# Patient Record
Sex: FEMALE | Race: BLACK OR AFRICAN AMERICAN | NOT HISPANIC OR LATINO | Employment: UNEMPLOYED | URBAN - METROPOLITAN AREA
[De-identification: names, ages, dates, MRNs, and addresses within clinical notes are randomized per-mention and may not be internally consistent; named-entity substitution may affect disease eponyms.]

---

## 2019-09-01 ENCOUNTER — HOSPITAL ENCOUNTER (EMERGENCY)
Facility: HOSPITAL | Age: 49
Discharge: HOME | End: 2019-09-01
Attending: EMERGENCY MEDICINE
Payer: MEDICAID

## 2019-09-01 VITALS
RESPIRATION RATE: 18 BRPM | HEART RATE: 88 BPM | SYSTOLIC BLOOD PRESSURE: 123 MMHG | WEIGHT: 257 LBS | BODY MASS INDEX: 45.54 KG/M2 | OXYGEN SATURATION: 100 % | DIASTOLIC BLOOD PRESSURE: 74 MMHG | TEMPERATURE: 98 F | HEIGHT: 63 IN

## 2019-09-01 DIAGNOSIS — H60.391 OTHER INFECTIVE ACUTE OTITIS EXTERNA OF RIGHT EAR: Primary | ICD-10-CM

## 2019-09-01 PROCEDURE — 69020 DRG XTRNL AUD CANAL ABSCESS: CPT | Mod: RT

## 2019-09-01 PROCEDURE — 25000003 PHARM REV CODE 250: Performed by: PHYSICIAN ASSISTANT

## 2019-09-01 PROCEDURE — 99283 EMERGENCY DEPT VISIT LOW MDM: CPT | Mod: 25

## 2019-09-01 RX ORDER — AMITRIPTYLINE HYDROCHLORIDE 50 MG/1
50 TABLET, FILM COATED ORAL NIGHTLY
COMMUNITY
End: 2022-04-28

## 2019-09-01 RX ORDER — METFORMIN HYDROCHLORIDE 1000 MG/1
1000 TABLET ORAL 2 TIMES DAILY WITH MEALS
Status: ON HOLD | COMMUNITY
End: 2023-08-12

## 2019-09-01 RX ORDER — LOSARTAN POTASSIUM 100 MG/1
100 TABLET ORAL DAILY
COMMUNITY

## 2019-09-01 RX ORDER — ACETAMINOPHEN 325 MG/1
650 TABLET ORAL
Status: COMPLETED | OUTPATIENT
Start: 2019-09-01 | End: 2019-09-01

## 2019-09-01 RX ORDER — NEOMYCIN SULFATE, POLYMYXIN B SULFATE AND HYDROCORTISONE 10; 3.5; 1 MG/ML; MG/ML; [USP'U]/ML
4 SUSPENSION/ DROPS AURICULAR (OTIC) 3 TIMES DAILY
Qty: 10 ML | Refills: 0 | Status: SHIPPED | OUTPATIENT
Start: 2019-09-01 | End: 2022-04-28

## 2019-09-01 RX ORDER — LAMOTRIGINE 100 MG/1
100 TABLET ORAL DAILY
COMMUNITY
End: 2022-05-19

## 2019-09-01 RX ORDER — MECLIZINE HYDROCHLORIDE 25 MG/1
25 TABLET ORAL 3 TIMES DAILY PRN
COMMUNITY
End: 2022-04-28

## 2019-09-01 RX ORDER — ESTRADIOL 1 MG/1
1 TABLET ORAL DAILY
COMMUNITY
End: 2022-05-19

## 2019-09-01 RX ORDER — CITALOPRAM 40 MG/1
40 TABLET, FILM COATED ORAL DAILY
COMMUNITY
End: 2022-04-28

## 2019-09-01 RX ADMIN — LIDOCAINE-EPINEPHRINE-TETRACAINE GEL 4-0.05-0.5% 1 ML: 4-0.05-0.5 GEL at 12:09

## 2019-09-01 RX ADMIN — BACITRACIN, NEOMYCIN, POLYMYXIN B 1 EACH: 400; 3.5; 5 OINTMENT TOPICAL at 12:09

## 2019-09-01 RX ADMIN — ACETAMINOPHEN 650 MG: 325 TABLET, FILM COATED ORAL at 12:09

## 2019-09-01 NOTE — ED TRIAGE NOTES
"Pt reports she woke up with right ear pain this morning and reports inside of ear "feels sloshy and wet".  Pt denies drainage from ear.  Denies taking medication for pain.  Denies f/c/n/v/d.  "

## 2019-09-01 NOTE — ED PROVIDER NOTES
"Encounter Date: 2019    SCRIBE #1 NOTE: I, Sandra Jarrett, am scribing for, and in the presence of,  SERGIO Montaño. I have scribed the following portions of the note - Other sections scribed: HPI, ROS.       History     Chief Complaint   Patient presents with    Otalgia     right ear pain x3 days     CC: Ear pain    HPI:  This is a 48 y.o. female with a PMHx of HTN and DM who presents to the Emergency Department with right ear pain that started 2-3 days ago. Pt states that a pimple was found in her ear when a nurse looked into her ear. Pt also states that her ear felt "wet" this morning and was tender to touch. Pt reports associated symptom of drainage, congestion, and muffled hearing. Pt denies fever and neck pain. Pt also denies swimming. Symptoms worsen with touch.      The history is provided by the patient.     Review of patient's allergies indicates:   Allergen Reactions    Morphine Other (See Comments)     shake     Past Medical History:   Diagnosis Date    Depression     Diabetes mellitus     Fibromyalgia     Hypertension     Vertigo      Past Surgical History:   Procedure Laterality Date     SECTION      HYSTERECTOMY       History reviewed. No pertinent family history.  Social History     Tobacco Use    Smoking status: Former Smoker    Smokeless tobacco: Never Used   Substance Use Topics    Alcohol use: Yes    Drug use: Never     Review of Systems   Constitutional: Negative for chills and fever.   HENT: Positive for congestion and ear pain. Negative for sore throat.         Positive for muffled hearing  Positive for ear drainage   Eyes: Negative for visual disturbance.   Respiratory: Negative for cough and shortness of breath.    Cardiovascular: Negative for chest pain.   Gastrointestinal: Negative for abdominal pain, nausea and vomiting.   Genitourinary: Negative for dysuria and vaginal discharge.   Musculoskeletal: Negative for neck pain.   Skin: Negative for rash. "   Allergic/Immunologic: Negative for immunocompromised state.   Neurological: Negative for headaches.       Physical Exam     Initial Vitals [09/01/19 1118]   BP Pulse Resp Temp SpO2   123/74 88 18 98.2 °F (36.8 °C) 100 %      MAP       --         Physical Exam    Vitals reviewed.  Constitutional: She appears well-developed and well-nourished. She is not diaphoretic. No distress.   HENT:   Head: Normocephalic and atraumatic.   Right Ear: There is drainage, swelling and tenderness. No foreign bodies. No mastoid tenderness. Tympanic membrane is not injected.   Left Ear: External ear normal.   Nose: Nose normal.   Focal swelling, erythema, and purulent drainage from superior aspect of the right external auditory canal, just inside the meatus.  No pain with pinna manipulation.  No other erythema or swelling to the external ear.   Eyes: Conjunctivae are normal. No scleral icterus.   Neck: Normal range of motion. Neck supple.   Cardiovascular: Normal rate and regular rhythm.   Pulmonary/Chest: No respiratory distress.   Musculoskeletal: Normal range of motion.   Lymphadenopathy:     She has no cervical adenopathy.   Neurological: She is alert and oriented to person, place, and time.   Skin: Skin is warm and dry.         ED Course   I & D - Incision and Drainage  Date/Time: 9/1/2019 12:35 PM  Performed by: German Noel PA-C  Authorized by: Chris Luna MD   Consent Done: Yes  Consent: Verbal consent obtained.  Consent given by: patient  Indications for incision and drainage: Pustule.  Body area: head/neck  Location details: right external ear    Anesthesia:  Local Anesthetic: LET (lido,epi,tetracaine)  Scalpel size: 18 gauge needle.  Drainage: pus  Drainage amount: scant  Complications: No  Specimens: No  Implants: No  Patient tolerance: Patient tolerated the procedure well with no immediate complications  Comments: 18 gauge needle to facilitate spontaneous drainage from a pustule inside right external  canal        Labs Reviewed - No data to display       Imaging Results    None          Medical Decision Making:   ED Management:  48-year-old female with right external ear pain with purulent drainage, coming from local source at the superior aspect of the external auditory canal.  No other canal swelling.  TM intact. No evidence of mastoiditis, facial cellulitis, necrotizing fasciitis, or other serious infection.  18 gauge needle was used to facilitate spontaneous drainage of the pustule.  Treated with antibiotic drops, and Tylenol.  Patient advised to follow up with PCP and/or ENT.  Return precautions given.            Scribe Attestation:   Scribe #1: I performed the above scribed service and the documentation accurately describes the services I performed. I attest to the accuracy of the note.               Clinical Impression:       ICD-10-CM ICD-9-CM   1. Other infective acute otitis externa of right ear H60.391 380.10                 Scribe attestation: I, German Noel, personally performed the services described in this documentation. All medical record entries made by the scribe were at my direction and in my presence.  I have reviewed the chart and agree that the record reflects my personal performance and is accurate and complete.               German Noel, LEONARD  09/01/19 4199

## 2019-12-05 ENCOUNTER — HOSPITAL ENCOUNTER (OUTPATIENT)
Dept: PREADMISSION TESTING | Facility: HOSPITAL | Age: 49
Discharge: HOME | End: 2019-12-05
Attending: ORTHOPAEDIC SURGERY
Payer: MEDICAID

## 2019-12-05 ENCOUNTER — ANESTHESIA EVENT (OUTPATIENT)
Dept: SURGERY | Facility: HOSPITAL | Age: 49
End: 2019-12-05
Payer: MEDICAID

## 2019-12-05 VITALS
RESPIRATION RATE: 18 BRPM | DIASTOLIC BLOOD PRESSURE: 97 MMHG | OXYGEN SATURATION: 100 % | BODY MASS INDEX: 45.7 KG/M2 | HEART RATE: 88 BPM | HEIGHT: 63 IN | SYSTOLIC BLOOD PRESSURE: 182 MMHG | WEIGHT: 257.94 LBS

## 2019-12-05 DIAGNOSIS — Z01.818 PRE-OP TESTING: Primary | ICD-10-CM

## 2019-12-05 RX ORDER — SODIUM CHLORIDE, SODIUM LACTATE, POTASSIUM CHLORIDE, CALCIUM CHLORIDE 600; 310; 30; 20 MG/100ML; MG/100ML; MG/100ML; MG/100ML
INJECTION, SOLUTION INTRAVENOUS CONTINUOUS
Status: CANCELLED | OUTPATIENT
Start: 2019-12-05

## 2019-12-05 RX ORDER — CEFAZOLIN SODIUM 2 G/50ML
2 SOLUTION INTRAVENOUS ONCE
Status: CANCELLED | OUTPATIENT
Start: 2019-12-06

## 2019-12-05 RX ORDER — LIDOCAINE HYDROCHLORIDE 10 MG/ML
1 INJECTION, SOLUTION EPIDURAL; INFILTRATION; INTRACAUDAL; PERINEURAL ONCE
Status: CANCELLED | OUTPATIENT
Start: 2019-12-05 | End: 2019-12-05

## 2019-12-05 NOTE — ANESTHESIA PREPROCEDURE EVALUATION
2019  Candy Huynh is a 49 y.o., female scheduled for excision of lipoma from left shoulder on 19.    Past Medical History:   Diagnosis Date    Depression     Diabetes mellitus     Fibromyalgia     Hypertension     Vertigo      Past Surgical History:   Procedure Laterality Date     SECTION      HYSTERECTOMY      NASAL SEPTOPLASTY         Anesthesia Evaluation    I have reviewed the Patient Summary Reports.    I have reviewed the Nursing Notes.   I have reviewed the Medications.     Review of Systems  Anesthesia Hx:  No problems with previous Anesthesia  Denies Family Hx of Anesthesia complications.    Social:  Former Smoker, Social Alcohol Use    Hematology/Oncology:  Hematology Normal        Cardiovascular:   Hypertension  Denies Angina.    Pulmonary:  Pulmonary Normal    Renal/:  Renal/ Normal     Hepatic/GI:  Hepatic/GI Normal    Neurological:  Neurology Normal    Endocrine:   Diabetes, well controlled, type 2    Psych:   depression          Physical Exam  General:  Morbid Obesity    Airway/Jaw/Neck:  Airway Findings: Mouth Opening: Normal Tongue: Normal  General Airway Assessment: Adult  Mallampati: II       Chest/Lungs:  Chest/Lungs Findings: Clear to auscultation, Normal Respiratory Rate     Heart/Vascular:  Heart Findings: Rate: Normal        Mental Status:  Mental Status Findings:  Cooperative, Alert and Oriented       Outside EKG 10/29/19  NSR      Anesthesia Plan  Type of Anesthesia, risks & benefits discussed:  Anesthesia Type:  general  Patient's Preference:   Intra-op Monitoring Plan: standard ASA monitors  Intra-op Monitoring Plan Comments:   Post Op Pain Control Plan: multimodal analgesia  Post Op Pain Control Plan Comments:   Induction:   IV  Beta Blocker:  Patient is not currently on a Beta-Blocker (No further documentation required).       Informed  Consent: Patient understands risks and agrees with Anesthesia plan.  Questions answered.   ASA Score: 3     Day of Surgery Review of History & Physical:        Anesthesia Plan Notes: Anesthesia consent to be signed prior to procedure on 12/6/19  Patient's BP elevated day of PAT, did not take BP meds           Ready For Surgery From Anesthesia Perspective.

## 2019-12-05 NOTE — DISCHARGE INSTRUCTIONS
Your surgery is scheduled for 12/06/2019    Please report to Procedure Check In Room on the 2nd FLOOR at  0845 a.m.          INSTRUCTIONS IMPORTANT!!!  ¨ Do not eat or drink after 12 midnight-including water. OK to brush teeth, no   gum, candy or mints!    ¨ Take only these medicines with a small swallow of water-morning of surgery.    losartan    ____  Proceed to [facility] on *** for additional testing.        ____  Do not wear makeup, including mascara.  ____  No powder, lotions or creams to surgical area.  ____  Please remove all jewelry, including piercings and leave at home.  ____  No money or valuables needed. Please leave at home.  ____  Please bring any documents given by your doctor.  ____  If going home the same day, arrange for a ride home. You will not be able to             drive if Anesthesia was used.  ____  Children under 18 years require a parent / guardian present the entire time             they are in surgery / recovery.  ____  Wear loose fitting clothing. Allow for dressings, bandages.  ____  Stop Aspirin, Ibuprofen, Motrin and Aleve at least 3-5 days before surgery, unless otherwise instructed by your doctor, or the nurse.   You MAY use Tylenol/acetaminophen until day of surgery.  ____  Wash the surgical area with Hibiclens the night before surgery, and again the             morning of surgery.  Be sure to rinse hibiclens off completely (if instructed by   nurse).  ____  If you take diabetic medication, do not take am of surgery unless instructed by Doctor.  ____  Call MD for temperature above 101 degrees or any other signs of infection such as Urinary (bladder) infection, Upper respiratory infection, skin boils, etc.  ____ Stop taking any Fish Oil supplement or any Vitamins that contain Vitamin E at least 5 days prior to surgery.  ____ Do Not wear your contact lenses the day of your procedure.  You may wear your glasses.      ____Do not shave surgical site for 3 days prior to  surgery.  ____ Practice Good hand washing before, during, and after procedure.      I have read or had read and explained to me, and understand the above information.  Additional comments or instructions:  For additional questions call [phone]      ANESTHESIA SIDE EFFECTS  -For the first 24 hours after surgery:  Do not drive, use heavy equipment, make important decisions, or drink alcohol  -It is normal to feel sleepy for several hours.  Rest until you are more awake.  -Have someone stay with you, if needed.  They can watch for problems and help keep you safe.  -Some possible post anesthesia side effects include: nausea and vomiting, sore throat and hoarseness, sleepiness, and dizziness.        Pre-Op Bathing Instructions    Before surgery, you can play an important role in your own health.    Because skin is not sterile, we need to be sure that your skin is as free of germs as possible. By following the instructions below, you can reduce the number of germs on your skin before surgery.    IMPORTANT: You will need to shower with a special soap called Hibiclens*, available at any pharmacy.  If you are allergic to Chlorhexidine (the antiseptic in Hibiclens), use an antibacterial soap such as Dial Soap for your preoperative shower.  You will shower with Hibiclens both the night before your surgery and the morning of your surgery.  Do not use Hibiclens on the head, face or genitals to avoid injury to those areas.    STEP #1: THE NIGHT BEFORE YOUR SURGERY     1. Do not shave the area of your body where your surgery will be performed.  2. Shower and wash your hair and body as usual with your normal soap and shampoo.  3. Rinse your hair and body thoroughly after you shower to remove all soap residue.  4. With your hand, apply one packet of Hibiclens soap to the surgical site.   5. Wash the site gently for five (5) minutes. Do not scrub your skin too hard.   6. Do not wash with your regular soap after Hibiclens is  used.  7. Rinse your body thoroughly.  8. Pat yourself dry with a clean, soft towel.  9. Do not use lotion, cream, or powder.  10. Wear clean clothes.    STEP #2: THE MORNING OF YOUR SURGERY     1. Repeat Step #1.    * Not to be used by people allergic to Chlorhexidine.      Anesthesia: General Anesthesia     You are watched continuously during your procedure by your anesthesia provider.     Youre due to have surgery. During surgery, youll be given medicine called anesthesia or anesthetic. This will keep you comfortable and pain-free. Your anesthesia provider will use general anesthesia.  What is general anesthesia?  General anesthesia puts you into a state like deep sleep. It goes into the bloodstream (IV anesthetics), into the lungs (gas anesthetics), or both. You feel nothing during the procedure. You will not remember it. During the procedure, the anesthesia provider monitors you continuously. He or she checks your heart rate and rhythm, blood pressure, breathing, and blood oxygen.  · IV anesthetics. IV anesthetics are given through an IV line in your arm. Theyre often given first. This is so you are asleep before a gas anesthetic is started. Some kinds of IV anesthetics relieve pain. Others relax you. Your doctor will decide which kind is best in your case.  · Gas anesthetics. Gas anesthetics are breathed into the lungs. They are often used to keep you asleep. They can be given through a facemask or a tube placed in your larynx or trachea (breathing tube).  ¨ If you have a facemask, your anesthesia provider will most likely place it over your nose and mouth while youre still awake. Youll breathe oxygen through the mask as your IV anesthetic is started. Gas anesthetic may be added through the mask.  ¨ If you have a tube in the larynx or trachea, it will be inserted into your throat after youre asleep.  Anesthesia tools and medicines  You will likely have:  · IV anesthetics. These are put into an IV line  into your bloodstream.  · Gas anesthetics. You breathe these anesthetics into your lungs, where they pass into your bloodstream.  · Pulse oximeter. This is a small clip that is attached to the end of your finger. This measures your blood oxygen level.  · Electrocardiography leads (electrodes). These are small sticky pads that are placed on your chest. They record your heart rate and rhythm.  · Blood pressure cuff. This reads your blood pressure.  Risks and possible complications  General anesthesia has some risks. These include:  · Breathing problems  · Nausea and vomiting  · Sore throat or hoarseness (usually temporary)  · Allergic reaction to the anesthetic  · Irregular heartbeat (rare)  · Cardiac arrest (rare)   Anesthesia safety  · Follow all instructions you are given for how long not to eat or drink before your procedure.  · Be sure your doctor knows what medicines and drugs you take. This includes over-the-counter medicines, herbs, supplements, alcohol or other drugs. You will be asked when those were last taken.  · Have an adult family member or friend drive you home after the procedure.  · For the first 24 hours after your surgery:  ¨ Do not drive or use heavy equipment.  ¨ Do not make important decisions or sign legal documents. If important decisions or signing legal documents is necessary during the first 24 hours after surgery, have a trusted family member or spouse act on your behalf.  ¨ Avoid alcohol.  ¨ Have a responsible adult stay with you. He or she can watch for problems and help keep you safe.  Date Last Reviewed: 12/1/2016 © 2000-2017 [employer]. [address] [place] [address], [place] [address]. All rights reserved. This information is not intended as a substitute for professional medical care. Always follow your healthcare professional's instructions.

## 2019-12-05 NOTE — PRE-PROCEDURE INSTRUCTIONS
Allergies, medical, surgical, family and psychosocial histories reviewed with patient. Periop plan of care discussed. Preop instructions given, including medications to take and to hold. Hibiclens soap and instructions on use given. Time given for questions and pt voiced understanding.      Pt contact number: Carmen King [phone]

## 2019-12-06 ENCOUNTER — ANESTHESIA (OUTPATIENT)
Dept: SURGERY | Facility: HOSPITAL | Age: 49
End: 2019-12-06
Payer: MEDICAID

## 2019-12-06 ENCOUNTER — HOSPITAL ENCOUNTER (OUTPATIENT)
Facility: HOSPITAL | Age: 49
Discharge: HOME | End: 2019-12-06
Attending: ORTHOPAEDIC SURGERY | Admitting: ORTHOPAEDIC SURGERY
Payer: MEDICAID

## 2019-12-06 VITALS
TEMPERATURE: 97 F | DIASTOLIC BLOOD PRESSURE: 72 MMHG | HEART RATE: 78 BPM | BODY MASS INDEX: 45.54 KG/M2 | SYSTOLIC BLOOD PRESSURE: 124 MMHG | RESPIRATION RATE: 18 BRPM | OXYGEN SATURATION: 95 % | WEIGHT: 257 LBS | HEIGHT: 63 IN

## 2019-12-06 DIAGNOSIS — Z01.818 PRE-OP TESTING: ICD-10-CM

## 2019-12-06 DIAGNOSIS — D17.20 LIPOMA OF ARM: ICD-10-CM

## 2019-12-06 PROCEDURE — 64415 NJX AA&/STRD BRCH PLXS IMG: CPT | Performed by: STUDENT IN AN ORGANIZED HEALTH CARE EDUCATION/TRAINING PROGRAM

## 2019-12-06 PROCEDURE — 63600175 PHARM REV CODE 636 W HCPCS

## 2019-12-06 PROCEDURE — 01710 ANES PX NRV MUSC UPR A&E NOS: CPT | Performed by: ORTHOPAEDIC SURGERY

## 2019-12-06 PROCEDURE — 63600175 PHARM REV CODE 636 W HCPCS: Performed by: NURSE ANESTHETIST, CERTIFIED REGISTERED

## 2019-12-06 PROCEDURE — 25000003 PHARM REV CODE 250: Performed by: NURSE ANESTHETIST, CERTIFIED REGISTERED

## 2019-12-06 PROCEDURE — 36000706: Performed by: ORTHOPAEDIC SURGERY

## 2019-12-06 PROCEDURE — 88304 TISSUE EXAM BY PATHOLOGIST: CPT | Performed by: PATHOLOGY

## 2019-12-06 PROCEDURE — 25000003 PHARM REV CODE 250: Performed by: STUDENT IN AN ORGANIZED HEALTH CARE EDUCATION/TRAINING PROGRAM

## 2019-12-06 PROCEDURE — 63600175 PHARM REV CODE 636 W HCPCS: Performed by: NURSE PRACTITIONER

## 2019-12-06 PROCEDURE — 63600175 PHARM REV CODE 636 W HCPCS: Performed by: ANESTHESIOLOGY

## 2019-12-06 PROCEDURE — 37000008 HC ANESTHESIA 1ST 15 MINUTES: Performed by: ORTHOPAEDIC SURGERY

## 2019-12-06 PROCEDURE — 25000003 PHARM REV CODE 250: Performed by: ORTHOPAEDIC SURGERY

## 2019-12-06 PROCEDURE — 88304 PR  SURG PATH,LEVEL III: ICD-10-PCS | Mod: 26,,, | Performed by: PATHOLOGY

## 2019-12-06 PROCEDURE — 63600175 PHARM REV CODE 636 W HCPCS: Performed by: STUDENT IN AN ORGANIZED HEALTH CARE EDUCATION/TRAINING PROGRAM

## 2019-12-06 PROCEDURE — 88304 TISSUE EXAM BY PATHOLOGIST: CPT | Mod: 26,,, | Performed by: PATHOLOGY

## 2019-12-06 PROCEDURE — 76942 ECHO GUIDE FOR BIOPSY: CPT | Performed by: STUDENT IN AN ORGANIZED HEALTH CARE EDUCATION/TRAINING PROGRAM

## 2019-12-06 PROCEDURE — 27200704 HC ULTRASOUND NDL/ECHOSTIM: Performed by: ANESTHESIOLOGY

## 2019-12-06 PROCEDURE — 71000015 HC POSTOP RECOV 1ST HR: Performed by: ORTHOPAEDIC SURGERY

## 2019-12-06 PROCEDURE — 37000009 HC ANESTHESIA EA ADD 15 MINS: Performed by: ORTHOPAEDIC SURGERY

## 2019-12-06 PROCEDURE — 36000707: Performed by: ORTHOPAEDIC SURGERY

## 2019-12-06 PROCEDURE — 71000033 HC RECOVERY, INTIAL HOUR: Performed by: ORTHOPAEDIC SURGERY

## 2019-12-06 RX ORDER — ONDANSETRON 2 MG/ML
INJECTION INTRAMUSCULAR; INTRAVENOUS
Status: DISCONTINUED | OUTPATIENT
Start: 2019-12-06 | End: 2019-12-06

## 2019-12-06 RX ORDER — METOCLOPRAMIDE HYDROCHLORIDE 5 MG/ML
10 INJECTION INTRAMUSCULAR; INTRAVENOUS ONCE
Status: COMPLETED | OUTPATIENT
Start: 2019-12-06 | End: 2019-12-06

## 2019-12-06 RX ORDER — ACETAMINOPHEN 10 MG/ML
INJECTION, SOLUTION INTRAVENOUS
Status: DISCONTINUED | OUTPATIENT
Start: 2019-12-06 | End: 2019-12-06

## 2019-12-06 RX ORDER — CEFAZOLIN SODIUM 2 G/50ML
2 SOLUTION INTRAVENOUS
Status: DISCONTINUED | OUTPATIENT
Start: 2019-12-06 | End: 2019-12-06 | Stop reason: HOSPADM

## 2019-12-06 RX ORDER — HYDROMORPHONE HYDROCHLORIDE 2 MG/ML
0.5 INJECTION, SOLUTION INTRAMUSCULAR; INTRAVENOUS; SUBCUTANEOUS EVERY 5 MIN PRN
Status: DISCONTINUED | OUTPATIENT
Start: 2019-12-06 | End: 2019-12-06 | Stop reason: HOSPADM

## 2019-12-06 RX ORDER — SODIUM CHLORIDE 9 MG/ML
INJECTION, SOLUTION INTRAVENOUS CONTINUOUS
Status: DISCONTINUED | OUTPATIENT
Start: 2019-12-06 | End: 2019-12-06 | Stop reason: HOSPADM

## 2019-12-06 RX ORDER — LIDOCAINE HCL/PF 100 MG/5ML
SYRINGE (ML) INTRAVENOUS
Status: DISCONTINUED | OUTPATIENT
Start: 2019-12-06 | End: 2019-12-06

## 2019-12-06 RX ORDER — GLYCOPYRROLATE 0.2 MG/ML
INJECTION INTRAMUSCULAR; INTRAVENOUS
Status: DISCONTINUED | OUTPATIENT
Start: 2019-12-06 | End: 2019-12-06

## 2019-12-06 RX ORDER — SUCCINYLCHOLINE CHLORIDE 20 MG/ML
INJECTION INTRAMUSCULAR; INTRAVENOUS
Status: DISCONTINUED | OUTPATIENT
Start: 2019-12-06 | End: 2019-12-06

## 2019-12-06 RX ORDER — ASPIRIN 325 MG
325 TABLET, DELAYED RELEASE (ENTERIC COATED) ORAL DAILY
Qty: 30 TABLET | Refills: 11 | Status: SHIPPED | OUTPATIENT
Start: 2019-12-06 | End: 2022-05-19

## 2019-12-06 RX ORDER — SODIUM CHLORIDE, SODIUM LACTATE, POTASSIUM CHLORIDE, CALCIUM CHLORIDE 600; 310; 30; 20 MG/100ML; MG/100ML; MG/100ML; MG/100ML
INJECTION, SOLUTION INTRAVENOUS CONTINUOUS
Status: DISCONTINUED | OUTPATIENT
Start: 2019-12-06 | End: 2019-12-06 | Stop reason: HOSPADM

## 2019-12-06 RX ORDER — BUPIVACAINE HYDROCHLORIDE AND EPINEPHRINE 2.5; 5 MG/ML; UG/ML
INJECTION, SOLUTION EPIDURAL; INFILTRATION; INTRACAUDAL; PERINEURAL
Status: DISCONTINUED | OUTPATIENT
Start: 2019-12-06 | End: 2019-12-06 | Stop reason: HOSPADM

## 2019-12-06 RX ORDER — SODIUM CHLORIDE 0.9 % (FLUSH) 0.9 %
10 SYRINGE (ML) INJECTION
Status: DISCONTINUED | OUTPATIENT
Start: 2019-12-06 | End: 2019-12-06 | Stop reason: HOSPADM

## 2019-12-06 RX ORDER — DEXAMETHASONE SODIUM PHOSPHATE 4 MG/ML
INJECTION, SOLUTION INTRA-ARTICULAR; INTRALESIONAL; INTRAMUSCULAR; INTRAVENOUS; SOFT TISSUE
Status: DISCONTINUED | OUTPATIENT
Start: 2019-12-06 | End: 2019-12-06

## 2019-12-06 RX ORDER — ONDANSETRON 2 MG/ML
INJECTION INTRAMUSCULAR; INTRAVENOUS
Status: COMPLETED
Start: 2019-12-06 | End: 2019-12-06

## 2019-12-06 RX ORDER — EPHEDRINE SULFATE 50 MG/ML
INJECTION, SOLUTION INTRAVENOUS
Status: DISCONTINUED | OUTPATIENT
Start: 2019-12-06 | End: 2019-12-06

## 2019-12-06 RX ORDER — METOCLOPRAMIDE HYDROCHLORIDE 5 MG/ML
INJECTION INTRAMUSCULAR; INTRAVENOUS
Status: DISCONTINUED
Start: 2019-12-06 | End: 2019-12-06 | Stop reason: HOSPADM

## 2019-12-06 RX ORDER — MIDAZOLAM HYDROCHLORIDE 1 MG/ML
INJECTION, SOLUTION INTRAMUSCULAR; INTRAVENOUS
Status: DISCONTINUED | OUTPATIENT
Start: 2019-12-06 | End: 2019-12-06

## 2019-12-06 RX ORDER — ROCURONIUM BROMIDE 10 MG/ML
INJECTION, SOLUTION INTRAVENOUS
Status: DISCONTINUED | OUTPATIENT
Start: 2019-12-06 | End: 2019-12-06

## 2019-12-06 RX ORDER — LIDOCAINE HYDROCHLORIDE 10 MG/ML
1 INJECTION, SOLUTION EPIDURAL; INFILTRATION; INTRACAUDAL; PERINEURAL ONCE
Status: DISCONTINUED | OUTPATIENT
Start: 2019-12-06 | End: 2019-12-06 | Stop reason: HOSPADM

## 2019-12-06 RX ORDER — PROPOFOL 10 MG/ML
VIAL (ML) INTRAVENOUS
Status: DISCONTINUED | OUTPATIENT
Start: 2019-12-06 | End: 2019-12-06

## 2019-12-06 RX ORDER — NEOSTIGMINE METHYLSULFATE 1 MG/ML
INJECTION, SOLUTION INTRAVENOUS
Status: DISCONTINUED | OUTPATIENT
Start: 2019-12-06 | End: 2019-12-06

## 2019-12-06 RX ORDER — PHENYLEPHRINE HYDROCHLORIDE 10 MG/ML
INJECTION INTRAVENOUS
Status: DISCONTINUED | OUTPATIENT
Start: 2019-12-06 | End: 2019-12-06

## 2019-12-06 RX ORDER — ONDANSETRON 4 MG/1
4 TABLET, FILM COATED ORAL EVERY 6 HOURS PRN
Qty: 30 TABLET | Refills: 0 | Status: SHIPPED | OUTPATIENT
Start: 2019-12-06 | End: 2022-05-19

## 2019-12-06 RX ORDER — MUPIROCIN 20 MG/G
OINTMENT TOPICAL
Status: DISCONTINUED | OUTPATIENT
Start: 2019-12-06 | End: 2019-12-06 | Stop reason: HOSPADM

## 2019-12-06 RX ORDER — HYDROCODONE BITARTRATE AND ACETAMINOPHEN 7.5; 325 MG/1; MG/1
1 TABLET ORAL EVERY 6 HOURS PRN
Qty: 30 TABLET | Refills: 0 | OUTPATIENT
Start: 2019-12-06 | End: 2020-10-21

## 2019-12-06 RX ORDER — SODIUM CHLORIDE 9 MG/ML
INJECTION, SOLUTION INTRAVENOUS CONTINUOUS PRN
Status: DISCONTINUED | OUTPATIENT
Start: 2019-12-06 | End: 2019-12-06

## 2019-12-06 RX ORDER — CEFAZOLIN SODIUM 2 G/50ML
2 SOLUTION INTRAVENOUS ONCE
Status: DISCONTINUED | OUTPATIENT
Start: 2019-12-06 | End: 2019-12-06 | Stop reason: HOSPADM

## 2019-12-06 RX ORDER — ONDANSETRON 2 MG/ML
4 INJECTION INTRAMUSCULAR; INTRAVENOUS ONCE AS NEEDED
Status: COMPLETED | OUTPATIENT
Start: 2019-12-06 | End: 2019-12-06

## 2019-12-06 RX ORDER — CEFAZOLIN SODIUM 1 G/3ML
INJECTION, POWDER, FOR SOLUTION INTRAMUSCULAR; INTRAVENOUS
Status: DISCONTINUED | OUTPATIENT
Start: 2019-12-06 | End: 2019-12-06

## 2019-12-06 RX ORDER — FENTANYL CITRATE 50 UG/ML
INJECTION, SOLUTION INTRAMUSCULAR; INTRAVENOUS
Status: DISCONTINUED | OUTPATIENT
Start: 2019-12-06 | End: 2019-12-06

## 2019-12-06 RX ADMIN — EPHEDRINE SULFATE 5 MG: 50 INJECTION, SOLUTION INTRAMUSCULAR; INTRAVENOUS; SUBCUTANEOUS at 02:12

## 2019-12-06 RX ADMIN — PHENYLEPHRINE HYDROCHLORIDE 100 MCG: 10 INJECTION INTRAVENOUS at 03:12

## 2019-12-06 RX ADMIN — FENTANYL CITRATE 50 MCG: 50 INJECTION, SOLUTION INTRAMUSCULAR; INTRAVENOUS at 04:12

## 2019-12-06 RX ADMIN — GLYCOPYRROLATE 0.6 MG: 0.2 INJECTION, SOLUTION INTRAMUSCULAR; INTRAVENOUS at 05:12

## 2019-12-06 RX ADMIN — MIDAZOLAM 2 MG: 1 INJECTION INTRAMUSCULAR; INTRAVENOUS at 01:12

## 2019-12-06 RX ADMIN — PHENYLEPHRINE HYDROCHLORIDE 100 MCG: 10 INJECTION INTRAVENOUS at 04:12

## 2019-12-06 RX ADMIN — PHENYLEPHRINE HYDROCHLORIDE 300 MCG: 10 INJECTION INTRAVENOUS at 02:12

## 2019-12-06 RX ADMIN — ONDANSETRON 4 MG: 2 INJECTION INTRAMUSCULAR; INTRAVENOUS at 05:12

## 2019-12-06 RX ADMIN — SODIUM CHLORIDE: 0.9 INJECTION, SOLUTION INTRAVENOUS at 03:12

## 2019-12-06 RX ADMIN — SODIUM CHLORIDE, SODIUM LACTATE, POTASSIUM CHLORIDE, AND CALCIUM CHLORIDE: .6; .31; .03; .02 INJECTION, SOLUTION INTRAVENOUS at 01:12

## 2019-12-06 RX ADMIN — FENTANYL CITRATE 25 MCG: 50 INJECTION, SOLUTION INTRAMUSCULAR; INTRAVENOUS at 04:12

## 2019-12-06 RX ADMIN — EPHEDRINE SULFATE 15 MG: 50 INJECTION, SOLUTION INTRAMUSCULAR; INTRAVENOUS; SUBCUTANEOUS at 02:12

## 2019-12-06 RX ADMIN — NEOSTIGMINE METHYLSULFATE 5 MG: 1 INJECTION INTRAVENOUS at 05:12

## 2019-12-06 RX ADMIN — ONDANSETRON 4 MG: 2 INJECTION, SOLUTION INTRAMUSCULAR; INTRAVENOUS at 03:12

## 2019-12-06 RX ADMIN — SUCCINYLCHOLINE CHLORIDE 100 MG: 20 INJECTION, SOLUTION INTRAMUSCULAR; INTRAVENOUS at 01:12

## 2019-12-06 RX ADMIN — ROCURONIUM BROMIDE 80 MG: 10 INJECTION, SOLUTION INTRAVENOUS at 01:12

## 2019-12-06 RX ADMIN — CEFAZOLIN 2 G: 330 INJECTION, POWDER, FOR SOLUTION INTRAMUSCULAR; INTRAVENOUS at 01:12

## 2019-12-06 RX ADMIN — ACETAMINOPHEN 1000 MG: 10 INJECTION, SOLUTION INTRAVENOUS at 02:12

## 2019-12-06 RX ADMIN — PHENYLEPHRINE HYDROCHLORIDE 200 MCG: 10 INJECTION INTRAVENOUS at 02:12

## 2019-12-06 RX ADMIN — FENTANYL CITRATE 100 MCG: 50 INJECTION, SOLUTION INTRAMUSCULAR; INTRAVENOUS at 01:12

## 2019-12-06 RX ADMIN — LIDOCAINE HYDROCHLORIDE 100 MG: 20 INJECTION, SOLUTION INTRAVENOUS at 01:12

## 2019-12-06 RX ADMIN — PROPOFOL 200 MG: 10 INJECTION, EMULSION INTRAVENOUS at 01:12

## 2019-12-06 RX ADMIN — PHENYLEPHRINE HYDROCHLORIDE 100 MCG: 10 INJECTION INTRAVENOUS at 02:12

## 2019-12-06 RX ADMIN — EPHEDRINE SULFATE 10 MG: 50 INJECTION, SOLUTION INTRAMUSCULAR; INTRAVENOUS; SUBCUTANEOUS at 04:12

## 2019-12-06 RX ADMIN — METOCLOPRAMIDE 10 MG: 5 INJECTION, SOLUTION INTRAMUSCULAR; INTRAVENOUS at 05:12

## 2019-12-06 RX ADMIN — DEXAMETHASONE SODIUM PHOSPHATE 4 MG: 4 INJECTION, SOLUTION INTRAMUSCULAR; INTRAVENOUS at 01:12

## 2019-12-06 NOTE — DISCHARGE INSTRUCTIONS
Do not lift your left arm.  Keep your sling on at all times except for hygiene.  Keep the dressing in place until follow up office visit.   Keep dressing clean dry and intact.   Take your medication as directed.       ANESTHESIA  -For the first 24 hours after surgery:  Do not drive, use heavy equipment, make important decisions, or drink alcohol  -It is normal to feel sleepy for several hours.  Rest until you are more awake.  -Have someone stay with you, if needed.  They can watch for problems and help keep you safe.  -Some possible post anesthesia side effects include: nausea and vomiting, sore throat and hoarseness, sleepiness, and dizziness.    PAIN  -If you have pain after surgery, pain medicine will help you feel better.  Take it as directed, before pain becomes severe.  Most pain relievers taken by mouth need at least 20-30 minutes to start working.  -Do not drive or drink alcohol while taking pain medicine.  -Pain medication can upset your stomach.  Taking them with a little food may help.  -Other ways to help control pain: elevation, ice, and relaxation  -Call your surgeon if still having unmanageable pain an hour after taking pain medicine.  -Pain medicine can cause constipation.  Taking an over-the counter stool softener while on prescription pain medicine and drinking plenty of fluids can prevent this side effect.  -Call your surgeon if you have severe side effects like: breathing problems, trouble waking up, dizziness, confusion, or severe constipation.    NAUSEA  -Some people have nausea after surgery.  This is often because of anesthesia, pain, pain medicine, or the stress of surgery.  -Do not push yourself to eat.  Start off with clear liquids and soup.  Slowly move to solid foods.  Don't eat fatty, rich, spicy foods at first.  Eat smaller amounts.  -If you develop persistent nausea and vomiting please notify your surgeon immediately.    BLEEDING  -Different types of surgery require different types of  care and dressing changes.  It is important to follow all instructions and advice from your surgeon.  Change dressing as directed.  Call your surgeon for any concerns regarding postop bleeding.    SIGNS OF INFECTION  -Signs of infection include: fever, swelling, drainage, and redness  -Notify your surgeon if you have a fever of 100.4 F (38.0 C) or higher.  -Notify your surgeon if you notice redness, swelling, increased pain, pus, or a foul smell at the incision site.          Aspirin, ASA oral tablets  What is this medicine?  ASPIRIN (AS pir in) is a pain reliever. It is used to treat mild pain and fever. This medicine is also used as directed by a doctor to prevent and to treat heart attacks, to prevent strokes, and to treat arthritis or inflammation.  How should I use this medicine?  Take this medicine by mouth with a glass of water. Follow the directions on the package or prescription label. You can take this medicine with or without food. If it upsets your stomach, take it with food. Do not take your medicine more often than directed.  Talk to your pediatrician regarding the use of this medicine in children. While this drug may be prescribed for children as young as 12 years of age for selected conditions, precautions do apply. Children and teenagers should not use this medicine to treat chicken pox or flu symptoms unless directed by a doctor.  Patients over 65 years old may have a stronger reaction and need a smaller dose.  What side effects may I notice from receiving this medicine?  Side effects that you should report to your doctor or health care professional as soon as possible:  · allergic reactions like skin rash, itching or hives, swelling of the face, lips, or tongue  · breathing problems  · changes in hearing, ringing in the ears  · confusion  · general ill feeling or flu-like symptoms  · pain on swallowing  · redness, blistering, peeling or loosening of the skin, including inside the mouth or  nose  · signs and symptoms of bleeding such as bloody or black, tarry stools; red or dark-brown urine; spitting up blood or brown material that looks like coffee grounds; red spots on the skin; unusual bruising or bleeding from the eye, gums, or nose  · trouble passing urine or change in the amount of urine  · unusually weak or tired  · yellowing of the eyes or skin  Side effects that usually do not require medical attention (report to your doctor or health care professional if they continue or are bothersome):  · diarrhea or constipation  · headache  · nausea, vomiting  · stomach gas, heartburn  What may interact with this medicine?  Do not take this medicine with any of the following medications:  · cidofovir  · ketorolac  · probenecid  This medicine may also interact with the following medications:  · alcohol  · alendronate  · bismuth subsalicylate  · flavocoxid  · herbal supplements like feverfew, garlic, marisa, ginkgo biloba, horse chestnut  · medicines for diabetes or glaucoma like acetazolamide, methazolamide  · medicines for gout  · medicines that treat or prevent blood clots like enoxaparin, heparin, ticlopidine, warfarin  · other aspirin and aspirin-like medicines  · NSAIDs, medicines for pain and inflammation, like ibuprofen or naproxen  · pemetrexed  · sulfinpyrazone  · varicella live vaccine  What if I miss a dose?  If you are taking this medicine on a regular schedule and miss a dose, take it as soon as you can. If it is almost time for your next dose, take only that dose. Do not take double or extra doses.  Where should I keep my medicine?  Keep out of the reach of children.  Store at room temperature between 15 and 30 degrees C (59 and 86 degrees F). Protect from heat and moisture. Do not use this medicine if it has a strong vinegar smell. Throw away any unused medicine after the expiration date.  What should I tell my health care provider before I take this medicine?  They need to know if you have  any of these conditions:  · anemia  · asthma  · bleeding problems  · child with chickenpox, the flu, or other viral infection  · diabetes  · gout  · if you frequently drink alcohol containing drinks  · kidney disease  · liver disease  · low level of vitamin K  · lupus  · smoke tobacco  · stomach ulcers or other problems  · an unusual or allergic reaction to aspirin, tartrazine dye, other medicines, dyes, or preservatives  · pregnant or trying to get pregnant  · breast-feeding  What should I watch for while using this medicine?  If you are treating yourself for pain, tell your doctor or health care professional if the pain lasts more than 10 days, if it gets worse, or if there is a new or different kind of pain. Tell your doctor if you see redness or swelling. Also, check with your doctor if you have a fever that lasts for more than 3 days. Only take this medicine to prevent heart attacks or blood clotting if prescribed by your doctor or health care professional.  Do not take aspirin or aspirin-like medicines with this medicine. Too much aspirin can be dangerous. Always read the labels carefully.  This medicine can irritate your stomach or cause bleeding problems. Do not smoke cigarettes or drink alcohol while taking this medicine. Do not lie down for 30 minutes after taking this medicine to prevent irritation to your throat.  If you are scheduled for any medical or dental procedure, tell your healthcare provider that you are taking this medicine. You may need to stop taking this medicine before the procedure.  This medicine may be used to treat migraines. If you take migraine medicines for 10 or more days a month, your migraines may get worse. Keep a diary of headache days and medicine use. Contact your healthcare professional if your migraine attacks occur more frequently.  NOTE:This sheet is a summary. It may not cover all possible information. If you have questions about this medicine, talk to your doctor,  pharmacist, or health care provider. Copyright© 2017 [employer]          Acetaminophen; Hydrocodone tablets or capsules  What is this medicine?  ACETAMINOPHEN; HYDROCODONE (a set a JAZLYN alonso fen; liza droe KOE done) is a pain reliever. It is used to treat moderate to severe pain.  How should I use this medicine?  Take this medicine by mouth with a glass of water. Follow the directions on the prescription label. You can take it with or without food. If it upsets your stomach, take it with food. Do not take your medicine more often than directed.  A special MedGuide will be given to you by the pharmacist with each prescription and refill. Be sure to read this information carefully each time.  Talk to your pediatrician regarding the use of this medicine in children. Special care may be needed.  What side effects may I notice from receiving this medicine?  Side effects that you should report to your doctor or health care professional as soon as possible:  · allergic reactions like skin rash, itching or hives, swelling of the face, lips, or tongue  · breathing problems  · confusion  · redness, blistering, peeling or loosening of the skin, including inside the mouth  · signs and symptoms of low blood pressure like dizziness; feeling faint or lightheaded, falls; unusually weak or tired  · trouble passing urine or change in the amount of urine  · yellowing of the eyes or skin  Side effects that usually do not require medical attention (report to your doctor or health care professional if they continue or are bothersome):  · constipation  · dry mouth  · nausea, vomiting  · tiredness  What may interact with this medicine?  This medicine may interact with the following medications:  · alcohol  · antiviral medicines for HIV or AIDS  · atropine  · antihistamines for allergy, cough and cold  · certain antibiotics like erythromycin, clarithromycin  · certain medicines for anxiety or sleep  · certain medicines for bladder problems  like oxybutynin, tolterodine  · certain medicines for depression like amitriptyline, fluoxetine, sertraline  · certain medicines for fungal infections like ketoconazole and itraconazole  · certain medicines for Parkinson's disease like benztropine, trihexyphenidyl  · certain medicines for seizures like carbamazepine, phenobarbital, phenytoin, primidone  · certain medicines for stomach problems like dicyclomine, hyoscyamine  · certain medicines for travel sickness like scopolamine  · general anesthetics like halothane, isoflurane, methoxyflurane, propofol  · ipratropium  · local anesthetics like lidocaine, pramoxine, tetracaine  · MAOIs like Carbex, Eldepryl, Marplan, Nardil, and Parnate  · medicines that relax muscles for surgery  · other medicines with acetaminophen  · other narcotic medicines for pain or cough  · phenothiazines like chlorpromazine, mesoridazine, prochlorperazine, thioridazine  · rifampin  What if I miss a dose?  If you miss a dose, take it as soon as you can. If it is almost time for your next dose, take only that dose. Do not take double or extra doses.  Where should I keep my medicine?  Keep out of the reach of children. This medicine can be abused. Keep your medicine in a safe place to protect it from theft. Do not share this medicine with anyone. Selling or giving away this medicine is dangerous and against the law.  This medicine may cause accidental overdose and death if it taken by other adults, children, or pets. Mix any unused medicine with a substance like cat litter or coffee grounds. Then throw the medicine away in a sealed container like a sealed bag or a coffee can with a lid. Do not use the medicine after the expiration date.  Store at room temperature between 15 and 30 degrees C (59 and 86 degrees F).  What should I tell my health care provider before I take this medicine?  They need to know if you have any of these conditions:  · brain tumor  · Crohn's disease, inflammatory bowel  disease, or ulcerative colitis  · drug abuse or addiction  · head injury  · heart or circulation problems  · if you often drink alcohol  · kidney disease or problems going to the bathroom  · liver disease  · lung disease, asthma, or breathing problems  · an unusual or allergic reaction to acetaminophen, hydrocodone, other opioid analgesics, other medicines, foods, dyes, or preservatives  · pregnant or trying to get pregnant  · breast-feeding  What should I watch for while using this medicine?  Tell your doctor or health care professional if your pain does not go away, if it gets worse, or if you have new or a different type of pain. You may develop tolerance to the medicine. Tolerance means that you will need a higher dose of the medicine for pain relief. Tolerance is normal and is expected if you take the medicine for a long time.  Do not suddenly stop taking your medicine because you may develop a severe reaction. Your body becomes used to the medicine. This does NOT mean you are addicted. Addiction is a behavior related to getting and using a drug for a non-medical reason. If you have pain, you have a medical reason to take pain medicine. Your doctor will tell you how much medicine to take. If your doctor wants you to stop the medicine, the dose will be slowly lowered over time to avoid any side effects.  There are different types of narcotic medicines (opiates). If you take more than one type at the same time or if you are taking another medicine that also causes drowsiness, you may have more side effects. Give your health care provider a list of all medicines you use. Your doctor will tell you how much medicine to take. Do not take more medicine than directed. Call emergency for help if you have problems breathing or unusual sleepiness.  Do not take other medicines that contain acetaminophen with this medicine. Always read labels carefully. If you have questions, ask your doctor or pharmacist.  If you take too  much acetaminophen get medical help right away. Too much acetaminophen can be very dangerous and cause liver damage. Even if you do not have symptoms, it is important to get help right away.  You may get drowsy or dizzy. Do not drive, use machinery, or do anything that needs mental alertness until you know how this medicine affects you. Do not stand or sit up quickly, especially if you are an older patient. This reduces the risk of dizzy or fainting spells. Alcohol may interfere with the effect of this medicine. Avoid alcoholic drinks.  The medicine will cause constipation. Try to have a bowel movement at least every 2 to 3 days. If you do not have a bowel movement for 3 days, call your doctor or health care professional.  Your mouth may get dry. Chewing sugarless gum or sucking hard candy, and drinking plenty of water may help. Contact your doctor if the problem does not go away or is severe.  NOTE:This sheet is a summary. It may not cover all possible information. If you have questions about this medicine, talk to your doctor, pharmacist, or health care provider. Copyright© 2017 [employer]        Ondansetron tablets  What is this medicine?  ONDANSETRON (on DAN se bereket) is used to treat nausea and vomiting caused by chemotherapy. It is also used to prevent or treat nausea and vomiting after surgery.  How should I use this medicine?  Take this medicine by mouth with a glass of water. Follow the directions on your prescription label. Take your doses at regular intervals. Do not take your medicine more often than directed.  Talk to your pediatrician regarding the use of this medicine in children. Special care may be needed.  What side effects may I notice from receiving this medicine?  Side effects that you should report to your doctor or health care professional as soon as possible:  · allergic reactions like skin rash, itching or hives, swelling of the face, lips or tongue  · breathing  problems  · confusion  · dizziness  · fast or irregular heartbeat  · feeling faint or lightheaded, falls  · fever and chills  · loss of balance or coordination  · seizures  · sweating  · swelling of the hands or feet  · tightness in the chest  · tremors  · unusually weak or tired  Side effects that usually do not require medical attention (report to your doctor or health care professional if they continue or are bothersome):  · constipation or diarrhea  · headache  What may interact with this medicine?  Do not take this medicine with any of the following medications:  · apomorphine  · certain medicines for fungal infections like fluconazole, itraconazole, ketoconazole, posaconazole, voriconazole  · cisapride  · dofetilide  · dronedarone  · pimozide  · thioridazine  · ziprasidone  This medicine may also interact with the following medications:  · carbamazepine  · certain medicines for depression, anxiety, or psychotic disturbances  · fentanyl  · linezolid  · MAOIs like Carbex, Eldepryl, Marplan, Nardil, and Parnate  · methylene blue (injected into a vein)  · other medicines that prolong the QT interval (cause an abnormal heart rhythm)  · phenytoin  · rifampicin  · tramadol  What if I miss a dose?  If you miss a dose, take it as soon as you can. If it is almost time for your next dose, take only that dose. Do not take double or extra doses.  Where should I keep my medicine?  Keep out of the reach of children.  Store between 2 and 30 degrees C (36 and 86 degrees F). Throw away any unused medicine after the expiration date.  What should I tell my health care provider before I take this medicine?  They need to know if you have any of these conditions:  · heart disease  · history of irregular heartbeat  · liver disease  · low levels of magnesium or potassium in the blood  · an unusual or allergic reaction to ondansetron, granisetron, other medicines, foods, dyes, or preservatives  · pregnant or trying to get  pregnant  · breast-feeding  What should I watch for while using this medicine?  Check with your doctor or health care professional right away if you have any sign of an allergic reaction.  NOTE:This sheet is a summary. It may not cover all possible information. If you have questions about this medicine, talk to your doctor, pharmacist, or health care provider. Copyright© 2017 [employer]

## 2019-12-06 NOTE — TRANSFER OF CARE
"Anesthesia Transfer of Care Note    Patient: Candy Huynh    Procedure(s) Performed: Procedure(s) (LRB):  EXCISION, MASS, EXTREMITY (Left)  RECONSTRUCTION-SOFT TISSUE (Left)    Patient location: PACU    Anesthesia Type: general and regional    Transport from OR: Transported from OR on 6-10 L/min O2 by face mask with adequate spontaneous ventilation    Post pain: adequate analgesia    Post assessment: no apparent anesthetic complications and tolerated procedure well    Post vital signs: stable    Level of consciousness: sedated and responds to stimulation    Nausea/Vomiting: no nausea/vomiting    Complications: none    Transfer of care protocol was followed      Last vitals:   Visit Vitals  /73   Pulse 78   Temp 36.2 °C (97.2 °F) (Skin)   Resp 20   Ht 5' 3" (1.6 m)   Wt 116.6 kg (257 lb)   SpO2 100%   Breastfeeding? No   BMI 45.53 kg/m²     "

## 2019-12-06 NOTE — ANESTHESIA PROCEDURE NOTES
Peripheral Block    Patient location during procedure: pre-op   Block not for primary anesthetic.  Reason for block: post-op pain management   Post-op Pain Location: left shoulder  Start time: 12/6/2019 1:30 PM  Timeout: 12/6/2019 1:29 PM   End time: 12/6/2019 1:34 PM    Staffing  Authorizing Provider: Amaury Mustafa MD  Performing Provider: Harman Alfaro MD    Preanesthetic Checklist  Completed: patient identified, site marked, pre-op evaluation, timeout performed, IV checked, risks and benefits discussed and monitors and equipment checked  Peripheral Block  Patient position: sitting  Block type: interscalene  Laterality: left  Injection technique: single shot  Needle  Needle type: Stimuplex   Needle localization: ultrasound guidance  Catheter type: stimulating  Catheter size: 19 G  Catheter at skin depth: 3 cm   -ultrasound image captured on disc.  Assessment  Injection assessment: negative aspiration and local visualized surrounding nerve  Paresthesia pain: none  Heart rate change: no  Slow fractionated injection: no

## 2019-12-06 NOTE — H&P
Reason For Visit  patient comes in for unknown mass on left shoulder that causes pain with movement.      History of Present Illness  The patient is a 49-year-old female who presents for evaluation and treatment of a left anterior shoulder mass. She says this started back in . She associates development of this lump on her shoulder to a period of overusing her left arm while on light duty at work after an accident in 2017. She states she never received appropriate treatment for this left shoulder problem and thus the problem has been getting worse. She complains of pain at night. She denies any neurologic complaints of shooting pains into her hand. Though she has full motion and strength of her shoulder, she states forward flexion causes the mass to hurt. She denies any fevers or chills per    Location: Left anterior shoulder    Severity: 5-6 out of 10    Timing: Onset     Modifying factors: Worse with forward flexion activity, better with rest    Quality: Burning sharp pain    Context: Atraumatic    Associated signs and symptoms: No neurologic complaints       Allergies   · morphine    Current Meds    Medication Name Instruction   busPIRone HCl - 10 MG Oral Tablet    Citalopram Hydrobromide 40 MG Oral Tablet    Estradiol 1 MG Oral Tablet    LaMICtal 150 MG Oral Tablet (LamoTRIgine)    Losartan Potassium 50 MG Oral Tablet    Meclizine HCl - 25 MG Oral Tablet Chewable    metFORMIN HCl - 1000 MG Oral Tablet      Surgical History   · History of  section   · History of Elbow surgery   · History of Hysterectomy   · History of Wrist surgery    Review of Systems  See chart for complete Review of Systems, Family, and Social history noted by patient and personally reviewed today.      Results/Data    Patient had x-rays of the left shoulder taken at an outside facility on 10/16/2019. These are notable for acromioclavicular arthrosis, moderate. No acute fractures or dislocations. The glenohumeral joint has  mild arthritic changes. No soft tissue calcifications noted in these images.       MRI is personally reviewed, taken recently of the left shoulder. These include multiple views of the shoulder demonstrating a large encapsulated mass in the soft tissue of the anterior shoulder and the anterior portion of the deltoid. The mass is same signal as the surrounding fat with no other signal appreciated in T1 images. Fat suppression images demonstrate full suppression of all signal in this mass. These findings together indicate a benign these findings together indicate peer fatty composition of the tissue. No aggressive findings noted. No invasion of nearby muscle or bone structures.     Vitals   Recorded: 30Oct2019 12:17PM   Height 5 ft 3 in   Weight 262 lb    BMI Calculated 46.41   BSA Calculated 2.17   Systolic 136, LUE, Sitting   Diastolic 89, LUE, Sitting   Heart Rate 84, L Brachial Artery   Pulse Quality Normal, L Brachial Artery   Pain Scale 6   Location: Left shoulder     Physical Exam  Vital signs are noted in the chart above.     The patient appears generally well and stated age.    The patient is oriented x 3.    Mood and affect are appropriate and normal.     Gait is normal.    Left shoulder is examined. There is a 5 cm round firm tender mass over the anterior shoulder just lateral to the biceps groove and distal to the AC joint. There is no fluctuance erythema or warmth to this mass. Mild tenderness to palpation at the AC joint. She has full range of motion of the shoulder in all directions but pain with forward flexion in the region of this mass. Rotator cuff strength is 5 out of 5. Deltoid strength is 5 out of 5. Normal sensation all distributions except decreased sensation in the region of the axillary nerve over the posterior and lateral deltoid compared to the right side. Palpable radial pulse.    Right shoulder examined for comparison. No masses or bulges noted over the shoulder. No tenderness to  palpation. Normal range of motion. Normal strength throughout the rotator cuff and entire extremity. Normal sensation in all distributions. Palpable radial pulse.      Assessment    1. 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 The mass is large and affects her activities of      daily living. Due to the position of the mass it is difficult for the patient to raise her arm.      She also has difficulty laying on that side due to pain caused by this mass. Patient has      the impression the mass continues to grow. The patient would like the mass removed. I      have advised her as to the risks and benefits of undergoing surgical intervention. These      include but are not limited to #1 regrowth of the mass, #2 injury to the      surrounding neurovascular structures, especially the axillary nerve and the deltoid      muscles, #3 worsening of pain after surgery, #4 prolonged recovery and limited      shoulder function after surgery, #5 need for repeat surgery. After discussing these risks,      the patient would like to proceed with surgical resection of this mass, which I      think is a reasonable option in this patient due to the limitations it is causing      her. Patient would like to schedule surgery for December 10, 2019, she will require      preoperative medical clearance prior to this. We will see her for surgery at that time and      then in the postop period.    Orders   1. Tobacco Use Screening; Status:Complete;   Done: [id number]    Plan  1. Plan for surgery, left shoulder mass resection  2. Patient will follow-up after surgery      3. Risks and benefits of surgery were discussed at length, including the possibility the mass is cancer, the possibility of regrowth of the mass, persistent pain, need for further surgery.

## 2019-12-06 NOTE — ANESTHESIA POSTPROCEDURE EVALUATION
Anesthesia Post Evaluation    Patient: Candy Huynh    Procedure(s) Performed: Procedure(s) (LRB):  EXCISION, MASS, EXTREMITY (Left)  RECONSTRUCTION-SOFT TISSUE (Left)    Final Anesthesia Type: MAC and regional    Patient location during evaluation: PACU  Patient participation: Yes- Able to Participate  Level of consciousness: awake and alert  Post-procedure vital signs: reviewed and stable  Pain management: adequate  Airway patency: patent    PONV status at discharge: No PONV  Anesthetic complications: no      Cardiovascular status: blood pressure returned to baseline and hemodynamically stable  Respiratory status: unassisted  Hydration status: euvolemic  Follow-up not needed.          Vitals Value Taken Time   /83 12/6/2019  5:46 PM   Temp 36.2 °C (97.2 °F) 12/6/2019  5:31 PM   Pulse 86 12/6/2019  5:47 PM   Resp 19 12/6/2019  5:47 PM   SpO2 97 % 12/6/2019  5:47 PM   Vitals shown include unvalidated device data.      No case tracking events are documented in the log.      Pain/Lisa Score: Lisa Score: 10 (12/6/2019  5:30 PM)

## 2019-12-06 NOTE — INTERVAL H&P NOTE
The patient has been examined and the H&P has been reviewed:    I concur with the findings and no changes have occurred since H&P was written.    Anesthesia/Surgery risks, benefits and alternative options discussed and understood by patient/family.          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

## 2019-12-06 NOTE — OP NOTE
LUCY ABRAHAM  : 1970  MRN: [id number]    Date of Procedure: 2019     Pre-op Diagnosis:  Left deltoid mass    Post-op Diagnosis:  Same    Procedure: excision of soft tissue tumor of arm (61198)     Surgeon: Honorio Pulido IV, MD   Assisting Surgeon:  none     Anesthesia: general, single-shot interscalene nerve blockade    Estimated Blood Loss: 50mL           Specimens:   1) frozen section: resulted as benign appearing fatty tissue  2) permanent section: pending      Implants: none    Indications:   The patient is a 49-year-old female who presented to the clinic for persistent left deltoid swelling and pain.  She had noted a mass developing over the lateral aspect of her shoulder.  This became painful and diagnostic imaging was performed.  This diagnostic imaging was consistent with a lipoma.  The patient was requested resection of this tumor.  A long discussion prior to surgery was had regarding the benefits of surgical treatment as well as the risks.  The risks included recurrence of the tumor, malignant tumor requiring further surgery, injury to the axillary nerve, severe bleeding, persistent pain in her shoulder, motor dysfunction, sensory dysfunction, or death.  Having understood these risks the patient proceeded with surgery.    Surgical procedure:   The patient was given preoperative antibiotics for prophylaxis against infection.  The patient was taken to the operating room and placed in the beach chair position on the OR table.  After adequate general anesthesia and securing of her head, the extremity was prepped and draped in standard sterile fashion. Local anesthetic was injected over the proposed surgical site which was in a standard deltopectoral incision.    (57053) deep to the deltoid fascia was noted to be a swelling deep to the mass of the lateral deltoid.  This was noted to be in the rhaphe between the anterior and lateral deltoid muscles.  The deltopectoral interval was developed  isolating the cephalic vein and taking it medially for protection during the case.  Once the interval was developed, the undersurface of the deltoid was palpated to correlate the location of the mass with the location of the axillary nerve and posterior humeral circumflex artery.  The structures were palpated and noted to be deep to the lesion.  The lateral deltoid was dissected sharply off of the lateral edge of the acromion. A single digit was inserted deep to the location of the lesion on the undersurface of the deltoid.  This finger was retained over the axillary nerve during dissection of the large mass measured above. Dissection was carried out between the anterior and lateral deltoid, revealing a large fatty appearing tumor.  This was taken out EN Kell.  During the dissection through the muscle, direct protection was used for the axillary nerve and posterior humeral circumflex artery.  These were not violated during the dissection.  After the tumor was removed, it was sent to pathology for frozen section as well as permanent section.     Next to the deltoid was reattached to the acromion using #5 braided nonabsorbable suture through drill holes in the lateral acromion. This anatomically restored the deltoid to its origin on the lateral margin of the acromion. The defect between the anterior deltoid and lateral deltoid was closed loosely with #1 Vicryl suture.  The wound was copiously irrigated and then closed in layers using 2 0 Vicryl for the subcutaneous layer and a 3 0 Monocryl for the skin.    Steri-Strips and a waterproof Aquacel dressing were applied over the wound.  The wound closed nicely. After drapes were removed, the patient was placed in a shoulder immobilizer with an abduction pad.  The patient was subsequently extubated in the OR.  She was transferred to the PACU in good condition.        Postoperative plan:  The patient will be nonweightbearing on the operative extremity.  She is to wear the  shoulder immobilizer at all times except for hygiene. She is to keep the dressing on until she sees me in the office in approximately 10 days ([phone]).        Complications: No     Estimated Blood Loss (EBL): 200 mL                   Condition: Good     Disposition: PACU - hemodynamically stable.     Attestation: I was present and scrubbed for the entire procedure.

## 2019-12-06 NOTE — BRIEF OP NOTE
[facility]  Brief Operative Note     SUMMARY     Surgery Date: 12/6/2019     Surgeon(s) and Role:     * Honorio Pulido IV, MD - Primary    Assisting Surgeon: None    Pre-op Diagnosis:  Localized swelling, mass and lump, upper limb [R22.30]    Post-op Diagnosis:  Post-Op Diagnosis Codes:     * Localized swelling, mass and lump, upper limb [R22.30]    Procedure(s) (LRB):  EXCISION, MASS, EXTREMITY (Left)  RECONSTRUCTION-SOFT TISSUE (Left)    Anesthesia: General    Description of the findings of the procedure: see operative report     Findings/Key Components: see operative report    Estimated Blood Loss: * No values recorded between 12/6/2019  2:44 PM and 12/6/2019  5:24 PM *         Specimens:   Specimen (12h ago, onward)    None          Discharge Note    SUMMARY     Admit Date: 12/6/2019    Discharge Date and Time:  12/06/2019 5:43 PM    Hospital Course (synopsis of major diagnoses, care, treatment, and services provided during the course of the hospital stay): no acute events     Final Diagnosis: Post-Op Diagnosis Codes:     * Localized swelling, mass and lump, upper limb [R22.30]    Disposition: Home or Self Care    Follow Up/Patient Instructions:     Medications:  Reconciled Home Medications:      Medication List      START taking these medications    aspirin 325 MG EC tablet  Commonly known as:  ECOTRIN  Take 1 tablet (325 mg total) by mouth once daily.     HYDROcodone-acetaminophen 7.5-325 mg per tablet  Commonly known as:  NORCO  Take 1 tablet by mouth every 6 (six) hours as needed for Pain.     ondansetron 4 MG tablet  Commonly known as:  ZOFRAN  Take 1 tablet (4 mg total) by mouth every 6 (six) hours as needed for Nausea.        CONTINUE taking these medications    amitriptyline 50 MG tablet  Commonly known as:  ELAVIL  Take 50 mg by mouth every evening.     citalopram 40 MG tablet  Commonly known as:  CELEXA  Take 40 mg by mouth once daily.     estradiol 1 MG tablet  Commonly known as:   ESTRACE  Take 1 mg by mouth once daily.     lamoTRIgine 100 MG tablet  Commonly known as:  LAMICTAL  Take 100 mg by mouth once daily.     losartan 100 MG tablet  Commonly known as:  COZAAR  Take 100 mg by mouth once daily.     meclizine 25 mg tablet  Commonly known as:  ANTIVERT  Take 25 mg by mouth 3 (three) times daily as needed.     metFORMIN 1000 MG tablet  Commonly known as:  GLUCOPHAGE  Take 1,000 mg by mouth 2 (two) times daily with meals.     neomycin-polymyxin-hydrocortisone 3.5-10,000-1 mg/mL-unit/mL-% otic suspension  Commonly known as:  CORTISPORIN  Place 4 drops into the right ear 3 (three) times daily.          Discharge Procedure Orders   Diet Adult Regular     Ice to affected area     Leave dressing on - Keep it clean, dry, and intact until clinic visit     Other restrictions (specify):   Order Comments: Sling at all times on left arm     Activity as tolerated     Weight bearing restrictions (specify):   Order Comments: NWLANE KUMAR, sling at all times

## 2019-12-07 NOTE — PLAN OF CARE
Pt ambulated to bathroom and voided freely, burping and states relief of nausea and gas discomfort. Tolerating clear liquids and crackers. Assisted pt to get dressed. Left arm in sling shot at all times. Explained and demonstrated to pt and family proper use and position of sling. IV discontinued. Discharge instructions given to pt and to family with time for questions and they voiced understanding. The family already has the delivered filled rx from out pharmacy. Pt able to tolerate activity to and from bathroom and getting dressed very well. Placed up in wheelchair and tolerating well Denies complaints. Looking forward to going home. Pt and family agree with discharge plan.

## 2019-12-09 NOTE — ADDENDUM NOTE
Addendum  created 12/09/19 1019 by Jacob Man MD    Cosign clinical note, Intraprocedure Flowsheets edited

## 2019-12-10 NOTE — BRIEF OP NOTE
[facility]  Brief Operative Note     SUMMARY     Surgery Date: 12/6/2019     Surgeon(s) and Role:     * Honorio Pulido IV, MD - Primary    Assisting Surgeon: None    Pre-op Diagnosis:  Localized swelling, mass and lump, upper limb [R22.30]    Post-op Diagnosis:  Post-Op Diagnosis Codes:     * Localized swelling, mass and lump, upper limb [R22.30]    Procedure(s) (LRB):  EXCISION, MASS, EXTREMITY (Left)  RECONSTRUCTION-SOFT TISSUE (Left)    Anesthesia: General    Description of the findings of the procedure: see operative report    Findings/Key Components: see operative report    Estimated Blood Loss: * No values recorded between 12/6/2019  2:44 PM and 12/6/2019  5:24 PM *         Specimens:   Specimen (12h ago, onward)    None          Discharge Note    SUMMARY     Admit Date: 12/6/2019    Discharge Date: 12/6/2019    Hospital Course (synopsis of major diagnoses, care, treatment, and services provided during the course of the hospital stay): no acute events     Final Diagnosis: Post-Op Diagnosis Codes:     * Localized swelling, mass and lump, upper limb [R22.30]    Disposition: Home or Self Care    Follow Up/Patient Instructions:     Medications:  Reconciled Home Medications:      Medication List      START taking these medications    aspirin 325 MG EC tablet  Commonly known as:  ECOTRIN  Take 1 tablet (325 mg total) by mouth once daily.     HYDROcodone-acetaminophen 7.5-325 mg per tablet  Commonly known as:  NORCO  Take 1 tablet by mouth every 6 (six) hours as needed for Pain.     ondansetron 4 MG tablet  Commonly known as:  ZOFRAN  Take 1 tablet (4 mg total) by mouth every 6 (six) hours as needed for Nausea.        CONTINUE taking these medications    amitriptyline 50 MG tablet  Commonly known as:  ELAVIL  Take 50 mg by mouth every evening.     citalopram 40 MG tablet  Commonly known as:  CELEXA  Take 40 mg by mouth once daily.     estradiol 1 MG tablet  Commonly known as:  ESTRACE  Take 1 mg by  mouth once daily.     lamoTRIgine 100 MG tablet  Commonly known as:  LAMICTAL  Take 100 mg by mouth once daily.     losartan 100 MG tablet  Commonly known as:  COZAAR  Take 100 mg by mouth once daily.     meclizine 25 mg tablet  Commonly known as:  ANTIVERT  Take 25 mg by mouth 3 (three) times daily as needed.     metFORMIN 1000 MG tablet  Commonly known as:  GLUCOPHAGE  Take 1,000 mg by mouth 2 (two) times daily with meals.     neomycin-polymyxin-hydrocortisone 3.5-10,000-1 mg/mL-unit/mL-% otic suspension  Commonly known as:  CORTISPORIN  Place 4 drops into the right ear 3 (three) times daily.          Discharge Procedure Orders   Diet Adult Regular     Ice to affected area     Leave dressing on - Keep it clean, dry, and intact until clinic visit     Other restrictions (specify):   Order Comments: Sling at all times on left arm     Activity as tolerated     Weight bearing restrictions (specify):   Order Comments: NWB LUE, sling at all times     Follow-up Information     Honorio Pulido IV, MD.    Specialty:  Orthopedic Surgery  Why:  For wound re-check  Contact information:  [address] [place] [address]  [address] [place] [id number] [phone]

## 2019-12-18 LAB
FINAL PATHOLOGIC DIAGNOSIS: NORMAL
FROZEN SECTION DIAGNOSIS: NORMAL
FROZEN SECTION FOOTNOTE: NORMAL
GROSS: NORMAL

## 2020-02-06 ENCOUNTER — CLINICAL SUPPORT (OUTPATIENT)
Dept: REHABILITATION | Facility: HOSPITAL | Age: 50
End: 2020-02-06
Payer: MEDICAID

## 2020-02-06 DIAGNOSIS — M25.512 LEFT ANTERIOR SHOULDER PAIN: ICD-10-CM

## 2020-02-06 PROCEDURE — 97161 PT EVAL LOW COMPLEX 20 MIN: CPT | Mod: PN

## 2020-02-06 NOTE — PLAN OF CARE
Physical Therapy Initial Evaluation     Name: Candy King [facility] Number: [id number]    Diagnosis:   Encounter Diagnosis   Name Primary?    Left anterior shoulder pain      Physician: Honorio Pulido IV, MD  Treatment Orders: PT Eval and Treat  Past Medical History:   Diagnosis Date    Depression     Diabetes mellitus     Fibromyalgia     Hypertension     Vertigo       Current Outpatient Medications   Medication Sig    amitriptyline (ELAVIL) 50 MG tablet Take 50 mg by mouth every evening.    aspirin (ECOTRIN) 325 MG EC tablet Take 1 tablet (325 mg total) by mouth once daily.    citalopram (CELEXA) 40 MG tablet Take 40 mg by mouth once daily.    estradiol (ESTRACE) 1 MG tablet Take 1 mg by mouth once daily.    HYDROcodone-acetaminophen (NORCO) 7.5-325 mg per tablet Take 1 tablet by mouth every 6 (six) hours as needed for Pain.    lamoTRIgine (LAMICTAL) 100 MG tablet Take 100 mg by mouth once daily.    losartan (COZAAR) 100 MG tablet Take 100 mg by mouth once daily.    meclizine (ANTIVERT) 25 mg tablet Take 25 mg by mouth 3 (three) times daily as needed.    metFORMIN (GLUCOPHAGE) 1000 MG tablet Take 1,000 mg by mouth 2 (two) times daily with meals.    neomycin-polymyxin-hydrocortisone (CORTISPORIN) 3.5-10,000-1 mg/mL-unit/mL-% otic suspension Place 4 drops into the right ear 3 (three) times daily.    ondansetron (ZOFRAN) 4 MG tablet Take 1 tablet (4 mg total) by mouth every 6 (six) hours as needed for Nausea.     No current facility-administered medications for this visit.      Review of patient's allergies indicates:   Allergen Reactions    Morphine Other (See Comments)     shake       Evaluation Date: 2-6-20  PT Diagnosis: L shoulder pain  Authorization period: 1-23-20 to 3-1-20  Visit # authorized: 20  Evaluation Time in/Out: see POC    Subjective     History of condition/Onset Date:   on December 6, 2019 pt underwent L shoulder surgery to  "remove a mass that was "in between her muscle and nerve". She says that the doctor said they "had to remove a muscle to get to the mass." she was wearing a sling for 6 wks. Denies having any specific restrictions from the doctor but says she is having a lot of trouble using her L arm at all. She still having ROM deficits from being immobilized.    PRECAUTIONS: dep, DM, HTN, fibromyalgia  Primary complaint: L shoulder pain  Describes pain as achy in ant shoulder, itchy around incision. Incision is hypersensitive.   Prior Treatment/Easing factors: tried tylenol/ibuprofen which doesn't change the pain much. Sometimes heat helps.  Current Functional Limitations:  pain and difficulty when trying to sleep, lifting groceries, folding laundry, driving due to L shoulder pain.   Occupation:  Was going to school prior to shoulder surgery. Not working.                       Prior Functional Status: pt living with her daughter who helps her with things around the house.   Pain Scale 0-10:  Current: 7/10     Best: 6/10      Worst: 10/10 says she has just dealt with the pain like this. Asked for pain meds but the doctor did not give her any.   Pts goals:  Get L shoulder feeling better and working again.     Objective     Observation/Posture: rounded shoulders  Pt with vertical incision along upper biceps measuring about 6 inches, dry healed, looks healthy. Pt notes its sensitive.   Palpation: significant TTP of scapular attachments on L side, biceps    Cervical Range of Motion:    % Pain   Flexion WFL    Extension WFL    Right Rotation 75    Left Rotation 75 Painful and tight     Right Side Bending WNL    Left Side Bending WNL       Shoulder Range of Motion:   Shoulder Left Right   Flexion 81 155   Abduction 52 WNL   ER Barely Able to reach L side of face WNL   IR L PSIS WNL     MMT   strength:   R= 50, 60 pounds force  L= 30,35 pounds force   Right Left Comment   Shoulder flexion: 4/5 3/5 Painful with all resistance applied " to L UE, MMT score based on lack of available range to perform test properly.    Shoulder Abduction: 4-/5 3-/5    Shoulder ER: 4/5 3/5    Shoulder IR: 4+/5 3-/5    Lower Trap: 3+/5 3-/5    Middle Trap: 3+/5 3+/5        Pt/family was provided educational information, including: role of PT, goals for PT, scheduling - pt verbalized understanding.   Discussed insurance limitations with pt.     Functional Limitations Reports - G Codes  Category: Other PT    Tool: FOTO Cervical Survey   Modifier  Impairment Limitation Restriction    CH  0 % impaired, limited or restricted    CI  @ least 1% but less than 20% impaired, limited or restricted    CJ  @ least 20%<40% impaired, limited or restricted    CK  @ least 40%<60% impaired, limited or restricted    CL  @ least 60% <80% impaired, limited or restricted    CM  @ least 80%<100% impaired limited or restricted    CN  100% impaired, limited or restricted     Current/: CL = 70% Limitation   Goal/ : CI = 19% Limitation      History  Co-morbidities and personal factors that may impact the plan of care Co-morbidities:   depression, diabetes, difficulty sleeping, high BMI, HTN and fibromyalgia.    Personal Factors:   no deficits     low   Examination  Body Structures and Functions, activity limitations and participation restrictions that may impact the plan of care Body Regions:   upper extremities  trunk    Body Systems:    gross symmetry  ROM  strength  gross coordinated movement  motor control  motor learning    Participation Restrictions:   none    Activity limitations:   Learning and applying knowledge  no deficits    General Tasks and Commands  no deficits    Communication  no deficits    Mobility  lifting and carrying objects  fine hand use (grasping/picking up)  driving (bike, car, motorcycle)  household chores    Self care  caring for body parts (brushing teeth, shaving, grooming)  dressing     Domestic Life  no deficits    Interactions/Relationships  no  deficits    Life Areas  no deficits    Community and Social Life  no deficits         low   Clinical Presentation stable and uncomplicated low   Decision Making/ Complexity Score: low         TREATMENT     PT Evaluation Completed? Yes  Educated pt on treatment goals and plan of care. Pt demo good understanding along with good return demonstration of home exercise program.      Candy received 00 minutes of therapeutic exercise & instruction to improve LOF per flowsheet.   Will plan to give HEP at first visit for comfortable AAROM stretches to improve L shoulder ROM.     MHP applied to L shoulder while semi-reclined to decrease muscle pain for ten mins at end of eval.   Candy received 00 minutes of manual therapy including:    Written Home Exercises Provided: no.   Candy verbalized good understanding of the education provided.   Patient demo good return demo with skill during exercises.    Assessment     Patient displays limitations in L shoulder AROM and strength due to recent immobilization following surgery to remove a mass in her L shoulder. She was very apprehensive to movement and reports very high levels of pain. Will take caution in prescribing HEP and starting AAROM exercises at next session. Pt requested heating pad and noted comfort with this.   Pt will benefit from skilled outpatient physical therapy to address the above stated deficits, provide pt/family education and to maximize pt's level of independence.  Pt prognosis is Excellent.      Medical necessity is demonstrated by the following IMPAIRMENTS/PROBLEMS:  - Pain limiting function  - Decreased Segmental Mobility & Decreased ROM  - Decreased Core & B UE strength  - Decreased Flexibility of L UE  - Decreased Tolerance to Functional Activities  - Inability to participate in vocational pursuits  - Requires skilled supervision to complete and progress HEP    Pt's spiritual, cultural and educational needs considered and pt agreeable to plan of care  and goals as stated below:     Anticipated Barriers for physical therapy: none    Short Term GOALS:  1. Pt to report average pain <5/10 with regular ADLs.   2.  Pt will be able to tolerate multi-directional UE strengthening without pain to improve functional use of UEs.  3. Pt will report >50% improvement in ability to dress self/put on jacket without pain since start of care to indicate improved functional mobility.    4. Pt to be able to sleep >4hrs without interruption due to pain.  5. Pt to begin HEP including specific stretching and strengthening as per their diagnosis to decrease pain and improve flexibility.  6. Pt to display >100 deg L shoulder flexion, >85 deg ABD in standing to allow for improved fxl level.       Long Term GOALS:   1. Pt to report <2/10, no longer limiting functional activities.  2. Pt to report return to >75% of regular ADLs, work and recreational tasks.   3. Pt to be compliant in advanced HEP to maintain progress gained in therapy thus far.  4. Pt will be able to perform 2 x 10 shoulder flexion with 2 lbs to indicate improved strength in B UEs.  5. Pt will report >75% improvement in ability to sleep/dress self/cook/clean  without pain since start of care to indicate improved functional mobility.     PLAN   Plan of care Certification: 2/6/2020 to 5-6-20.    Outpatient physical therapy 1-2 times per week x 12 weeks to include: patient education, therapeutic exercises, neuromuscular re-education, pain management/modalities prn, joint mobilizations, and regular update of pt's home exercise program.  Pt may be seen by PTA as part of the rehabilitation team.     Mimi Rodriguez DPT

## 2020-02-19 ENCOUNTER — DOCUMENTATION ONLY (OUTPATIENT)
Dept: REHABILITATION | Facility: HOSPITAL | Age: 50
End: 2020-02-19

## 2020-02-19 NOTE — PROGRESS NOTES
Missed Visit/Cancellation     Date: 02/19/2020      Canceled Number: 0  No Show Number: 1             Pt initially had visit scheduled today for 1000.  Pt with NS visit this morning post initial evaluation.  Pt's next scheduled appointment is  2/21/20 at 1000.

## 2020-03-25 PROBLEM — M25.512 LEFT ANTERIOR SHOULDER PAIN: Status: RESOLVED | Noted: 2020-02-06 | Resolved: 2020-03-25

## 2020-06-03 ENCOUNTER — HOSPITAL ENCOUNTER (EMERGENCY)
Facility: HOSPITAL | Age: 50
Discharge: HOME | End: 2020-06-03
Attending: EMERGENCY MEDICINE
Payer: MEDICAID

## 2020-06-03 VITALS
RESPIRATION RATE: 18 BRPM | HEIGHT: 63 IN | DIASTOLIC BLOOD PRESSURE: 77 MMHG | SYSTOLIC BLOOD PRESSURE: 124 MMHG | OXYGEN SATURATION: 99 % | WEIGHT: 265 LBS | HEART RATE: 84 BPM | BODY MASS INDEX: 46.95 KG/M2 | TEMPERATURE: 98 F

## 2020-06-03 DIAGNOSIS — M79.605 ACUTE PAIN OF LEFT LOWER EXTREMITY: Primary | ICD-10-CM

## 2020-06-03 DIAGNOSIS — M79.606 LEG PAIN: ICD-10-CM

## 2020-06-03 PROCEDURE — 99284 EMERGENCY DEPT VISIT MOD MDM: CPT | Mod: 25

## 2020-06-03 PROCEDURE — 25000003 PHARM REV CODE 250: Performed by: PHYSICIAN ASSISTANT

## 2020-06-03 RX ORDER — MELOXICAM 7.5 MG/1
7.5 TABLET ORAL DAILY
Qty: 12 TABLET | Refills: 0 | OUTPATIENT
Start: 2020-06-03 | End: 2020-07-12

## 2020-06-03 RX ORDER — IBUPROFEN 600 MG/1
600 TABLET ORAL
Status: COMPLETED | OUTPATIENT
Start: 2020-06-03 | End: 2020-06-03

## 2020-06-03 RX ADMIN — IBUPROFEN 600 MG: 600 TABLET, FILM COATED ORAL at 05:06

## 2020-06-03 NOTE — ED TRIAGE NOTES
Pt presents to the ED via personal transportation reporting left leg pain x 3 days. Pt currently reporting pain 10/10 behind her knee and upper part of her calf. Pt denies hx of blood clots. Pt reporting pain only when walking or stretching. Denies recent injuries or falls.

## 2020-06-03 NOTE — PROVIDER PROGRESS NOTES - EMERGENCY DEPT.
Emergency Department TeleTRIAGE Encounter Note      CHIEF COMPLAINT    Chief Complaint   Patient presents with    Leg Pain     pain behind ledt leg started x 4 days .Denies trauma or fall       VITAL SIGNS   Initial Vitals [06/03/20 1527]   BP Pulse Resp Temp SpO2   (!) 145/84 98 18 98.2 °F (36.8 °C) 99 %      MAP       --            ALLERGIES    Review of patient's allergies indicates:   Allergen Reactions    Morphine Other (See Comments)     shake       PROVIDER TRIAGE NOTE  Patient with past medical history depression, diabetes, fibromyalgia, hypertension presents to the ED for evaluation of left lower leg pain.  Patient reports left calf pain that started 4 days ago.  She denies trauma or injury.  She reports the leg feels swollen and is tender to palpation.  She denies erythema at this time.  She took over-the-counter Tylenol without relief.  Patient has not had a DVT before, she is not on birth control, she has not had any recent travel or surgeries.    ORDERS  Labs Reviewed - No data to display    ED Orders (720h ago, onward)    Start Ordered     Status Ordering Provider    06/03/20 1541 06/03/20 1540   Lower Extremity Veins Left  1 time imaging      Ordered DANYELL SCHERER            Virtual Visit Note: The provider triage portion of this emergency department evaluation and documentation was performed via [employer], a HIPAA-compliant telemedicine application, in concert with a tele-presenter in the room. A face to face patient evaluation with one of my colleagues will occur once the patient is placed in an emergency department room.      DISCLAIMER: This note was prepared with [employer] voice recognition transcription software. Garbled syntax, mangled pronouns, and other bizarre constructions may be attributed to that software system.

## 2020-06-04 NOTE — ED PROVIDER NOTES
Encounter Date: 6/3/2020    SCRIBE #1 NOTE: I, Fidencio Bueno, am scribing for, and in the presence of,  Jairo Aparicio PA-C. I have scribed the following portions of the note - Other sections scribed: HPI/ROS.       History     Chief Complaint   Patient presents with    Leg Pain     pain behind ledt leg started x 4 days .Denies trauma or fall     Pt seen by provider at 5:45PM    This 49 y.o. female with a medical history of DM, HTN, and fibromyalgia presents to the ED for an emergent evaluation of L popliteal and L upper calf pain x 3-4 days. No recent traumas or injuries to the LLE. No prior episodes of similar pain. She attempted tx previously with Tylenol without relief. No alleviating factors. No recent long distance traveling. Otherwise, pt denies fever, chills, rashes, wounds, n/v, numbness, weakness, and any other associated symptoms.    The history is provided by the patient. No  was used.     Review of patient's allergies indicates:   Allergen Reactions    Morphine Other (See Comments)     shake     Past Medical History:   Diagnosis Date    Depression     Diabetes mellitus     Fibromyalgia     Hypertension     Vertigo      Past Surgical History:   Procedure Laterality Date     SECTION      EXCISION OF MASS OF EXTREMITY Left 2019    Procedure: EXCISION, MASS, EXTREMITY;  Surgeon: Honorio Pulido IV, MD;  Location: [facility] OR;  Service: Orthopedics;  Laterality: Left;  excision lipoma  Request Lacie    HERNIA REPAIR      HYSTERECTOMY      NASAL SEPTOPLASTY       History reviewed. No pertinent family history.  Social History     Tobacco Use    Smoking status: Former Smoker    Smokeless tobacco: Never Used   Substance Use Topics    Alcohol use: Yes    Drug use: Never     Review of Systems   Constitutional: Negative for chills and fever.   Gastrointestinal: Negative for nausea and vomiting.   Musculoskeletal: Positive for myalgias. Negative for arthralgias.    Skin: Negative for rash and wound.   Neurological: Negative for weakness and numbness.   All other systems reviewed and are negative.      Physical Exam     Initial Vitals [06/03/20 1527]   BP Pulse Resp Temp SpO2   (!) 145/84 98 18 98.2 °F (36.8 °C) 99 %      MAP       --         Physical Exam    Nursing note and vitals reviewed.  Constitutional: She appears well-developed and well-nourished. She is not diaphoretic. No distress.   HENT:   Head: Normocephalic and atraumatic.   Nose: Nose normal.   Eyes: Conjunctivae and EOM are normal. Right eye exhibits no discharge. Left eye exhibits no discharge.   Neck: Normal range of motion. No tracheal deviation present. No JVD present.   Cardiovascular: Normal rate, regular rhythm and normal heart sounds. Exam reveals no friction rub.    No murmur heard.  Pulmonary/Chest: Breath sounds normal. No stridor. No respiratory distress. She has no wheezes. She has no rhonchi. She has no rales. She exhibits no tenderness.   Musculoskeletal: Normal range of motion.   Reproducible tenderness to the left calf with mild unilateral swelling on the left.  No erythema.  No bony tenderness of the left lower extremity.  Full ROM of left lower extremity.  Fully bears on the left lower extremity and is ambulatory.  Distal lower extremity pulses 2+ and equal.  Sensation intact and equal.  No foot drop.  Soft compartments.   Neurological: She is alert and oriented to person, place, and time.   Skin: Skin is warm and dry. No rash and no abscess noted. No erythema. No pallor.         ED Course   Procedures  Labs Reviewed - No data to display       Imaging Results          US Lower Extremity Veins Left (Final result)  Result time 06/03/20 19:16:48    Final result by Jaycob Stone MD (06/03/20 19:16:48)                 Impression:      No evidence of left lower extremity deep venous thrombosis.      Electronically signed by: Jaycob Stone MD  Date:    06/03/2020  Time:    19:16              Narrative:    EXAMINATION:  US LOWER EXTREMITY VEINS LEFT    CLINICAL HISTORY:  Pain in leg, unspecified    TECHNIQUE:  Duplex and color flow Doppler evaluation of the left lower extremity veins was performed.    COMPARISON:  None    FINDINGS:  No evidence of clot involving the bilateral common femoral veins or left greater saphenous, femoral, popliteal, peroneal, anterior and posterior tibial veins.  All venous structures demonstrate normal respiratory phasicity and augment adequately.  No evidence of soft tissue mass or Baker's cyst.                                 Medical Decision Making:   History:   Old Medical Records: I decided to obtain old medical records.  Clinical Tests:   Lab Tests: Ordered and Reviewed  Radiological Study: Ordered and Reviewed  ED Management:  I am unsure of the etiology of this patient's symptoms, however, I do not believe they are of emergent/life threatening etiology at this time.  No DVT or Baker cyst.  No vascular compromise or septic joint.  No overlying cellulitis.  I doubt occult hip and spine injury.  Symptoms improved in the ED with pain medication.  Advising follow-up with PCP.  Strict return precautions discussed with patient who is agreeable to the plan.            Scribe Attestation:   Scribe #1: I performed the above scribed service and the documentation accurately describes the services I performed. I attest to the accuracy of the note.                          Clinical Impression:       ICD-10-CM ICD-9-CM   1. Acute pain of left lower extremity M79.605 729.5   2. Leg pain M79.606 729.5           Scribe Attestation: I, Jairo Aparicio PA-C, personally performed the services described in this documentation. All medical record entries made by the scribe were at my direction and in my presence. I have reviewed the chart and agree that the record reflects my personal performance and is accurate and complete.   ED Disposition Condition    Discharge Stable        ED  Prescriptions     Medication Sig Dispense Start Date End Date Auth. Provider    meloxicam (MOBIC) 7.5 MG tablet Take 1 tablet (7.5 mg total) by mouth once daily. 12 tablet 6/3/2020  Jairo Aparicio PA-C        Follow-up Information     Follow up With Specialties Details Why Contact Info     [facility]  Schedule an appointment as soon as possible for a visit in 1 day For reevaluation [address] [place] [address]  [phone]      [facility] Go to  If symptoms worsen [address] [place] [id number] [phone]                                     Jairo Aparicio PA-C  06/03/20 7562

## 2020-06-24 ENCOUNTER — HOSPITAL ENCOUNTER (OUTPATIENT)
Dept: RADIOLOGY | Facility: HOSPITAL | Age: 50
Discharge: HOME | End: 2020-06-24
Attending: ORTHOPAEDIC SURGERY
Payer: MEDICAID

## 2020-06-24 DIAGNOSIS — M25.562 ACUTE PAIN OF LEFT KNEE: ICD-10-CM

## 2020-06-24 DIAGNOSIS — M25.562 ACUTE PAIN OF LEFT KNEE: Primary | ICD-10-CM

## 2020-06-24 PROCEDURE — 73564 X-RAY EXAM KNEE 4 OR MORE: CPT | Mod: TC,FY,LT

## 2020-06-24 PROCEDURE — 73564 X-RAY EXAM KNEE 4 OR MORE: CPT | Mod: 26,LT,, | Performed by: RADIOLOGY

## 2020-06-24 PROCEDURE — 73564 XR KNEE COMP 4 OR MORE VIEWS LEFT: ICD-10-PCS | Mod: 26,LT,, | Performed by: RADIOLOGY

## 2020-07-12 ENCOUNTER — HOSPITAL ENCOUNTER (EMERGENCY)
Facility: HOSPITAL | Age: 50
Discharge: HOME | End: 2020-07-12
Attending: EMERGENCY MEDICINE
Payer: MEDICAID

## 2020-07-12 VITALS
BODY MASS INDEX: 49.07 KG/M2 | TEMPERATURE: 98 F | DIASTOLIC BLOOD PRESSURE: 80 MMHG | WEIGHT: 277 LBS | SYSTOLIC BLOOD PRESSURE: 147 MMHG | OXYGEN SATURATION: 98 % | RESPIRATION RATE: 16 BRPM | HEART RATE: 97 BPM

## 2020-07-12 DIAGNOSIS — L60.0 INGROWN NAIL: Primary | ICD-10-CM

## 2020-07-12 DIAGNOSIS — M79.644 THUMB PAIN, RIGHT: ICD-10-CM

## 2020-07-12 PROCEDURE — 25000003 PHARM REV CODE 250: Mod: ER | Performed by: EMERGENCY MEDICINE

## 2020-07-12 PROCEDURE — 99284 EMERGENCY DEPT VISIT MOD MDM: CPT | Mod: 25,ER

## 2020-07-12 PROCEDURE — 11750 EXCISION NAIL&NAIL MATRIX: CPT | Mod: F5,ER

## 2020-07-12 RX ORDER — AMOXICILLIN AND CLAVULANATE POTASSIUM 875; 125 MG/1; MG/1
1 TABLET, FILM COATED ORAL 2 TIMES DAILY
Qty: 20 TABLET | Refills: 0 | Status: SHIPPED | OUTPATIENT
Start: 2020-07-12 | End: 2022-04-28

## 2020-07-12 RX ORDER — IBUPROFEN 400 MG/1
800 TABLET ORAL
Status: COMPLETED | OUTPATIENT
Start: 2020-07-12 | End: 2020-07-12

## 2020-07-12 RX ORDER — MELOXICAM 15 MG/1
15 TABLET ORAL DAILY
Qty: 30 TABLET | Refills: 0 | Status: SHIPPED | OUTPATIENT
Start: 2020-07-12 | End: 2020-12-17

## 2020-07-12 RX ORDER — AMOXICILLIN AND CLAVULANATE POTASSIUM 875; 125 MG/1; MG/1
1 TABLET, FILM COATED ORAL
Status: COMPLETED | OUTPATIENT
Start: 2020-07-12 | End: 2020-07-12

## 2020-07-12 RX ORDER — FAMOTIDINE 20 MG/1
20 TABLET, FILM COATED ORAL 2 TIMES DAILY
Qty: 60 TABLET | Refills: 0 | Status: SHIPPED | OUTPATIENT
Start: 2020-07-12 | End: 2022-10-06 | Stop reason: CLARIF

## 2020-07-12 RX ADMIN — AMOXICILLIN AND CLAVULANATE POTASSIUM 1 TABLET: 875; 125 TABLET, FILM COATED ORAL at 09:07

## 2020-07-12 RX ADMIN — IBUPROFEN 800 MG: 400 TABLET ORAL at 09:07

## 2020-07-12 NOTE — ED PROVIDER NOTES
"Encounter Date: 2020       History     Chief Complaint   Patient presents with    Hand Pain     Pt states," My right thumb been hurting for about four days. I have pain on the side of the nail."     49 y.o. female Past Medical History:  No date: Depression  No date: Diabetes mellitus  No date: Fibromyalgia  No date: Hypertension  No date: Vertigo     Notes that she has pain to lateral aspect of R nail x 4 days. Notes that she had a fake nail on it when she has removed. Does endorse putting thumb in mouth. No systemic symptoms        Review of patient's allergies indicates:   Allergen Reactions    Morphine Other (See Comments)     shake     Past Medical History:   Diagnosis Date    Depression     Diabetes mellitus     Fibromyalgia     Hypertension     Vertigo      Past Surgical History:   Procedure Laterality Date     SECTION      EXCISION OF MASS OF EXTREMITY Left 2019    Procedure: EXCISION, MASS, EXTREMITY;  Surgeon: Honorio Pulido IV, MD;  [facility];  Service: Orthopedics;  Laterality: Left;  excision lipoma  Request Lacie    HERNIA REPAIR      HYSTERECTOMY      NASAL SEPTOPLASTY       History reviewed. No pertinent family history.  Social History     Tobacco Use    Smoking status: Former Smoker    Smokeless tobacco: Never Used   Substance Use Topics    Alcohol use: Yes    Drug use: Never     Review of Systems   Constitutional: Negative for fever.   HENT: Negative for sore throat.    Respiratory: Negative for shortness of breath.    Cardiovascular: Negative for chest pain.   Gastrointestinal: Negative for nausea.   Genitourinary: Negative for dysuria.   Musculoskeletal: Negative for back pain.   Skin: Negative for rash.   Neurological: Negative for weakness.   Hematological: Does not bruise/bleed easily.   All other systems reviewed and are negative.      Physical Exam     Initial Vitals [20 0852]   BP Pulse Resp Temp SpO2   (!) 147/80 97 16 97.9 °F (36.6 °C) 98 % "      MAP       --         Physical Exam    Nursing note and vitals reviewed.  Constitutional: She appears well-developed and well-nourished.   HENT:   Head: Normocephalic and atraumatic.   Eyes: Conjunctivae and EOM are normal. Pupils are equal, round, and reactive to light.   Neck: Normal range of motion.   Cardiovascular: Normal rate.   Pulmonary/Chest: No respiratory distress.   Abdominal: She exhibits no distension.   Musculoskeletal: Normal range of motion.   Neurological: She is alert. No cranial nerve deficit. GCS score is 15. GCS eye subscore is 4. GCS verbal subscore is 5. GCS motor subscore is 6.   Skin: Skin is warm and dry.   Psychiatric: She has a normal mood and affect. Thought content normal.     R thumb with ingrown nail to radial aspect of thumb    ED Course   Ingrown Nail excision    Date/Time: 7/12/2020 9:14 AM  Performed by: Alla Grubbs MD  Authorized by: Alla Grubbs MD   Consent Done: Emergent Situation  Location: right hand  Patient sedated: no  Preparation: skin prepped with alcohol  Nail removal amount: ingrown portion of nail bluntly dissected and removed.  Side: radial  Wedge excision of skin of nail fold: no  Patient tolerance: Patient tolerated the procedure well with no immediate complications        Labs Reviewed - No data to display       Imaging Results    None                                  I have bluntly dissected out the ingrown portion of the nail and removed it. Resolution of symptoms.    Will start on augmentin empirically.        Clinical Impression:       ICD-10-CM ICD-9-CM   1. Ingrown nail  L60.0 703.0   2. Thumb pain, right  M79.644 729.5                                Alla Grubbs MD  07/12/20 0913       Alla Grubbs MD  07/12/20 0915

## 2020-07-12 NOTE — DISCHARGE INSTRUCTIONS
Thank you for coming to our Emergency Department today. It is important to remember that some problems are difficult to diagnose and may not be found during your first visit. Be sure to follow up with your primary care doctor and review any labs/imaging that was performed with them. If you do not have a primary care doctor, you may contact the one listed on your discharge paperwork or you may also call the [facility] Appointment Desk at [phone] to schedule an appointment with one.     All medications may potentially have side effects and it is impossible to predict which medications may give you side effects. If you feel that you are having a negative effect of any medication you should immediately stop taking them and seek medical attention.    Return to the ER with any questions/concerns, new/concerning symptoms, worsening or failure to improve. Do not drive or make any important decisions for 24 hours if you have received any pain medications, sedatives or mood altering drugs during your ER visit.

## 2020-07-15 ENCOUNTER — HOSPITAL ENCOUNTER (EMERGENCY)
Facility: HOSPITAL | Age: 50
Discharge: HOME | End: 2020-07-15
Attending: EMERGENCY MEDICINE
Payer: MEDICAID

## 2020-07-15 VITALS
HEIGHT: 63 IN | BODY MASS INDEX: 49.08 KG/M2 | DIASTOLIC BLOOD PRESSURE: 70 MMHG | OXYGEN SATURATION: 97 % | SYSTOLIC BLOOD PRESSURE: 126 MMHG | TEMPERATURE: 98 F | RESPIRATION RATE: 19 BRPM | WEIGHT: 277 LBS | HEART RATE: 87 BPM

## 2020-07-15 DIAGNOSIS — L03.011 PARONYCHIA OF FINGER OF RIGHT HAND: Primary | ICD-10-CM

## 2020-07-15 LAB
B-HCG UR QL: NEGATIVE
CTP QC/QA: YES

## 2020-07-15 PROCEDURE — 10060 I&D ABSCESS SIMPLE/SINGLE: CPT

## 2020-07-15 PROCEDURE — 63600175 PHARM REV CODE 636 W HCPCS: Performed by: PHYSICIAN ASSISTANT

## 2020-07-15 PROCEDURE — 81025 URINE PREGNANCY TEST: CPT | Performed by: EMERGENCY MEDICINE

## 2020-07-15 PROCEDURE — 25000003 PHARM REV CODE 250: Performed by: PHYSICIAN ASSISTANT

## 2020-07-15 PROCEDURE — 99284 EMERGENCY DEPT VISIT MOD MDM: CPT | Mod: 25

## 2020-07-15 PROCEDURE — 96372 THER/PROPH/DIAG INJ SC/IM: CPT | Mod: 59

## 2020-07-15 RX ORDER — ACETAMINOPHEN 500 MG
500 TABLET ORAL EVERY 4 HOURS PRN
Qty: 20 TABLET | Refills: 0 | Status: SHIPPED | OUTPATIENT
Start: 2020-07-15 | End: 2020-07-20

## 2020-07-15 RX ORDER — MUPIROCIN 20 MG/G
OINTMENT TOPICAL 3 TIMES DAILY
Qty: 15 G | Refills: 0 | Status: SHIPPED | OUTPATIENT
Start: 2020-07-15 | End: 2020-07-22

## 2020-07-15 RX ORDER — MUPIROCIN 20 MG/G
1 OINTMENT TOPICAL
Status: COMPLETED | OUTPATIENT
Start: 2020-07-15 | End: 2020-07-15

## 2020-07-15 RX ORDER — METHOCARBAMOL 500 MG/1
1000 TABLET, FILM COATED ORAL 3 TIMES DAILY PRN
Qty: 30 TABLET | Refills: 0 | Status: SHIPPED | OUTPATIENT
Start: 2020-07-15 | End: 2020-07-20

## 2020-07-15 RX ORDER — KETOROLAC TROMETHAMINE 30 MG/ML
15 INJECTION, SOLUTION INTRAMUSCULAR; INTRAVENOUS
Status: COMPLETED | OUTPATIENT
Start: 2020-07-15 | End: 2020-07-15

## 2020-07-15 RX ORDER — IBUPROFEN 600 MG/1
600 TABLET ORAL EVERY 6 HOURS PRN
Qty: 20 TABLET | Refills: 0 | Status: SHIPPED | OUTPATIENT
Start: 2020-07-15 | End: 2020-07-20

## 2020-07-15 RX ADMIN — KETOROLAC TROMETHAMINE 15 MG: 30 INJECTION, SOLUTION INTRAMUSCULAR at 06:07

## 2020-07-15 RX ADMIN — MUPIROCIN 22 G: 20 OINTMENT TOPICAL at 06:07

## 2020-07-15 NOTE — DISCHARGE INSTRUCTIONS
Apply Bactroban to prevent infection and Take medications for pain as prescribed. Follow up with primary care in 2 days. Return to ER for worsening symptoms, vomiting or as needed

## 2020-07-15 NOTE — PROVIDER PROGRESS NOTES - EMERGENCY DEPT.
Emergency Department TeleTRIAGE Encounter Note      CHIEF COMPLAINT    Chief Complaint   Patient presents with    Hand Pain     Patient c/o right thumb pain and swelling x 3 days.  Was seen in ED 3 days ago but reports symptoms increasing.  Mild swelling with redness noted to nailbed around right thumb.         VITAL SIGNS   Initial Vitals [07/15/20 0233]   BP Pulse Resp Temp SpO2   135/63 94 14 98.5 °F (36.9 °C) 98 %      MAP       --            ALLERGIES    Review of patient's allergies indicates:   Allergen Reactions    Morphine Other (See Comments)     shake       PROVIDER TRIAGE NOTE  This is a teletriage evaluation of a 49 y.o. female presenting to the ED with c/o right thumb pain and swelling.  Seen in the ED 3 days ago and discharged with antibiotics for ingrown fingernail, but reports symptoms have worsened.  Will get x-ray to rule out foreign body or fracture.  Initial orders will be placed and care will be transferred to an alternate provider when patient is roomed for a full evaluation. Any additional orders and the final disposition will be determined by that provider.         ORDERS  Labs Reviewed   POCT URINE PREGNANCY       ED Orders (720h ago, onward)    Start Ordered     Status Ordering Provider    07/15/20 0241 07/15/20 0240  X-Ray Finger 2 or More Views Right  1 time imaging      Ordered CUCO RAY    07/15/20 0227 07/15/20 0227  POCT urine pregnancy  Once      Ordered MARIVEL AN            Virtual Visit Note: The provider triage portion of this emergency department evaluation and documentation was performed via [employer] ([employer]), a HIPAA-compliant telemedicine application, in concert with a tele-presenter in the room. A face to face patient evaluation with one of my colleagues will occur once the patient is placed in an emergency department room.      DISCLAIMER: This note was prepared with [employer] voice recognition transcription software. Garbled syntax, mangled pronouns, and other  bizarre constructions may be attributed to that software system.

## 2020-07-15 NOTE — ED TRIAGE NOTES
Pt here with c/o pain to right thumb x 3 days. Pt seen in ED 3 days ago for same complaints. Pt has redness, swelling and tenderness to area.

## 2020-07-15 NOTE — ED PROVIDER NOTES
Encounter Date: 7/15/2020    SCRIBE #1 NOTE: I, Adriana Vega, am scribing for, and in the presence of,  Nedra Aiken PA-C. I have scribed the following portions of the note - Other sections scribed: HPI, ROS.       History   This is a 49 y.o. female with a PMHx of HTN who presents to the Emergency Department with a cc of hand pain to the right thumb x1 week. The pt states that she was seen in the ED 3 days ago where she was told that she had an ingrown nail. She reports that she was prescribed Amoxicillin and Mobic w/no relief. Pt reports associated swelling. Pt denies any fever, chills, nausea, or emesis. She does not bite her nails.  The pt states that she has not experienced these symptoms in the past. The pt is allergic to Morphine.    The history was provided by the patient. No  was used.      Chief Complaint   Patient presents with    Hand Pain     Patient c/o right thumb pain and swelling x 3 days.  Was seen in ED 3 days ago but reports symptoms increasing.  Mild swelling with redness noted to nailbed around right thumb.         Review of patient's allergies indicates:   Allergen Reactions    Morphine Other (See Comments)     shake     Past Medical History:   Diagnosis Date    Depression     Diabetes mellitus     Fibromyalgia     Hypertension     Vertigo      Past Surgical History:   Procedure Laterality Date     SECTION      EXCISION OF MASS OF EXTREMITY Left 2019    Procedure: EXCISION, MASS, EXTREMITY;  Surgeon: Honorio Pulido IV, MD;  Location: [facility];  Service: Orthopedics;  Laterality: Left;  excision lipoma  Request Lacie    HERNIA REPAIR      HYSTERECTOMY      NASAL SEPTOPLASTY       History reviewed. No pertinent family history.  Social History     Tobacco Use    Smoking status: Former Smoker    Smokeless tobacco: Never Used   Substance Use Topics    Alcohol use: Yes    Drug use: Never     Review of Systems   Constitutional: Negative for  chills, diaphoresis and fever.   HENT: Negative for ear pain and sore throat.    Eyes: Negative for photophobia, pain, discharge, redness and itching.   Respiratory: Negative for cough and shortness of breath.    Cardiovascular: Negative for chest pain.   Gastrointestinal: Negative for diarrhea, nausea and vomiting.   Genitourinary: Negative for dysuria.   Musculoskeletal: Positive for arthralgias and joint swelling.   Skin: Negative for rash.   Neurological: Negative for headaches.       Physical Exam     Initial Vitals [07/15/20 0233]   BP Pulse Resp Temp SpO2   135/63 94 14 98.5 °F (36.9 °C) 98 %      MAP       --         Physical Exam    Nursing note and vitals reviewed.  Constitutional: She appears well-developed and well-nourished.   HENT:   Head: Normocephalic.   Right Ear: External ear normal.   Left Ear: External ear normal.   Nose: Nose normal.   Eyes: Conjunctivae are normal.   Cardiovascular: Intact distal pulses.   Pulmonary/Chest: No respiratory distress.   Abdominal: There is no tenderness at McBurney's point and negative Baum's sign.   Musculoskeletal: Normal range of motion.      Comments: No bony ttp      Neurological: She is alert. She has normal strength. No sensory deficit.   Skin: Skin is warm and dry.   Purulent yellow discoloration under nail of L thumb with extension to the base of nail. Mild erythema surrounding the nail.   Psychiatric: She has a normal mood and affect.         ED Course   I & D - Incision and Drainage    Date/Time: 7/15/2020 6:28 AM  Location procedure was performed: [facility] EMERGENCY DEPARTMENT  Performed by: Nedra Aiken PA-C  Authorized by: Nam Ta MD   Type: abscess  Body area: upper extremity  Location details: right thumb  Description of findings: paronychia    Complexity: simple  Drainage: purulent  Drainage amount: scant  Wound treatment: incision,  drainage and  expression of material  Patient tolerance: Patient tolerated the procedure well with  no immediate complications  Comments: 18 gauge needle used to base of nail with return of only bloody drainage. trephinator used and purulent yellow drainage expressed from the nail         Labs Reviewed   POCT URINE PREGNANCY          Imaging Results          X-Ray Finger 2 or More Views Right (Final result)  Result time 07/15/20 04:05:23    Final result by Hunter Carter MD (07/15/20 04:05:23)                 Impression:      Chronic appearing changes are noted as above, there is no radiographic evidence for acute fracture or dislocation or soft tissue foreign body.  Close clinical and historical correlation is otherwise needed to determine need for additional follow-up.      Electronically signed by: Hunter Carter  Date:    07/15/2020  Time:    04:05             Narrative:    EXAMINATION:  XR FINGER 2 OR MORE VIEWS RIGHT    CLINICAL HISTORY:  Right thumb swelling;    TECHNIQUE:  Radiographic examination of the right thumb was performed, 3 radiographs are submitted.    COMPARISON:  None    FINDINGS:  There are mild chronic appearing changes noted including mild chronic change at the 1st carpometacarpal joint and at the metacarpophalangeal joint, there is also a small osseous projection seen at the proximal margin of the distal phalanx of the 1st digit, this may relate to a small exostosis or small osteophyte, it does not appear to represent acute process.  Otherwise on close evaluation of available imaging there is no radiographic evidence for osseous destructive process, acute fracture or dislocation and no radiographically detectable radiopaque soft tissue foreign body.                                 Medical Decision Making:   Initial Assessment:   49-year-old male presenting for evaluation of atraumatic right thumb pain.  Patient is afebrile, nontoxic appearing in no distress.  Exam above.  She is currently on 4 day course of amoxicillin.  Exam remarkable for paronychia.  Incision and drainage performed  per procedure note.  She is currently on amoxicillin.  She does not bite her nails.  No significant surrounding cellulitis.  Will have her complete course of amoxicillin.  Do not feel that she failed outpatient therapy.  Will prescribe Bactroban and instructed her to keep the area clean dry and covered and return to the emergency department for worsening symptoms or as needed.  Considered but doubt sepsis, septic joint. Xray negative for fracture or dislocation.             Scribe Attestation:   Scribe #1: I performed the above scribed service and the documentation accurately describes the services I performed. I attest to the accuracy of the note.                          Clinical Impression:       ICD-10-CM ICD-9-CM   1. Paronychia of finger of right hand  L03.011 681.02             ED Disposition Condition    Discharge Stable       Scribe attestation: I, Nedra Aiken PA-C, personally performed the services described in this documentation. All medical record entries made by the scribe were at my direction and in my presence.  I have reviewed the chart and agree that the record reflects my personal performance and is accurate and complete.    ED Prescriptions     Medication Sig Dispense Start Date End Date Auth. Provider    ibuprofen (ADVIL,MOTRIN) 600 MG tablet Take 1 tablet (600 mg total) by mouth every 6 (six) hours as needed for Pain. 20 tablet 7/15/2020 7/20/2020 Nedra Aiken PA-C    acetaminophen (TYLENOL) 500 MG tablet Take 1 tablet (500 mg total) by mouth every 4 (four) hours as needed. 20 tablet 7/15/2020 7/20/2020 Nedra Aiken PA-C    methocarbamoL (ROBAXIN) 500 MG Tab Take 2 tablets (1,000 mg total) by mouth 3 (three) times daily as needed (for pain). 30 tablet 7/15/2020 7/20/2020 Nedra Aiken PA-C    mupirocin (BACTROBAN) 2 % ointment Apply topically 3 (three) times daily. for 7 days 15 g 7/15/2020 7/22/2020 Nedra Aiken PA-C        Follow-up Information      Follow up With Specialties Details Why Contact Info    Your Primary Care Doctor  Schedule an appointment as soon as possible for a visit in 2 days      [facility] Emergency Medicine Go to  As needed, If symptoms worsen [address]  [facility] [address]  [phone]                                     Nedra Aiken PA-C  07/15/20 0811

## 2020-09-30 DIAGNOSIS — M25.562 LEFT KNEE PAIN: Primary | ICD-10-CM

## 2020-10-02 ENCOUNTER — CLINICAL SUPPORT (OUTPATIENT)
Dept: REHABILITATION | Facility: HOSPITAL | Age: 50
End: 2020-10-02
Payer: MEDICAID

## 2020-10-02 DIAGNOSIS — R26.9 GAIT ABNORMALITY: ICD-10-CM

## 2020-10-02 DIAGNOSIS — R29.898 DECREASED STRENGTH OF LOWER EXTREMITY: ICD-10-CM

## 2020-10-02 DIAGNOSIS — M25.662 DECREASED RANGE OF MOTION (ROM) OF LEFT KNEE: ICD-10-CM

## 2020-10-02 DIAGNOSIS — R26.89 DECREASED FUNCTIONAL MOBILITY: ICD-10-CM

## 2020-10-02 PROCEDURE — 97110 THERAPEUTIC EXERCISES: CPT | Mod: PN

## 2020-10-02 PROCEDURE — 97161 PT EVAL LOW COMPLEX 20 MIN: CPT | Mod: PN

## 2020-10-02 NOTE — PROGRESS NOTES
" [facility]  Physical Therapy Initial Evaluation    [facility]  [facility] Number: [id number]    Therapy Diagnosis:   Encounter Diagnoses   Name Primary?    Decreased strength of lower extremity     Gait abnormality     Decreased range of motion (ROM) of left knee     Decreased functional mobility      Physician: Araceli Avila, NP-C    Physician Orders: PT Eval and Treat   Medical Diagnosis from Referral: M25.562 (ICD-10-CM) - Left knee pain  Evaluation Date: 10/2/2020  Authorization Period Expiration: 9/30/2021  Plan of Care Expiration: 1/5/2021  Visit # / Visits authorized: 1/ 1    Time In: 9:25  Time Out: 10:00  Total Billable Time: 35 minutes    Precautions: Standard    Subjective   Date of onset: July 2020    History of current condition - Candy reports:history of pain in L knee since ~July. Pt reports insidious onset of pain. Reports pain with L knee when she walks and goes up stairs. If she sits for prolonged periods, increased pain with standing and needs to stand for a minute before walking. Pt localizes pain to anterior and lateral knee, some pulling in back of knee and down into calf. Pt reports increased pain with walking, standing, stair climbing, prolonged sitting. Pt reports some popping in knee when she starts walking.     Pain:  Current 6/10, worst 10/10, best 6/10   Location: left knee   Description: sharp and then nagging pressure  Aggravating Factors: was trying heat, MD said not to   Easing Factors: rest    Prior Therapy: not for knee  Social History: about 15 steps to get upstairs, lives with daughter  Occupation: working   Prior Level of Function: independent in all activities, minimal reported knee pain   Current Level of Function: limited in walking, standing, household chores    Pts goals: "I would like to be able to walk without pain"     Imaging, X-Ray L Knee: 6/24/2020       Medical History:   Past Medical History:   Diagnosis Date    " Depression     Diabetes mellitus     Fibromyalgia     Hypertension     Vertigo        Surgical History:   Candy Huynh  has a past surgical history that includes Hysterectomy;  section; Nasal septoplasty; Excision of mass of extremity (Left, 2019); and Hernia repair.    Medications:   Candy has a current medication list which includes the following prescription(s): amitriptyline, amoxicillin-clavulanate 875-125mg, aspirin, citalopram, estradiol, famotidine, hydrocodone-acetaminophen, lamotrigine, losartan, meclizine, meloxicam, metformin, neomycin-polymyxin-hydrocortisone, and ondansetron.    Allergies:   Review of patient's allergies indicates:   Allergen Reactions    Morphine Other (See Comments)     shake          Objective     Observation: Pt with difficulty localizing pain throughout evaluation    Posture Alignment: no significant postural deviations    GAIT DEVIATIONS: Candy displays decreased gait speed, antalgia gait on L LE, decreased L knee flexion throughout gait    Range of Motion:   Knee Left active Left Passive   Flexion 85 95   Extension 10 0     Knee Right active Right Passive   Flexion 105 105   Extension 0 0       Lower Extremity Strength   Right LE  Left LE    Knee extension: 4/5 Knee extension: 4/5   Knee flexion: 4/5 Knee flexion: 3+/5   Hip flexion: 3+/5 Hip flexion: 3+/5   Hip extension:  3/5 Hip extension: 3/5   Hip abduction: 4/5 Hip abduction: 3/5   Ankle dorsiflexion: 5/5 Ankle dorsiflexion: 4/5       Special Tests:   Right Left   Anterior Drawer Negative Negative   Sasha's Test Negative Negative   Donato's compression test Negative Positive   Patellar Grind Test Negative Positive     Joint Mobility:Normal patellar mobility in all planes bilaterally     Palpation: Difficulty localizing TTP, increased tenderness globally at L knee, located primarily at medial and lateral joint line    Flexibility: Decreased flexibility to B hamstrings and hip flexors,  quadriceps        TREATMENT   Treatment Time In: 9:50 am   Treatment Time Out: 10:00 am   Total Treatment time separate from Evaluation: 10 minutes    Candy received therapeutic exercises to develop strength, ROM and flexibility for 10 minutes including:    +Quad sets 2x10, 5 sec  +Bridges 2x10  +Sidelying hip abduction 2x10    Home Exercises and Patient Education Provided    Education provided:   - role of PT, plan of care, goals, scheduling limitations, cancellation policies    Written Home Exercises Provided: yes.  Exercises were reviewed and Candy was able to demonstrate them prior to the end of the session.  Candy demonstrated good  understanding of the education provided.     See EMR under Patient Instructions for exercises provided 10/2/2020.    Assessment   Candy is a 50 y.o. female referred to outpatient Physical Therapy with a medical diagnosis of L knee pain. Pt presents to evaluation with decreased L knee ROM, decreased B LE strength, impaired functional strength, muscular dysfunction, decreased flexibility, and increased pain in L knee. Pt currently limited in all daily activities secondary to pain and function loss. Pt with greatest LE strength limitations in B hip girdle and posterior chain. Ligamentous special testing all negative at B knee. Pt with likely muscular imbalance at L knee resulting from weakness in hip girdle and decreased flexibility to hip musculature. Pt would benefit from skilled PT services in order to address listed deficits, decrease pain, establish HEP, maximize functional mobility, and return to PLOF. Pt is motivated to participate in therapy and is in agreement with POC.     Pt prognosis is Good.   Pt will benefit from skilled outpatient Physical Therapy to address the deficits stated above and in the chart below, provide pt/family education, and to maximize pt's level of independence.     Plan of care discussed with patient: Yes  Pt's spiritual, cultural and  educational needs considered and patient is agreeable to the plan of care and goals as stated below:     Anticipated Barriers for therapy: high level of reported pain     Medical Necessity is demonstrated by the following  History  Co-morbidities and personal factors that may impact the plan of care Co-morbidities:   depression, diabetes and HTN, fibromyalgia    Personal Factors:   no deficits     low   Examination  Body Structures and Functions, activity limitations and participation restrictions that may impact the plan of care Body Regions:   back  lower extremities  trunk    Body Systems:    ROM  strength  balance  gait  transfers  transitions  motor control    Participation Restrictions:   Ability to complete housework with no pain    Activity limitations:   Learning and applying knowledge  no deficits    General Tasks and Commands  no deficits    Communication  no deficits    Mobility  lifting and carrying objects  walking    Self care  no deficits    Domestic Life  shopping  cooking  doing house work (cleaning house, washing dishes, laundry)  assisting others    Interactions/Relationships  no deficits    Life Areas  no deficits    Community and Social Life  no deficits         Complexity: low   Clinical Presentation stable and uncomplicated low   Decision Making/ Complexity Score: low       GOALS: Short Term Goals:  4 weeks  1.Report decreased in pain at worse less than  <   / =  7  /10  to increase tolerance for functional mobility.On going  2. Pt to improve L knee range of motion by 25% to allow for improved functional mobility to allow for improvement in IADLs. .On going  3. Increased B LE MMT 1/2 grade to increase tolerance for ADL and work activities.On going  5. Pt to tolerate HEP to improve ROM and independence with ADL's.On going    Long Term Goals: 8 weeks  1.Report decreased in pain at worse less than  <   / =  4  /10  to increase tolerance for functional mobility.  2.Pt to normalize L knee ROM  compared to R to allow for improved functional mobility to allow for improvement in IADLs.   3.Increased B LE MMT 1 grade to increase tolerance for ADL and work activities.  4. Pt will report < / = 50% disability on FOTO knee survey  to demonstrate increase in LE function with every day tasks.   5. Pt to be Independent with HEP to improve ROM and independence with ADL's    Plan   Plan of care Certification: 10/2/2020 to 1/2/2020.    Outpatient Physical Therapy 1-2 times weekly for 8 weeks to include the following interventions: Gait Training, Manual Therapy, Moist Heat/ Ice, Neuromuscular Re-ed, Patient Education, Self Care, Therapeutic Activites and Therapeutic Exercise, dry needling      Angela Ugalde, PT, DPT  10/2/2020

## 2020-10-05 PROBLEM — R26.9 GAIT ABNORMALITY: Status: ACTIVE | Noted: 2020-10-05

## 2020-10-05 PROBLEM — R26.89 DECREASED FUNCTIONAL MOBILITY: Status: ACTIVE | Noted: 2020-10-05

## 2020-10-05 PROBLEM — M25.662 DECREASED RANGE OF MOTION (ROM) OF LEFT KNEE: Status: ACTIVE | Noted: 2020-10-05

## 2020-10-05 PROBLEM — R29.898 DECREASED STRENGTH OF LOWER EXTREMITY: Status: ACTIVE | Noted: 2020-10-05

## 2020-10-05 NOTE — PLAN OF CARE
" [facility]  Physical Therapy Initial Evaluation    [facility]  [facility] Number: [id number]    Therapy Diagnosis:   Encounter Diagnoses   Name Primary?    Decreased strength of lower extremity     Gait abnormality     Decreased range of motion (ROM) of left knee     Decreased functional mobility      Physician: Araceli Avila, NP-C    Physician Orders: PT Eval and Treat   Medical Diagnosis from Referral: M25.562 (ICD-10-CM) - Left knee pain  Evaluation Date: 10/2/2020  Authorization Period Expiration: 9/30/2021  Plan of Care Expiration: 1/5/2021  Visit # / Visits authorized: 1/ 1    Time In: 9:25  Time Out: 10:00  Total Billable Time: 35 minutes    Precautions: Standard    Subjective   Date of onset: July 2020    History of current condition - Candy reports:history of pain in L knee since ~July. Pt reports insidious onset of pain. Reports pain with L knee when she walks and goes up stairs. If she sits for prolonged periods, increased pain with standing and needs to stand for a minute before walking. Pt localizes pain to anterior and lateral knee, some pulling in back of knee and down into calf. Pt reports increased pain with walking, standing, stair climbing, prolonged sitting. Pt reports some popping in knee when she starts walking.     Pain:  Current 6/10, worst 10/10, best 6/10   Location: left knee   Description: sharp and then nagging pressure  Aggravating Factors: was trying heat, MD said not to   Easing Factors: rest    Prior Therapy: not for knee  Social History: about 15 steps to get upstairs, lives with daughter  Occupation: working   Prior Level of Function: independent in all activities, minimal reported knee pain   Current Level of Function: limited in walking, standing, household chores    Pts goals: "I would like to be able to walk without pain"     Imaging, X-Ray L Knee: 6/24/2020       Medical History:   Past Medical History:   Diagnosis Date    " Depression     Diabetes mellitus     Fibromyalgia     Hypertension     Vertigo        Surgical History:   Candy Huynh  has a past surgical history that includes Hysterectomy;  section; Nasal septoplasty; Excision of mass of extremity (Left, 2019); and Hernia repair.    Medications:   Candy has a current medication list which includes the following prescription(s): amitriptyline, amoxicillin-clavulanate 875-125mg, aspirin, citalopram, estradiol, famotidine, hydrocodone-acetaminophen, lamotrigine, losartan, meclizine, meloxicam, metformin, neomycin-polymyxin-hydrocortisone, and ondansetron.    Allergies:   Review of patient's allergies indicates:   Allergen Reactions    Morphine Other (See Comments)     shake          Objective     Observation: Pt with difficulty localizing pain throughout evaluation    Posture Alignment: no significant postural deviations    GAIT DEVIATIONS: Candy displays decreased gait speed, antalgia gait on L LE, decreased L knee flexion throughout gait    Range of Motion:   Knee Left active Left Passive   Flexion 85 95   Extension 10 0     Knee Right active Right Passive   Flexion 105 105   Extension 0 0       Lower Extremity Strength   Right LE  Left LE    Knee extension: 4/5 Knee extension: 4/5   Knee flexion: 4/5 Knee flexion: 3+/5   Hip flexion: 3+/5 Hip flexion: 3+/5   Hip extension:  3/5 Hip extension: 3/5   Hip abduction: 4/5 Hip abduction: 3/5   Ankle dorsiflexion: 5/5 Ankle dorsiflexion: 4/5       Special Tests:   Right Left   Anterior Drawer Negative Negative   Sasha's Test Negative Negative   Donato's compression test Negative Positive   Patellar Grind Test Negative Positive     Joint Mobility:Normal patellar mobility in all planes bilaterally     Palpation: Difficulty localizing TTP, increased tenderness globally at L knee, located primarily at medial and lateral joint line    Flexibility: Decreased flexibility to B hamstrings and hip flexors,  quadriceps        TREATMENT   Treatment Time In: 9:50 am   Treatment Time Out: 10:00 am   Total Treatment time separate from Evaluation: 10 minutes    Candy received therapeutic exercises to develop strength, ROM and flexibility for 10 minutes including:    +Quad sets 2x10, 5 sec  +Bridges 2x10  +Sidelying hip abduction 2x10    Home Exercises and Patient Education Provided    Education provided:   - role of PT, plan of care, goals, scheduling limitations, cancellation policies    Written Home Exercises Provided: yes.  Exercises were reviewed and Candy was able to demonstrate them prior to the end of the session.  Candy demonstrated good  understanding of the education provided.     See EMR under Patient Instructions for exercises provided 10/2/2020.    Assessment   Candy is a 50 y.o. female referred to outpatient Physical Therapy with a medical diagnosis of L knee pain. Pt presents to evaluation with decreased L knee ROM, decreased B LE strength, impaired functional strength, muscular dysfunction, decreased flexibility, and increased pain in L knee. Pt currently limited in all daily activities secondary to pain and function loss. Pt with greatest LE strength limitations in B hip girdle and posterior chain. Ligamentous special testing all negative at B knee. Pt with likely muscular imbalance at L knee resulting from weakness in hip girdle and decreased flexibility to hip musculature. Pt would benefit from skilled PT services in order to address listed deficits, decrease pain, establish HEP, maximize functional mobility, and return to PLOF. Pt is motivated to participate in therapy and is in agreement with POC.     Pt prognosis is Good.   Pt will benefit from skilled outpatient Physical Therapy to address the deficits stated above and in the chart below, provide pt/family education, and to maximize pt's level of independence.     Plan of care discussed with patient: Yes  Pt's spiritual, cultural and  educational needs considered and patient is agreeable to the plan of care and goals as stated below:     Anticipated Barriers for therapy: high level of reported pain     Medical Necessity is demonstrated by the following  History  Co-morbidities and personal factors that may impact the plan of care Co-morbidities:   depression, diabetes and HTN, fibromyalgia    Personal Factors:   no deficits     low   Examination  Body Structures and Functions, activity limitations and participation restrictions that may impact the plan of care Body Regions:   back  lower extremities  trunk    Body Systems:    ROM  strength  balance  gait  transfers  transitions  motor control    Participation Restrictions:   Ability to complete housework with no pain    Activity limitations:   Learning and applying knowledge  no deficits    General Tasks and Commands  no deficits    Communication  no deficits    Mobility  lifting and carrying objects  walking    Self care  no deficits    Domestic Life  shopping  cooking  doing house work (cleaning house, washing dishes, laundry)  assisting others    Interactions/Relationships  no deficits    Life Areas  no deficits    Community and Social Life  no deficits         Complexity: low   Clinical Presentation stable and uncomplicated low   Decision Making/ Complexity Score: low       GOALS: Short Term Goals:  4 weeks  1.Report decreased in pain at worse less than  <   / =  7  /10  to increase tolerance for functional mobility.On going  2. Pt to improve L knee range of motion by 25% to allow for improved functional mobility to allow for improvement in IADLs. .On going  3. Increased B LE MMT 1/2 grade to increase tolerance for ADL and work activities.On going  5. Pt to tolerate HEP to improve ROM and independence with ADL's.On going    Long Term Goals: 8 weeks  1.Report decreased in pain at worse less than  <   / =  4  /10  to increase tolerance for functional mobility.  2.Pt to normalize L knee ROM  compared to R to allow for improved functional mobility to allow for improvement in IADLs.   3.Increased B LE MMT 1 grade to increase tolerance for ADL and work activities.  4. Pt will report < / = 50% disability on FOTO knee survey  to demonstrate increase in LE function with every day tasks.   5. Pt to be Independent with HEP to improve ROM and independence with ADL's    Plan   Plan of care Certification: 10/2/2020 to 1/2/2020.    Outpatient Physical Therapy 1-2 times weekly for 8 weeks to include the following interventions: Gait Training, Manual Therapy, Moist Heat/ Ice, Neuromuscular Re-ed, Patient Education, Self Care, Therapeutic Activites and Therapeutic Exercise, dry needling      Angela Ugalde, PT, DPT  10/2/2020

## 2020-10-20 ENCOUNTER — HOSPITAL ENCOUNTER (EMERGENCY)
Facility: HOSPITAL | Age: 50
Discharge: HOME | End: 2020-10-21
Attending: EMERGENCY MEDICINE
Payer: MEDICAID

## 2020-10-20 DIAGNOSIS — N75.1 BARTHOLIN'S GLAND ABSCESS: Primary | ICD-10-CM

## 2020-10-20 PROCEDURE — 99284 EMERGENCY DEPT VISIT MOD MDM: CPT

## 2020-10-21 VITALS
OXYGEN SATURATION: 96 % | HEART RATE: 88 BPM | SYSTOLIC BLOOD PRESSURE: 152 MMHG | WEIGHT: 282 LBS | TEMPERATURE: 98 F | DIASTOLIC BLOOD PRESSURE: 88 MMHG | HEIGHT: 63 IN | BODY MASS INDEX: 49.96 KG/M2 | RESPIRATION RATE: 18 BRPM

## 2020-10-21 PROCEDURE — 25000003 PHARM REV CODE 250: Performed by: PHYSICIAN ASSISTANT

## 2020-10-21 RX ORDER — HYDROCODONE BITARTRATE AND ACETAMINOPHEN 5; 325 MG/1; MG/1
1 TABLET ORAL EVERY 6 HOURS PRN
Qty: 10 TABLET | Refills: 0 | Status: SHIPPED | OUTPATIENT
Start: 2020-10-21 | End: 2022-05-19

## 2020-10-21 RX ORDER — SULFAMETHOXAZOLE AND TRIMETHOPRIM 800; 160 MG/1; MG/1
1 TABLET ORAL
Status: COMPLETED | OUTPATIENT
Start: 2020-10-21 | End: 2020-10-21

## 2020-10-21 RX ORDER — HYDROCODONE BITARTRATE AND ACETAMINOPHEN 5; 325 MG/1; MG/1
1 TABLET ORAL
Status: COMPLETED | OUTPATIENT
Start: 2020-10-21 | End: 2020-10-21

## 2020-10-21 RX ORDER — SULFAMETHOXAZOLE AND TRIMETHOPRIM 800; 160 MG/1; MG/1
1 TABLET ORAL 2 TIMES DAILY
Qty: 14 TABLET | Refills: 0 | Status: SHIPPED | OUTPATIENT
Start: 2020-10-21 | End: 2020-10-28

## 2020-10-21 RX ADMIN — SULFAMETHOXAZOLE AND TRIMETHOPRIM 1 TABLET: 800; 160 TABLET ORAL at 01:10

## 2020-10-21 RX ADMIN — HYDROCODONE BITARTRATE AND ACETAMINOPHEN 1 TABLET: 5; 325 TABLET ORAL at 01:10

## 2020-10-21 NOTE — DISCHARGE INSTRUCTIONS
Continue with warm compresses to the area 15 min at a time, multiple times daily.  Gentle massaging of the area to help express the fluid.    Take all antibiotics as prescribed, try to take with meals to limit nausea.  Norco for pain. Be aware, this medication is sedating.  Do not mix with alcohol or any other sedating medications.  Do not drive or operate machinery when taking this medication.     Follow-up with your OBGYN as planned.  Please return to this ED if area no longer draining, if area becomes more swollen and painful, if you begin with fever, if unable to tolerate pain, if any other problems occur.

## 2020-10-21 NOTE — ED PROVIDER NOTES
Encounter Date: 10/20/2020       History     Chief Complaint   Patient presents with    Abscess     Pt c/o an abscess with drainage to the vaginal area x3 days. Pt denies any fever.     51yo F with pmh HTN, NIDDM, presents to ED complaining of swelling to left labia.    Patient states she has had a small area of swelling to the left labia over the past few weeks.  She states area has become painful and swollen over the past 3 days.  She admits to spontaneous drainage today.  No fever or chills.  No abdominal pain.  No pelvic pain.  No urinary complaints or flank pain.  No vaginal discharge, no trauma.  Denies history of previous bartholin abscess.         Review of patient's allergies indicates:   Allergen Reactions    Morphine Other (See Comments)     shake     Past Medical History:   Diagnosis Date    Depression     Diabetes mellitus     Fibromyalgia     Hypertension     Vertigo      Past Surgical History:   Procedure Laterality Date     SECTION      EXCISION OF MASS OF EXTREMITY Left 2019    Procedure: EXCISION, MASS, EXTREMITY;  Surgeon: Honorio Pulido IV, MD;  Location: [facility] OR;  Service: Orthopedics;  Laterality: Left;  excision lipoma  Request Lacie    HERNIA REPAIR      HYSTERECTOMY      NASAL SEPTOPLASTY       No family history on file.  Social History     Tobacco Use    Smoking status: Former Smoker    Smokeless tobacco: Never Used   Substance Use Topics    Alcohol use: Yes    Drug use: Never     Review of Systems   Constitutional: Negative for chills, fatigue and fever.   Respiratory: Negative for shortness of breath.    Cardiovascular: Negative for chest pain.   Gastrointestinal: Negative for abdominal pain.   Genitourinary: Negative for pelvic pain and vaginal bleeding.   Musculoskeletal: Negative for myalgias, neck pain and neck stiffness.   Skin: Positive for rash.   Neurological: Negative for light-headedness.       Physical Exam     Initial Vitals [10/20/20 2325]   BP  Pulse Resp Temp SpO2   (!) 182/84 99 18 98.4 °F (36.9 °C) 98 %      MAP       --         Physical Exam    Nursing note and vitals reviewed.  Constitutional: She appears well-developed and well-nourished. She is not diaphoretic. No distress.   HENT:   Head: Normocephalic and atraumatic.   Eyes: EOM are normal.   Neck: Neck supple.   Genitourinary:    Genitourinary Comments: There is a small area of fluctuance with central spontaneous drainage just inside lower aspect of left labia minora; area is exquisitely tender.  Draining serosanguineous fluid with some scant curd-like material     Neurological: She is alert and oriented to person, place, and time.   Skin: Skin is warm.   Psychiatric: She has a normal mood and affect. Thought content normal.         ED Course   Procedures  Labs Reviewed - No data to display       Imaging Results    None          Medical Decision Making:   Initial Assessment:   50-year-old female with chief complaint wounds/swelling to left labia, worsening over the past 3 days, began spontaneously draining today.  Differential Diagnosis:   Bartholin's cyst, Bartholin abscess, folliculitis, vaginal discharge  ED Management:  Suspect Bartholin's cyst with likely infection.  Able to massage the area and easily express majority of the fluctuance.  I do not feel need for incision and drainage at this time.  I will start on Bactrim, encouraged warm compresses, she has an upcoming appointment with her OB.  ED return precautions given.                             Clinical Impression:       ICD-10-CM ICD-9-CM   1. Bartholin's gland abscess  N75.1 616.3                      Disposition:   Disposition: Discharged  Condition: Stable     ED Disposition Condition    Discharge Stable        ED Prescriptions     Medication Sig Dispense Start Date End Date Auth. Provider    sulfamethoxazole-trimethoprim 800-160mg (BACTRIM DS) 800-160 mg Tab Take 1 tablet by mouth 2 (two) times daily. for 7 days 14 tablet  10/21/2020 10/28/2020 Lio Chavira PA-C    HYDROcodone-acetaminophen (NORCO) 5-325 mg per tablet Take 1 tablet by mouth every 6 (six) hours as needed (Severe/breakthrough pain). 10 tablet 10/21/2020  Lio Chavira PA-C        Follow-up Information     Follow up With Specialties Details Why Contact Info    OBGYN  Go to  For reevaluation                                        Lio Chavira PA-C  10/21/20 0144

## 2020-10-31 ENCOUNTER — HOSPITAL ENCOUNTER (EMERGENCY)
Facility: HOSPITAL | Age: 50
Discharge: HOME | End: 2020-10-31
Attending: EMERGENCY MEDICINE
Payer: MEDICAID

## 2020-10-31 VITALS
TEMPERATURE: 99 F | HEART RATE: 85 BPM | DIASTOLIC BLOOD PRESSURE: 64 MMHG | SYSTOLIC BLOOD PRESSURE: 131 MMHG | WEIGHT: 272 LBS | OXYGEN SATURATION: 99 % | RESPIRATION RATE: 18 BRPM | BODY MASS INDEX: 48.18 KG/M2

## 2020-10-31 DIAGNOSIS — M54.32 SCIATICA OF LEFT SIDE: Primary | ICD-10-CM

## 2020-10-31 DIAGNOSIS — M54.42 ACUTE LEFT-SIDED LOW BACK PAIN WITH LEFT-SIDED SCIATICA: ICD-10-CM

## 2020-10-31 PROCEDURE — 96372 THER/PROPH/DIAG INJ SC/IM: CPT | Mod: ER

## 2020-10-31 PROCEDURE — 25000003 PHARM REV CODE 250: Mod: ER | Performed by: PHYSICIAN ASSISTANT

## 2020-10-31 PROCEDURE — 99284 EMERGENCY DEPT VISIT MOD MDM: CPT | Mod: 25,ER

## 2020-10-31 PROCEDURE — 63600175 PHARM REV CODE 636 W HCPCS: Mod: ER | Performed by: PHYSICIAN ASSISTANT

## 2020-10-31 RX ORDER — TRIAMCINOLONE ACETONIDE 40 MG/ML
60 INJECTION, SUSPENSION INTRA-ARTICULAR; INTRAMUSCULAR
Status: COMPLETED | OUTPATIENT
Start: 2020-10-31 | End: 2020-10-31

## 2020-10-31 RX ORDER — TRAMADOL HYDROCHLORIDE 50 MG/1
50 TABLET ORAL
Status: COMPLETED | OUTPATIENT
Start: 2020-10-31 | End: 2020-10-31

## 2020-10-31 RX ORDER — TRAMADOL HYDROCHLORIDE 50 MG/1
50 TABLET ORAL EVERY 6 HOURS PRN
Qty: 11 TABLET | Refills: 0 | Status: SHIPPED | OUTPATIENT
Start: 2020-10-31 | End: 2022-05-19

## 2020-10-31 RX ORDER — LIDOCAINE 50 MG/G
1 PATCH TOPICAL DAILY
Qty: 15 PATCH | Refills: 0 | Status: SHIPPED | OUTPATIENT
Start: 2020-10-31 | End: 2022-05-19

## 2020-10-31 RX ORDER — KETOROLAC TROMETHAMINE 30 MG/ML
30 INJECTION, SOLUTION INTRAMUSCULAR; INTRAVENOUS
Status: COMPLETED | OUTPATIENT
Start: 2020-10-31 | End: 2020-10-31

## 2020-10-31 RX ORDER — IBUPROFEN 600 MG/1
600 TABLET ORAL EVERY 6 HOURS PRN
Qty: 20 TABLET | Refills: 0 | Status: SHIPPED | OUTPATIENT
Start: 2020-10-31 | End: 2020-12-17

## 2020-10-31 RX ADMIN — KETOROLAC TROMETHAMINE 30 MG: 30 INJECTION, SOLUTION INTRAMUSCULAR; INTRAVENOUS at 01:10

## 2020-10-31 RX ADMIN — TRAMADOL HYDROCHLORIDE 50 MG: 50 TABLET, FILM COATED ORAL at 01:10

## 2020-10-31 RX ADMIN — TRIAMCINOLONE ACETONIDE 60 MG: 40 INJECTION, SUSPENSION INTRA-ARTICULAR; INTRAMUSCULAR at 01:10

## 2020-10-31 NOTE — ED PROVIDER NOTES
"Encounter Date: 10/31/2020    SCRIBE #1 NOTE: I, Dyan Rayshawn, am scribing for, and in the presence of,  SERGIO Tuttle. I have scribed the following portions of the note - Other sections scribed: HPI, ROS, PE.       History     Chief Complaint   Patient presents with    Sciatica     pT STATES," mY SCIATICA IS ACTING UP. I have pains going from my back, through the butt to my left leg."     Candy Huynh is a 50 y.o. female with a history of sciatica who presents to the ED complaining of back pain and stiffness radiating down to buttocks and left leg onset three days ago. Denies trauma or injury. Reports pain with movement and sitting. Patient has not taken any medication for pain. Denies fever, cough, chills, or sore throat. She was diagnosed with sciatica one year ago. Reports history of HTN, controlled with medication. Denies history of diabetes. Reports past surgical histroy of left shoulder with a mass removed, , and hysterectomy. Patient is allergic to morphine. States that Dr. Pulido was her surgeon for her shoulder surgery.    The history is provided by the patient. No  was used.     Review of patient's allergies indicates:   Allergen Reactions    Morphine Other (See Comments)     shake     Past Medical History:   Diagnosis Date    Depression     Diabetes mellitus     Fibromyalgia     Hypertension     Vertigo      Past Surgical History:   Procedure Laterality Date     SECTION      EXCISION OF MASS OF EXTREMITY Left 2019    Procedure: EXCISION, MASS, EXTREMITY;  Surgeon: Honorio Pulido IV, MD;  Location: [facility];  Service: Orthopedics;  Laterality: Left;  excision lipoma  Request Lacie    HERNIA REPAIR      HYSTERECTOMY      NASAL SEPTOPLASTY       History reviewed. No pertinent family history.  Social History     Tobacco Use    Smoking status: Former Smoker    Smokeless tobacco: Never Used   Substance Use Topics    Alcohol use: Yes    Drug use: " Never     Review of Systems   Constitutional: Negative for chills and fever.   HENT: Negative for rhinorrhea and sore throat.    Eyes: Negative for redness.   Respiratory: Negative for cough and shortness of breath.    Cardiovascular: Negative for chest pain and leg swelling.   Gastrointestinal: Negative for abdominal pain, diarrhea, nausea and vomiting.   Genitourinary: Negative for dysuria.   Musculoskeletal: Positive for back pain (radiating down buttocks and left leg ).   Skin: Negative for rash.   Neurological: Negative for syncope and headaches.       Physical Exam     Initial Vitals [10/31/20 1143]   BP Pulse Resp Temp SpO2   (!) 154/72 96 16 98 °F (36.7 °C) 98 %      MAP       --         Physical Exam    Nursing note and vitals reviewed.  Constitutional: She appears well-developed and well-nourished.   HENT:   Head: Normocephalic and atraumatic.   Eyes: Conjunctivae are normal.   Neck: Normal range of motion. Neck supple.   Cardiovascular: Normal rate and intact distal pulses.   Pulmonary/Chest: Effort normal. No respiratory distress.   Musculoskeletal: Normal range of motion.      Lumbar back: She exhibits pain. She exhibits no tenderness, no bony tenderness and no deformity.      Comments: 2+ dorsalis pedis pulses present bilaterally.  Distal sensation intact.  5/5 strength in bilateral lower extremities.   Neurological: She is alert and oriented to person, place, and time. She has normal strength. No cranial nerve deficit or sensory deficit. Coordination and gait normal.   Skin: Skin is warm and dry.   Psychiatric: She has a normal mood and affect.         ED Course   Procedures  Labs Reviewed - No data to display       Imaging Results    None          Medical Decision Making:   History:   Old Medical Records: I decided to obtain old medical records.  ED Management:  Hemodynamically stable.  Nontoxic and in no acute distress.  Patient is overall well-appearing, pleasant, conversational.  History and  physical exam findings consistent with sciatica.  Will treat with Kenalog IM, Toradol, tramadol.  Discharge home with PCP follow-up.  Patient verbalizes understanding and is agreeable with plan.  Strict ED return precautions given for any worsening or additional concerning symptoms.            Scribe Attestation:   Scribe #1: I performed the above scribed service and the documentation accurately describes the services I performed. I attest to the accuracy of the note.    Scribe attestation: I, Nolberto Adame, personally performed the services described in this documentation.  All medical record entries made by the scribe were at my direction and in my presence.  I have reviewed the chart and agree that the record reflects my personal performance and is accurate and complete.                  Clinical Impression:     ICD-10-CM ICD-9-CM   1. Sciatica of left side  M54.32 724.3   2. Acute left-sided low back pain with left-sided sciatica  M54.42 724.2     724.3                      Disposition:   Disposition: Discharged  Condition: Stable     ED Disposition Condition    Discharge Stable        ED Prescriptions     Medication Sig Dispense Start Date End Date Auth. Provider    lidocaine (LIDODERM) 5 % Place 1 patch onto the skin once daily. Remove & Discard patch within 12 hours or as directed by MD 15 patch 10/31/2020  Nolberto Adame PA-C    ibuprofen (ADVIL,MOTRIN) 600 MG tablet Take 1 tablet (600 mg total) by mouth every 6 (six) hours as needed. 20 tablet 10/31/2020  Nolberto Adame PA-C    traMADoL (ULTRAM) 50 mg tablet Take 1 tablet (50 mg total) by mouth every 6 (six) hours as needed. 11 tablet 10/31/2020  Nolberto Adame PA-C        Follow-up Information     Follow up With Specialties Details Why Contact Info    [facility] Sever[facility] Chaves  Schedule an appointment as soon as possible for a visit   [address] [place] [address]  [phone]      [facility] Emergency Medicine Go to   If symptoms worsen [address] [id number] [phone]                                       Nolberto Adame PA-C  11/04/20 [id number]

## 2020-10-31 NOTE — DISCHARGE INSTRUCTIONS
PLEASE TAKE NEW MEDICATION AS DIRECTED AND FOLLOW DISCHARGE INSTRUCTIONS PROVIDED.  PLEASE MAKE SURE TO FOLLOW-UP WITH YOUR PCP USING RESOURCES PROVIDED TO DISCUSS TODAY'S EMERGENCY DEPARTMENT VISIT AND FOR FURTHER EVALUATION AND MANAGEMENT.  PLEASE RETURN EMERGENCY DEPARTMENT IMMEDIATELY IF HER SYMPTOMS WORSEN OR YOU DEVELOP ANY ADDITIONAL CONCERNING SYMPTOMS.

## 2020-11-04 ENCOUNTER — CLINICAL SUPPORT (OUTPATIENT)
Dept: REHABILITATION | Facility: HOSPITAL | Age: 50
End: 2020-11-04
Payer: MEDICAID

## 2020-11-04 DIAGNOSIS — M25.662 DECREASED RANGE OF MOTION (ROM) OF LEFT KNEE: ICD-10-CM

## 2020-11-04 DIAGNOSIS — R29.898 DECREASED STRENGTH OF LOWER EXTREMITY: Primary | ICD-10-CM

## 2020-11-04 DIAGNOSIS — R26.9 GAIT ABNORMALITY: ICD-10-CM

## 2020-11-04 DIAGNOSIS — R26.89 DECREASED FUNCTIONAL MOBILITY: ICD-10-CM

## 2020-11-04 PROCEDURE — 97110 THERAPEUTIC EXERCISES: CPT | Mod: PN

## 2020-11-04 NOTE — PROGRESS NOTES
"  Physical Therapy Daily Treatment Note     Name: Candy King [facility]  Clinic Number: [id number]    Therapy Diagnosis:   Encounter Diagnoses   Name Primary?    Decreased strength of lower extremity Yes    Gait abnormality     Decreased range of motion (ROM) of left knee     Decreased functional mobility      Physician: Araceli Avila, NP-C    Visit Date: 2020    Physician Orders: PT Eval and Treat   Medical Diagnosis from Referral: M25.562 (ICD-10-CM) - Left knee pain  Evaluation Date: 10/2/2020  Authorization Period Expiration: 2021  Plan of Care Expiration: 2021  Visit # / Visits authorized:   PN Due: 2020     Time In: 11:15 pm   Time Out: 12:10 pm   Total Billable Time: 40 minutes    Precautions: Standard    Subjective     Pt reports: diagnosed sciatica years ago. Had increased pain over the weekend and went to ER, received 2 shots, some relief. States that this pain was on the lower part of L back, going down into the buttocks.     She was compliant with home exercise program.  Response to previous treatment: none  Functional change: ongoing    Pain: 8/10  Location: left knee      Objective     Candy received therapeutic exercises to develop strength, endurance, ROM and flexibility for 40 minutes includin minutes in Nu step    Gastroc str on incline 3x30"  HS str on step 3x30"  Quad set 5" 2x10  SLR 2x10  Glut Squeeze 2x10  LAQ 2x10  Standing hip abduction 2x10  Standing hip extension 2x10    Candy received the following manual therapy techniques: Soft tissue Mobilization were applied to the: L quad for 5 minutes, including:    +Roller to L quadriceps    Candy received hot pack to L knee for 10 minutes.    Home Exercises Provided and Patient Education Provided     Education provided:   Cont to perform HEP as provided.     Written Home Exercises Provided: Patient instructed to cont prior HEP.  Exercises were reviewed and Candy was able to demonstrate them prior to the end " of the session.  Canyd demonstrated good  understanding of the education provided.     See EMR under Patient Instructions for exercises provided prior visit.    Assessment     Pt tolerated initial treatment session fair overall with no adverse response noted. Pt with significant pain reported today in L knee and L lower back. Pt completing all supine there-ex with head of bed elevated. Pt with increased pain with knee flexion in supine, not able to tolerate bridging or supine hip abduction. Pt with good muscular challenge noted to glut squeezes and LAQ. Pt with some improvement in level of pain reported with rolling to L quadriceps. Pt remains appropriate for therapy to address limited L knee ROM, strength, balance, and decreased functional mobility.     Candy is progressing well towards her goals.   Pt prognosis is Good.     Pt will continue to benefit from skilled outpatient physical therapy to address the deficits listed in the problem list box on initial evaluation, provide pt/family education and to maximize pt's level of independence in the home and community environment.     Pt's spiritual, cultural and educational needs considered and pt agreeable to plan of care and goals.    Anticipated barriers to physical therapy: none    GOALS: Short Term Goals:  4 weeks  1.Report decreased in pain at worse less than  <   / =  7  /10  to increase tolerance for functional mobility.On going  2. Pt to improve L knee range of motion by 25% to allow for improved functional mobility to allow for improvement in IADLs. .On going  3. Increased B LE MMT 1/2 grade to increase tolerance for ADL and work activities.On going  5. Pt to tolerate HEP to improve ROM and independence with ADL's.On going     Long Term Goals: 8 weeks  1.Report decreased in pain at worse less than  <   / =  4  /10  to increase tolerance for functional mobility.  2.Pt to normalize L knee ROM compared to R to allow for improved functional mobility to  allow for improvement in IADLs.   3.Increased B LE MMT 1 grade to increase tolerance for ADL and work activities.  4. Pt will report < / = 50% disability on FOTO knee survey  to demonstrate increase in LE function with every day tasks.   5. Pt to be Independent with HEP to improve ROM and independence with ADL's    Plan     Certification date: 10/2/2020 - 1/2/2021    Cont skilled PT session towards PT and patient's goals.    Angela Ugalde, PT   11/04/2020

## 2020-12-01 ENCOUNTER — DOCUMENTATION ONLY (OUTPATIENT)
Dept: REHABILITATION | Facility: HOSPITAL | Age: 50
End: 2020-12-01

## 2020-12-17 ENCOUNTER — INITIAL CONSULT (OUTPATIENT)
Dept: ORTHOPEDICS | Facility: CLINIC | Age: 50
End: 2020-12-17
Payer: MEDICAID

## 2020-12-17 VITALS
DIASTOLIC BLOOD PRESSURE: 82 MMHG | HEIGHT: 63 IN | BODY MASS INDEX: 49.55 KG/M2 | HEART RATE: 96 BPM | SYSTOLIC BLOOD PRESSURE: 144 MMHG | WEIGHT: 279.63 LBS

## 2020-12-17 DIAGNOSIS — M25.462 EFFUSION OF LEFT KNEE: ICD-10-CM

## 2020-12-17 DIAGNOSIS — M25.562 ACUTE PAIN OF LEFT KNEE: Primary | ICD-10-CM

## 2020-12-17 DIAGNOSIS — M17.10 UNILATERAL PRIMARY OSTEOARTHRITIS, UNSPECIFIED KNEE: ICD-10-CM

## 2020-12-17 PROCEDURE — 99999 PR PBB SHADOW E&M-EST. PATIENT-LVL IV: ICD-10-PCS | Mod: PBBFAC,,, | Performed by: ORTHOPAEDIC SURGERY

## 2020-12-17 PROCEDURE — 99999 PR PBB SHADOW E&M-EST. PATIENT-LVL IV: CPT | Mod: PBBFAC,,, | Performed by: ORTHOPAEDIC SURGERY

## 2020-12-17 PROCEDURE — 99203 OFFICE O/P NEW LOW 30 MIN: CPT | Mod: 25,S$PBB,, | Performed by: ORTHOPAEDIC SURGERY

## 2020-12-17 PROCEDURE — 20610 DRAIN/INJ JOINT/BURSA W/O US: CPT | Mod: PBBFAC,PN | Performed by: ORTHOPAEDIC SURGERY

## 2020-12-17 PROCEDURE — 20610 LARGE JOINT ASPIRATION/INJECTION: L KNEE: ICD-10-PCS | Mod: S$PBB,LT,, | Performed by: ORTHOPAEDIC SURGERY

## 2020-12-17 PROCEDURE — 99214 OFFICE O/P EST MOD 30 MIN: CPT | Mod: PBBFAC,PN,25 | Performed by: ORTHOPAEDIC SURGERY

## 2020-12-17 PROCEDURE — 99203 PR OFFICE/OUTPT VISIT, NEW, LEVL III, 30-44 MIN: ICD-10-PCS | Mod: 25,S$PBB,, | Performed by: ORTHOPAEDIC SURGERY

## 2020-12-17 RX ORDER — LIDOCAINE HYDROCHLORIDE 10 MG/ML
4 INJECTION INFILTRATION; PERINEURAL
Status: DISCONTINUED | OUTPATIENT
Start: 2020-12-17 | End: 2020-12-17 | Stop reason: HOSPADM

## 2020-12-17 RX ORDER — DICLOFENAC SODIUM 75 MG/1
TABLET, DELAYED RELEASE ORAL
COMMUNITY
Start: 2020-11-20 | End: 2022-04-28

## 2020-12-17 RX ORDER — IBUPROFEN 800 MG/1
TABLET ORAL
COMMUNITY
Start: 2020-12-10 | End: 2021-11-10

## 2020-12-17 RX ORDER — BUPIVACAINE HYDROCHLORIDE 5 MG/ML
5 INJECTION, SOLUTION PERINEURAL
Status: DISCONTINUED | OUTPATIENT
Start: 2020-12-17 | End: 2020-12-17 | Stop reason: HOSPADM

## 2020-12-17 RX ORDER — MUPIROCIN 20 MG/G
OINTMENT TOPICAL
COMMUNITY
Start: 2020-10-19 | End: 2022-05-19

## 2020-12-17 RX ORDER — BUSPIRONE HYDROCHLORIDE 10 MG/1
TABLET ORAL
COMMUNITY
Start: 2020-11-20 | End: 2022-04-28

## 2020-12-17 RX ORDER — ARIPIPRAZOLE 5 MG/1
TABLET ORAL
COMMUNITY
Start: 2020-11-20 | End: 2022-04-28

## 2020-12-17 RX ORDER — MONTELUKAST SODIUM 10 MG/1
TABLET ORAL
Status: ON HOLD | COMMUNITY
Start: 2020-11-20 | End: 2023-08-12 | Stop reason: HOSPADM

## 2020-12-17 RX ORDER — CELECOXIB 200 MG/1
CAPSULE ORAL
COMMUNITY
Start: 2020-11-20 | End: 2020-12-17

## 2020-12-17 RX ORDER — KETOROLAC TROMETHAMINE 30 MG/ML
30 INJECTION, SOLUTION INTRAMUSCULAR; INTRAVENOUS
Status: DISCONTINUED | OUTPATIENT
Start: 2020-12-17 | End: 2020-12-17 | Stop reason: HOSPADM

## 2020-12-17 RX ORDER — OMEPRAZOLE 40 MG/1
CAPSULE, DELAYED RELEASE ORAL
COMMUNITY
Start: 2020-11-20 | End: 2022-10-06 | Stop reason: CLARIF

## 2020-12-17 RX ORDER — DICLOFENAC SODIUM 10 MG/G
GEL TOPICAL
COMMUNITY
Start: 2020-12-04 | End: 2022-04-28

## 2020-12-17 RX ADMIN — KETOROLAC TROMETHAMINE 30 MG: 30 INJECTION, SOLUTION INTRAMUSCULAR; INTRAVENOUS at 11:12

## 2020-12-17 RX ADMIN — LIDOCAINE HYDROCHLORIDE 4 ML: 10 INJECTION, SOLUTION INFILTRATION; PERINEURAL at 11:12

## 2020-12-17 RX ADMIN — BUPIVACAINE HYDROCHLORIDE 5 ML: 5 INJECTION, SOLUTION PERINEURAL at 11:12

## 2020-12-17 NOTE — PROGRESS NOTES
Subjective:      Patient ID: Candy Huynh is a 50 y.o. female.    Chief Complaint: Pain of the Left Knee    anteriorSubjective:    Patient ID: Candy Huynh is a 50 y.o. female.    Chief Complaint: Pain of the Left Knee      KNEE PAIN: Complains of pain to the leftknee.   PAIN LOCATED: anterior  ONSET: 3 months ago.  QUALITY:  Patient states the pain is worsening  INJURY?yes);  twisting  ASSOCIATED SYMPTOM AND TRIGGERS:Standing/Weightbearing, walking, trouble w stairs, stiffness, swelling, limping, stiffness w sitting , + giving out  USES ASSISTIVE DEVICE: none  RELIEVED BY:ice, medication: diclofenac some relief, mobic some relief, rest sitting  PATIENT DENIES: bruising, redness, deformity   HISTORY: Previous knee injury/surgery: no  Hx: none  Therapy for left knee  for 2-3 mos, no improvement  Right knee injury in 2017    Social History     Occupational History    Not on file   Tobacco Use    Smoking status: Former Smoker    Smokeless tobacco: Never Used   Substance and Sexual Activity    Alcohol use: Yes    Drug use: Never    Sexual activity: Yes     Partners: Male      Review of Systems   Constitution: Negative for diaphoresis.   HENT: Negative for ear discharge, nosebleeds and stridor.    Eyes: Negative for photophobia.   Cardiovascular: Negative for syncope.   Respiratory: Negative for hemoptysis, shortness of breath and wheezing.    Neurological: Negative for tremors.   Psychiatric/Behavioral: Negative.          Objective:              Right Knee Exam     Range of Motion   Extension: 5   Flexion: 100     Left Knee Exam     Inspection   Swelling: present  Effusion: present    Tenderness   The patient tender to palpation of the lateral joint line.    Range of Motion   Extension: 10   Flexion: 100     Tests   Meniscus   Sasha:  Medial - positive     Other   Sensation: normal    Muscle Strength   Left Lower Extremity   Hip Abduction: 4/5   Quadriceps:  4/5   Hamstrin/5           Assessment:       No diagnosis found.      Plan:       I had a lengthy discussion with the patient regarding the natural history of   meniscal tears and osteoarthritis.  The patient is receiving minimal relief with NSAIDs and the pain prevents them from participating in physical therapy. At this point, given the acuity of the symptoms, mechanical in nature, physical examination and minimal degenerative findings on radiographs, I think an MRI would be warranted to evaluate for meniscal pathology.  We will see if we can help arrange this.   I will see the patient back once the MRI is complete.  we injected the left knee w 1cc of ketorolac, marcaine and lidocaine under sterile conditions with the patient's informed consent mild/moderate

## 2020-12-17 NOTE — PROCEDURES
Large Joint Aspiration/Injection: L knee    Date/Time: 12/17/2020 11:45 AM  Performed by: Donnie Campuzano MD  Authorized by: Donnie Campuzano MD     Consent Done?:  Yes (Verbal)  Indications:  Arthritis  Site marked: the procedure site was marked    Timeout: prior to procedure the correct patient, procedure, and site was verified    Prep: patient was prepped and draped in usual sterile fashion      Local anesthesia used?: Yes    Local anesthetic:  Topical anesthetic    Details:  Needle Size:  22 G  Ultrasonic Guidance for needle placement?: No    Approach:  Anterolateral  Location:  Knee  Site:  L knee  Medications:  4 mL lidocaine HCL 10 mg/ml (1%) 10 mg/mL (1 %); 5 mL bupivacaine 0.5 % (5 mg/mL); 30 mg ketorolac 30 mg/mL (1 mL)  Patient tolerance:  Patient tolerated the procedure well with no immediate complications

## 2020-12-24 ENCOUNTER — HOSPITAL ENCOUNTER (OUTPATIENT)
Dept: RADIOLOGY | Facility: HOSPITAL | Age: 50
Discharge: HOME | End: 2020-12-24
Attending: ORTHOPAEDIC SURGERY
Payer: MEDICAID

## 2020-12-24 DIAGNOSIS — M25.562 ACUTE PAIN OF LEFT KNEE: ICD-10-CM

## 2020-12-24 DIAGNOSIS — M17.10 UNILATERAL PRIMARY OSTEOARTHRITIS, UNSPECIFIED KNEE: ICD-10-CM

## 2020-12-24 DIAGNOSIS — M25.462 EFFUSION OF LEFT KNEE: ICD-10-CM

## 2020-12-24 PROCEDURE — 73721 MRI KNEE WITHOUT CONTRAST LEFT: ICD-10-PCS | Mod: 26,LT,, | Performed by: RADIOLOGY

## 2020-12-24 PROCEDURE — 73721 MRI JNT OF LWR EXTRE W/O DYE: CPT | Mod: 26,LT,, | Performed by: RADIOLOGY

## 2020-12-24 PROCEDURE — 73721 MRI JNT OF LWR EXTRE W/O DYE: CPT | Mod: TC,LT

## 2020-12-28 ENCOUNTER — TELEPHONE (OUTPATIENT)
Dept: ORTHOPEDICS | Facility: CLINIC | Age: 50
End: 2020-12-28

## 2020-12-28 NOTE — TELEPHONE ENCOUNTER
----- Message from Donnie Campuzano MD sent at 12/26/2020  3:08 PM CST -----  Regarding: mri results  Patient should make appt to review mri results

## 2020-12-28 NOTE — TELEPHONE ENCOUNTER
----- Message from Kiah Rhodes sent at 12/28/2020  8:43 AM CST -----  Contact: [phone]  Patient is requesting call back about MRI states someone tried to call today 12/28/2020

## 2021-01-05 ENCOUNTER — HOSPITAL ENCOUNTER (OUTPATIENT)
Dept: RADIOLOGY | Facility: HOSPITAL | Age: 51
Discharge: HOME | End: 2021-01-05
Attending: ORTHOPAEDIC SURGERY
Payer: MEDICAID

## 2021-01-05 ENCOUNTER — OFFICE VISIT (OUTPATIENT)
Dept: ORTHOPEDICS | Facility: CLINIC | Age: 51
End: 2021-01-05
Payer: MEDICAID

## 2021-01-05 ENCOUNTER — CLINICAL SUPPORT (OUTPATIENT)
Dept: LAB | Facility: HOSPITAL | Age: 51
End: 2021-01-05
Attending: ORTHOPAEDIC SURGERY
Payer: MEDICAID

## 2021-01-05 VITALS
WEIGHT: 277.81 LBS | HEART RATE: 85 BPM | SYSTOLIC BLOOD PRESSURE: 146 MMHG | HEIGHT: 63 IN | DIASTOLIC BLOOD PRESSURE: 86 MMHG | BODY MASS INDEX: 49.22 KG/M2

## 2021-01-05 DIAGNOSIS — M25.562 ACUTE PAIN OF LEFT KNEE: ICD-10-CM

## 2021-01-05 DIAGNOSIS — M25.562 ACUTE PAIN OF LEFT KNEE: Primary | ICD-10-CM

## 2021-01-05 PROCEDURE — 71045 X-RAY EXAM CHEST 1 VIEW: CPT | Mod: TC,FY

## 2021-01-05 PROCEDURE — 93010 ELECTROCARDIOGRAM REPORT: CPT | Mod: ,,, | Performed by: INTERNAL MEDICINE

## 2021-01-05 PROCEDURE — 99215 OFFICE O/P EST HI 40 MIN: CPT | Mod: S$PBB,,, | Performed by: ORTHOPAEDIC SURGERY

## 2021-01-05 PROCEDURE — 93010 EKG 12-LEAD: ICD-10-PCS | Mod: ,,, | Performed by: INTERNAL MEDICINE

## 2021-01-05 PROCEDURE — 71045 X-RAY EXAM CHEST 1 VIEW: CPT | Mod: 26,,, | Performed by: RADIOLOGY

## 2021-01-05 PROCEDURE — 99215 OFFICE O/P EST HI 40 MIN: CPT | Mod: PBBFAC,PN | Performed by: ORTHOPAEDIC SURGERY

## 2021-01-05 PROCEDURE — 99999 PR PBB SHADOW E&M-EST. PATIENT-LVL V: ICD-10-PCS | Mod: PBBFAC,,, | Performed by: ORTHOPAEDIC SURGERY

## 2021-01-05 PROCEDURE — 71045 XR CHEST 1 VIEW PRE-OP: ICD-10-PCS | Mod: 26,,, | Performed by: RADIOLOGY

## 2021-01-05 PROCEDURE — 99215 PR OFFICE/OUTPT VISIT, EST, LEVL V, 40-54 MIN: ICD-10-PCS | Mod: S$PBB,,, | Performed by: ORTHOPAEDIC SURGERY

## 2021-01-05 PROCEDURE — 93005 ELECTROCARDIOGRAM TRACING: CPT

## 2021-01-05 PROCEDURE — 99999 PR PBB SHADOW E&M-EST. PATIENT-LVL V: CPT | Mod: PBBFAC,,, | Performed by: ORTHOPAEDIC SURGERY

## 2021-01-06 ENCOUNTER — ANESTHESIA EVENT (OUTPATIENT)
Dept: SURGERY | Facility: HOSPITAL | Age: 51
End: 2021-01-06
Payer: MEDICAID

## 2021-01-08 ENCOUNTER — TELEPHONE (OUTPATIENT)
Dept: ORTHOPEDICS | Facility: CLINIC | Age: 51
End: 2021-01-08

## 2021-01-11 ENCOUNTER — HOSPITAL ENCOUNTER (OUTPATIENT)
Facility: HOSPITAL | Age: 51
Discharge: HOME | End: 2021-01-11
Attending: ORTHOPAEDIC SURGERY | Admitting: ORTHOPAEDIC SURGERY
Payer: MEDICAID

## 2021-01-11 ENCOUNTER — ANESTHESIA (OUTPATIENT)
Dept: SURGERY | Facility: HOSPITAL | Age: 51
End: 2021-01-11
Payer: MEDICAID

## 2021-01-11 VITALS
BODY MASS INDEX: 49.08 KG/M2 | SYSTOLIC BLOOD PRESSURE: 122 MMHG | TEMPERATURE: 98 F | RESPIRATION RATE: 18 BRPM | DIASTOLIC BLOOD PRESSURE: 78 MMHG | WEIGHT: 277 LBS | HEIGHT: 63 IN | HEART RATE: 69 BPM | OXYGEN SATURATION: 96 %

## 2021-01-11 DIAGNOSIS — E66.01 MORBID OBESITY WITH BMI OF 45.0-49.9, ADULT: ICD-10-CM

## 2021-01-11 DIAGNOSIS — M25.569 KNEE PAIN: ICD-10-CM

## 2021-01-11 DIAGNOSIS — M25.562 ACUTE PAIN OF LEFT KNEE: Primary | ICD-10-CM

## 2021-01-11 DIAGNOSIS — M23.301 DEGENERATIVE TEAR OF LATERAL MENISCUS, LEFT: ICD-10-CM

## 2021-01-11 DIAGNOSIS — M23.204 DEGENERATIVE TEAR OF LEFT MEDIAL MENISCUS: ICD-10-CM

## 2021-01-11 LAB
APPEARANCE FLD: NORMAL
BASOPHILS NFR FLD MANUAL: 0 %
BODY FLD TYPE: NORMAL
BODY FLUID COMMENTS: NORMAL
COLOR FLD: NORMAL
EOSINOPHIL NFR FLD MANUAL: 0 %
LYMPHOCYTES NFR FLD MANUAL: 41 %
MESOTHL CELL NFR FLD MANUAL: 0 %
MONOS+MACROS NFR FLD MANUAL: 49 %
NEUTROPHILS NFR FLD MANUAL: 10 %
OTHER CELLS FLD MANUAL: 0 %
POCT GLUCOSE: 128 MG/DL (ref 70–110)
SARS-COV-2 RDRP RESP QL NAA+PROBE: NEGATIVE
WBC # FLD: 175 /CU MM

## 2021-01-11 PROCEDURE — 63600175 PHARM REV CODE 636 W HCPCS: Performed by: STUDENT IN AN ORGANIZED HEALTH CARE EDUCATION/TRAINING PROGRAM

## 2021-01-11 PROCEDURE — U0002 COVID-19 LAB TEST NON-CDC: HCPCS

## 2021-01-11 PROCEDURE — 01400 ANES OPN/ARTHRS KNEE JT NOS: CPT | Performed by: ORTHOPAEDIC SURGERY

## 2021-01-11 PROCEDURE — 37000009 HC ANESTHESIA EA ADD 15 MINS: Performed by: ORTHOPAEDIC SURGERY

## 2021-01-11 PROCEDURE — 29880 PR KNEE SCOPE MED/LAT MENISCECTOMY: ICD-10-PCS | Mod: 22,LT,, | Performed by: ORTHOPAEDIC SURGERY

## 2021-01-11 PROCEDURE — 89051 BODY FLUID CELL COUNT: CPT

## 2021-01-11 PROCEDURE — 97161 PT EVAL LOW COMPLEX 20 MIN: CPT

## 2021-01-11 PROCEDURE — 63600175 PHARM REV CODE 636 W HCPCS: Performed by: ANESTHESIOLOGY

## 2021-01-11 PROCEDURE — 29880 ARTHRS KNE SRG MNISECTMY M&L: CPT | Mod: 22,LT,, | Performed by: ORTHOPAEDIC SURGERY

## 2021-01-11 PROCEDURE — 97116 GAIT TRAINING THERAPY: CPT

## 2021-01-11 PROCEDURE — 63600175 PHARM REV CODE 636 W HCPCS: Performed by: ORTHOPAEDIC SURGERY

## 2021-01-11 PROCEDURE — 25000003 PHARM REV CODE 250: Performed by: NURSE ANESTHETIST, CERTIFIED REGISTERED

## 2021-01-11 PROCEDURE — 25000003 PHARM REV CODE 250: Performed by: ORTHOPAEDIC SURGERY

## 2021-01-11 PROCEDURE — 71000016 HC POSTOP RECOV ADDL HR: Performed by: ORTHOPAEDIC SURGERY

## 2021-01-11 PROCEDURE — 27201423 OPTIME MED/SURG SUP & DEVICES STERILE SUPPLY: Performed by: ORTHOPAEDIC SURGERY

## 2021-01-11 PROCEDURE — 71000015 HC POSTOP RECOV 1ST HR: Performed by: ORTHOPAEDIC SURGERY

## 2021-01-11 PROCEDURE — 37000008 HC ANESTHESIA 1ST 15 MINUTES: Performed by: ORTHOPAEDIC SURGERY

## 2021-01-11 PROCEDURE — 63600175 PHARM REV CODE 636 W HCPCS: Performed by: NURSE ANESTHETIST, CERTIFIED REGISTERED

## 2021-01-11 PROCEDURE — 36000711: Performed by: ORTHOPAEDIC SURGERY

## 2021-01-11 PROCEDURE — 71000033 HC RECOVERY, INTIAL HOUR: Performed by: ORTHOPAEDIC SURGERY

## 2021-01-11 PROCEDURE — 25000003 PHARM REV CODE 250: Performed by: ANESTHESIOLOGY

## 2021-01-11 PROCEDURE — 36000710: Performed by: ORTHOPAEDIC SURGERY

## 2021-01-11 RX ORDER — TRAMADOL HYDROCHLORIDE 50 MG/1
50 TABLET ORAL EVERY 6 HOURS PRN
Qty: 28 TABLET | Refills: 0 | Status: SHIPPED | OUTPATIENT
Start: 2021-01-11 | End: 2021-01-18

## 2021-01-11 RX ORDER — ACETAMINOPHEN 325 MG/1
325 TABLET ORAL EVERY 4 HOURS
Qty: 84 TABLET | Refills: 0 | Status: SHIPPED | OUTPATIENT
Start: 2021-01-11 | End: 2021-01-25

## 2021-01-11 RX ORDER — SODIUM CHLORIDE 0.9 % (FLUSH) 0.9 %
3 SYRINGE (ML) INJECTION
Status: DISCONTINUED | OUTPATIENT
Start: 2021-01-11 | End: 2021-01-11 | Stop reason: HOSPADM

## 2021-01-11 RX ORDER — PROPOFOL 10 MG/ML
VIAL (ML) INTRAVENOUS
Status: DISCONTINUED | OUTPATIENT
Start: 2021-01-11 | End: 2021-01-11

## 2021-01-11 RX ORDER — DEXAMETHASONE SODIUM PHOSPHATE 4 MG/ML
INJECTION, SOLUTION INTRA-ARTICULAR; INTRALESIONAL; INTRAMUSCULAR; INTRAVENOUS; SOFT TISSUE
Status: DISCONTINUED | OUTPATIENT
Start: 2021-01-11 | End: 2021-01-11

## 2021-01-11 RX ORDER — EPINEPHRINE 1 MG/ML
INJECTION, SOLUTION INTRACARDIAC; INTRAMUSCULAR; INTRAVENOUS; SUBCUTANEOUS
Status: DISCONTINUED | OUTPATIENT
Start: 2021-01-11 | End: 2021-01-11 | Stop reason: HOSPADM

## 2021-01-11 RX ORDER — CEFAZOLIN SODIUM 2 G/50ML
2 SOLUTION INTRAVENOUS ONCE
Status: COMPLETED | OUTPATIENT
Start: 2021-01-11 | End: 2021-01-11

## 2021-01-11 RX ORDER — MIDAZOLAM HYDROCHLORIDE 1 MG/ML
INJECTION, SOLUTION INTRAMUSCULAR; INTRAVENOUS
Status: DISCONTINUED | OUTPATIENT
Start: 2021-01-11 | End: 2021-01-11

## 2021-01-11 RX ORDER — DIPHENHYDRAMINE HYDROCHLORIDE 50 MG/ML
12.5 INJECTION INTRAMUSCULAR; INTRAVENOUS EVERY 6 HOURS PRN
Status: DISCONTINUED | OUTPATIENT
Start: 2021-01-11 | End: 2021-01-11 | Stop reason: HOSPADM

## 2021-01-11 RX ORDER — HYDROCODONE BITARTRATE AND ACETAMINOPHEN 5; 325 MG/1; MG/1
1 TABLET ORAL EVERY 6 HOURS PRN
Status: DISCONTINUED | OUTPATIENT
Start: 2021-01-11 | End: 2021-01-11 | Stop reason: HOSPADM

## 2021-01-11 RX ORDER — HYDROMORPHONE HYDROCHLORIDE 2 MG/ML
0.5 INJECTION, SOLUTION INTRAMUSCULAR; INTRAVENOUS; SUBCUTANEOUS EVERY 5 MIN PRN
Status: DISCONTINUED | OUTPATIENT
Start: 2021-01-11 | End: 2021-01-11 | Stop reason: HOSPADM

## 2021-01-11 RX ORDER — ONDANSETRON 2 MG/ML
INJECTION INTRAMUSCULAR; INTRAVENOUS
Status: DISCONTINUED | OUTPATIENT
Start: 2021-01-11 | End: 2021-01-11

## 2021-01-11 RX ORDER — FENTANYL CITRATE 50 UG/ML
INJECTION, SOLUTION INTRAMUSCULAR; INTRAVENOUS
Status: DISCONTINUED | OUTPATIENT
Start: 2021-01-11 | End: 2021-01-11

## 2021-01-11 RX ORDER — BUPIVACAINE HYDROCHLORIDE 2.5 MG/ML
INJECTION, SOLUTION INFILTRATION; PERINEURAL
Status: DISCONTINUED | OUTPATIENT
Start: 2021-01-11 | End: 2021-01-11 | Stop reason: HOSPADM

## 2021-01-11 RX ORDER — LIDOCAINE HYDROCHLORIDE 20 MG/ML
INJECTION INTRAVENOUS
Status: DISCONTINUED | OUTPATIENT
Start: 2021-01-11 | End: 2021-01-11

## 2021-01-11 RX ORDER — ONDANSETRON 2 MG/ML
4 INJECTION INTRAMUSCULAR; INTRAVENOUS DAILY PRN
Status: DISCONTINUED | OUTPATIENT
Start: 2021-01-11 | End: 2021-01-11 | Stop reason: HOSPADM

## 2021-01-11 RX ADMIN — MIDAZOLAM 2 MG: 1 INJECTION INTRAMUSCULAR; INTRAVENOUS at 09:01

## 2021-01-11 RX ADMIN — HYDROMORPHONE HYDROCHLORIDE 0.5 MG: 2 INJECTION, SOLUTION INTRAMUSCULAR; INTRAVENOUS; SUBCUTANEOUS at 10:01

## 2021-01-11 RX ADMIN — CEFAZOLIN SODIUM 2 G: 2 SOLUTION INTRAVENOUS at 09:01

## 2021-01-11 RX ADMIN — HYDROCODONE BITARTRATE AND ACETAMINOPHEN 1 TABLET: 5; 325 TABLET ORAL at 11:01

## 2021-01-11 RX ADMIN — FENTANYL CITRATE 50 MCG: 50 INJECTION, SOLUTION INTRAMUSCULAR; INTRAVENOUS at 09:01

## 2021-01-11 RX ADMIN — ONDANSETRON 8 MG: 2 INJECTION, SOLUTION INTRAMUSCULAR; INTRAVENOUS at 09:01

## 2021-01-11 RX ADMIN — PROPOFOL 200 MG: 10 INJECTION, EMULSION INTRAVENOUS at 09:01

## 2021-01-11 RX ADMIN — LIDOCAINE HYDROCHLORIDE 100 MG: 20 INJECTION, SOLUTION INTRAVENOUS at 09:01

## 2021-01-11 RX ADMIN — SODIUM CHLORIDE, SODIUM LACTATE, POTASSIUM CHLORIDE, AND CALCIUM CHLORIDE: .6; .31; .03; .02 INJECTION, SOLUTION INTRAVENOUS at 07:01

## 2021-01-11 RX ADMIN — DEXAMETHASONE SODIUM PHOSPHATE 8 MG: 4 INJECTION, SOLUTION INTRA-ARTICULAR; INTRALESIONAL; INTRAMUSCULAR; INTRAVENOUS; SOFT TISSUE at 09:01

## 2021-01-13 ENCOUNTER — CLINICAL SUPPORT (OUTPATIENT)
Dept: REHABILITATION | Facility: HOSPITAL | Age: 51
End: 2021-01-13
Attending: ORTHOPAEDIC SURGERY
Payer: MEDICAID

## 2021-01-13 DIAGNOSIS — M25.662 DECREASED RANGE OF MOTION (ROM) OF LEFT KNEE: Primary | ICD-10-CM

## 2021-01-13 DIAGNOSIS — R26.2 DIFFICULTY WALKING: ICD-10-CM

## 2021-01-13 DIAGNOSIS — M25.562 ACUTE PAIN OF LEFT KNEE: ICD-10-CM

## 2021-01-13 PROCEDURE — 97110 THERAPEUTIC EXERCISES: CPT | Mod: PN

## 2021-01-13 PROCEDURE — 97161 PT EVAL LOW COMPLEX 20 MIN: CPT | Mod: PN

## 2021-02-02 ENCOUNTER — OFFICE VISIT (OUTPATIENT)
Dept: ORTHOPEDICS | Facility: CLINIC | Age: 51
End: 2021-02-02
Payer: MEDICAID

## 2021-02-02 VITALS
HEIGHT: 63 IN | DIASTOLIC BLOOD PRESSURE: 90 MMHG | HEART RATE: 77 BPM | BODY MASS INDEX: 49.08 KG/M2 | WEIGHT: 277 LBS | SYSTOLIC BLOOD PRESSURE: 135 MMHG

## 2021-02-02 DIAGNOSIS — Z98.890 STATUS POST ARTHROSCOPY OF KNEE: Primary | ICD-10-CM

## 2021-02-02 PROCEDURE — 99024 PR POST-OP FOLLOW-UP VISIT: ICD-10-PCS | Mod: ,,, | Performed by: ORTHOPAEDIC SURGERY

## 2021-02-02 PROCEDURE — 99214 OFFICE O/P EST MOD 30 MIN: CPT | Mod: PBBFAC,PN | Performed by: ORTHOPAEDIC SURGERY

## 2021-02-02 PROCEDURE — 99999 PR PBB SHADOW E&M-EST. PATIENT-LVL IV: CPT | Mod: PBBFAC,,, | Performed by: ORTHOPAEDIC SURGERY

## 2021-02-02 PROCEDURE — 99999 PR PBB SHADOW E&M-EST. PATIENT-LVL IV: ICD-10-PCS | Mod: PBBFAC,,, | Performed by: ORTHOPAEDIC SURGERY

## 2021-02-02 PROCEDURE — 99024 POSTOP FOLLOW-UP VISIT: CPT | Mod: ,,, | Performed by: ORTHOPAEDIC SURGERY

## 2021-04-15 ENCOUNTER — PATIENT MESSAGE (OUTPATIENT)
Dept: RESEARCH | Facility: HOSPITAL | Age: 51
End: 2021-04-15

## 2021-07-01 ENCOUNTER — PATIENT MESSAGE (OUTPATIENT)
Dept: ADMINISTRATIVE | Facility: OTHER | Age: 51
End: 2021-07-01

## 2021-11-10 ENCOUNTER — HOSPITAL ENCOUNTER (EMERGENCY)
Facility: HOSPITAL | Age: 51
Discharge: HOME | End: 2021-11-10
Attending: EMERGENCY MEDICINE
Payer: MEDICAID

## 2021-11-10 VITALS
TEMPERATURE: 99 F | WEIGHT: 244 LBS | SYSTOLIC BLOOD PRESSURE: 145 MMHG | OXYGEN SATURATION: 99 % | HEIGHT: 63 IN | DIASTOLIC BLOOD PRESSURE: 78 MMHG | BODY MASS INDEX: 43.23 KG/M2 | RESPIRATION RATE: 18 BRPM | HEART RATE: 78 BPM

## 2021-11-10 DIAGNOSIS — R05.9 COUGH: ICD-10-CM

## 2021-11-10 DIAGNOSIS — L72.3 SEBACEOUS CYST: Primary | ICD-10-CM

## 2021-11-10 DIAGNOSIS — R68.84 JAW PAIN: ICD-10-CM

## 2021-11-10 LAB
CTP QC/QA: YES
POCT GLUCOSE: 189 MG/DL (ref 70–110)
SARS-COV-2 RDRP RESP QL NAA+PROBE: NEGATIVE

## 2021-11-10 PROCEDURE — 82962 GLUCOSE BLOOD TEST: CPT | Mod: ER

## 2021-11-10 PROCEDURE — 99284 EMERGENCY DEPT VISIT MOD MDM: CPT | Mod: 25,ER

## 2021-11-10 PROCEDURE — U0002 COVID-19 LAB TEST NON-CDC: HCPCS | Mod: ER | Performed by: PHYSICIAN ASSISTANT

## 2021-11-10 PROCEDURE — 10060 I&D ABSCESS SIMPLE/SINGLE: CPT | Mod: RT,ER

## 2021-11-10 PROCEDURE — 25000003 PHARM REV CODE 250: Mod: ER | Performed by: PHYSICIAN ASSISTANT

## 2021-11-10 RX ORDER — IBUPROFEN 600 MG/1
600 TABLET ORAL
Status: COMPLETED | OUTPATIENT
Start: 2021-11-10 | End: 2021-11-10

## 2021-11-10 RX ORDER — IBUPROFEN 600 MG/1
600 TABLET ORAL EVERY 6 HOURS PRN
Qty: 20 TABLET | Refills: 0 | Status: SHIPPED | OUTPATIENT
Start: 2021-11-10 | End: 2021-11-15

## 2021-11-10 RX ORDER — BENZONATATE 100 MG/1
100 CAPSULE ORAL
Status: COMPLETED | OUTPATIENT
Start: 2021-11-10 | End: 2021-11-10

## 2021-11-10 RX ORDER — CEPHALEXIN 500 MG/1
500 CAPSULE ORAL EVERY 12 HOURS
Qty: 20 CAPSULE | Refills: 0 | Status: SHIPPED | OUTPATIENT
Start: 2021-11-10 | End: 2021-11-20

## 2021-11-10 RX ORDER — CYCLOBENZAPRINE HCL 10 MG
10 TABLET ORAL 3 TIMES DAILY PRN
Qty: 20 TABLET | Refills: 0 | Status: SHIPPED | OUTPATIENT
Start: 2021-11-10 | End: 2021-11-17

## 2021-11-10 RX ORDER — LIDOCAINE HYDROCHLORIDE 10 MG/ML
10 INJECTION INFILTRATION; PERINEURAL
Status: COMPLETED | OUTPATIENT
Start: 2021-11-10 | End: 2021-11-10

## 2021-11-10 RX ORDER — ACETAMINOPHEN 500 MG
500 TABLET ORAL EVERY 4 HOURS PRN
Qty: 20 TABLET | Refills: 0 | Status: SHIPPED | OUTPATIENT
Start: 2021-11-10 | End: 2021-11-15

## 2021-11-10 RX ORDER — BENZONATATE 100 MG/1
100 CAPSULE ORAL 3 TIMES DAILY PRN
Qty: 20 CAPSULE | Refills: 0 | Status: SHIPPED | OUTPATIENT
Start: 2021-11-10 | End: 2021-11-17

## 2021-11-10 RX ORDER — ALBUTEROL SULFATE 90 UG/1
1-2 AEROSOL, METERED RESPIRATORY (INHALATION) EVERY 6 HOURS PRN
Qty: 18 G | Refills: 0 | Status: SHIPPED | OUTPATIENT
Start: 2021-11-10 | End: 2022-05-19 | Stop reason: SDUPTHER

## 2021-11-10 RX ADMIN — IBUPROFEN 600 MG: 600 TABLET ORAL at 02:11

## 2021-11-10 RX ADMIN — BENZONATATE 100 MG: 100 CAPSULE ORAL at 02:11

## 2021-11-10 RX ADMIN — LIDOCAINE HYDROCHLORIDE 10 ML: 10 INJECTION, SOLUTION INFILTRATION; PERINEURAL at 02:11

## 2022-04-28 ENCOUNTER — HOSPITAL ENCOUNTER (OUTPATIENT)
Dept: RADIOLOGY | Facility: HOSPITAL | Age: 52
Discharge: HOME | End: 2022-04-28
Attending: ORTHOPAEDIC SURGERY
Payer: MEDICAID

## 2022-04-28 ENCOUNTER — OFFICE VISIT (OUTPATIENT)
Dept: ORTHOPEDICS | Facility: CLINIC | Age: 52
End: 2022-04-28
Payer: MEDICAID

## 2022-04-28 VITALS
WEIGHT: 233.81 LBS | SYSTOLIC BLOOD PRESSURE: 127 MMHG | BODY MASS INDEX: 41.43 KG/M2 | HEIGHT: 63 IN | DIASTOLIC BLOOD PRESSURE: 81 MMHG

## 2022-04-28 DIAGNOSIS — M25.561 CHRONIC PAIN OF RIGHT KNEE: ICD-10-CM

## 2022-04-28 DIAGNOSIS — M25.561 RIGHT KNEE PAIN, UNSPECIFIED CHRONICITY: ICD-10-CM

## 2022-04-28 DIAGNOSIS — M25.561 RIGHT KNEE PAIN, UNSPECIFIED CHRONICITY: Primary | ICD-10-CM

## 2022-04-28 DIAGNOSIS — M17.11 PRIMARY OSTEOARTHRITIS OF RIGHT KNEE: Primary | ICD-10-CM

## 2022-04-28 DIAGNOSIS — G89.29 CHRONIC PAIN OF RIGHT KNEE: ICD-10-CM

## 2022-04-28 DIAGNOSIS — M62.81 QUADRICEPS WEAKNESS: ICD-10-CM

## 2022-04-28 PROCEDURE — 99999 PR PBB SHADOW E&M-EST. PATIENT-LVL III: CPT | Mod: PBBFAC,,, | Performed by: ORTHOPAEDIC SURGERY

## 2022-04-28 PROCEDURE — 3074F PR MOST RECENT SYSTOLIC BLOOD PRESSURE < 130 MM HG: ICD-10-PCS | Mod: CPTII,,, | Performed by: ORTHOPAEDIC SURGERY

## 2022-04-28 PROCEDURE — 99213 OFFICE O/P EST LOW 20 MIN: CPT | Mod: PBBFAC,PN,25 | Performed by: ORTHOPAEDIC SURGERY

## 2022-04-28 PROCEDURE — 20610 DRAIN/INJ JOINT/BURSA W/O US: CPT | Mod: PBBFAC,PN | Performed by: ORTHOPAEDIC SURGERY

## 2022-04-28 PROCEDURE — 1159F MED LIST DOCD IN RCRD: CPT | Mod: CPTII,,, | Performed by: ORTHOPAEDIC SURGERY

## 2022-04-28 PROCEDURE — 99214 OFFICE O/P EST MOD 30 MIN: CPT | Mod: 25,S$PBB,, | Performed by: ORTHOPAEDIC SURGERY

## 2022-04-28 PROCEDURE — 73564 X-RAY EXAM KNEE 4 OR MORE: CPT | Mod: TC,FY,RT

## 2022-04-28 PROCEDURE — 1159F PR MEDICATION LIST DOCUMENTED IN MEDICAL RECORD: ICD-10-PCS | Mod: CPTII,,, | Performed by: ORTHOPAEDIC SURGERY

## 2022-04-28 PROCEDURE — 3079F DIAST BP 80-89 MM HG: CPT | Mod: CPTII,,, | Performed by: ORTHOPAEDIC SURGERY

## 2022-04-28 PROCEDURE — 20610 LARGE JOINT ASPIRATION/INJECTION: R KNEE: ICD-10-PCS | Mod: S$PBB,RT,, | Performed by: ORTHOPAEDIC SURGERY

## 2022-04-28 PROCEDURE — 3074F SYST BP LT 130 MM HG: CPT | Mod: CPTII,,, | Performed by: ORTHOPAEDIC SURGERY

## 2022-04-28 PROCEDURE — 3008F BODY MASS INDEX DOCD: CPT | Mod: CPTII,,, | Performed by: ORTHOPAEDIC SURGERY

## 2022-04-28 PROCEDURE — 73564 X-RAY EXAM KNEE 4 OR MORE: CPT | Mod: 26,RT,, | Performed by: RADIOLOGY

## 2022-04-28 PROCEDURE — 99214 PR OFFICE/OUTPT VISIT, EST, LEVL IV, 30-39 MIN: ICD-10-PCS | Mod: 25,S$PBB,, | Performed by: ORTHOPAEDIC SURGERY

## 2022-04-28 PROCEDURE — 99999 PR PBB SHADOW E&M-EST. PATIENT-LVL III: ICD-10-PCS | Mod: PBBFAC,,, | Performed by: ORTHOPAEDIC SURGERY

## 2022-04-28 PROCEDURE — 3008F PR BODY MASS INDEX (BMI) DOCUMENTED: ICD-10-PCS | Mod: CPTII,,, | Performed by: ORTHOPAEDIC SURGERY

## 2022-04-28 PROCEDURE — 4010F PR ACE/ARB THEARPY RXD/TAKEN: ICD-10-PCS | Mod: CPTII,,, | Performed by: ORTHOPAEDIC SURGERY

## 2022-04-28 PROCEDURE — 73564 XR KNEE COMP 4 OR MORE VIEWS RIGHT: ICD-10-PCS | Mod: 26,RT,, | Performed by: RADIOLOGY

## 2022-04-28 PROCEDURE — 3079F PR MOST RECENT DIASTOLIC BLOOD PRESSURE 80-89 MM HG: ICD-10-PCS | Mod: CPTII,,, | Performed by: ORTHOPAEDIC SURGERY

## 2022-04-28 PROCEDURE — 4010F ACE/ARB THERAPY RXD/TAKEN: CPT | Mod: CPTII,,, | Performed by: ORTHOPAEDIC SURGERY

## 2022-04-28 RX ORDER — LIDOCAINE HYDROCHLORIDE 10 MG/ML
4 INJECTION INFILTRATION; PERINEURAL
Status: DISCONTINUED | OUTPATIENT
Start: 2022-04-28 | End: 2022-04-28 | Stop reason: HOSPADM

## 2022-04-28 RX ORDER — BUPIVACAINE HYDROCHLORIDE 5 MG/ML
5 INJECTION, SOLUTION PERINEURAL
Status: DISCONTINUED | OUTPATIENT
Start: 2022-04-28 | End: 2022-04-28 | Stop reason: HOSPADM

## 2022-04-28 RX ORDER — KETOROLAC TROMETHAMINE 30 MG/ML
30 INJECTION, SOLUTION INTRAMUSCULAR; INTRAVENOUS
Status: DISCONTINUED | OUTPATIENT
Start: 2022-04-28 | End: 2022-04-28 | Stop reason: HOSPADM

## 2022-04-28 RX ADMIN — BUPIVACAINE HYDROCHLORIDE 5 ML: 5 INJECTION, SOLUTION PERINEURAL at 01:04

## 2022-04-28 RX ADMIN — LIDOCAINE HYDROCHLORIDE 4 ML: 10 INJECTION, SOLUTION INFILTRATION; PERINEURAL at 01:04

## 2022-04-28 RX ADMIN — KETOROLAC TROMETHAMINE 30 MG: 30 INJECTION, SOLUTION INTRAMUSCULAR; INTRAVENOUS at 01:04

## 2022-04-28 NOTE — PROGRESS NOTES
Subjective:      Patient ID: Candy Huynh is a 51 y.o. female.    Chief Complaint: Pain of the Left Knee and Pain of the Right Knee      Patient ID: Candy Huynh is a 51 y.o. female.    Chief Complaint: Pain of the Left Knee and Pain of the Right Knee      KNEE PAIN: Complains of pain to the rightknee.   PAIN LOCATED: anterior, lateral and medial  ONSET: 2 years ago.  QUALITY:  Patient states the pain is worsening  HISTORY: Previous knee injury/surgery: no  Hx:   ASSOCIATED SYMPTOM AND TRIGGERS:Standing/Weightbearing, walking, trouble w stairs, stiffness, swelling, limping, stiffness w sitting   USES ASSISTIVE DEVICE: none  RELIEVED BY:rest, heat, ice, medication: Ibuprofen, Tylenol used but not effective, cortisone injection  PATIENT DENIES: bruising, redness, deformity         Social History     Occupational History    Not on file   Tobacco Use    Smoking status: Former Smoker    Smokeless tobacco: Never Used   Substance and Sexual Activity    Alcohol use: Yes    Drug use: Never    Sexual activity: Yes     Partners: Male      Review of Systems   Constitutional: Negative for diaphoresis.   HENT: Negative for ear discharge, nosebleeds and stridor.    Eyes: Negative for photophobia.   Cardiovascular: Negative for syncope.   Respiratory: Negative for hemoptysis, shortness of breath and wheezing.    Neurological: Negative for tremors.   Psychiatric/Behavioral: Negative.          Objective:    General    Constitutional: She is oriented to person, place, and time. She appears well-developed.   HENT:   Head: Normocephalic and atraumatic.   Right Ear: External ear normal.   Left Ear: External ear normal.   Eyes: EOM are normal.   Cardiovascular: Intact distal pulses.    Neurological: She is alert and oriented to person, place, and time.   Psychiatric: She has a normal mood and affect. Her behavior is normal. Judgment and thought content normal.     General Musculoskeletal Exam   Gait: antalgic and  abnormal       Right Knee Exam     Inspection   Swelling: present  Effusion: present    Tenderness   The patient is tender to palpation of the medial joint line.    Crepitus   The patient has crepitus of the patella.    Range of Motion   Extension: 10   Flexion:  90 abnormal     Tests   Ligament Examination   MCL - Valgus: normal (0 to 2mm)  LCL - Varus: normal    Other   Sensation: normal    Muscle Strength   Right Lower Extremity   Quadriceps:  4/5   Hamstrin/5          Assessment:       1. Primary osteoarthritis of right knee    2. Chronic pain of right knee    3. Quadriceps weakness          Plan:       We had a lengthy discussion regarding the natural history of osteoarthritis.   The patient would like to continue nonoperative management, and I think that is Reasonable. The patient has severe bone on bone knee oa. KL score 4  We went ahead and injected the right  with 1 dose of ketorolac, Marcaine, and lidocaine. We reviewed the risks (incl side effects and allergies), benefits, of all treatment options including injections.   We will see the patient back on a p.r.n. basis as their symptoms dictate.  We also did begin discussing total knee arthroplasty. The patient was given educational literature regarding knee OA and total knee arthroplasty.  I discussed a new treatment therapy called Iovera, which is cryotherapy, to provide symptomatic relief along the sensory distribution of the infrapatellar tendon branch of the saphenous nerve, AFCN, and LFCN.  The patient elected to move forward with this.  We did discuss the fact that this is a fairly novel procedure and the is very limited scientific data around this; however, it is FDA approved.  In a few patients there can be continued pain along the treated nerve but the exact risk has not been quantified but seems to be rare. The patient was given patient information and literature to review prior to the procedure as well. Based on this, the patient feels the  benefits outweigh the risks.

## 2022-04-28 NOTE — PROCEDURES
Large Joint Aspiration/Injection: R knee    Date/Time: 4/28/2022 1:45 PM  Performed by: Donnie Campuzano MD  Authorized by: Donnie Campuzano MD     Consent Done?:  Yes (Verbal)  Indications:  Arthritis  Site marked: the procedure site was marked    Timeout: prior to procedure the correct patient, procedure, and site was verified    Prep: patient was prepped and draped in usual sterile fashion      Local anesthesia used?: Yes    Local anesthetic:  Topical anesthetic and lidocaine 1% without epinephrine    Details:  Needle Size:  22 G  Ultrasonic Guidance for needle placement?: No    Approach:  Anteromedial  Location:  Knee  Site:  R knee  Medications:  4 mL LIDOcaine HCL 10 mg/ml (1%) 10 mg/mL (1 %); 5 mL BUPivacaine 0.5 % (5 mg/mL); 30 mg ketorolac 30 mg/mL (1 mL)  Patient tolerance:  Patient tolerated the procedure well with no immediate complications

## 2022-05-05 ENCOUNTER — TELEPHONE (OUTPATIENT)
Dept: ORTHOPEDICS | Facility: CLINIC | Age: 52
End: 2022-05-05
Payer: MEDICAID

## 2022-05-05 NOTE — TELEPHONE ENCOUNTER
----- Message from Akua Hodgse sent at 5/5/2022  2:30 PM CDT -----  Contact: [phone]  Type:  Needs Medical Advice    Who Called: pt called  Would the patient rather a call back or a response via [facility]? Call back  Best Call Back Number: [phone]  Additional Information: Pt says she called yesterday and no return call. Please call pt to advice

## 2022-05-06 ENCOUNTER — TELEPHONE (OUTPATIENT)
Dept: ORTHOPEDICS | Facility: CLINIC | Age: 52
End: 2022-05-06
Payer: MEDICAID

## 2022-05-06 DIAGNOSIS — M17.11 PRIMARY OSTEOARTHRITIS OF RIGHT KNEE: Primary | ICD-10-CM

## 2022-05-06 RX ORDER — DICLOFENAC SODIUM 75 MG/1
75 TABLET, DELAYED RELEASE ORAL 2 TIMES DAILY
Qty: 60 TABLET | Refills: 0 | Status: SHIPPED | OUTPATIENT
Start: 2022-05-06 | End: 2022-06-05

## 2022-05-06 NOTE — TELEPHONE ENCOUNTER
Call returned to [email or web address]. She wanted to inform us that pt is currently taking Meloxicam, Ibuprofen and just filled Voltaren. Information will be forwarded to prescribing physcian.

## 2022-05-06 NOTE — TELEPHONE ENCOUNTER
----- Message from Vitor Lim sent at 5/6/2022 10:18 AM CDT -----  Contact: [phone]/connor  Who Called: connor  Regarding: issue with prescription  Would the patient rather a call back or a response via [facility]? Call back  Best Call Back Number: [phone]  Additional Information: n/a

## 2022-05-12 ENCOUNTER — TELEPHONE (OUTPATIENT)
Dept: ORTHOPEDICS | Facility: CLINIC | Age: 52
End: 2022-05-12
Payer: MEDICAID

## 2022-05-12 NOTE — TELEPHONE ENCOUNTER
----- Message from Vitor Lim sent at 5/12/2022  3:08 PM CDT -----  Contact: [phone]/self  Who Called: PT  Regarding: pt wants to know was her procedure approved through Medicaid  Would the patient rather a call back or a response via [facility]? Call back  Best Call Back Number: [phone]  Additional Information: n/a

## 2022-05-13 ENCOUNTER — TELEPHONE (OUTPATIENT)
Dept: ORTHOPEDICS | Facility: CLINIC | Age: 52
End: 2022-05-13
Payer: MEDICAID

## 2022-05-13 NOTE — TELEPHONE ENCOUNTER
----- Message from Laurel Elizabeth MA sent at 5/12/2022  4:49 PM CDT -----  Contact: [phone]/self    ----- Message -----  From: Vitor Lim  Sent: 5/12/2022   4:44 PM CDT  To: Tatianna Fields Staff    Who Called: PT  Regarding: pt wants to know was her procedure approved through Medicaid  Would the patient rather a call back or a response via [facility]? Call back  Best Call Back Number: [phone]  Additional Information: n/a

## 2022-05-16 ENCOUNTER — TELEPHONE (OUTPATIENT)
Dept: ORTHOPEDICS | Facility: CLINIC | Age: 52
End: 2022-05-16
Payer: MEDICAID

## 2022-05-16 NOTE — TELEPHONE ENCOUNTER
Spoke with patient and advised her that her Iovera was approved by her insurance and she is schedule on Thursday 5/19/22 to have it done in the office.

## 2022-05-19 ENCOUNTER — PROCEDURE VISIT (OUTPATIENT)
Dept: ORTHOPEDICS | Facility: CLINIC | Age: 52
End: 2022-05-19
Payer: MEDICAID

## 2022-05-19 VITALS
WEIGHT: 229.38 LBS | HEART RATE: 86 BPM | DIASTOLIC BLOOD PRESSURE: 85 MMHG | SYSTOLIC BLOOD PRESSURE: 144 MMHG | HEIGHT: 63 IN | BODY MASS INDEX: 40.64 KG/M2

## 2022-05-19 DIAGNOSIS — M62.81 QUADRICEPS WEAKNESS: ICD-10-CM

## 2022-05-19 DIAGNOSIS — M25.561 CHRONIC PAIN OF RIGHT KNEE: ICD-10-CM

## 2022-05-19 DIAGNOSIS — M17.11 PRIMARY OSTEOARTHRITIS OF RIGHT KNEE: ICD-10-CM

## 2022-05-19 DIAGNOSIS — G89.29 CHRONIC PAIN OF RIGHT KNEE: ICD-10-CM

## 2022-05-19 PROCEDURE — 64640 PR DESTRUCT BY NEURO AGENT; OTHER PERIPH NERVE: ICD-10-PCS | Mod: S$PBB,RT,, | Performed by: ORTHOPAEDIC SURGERY

## 2022-05-19 PROCEDURE — 99499 NO LOS: ICD-10-PCS | Mod: S$PBB,,, | Performed by: ORTHOPAEDIC SURGERY

## 2022-05-19 PROCEDURE — 99499 UNLISTED E&M SERVICE: CPT | Mod: S$PBB,,, | Performed by: ORTHOPAEDIC SURGERY

## 2022-05-19 PROCEDURE — 64640 INJECTION TREATMENT OF NERVE: CPT | Mod: S$PBB,RT,, | Performed by: ORTHOPAEDIC SURGERY

## 2022-05-19 PROCEDURE — 64640 INJECTION TREATMENT OF NERVE: CPT | Mod: PBBFAC,PN,RT | Performed by: ORTHOPAEDIC SURGERY

## 2022-05-19 RX ORDER — LEVOCETIRIZINE DIHYDROCHLORIDE 5 MG/1
TABLET, FILM COATED ORAL
COMMUNITY
Start: 2022-04-06 | End: 2022-10-06 | Stop reason: CLARIF

## 2022-05-19 RX ORDER — METRONIDAZOLE 500 MG/1
500 TABLET ORAL 2 TIMES DAILY
COMMUNITY
Start: 2022-04-08 | End: 2022-10-16 | Stop reason: CLARIF

## 2022-05-19 RX ORDER — ERGOCALCIFEROL 1.25 MG/1
50000 CAPSULE ORAL
COMMUNITY
Start: 2022-05-03

## 2022-05-19 RX ORDER — AMLODIPINE BESYLATE 5 MG/1
5 TABLET ORAL DAILY
COMMUNITY
Start: 2022-05-03

## 2022-05-19 RX ORDER — AZELASTINE 1 MG/ML
1 SPRAY, METERED NASAL DAILY
COMMUNITY
Start: 2022-05-03

## 2022-05-19 RX ORDER — DIPHENHYDRAMINE HCL 25 MG
CAPSULE ORAL
Status: ON HOLD | COMMUNITY
Start: 2022-05-03 | End: 2022-10-27 | Stop reason: HOSPADM

## 2022-05-19 RX ORDER — PRAZOSIN HYDROCHLORIDE 1 MG/1
CAPSULE ORAL
Status: ON HOLD | COMMUNITY
Start: 2022-05-03 | End: 2023-08-08 | Stop reason: HOSPADM

## 2022-05-19 RX ORDER — FOLIC ACID 1 MG/1
1000 TABLET ORAL DAILY
Status: ON HOLD | COMMUNITY
Start: 2022-05-03 | End: 2022-10-27 | Stop reason: HOSPADM

## 2022-05-19 RX ORDER — IBUPROFEN 800 MG/1
TABLET ORAL
COMMUNITY
Start: 2022-05-03 | End: 2022-08-30 | Stop reason: ALTCHOICE

## 2022-05-19 RX ORDER — MELOXICAM 15 MG/1
15 TABLET ORAL DAILY
COMMUNITY
Start: 2022-05-03 | End: 2022-06-15

## 2022-05-19 NOTE — PROCEDURES
Procedures   Procedure Note Iovera:    DATE OF PROCEDURE:  05/19/2022    PREOPERATIVE DIAGNOSIS: right knee pain.     POSTOPERATIVE DIAGNOSIS: right knee pain.     PROCEDURE: Iovera treatment of anterior femoral cutaneous nerve, Lateral femoral cut nerve, and both branches of infrapatellar saphenous nerve using at least 4 different punctures to treat all 4 nerves. (cpt 64640 x4)    ATTENDING SURGEON: Donnie Campuzano M.D.   ASSISTANT: ruben coleman    COMPLICATIONS: None.     IMPLANTS:  None    ESTIMATED BLOOD LOSS:  < 5cc    SPECIMENS REMOVED:  None    ANESTHESIA: Local lidocaine    INDICATIONS FOR PROCEDURE: This is a 51 y.o. female with longstanding knee pain. They have failed non operative management including injections.I discussed a new treatment therapy called Iovera, which is cryotherapy, to provide symptomatic relief along the sensory distribution of the infrapatellar tendon branch of the saphenous nerve  and AFCN. The patient elected to move forward with this  We did discuss the fact that this is a fairly novel procedure and there is very limited scientific data around this.  However, it FDA approved.  The patient was given patient information and literature to review prior to the procedure as well.  Based on this, the patient agreed to move forward with doing the procedure.      PROCEDURE:    The patient was placed supine on the exam table and the proximal medial aspect of the right tibia and anterior aspect of distal femur was prepped with sterile Betadine and alcohol.  A line was drawn extending approximately 5 cm medial to inferior pole of the patella distally to a point approximately 5 cm medial to the tibial tubercle.  A second line was drawn in a medial to lateral direction the width of the patella approximately 7 cm proximal to the patella. We then infiltrated the skin with lidocaine along both lines using a 25g needle. We then introduced the Iovera device along these lines and this device penetrated the  skin, creating cryotherapy to both branches of the infrapatellar saphenous nerve, a third treatment to the anterior femoral cutaneous nerve, and fourth LFCN. 7 punctures of the skin were made to treat the 2 branches of the ISN and another 7 punctures were made to treat the AFCN and LFCN. There were a total of 4 nerves treated with iovera. The patient tolerated the procedure well with no problems.

## 2022-05-24 ENCOUNTER — TELEPHONE (OUTPATIENT)
Dept: ORTHOPEDICS | Facility: CLINIC | Age: 52
End: 2022-05-24
Payer: MEDICAID

## 2022-05-24 DIAGNOSIS — R52 PAIN: Primary | ICD-10-CM

## 2022-05-24 NOTE — TELEPHONE ENCOUNTER
----- Message from Akua Hodges sent at 5/24/2022 12:50 PM CDT -----  Contact: [phone]  Type:  Needs Medical Advice    Who Called: pt called  Would the patient rather a call back or a response via [facility]? Call back  Best Call Back Number: [phone]  Additional Information: Pt would like to speak to nurse. Please call pt to advice

## 2022-05-25 ENCOUNTER — OFFICE VISIT (OUTPATIENT)
Dept: ORTHOPEDICS | Facility: CLINIC | Age: 52
End: 2022-05-25
Payer: MEDICAID

## 2022-05-25 ENCOUNTER — HOSPITAL ENCOUNTER (OUTPATIENT)
Dept: RADIOLOGY | Facility: HOSPITAL | Age: 52
Discharge: HOME | End: 2022-05-25
Attending: ORTHOPAEDIC SURGERY
Payer: MEDICAID

## 2022-05-25 VITALS
WEIGHT: 230.94 LBS | HEART RATE: 80 BPM | BODY MASS INDEX: 40.91 KG/M2 | DIASTOLIC BLOOD PRESSURE: 80 MMHG | SYSTOLIC BLOOD PRESSURE: 123 MMHG

## 2022-05-25 DIAGNOSIS — M25.511 CHRONIC RIGHT SHOULDER PAIN: ICD-10-CM

## 2022-05-25 DIAGNOSIS — G89.29 CHRONIC RIGHT SHOULDER PAIN: ICD-10-CM

## 2022-05-25 DIAGNOSIS — M75.41 ROTATOR CUFF IMPINGEMENT SYNDROME OF RIGHT SHOULDER: Primary | ICD-10-CM

## 2022-05-25 DIAGNOSIS — M75.101 TEAR OF RIGHT SUPRASPINATUS TENDON: ICD-10-CM

## 2022-05-25 DIAGNOSIS — R52 PAIN: ICD-10-CM

## 2022-05-25 PROCEDURE — 1159F MED LIST DOCD IN RCRD: CPT | Mod: CPTII,,, | Performed by: ORTHOPAEDIC SURGERY

## 2022-05-25 PROCEDURE — 3079F DIAST BP 80-89 MM HG: CPT | Mod: CPTII,,, | Performed by: ORTHOPAEDIC SURGERY

## 2022-05-25 PROCEDURE — 3008F BODY MASS INDEX DOCD: CPT | Mod: CPTII,,, | Performed by: ORTHOPAEDIC SURGERY

## 2022-05-25 PROCEDURE — 73030 X-RAY EXAM OF SHOULDER: CPT | Mod: 26,RT,, | Performed by: RADIOLOGY

## 2022-05-25 PROCEDURE — 3008F PR BODY MASS INDEX (BMI) DOCUMENTED: ICD-10-PCS | Mod: CPTII,,, | Performed by: ORTHOPAEDIC SURGERY

## 2022-05-25 PROCEDURE — 99999 PR PBB SHADOW E&M-EST. PATIENT-LVL IV: ICD-10-PCS | Mod: PBBFAC,,, | Performed by: ORTHOPAEDIC SURGERY

## 2022-05-25 PROCEDURE — 3074F SYST BP LT 130 MM HG: CPT | Mod: CPTII,,, | Performed by: ORTHOPAEDIC SURGERY

## 2022-05-25 PROCEDURE — 99213 OFFICE O/P EST LOW 20 MIN: CPT | Mod: S$PBB,24,, | Performed by: ORTHOPAEDIC SURGERY

## 2022-05-25 PROCEDURE — 99999 PR PBB SHADOW E&M-EST. PATIENT-LVL IV: CPT | Mod: PBBFAC,,, | Performed by: ORTHOPAEDIC SURGERY

## 2022-05-25 PROCEDURE — 99214 OFFICE O/P EST MOD 30 MIN: CPT | Mod: PBBFAC,PN | Performed by: ORTHOPAEDIC SURGERY

## 2022-05-25 PROCEDURE — 99213 PR OFFICE/OUTPT VISIT, EST, LEVL III, 20-29 MIN: ICD-10-PCS | Mod: S$PBB,24,, | Performed by: ORTHOPAEDIC SURGERY

## 2022-05-25 PROCEDURE — 73030 XR SHOULDER COMPLETE 2 OR MORE VIEWS RIGHT: ICD-10-PCS | Mod: 26,RT,, | Performed by: RADIOLOGY

## 2022-05-25 PROCEDURE — 4010F ACE/ARB THERAPY RXD/TAKEN: CPT | Mod: CPTII,,, | Performed by: ORTHOPAEDIC SURGERY

## 2022-05-25 PROCEDURE — 3074F PR MOST RECENT SYSTOLIC BLOOD PRESSURE < 130 MM HG: ICD-10-PCS | Mod: CPTII,,, | Performed by: ORTHOPAEDIC SURGERY

## 2022-05-25 PROCEDURE — 3079F PR MOST RECENT DIASTOLIC BLOOD PRESSURE 80-89 MM HG: ICD-10-PCS | Mod: CPTII,,, | Performed by: ORTHOPAEDIC SURGERY

## 2022-05-25 PROCEDURE — 1159F PR MEDICATION LIST DOCUMENTED IN MEDICAL RECORD: ICD-10-PCS | Mod: CPTII,,, | Performed by: ORTHOPAEDIC SURGERY

## 2022-05-25 PROCEDURE — 4010F PR ACE/ARB THEARPY RXD/TAKEN: ICD-10-PCS | Mod: CPTII,,, | Performed by: ORTHOPAEDIC SURGERY

## 2022-05-25 PROCEDURE — 73030 X-RAY EXAM OF SHOULDER: CPT | Mod: TC,FY,RT

## 2022-05-25 RX ORDER — FLUOXETINE HYDROCHLORIDE 20 MG/1
40 CAPSULE ORAL DAILY
COMMUNITY
Start: 2022-05-03

## 2022-05-25 RX ORDER — FLUTICASONE PROPIONATE 50 MCG
1 SPRAY, SUSPENSION (ML) NASAL DAILY
Status: ON HOLD | COMMUNITY
Start: 2022-05-03 | End: 2023-08-12 | Stop reason: HOSPADM

## 2022-05-25 NOTE — PROGRESS NOTES
[facility], Orthopedics and Sports Medicine  [facility]    New Patient Shoulder Office Visit  05/25/2022     Subjective:      Candy Huynh is a 51 y.o. right handed female referred by No ref. provider found for evaluation and treatment of a right shoulder problem. This is evaluated as a personal injury.    The patient notes the following symptoms: pain and weakness.  Symptoms are located at the anterior, lateral, and posterior aspect of the shoulder.  Symptoms are worse with lifting the arm away from the body.  She has pain at night in the shoulder which effects her sleep.  She denies neurologic complaints into the hand or pain below the elbow.      The symptoms began 3 months ago and are worsening.    The patient is active in none. 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    Related history: negative for prior surgery, trauma, arthritis or disorders.    Patient states that she was evaluated for this complaint at [facility] and received an injection (? CSI SA) April 2022, which improved pain for approx 2 weeks. Patient states that she has also taken tylenol and diclofenac for the pain with minimal improvement. Patient has tried heating pads for the pain with mild improvement in symptoms. Patient states that she has been doing self directed home exercise program.     Outside reports reviewed: historical medical records, office notes, radiology reports and x-ray reports.    Past Medical History:   Diagnosis Date    Depression     Diabetes mellitus     Fibromyalgia     Hypertension     Vertigo        Patient Active Problem List   Diagnosis    Lipoma of arm    Quadriceps weakness    Gait abnormality    Decreased range of motion (ROM) of left knee    Decreased functional mobility    Acute pain of left knee    Knee pain    Degenerative tear of left medial meniscus    Degenerative tear of lateral meniscus, left    Morbid obesity with BMI of 45.0-49.9, adult     Difficulty walking    Status post arthroscopy of knee    Primary osteoarthritis of right knee       Past Surgical History:   Procedure Laterality Date    ARTHROSCOPY OF KNEE Left 2021    Procedure: ARTHROSCOPY, KNEE;  Surgeon: Donnie Campuzano MD;  Location: [facility] OR;  Service: Orthopedics;  Laterality: Left;     SECTION      EXCISION OF MASS OF EXTREMITY Left 2019    Procedure: EXCISION, MASS, EXTREMITY;  Surgeon: Honorio Pulido IV, MD;  Location: [facility] OR;  Service: Orthopedics;  Laterality: Left;  excision lipoma  Request Lacie    HERNIA REPAIR      HYSTERECTOMY      KNEE ARTHROSCOPY W/ MENISCECTOMY  2021    Procedure: ARTHROSCOPY, KNEE, WITH MENISCECTOMY;  Surgeon: Donnie Campuzano MD;  Location: [facility] OR;  Service: Orthopedics;;    NASAL SEPTOPLASTY          Current Outpatient Medications   Medication Instructions    amLODIPine (NORVASC) 5 mg, Oral, Daily    azelastine (ASTELIN) 137 mcg (0.1 %) nasal spray 1 spray, Daily    diclofenac (VOLTAREN) 75 mg, Oral, 2 times daily    ergocalciferol (ERGOCALCIFEROL) 50,000 Units, Oral, Every 7 days    famotidine (PEPCID) 20 mg, Oral, 2 times daily    FLUoxetine 40 mg, Oral, Daily    fluticasone propionate (FLONASE) 50 mcg/actuation nasal spray 1 spray, Each Nostril, Daily    folic acid (FOLVITE) 1,000 mcg, Oral, Daily    ibuprofen (ADVIL,MOTRIN) 800 MG tablet SMARTSI Tablet(s) By Mouth Every 12 Hours PRN    levocetirizine (XYZAL) 5 MG tablet No dose, route, or frequency recorded.    losartan (COZAAR) 100 mg, Oral, Daily    meloxicam (MOBIC) 15 mg, Oral, Daily    metFORMIN (GLUCOPHAGE) 1,000 mg, Oral, 2 times daily with meals    metroNIDAZOLE (FLAGYL) 500 mg, Oral, 2 times daily    montelukast (SINGULAIR) 10 mg tablet No dose, route, or frequency recorded.    nicotine polacrilex (NICORETTE) 4 MG Gum SMARTSI By Mouth Every 1-2 Hours    omeprazole (PRILOSEC) 40 MG capsule No dose, route, or frequency recorded.    prazosin  (MINIPRESS) 1 MG Cap TAKE 1 CAPSULE BY MOUTH EVERY NIGHT AT BEDTIME FOR 7 DAYS; THEN TAKE 2 CAPSULES BY MOUTH EVERY NIGHT AT BEDTIME FOR 7 DAYS; THEN TAKE 3 CAPSULES BY MOUTH EVERY NIGHT AT BEDTIME THEREAFTER AS DIRECTED BY PHYSICIAN        Review of patient's allergies indicates:   Allergen Reactions    Morphine Other (See Comments)     shake       Social History     Socioeconomic History    Marital status:    Tobacco Use    Smoking status: Former Smoker    Smokeless tobacco: Never Used   Substance and Sexual Activity    Alcohol use: Yes    Drug use: Never    Sexual activity: Yes     Partners: Male       No family history on file.      Review of Systems   Constitutional: Negative for chills and fever.   Cardiovascular: Negative for chest pain.   Respiratory: Negative for shortness of breath.    Musculoskeletal: Positive for arthritis, back pain and joint pain. Negative for joint swelling, muscle cramps, muscle weakness, myalgias and stiffness.   Neurological: Negative for focal weakness, numbness, paresthesias and sensory change.        Objective:      General    Nursing note and vitals reviewed.  Constitutional: She is oriented to person, place, and time. She appears well-developed and well-nourished. No distress.   HENT:   Head: Normocephalic and atraumatic.   Right Ear: External ear normal.   Left Ear: External ear normal.   Nose: Nose normal.   Eyes: Pupils are equal, round, and reactive to light.   Cardiovascular: Normal rate and regular rhythm.    Pulmonary/Chest: Effort normal and breath sounds normal. No respiratory distress.   Abdominal: Soft. There is no guarding.   Neurological: She is alert and oriented to person, place, and time.   Psychiatric: She has a normal mood and affect. Her behavior is normal.         Right Shoulder Exam     Inspection/Observation   Swelling: absent  Bruising: absent  Atrophy: absent    Tenderness   The patient is tender to palpation of the supraspinatus.    Range  of Motion   Active abduction:  140 normal   Passive abduction: 170   Forward Flexion:  110 normal Adduction: 90   External Rotation 0 degrees:  50 normal   External Rotation 90 degrees: normal  Internal rotation 0 degrees:  T8 normal   Internal rotation 90 degrees: normal     Tests & Signs   Drop arm: positive  Padgett test: positive  Impingement: positive  Rotator Cuff Painful Arc/Range: moderate  Belly Press: negative  Active Compression Test ([place]'s Sign): negative  Speed's Test: positive    Other   Sensation: normal    Comments:  Carey test- positive     Left Shoulder Exam     Inspection/Observation   Swelling: absent  Bruising: absent  Scars: present  Atrophy: absent    Tenderness   The patient is experiencing no tenderness.     Range of Motion   Active abduction:  170 normal   Forward Flexion:  170 normal   External Rotation 0 degrees:  50 normal   External Rotation 90 degrees: normal  Internal rotation 0 degrees:  T8 normal   Internal rotation 90 degrees: normal     Muscle Strength   The patient has normal left shoulder strength.    Tests & Signs   Drop arm: negative  Padgett test: negative  Impingement: negative  Belly Press: negative  Active Compression test ([place]'s Sign): negative  Speed's Test: negative    Other   Sensation: normal     Comments:  Carey test- negative      Muscle Strength   Right Upper Extremity   Shoulder Abduction: 4/5   Shoulder Internal Rotation: 5/5   Shoulder External Rotation: 5/5   Biceps: 5/5   Left Upper Extremity  Shoulder Abduction: 5/5   Shoulder Internal Rotation: 5/5   Shoulder External Rotation: 5/5   Biceps: 5/5     Reflexes     Left Side  Biceps:  2+  Triceps:  2+  Brachioradialis:  2+  Left Lowery's Sign:  Absent    Right Side   Biceps:  2+  Triceps:  2+  Brachioradialis:  2+  Right Lowery's Sign:  absent    Vascular Exam     Right Pulses      Radial:                    2+      Left Pulses      Radial:                    2+      Capillary Refill  Right Hand: normal  capillary refill            Imaging:  Radiographs of the right shoulder taken 05/25/2022 were personally reviewed from the [facility] EMR.  Multiple views of the shoulder are available today for review, including an AP, scapular Y, axillary view.  The glenohumeral joint demonstrates minimal degenerative changes.  The acromioclavicular joint demonstrates mild degenerative changes .  The glenohumeral joint is concentrically reduced.  There is no acute fracture or dislocation.      Procedures        Assessment:       Candy Huynh is a 51 y.o. female seen in the office today. The primary encounter diagnosis was Rotator cuff impingement syndrome of right shoulder. Diagnoses of Chronic right shoulder pain and Tear of right supraspinatus tendon were also pertinent to this visit.  Further work-up is recommended at this time. Will order MRI at today's visit. Discussed referral to therapy but patient declined since has already done a home exercise program and still has significant shoulder pain.  The natural history and expected course discussed with patient. Various treatment options were discussed, including their risks and benefits. All of the patient's questions were answered.     Plan:      1. Tylenol 650mg TID, PRN pain.  2. MRI Right shoulder.  3. Follow up visit in approximately 2 weeks to discuss results of MRI.  4. continue using heating pack at night and ice pack during daytime         Honorio Pulido IV, MD   of Clinical Orthopedics  Department of Orthopedic Surgery  [facility]  Office: [phone]  Website: [email or web address]      Orders Placed This Encounter    MRI Shoulder Without Contrast Right

## 2022-06-11 ENCOUNTER — HOSPITAL ENCOUNTER (OUTPATIENT)
Dept: RADIOLOGY | Facility: HOSPITAL | Age: 52
Discharge: HOME | End: 2022-06-11
Attending: STUDENT IN AN ORGANIZED HEALTH CARE EDUCATION/TRAINING PROGRAM
Payer: MEDICAID

## 2022-06-11 DIAGNOSIS — M75.101 TEAR OF RIGHT SUPRASPINATUS TENDON: ICD-10-CM

## 2022-06-11 DIAGNOSIS — G89.29 CHRONIC RIGHT SHOULDER PAIN: ICD-10-CM

## 2022-06-11 DIAGNOSIS — M25.511 CHRONIC RIGHT SHOULDER PAIN: ICD-10-CM

## 2022-06-11 DIAGNOSIS — M75.41 ROTATOR CUFF IMPINGEMENT SYNDROME OF RIGHT SHOULDER: ICD-10-CM

## 2022-06-11 PROCEDURE — 73221 MRI JOINT UPR EXTREM W/O DYE: CPT | Mod: 26,RT,, | Performed by: RADIOLOGY

## 2022-06-11 PROCEDURE — 73221 MRI SHOULDER WITHOUT CONTRAST RIGHT: ICD-10-PCS | Mod: 26,RT,, | Performed by: RADIOLOGY

## 2022-06-11 PROCEDURE — 73221 MRI JOINT UPR EXTREM W/O DYE: CPT | Mod: TC,RT

## 2022-06-15 ENCOUNTER — OFFICE VISIT (OUTPATIENT)
Dept: ORTHOPEDICS | Facility: CLINIC | Age: 52
End: 2022-06-15
Payer: MEDICAID

## 2022-06-15 VITALS
SYSTOLIC BLOOD PRESSURE: 145 MMHG | BODY MASS INDEX: 41.54 KG/M2 | HEIGHT: 63 IN | WEIGHT: 234.44 LBS | DIASTOLIC BLOOD PRESSURE: 82 MMHG | HEART RATE: 84 BPM

## 2022-06-15 DIAGNOSIS — M75.41 ROTATOR CUFF IMPINGEMENT SYNDROME OF RIGHT SHOULDER: ICD-10-CM

## 2022-06-15 DIAGNOSIS — M75.101 TEAR OF RIGHT SUPRASPINATUS TENDON: Primary | ICD-10-CM

## 2022-06-15 DIAGNOSIS — M75.21 BICEPS TENDINITIS OF RIGHT UPPER EXTREMITY: ICD-10-CM

## 2022-06-15 PROCEDURE — 3077F SYST BP >= 140 MM HG: CPT | Mod: CPTII,,, | Performed by: ORTHOPAEDIC SURGERY

## 2022-06-15 PROCEDURE — 3008F PR BODY MASS INDEX (BMI) DOCUMENTED: ICD-10-PCS | Mod: CPTII,,, | Performed by: ORTHOPAEDIC SURGERY

## 2022-06-15 PROCEDURE — 4010F PR ACE/ARB THEARPY RXD/TAKEN: ICD-10-PCS | Mod: CPTII,,, | Performed by: ORTHOPAEDIC SURGERY

## 2022-06-15 PROCEDURE — 4010F ACE/ARB THERAPY RXD/TAKEN: CPT | Mod: CPTII,,, | Performed by: ORTHOPAEDIC SURGERY

## 2022-06-15 PROCEDURE — 3079F PR MOST RECENT DIASTOLIC BLOOD PRESSURE 80-89 MM HG: ICD-10-PCS | Mod: CPTII,,, | Performed by: ORTHOPAEDIC SURGERY

## 2022-06-15 PROCEDURE — 99999 PR PBB SHADOW E&M-EST. PATIENT-LVL IV: ICD-10-PCS | Mod: PBBFAC,,, | Performed by: ORTHOPAEDIC SURGERY

## 2022-06-15 PROCEDURE — 99213 PR OFFICE/OUTPT VISIT, EST, LEVL III, 20-29 MIN: ICD-10-PCS | Mod: S$PBB,,, | Performed by: ORTHOPAEDIC SURGERY

## 2022-06-15 PROCEDURE — 99213 OFFICE O/P EST LOW 20 MIN: CPT | Mod: S$PBB,,, | Performed by: ORTHOPAEDIC SURGERY

## 2022-06-15 PROCEDURE — 99214 OFFICE O/P EST MOD 30 MIN: CPT | Mod: PBBFAC,PN | Performed by: ORTHOPAEDIC SURGERY

## 2022-06-15 PROCEDURE — 3008F BODY MASS INDEX DOCD: CPT | Mod: CPTII,,, | Performed by: ORTHOPAEDIC SURGERY

## 2022-06-15 PROCEDURE — 3077F PR MOST RECENT SYSTOLIC BLOOD PRESSURE >= 140 MM HG: ICD-10-PCS | Mod: CPTII,,, | Performed by: ORTHOPAEDIC SURGERY

## 2022-06-15 PROCEDURE — 1159F PR MEDICATION LIST DOCUMENTED IN MEDICAL RECORD: ICD-10-PCS | Mod: CPTII,,, | Performed by: ORTHOPAEDIC SURGERY

## 2022-06-15 PROCEDURE — 3079F DIAST BP 80-89 MM HG: CPT | Mod: CPTII,,, | Performed by: ORTHOPAEDIC SURGERY

## 2022-06-15 PROCEDURE — 1159F MED LIST DOCD IN RCRD: CPT | Mod: CPTII,,, | Performed by: ORTHOPAEDIC SURGERY

## 2022-06-15 PROCEDURE — 99999 PR PBB SHADOW E&M-EST. PATIENT-LVL IV: CPT | Mod: PBBFAC,,, | Performed by: ORTHOPAEDIC SURGERY

## 2022-06-15 RX ORDER — ESTROGENS, CONJUGATED 0.62 MG/1
0.62 TABLET, FILM COATED ORAL DAILY
COMMUNITY
Start: 2022-06-13

## 2022-06-15 RX ORDER — ESTRADIOL 0.1 MG/G
CREAM VAGINAL
COMMUNITY
Start: 2022-06-10 | End: 2023-07-28

## 2022-06-15 RX ORDER — ALBUTEROL SULFATE 90 UG/1
AEROSOL, METERED RESPIRATORY (INHALATION)
COMMUNITY
Start: 2022-06-10

## 2022-06-15 NOTE — PROGRESS NOTES
[facility], Orthopedics and Sports Medicine  [facility]    Established Patient Shoulder Office Visit  06/15/2022     Diagnosis:  Right supraspinatus tendon tear, biceps tendonitis, subacromial impingement syndrome      Subjective:      Candy Huynh is a 51 y.o. female who returns for follow up treatment of the right shoulder problem.      The patient previously had a right shoulder CSI and was doing a home exercise program for her right shoulder problem, but had no improvement in her symptoms when I saw her for the first time regarding this shoulder at the last visit.  Given the pain she was having and persistence of symptoms, we ordered an MRI.  She presents today to discuss further treatment.      She complains of anterior, lateral, and posterior shoulder pain with moving the arm away from the body and overhead motion.  She has pain in the shoulder at night.  She denies neurologic complaints.      Of note I previously did a lipoma resection from the left deltoid 2-3 years ago.     Today the patient was agitated and distraught about the condition of her shoulder.  She has been taking diclofenac from another provider, but had minimal improvement in her symptoms.  She is not taking tylenol or other medication.      Outside reports reviewed: historical medical records and office notes.    Past Medical History:   Diagnosis Date    Depression     Diabetes mellitus     Fibromyalgia     Hypertension     Vertigo        Patient Active Problem List   Diagnosis    Lipoma of arm    Quadriceps weakness    Gait abnormality    Decreased range of motion (ROM) of left knee    Decreased functional mobility    Acute pain of left knee    Knee pain    Degenerative tear of left medial meniscus    Degenerative tear of lateral meniscus, left    Morbid obesity with BMI of 45.0-49.9, adult    Difficulty walking    Status post arthroscopy of knee    Primary osteoarthritis of right knee    Tear  of right supraspinatus tendon    Rotator cuff impingement syndrome of right shoulder    Biceps tendinitis of right upper extremity       Past Surgical History:   Procedure Laterality Date    ARTHROSCOPY OF KNEE Left 2021    Procedure: ARTHROSCOPY, KNEE;  Surgeon: Donnie Campuzano MD;  Location: [facility] OR;  Service: Orthopedics;  Laterality: Left;     SECTION      EXCISION OF MASS OF EXTREMITY Left 2019    Procedure: EXCISION, MASS, EXTREMITY;  Surgeon: Honorio Pulido IV, MD;  Location: [facility] OR;  Service: Orthopedics;  Laterality: Left;  excision lipoma  Request Lacie    HERNIA REPAIR      HYSTERECTOMY      KNEE ARTHROSCOPY W/ MENISCECTOMY  2021    Procedure: ARTHROSCOPY, KNEE, WITH MENISCECTOMY;  Surgeon: Donnie Campuzano MD;  Location: [facility] OR;  Service: Orthopedics;;    NASAL SEPTOPLASTY          Current Outpatient Medications   Medication Instructions    albuterol (PROVENTIL/VENTOLIN HFA) 90 mcg/actuation inhaler SMARTSI Puff(s) By Mouth Every 4 Hours PRN    amLODIPine (NORVASC) 5 mg, Oral, Daily    azelastine (ASTELIN) 137 mcg (0.1 %) nasal spray 1 spray, Daily    ergocalciferol (ERGOCALCIFEROL) 50,000 Units, Oral, Every 7 days    estradioL (ESTRACE) 0.01 % (0.1 mg/gram) vaginal cream Vaginal    famotidine (PEPCID) 20 mg, Oral, 2 times daily    FLUoxetine 40 mg, Oral, Daily    fluticasone propionate (FLONASE) 50 mcg/actuation nasal spray 1 spray, Each Nostril, Daily    folic acid (FOLVITE) 1,000 mcg, Oral, Daily    ibuprofen (ADVIL,MOTRIN) 800 MG tablet SMARTSI Tablet(s) By Mouth Every 12 Hours PRN    levocetirizine (XYZAL) 5 MG tablet No dose, route, or frequency recorded.    losartan (COZAAR) 100 mg, Oral, Daily    metFORMIN (GLUCOPHAGE) 1,000 mg, Oral, 2 times daily with meals    metroNIDAZOLE (FLAGYL) 500 mg, Oral, 2 times daily    montelukast (SINGULAIR) 10 mg tablet No dose, route, or frequency recorded.    nicotine polacrilex (NICORETTE) 4 MG Gum SMARTSI By  Mouth Every 1-2 Hours    omeprazole (PRILOSEC) 40 MG capsule No dose, route, or frequency recorded.    prazosin (MINIPRESS) 1 MG Cap TAKE 1 CAPSULE BY MOUTH EVERY NIGHT AT BEDTIME FOR 7 DAYS; THEN TAKE 2 CAPSULES BY MOUTH EVERY NIGHT AT BEDTIME FOR 7 DAYS; THEN TAKE 3 CAPSULES BY MOUTH EVERY NIGHT AT BEDTIME THEREAFTER AS DIRECTED BY PHYSICIAN    PREMARIN 0.625 mg, Oral, Daily        Review of patient's allergies indicates:   Allergen Reactions    Morphine Other (See Comments)     shake       Social History     Socioeconomic History    Marital status:    Tobacco Use    Smoking status: Former Smoker    Smokeless tobacco: Never Used   Substance and Sexual Activity    Alcohol use: Yes    Drug use: Never    Sexual activity: Yes     Partners: Male       No family history on file.      Review of Systems   Constitutional: Negative for chills and fever.   HENT: Negative for hearing loss.    Eyes: Negative for blurred vision.   Cardiovascular: Negative for chest pain.   Respiratory: Negative for shortness of breath.    Gastrointestinal: Negative for abdominal pain.   Neurological: Negative for light-headedness.        Objective:      General    Nursing note and vitals reviewed.  Constitutional: She is oriented to person, place, and time. She appears well-developed and well-nourished. No distress.   HENT:   Head: Normocephalic and atraumatic.   Right Ear: External ear normal.   Left Ear: External ear normal.   Nose: Nose normal.   Eyes: Pupils are equal, round, and reactive to light.   Cardiovascular: Normal rate and regular rhythm.    Pulmonary/Chest: Effort normal and breath sounds normal. No respiratory distress.   Abdominal: Soft. There is no guarding.   Neurological: She is alert and oriented to person, place, and time.   Psychiatric: She has a normal mood and affect. Her behavior is normal.         Right Shoulder Exam     Inspection/Observation   Swelling: absent  Bruising: absent  Atrophy:  absent    Range of Motion   Active abduction: 140   Passive abduction: 170   Forward Flexion: 140     Tests & Signs   Drop arm: positive  Rotator Cuff Painful Arc/Range: moderate    Comments:  Exam limited by patient     Left Shoulder Exam     Inspection/Observation   Swelling: absent  Bruising: absent  Scars: present  Atrophy: absent    Tenderness   The patient is experiencing no tenderness.     Range of Motion   Active abduction:  170 normal   Forward Flexion:  170 normal   External Rotation 0 degrees:  50 normal   External Rotation 90 degrees: normal  Internal rotation 0 degrees:  T8 normal   Internal rotation 90 degrees: normal     Muscle Strength   The patient has normal left shoulder strength.    Tests & Signs   Drop arm: negative  Padgett test: negative  Impingement: negative  Belly Press: negative  Active Compression test ([place]'s Sign): negative  Speed's Test: negative    Other   Sensation: normal     Comments:  Carey test- negative      Muscle Strength   Left Upper Extremity  Shoulder Abduction: 5/5   Shoulder Internal Rotation: 5/5   Shoulder External Rotation: 5/5   Biceps: 5/5     Reflexes     Left Side  Biceps:  2+  Triceps:  2+  Brachioradialis:  2+  Left Lowery's Sign:  Absent    Right Side   Biceps:  2+  Triceps:  2+  Brachioradialis:  2+    Vascular Exam     Right Pulses      Radial:                    2+      Left Pulses      Radial:                    2+      Capillary Refill  Right Hand: normal capillary refill            Imaging:   MRI of the right shoulder taken taken 6/11/2022 was personally reviewed from the [facility] EMR.  Multiple T1 and T2 sequences including axial, coronal, and sagittal views were reviewed.  No acute fractures or dislocations are noted in these images.  There is a partial tear of the supraspinatus tendon.  There is flattening of the biceps tendon and synovitis in the biceps groove proximally and a possible tear at the biceps anchor.  The chondral surfaces are  intact.  The subacromial space has increased fluid.      Procedures      Assessment:       Candy Huynh is a 51 y.o. female seen in the office today. The primary encounter diagnosis was Tear of right supraspinatus tendon. Diagnoses of Rotator cuff impingement syndrome of right shoulder and Biceps tendinitis of right upper extremity were also pertinent to this visit.  Referal to another provider (second opinion and pain management) is recommended at this time. I had a long discussion with the patient regarding her problem.  I offered her a shoulder operation to help resolve her issue.  She requested pain medication and I recommended a scheduled regimen of Tylenol.  The patient declined and then became agitated.  She felt I was not treating her pain appropriately and felt I was not giving her other pain medications due to some agenda to limit the opioid epidemic.  I explained the reasoning behind my recommendation and she did not accept this reason.  The patient further became agitated.  I had a chaperone (nurse) enter the room, since the patient became increasingly agitated.  The patient requested referral to pain management and this was provided.  I recommended transfer of care to another provider because the patient does not trust this provider; this would limit the therapeutic effect of any proposed treatment.  Today's encounter was atypical for the patient and she was more agitated than at prior office visits so I asked if there was something else going on that we could help with; I offered referral to another professional to discuss any social issues that may be involved but the patient was offended and thought I was suggesting she needed an psychiatric evaluation.  Overall, the patient no longer trusts this provider regarding care for her shoulder problem, so I recommend evaluation by another Orthopedic provider.  The natural history and expected course discussed with patient. Various treatment options  were discussed, including their risks and benefits. All of the patient's questions were answered.     Plan:      1. Referral to another provider and pain management clinic .         Honorio Pulido IV, MD   of Clinical Orthopedics  Department of Orthopedic Surgery  [facility]  Office: [phone]  Website: [email or web address]      Orders Placed This Encounter    Ambulatory referral/consult to Pain Clinic    Ambulatory referral/consult to Orthopedics

## 2022-06-21 ENCOUNTER — OFFICE VISIT (OUTPATIENT)
Dept: ORTHOPEDICS | Facility: CLINIC | Age: 52
End: 2022-06-21
Payer: MEDICAID

## 2022-06-21 ENCOUNTER — RESEARCH ENCOUNTER (OUTPATIENT)
Dept: RESEARCH | Facility: HOSPITAL | Age: 52
End: 2022-06-21
Payer: MEDICAID

## 2022-06-21 VITALS
WEIGHT: 238.31 LBS | BODY MASS INDEX: 42.23 KG/M2 | HEART RATE: 70 BPM | DIASTOLIC BLOOD PRESSURE: 88 MMHG | HEIGHT: 63 IN | SYSTOLIC BLOOD PRESSURE: 134 MMHG

## 2022-06-21 DIAGNOSIS — I10 HYPERTENSION, UNSPECIFIED TYPE: ICD-10-CM

## 2022-06-21 DIAGNOSIS — E66.01 MORBID OBESITY WITH BMI OF 45.0-49.9, ADULT: ICD-10-CM

## 2022-06-21 DIAGNOSIS — M62.81 QUADRICEPS WEAKNESS: ICD-10-CM

## 2022-06-21 DIAGNOSIS — F32.A DEPRESSION, UNSPECIFIED DEPRESSION TYPE: ICD-10-CM

## 2022-06-21 DIAGNOSIS — M25.561 CHRONIC PAIN OF RIGHT KNEE: ICD-10-CM

## 2022-06-21 DIAGNOSIS — G89.29 CHRONIC PAIN OF RIGHT KNEE: ICD-10-CM

## 2022-06-21 DIAGNOSIS — M17.11 PRIMARY OSTEOARTHRITIS OF RIGHT KNEE: Primary | ICD-10-CM

## 2022-06-21 DIAGNOSIS — E11.69 TYPE 2 DIABETES MELLITUS WITH OTHER SPECIFIED COMPLICATION, UNSPECIFIED WHETHER LONG TERM INSULIN USE: ICD-10-CM

## 2022-06-21 DIAGNOSIS — M79.7 FIBROMYALGIA: ICD-10-CM

## 2022-06-21 DIAGNOSIS — Z01.818 PRE-OP TESTING: ICD-10-CM

## 2022-06-21 PROCEDURE — 3075F PR MOST RECENT SYSTOLIC BLOOD PRESS GE 130-139MM HG: ICD-10-PCS | Mod: CPTII,,, | Performed by: ORTHOPAEDIC SURGERY

## 2022-06-21 PROCEDURE — 3079F PR MOST RECENT DIASTOLIC BLOOD PRESSURE 80-89 MM HG: ICD-10-PCS | Mod: CPTII,,, | Performed by: ORTHOPAEDIC SURGERY

## 2022-06-21 PROCEDURE — 4010F PR ACE/ARB THEARPY RXD/TAKEN: ICD-10-PCS | Mod: CPTII,,, | Performed by: ORTHOPAEDIC SURGERY

## 2022-06-21 PROCEDURE — 99999 PR PBB SHADOW E&M-EST. PATIENT-LVL V: CPT | Mod: PBBFAC,,, | Performed by: ORTHOPAEDIC SURGERY

## 2022-06-21 PROCEDURE — 4010F ACE/ARB THERAPY RXD/TAKEN: CPT | Mod: CPTII,,, | Performed by: ORTHOPAEDIC SURGERY

## 2022-06-21 PROCEDURE — 1159F MED LIST DOCD IN RCRD: CPT | Mod: CPTII,,, | Performed by: ORTHOPAEDIC SURGERY

## 2022-06-21 PROCEDURE — 99215 PR OFFICE/OUTPT VISIT, EST, LEVL V, 40-54 MIN: ICD-10-PCS | Mod: S$PBB,,, | Performed by: ORTHOPAEDIC SURGERY

## 2022-06-21 PROCEDURE — 3008F BODY MASS INDEX DOCD: CPT | Mod: CPTII,,, | Performed by: ORTHOPAEDIC SURGERY

## 2022-06-21 PROCEDURE — 1159F PR MEDICATION LIST DOCUMENTED IN MEDICAL RECORD: ICD-10-PCS | Mod: CPTII,,, | Performed by: ORTHOPAEDIC SURGERY

## 2022-06-21 PROCEDURE — 3079F DIAST BP 80-89 MM HG: CPT | Mod: CPTII,,, | Performed by: ORTHOPAEDIC SURGERY

## 2022-06-21 PROCEDURE — 99999 PR PBB SHADOW E&M-EST. PATIENT-LVL V: ICD-10-PCS | Mod: PBBFAC,,, | Performed by: ORTHOPAEDIC SURGERY

## 2022-06-21 PROCEDURE — 3008F PR BODY MASS INDEX (BMI) DOCUMENTED: ICD-10-PCS | Mod: CPTII,,, | Performed by: ORTHOPAEDIC SURGERY

## 2022-06-21 PROCEDURE — 99215 OFFICE O/P EST HI 40 MIN: CPT | Mod: S$PBB,,, | Performed by: ORTHOPAEDIC SURGERY

## 2022-06-21 PROCEDURE — 3075F SYST BP GE 130 - 139MM HG: CPT | Mod: CPTII,,, | Performed by: ORTHOPAEDIC SURGERY

## 2022-06-21 PROCEDURE — 99215 OFFICE O/P EST HI 40 MIN: CPT | Mod: PBBFAC,PN | Performed by: ORTHOPAEDIC SURGERY

## 2022-06-21 NOTE — PROGRESS NOTES
Subjective:      Patient ID: Candy Huynh is a 51 y.o. female.    Chief Complaint: Pain of the Right Knee    Knee Pain: right  swelling: yes  worsens with activity: yes  relieved by: injections    Social History     Occupational History    Not on file   Tobacco Use    Smoking status: Former Smoker    Smokeless tobacco: Never Used   Substance and Sexual Activity    Alcohol use: Yes    Drug use: Never    Sexual activity: Yes     Partners: Male      Review of Systems   Constitutional: Negative for diaphoresis.   HENT: Negative for ear discharge, nosebleeds and stridor.    Eyes: Negative for photophobia.   Cardiovascular: Negative for syncope.   Respiratory: Negative for hemoptysis, shortness of breath and wheezing.    Neurological: Negative for tremors.   Psychiatric/Behavioral: Negative.          Objective:    General    Constitutional: She is oriented to person, place, and time. She appears well-developed.   HENT:   Head: Normocephalic and atraumatic.   Right Ear: External ear normal.   Left Ear: External ear normal.   Eyes: EOM are normal.   Cardiovascular: Intact distal pulses.    Neurological: She is alert and oriented to person, place, and time.   Psychiatric: She has a normal mood and affect. Her behavior is normal. Judgment and thought content normal.     General Musculoskeletal Exam   Gait: antalgic and abnormal       Right Knee Exam     Inspection   Swelling: present  Effusion: present    Tenderness   The patient is tender to palpation of the medial joint line.    Crepitus   The patient has crepitus of the patella.    Range of Motion   Flexion: abnormal     Other   Sensation: normal    Muscle Strength   Right Lower Extremity   Quadriceps:  4/5   Hamstrin/5          Assessment:       1. Primary osteoarthritis of right knee    2. Chronic pain of right knee    3. Quadriceps weakness    4. Depression, unspecified depression type    5. Morbid obesity with BMI of 45.0-49.9, adult    6. Type 2  diabetes mellitus with other specified complication, unspecified whether long term insulin use    7. Hypertension, unspecified type    8. Fibromyalgia          Plan:       The patient has failed non operative management, has painful crepitus, and has swelling. The natural history of severe degenerative arthritis was discussed at length and nonoperative and operative treatment was reviewed. Due to the pain and associated disability, I recommend right total knee replacement.  The risks, benefits, convalescence, and alternatives were reviewed.  Numerous questions were asked and answered.  Models were used as an education tool.  Surgery will be scheduled at a convenient time.  The discussion with the patient included a description of a total knee replacement.  A total knee replacement (TKR) means a resurfacing of all three surfaces-the patella, femur, and tibia, with metal and plastic parts. The prosthetic parts are usually cemented into position and will allow a patient a full range of motion from. The postoperative motion, however, is determined by multiple factors, the most important of which is preoperative motion. In general, the better the motion preoperatively, the better the motion postoperatively.  In younger patients  I will generally use an uncemented tibial component and leave the patella unresurfaced, in an effort to preserve bone stock for any future revision surgeries. In those patients we discuss the risk of anterior knee pain in a small subset of patients (5-10%) with an unresurfaced patella. The operation, pending medical clearance, generally requires a hospitalization of 0-3 days for one knee (possibly longer for a bilateral replacement). In general, we prefer to perform the procedure under epidural/spinal anesthesia.  Patient blood donation will be based on the individual patient but is not common and based on preoperative hemoglobin/hematocrit levels.The operation will be done preferably in a  minimally invasive fashion which will facilitate recovery.  While performing the procedure in a minimally invasive manner is our preference, some patients are not candidates.  In that situation, a non-minimally invasive technique will be performed.  This will not adversely affect the long term success of the TKR.  Postoperatively, the patient is  walking the day of surgery and may be discahrged the day of surgery if stable with a walker or cane.  The first couple of days can be painful and the pain medication will alleviate but not eliminate the pain.  Thus, the patient must really push hard to get the range of motion.   The cane is to be dispensed with once the patient is secure enough.  In general, there is no cast or brace required with a routine knee replacement. In the long term, we do not encourage our patients to run for the sake of running; although, pending their preoperative status, we often allow patients to play doubles tennis or comparable activities.  We also allow them to do gentle intermediate downhill skiing if they are truly an expert skier.  Biking is encouraged as well as swimming.  The follow-up periods are usually at 2 weeks, 3 months, six months, and yearly intervals.  Potential complications with total knee replacements include infection (less than 2%), which is much higher in patients with obesity, diabetes, or other conditions which adversely affect the immune system.  (Patients with a previous history of osteomyelitis can have infection rates as high as 15%).  By nerve damage we mean a peroneal nerve palsy with a foot drop (a flapping foot with ambulation).  This is particularly more apt to occur with a bad valgus (knock knee) deformity.  The incidence can be quite high in this particular patient population.  There are always areas of skin numbness, but this is not an untoward effect, nor do we consider it a complication.  Other potential complications include dislocation of the patellar  component (less than 2%).  The loosening of either the tibial or femoral component is much more infrequent.  It usually occurs with infection or long-term use in a patient population that is at extreme risk (e.g., markedly overweight and not conscientious about their exercises).  Major blood vessel damage is also extremely rare.  Theoretically, because of the anatomic proximity of the popliteal artery, it could be lacerated with subsequent repairs required.  Albeit unlikely, a disruption of the popliteal artery could theoretically result in an amputation.  Similarly, infection could theoretically result in an amputation if one were to grow out an organism that cannot be controlled with antibiotics.  General medical complications include phlebitis for which we prophylactically anticoagulate, but it could still occur and fatal pulmonary embolus has been reported.  Cardiovascular problems, such as a heart attack or ischemia, are always a concern with such hemodynamic changes in the blood vascular system.  Our goal is to improve at least 80% of the pain and hopefully 100% but some aspects of the patients anatomy or other factors may not provide complete pain relief. Other general complications are very rare but in medicine anything could theoretically happen. We also discussed performing a pre-operative peripheral sensory block of the bracnhes of the AFCN and ISN of the same knee using iovera to help with pain control post surgery. This is a relatively new technology with early data demonstrating significant pain improvement and functional recovery. However the science defining all the associated risks is still developing. From what we know thus far, a small number of patients can have nerve pain along the areas of the treated nerves. I think the patient understands the risk benefit ratio of total knee replacement and the iovera treatment and would like to pursue the total knee replacement and iovera treatment. We will  also use the TSB NMES device with garment prescribed for disuse atrophy as part of rehabilitation program to restore strength. Due to the large surface area and frequency of treatments, it is not feasible to use conventional electrodes, requiring the use of a conductive garment. This device is being used in conjunction with physical therapy. we will begin the pre-operative process.  KL4

## 2022-06-21 NOTE — PROGRESS NOTES
Protocol: innovations in Genicular Outcomes Registry (Natalio)  Protocol#: Natalio  IRB#: [id number]  Version Date: 18-Mar-2022  PI: Donnie Campuzano MD  Patient Initials: MS     Eligibility Note:      - Inclusion Criteria  1. Planned to receive treatment for knee OA pain including, but not limited to, knee injections, nerve blocking  procedures, or knee arthroplasty within 60 days of screening yes, scheduled for Iovera on 8- in preparation for TKA on 8-.  2. Able to understand the informed consent and the assessment questionnaires and have the ability to complete  them in the opinion of the investigator  yes  3. Have access to a smartphone or internet access with a computer/tablet to complete the questionnaires using  the registry application yes  7.4 Exclusion Criteria  1. Actively enrolled in an investigational trial that would preclude patients from receiving the sites standard  of care for knee OA pain or knee arthroplasty recovery protocol no   2. Planning to have a surgery other than on the target knee no

## 2022-06-21 NOTE — PROGRESS NOTES
Protocol: innovations in Genicular Outcomes Registry (Natalio)  Protocol#: Natalio  IRB#: [id number]  Version Date: 18-Mar-2022  PI: Donnie Campuzano MD  Patient Initials: MS     Informed Consent Process:     Research team met with patient, in private clinic room, to discuss above mentioned protocol. Patient is alert and oriented x 3. Mood and affect appropriate to the situation. Patient states that she is not participating in any other research studies. Patient states understanding about the diagnosis of osteoarthritis of the knee and of the procedure to being done on that knee.  Purpose of study reviewed with the patient.  Patient states understanding of this information.   Length of study and number of participants reviewed with patient; patient states understanding of the length of the study.   Study procedure discussed in detail with patient. Patient states understanding of this information.  Potential benefits of study along with costs and/or payment reimbursement per insurance discussed with patient; Patient states understanding that insurance will cover cost of all standard of care medications and procedures. Patient aware of $20 payment for qualifying visits with completed questionnaires.  Alternative treatment methods discussed with patient; Patient states understanding of this information.   Study related questions/compensation for injury, and whom to contact regarding rights as a research subject all reviewed with patient; Patient verbalizes understanding of this information.   Voluntary participation and withdrawal from research stressed to patient; patient states understanding that she may withdraw consent at any time without compromise to care.   Confidentiality and HIPAA discussed with patient. Patient verbalizes understanding of this information.      Patient states her willingness to participate in study; [employer] eligibility confirmed and [employer] link send to patient's e-mail address. Patient instructed  to read informed consent in its entirety, initial at the bottom of each page, then sign the last page via their smart device or computer and submit. Throughout consenting process, patient given several opportunities to ask questions; all questions addressed and answered to patient's satisfaction.      Patient signed eConsent and research team completed the staff portion.     Patient to complete all baseline/screening questionnaires prior to any procedures.  Clincard will be given on day of treatment.

## 2022-06-27 ENCOUNTER — TELEPHONE (OUTPATIENT)
Dept: ORTHOPEDICS | Facility: CLINIC | Age: 52
End: 2022-06-27
Payer: MEDICAID

## 2022-06-27 ENCOUNTER — HOSPITAL ENCOUNTER (EMERGENCY)
Facility: HOSPITAL | Age: 52
Discharge: HOME | End: 2022-06-27
Attending: EMERGENCY MEDICINE
Payer: MEDICAID

## 2022-06-27 VITALS
OXYGEN SATURATION: 98 % | TEMPERATURE: 99 F | HEART RATE: 74 BPM | SYSTOLIC BLOOD PRESSURE: 154 MMHG | HEIGHT: 63 IN | RESPIRATION RATE: 16 BRPM | WEIGHT: 238 LBS | DIASTOLIC BLOOD PRESSURE: 78 MMHG | BODY MASS INDEX: 42.17 KG/M2

## 2022-06-27 DIAGNOSIS — M75.41 ROTATOR CUFF IMPINGEMENT SYNDROME OF RIGHT SHOULDER: ICD-10-CM

## 2022-06-27 DIAGNOSIS — G89.29 CHRONIC RIGHT SHOULDER PAIN: Primary | ICD-10-CM

## 2022-06-27 DIAGNOSIS — M25.511 CHRONIC RIGHT SHOULDER PAIN: Primary | ICD-10-CM

## 2022-06-27 DIAGNOSIS — M67.911 DISORDER OF RIGHT ROTATOR CUFF: ICD-10-CM

## 2022-06-27 PROCEDURE — 96372 THER/PROPH/DIAG INJ SC/IM: CPT | Performed by: EMERGENCY MEDICINE

## 2022-06-27 PROCEDURE — 63600175 PHARM REV CODE 636 W HCPCS: Performed by: EMERGENCY MEDICINE

## 2022-06-27 PROCEDURE — 25000003 PHARM REV CODE 250: Performed by: EMERGENCY MEDICINE

## 2022-06-27 PROCEDURE — 99284 PR EMERGENCY DEPT VISIT,LEVEL IV: ICD-10-PCS | Mod: ,,, | Performed by: EMERGENCY MEDICINE

## 2022-06-27 PROCEDURE — 99284 EMERGENCY DEPT VISIT MOD MDM: CPT | Mod: ,,, | Performed by: EMERGENCY MEDICINE

## 2022-06-27 PROCEDURE — 99284 EMERGENCY DEPT VISIT MOD MDM: CPT

## 2022-06-27 RX ORDER — NAPROXEN 500 MG/1
500 TABLET ORAL 2 TIMES DAILY WITH MEALS
Qty: 14 TABLET | Refills: 0 | Status: SHIPPED | OUTPATIENT
Start: 2022-06-27 | End: 2022-07-04

## 2022-06-27 RX ORDER — OXYCODONE HYDROCHLORIDE 5 MG/1
5 TABLET ORAL
Status: COMPLETED | OUTPATIENT
Start: 2022-06-27 | End: 2022-06-27

## 2022-06-27 RX ORDER — ORPHENADRINE CITRATE 30 MG/ML
60 INJECTION INTRAMUSCULAR; INTRAVENOUS
Status: COMPLETED | OUTPATIENT
Start: 2022-06-27 | End: 2022-06-27

## 2022-06-27 RX ORDER — KETOROLAC TROMETHAMINE 30 MG/ML
30 INJECTION, SOLUTION INTRAMUSCULAR; INTRAVENOUS
Status: COMPLETED | OUTPATIENT
Start: 2022-06-27 | End: 2022-06-27

## 2022-06-27 RX ORDER — METHOCARBAMOL 500 MG/1
500 TABLET, FILM COATED ORAL 3 TIMES DAILY PRN
Qty: 15 TABLET | Refills: 0 | Status: SHIPPED | OUTPATIENT
Start: 2022-06-27 | End: 2022-07-02

## 2022-06-27 RX ADMIN — KETOROLAC TROMETHAMINE 30 MG: 30 INJECTION, SOLUTION INTRAMUSCULAR at 09:06

## 2022-06-27 RX ADMIN — ORPHENADRINE CITRATE 60 MG: 30 INJECTION INTRAMUSCULAR; INTRAVENOUS at 09:06

## 2022-06-27 RX ADMIN — OXYCODONE 5 MG: 5 TABLET ORAL at 09:06

## 2022-06-27 NOTE — TELEPHONE ENCOUNTER
----- Message from Estephania Lim sent at 6/27/2022  4:28 PM CDT -----  Regarding: appointment concerns  Name of Who is Calling: Candy           What is the request in detail: Patient is requesting a call back in regards to her appointment.            Can the clinic reply by [facility]: No           What Number to Call Back if not in [facility]: [phone]

## 2022-06-27 NOTE — TELEPHONE ENCOUNTER
Spoke with Mrs Wong- sooner appointment with PA declined. Patient prefers to see MD. Will keep scheduled appointment with MD and has been placed on wait-list in case there's a cancellation.

## 2022-06-28 NOTE — ED PROVIDER NOTES
"Encounter Date: 2022    SCRIBE #1 NOTE: I, Chely Tomlin, am scribing for, and in the presence of,  Shahbaz Garcia DO. I have scribed the entire note.       History     Chief Complaint   Patient presents with    Shoulder Pain     [facility] canceled my surg cause I didn't want to take tylenol     The history is provided by the patient and medical records. No  was used.      Time patient was seen by the provider: 8:53 PM      The patient is a 51 y.o. female with past medical history of DM type 2, HTN, fibromyalgia, who presents to the ED with a complaint of right shoulder pain onset 4 months ago. This is a patient with a known rotator cuff impingement syndrome of the right shoulder and being followed by orthopedic surgery at [facility]. She has received injections in the past with mild relief to her pain. She was told to start a regimen of Tylenol but states she refused due to Tylenol not being a strong pain medicine. She was also offered surgery but this has now been canceled. States she is in "unbearable" pain at this time. Any movement of the RUE worsens her pain and is alleviated with keeping arm still. She has not taken any pain medications, anti-inflammatories or muscle relaxants.    Review of patient's allergies indicates:   Allergen Reactions    Morphine Other (See Comments)     shake     Past Medical History:   Diagnosis Date    Depression     Diabetes mellitus     Fibromyalgia     Hypertension     Vertigo      Past Surgical History:   Procedure Laterality Date    ARTHROSCOPY OF KNEE Left 2021    Procedure: ARTHROSCOPY, KNEE;  Surgeon: Donnie Campuzano MD;  Location: [facility] OR;  Service: Orthopedics;  Laterality: Left;     SECTION      EXCISION OF MASS OF EXTREMITY Left 2019    Procedure: EXCISION, MASS, EXTREMITY;  Surgeon: Honorio Pulido IV, MD;  Location: [facility] OR;  Service: Orthopedics;  Laterality: Left;  excision lipoma  Request Lacie    HERNIA REPAIR  "     HYSTERECTOMY      KNEE ARTHROSCOPY W/ MENISCECTOMY  1/11/2021    Procedure: ARTHROSCOPY, KNEE, WITH MENISCECTOMY;  Surgeon: Donnie Campuzano MD;  Location: [facility] OR;  Service: Orthopedics;;    NASAL SEPTOPLASTY       No family history on file.  Social History     Tobacco Use    Smoking status: Former Smoker    Smokeless tobacco: Never Used   Substance Use Topics    Alcohol use: Yes    Drug use: Never     Review of Systems   Constitutional: Negative for fever.   HENT: Negative for sore throat.    Respiratory: Negative for shortness of breath.    Cardiovascular: Negative for chest pain.   Gastrointestinal: Negative for nausea.   Genitourinary: Negative for dysuria.   Musculoskeletal: Positive for arthralgias.   Skin: Negative for rash.   Neurological: Negative for weakness.   Hematological: Does not bruise/bleed easily.       Physical Exam     Initial Vitals [06/27/22 1837]   BP Pulse Resp Temp SpO2   (!) 154/78 74 18 98.6 °F (37 °C) 98 %      MAP       --         Physical Exam    Nursing note and vitals reviewed.      Gen/Constitutional: Interactive.  Moderate emotional distress  Head: Normocephalic, Atraumatic  Neck: supple, no masses or LAD, no JVD  Eyes: PERRLA, conjunctiva clear  Ears, Nose and Throat: No rhinorrhea or stridor.  Cardiac:  Regular rate, Reg Rhythm, No murmur  Pulmonary: CTA Bilat, no wheezes, rhonchi, rales.  No increased work of breathing.  GI: Abdomen soft, non-tender, non-distended; no rebound or guarding  : No CVA tenderness.  Musculoskeletal: Extremities warm, well perfused, no erythema, no edema  Right shoulder:  Pain and tenderness in the distribution of the supraspinatus muscle.  Subscapularis, teres minor, and infraspinatus intact.  2+ distal pulses, no pain along the bicipital tendon groove, there is no bony deformity, no dislocation, sensory intact to light touch.  Skin: No rashes, cyanosis or jaundice.  Neuro: Alert and Oriented x 3; No focal motor or sensory deficits.    Psych:  Normal affect      ED Course   Procedures  Labs Reviewed - No data to display       Imaging Results    None          Medications   ketorolac injection 30 mg (30 mg Intramuscular Given 6/27/22 2115)   orphenadrine injection 60 mg (60 mg Intramuscular Given 6/27/22 2115)   oxyCODONE immediate release tablet 5 mg (5 mg Oral Given 6/27/22 2114)     Medical Decision Making:   History:   Old Medical Records: I decided to obtain old medical records.  Initial Assessment:   The patient is a 51 y.o. female with past medical history of DM type 2, HTN, fibromyalgia, who presents to the ED with a complaint of right shoulder pain onset 4 months ago.  Differential Diagnosis:   Rotator cuff tear, rotator cuff injury, supraspinatus injury, shoulder fracture, shoulder dislocation, septic arthritis  Clinical Tests:   Radiological Study: Reviewed    Afebrile vital signs are stable.  Physical exam findings remarkable for tenderness along the trapezius and distribution of the supraspinatus muscle group of the rotator cuff of the right shoulder.  She has known tear of the right supraspinatus muscle approximately 4 x 4 mm with a 50% tear.  She is set for surgery by her orthopedic surgeon however there was a disagreement, and now the surgery has been canceled.  She is seeking a 2nd opinion and pain management.  Will treat her pain here in the ED with Toradol, muscle relaxant, and opiate pain medication.  Will refer to ambulatory Orthopedics for further evaluation for surgical intervention of known rotator cuff tear.  Do not suspect septic arthritis, dislocation or fracture based on exam, history and findings.  Sensory intact, neurovascular intact.  Ambulatory referral was placed. Patient agreeable to discharge plan. Strict ED precautions and return instructions discussed at length and patient verbalized understanding. All questions were answered and ample time was given for questions.      Complexity:  Moderate high risk          Scribe  Attestation:   Scribe #1: I performed the above scribed service and the documentation accurately describes the services I performed. I attest to the accuracy of the note.               I, Dr. Shahbaz Garcia, personally performed the services described in this documentation. All medical record entries made by the scribe were at my direction and in my presence.  I have reviewed the chart and agree that the record reflects my personal performance and is accurate and complete.     Clinical Impression:   Final diagnoses:  [M25.511, G89.29] Chronic right shoulder pain (Primary)  [M67.911] Disorder of right rotator cuff  [M75.41] Rotator cuff impingement syndrome of right shoulder          ED Disposition Condition    Discharge Stable        ED Prescriptions     Medication Sig Dispense Start Date End Date Auth. Provider    methocarbamoL (ROBAXIN) 500 MG Tab Take 1 tablet (500 mg total) by mouth 3 (three) times daily as needed (shoulder pain). 15 tablet 6/27/2022 7/2/2022 Shahbaz Garcia DO    naproxen (NAPROSYN) 500 MG tablet Take 1 tablet (500 mg total) by mouth 2 (two) times daily with meals. for 7 days 14 tablet 6/27/2022 7/4/2022 Shahbaz Garcia DO        Follow-up Information     Follow up With Specialties Details Why Contact Info Additional Information    [facility] - [facility]oped[facility] Schedule an appointment as soon as possible for a visit in 1 week They will call you for an appointment [phone] [facility] [address] [phone] [facility] - Main Building, 5th Floor Please park in [facility] and take Atrium elevator         Shahbaz Garcia DO, FAAEM  Emergency Staff Physician   Dept of Emergency Medicine   [facility]  Spectralink: [id number]        Disclaimer: This note has been generated using voice-recognition software. There may be typographical errors that have been missed during proof-reading.       Shahbaz Garcia DO  06/28/22 7734

## 2022-06-28 NOTE — DISCHARGE INSTRUCTIONS
Today, your evaluation for your pain at is consistent with your rotator cuff impingement and rotator cuff injury. We have referred you to Orthopedic surgery for re-evaluation and to discuss surgical options.  You may take the muscle relaxant in the anti-inflammatory for 1 week.  Please follow-up with Orthopedics for further pain management.    Our goal in the emergency department is to always give you outstanding care and exceptional service. You may receive a survey by mail or e-mail in the next week regarding your experience in our ED. We would greatly appreciate your completing and returning the survey. Your feedback provides us with a way to recognize our staff who give very good care and it helps us learn how to improve when your experience was below our aspiration of excellence.

## 2022-06-28 NOTE — ED NOTES
"Pt reports right  torn rotator cuff, was supposed to have surgery, which was cancelled. Today she reports "unbearable" pain.   "

## 2022-06-28 NOTE — ED NOTES
Patient identifiers for Candy Huynh 51 y.o. female checked and correct.  Past Medical History:   Diagnosis Date    Depression     Diabetes mellitus     Fibromyalgia     Hypertension     Vertigo      Allergies reported:   Review of patient's allergies indicates:   Allergen Reactions    Morphine Other (See Comments)     shake         LOC: Patient is awake, alert, and aware of environment with an appropriate affect. Patient is oriented x 4 and speaking appropriately.  APPEARANCE: Patient is well appearing and appears stated age. Patient is clean, well groomed, and dressed appropriately for season.   HEENT:   SKIN: The skin is warm and dry. Patient has normal skin turgor and moist mucus membranes.   MUSCULOSKELETAL: RUE ROM limited due to injury.    RESPIRATORY: Airway is open and patent. Respirations are spontaneous and non-labored with normal effort and rate.  ABDOMEN: Soft and non-tender upon palpation and non distended.

## 2022-06-29 ENCOUNTER — TELEPHONE (OUTPATIENT)
Dept: ORTHOPEDICS | Facility: CLINIC | Age: 52
End: 2022-06-29
Payer: MEDICAID

## 2022-06-29 NOTE — TELEPHONE ENCOUNTER
----- Message from Cheryl Martinez RN sent at 6/28/2022  9:45 PM CDT -----  Contact: [phone]  She needs to go back to [facility]  ----- Message -----  From: Kadie Rushing MA  Sent: 6/28/2022   5:46 PM CDT  To: Dewayne CASTELLANOS Staff, #      ----- Message -----  From: Chan Romero MA  Sent: 6/28/2022  12:35 PM CDT  To: [facility] Triage    Patient was released form the ER, was told by   to see someone asap. Please advise.

## 2022-06-29 NOTE — TELEPHONE ENCOUNTER
Unable to reach pt.  Left v/m for pt to contact [facility] or [facility] ortho for follow up care.  [facility] is unable to see patient at this time.

## 2022-07-01 ENCOUNTER — TELEPHONE (OUTPATIENT)
Dept: ORTHOPEDICS | Facility: CLINIC | Age: 52
End: 2022-07-01
Payer: MEDICAID

## 2022-07-01 NOTE — TELEPHONE ENCOUNTER
Called pt to apologize we were not able to meet her expectations for a sooner appt with a shoulder surgeon at [facility] Explained that we made appt with first available according to decision tree as [facility] not have shoulder surgeon, rather APPs that treat shoulders. Pt was initially dissatisfied. Offered non-pharmacologic pain relief measures such as heat to loosen joint in am, ice qhs to reduce inflammation. Pt reports was told to take tylenol. Suggested 650 of Tylenol ER tid. Pt appreciated the call and compassion and ended the call with have a blessed day.

## 2022-07-14 ENCOUNTER — OFFICE VISIT (OUTPATIENT)
Dept: ORTHOPEDICS | Facility: CLINIC | Age: 52
End: 2022-07-14
Payer: MEDICAID

## 2022-07-14 ENCOUNTER — TELEPHONE (OUTPATIENT)
Dept: ORTHOPEDICS | Facility: CLINIC | Age: 52
End: 2022-07-14

## 2022-07-14 VITALS — HEIGHT: 63 IN | BODY MASS INDEX: 42.17 KG/M2 | WEIGHT: 238 LBS

## 2022-07-14 DIAGNOSIS — M75.41 ROTATOR CUFF IMPINGEMENT SYNDROME OF RIGHT SHOULDER: ICD-10-CM

## 2022-07-14 DIAGNOSIS — M67.911 DISORDER OF RIGHT ROTATOR CUFF: ICD-10-CM

## 2022-07-14 DIAGNOSIS — M75.101 TEAR OF RIGHT SUPRASPINATUS TENDON: ICD-10-CM

## 2022-07-14 DIAGNOSIS — M25.511 CHRONIC RIGHT SHOULDER PAIN: ICD-10-CM

## 2022-07-14 DIAGNOSIS — G89.29 CHRONIC RIGHT SHOULDER PAIN: ICD-10-CM

## 2022-07-14 PROCEDURE — 4010F ACE/ARB THERAPY RXD/TAKEN: CPT | Mod: CPTII,,, | Performed by: ORTHOPAEDIC SURGERY

## 2022-07-14 PROCEDURE — 99204 OFFICE O/P NEW MOD 45 MIN: CPT | Mod: S$PBB,,, | Performed by: ORTHOPAEDIC SURGERY

## 2022-07-14 PROCEDURE — 99999 PR PBB SHADOW E&M-EST. PATIENT-LVL III: ICD-10-PCS | Mod: PBBFAC,,, | Performed by: ORTHOPAEDIC SURGERY

## 2022-07-14 PROCEDURE — 1159F MED LIST DOCD IN RCRD: CPT | Mod: CPTII,,, | Performed by: ORTHOPAEDIC SURGERY

## 2022-07-14 PROCEDURE — 99999 PR PBB SHADOW E&M-EST. PATIENT-LVL III: CPT | Mod: PBBFAC,,, | Performed by: ORTHOPAEDIC SURGERY

## 2022-07-14 PROCEDURE — 3008F BODY MASS INDEX DOCD: CPT | Mod: CPTII,,, | Performed by: ORTHOPAEDIC SURGERY

## 2022-07-14 PROCEDURE — 4010F PR ACE/ARB THEARPY RXD/TAKEN: ICD-10-PCS | Mod: CPTII,,, | Performed by: ORTHOPAEDIC SURGERY

## 2022-07-14 PROCEDURE — 3008F PR BODY MASS INDEX (BMI) DOCUMENTED: ICD-10-PCS | Mod: CPTII,,, | Performed by: ORTHOPAEDIC SURGERY

## 2022-07-14 PROCEDURE — 99204 PR OFFICE/OUTPT VISIT, NEW, LEVL IV, 45-59 MIN: ICD-10-PCS | Mod: S$PBB,,, | Performed by: ORTHOPAEDIC SURGERY

## 2022-07-14 PROCEDURE — 1159F PR MEDICATION LIST DOCUMENTED IN MEDICAL RECORD: ICD-10-PCS | Mod: CPTII,,, | Performed by: ORTHOPAEDIC SURGERY

## 2022-07-14 PROCEDURE — 99213 OFFICE O/P EST LOW 20 MIN: CPT | Mod: PBBFAC,PN | Performed by: ORTHOPAEDIC SURGERY

## 2022-07-14 RX ORDER — LORATADINE 10 MG/1
10 TABLET ORAL DAILY
COMMUNITY
Start: 2022-06-16

## 2022-07-14 RX ORDER — CEFAZOLIN SODIUM 2 G/50ML
2 SOLUTION INTRAVENOUS
Status: CANCELLED | OUTPATIENT
Start: 2022-07-14

## 2022-07-14 RX ORDER — TRAMADOL HYDROCHLORIDE 50 MG/1
50 TABLET ORAL EVERY 6 HOURS PRN
Qty: 40 TABLET | Refills: 0 | Status: SHIPPED | OUTPATIENT
Start: 2022-07-14 | End: 2022-07-24

## 2022-07-14 RX ORDER — NAPROXEN 375 MG/1
375 TABLET ORAL 2 TIMES DAILY
Qty: 60 TABLET | Refills: 1 | Status: SHIPPED | OUTPATIENT
Start: 2022-07-14 | End: 2022-08-30 | Stop reason: ALTCHOICE

## 2022-07-14 NOTE — TELEPHONE ENCOUNTER
----- Message from Bree Navarro sent at 7/14/2022  2:50 PM CDT -----  Type:  Needs Medical Advice    Who Called: PT  Symptoms (please be specific):    How long has patient had these symptoms:    Pharmacy name and phone #:      [facility]  [place] [address].   Phone:  [phone]  Fax:  [phone]          Would the patient rather a call back or a response via [facility]? CALL  Best Call Back Number: [phone] (M)   Additional Information: NEEDS AUTH         naproxen (EC-NAPROSYN) 375 MG TbEC EC tablet 60 tablet 1 7/14/2022  --  Sig - Route: Take 1 tablet (375 mg total) by mouth 2 (two) times daily. - Oral  Sent to pharmacy as: naproxen (EC-NAPROSYN) 375 MG TbEC EC tablet  Class: Normal  Order: [id number]  Date/Time Signed: 7/14/2022 14:16      E-Prescribing Status: Receipt confirmed by pharmacy (7/14/2022  2:21 PM CDT)

## 2022-07-14 NOTE — PROGRESS NOTES
CC:  Partial tear rotator cuff right shoulder        HPI:  Candy Huynh is a very pleasant 51 y.o. female with ongoing symptoms right shoulder for more than 6 months  She has had pain in the right shoulder difficulty with overhead lifting and pain at night  She actually had seen Dr. Pulido before and had been scheduled for surgery for shoulder arthroscopy.  There was some type of disagreement and surgery was canceled and she has elected to follow-up with  For further treatment  She has had an MRI on the right shoulder  She has been through physical therapy without relief            PAST MEDICAL HISTORY:   Past Medical History:   Diagnosis Date    Depression     Diabetes mellitus     Fibromyalgia     Hypertension     Vertigo      PAST SURGICAL HISTORY:   Past Surgical History:   Procedure Laterality Date    ARTHROSCOPY OF KNEE Left 2021    Procedure: ARTHROSCOPY, KNEE;  Surgeon: Donnie Campuzano MD;  Location: [facility] OR;  Service: Orthopedics;  Laterality: Left;     SECTION      EXCISION OF MASS OF EXTREMITY Left 2019    Procedure: EXCISION, MASS, EXTREMITY;  Surgeon: Honorio Pulido IV, MD;  Location: [facility] OR;  Service: Orthopedics;  Laterality: Left;  excision lipoma  Request Lacie    HERNIA REPAIR      HYSTERECTOMY      KNEE ARTHROSCOPY W/ MENISCECTOMY  2021    Procedure: ARTHROSCOPY, KNEE, WITH MENISCECTOMY;  Surgeon: Donnie Campuzano MD;  Location: [facility] OR;  Service: Orthopedics;;    NASAL SEPTOPLASTY       FAMILY HISTORY: No family history on file.  SOCIAL HISTORY:   Social History     Socioeconomic History    Marital status:    Tobacco Use    Smoking status: Former Smoker    Smokeless tobacco: Never Used   Substance and Sexual Activity    Alcohol use: Yes    Drug use: Never    Sexual activity: Yes     Partners: Male       MEDICATIONS:   Current Outpatient Medications:     albuterol (PROVENTIL/VENTOLIN HFA) 90 mcg/actuation inhaler, SMARTSI Puff(s) By Mouth  Every 4 Hours PRN, Disp: , Rfl:     amLODIPine (NORVASC) 5 MG tablet, Take 5 mg by mouth once daily., Disp: , Rfl:     azelastine (ASTELIN) 137 mcg (0.1 %) nasal spray, 1 spray once daily., Disp: , Rfl:     ergocalciferol (ERGOCALCIFEROL) 50,000 unit Cap, Take 50,000 Units by mouth every 7 days., Disp: , Rfl:     estradioL (ESTRACE) 0.01 % (0.1 mg/gram) vaginal cream, Place vaginally., Disp: , Rfl:     famotidine (PEPCID) 20 MG tablet, Take 1 tablet (20 mg total) by mouth 2 (two) times daily., Disp: 60 tablet, Rfl: 0    FLUoxetine 20 MG capsule, Take 40 mg by mouth once daily., Disp: , Rfl:     fluticasone propionate (FLONASE) 50 mcg/actuation nasal spray, 1 spray by Each Nostril route once daily., Disp: , Rfl:     folic acid (FOLVITE) 1 MG tablet, Take 1,000 mcg by mouth once daily., Disp: , Rfl:     ibuprofen (ADVIL,MOTRIN) 800 MG tablet, SMARTSI Tablet(s) By Mouth Every 12 Hours PRN, Disp: , Rfl:     levocetirizine (XYZAL) 5 MG tablet, , Disp: , Rfl:     loratadine (CLARITIN) 10 mg tablet, Take 10 mg by mouth once daily., Disp: , Rfl:     losartan (COZAAR) 100 MG tablet, Take 100 mg by mouth once daily., Disp: , Rfl:     metFORMIN (GLUCOPHAGE) 1000 MG tablet, Take 1,000 mg by mouth 2 (two) times daily with meals., Disp: , Rfl:     metroNIDAZOLE (FLAGYL) 500 MG tablet, Take 500 mg by mouth 2 (two) times daily., Disp: , Rfl:     montelukast (SINGULAIR) 10 mg tablet, , Disp: , Rfl:     nicotine polacrilex (NICORETTE) 4 MG Gum, SMARTSI By Mouth Every 1-2 Hours, Disp: , Rfl:     omeprazole (PRILOSEC) 40 MG capsule, , Disp: , Rfl:     prazosin (MINIPRESS) 1 MG Cap, TAKE 1 CAPSULE BY MOUTH EVERY NIGHT AT BEDTIME FOR 7 DAYS; THEN TAKE 2 CAPSULES BY MOUTH EVERY NIGHT AT BEDTIME FOR 7 DAYS; THEN TAKE 3 CAPSULES BY MOUTH EVERY NIGHT AT BEDTIME THEREAFTER AS DIRECTED BY PHYSICIAN, Disp: , Rfl:     PREMARIN 0.625 mg tablet, Take 0.625 mg by mouth once daily., Disp: , Rfl:     naproxen (EC-NAPROSYN)  "375 MG TbEC EC tablet, Take 1 tablet (375 mg total) by mouth 2 (two) times daily., Disp: 60 tablet, Rfl: 1    traMADoL (ULTRAM) 50 mg tablet, Take 1 tablet (50 mg total) by mouth every 6 (six) hours as needed for Pain., Disp: 40 tablet, Rfl: 0  ALLERGIES:   Review of patient's allergies indicates:   Allergen Reactions    Morphine Other (See Comments)     shake       Review of Systems:  Constitutional: no fever or chills  ENT: no nasal congestion or sore throat  Respiratory: no cough or shortness of breath  Cardiovascular: no chest pain or palpitations  Gastrointestinal: no nausea or vomiting, PUD, GERD, NSAID intolerance  Genitourinary: no hematuria or dysuria  Integument/Breast: no rash or pruritis  Hematologic/Lymphatic: no easy bruising or lymphadenopathy  Musculoskeletal: see HPI  Neurological: no seizures or tremors  Behavioral/Psych: no auditory or visual hallucinations      Physical Exam   Vitals:    07/14/22 1346   Weight: 108 kg (238 lb)   Height: 5' 3" (1.6 m)   PainSc:   8   PainLoc: Shoulder       Constitutional: Oriented to person, place, and time. Appears well-developed and well-nourished.   HENT:   Head: Normocephalic and atraumatic.   Nose: Nose normal.   Eyes: No scleral icterus.   Neck: Normal range of motion.   Cardiovascular: Normal rate and regular rhythm.    Pulses:       Radial pulses are 2+ on the right side, and 2+ on the left side.   Pulmonary/Chest: Effort normal and breath sounds normal.   Abdominal: Soft.   Neurological: Alert and oriented to person, place, and time.   Skin: Skin is warm.   Psychiatric: Normal mood and affect.     MUSCULOSKELETAL UPPER EXTREMITY:  Examination right shoulder mild tenderness at the AC joint and anterolateral acromion  Range of motion of the shoulder is slightly decreased due to pain  Positive impingement sign  No instability  Positive supraspinatus stress test  Neurologic exam intact            Diagnostic Studies:  MRI scan right shoulder shows partial " "tear rotator cuff with impingement        Assessment:  Partial tear rotator cuff right shoulder    Plan:  I have recommended right shoulder arthroscopy subacromial decompression possible rotator cuff repair as an outpatient  The risks and benefits explained she understands  In the meantime avoid overhead lifting  She is scheduled for knee surgery next month we will need to wait until she recovers from the knee surgery before we proceed with the shoulder surgery      The risks and benefits of surgery were discussed with the patient today and they understand.  The consent was signed in the office for surgery.      Ministerio Orr MD (Jay)  [facility]  Orthopedic Upper Extremity Surgery      "

## 2022-07-15 ENCOUNTER — TELEPHONE (OUTPATIENT)
Dept: ORTHOPEDICS | Facility: CLINIC | Age: 52
End: 2022-07-15
Payer: MEDICAID

## 2022-07-15 NOTE — TELEPHONE ENCOUNTER
Spoke with Mrs. Huynh- informed PA for Naproxen has been initiated. Also, patient stated only 28 tablets for Tramadol was only dispensed, pt was informed if she is still in pain after completing current script- she can request a refill,however this will be up to provider to refill. Patient expressed v/u.

## 2022-07-15 NOTE — TELEPHONE ENCOUNTER
----- Message from Kevin Laird sent at 7/15/2022 10:51 AM CDT -----  Contact: pt  Type: Requesting to speak with nurse        Who Called: PT  Regarding: would like a call back from [place]   Would the patient rather a call back or a response via [facility]? Call back  Best Call Back Number: [phone]  Additional Information:

## 2022-07-19 ENCOUNTER — PATIENT MESSAGE (OUTPATIENT)
Dept: ORTHOPEDICS | Facility: CLINIC | Age: 52
End: 2022-07-19
Payer: MEDICAID

## 2022-07-20 ENCOUNTER — HOSPITAL ENCOUNTER (OUTPATIENT)
Dept: RADIOLOGY | Facility: HOSPITAL | Age: 52
Discharge: HOME | End: 2022-07-20
Attending: ORTHOPAEDIC SURGERY
Payer: MEDICAID

## 2022-07-20 ENCOUNTER — CLINICAL SUPPORT (OUTPATIENT)
Dept: LAB | Facility: HOSPITAL | Age: 52
End: 2022-07-20
Attending: ORTHOPAEDIC SURGERY
Payer: MEDICAID

## 2022-07-20 ENCOUNTER — OFFICE VISIT (OUTPATIENT)
Dept: FAMILY MEDICINE | Facility: HOSPITAL | Age: 52
End: 2022-07-20
Attending: FAMILY MEDICINE
Payer: MEDICAID

## 2022-07-20 VITALS
HEIGHT: 63 IN | HEART RATE: 77 BPM | DIASTOLIC BLOOD PRESSURE: 91 MMHG | WEIGHT: 239 LBS | BODY MASS INDEX: 42.35 KG/M2 | SYSTOLIC BLOOD PRESSURE: 136 MMHG

## 2022-07-20 DIAGNOSIS — I10 HYPERTENSION, UNSPECIFIED TYPE: ICD-10-CM

## 2022-07-20 DIAGNOSIS — E11.69 TYPE 2 DIABETES MELLITUS WITH OTHER SPECIFIED COMPLICATION, UNSPECIFIED WHETHER LONG TERM INSULIN USE: ICD-10-CM

## 2022-07-20 DIAGNOSIS — F32.A DEPRESSION, UNSPECIFIED DEPRESSION TYPE: ICD-10-CM

## 2022-07-20 DIAGNOSIS — G89.29 CHRONIC PAIN OF RIGHT KNEE: ICD-10-CM

## 2022-07-20 DIAGNOSIS — M17.11 PRIMARY OSTEOARTHRITIS OF RIGHT KNEE: ICD-10-CM

## 2022-07-20 DIAGNOSIS — M79.7 FIBROMYALGIA: ICD-10-CM

## 2022-07-20 DIAGNOSIS — E66.01 MORBID OBESITY WITH BMI OF 45.0-49.9, ADULT: ICD-10-CM

## 2022-07-20 DIAGNOSIS — M62.81 QUADRICEPS WEAKNESS: ICD-10-CM

## 2022-07-20 DIAGNOSIS — M25.561 CHRONIC PAIN OF RIGHT KNEE: ICD-10-CM

## 2022-07-20 DIAGNOSIS — Z01.818 PRE-OP EVALUATION: Primary | ICD-10-CM

## 2022-07-20 PROCEDURE — 71045 XR CHEST 1 VIEW PRE-OP: ICD-10-PCS | Mod: 26,,, | Performed by: RADIOLOGY

## 2022-07-20 PROCEDURE — 71045 X-RAY EXAM CHEST 1 VIEW: CPT | Mod: 26,,, | Performed by: RADIOLOGY

## 2022-07-20 PROCEDURE — 71045 X-RAY EXAM CHEST 1 VIEW: CPT | Mod: TC,FY

## 2022-07-20 PROCEDURE — 77073 XR HIP TO ANKLE: ICD-10-PCS | Mod: 26,,, | Performed by: RADIOLOGY

## 2022-07-20 PROCEDURE — 77073 BONE LENGTH STUDIES: CPT | Mod: 26,,, | Performed by: RADIOLOGY

## 2022-07-20 PROCEDURE — 77073 BONE LENGTH STUDIES: CPT | Mod: TC,FY

## 2022-07-20 PROCEDURE — 99214 OFFICE O/P EST MOD 30 MIN: CPT | Mod: 25

## 2022-07-20 NOTE — PROGRESS NOTES
Progress Note  [facility]      Subjective:     Candy Huynh is a 51 y.o. year old female with PMHx of HTN, DM, GERD, MDD, Anxiety, Obesity (BMI 40-44) who presented to clinic today for a pre-op evaluation. Patient is scheduled to undergo R TKA with Dr. Campuzano on 8/15/22    For patient's HTN, she is taking amlodipine 5mg, losartan 5mg. Patient reports good compliance with this. Patient checks her BP at home and notes pressures in the 130s systolic and 80s-90s diastolic.     DM: Metformin 1000mg once a day. Patient has not had a A1C taken in a while. A1C ordered and patient will obtain lab work soon. Patient does not check BGs at home.    GERD: Patient currently on famotidine.    MDD/Anxiety: Fluoxetine 20mg    Review of Systems   Constitutional: Negative for chills, diaphoresis and fever.   HENT: Negative for ear discharge, ear pain and sinus pain.    Eyes: Negative for blurred vision, double vision and photophobia.   Respiratory: Negative for cough, sputum production, shortness of breath and wheezing.    Cardiovascular: Negative for chest pain, palpitations, leg swelling and PND.   Gastrointestinal: Negative for abdominal pain, constipation, diarrhea, nausea and vomiting.   Genitourinary: Negative for dysuria, flank pain and frequency.   Musculoskeletal: Negative for back pain, myalgias and neck pain.   Skin: Negative for rash.   Neurological: Negative for dizziness, weakness and headaches.   Psychiatric/Behavioral: Negative.        Objective:     Vitals:    07/20/22 1117   BP: (!) 136/91   Pulse: 77       Wt Readings from Last 1 Encounters:   07/20/22 108.4 kg (238 lb 15.7 oz)       Physical Exam  Constitutional:       General: She is not in acute distress.     Appearance: Normal appearance.   HENT:      Head: Normocephalic and atraumatic.      Right Ear: External ear normal.      Left Ear: External ear normal.      Mouth/Throat:      Mouth: Mucous membranes are moist.      Pharynx: No oropharyngeal  exudate or posterior oropharyngeal erythema.   Cardiovascular:      Rate and Rhythm: Normal rate and regular rhythm.      Pulses: Normal pulses.      Heart sounds: Normal heart sounds. No murmur heard.  Pulmonary:      Effort: Pulmonary effort is normal. No respiratory distress.      Breath sounds: Normal breath sounds. No wheezing.   Abdominal:      General: Abdomen is flat. There is no distension.      Palpations: Abdomen is soft.      Tenderness: There is no abdominal tenderness. There is no guarding or rebound.   Musculoskeletal:      Cervical back: Normal range of motion and neck supple. No tenderness.   Skin:     General: Skin is warm.      Capillary Refill: Capillary refill takes less than 2 seconds.   Neurological:      General: No focal deficit present.      Mental Status: She is alert.         Assessment/Plan:     Pre-operative evaluation with risk assessment              -           Scheduled for R TKA on 8/15/22 by Dr. Campuzano  -           DASI score: 58.2 w/ Functional Capacity in METS: 9.88 (>4) with a revised cardiac risk index of 3.9%.    - she  is not on antiplatelets/anticoagulation.   - she does not have a history of blood dyscrasias or previous reaction to anesthesia   - she  is a NEVER SMOKER.  - she does have a prior h/o HTN. BP: 136/91.   - she does not have a prior h/o HLD/CAD w/ either MI or CVA. See below for latest ASCVD if all necessary values are available. Continue medical management w/ amlodipine and losartan.  - she does have a prior h/o DM. Currently taking metformin. See below for last A1C.  - she does not have a prior h/o thyroid disease.  - she does not have a prior h/o COPD/Asthma/GATITO/OHS. does not use CPAP. does not have changes from baseline function.   - she does not have a prior h/o HF..  - BMI: Body mass index is 42.33 kg/m².               -           The patient is medically optimized with a low to moderate risk to proceed with (per ACC/AHA sofie-operative guidelines) a  moderate risk surgery.   - Please call our office for any additional questions or concerns.    The ASCVD Risk score (Kathy CONOR Jr., et al., 2013) failed to calculate for the following reasons:    Cannot find a previous HDL lab    Cannot find a previous total cholesterol lab  No results found for: LABA1C, HGBA1C   No results found for: TSH           Case discussed with staff: Dr. Reji Romano MD PGY-2  [facility]   07/20/2022 11:47 AM    This note was partially created using ZE[employer] Voice Recognition software. Typographical and content errors may occur with this process. While efforts are made to detect and correct such errors, in some cases errors will persist. For this reason, wording in this document should be considered in the proper context and not strictly verbatim.

## 2022-07-26 ENCOUNTER — OFFICE VISIT (OUTPATIENT)
Dept: ORTHOPEDICS | Facility: CLINIC | Age: 52
End: 2022-07-26
Payer: MEDICAID

## 2022-07-26 VITALS — BODY MASS INDEX: 42.17 KG/M2 | HEIGHT: 63 IN | WEIGHT: 238 LBS

## 2022-07-26 DIAGNOSIS — M17.11 PRIMARY OSTEOARTHRITIS OF RIGHT KNEE: Primary | ICD-10-CM

## 2022-07-26 PROCEDURE — 99213 PR OFFICE/OUTPT VISIT, EST, LEVL III, 20-29 MIN: ICD-10-PCS | Mod: S$PBB,,, | Performed by: ORTHOPAEDIC SURGERY

## 2022-07-26 PROCEDURE — 99213 OFFICE O/P EST LOW 20 MIN: CPT | Mod: PBBFAC,PN | Performed by: ORTHOPAEDIC SURGERY

## 2022-07-26 PROCEDURE — 3044F HG A1C LEVEL LT 7.0%: CPT | Mod: CPTII,,, | Performed by: ORTHOPAEDIC SURGERY

## 2022-07-26 PROCEDURE — 3008F PR BODY MASS INDEX (BMI) DOCUMENTED: ICD-10-PCS | Mod: CPTII,,, | Performed by: ORTHOPAEDIC SURGERY

## 2022-07-26 PROCEDURE — 99999 PR PBB SHADOW E&M-EST. PATIENT-LVL III: CPT | Mod: PBBFAC,,, | Performed by: ORTHOPAEDIC SURGERY

## 2022-07-26 PROCEDURE — 4010F PR ACE/ARB THEARPY RXD/TAKEN: ICD-10-PCS | Mod: CPTII,,, | Performed by: ORTHOPAEDIC SURGERY

## 2022-07-26 PROCEDURE — 99213 OFFICE O/P EST LOW 20 MIN: CPT | Mod: S$PBB,,, | Performed by: ORTHOPAEDIC SURGERY

## 2022-07-26 PROCEDURE — 3044F PR MOST RECENT HEMOGLOBIN A1C LEVEL <7.0%: ICD-10-PCS | Mod: CPTII,,, | Performed by: ORTHOPAEDIC SURGERY

## 2022-07-26 PROCEDURE — 1159F MED LIST DOCD IN RCRD: CPT | Mod: CPTII,,, | Performed by: ORTHOPAEDIC SURGERY

## 2022-07-26 PROCEDURE — 3008F BODY MASS INDEX DOCD: CPT | Mod: CPTII,,, | Performed by: ORTHOPAEDIC SURGERY

## 2022-07-26 PROCEDURE — 4010F ACE/ARB THERAPY RXD/TAKEN: CPT | Mod: CPTII,,, | Performed by: ORTHOPAEDIC SURGERY

## 2022-07-26 PROCEDURE — 99999 PR PBB SHADOW E&M-EST. PATIENT-LVL III: ICD-10-PCS | Mod: PBBFAC,,, | Performed by: ORTHOPAEDIC SURGERY

## 2022-07-26 PROCEDURE — 1159F PR MEDICATION LIST DOCUMENTED IN MEDICAL RECORD: ICD-10-PCS | Mod: CPTII,,, | Performed by: ORTHOPAEDIC SURGERY

## 2022-07-26 NOTE — PROGRESS NOTES
Subjective:      Patient ID: Candy Huynh is a 51 y.o. female.    Chief Complaint: Follow-up of the Right Knee    Patient frustrated with lack of office responsiveness    Social History     Occupational History    Not on file   Tobacco Use    Smoking status: Former Smoker    Smokeless tobacco: Never Used   Substance and Sexual Activity    Alcohol use: Yes    Drug use: Never    Sexual activity: Yes     Partners: Male      ROS      Objective:    Ortho/SPM Exam       Assessment:       1. Primary osteoarthritis of right knee          Plan:       I had a 30 minute conversation today with the patient about the frustrations that the office and staff have been having with some of the conversations they have had with the patient.  They feel that she has not been nice to them and has been rude.  I also listen to the patient's perspective that she has significant number of medical complaints and is looking out for her well-being.  We had a lengthy conversation about how to improve communication both from the patient and from the office so we can provide her the best care possible.  She understands that she may come across as being forceful but will try to be a little bit more respectful and accommodating knowing how busy and challenging the staff work flow can be.  I have asked her to delay her shoulder surgery to at least 3-6 months after her knee replacement.  We will continue with her surgery as scheduled.

## 2022-07-28 NOTE — PROGRESS NOTES
I assume primary medical responsibility for this patient. I have reviewed the history, physical, and assessement & treatment plan with the resident and agree that the care is reasonable and necessary. This service has been performed by a resident without the presence of a teaching physician under the primary care exception. If necessary, an addendum of additional findings or evaluation beyond the resident documentation will be noted below.     Kallie Mendoza MD

## 2022-08-04 ENCOUNTER — TELEPHONE (OUTPATIENT)
Dept: ORTHOPEDICS | Facility: CLINIC | Age: 52
End: 2022-08-04
Payer: MEDICAID

## 2022-08-04 DIAGNOSIS — M17.11 PRIMARY OSTEOARTHRITIS OF RIGHT KNEE: Primary | ICD-10-CM

## 2022-08-04 RX ORDER — NAPROXEN 375 MG/1
375 TABLET ORAL 2 TIMES DAILY WITH MEALS
Qty: 60 TABLET | Refills: 2 | Status: SHIPPED | OUTPATIENT
Start: 2022-08-04 | End: 2022-08-30 | Stop reason: ALTCHOICE

## 2022-08-04 NOTE — TELEPHONE ENCOUNTER
Moon Orr,     Patient's last office visit was 7/14. Patient was given a rx for naproxen ec, but patient's insurance won't cover. I spoke with the patients's pharmacy and was told that insurance will cover an alternative - regular naproxen.     Please Advise

## 2022-08-04 NOTE — TELEPHONE ENCOUNTER
----- Message from Imer Ferrera sent at 8/4/2022 10:13 AM CDT -----  Contact: pt  .Type:  Needs Medical Advice    Who Called: pt  Would the patient rather a call back or a response via [facility]? Call back  Best Call Back Number: [phone]  Additional Information: Pt. Is calling to speak to the nurse regarding getting her surgery date in Sept. Moved up.  Please call the pt back

## 2022-08-08 ENCOUNTER — TELEPHONE (OUTPATIENT)
Dept: ORTHOPEDICS | Facility: CLINIC | Age: 52
End: 2022-08-08
Payer: MEDICAID

## 2022-08-08 NOTE — TELEPHONE ENCOUNTER
----- Message from Grzegorz Llanos sent at 8/8/2022  9:24 AM CDT -----  .Type:  Needs Medical Advice    Who Called: self  Would the patient rather a call back or a response via [facility]? Call back  Best Call Back Number: [phone]   Additional Information: Patient would like a call back to schedule an appointment

## 2022-08-11 ENCOUNTER — RESEARCH ENCOUNTER (OUTPATIENT)
Dept: RESEARCH | Facility: HOSPITAL | Age: 52
End: 2022-08-11
Payer: MEDICAID

## 2022-08-11 NOTE — PROGRESS NOTES
Protocol: Innovations in Genicular Outcomes Registry (Natalio)  Protocol#:Natalio  IRB# 2021.156  Version Date: 18-Mar-2022  PI: Donnie Campuzano MD  Patient Initials: M.S.      Patient's Treatment Day Visit was canceled due to scheduling issues. Will wait for new treatment date.

## 2022-08-11 NOTE — PROGRESS NOTES
Protocol: innovations in Genicular Outcomes Registry (Natalio)  Protocol#: Natalio  IRB#: 2021.156  Version Date: 18-Mar-2022  PI: Donnie Campuzano MD  Patient Initials: DELVIN.S.   Study #: [id number]    Screening Note:    Patient reports the following information during screening visit.    Demographics:  female  Black or -American  Non-    Social Hx:    Former smoker  Alcohol Abuse or dependency: no  Opioid abuse hx: no  Illicit drug abuse: no    Economic hx:  Unemployed  # of days missed due to OA affected knee: not applicable  [place]: no  Level of physical activity during last 12 months: Light  Highest Level of education: College degree    Medical Hx:  allergies and past medical history  Active Ambulatory Problems     Diagnosis Date Noted    Lipoma of arm 2019    Quadriceps weakness 10/05/2020    Gait abnormality 10/05/2020    Decreased range of motion (ROM) of left knee 10/05/2020    Decreased functional mobility 10/05/2020    Acute pain of left knee 2020    Knee pain 2021    Degenerative tear of left medial meniscus     Degenerative tear of lateral meniscus, left     Morbid obesity with BMI of 45.0-49.9, adult     Difficulty walking 2021    Status post arthroscopy of knee 2021    Primary osteoarthritis of right knee 2022    Tear of right supraspinatus tendon 06/15/2022    Rotator cuff impingement syndrome of right shoulder 06/15/2022    Biceps tendinitis of right upper extremity 06/15/2022    Depression     Diabetes mellitus     Hypertension     Fibromyalgia      Resolved Ambulatory Problems     Diagnosis Date Noted    Left anterior shoulder pain 2020     Past Medical History:   Diagnosis Date    Vertigo        Surgical Hx:  Past Surgical History:   Procedure Laterality Date    ARTHROSCOPY OF KNEE Left 2021    Procedure: ARTHROSCOPY, KNEE;  Surgeon: Donnie Campuzano MD;  Location: [facility] OR;  Service: Orthopedics;  Laterality: Left;      SECTION  1998    EXCISION OF MASS OF EXTREMITY Left 12/6/2019    Procedure: EXCISION, MASS, EXTREMITY;  Surgeon: Honorio Pulido IV, MD;  Location: [facility] OR;  Service: Orthopedics;  Laterality: Left;  excision lipoma  Request Lacie    HERNIA REPAIR  2014    HYSTERECTOMY  2006    KNEE ARTHROSCOPY W/ MENISCECTOMY  1/11/2021    Procedure: ARTHROSCOPY, KNEE, WITH MENISCECTOMY;  Surgeon: Donnie Campuzano MD;  Location: [facility] OR;  Service: Orthopedics;;    Left shoulder  2020    NASAL SEPTOPLASTY           OA Treatment Hx:  Did pt receive steroid injections (intra-articular) for knee OA within last 12 months?  yes  Did the patient receive viscosupplementation within last 12 months? no  Is there a hx of cryoanalgesia or radio-frequency ablation tx for the affected knee within last 12 months? yes  Herbal therapy or supplements within last 12 months for OA knee pain? no  Medical marijuana use within last 12 months for OA pain? no  Hx of acupuncture within last 12 months for OA pain? no  Knee brace use within last 12 months for OA pain? yes  Physical therapy within last 12 months for OA knee pain? yes    OA Medications/ Any Analgesic Medications used in the last three months prior to enrollment: (Per study sponsor list)  Patient states the following medications are current:      · diclofenic (Pain management for knee OA)  · loratadine (CLARITIN)  · albuterol (PROVENTIL/VENTOLIN HFA)  · estradioL (ESTRACE)  · PREMARIN  · FLUoxetine  · fluticasone propionate (FLONASE)  · metroNIDAZOLE (FLAGYL)  · ibuprofen (ADVIL,MOTRIN)  · levocetirizine (XYZAL)  · amLODIPine (NORVASC)  · ASTELIN  · prazosin (MINIPRESS)  · nicotine polacrilex (NICORETTE)

## 2022-08-19 ENCOUNTER — RESEARCH ENCOUNTER (OUTPATIENT)
Dept: RESEARCH | Facility: HOSPITAL | Age: 52
End: 2022-08-19
Payer: MEDICAID

## 2022-08-19 ENCOUNTER — PATIENT MESSAGE (OUTPATIENT)
Dept: RESEARCH | Facility: HOSPITAL | Age: 52
End: 2022-08-19
Payer: MEDICAID

## 2022-08-19 NOTE — PROGRESS NOTES
Protocol: innovations in Genicular Outcomes Registry (Natalio)  Protocol#: Natalio  IRB#: 2021.156  Version Date: 18-Mar-2022  PI: Donnie Campuzano MD  Patient Initials: M.S. (Study ID#[id number])     End of Enrollment      Per Tatianna Gonzalez, pt has decided to go with a different provider for OA knee treatment. Pt will now be off study due to outside provider not being a sub-investigator on study.     Pt has been contacted via phone and patient portal.

## 2022-08-30 ENCOUNTER — OFFICE VISIT (OUTPATIENT)
Dept: ORTHOPEDICS | Facility: CLINIC | Age: 52
End: 2022-08-30
Payer: MEDICAID

## 2022-08-30 VITALS
WEIGHT: 245.81 LBS | HEART RATE: 70 BPM | DIASTOLIC BLOOD PRESSURE: 87 MMHG | SYSTOLIC BLOOD PRESSURE: 147 MMHG | HEIGHT: 63 IN | BODY MASS INDEX: 43.55 KG/M2

## 2022-08-30 DIAGNOSIS — M17.11 PRIMARY OSTEOARTHRITIS OF RIGHT KNEE: Primary | ICD-10-CM

## 2022-08-30 PROCEDURE — 99999 PR PBB SHADOW E&M-EST. PATIENT-LVL IV: CPT | Mod: PBBFAC,,, | Performed by: ORTHOPAEDIC SURGERY

## 2022-08-30 PROCEDURE — 3077F SYST BP >= 140 MM HG: CPT | Mod: CPTII,,, | Performed by: ORTHOPAEDIC SURGERY

## 2022-08-30 PROCEDURE — 3079F DIAST BP 80-89 MM HG: CPT | Mod: CPTII,,, | Performed by: ORTHOPAEDIC SURGERY

## 2022-08-30 PROCEDURE — 99213 PR OFFICE/OUTPT VISIT, EST, LEVL III, 20-29 MIN: ICD-10-PCS | Mod: S$PBB,,, | Performed by: ORTHOPAEDIC SURGERY

## 2022-08-30 PROCEDURE — 1159F PR MEDICATION LIST DOCUMENTED IN MEDICAL RECORD: ICD-10-PCS | Mod: CPTII,,, | Performed by: ORTHOPAEDIC SURGERY

## 2022-08-30 PROCEDURE — 3079F PR MOST RECENT DIASTOLIC BLOOD PRESSURE 80-89 MM HG: ICD-10-PCS | Mod: CPTII,,, | Performed by: ORTHOPAEDIC SURGERY

## 2022-08-30 PROCEDURE — 3008F BODY MASS INDEX DOCD: CPT | Mod: CPTII,,, | Performed by: ORTHOPAEDIC SURGERY

## 2022-08-30 PROCEDURE — 1160F PR REVIEW ALL MEDS BY PRESCRIBER/CLIN PHARMACIST DOCUMENTED: ICD-10-PCS | Mod: CPTII,,, | Performed by: ORTHOPAEDIC SURGERY

## 2022-08-30 PROCEDURE — 99214 OFFICE O/P EST MOD 30 MIN: CPT | Mod: PBBFAC,PN | Performed by: ORTHOPAEDIC SURGERY

## 2022-08-30 PROCEDURE — 1159F MED LIST DOCD IN RCRD: CPT | Mod: CPTII,,, | Performed by: ORTHOPAEDIC SURGERY

## 2022-08-30 PROCEDURE — 1160F RVW MEDS BY RX/DR IN RCRD: CPT | Mod: CPTII,,, | Performed by: ORTHOPAEDIC SURGERY

## 2022-08-30 PROCEDURE — 4010F ACE/ARB THERAPY RXD/TAKEN: CPT | Mod: CPTII,,, | Performed by: ORTHOPAEDIC SURGERY

## 2022-08-30 PROCEDURE — 3044F HG A1C LEVEL LT 7.0%: CPT | Mod: CPTII,,, | Performed by: ORTHOPAEDIC SURGERY

## 2022-08-30 PROCEDURE — 3077F PR MOST RECENT SYSTOLIC BLOOD PRESSURE >= 140 MM HG: ICD-10-PCS | Mod: CPTII,,, | Performed by: ORTHOPAEDIC SURGERY

## 2022-08-30 PROCEDURE — 3044F PR MOST RECENT HEMOGLOBIN A1C LEVEL <7.0%: ICD-10-PCS | Mod: CPTII,,, | Performed by: ORTHOPAEDIC SURGERY

## 2022-08-30 PROCEDURE — 4010F PR ACE/ARB THEARPY RXD/TAKEN: ICD-10-PCS | Mod: CPTII,,, | Performed by: ORTHOPAEDIC SURGERY

## 2022-08-30 PROCEDURE — 99999 PR PBB SHADOW E&M-EST. PATIENT-LVL IV: ICD-10-PCS | Mod: PBBFAC,,, | Performed by: ORTHOPAEDIC SURGERY

## 2022-08-30 PROCEDURE — 3008F PR BODY MASS INDEX (BMI) DOCUMENTED: ICD-10-PCS | Mod: CPTII,,, | Performed by: ORTHOPAEDIC SURGERY

## 2022-08-30 PROCEDURE — 99213 OFFICE O/P EST LOW 20 MIN: CPT | Mod: S$PBB,,, | Performed by: ORTHOPAEDIC SURGERY

## 2022-08-30 RX ORDER — CLARITHROMYCIN 500 MG/1
500 TABLET, FILM COATED ORAL 2 TIMES DAILY
COMMUNITY
Start: 2022-08-16 | End: 2022-10-06 | Stop reason: CLARIF

## 2022-08-30 RX ORDER — TRAMADOL HYDROCHLORIDE 50 MG/1
50 TABLET ORAL EVERY 6 HOURS PRN
COMMUNITY
Start: 2022-08-05 | End: 2022-10-25 | Stop reason: HOSPADM

## 2022-08-30 RX ORDER — MELOXICAM 15 MG/1
15 TABLET ORAL DAILY
Qty: 30 TABLET | Refills: 3 | Status: SHIPPED | OUTPATIENT
Start: 2022-08-30 | End: 2022-12-20 | Stop reason: SDUPTHER

## 2022-08-30 RX ORDER — AMOXICILLIN 500 MG/1
CAPSULE ORAL
COMMUNITY
Start: 2022-08-17 | End: 2022-10-06 | Stop reason: CLARIF

## 2022-08-30 RX ORDER — PANTOPRAZOLE SODIUM 40 MG/1
TABLET, DELAYED RELEASE ORAL
Status: ON HOLD | COMMUNITY
Start: 2022-08-12 | End: 2023-08-12 | Stop reason: HOSPADM

## 2022-08-30 RX ORDER — EPINEPHRINE 0.3 MG/.3ML
INJECTION SUBCUTANEOUS
Status: ON HOLD | COMMUNITY
End: 2023-08-12 | Stop reason: HOSPADM

## 2022-09-03 NOTE — PROGRESS NOTES
Subjective:       Patient ID: Candy Huynh is a 51 y.o. female.    Chief Complaint:   Pain of the Right Knee  She comes in for initial consultation and advice with me regarding right knee osteoarthritis pain.  She was previously medically cleared and scheduled for right knee replacement elsewhere, but she fired her doctor because the clinic was unresponsive to her calls.  She has also previously fires a shoulder surgeon based on similar complaints.  She is scheduled for right shoulder surgery with Dr. Orr this month.  She is wondering whether she should do the shoulder or the knee 1st.  The right side hurts more and swells more than the left.  She also has popping and catching in the right knee.  No fall, accident, injury.  She did have arthroscopic meniscectomy of the left knee about a year and a half ago.  No right knee surgery.  No groin pain, distal numbness or tingling.    Past Medical History:   Diagnosis Date    Depression     Diabetes mellitus     Fibromyalgia     Hypertension     Vertigo      Past Surgical History:   Procedure Laterality Date    ARTHROSCOPY OF KNEE Left 2021    Procedure: ARTHROSCOPY, KNEE;  Surgeon: Donnie Campuzano MD;  Location: [facility] OR;  Service: Orthopedics;  Laterality: Left;     SECTION      EXCISION OF MASS OF EXTREMITY Left 2019    Procedure: EXCISION, MASS, EXTREMITY;  Surgeon: Honorio Pulido IV, MD;  Location: [facility] OR;  Service: Orthopedics;  Laterality: Left;  excision lipoma  Request Lacie    HERNIA REPAIR      HYSTERECTOMY      KNEE ARTHROSCOPY W/ MENISCECTOMY  2021    Procedure: ARTHROSCOPY, KNEE, WITH MENISCECTOMY;  Surgeon: Donnie Campuzano MD;  Location: [facility] OR;  Service: Orthopedics;;    NASAL SEPTOPLASTY       History reviewed. No pertinent family history.  Social History     Socioeconomic History    Marital status:    Tobacco Use    Smoking status: Former    Smokeless tobacco: Never   Substance and Sexual Activity    Alcohol use: Yes     Drug use: Never    Sexual activity: Yes     Partners: Male       Current Outpatient Medications   Medication Sig Dispense Refill    albuterol (PROVENTIL/VENTOLIN HFA) 90 mcg/actuation inhaler SMARTSI Puff(s) By Mouth Every 4 Hours PRN      amLODIPine (NORVASC) 5 MG tablet Take 5 mg by mouth once daily.      azelastine (ASTELIN) 137 mcg (0.1 %) nasal spray 1 spray once daily.      clarithromycin (BIAXIN) 500 MG tablet Take 500 mg by mouth 2 (two) times daily.      EPINEPHrine (EPIPEN) 0.3 mg/0.3 mL AtIn as directed      ergocalciferol (ERGOCALCIFEROL) 50,000 unit Cap Take 50,000 Units by mouth every 7 days.      estradioL (ESTRACE) 0.01 % (0.1 mg/gram) vaginal cream Place vaginally.      FLUoxetine 20 MG capsule Take 40 mg by mouth once daily.      fluticasone propionate (FLONASE) 50 mcg/actuation nasal spray 1 spray by Each Nostril route once daily.      folic acid (FOLVITE) 1 MG tablet Take 1,000 mcg by mouth once daily.      levocetirizine (XYZAL) 5 MG tablet       loratadine (CLARITIN) 10 mg tablet Take 10 mg by mouth once daily.      losartan (COZAAR) 100 MG tablet Take 100 mg by mouth once daily.      metFORMIN (GLUCOPHAGE) 1000 MG tablet Take 1,000 mg by mouth 2 (two) times daily with meals.      metroNIDAZOLE (FLAGYL) 500 MG tablet Take 500 mg by mouth 2 (two) times daily.      montelukast (SINGULAIR) 10 mg tablet       nicotine polacrilex (NICORETTE) 4 MG Gum SMARTSI By Mouth Every 1-2 Hours      omeprazole (PRILOSEC) 40 MG capsule       prazosin (MINIPRESS) 1 MG Cap TAKE 1 CAPSULE BY MOUTH EVERY NIGHT AT BEDTIME FOR 7 DAYS; THEN TAKE 2 CAPSULES BY MOUTH EVERY NIGHT AT BEDTIME FOR 7 DAYS; THEN TAKE 3 CAPSULES BY MOUTH EVERY NIGHT AT BEDTIME THEREAFTER AS DIRECTED BY PHYSICIAN      PREMARIN 0.625 mg tablet Take 0.625 mg by mouth once daily.      traMADoL (ULTRAM) 50 mg tablet Take 50 mg by mouth every 6 (six) hours as needed.      amoxicillin (AMOXIL) 500 MG capsule TAKE 2 CAPSULES TWICE DAILY FOR 10  "DAYS      famotidine (PEPCID) 20 MG tablet Take 1 tablet (20 mg total) by mouth 2 (two) times daily. 60 tablet 0    meloxicam (MOBIC) 15 MG tablet Take 1 tablet (15 mg total) by mouth once daily. 30 tablet 3    pantoprazole (PROTONIX) 40 MG tablet TAKE 1 TABLET BY MOUTH ONCE DAILY 30 MINUTES BEFORE BREAKFAST       No current facility-administered medications for this visit.     Review of patient's allergies indicates:   Allergen Reactions    Morphine Other (See Comments)     shake         Objective:      Vitals:    08/30/22 1453   BP: (!) 147/87   BP Location: Right arm   Patient Position: Sitting   BP Method: Large (Automatic)   Pulse: 70   Weight: 111.5 kg (245 lb 13 oz)   Height: 5' 3" (1.6 m)     Physical Exam  Right knee:  There is is a mild varus deformity, which is partially passively correctable.  Active range of motion is 5-90 degrees.  (She only has 5-95 range of motion on the left).  Anterior crepitus with active extension.  Patellar mobility is limited.  Boggy synovitis without effusion.  Medial joint line tenderness predominates.  No instability to varus/valgus/Lachman's stressing.  No pain in the groin with flexion and internal rotation of the hip which is not limited.  Skin intact.  Distal neurovascular intact.  Flip test negative.    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  Assessment:   Advanced DJD knees  Plan:       We had a long discussion of knee replacement surgery with her and her daughter today.  She is uncertain about whether she would like to have her shoulder or her knee done 1st.  Since we have a 3 month wait list for knee surgery currently, she will probably go ahead and get her shoulder fixed while we put her on the list for right knee surgery.    The surgical process of joint replacement was discussed in detail with the patient including a detailed discussion of the procedure itself (including visual model, x-ray " review). The typical perioperative and postoperative course was discussed and perioperative risks were discussed to the patient's satisfaction.  Risks and complications discussed included but were not limited to the risks of anesthetic complications, infection, bleeding, wound healing complications, aseptic loosening, instability, limb length inequality, neurologic dysfunction including numbness,  DVT, pulmonary embolism, perioperative medical risks (cardiac, pulmonary, renal, neurologic), and death and the patient elects to proceed. We will initiate pre-operative medical evaluation and clearance and set a provisional date for surgical intervention according to the patient's schedule. We discussed surgical options including implant type, and why I believe the implant selected is a good match for the patient's needs. The patient expressed understanding and would like to proceed with surgery.     I explained to the patient that stiff knees make stiff knee replacements, and that they should not expect to achieve more flexion postoperatively than they have preoperatively.    The patient's pathophysiology was explained in detail with reference to x-rays, models, other visual aids as appropriate.  Treatment options were discussed in detail.  Questions were invited and answered to the patient's satisfaction.    Nolberto Fraser MD  Orthopaedic Surgery

## 2022-09-06 ENCOUNTER — HOSPITAL ENCOUNTER (OUTPATIENT)
Facility: HOSPITAL | Age: 52
Discharge: HOME | End: 2022-09-06
Attending: ORTHOPAEDIC SURGERY | Admitting: ORTHOPAEDIC SURGERY
Payer: MEDICAID

## 2022-09-06 ENCOUNTER — ANESTHESIA EVENT (OUTPATIENT)
Dept: SURGERY | Facility: HOSPITAL | Age: 52
End: 2022-09-06
Payer: MEDICAID

## 2022-09-06 ENCOUNTER — ANESTHESIA (OUTPATIENT)
Dept: SURGERY | Facility: HOSPITAL | Age: 52
End: 2022-09-06
Payer: MEDICAID

## 2022-09-06 VITALS
DIASTOLIC BLOOD PRESSURE: 62 MMHG | WEIGHT: 245 LBS | BODY MASS INDEX: 43.41 KG/M2 | OXYGEN SATURATION: 96 % | TEMPERATURE: 98 F | SYSTOLIC BLOOD PRESSURE: 118 MMHG | RESPIRATION RATE: 20 BRPM | HEART RATE: 59 BPM | HEIGHT: 63 IN

## 2022-09-06 DIAGNOSIS — M75.41 ROTATOR CUFF IMPINGEMENT SYNDROME OF RIGHT SHOULDER: ICD-10-CM

## 2022-09-06 DIAGNOSIS — M75.101 TEAR OF RIGHT SUPRASPINATUS TENDON: ICD-10-CM

## 2022-09-06 LAB — POCT GLUCOSE: 126 MG/DL (ref 70–110)

## 2022-09-06 PROCEDURE — 37000009 HC ANESTHESIA EA ADD 15 MINS: Performed by: ORTHOPAEDIC SURGERY

## 2022-09-06 PROCEDURE — 29827 PR SHLDR ARTHROSCOP,SURG,W/ROTAT CUFF REPR: ICD-10-PCS | Mod: RT,,, | Performed by: ORTHOPAEDIC SURGERY

## 2022-09-06 PROCEDURE — 63600175 PHARM REV CODE 636 W HCPCS: Performed by: NURSE ANESTHETIST, CERTIFIED REGISTERED

## 2022-09-06 PROCEDURE — 29827 SHO ARTHRS SRG RT8TR CUF RPR: CPT | Mod: RT,,, | Performed by: ORTHOPAEDIC SURGERY

## 2022-09-06 PROCEDURE — 71000033 HC RECOVERY, INTIAL HOUR: Performed by: ORTHOPAEDIC SURGERY

## 2022-09-06 PROCEDURE — 37000008 HC ANESTHESIA 1ST 15 MINUTES: Performed by: ORTHOPAEDIC SURGERY

## 2022-09-06 PROCEDURE — 36000710: Performed by: ORTHOPAEDIC SURGERY

## 2022-09-06 PROCEDURE — C9290 INJ, BUPIVACAINE LIPOSOME: HCPCS | Performed by: STUDENT IN AN ORGANIZED HEALTH CARE EDUCATION/TRAINING PROGRAM

## 2022-09-06 PROCEDURE — 63600175 PHARM REV CODE 636 W HCPCS: Performed by: STUDENT IN AN ORGANIZED HEALTH CARE EDUCATION/TRAINING PROGRAM

## 2022-09-06 PROCEDURE — 29824 PR SHLDR ARTHROSCOP,SURG,DIS CLAVICULECTOMY: ICD-10-PCS | Mod: 51,RT,, | Performed by: ORTHOPAEDIC SURGERY

## 2022-09-06 PROCEDURE — 29826 PR SHLDR ARTHROSCOP,PART ACROMIOPLAS: ICD-10-PCS | Mod: RT,,, | Performed by: ORTHOPAEDIC SURGERY

## 2022-09-06 PROCEDURE — 63600175 PHARM REV CODE 636 W HCPCS: Performed by: ORTHOPAEDIC SURGERY

## 2022-09-06 PROCEDURE — 25000003 PHARM REV CODE 250: Performed by: ANESTHESIOLOGY

## 2022-09-06 PROCEDURE — 71000015 HC POSTOP RECOV 1ST HR: Performed by: ORTHOPAEDIC SURGERY

## 2022-09-06 PROCEDURE — 27201423 OPTIME MED/SURG SUP & DEVICES STERILE SUPPLY: Performed by: ORTHOPAEDIC SURGERY

## 2022-09-06 PROCEDURE — 36000711: Performed by: ORTHOPAEDIC SURGERY

## 2022-09-06 PROCEDURE — C1713 ANCHOR/SCREW BN/BN,TIS/BN: HCPCS | Performed by: ORTHOPAEDIC SURGERY

## 2022-09-06 PROCEDURE — 29824 SHO ARTHRS SRG DSTL CLAVICLC: CPT | Mod: 51,RT,, | Performed by: ORTHOPAEDIC SURGERY

## 2022-09-06 PROCEDURE — 29826 SHO ARTHRS SRG DECOMPRESSION: CPT | Mod: RT,,, | Performed by: ORTHOPAEDIC SURGERY

## 2022-09-06 PROCEDURE — 25000003 PHARM REV CODE 250: Performed by: NURSE ANESTHETIST, CERTIFIED REGISTERED

## 2022-09-06 PROCEDURE — 76942 ECHO GUIDE FOR BIOPSY: CPT | Performed by: STUDENT IN AN ORGANIZED HEALTH CARE EDUCATION/TRAINING PROGRAM

## 2022-09-06 PROCEDURE — 63600175 PHARM REV CODE 636 W HCPCS: Performed by: ANESTHESIOLOGY

## 2022-09-06 PROCEDURE — 71000016 HC POSTOP RECOV ADDL HR: Performed by: ORTHOPAEDIC SURGERY

## 2022-09-06 DEVICE — ANCHOR SUT KNOTLESS MAG 2: Type: IMPLANTABLE DEVICE | Site: SHOULDER | Status: FUNCTIONAL

## 2022-09-06 RX ORDER — CEFAZOLIN SODIUM 2 G/50ML
2 SOLUTION INTRAVENOUS
Status: DISCONTINUED | OUTPATIENT
Start: 2022-09-06 | End: 2022-09-06 | Stop reason: HOSPADM

## 2022-09-06 RX ORDER — ACETAMINOPHEN 325 MG/1
650 TABLET ORAL EVERY 4 HOURS PRN
Status: DISCONTINUED | OUTPATIENT
Start: 2022-09-06 | End: 2022-09-06 | Stop reason: HOSPADM

## 2022-09-06 RX ORDER — DEXAMETHASONE SODIUM PHOSPHATE 4 MG/ML
INJECTION, SOLUTION INTRA-ARTICULAR; INTRALESIONAL; INTRAMUSCULAR; INTRAVENOUS; SOFT TISSUE
Status: DISCONTINUED | OUTPATIENT
Start: 2022-09-06 | End: 2022-09-06

## 2022-09-06 RX ORDER — PROPOFOL 10 MG/ML
VIAL (ML) INTRAVENOUS
Status: DISCONTINUED | OUTPATIENT
Start: 2022-09-06 | End: 2022-09-06

## 2022-09-06 RX ORDER — DIPHENHYDRAMINE HYDROCHLORIDE 50 MG/ML
INJECTION INTRAMUSCULAR; INTRAVENOUS
Status: DISCONTINUED | OUTPATIENT
Start: 2022-09-06 | End: 2022-09-06

## 2022-09-06 RX ORDER — HYDROCODONE BITARTRATE AND ACETAMINOPHEN 7.5; 325 MG/1; MG/1
1 TABLET ORAL EVERY 4 HOURS PRN
Qty: 30 TABLET | Refills: 0 | Status: SHIPPED | OUTPATIENT
Start: 2022-09-06 | End: 2022-09-21 | Stop reason: SDUPTHER

## 2022-09-06 RX ORDER — KETOROLAC TROMETHAMINE 30 MG/ML
30 INJECTION, SOLUTION INTRAMUSCULAR; INTRAVENOUS ONCE
Status: DISCONTINUED | OUTPATIENT
Start: 2022-09-06 | End: 2022-09-06 | Stop reason: HOSPADM

## 2022-09-06 RX ORDER — OXYCODONE HYDROCHLORIDE 5 MG/1
5 TABLET ORAL
Status: DISCONTINUED | OUTPATIENT
Start: 2022-09-06 | End: 2022-09-06 | Stop reason: HOSPADM

## 2022-09-06 RX ORDER — ONDANSETRON 2 MG/ML
4 INJECTION INTRAMUSCULAR; INTRAVENOUS DAILY PRN
Status: DISCONTINUED | OUTPATIENT
Start: 2022-09-06 | End: 2022-09-06 | Stop reason: HOSPADM

## 2022-09-06 RX ORDER — ROCURONIUM BROMIDE 10 MG/ML
INJECTION, SOLUTION INTRAVENOUS
Status: DISCONTINUED | OUTPATIENT
Start: 2022-09-06 | End: 2022-09-06

## 2022-09-06 RX ORDER — LIDOCAINE HCL/PF 100 MG/5ML
SYRINGE (ML) INTRAVENOUS
Status: DISCONTINUED | OUTPATIENT
Start: 2022-09-06 | End: 2022-09-06

## 2022-09-06 RX ORDER — FENTANYL CITRATE 50 UG/ML
INJECTION, SOLUTION INTRAMUSCULAR; INTRAVENOUS
Status: DISCONTINUED | OUTPATIENT
Start: 2022-09-06 | End: 2022-09-06

## 2022-09-06 RX ORDER — HYDROMORPHONE HYDROCHLORIDE 2 MG/ML
0.5 INJECTION, SOLUTION INTRAMUSCULAR; INTRAVENOUS; SUBCUTANEOUS EVERY 5 MIN PRN
Status: COMPLETED | OUTPATIENT
Start: 2022-09-06 | End: 2022-09-06

## 2022-09-06 RX ORDER — MIDAZOLAM HYDROCHLORIDE 1 MG/ML
INJECTION, SOLUTION INTRAMUSCULAR; INTRAVENOUS
Status: DISCONTINUED | OUTPATIENT
Start: 2022-09-06 | End: 2022-09-06

## 2022-09-06 RX ORDER — OXYCODONE HYDROCHLORIDE 5 MG/1
10 TABLET ORAL EVERY 4 HOURS PRN
Status: DISCONTINUED | OUTPATIENT
Start: 2022-09-06 | End: 2022-09-06 | Stop reason: HOSPADM

## 2022-09-06 RX ORDER — EPINEPHRINE 1 MG/ML
INJECTION, SOLUTION INTRACARDIAC; INTRAMUSCULAR; INTRAVENOUS; SUBCUTANEOUS
Status: DISCONTINUED | OUTPATIENT
Start: 2022-09-06 | End: 2022-09-06 | Stop reason: HOSPADM

## 2022-09-06 RX ORDER — ONDANSETRON 2 MG/ML
INJECTION INTRAMUSCULAR; INTRAVENOUS
Status: DISCONTINUED | OUTPATIENT
Start: 2022-09-06 | End: 2022-09-06

## 2022-09-06 RX ORDER — ONDANSETRON 8 MG/1
8 TABLET, ORALLY DISINTEGRATING ORAL EVERY 8 HOURS PRN
Status: DISCONTINUED | OUTPATIENT
Start: 2022-09-06 | End: 2022-09-06 | Stop reason: HOSPADM

## 2022-09-06 RX ORDER — BUPIVACAINE HYDROCHLORIDE 2.5 MG/ML
INJECTION, SOLUTION EPIDURAL; INFILTRATION; INTRACAUDAL
Status: COMPLETED | OUTPATIENT
Start: 2022-09-06 | End: 2022-09-06

## 2022-09-06 RX ADMIN — ONDANSETRON 4 MG: 2 INJECTION, SOLUTION INTRAMUSCULAR; INTRAVENOUS at 10:09

## 2022-09-06 RX ADMIN — HYDROMORPHONE HYDROCHLORIDE 0.5 MG: 2 INJECTION INTRAMUSCULAR; INTRAVENOUS; SUBCUTANEOUS at 12:09

## 2022-09-06 RX ADMIN — DIPHENHYDRAMINE HYDROCHLORIDE 12.5 MG: 50 INJECTION INTRAMUSCULAR; INTRAVENOUS at 10:09

## 2022-09-06 RX ADMIN — LIDOCAINE HYDROCHLORIDE 75 MG: 20 INJECTION, SOLUTION INTRAVENOUS at 10:09

## 2022-09-06 RX ADMIN — ROCURONIUM BROMIDE 40 MG: 10 INJECTION, SOLUTION INTRAVENOUS at 10:09

## 2022-09-06 RX ADMIN — GLYCOPYRROLATE 0.2 MG: 0.2 INJECTION, SOLUTION INTRAMUSCULAR; INTRAVITREAL at 10:09

## 2022-09-06 RX ADMIN — HYDROMORPHONE HYDROCHLORIDE 0.5 MG: 2 INJECTION INTRAMUSCULAR; INTRAVENOUS; SUBCUTANEOUS at 11:09

## 2022-09-06 RX ADMIN — OXYCODONE HYDROCHLORIDE 5 MG: 5 TABLET ORAL at 12:09

## 2022-09-06 RX ADMIN — SODIUM CHLORIDE, SODIUM LACTATE, POTASSIUM CHLORIDE, AND CALCIUM CHLORIDE: .6; .31; .03; .02 INJECTION, SOLUTION INTRAVENOUS at 09:09

## 2022-09-06 RX ADMIN — SODIUM CHLORIDE, SODIUM LACTATE, POTASSIUM CHLORIDE, AND CALCIUM CHLORIDE: .6; .31; .03; .02 INJECTION, SOLUTION INTRAVENOUS at 08:09

## 2022-09-06 RX ADMIN — DEXAMETHASONE SODIUM PHOSPHATE 4 MG: 4 INJECTION, SOLUTION INTRA-ARTICULAR; INTRALESIONAL; INTRAMUSCULAR; INTRAVENOUS; SOFT TISSUE at 10:09

## 2022-09-06 RX ADMIN — PROPOFOL 150 MG: 10 INJECTION, EMULSION INTRAVENOUS at 10:09

## 2022-09-06 RX ADMIN — CEFAZOLIN SODIUM 2 G: 2 SOLUTION INTRAVENOUS at 10:09

## 2022-09-06 RX ADMIN — FENTANYL CITRATE 50 MCG: 50 INJECTION, SOLUTION INTRAMUSCULAR; INTRAVENOUS at 10:09

## 2022-09-06 RX ADMIN — BUPIVACAINE HYDROCHLORIDE 10 ML: 2.5 INJECTION, SOLUTION EPIDURAL; INFILTRATION; INTRACAUDAL; PERINEURAL at 08:09

## 2022-09-06 RX ADMIN — MIDAZOLAM 5 MG: 1 INJECTION INTRAMUSCULAR; INTRAVENOUS at 08:09

## 2022-09-06 RX ADMIN — BUPIVACAINE 10 ML: 13.3 INJECTION, SUSPENSION, LIPOSOMAL INFILTRATION at 08:09

## 2022-09-06 NOTE — ANESTHESIA PROCEDURE NOTES
Peripheral Block    Patient location during procedure: pre-op   Block not for primary anesthetic.  Reason for block: at surgeon's request and post-op pain management   Post-op Pain Location: R shoulder   Start time: 9/6/2022 8:50 AM  Timeout: 9/6/2022 8:44 AM   End time: 9/6/2022 8:55 AM    Staffing  Authorizing Provider: Honorio Huang MD  Performing Provider: Kevin Heart MD    Preanesthetic Checklist  Completed: patient identified, IV checked, site marked, risks and benefits discussed, surgical consent, monitors and equipment checked, pre-op evaluation and timeout performed  Peripheral Block  Patient position: sitting  Prep: ChloraPrep  Patient monitoring: heart rate, cardiac monitor, continuous pulse ox, continuous capnometry and frequent blood pressure checks  Block type: interscalene  Laterality: right  Injection technique: single shot  Needle  Needle type: Stimuplex   Needle gauge: 22 G  Needle length: 2 in  Needle localization: anatomical landmarks and ultrasound guidance   -ultrasound image captured on disc.  Assessment  Injection assessment: negative aspiration, negative parasthesia and local visualized surrounding nerve  Paresthesia pain: none  Heart rate change: no  Slow fractionated injection: yes    Medications:    Medications: bupivacaine (pf) (MARCAINE) injection 0.25% - Perineural   10 mL - 9/6/2022 8:55:00 AM    Additional Notes  VSS.  [facility] RN monitoring vitals throughout procedure.  Patient tolerated procedure well.

## 2022-09-06 NOTE — TRANSFER OF CARE
"Anesthesia Transfer of Care Note    Patient: Candy Huynh    Procedure(s) Performed: Procedure(s) (LRB):  REPAIR, ROTATOR CUFF, ARTHROSCOPIC (Right)    Patient location: PACU    Anesthesia Type: general    Transport from OR: Transported from OR on 6-10 L/min O2 by face mask with adequate spontaneous ventilation    Post pain: adequate analgesia    Post assessment: no apparent anesthetic complications    Post vital signs: stable    Level of consciousness: awake and alert    Nausea/Vomiting: no nausea/vomiting    Complications: none    Transfer of care protocol was followed      Last vitals:   Visit Vitals  BP (!) 140/91   Pulse 70   Temp 37 °C (98.6 °F) (Skin)   Resp 18   Ht 5' 3" (1.6 m)   Wt 111.1 kg (245 lb)   SpO2 98%   Breastfeeding No   BMI 43.40 kg/m²     "

## 2022-09-06 NOTE — OP NOTE
[place] [facility] (Hospital)  Operative Note      Date of Procedure: 9/6/2022     Procedure: Procedure(s) (LRB):  REPAIR, ROTATOR CUFF, ARTHROSCOPIC (Right)     Surgeon(s) and Role:     * Ministerio Orr Jr., MD - Primary    Assisting Surgeon: None    Pre-Operative Diagnosis: Rotator cuff impingement syndrome of right shoulder [M75.41]    Post-Operative Diagnosis: Post-Op Diagnosis Codes:     * Rotator cuff impingement syndrome of right shoulder [M75.41]    Anesthesia: General/Regional    Operative Findings (including complications, if any): rot cuff tear right    Description of Technical Procedures: DATE OF PROCEDURE:   9/6/2022     PREOPERATIVE DIAGNOSES:  1.  Rotator cuff tear, right shoulder.  2.  Acromioclavicular arthritis, right shoulder.     POSTOPERATIVE DIAGNOSES:  1.  Rotator cuff tear,right shoulder.  2.  Acromioclavicular arthritis, right shoulder.     OPERATIVE PROCEDURES:  1.  right shoulder arthroscopy with subacromial decompression.  2.  right shoulder arthroscopic rotator cuff repair using Opus suture anchors X1.  3.  right shoulder arthroscopic acromioclavicular joint resection (mini   Tasneem).     SURGEON:  Ministerio Orr Jr., M.D.     FIRST ASSISTANT:  Daxa     ANESTHESIA:  General endotracheal.     ESTIMATED BLOOD LOSS:  Minimal.     COMPLICATIONS:  None.     SPECIMENS:  None.     BRIEF INDICATIONS:  A 51-year-old female with a rotator cuff tear,right shoulder, taken to surgery for the above procedure.     OPERATIVE PROCEDURE IN DETAIL:  After operative consent was obtained, the   patient brought to the Operating Room, placed supine on the operating room   table.  Anesthesia by GET method was performed by the Anesthesia staff.  After   the patient was asleep, carefully turned to lateral decubitus position and   stabilized on the bean bag, the involved shoulder was then prepped and draped   out in the normal sterile fashion and then suspended to longitudinal traction 12   pounds.      Following this, a posterolateral stab incision was made behind the acromion and   the scope inserted into the shoulder joint.  Diagnostic arthroscopy of the   shoulder showed a full-thickness rotator cuff tear.  The lateral portal was   created with a #15 blade and a sucker shaver inserted laterally and a complete   bursectomy performed including removal of the CA ligament.  Hemostasis achieved   with the Bovie.  The leading edge of the rotator cuff tear was freshened up and   the greater tuberosity was prepared.     Suture passers were then used to place inverted horizontal mattress sutures in   the leading edge of the rotator cuff.  A superior portal was then created and   drilling followed by insertion of the suture anchors laterally passing the   sutures through the anchor tightening up bringing the rotator cuff down flush to   bone where the anchors were locked in place and sutures cut short.  The repair   was noted to be watertight.  Range of motion was checked and noted to be full   without impingement.     The stephanie was then brought in laterally and an acromioplasty was performed from   lateral to medial, decompressing the subacromial space all the way to the AC   joint.  The AC joint was severely degenerative and therefore the distal clavicle   was resected using the bur and an anterior portal to remove the distal 5 to 8   mm of clavicle and including the undersurface.  After fully decompressing, all   bony surfaces were carefully smoothed out.  Excess fluid and debris evacuated.    The instruments were removed and the portals then closed using interrupted 3-0   nylon suture in the skin.  Sterile dressing applied followed by a pillow sling.    The patient extubated and brought to the Recovery Room in stable condition.    All sponge and needle counts reported as correct.  No complications.          Significant Surgical Tasks Conducted by the Assistant(s), if Applicable: scope    Estimated Blood Loss (EBL):  * No values recorded between 9/6/2022 10:38 AM and 9/6/2022 11:27 AM *           Implants: * No implants in log *    Specimens:   Specimen (24h ago, onward)      None                    Condition: Good    Disposition: PACU - hemodynamically stable.    Attestation: I was present and scrubbed for the entire procedure.    Discharge Note    OUTCOME: Patient tolerated treatment/procedure well without complication and is now ready for discharge.    DISPOSITION: Home or Self Care    FINAL DIAGNOSIS:  <principal problem not specified>    FOLLOWUP: In clinic    DISCHARGE INSTRUCTIONS:    Discharge Procedure Orders   Diet general     Call MD for:  temperature >100.4     Call MD for:  persistent nausea and vomiting     Call MD for:  severe uncontrolled pain     Ice to affected area   Order Comments: using barrier between ice and skin (specify duration&frequency)     Remove dressing in 24 hours

## 2022-09-06 NOTE — ANESTHESIA PROCEDURE NOTES
Intubation    Date/Time: 9/6/2022 10:10 AM  Performed by: Jennifer Baum CRNA  Authorized by: Honorio Huang MD     Intubation:     Induction:  Intravenous    Intubated:  Postinduction    Mask Ventilation:  Easy mask    Attempts:  1    Attempted By:  CRNA    Method of Intubation:  Video laryngoscopy    Blade:  [place] 3    Laryngeal View Grade: Grade I - full view of cords      Difficult Airway Encountered?: No      Complications:  None    Airway Device:  Oral endotracheal tube    Airway Device Size:  7.5    Style/Cuff Inflation:  Cuffed (inflated to minimal occlusive pressure)    Tube secured:  21    Secured at:  The lips    Placement Verified By:  Capnometry    Complicating Factors:  None    Findings Post-Intubation:  BS equal bilateral and atraumatic/condition of teeth unchanged

## 2022-09-06 NOTE — ANESTHESIA POSTPROCEDURE EVALUATION
Anesthesia Post Evaluation    Patient: Candy Huynh    Procedure(s) Performed: Procedure(s) (LRB):  REPAIR, ROTATOR CUFF, ARTHROSCOPIC (Right)    Final Anesthesia Type: general      Patient location during evaluation: PACU  Patient participation: Yes- Able to Participate  Level of consciousness: awake and alert  Post-procedure vital signs: reviewed and stable  Pain management: adequate  Airway patency: patent  GATITO mitigation strategies: Multimodal analgesia  PONV status at discharge: No PONV  Anesthetic complications: no      Cardiovascular status: blood pressure returned to baseline and hemodynamically stable  Respiratory status: room air  Hydration status: euvolemic  Follow-up not needed.          Vitals Value Taken Time   /69 09/06/22 1230   Temp 36.4 °C (97.5 °F) 09/06/22 1230   Pulse 57 09/06/22 1230   Resp 18 09/06/22 1230   SpO2 96 % 09/06/22 1230         Event Time   Out of Recovery 12:30:00         Pain/Lisa Score: Pain Rating Prior to Med Admin: 6 (9/6/2022 12:10 PM)  Lisa Score: 10 (9/6/2022 11:45 AM)

## 2022-09-06 NOTE — ANESTHESIA PREPROCEDURE EVALUATION
2022  Candy Huynh is a 51 y.o., female scheduled for R rotator cuff repair    Review of patient's allergies indicates:   Allergen Reactions    Morphine Other (See Comments)     shake       Past Medical History:   Diagnosis Date    Depression     Diabetes mellitus     Fibromyalgia     Hypertension     Vertigo        Past Surgical History:   Procedure Laterality Date    ARTHROSCOPY OF KNEE Left 2021    Procedure: ARTHROSCOPY, KNEE;  Surgeon: Donnie Campuzano MD;  Location: [facility] OR;  Service: Orthopedics;  Laterality: Left;     SECTION      EXCISION OF MASS OF EXTREMITY Left 2019    Procedure: EXCISION, MASS, EXTREMITY;  Surgeon: Honorio Pulido IV, MD;  Location: [facility] OR;  Service: Orthopedics;  Laterality: Left;  excision lipoma  Request Lacie    HERNIA REPAIR      HYSTERECTOMY      KNEE ARTHROSCOPY W/ MENISCECTOMY  2021    Procedure: ARTHROSCOPY, KNEE, WITH MENISCECTOMY;  Surgeon: Donnie Campuzano MD;  Location: [facility] OR;  Service: Orthopedics;;    NASAL SEPTOPLASTY         Outpatient Meds  No current facility-administered medications on file prior to encounter.     Current Outpatient Medications on File Prior to Encounter   Medication Sig Dispense Refill    albuterol (PROVENTIL/VENTOLIN HFA) 90 mcg/actuation inhaler SMARTSI Puff(s) By Mouth Every 4 Hours PRN      amLODIPine (NORVASC) 5 MG tablet Take 5 mg by mouth once daily.      azelastine (ASTELIN) 137 mcg (0.1 %) nasal spray 1 spray once daily.      ergocalciferol (ERGOCALCIFEROL) 50,000 unit Cap Take 50,000 Units by mouth every 7 days.      estradioL (ESTRACE) 0.01 % (0.1 mg/gram) vaginal cream Place vaginally.      FLUoxetine 20 MG capsule Take 40 mg by mouth once daily.      fluticasone propionate (FLONASE) 50 mcg/actuation nasal spray 1 spray by Each Nostril route once daily.      folic acid (FOLVITE)  1 MG tablet Take 1,000 mcg by mouth once daily.      levocetirizine (XYZAL) 5 MG tablet       loratadine (CLARITIN) 10 mg tablet Take 10 mg by mouth once daily.      losartan (COZAAR) 100 MG tablet Take 100 mg by mouth once daily.      metFORMIN (GLUCOPHAGE) 1000 MG tablet Take 1,000 mg by mouth 2 (two) times daily with meals.      metroNIDAZOLE (FLAGYL) 500 MG tablet Take 500 mg by mouth 2 (two) times daily.      montelukast (SINGULAIR) 10 mg tablet       nicotine polacrilex (NICORETTE) 4 MG Gum SMARTSI By Mouth Every 1-2 Hours      omeprazole (PRILOSEC) 40 MG capsule       prazosin (MINIPRESS) 1 MG Cap TAKE 1 CAPSULE BY MOUTH EVERY NIGHT AT BEDTIME FOR 7 DAYS; THEN TAKE 2 CAPSULES BY MOUTH EVERY NIGHT AT BEDTIME FOR 7 DAYS; THEN TAKE 3 CAPSULES BY MOUTH EVERY NIGHT AT BEDTIME THEREAFTER AS DIRECTED BY PHYSICIAN      PREMARIN 0.625 mg tablet Take 0.625 mg by mouth once daily.      famotidine (PEPCID) 20 MG tablet Take 1 tablet (20 mg total) by mouth 2 (two) times daily. 60 tablet 0       Inpatient Scheduled Meds        Pre-op Assessment    I have reviewed the Patient Summary Reports.    I have reviewed the Nursing Notes. I have reviewed the NPO Status.   I have reviewed the Medications.     Review of Systems  Anesthesia Hx:  No problems with previous Anesthesia  Denies Family Hx of Anesthesia complications.    Social:  Former Smoker, Social Alcohol Use    Hematology/Oncology:  Hematology Normal        Cardiovascular:   Hypertension  Denies Angina.    Pulmonary:  Pulmonary Normal    Renal/:  Renal/ Normal     Hepatic/GI:  Hepatic/GI Normal    Neurological:  Neurology Normal    Endocrine:   Diabetes, well controlled, type 2  Diabetes    Psych:   depression          Physical Exam  General:  Morbid Obesity      Airway/Jaw/Neck:  Airway Findings: Mouth Opening: Normal   Tongue: Normal   General Airway Assessment: Adult Mallampati: II       Chest/Lungs:  Chest/Lungs Findings: Clear to auscultation,  Normal Respiratory Rate      Heart/Vascular:  Heart Findings: Rate: Normal        Mental Status:  Mental Status Findings:  Cooperative, Alert and Oriented       Outside EKG 10/29/19  NSR      Anesthesia Plan  Type of Anesthesia, risks & benefits discussed:  Anesthesia Type:  general, Regional    Patient's Preference:   Plan Factors:          Intra-op Monitoring Plan: standard ASA monitors  Intra-op Monitoring Plan Comments:   Post Op Pain Control Plan: multimodal analgesia  Post Op Pain Control Plan Comments:     Induction:   IV  Beta Blocker:  Patient is not currently on a Beta-Blocker (No further documentation required).       Informed Consent: Informed consent signed with the Patient and all parties understand the risks and agree with anesthesia plan.  All questions answered.    ASA Score: 3     Day of Surgery Review of History & Physical:  There are no significant changes.  H&P Update referred to the surgeon/provider.          Ready For Surgery From Anesthesia Perspective.           Physical Exam  General: Morbid Obesity    Airway:  Mallampati: II   Mouth Opening: Normal  Tongue: Normal    Chest/Lungs:  Clear to auscultation, Normal Respiratory Rate    Heart:  Rate: Normal          Anesthesia Plan  Type of Anesthesia, risks & benefits discussed:    Anesthesia Type: general, Regional  Intra-op Monitoring Plan: standard ASA monitors  Post Op Pain Control Plan: multimodal analgesia  Induction:  IV  Informed Consent: Informed consent signed with the Patient and all parties understand the risks and agree with anesthesia plan.  All questions answered.   ASA Score: 3  Day of Surgery Review of History & Physical: H&P Update referred to the surgeon/provider.    Ready For Surgery From Anesthesia Perspective.       .

## 2022-09-06 NOTE — H&P
CC:  Partial tear rotator cuff right shoulder           HPI:  Candy Huynh is a very pleasant 51 y.o. female with ongoing symptoms right shoulder for more than 6 months  She has had pain in the right shoulder difficulty with overhead lifting and pain at night  She actually had seen Dr. Pulido before and had been scheduled for surgery for shoulder arthroscopy.  There was some type of disagreement and surgery was canceled and she has elected to follow-up with  For further treatment  She has had an MRI on the right shoulder  She has been through physical therapy without relief               PAST MEDICAL HISTORY:        Past Medical History:   Diagnosis Date    Depression      Diabetes mellitus      Fibromyalgia      Hypertension      Vertigo        PAST SURGICAL HISTORY:         Past Surgical History:   Procedure Laterality Date    ARTHROSCOPY OF KNEE Left 2021     Procedure: ARTHROSCOPY, KNEE;  Surgeon: Donnie Campuzano MD;  Location: [facility] OR;  Service: Orthopedics;  Laterality: Left;     SECTION        EXCISION OF MASS OF EXTREMITY Left 2019     Procedure: EXCISION, MASS, EXTREMITY;  Surgeon: Honorio Pulido IV, MD;  Location: [facility] OR;  Service: Orthopedics;  Laterality: Left;  excision lipoma  Request Lacie    HERNIA REPAIR        HYSTERECTOMY        KNEE ARTHROSCOPY W/ MENISCECTOMY   2021     Procedure: ARTHROSCOPY, KNEE, WITH MENISCECTOMY;  Surgeon: Donnie Campuzano MD;  Location: [facility] OR;  Service: Orthopedics;;    NASAL SEPTOPLASTY          FAMILY HISTORY: No family history on file.  SOCIAL HISTORY:   Social History               Socioeconomic History    Marital status:    Tobacco Use    Smoking status: Former Smoker    Smokeless tobacco: Never Used   Substance and Sexual Activity    Alcohol use: Yes    Drug use: Never    Sexual activity: Yes       Partners: Male            MEDICATIONS:   Current Outpatient Medications:     albuterol (PROVENTIL/VENTOLIN HFA) 90 mcg/actuation  inhaler, SMARTSI Puff(s) By Mouth Every 4 Hours PRN, Disp: , Rfl:     amLODIPine (NORVASC) 5 MG tablet, Take 5 mg by mouth once daily., Disp: , Rfl:     azelastine (ASTELIN) 137 mcg (0.1 %) nasal spray, 1 spray once daily., Disp: , Rfl:     ergocalciferol (ERGOCALCIFEROL) 50,000 unit Cap, Take 50,000 Units by mouth every 7 days., Disp: , Rfl:     estradioL (ESTRACE) 0.01 % (0.1 mg/gram) vaginal cream, Place vaginally., Disp: , Rfl:     famotidine (PEPCID) 20 MG tablet, Take 1 tablet (20 mg total) by mouth 2 (two) times daily., Disp: 60 tablet, Rfl: 0    FLUoxetine 20 MG capsule, Take 40 mg by mouth once daily., Disp: , Rfl:     fluticasone propionate (FLONASE) 50 mcg/actuation nasal spray, 1 spray by Each Nostril route once daily., Disp: , Rfl:     folic acid (FOLVITE) 1 MG tablet, Take 1,000 mcg by mouth once daily., Disp: , Rfl:     ibuprofen (ADVIL,MOTRIN) 800 MG tablet, SMARTSI Tablet(s) By Mouth Every 12 Hours PRN, Disp: , Rfl:     levocetirizine (XYZAL) 5 MG tablet, , Disp: , Rfl:     loratadine (CLARITIN) 10 mg tablet, Take 10 mg by mouth once daily., Disp: , Rfl:     losartan (COZAAR) 100 MG tablet, Take 100 mg by mouth once daily., Disp: , Rfl:     metFORMIN (GLUCOPHAGE) 1000 MG tablet, Take 1,000 mg by mouth 2 (two) times daily with meals., Disp: , Rfl:     metroNIDAZOLE (FLAGYL) 500 MG tablet, Take 500 mg by mouth 2 (two) times daily., Disp: , Rfl:     montelukast (SINGULAIR) 10 mg tablet, , Disp: , Rfl:     nicotine polacrilex (NICORETTE) 4 MG Gum, SMARTSI By Mouth Every 1-2 Hours, Disp: , Rfl:     omeprazole (PRILOSEC) 40 MG capsule, , Disp: , Rfl:     prazosin (MINIPRESS) 1 MG Cap, TAKE 1 CAPSULE BY MOUTH EVERY NIGHT AT BEDTIME FOR 7 DAYS; THEN TAKE 2 CAPSULES BY MOUTH EVERY NIGHT AT BEDTIME FOR 7 DAYS; THEN TAKE 3 CAPSULES BY MOUTH EVERY NIGHT AT BEDTIME THEREAFTER AS DIRECTED BY PHYSICIAN, Disp: , Rfl:     PREMARIN 0.625 mg tablet, Take 0.625 mg by mouth once daily., Disp: , Rfl:      "naproxen (EC-NAPROSYN) 375 MG TbEC EC tablet, Take 1 tablet (375 mg total) by mouth 2 (two) times daily., Disp: 60 tablet, Rfl: 1    traMADoL (ULTRAM) 50 mg tablet, Take 1 tablet (50 mg total) by mouth every 6 (six) hours as needed for Pain., Disp: 40 tablet, Rfl: 0  ALLERGIES:         Review of patient's allergies indicates:   Allergen Reactions    Morphine Other (See Comments)       shake         Review of Systems:  Constitutional: no fever or chills  ENT: no nasal congestion or sore throat  Respiratory: no cough or shortness of breath  Cardiovascular: no chest pain or palpitations  Gastrointestinal: no nausea or vomiting, PUD, GERD, NSAID intolerance  Genitourinary: no hematuria or dysuria  Integument/Breast: no rash or pruritis  Hematologic/Lymphatic: no easy bruising or lymphadenopathy  Musculoskeletal: see HPI  Neurological: no seizures or tremors  Behavioral/Psych: no auditory or visual hallucinations        Physical Exam       Vitals:     07/14/22 1346   Weight: 108 kg (238 lb)   Height: 5' 3" (1.6 m)   PainSc:   8   PainLoc: Shoulder         Constitutional: Oriented to person, place, and time. Appears well-developed and well-nourished.   HENT:   Head: Normocephalic and atraumatic.   Nose: Nose normal.   Eyes: No scleral icterus.   Neck: Normal range of motion.   Cardiovascular: Normal rate and regular rhythm.    Pulses:       Radial pulses are 2+ on the right side, and 2+ on the left side.   Pulmonary/Chest: Effort normal and breath sounds normal.   Abdominal: Soft.   Neurological: Alert and oriented to person, place, and time.   Skin: Skin is warm.   Psychiatric: Normal mood and affect.      MUSCULOSKELETAL UPPER EXTREMITY:  Examination right shoulder mild tenderness at the AC joint and anterolateral acromion  Range of motion of the shoulder is slightly decreased due to pain  Positive impingement sign  No instability  Positive supraspinatus stress test  Neurologic exam intact                 Diagnostic " "Studies:  MRI scan right shoulder shows partial tear rotator cuff with impingement           Assessment:  Partial tear rotator cuff right shoulder     Plan:  I have recommended right shoulder arthroscopy subacromial decompression possible rotator cuff repair as an outpatient  The risks and benefits explained she understands  In the meantime avoid overhead lifting  She is scheduled for knee surgery next month we will need to wait until she recovers from the knee surgery before we proceed with the shoulder surgery        The risks and benefits of surgery were discussed with the patient today and they understand.  The consent was signed in the office for surgery.        Ministerio Orr MD (Jay)  [facility]  Orthopedic Upper Extremity Surgery        "

## 2022-09-07 ENCOUNTER — NURSE TRIAGE (OUTPATIENT)
Dept: ADMINISTRATIVE | Facility: CLINIC | Age: 52
End: 2022-09-07
Payer: MEDICAID

## 2022-09-07 NOTE — TELEPHONE ENCOUNTER
Pt calling stating that she had a rotator cuff procedure with Dr. Orr yesterday. Reports pain medication isnt giving any relief and reports pain with breathing. Reports she does feel SOB while speaking. Per protocol advised to ED NOW. verbalized understanding. Denies any further questions or concerns at this time, advised to call back if they have any that come up. Advised pt to call back with any other concerns or worsening symptoms. Verbalized understanding and will route message to provider.         Reason for Disposition   Caller has URGENT question and triager unable to answer question   Difficulty breathing   MODERATE difficulty breathing (e.g., speaks in phrases, SOB even at rest, pulse 100-120) of new-onset or worse than normal    Additional Information   Negative: Sounds like a life-threatening emergency to the triager   Negative: Bright red, wide-spread, sunburn-like rash   Negative: SEVERE headache and after spinal (epidural) anesthesia   Negative: Vomiting and persists > 4 hours   Negative: Vomiting and abdomen looks much more swollen than usual   Negative: Drinking very little and dehydration suspected (e.g., no urine > 12 hours, very dry mouth, very lightheaded)   Negative: Patient sounds very sick or weak to the triager   Negative: Sounds like a serious complication to the triager   Negative: Fever > 100.4 F (38.0 C)   Negative: SEVERE difficulty breathing (e.g., struggling for each breath, speaks in single words, pulse > 120)   Negative: Breathing stopped and hasn't returned   Negative: Choking on something   Negative: Bluish (or gray) lips or face   Negative: Difficult to awaken or acting confused (e.g., disoriented, slurred speech)   Negative: Passed out (i.e., fainted, collapsed and was not responding)   Negative: Wheezing started suddenly after medicine, an allergic food, or bee sting   Negative: Stridor   Negative: Slow, shallow and weak breathing   Negative: Sounds like a life-threatening  emergency to the triager    Protocols used: Post-Op Symptoms and [place]-B-QS, Breathing Difficulty-A-OH

## 2022-09-07 NOTE — TELEPHONE ENCOUNTER
Spoke with patient regarding her pain she's having following her right shoulder surgery on 9/6. Informed patient that the pain she is having is normal after surgery. I also informed the patient that she can take 400mg of Ibuprofen with pain medication for 3 days, also place ice on her shoulder, and if she is still having pain to give us a call back in clinic. All questions answered and patient verbalized understanding.

## 2022-09-16 ENCOUNTER — TELEPHONE (OUTPATIENT)
Dept: ORTHOPEDICS | Facility: CLINIC | Age: 52
End: 2022-09-16
Payer: MEDICAID

## 2022-09-16 NOTE — TELEPHONE ENCOUNTER
I called the patient regarding her surgery with Dr. Fraser. The patient wants to have a R TKA with Dr. Fraser. The patient just had a recent right shoulder surgery with Dr. Orr. I told the patient to ask Dr. Orr when she can start to use a walker. The patient wants to have surgery in November. I told her that when I get more time in November, I will give her a call.

## 2022-09-21 ENCOUNTER — OFFICE VISIT (OUTPATIENT)
Dept: ORTHOPEDICS | Facility: CLINIC | Age: 52
End: 2022-09-21
Payer: MEDICAID

## 2022-09-21 ENCOUNTER — TELEPHONE (OUTPATIENT)
Dept: ORTHOPEDICS | Facility: CLINIC | Age: 52
End: 2022-09-21

## 2022-09-21 VITALS — HEIGHT: 63 IN | BODY MASS INDEX: 43.59 KG/M2 | WEIGHT: 246 LBS

## 2022-09-21 DIAGNOSIS — Z98.890 S/P RIGHT ROTATOR CUFF REPAIR: Primary | ICD-10-CM

## 2022-09-21 PROCEDURE — 99999 PR PBB SHADOW E&M-EST. PATIENT-LVL V: ICD-10-PCS | Mod: PBBFAC,,, | Performed by: PHYSICIAN ASSISTANT

## 2022-09-21 PROCEDURE — 1159F MED LIST DOCD IN RCRD: CPT | Mod: CPTII,,, | Performed by: PHYSICIAN ASSISTANT

## 2022-09-21 PROCEDURE — 3044F HG A1C LEVEL LT 7.0%: CPT | Mod: CPTII,,, | Performed by: PHYSICIAN ASSISTANT

## 2022-09-21 PROCEDURE — 99024 POSTOP FOLLOW-UP VISIT: CPT | Mod: ,,, | Performed by: PHYSICIAN ASSISTANT

## 2022-09-21 PROCEDURE — 4010F PR ACE/ARB THEARPY RXD/TAKEN: ICD-10-PCS | Mod: CPTII,,, | Performed by: PHYSICIAN ASSISTANT

## 2022-09-21 PROCEDURE — 99024 PR POST-OP FOLLOW-UP VISIT: ICD-10-PCS | Mod: ,,, | Performed by: PHYSICIAN ASSISTANT

## 2022-09-21 PROCEDURE — 4010F ACE/ARB THERAPY RXD/TAKEN: CPT | Mod: CPTII,,, | Performed by: PHYSICIAN ASSISTANT

## 2022-09-21 PROCEDURE — 99999 PR PBB SHADOW E&M-EST. PATIENT-LVL V: CPT | Mod: PBBFAC,,, | Performed by: PHYSICIAN ASSISTANT

## 2022-09-21 PROCEDURE — 1160F RVW MEDS BY RX/DR IN RCRD: CPT | Mod: CPTII,,, | Performed by: PHYSICIAN ASSISTANT

## 2022-09-21 PROCEDURE — 3044F PR MOST RECENT HEMOGLOBIN A1C LEVEL <7.0%: ICD-10-PCS | Mod: CPTII,,, | Performed by: PHYSICIAN ASSISTANT

## 2022-09-21 PROCEDURE — 99215 OFFICE O/P EST HI 40 MIN: CPT | Mod: PBBFAC,PN | Performed by: PHYSICIAN ASSISTANT

## 2022-09-21 PROCEDURE — 1160F PR REVIEW ALL MEDS BY PRESCRIBER/CLIN PHARMACIST DOCUMENTED: ICD-10-PCS | Mod: CPTII,,, | Performed by: PHYSICIAN ASSISTANT

## 2022-09-21 PROCEDURE — 3008F PR BODY MASS INDEX (BMI) DOCUMENTED: ICD-10-PCS | Mod: CPTII,,, | Performed by: PHYSICIAN ASSISTANT

## 2022-09-21 PROCEDURE — 1159F PR MEDICATION LIST DOCUMENTED IN MEDICAL RECORD: ICD-10-PCS | Mod: CPTII,,, | Performed by: PHYSICIAN ASSISTANT

## 2022-09-21 PROCEDURE — 3008F BODY MASS INDEX DOCD: CPT | Mod: CPTII,,, | Performed by: PHYSICIAN ASSISTANT

## 2022-09-21 RX ORDER — HYDROCODONE BITARTRATE AND ACETAMINOPHEN 7.5; 325 MG/1; MG/1
1 TABLET ORAL EVERY 8 HOURS PRN
Qty: 21 TABLET | Refills: 0 | Status: SHIPPED | OUTPATIENT
Start: 2022-09-21 | End: 2022-09-28

## 2022-09-21 NOTE — PROGRESS NOTES
Subjective:      Patient ID: Candy Huynh is a 51 y.o. female.    Chief Complaint: No chief complaint on file.    HPI: Candy Huynh is here for a postop visit. She is 14 days s/p RIGHT rotator cuff repair (suture anchor x1), SA decompression, and AC jt resection. She is doing well. Pt reports pain has been somewhat controlled with Ibuprofen and Rx Norco. She continues to wear UE sling and has not begun formal PT yet.    Past Medical History:   Diagnosis Date    Depression     Diabetes mellitus     Fibromyalgia     Hypertension     Vertigo        Current Outpatient Medications:     albuterol (PROVENTIL/VENTOLIN HFA) 90 mcg/actuation inhaler, SMARTSI Puff(s) By Mouth Every 4 Hours PRN, Disp: , Rfl:     amLODIPine (NORVASC) 5 MG tablet, Take 5 mg by mouth once daily., Disp: , Rfl:     amoxicillin (AMOXIL) 500 MG capsule, TAKE 2 CAPSULES TWICE DAILY FOR 10 DAYS, Disp: , Rfl:     azelastine (ASTELIN) 137 mcg (0.1 %) nasal spray, 1 spray once daily., Disp: , Rfl:     clarithromycin (BIAXIN) 500 MG tablet, Take 500 mg by mouth 2 (two) times daily., Disp: , Rfl:     EPINEPHrine (EPIPEN) 0.3 mg/0.3 mL AtIn, as directed, Disp: , Rfl:     ergocalciferol (ERGOCALCIFEROL) 50,000 unit Cap, Take 50,000 Units by mouth every 7 days., Disp: , Rfl:     estradioL (ESTRACE) 0.01 % (0.1 mg/gram) vaginal cream, Place vaginally., Disp: , Rfl:     famotidine (PEPCID) 20 MG tablet, Take 1 tablet (20 mg total) by mouth 2 (two) times daily., Disp: 60 tablet, Rfl: 0    FLUoxetine 20 MG capsule, Take 40 mg by mouth once daily., Disp: , Rfl:     fluticasone propionate (FLONASE) 50 mcg/actuation nasal spray, 1 spray by Each Nostril route once daily., Disp: , Rfl:     folic acid (FOLVITE) 1 MG tablet, Take 1,000 mcg by mouth once daily., Disp: , Rfl:     HYDROcodone-acetaminophen (NORCO) 7.5-325 mg per tablet, Take 1 tablet by mouth every 4 (four) hours as needed for Pain., Disp: 30 tablet, Rfl: 0    levocetirizine (XYZAL) 5 MG  "tablet, , Disp: , Rfl:     loratadine (CLARITIN) 10 mg tablet, Take 10 mg by mouth once daily., Disp: , Rfl:     losartan (COZAAR) 100 MG tablet, Take 100 mg by mouth once daily., Disp: , Rfl:     meloxicam (MOBIC) 15 MG tablet, Take 1 tablet (15 mg total) by mouth once daily., Disp: 30 tablet, Rfl: 3    metFORMIN (GLUCOPHAGE) 1000 MG tablet, Take 1,000 mg by mouth 2 (two) times daily with meals., Disp: , Rfl:     metroNIDAZOLE (FLAGYL) 500 MG tablet, Take 500 mg by mouth 2 (two) times daily., Disp: , Rfl:     montelukast (SINGULAIR) 10 mg tablet, , Disp: , Rfl:     nicotine polacrilex (NICORETTE) 4 MG Gum, SMARTSI By Mouth Every 1-2 Hours, Disp: , Rfl:     omeprazole (PRILOSEC) 40 MG capsule, , Disp: , Rfl:     pantoprazole (PROTONIX) 40 MG tablet, TAKE 1 TABLET BY MOUTH ONCE DAILY 30 MINUTES BEFORE BREAKFAST, Disp: , Rfl:     prazosin (MINIPRESS) 1 MG Cap, TAKE 1 CAPSULE BY MOUTH EVERY NIGHT AT BEDTIME FOR 7 DAYS; THEN TAKE 2 CAPSULES BY MOUTH EVERY NIGHT AT BEDTIME FOR 7 DAYS; THEN TAKE 3 CAPSULES BY MOUTH EVERY NIGHT AT BEDTIME THEREAFTER AS DIRECTED BY PHYSICIAN, Disp: , Rfl:     PREMARIN 0.625 mg tablet, Take 0.625 mg by mouth once daily., Disp: , Rfl:     traMADoL (ULTRAM) 50 mg tablet, Take 50 mg by mouth every 6 (six) hours as needed., Disp: , Rfl:   Review of patient's allergies indicates:   Allergen Reactions    Morphine Other (See Comments)     shake        5' 3" (1.6 m)   Wt 111.6 kg (246 lb)   BMI 43.58 kg/m²      Review of Systems   Constitutional:  Negative for chills and fever.   Respiratory:  Negative for shortness of breath.    Cardiovascular:  Negative for chest pain and leg swelling.   Gastrointestinal:  Negative for nausea and vomiting.   Genitourinary:  Negative for dysuria.   Musculoskeletal:  Negative for falls.   Skin:  Negative for rash.   Neurological:  Negative for dizziness and sensory change.      (+) Joint Pain  Objective:    Ortho Exam   Right shoulder:  Sugical wound margins " are well approximated and healing nicely. No redness, warmth, drainage, or other signs of infection.   PROM *limited 2/2 pain  FF: 45  ER: 5    Strength: not tested today  Sensation intact. Pulses present.    GEN: Well developed, well nourished female. AAOX3. No acute distress.   Breathing unlabored.  Mood and affect appropriate.       Assessment:     Imaging:  None obtained today        1. S/P right rotator cuff repair            Plan:     Sutures removed and steri strips applied. Remove strips in 7 dys   Regular wound care explained with soap and water.  DME: continue UE brace during activities  PT: formal referral for PROM x6wks  Activity: No strengthening  Pain control: OTC analgesia as tolerated, ice, PT, Rx Kaplan refill with recommendation to begin weaning off of narcotic pain medication   Follow Up: 4wks for re-evaluation

## 2022-09-27 ENCOUNTER — TELEPHONE (OUTPATIENT)
Dept: ORTHOPEDICS | Facility: CLINIC | Age: 52
End: 2022-09-27
Payer: MEDICAID

## 2022-09-27 NOTE — TELEPHONE ENCOUNTER
----- Message from Rafia Looney sent at 9/27/2022  1:51 PM CDT -----  Regarding: medical Clearence  Contact: Pt  Pt Advice    Who Called: pt   Would the patient rather a call back or a response via [facility]?    Best Call Back Number: [phone]  Additional Information:  Pt would like a Medical Clearance  Faxed To Dr Gray's office , Pt states that Surgery Date on 10/26

## 2022-09-27 NOTE — TELEPHONE ENCOUNTER
I called the patient today regarding her surgery. The patient needs to confirm with me a surgery date of 12/5/2022. I left a message for the patient to call me back. I left my name and phone number.

## 2022-09-27 NOTE — TELEPHONE ENCOUNTER
Spoke with patient. Patient stated she needs clearance from Dr Orr  stating that it will be ok for her to have a knee replacement after having her shoulder surgery with us on 9/6. Message sent to Dr Orr to please advise.

## 2022-09-27 NOTE — TELEPHONE ENCOUNTER
Patient has spoke with Hector. Will work on getting clearance with Dr Orr so that she can have surgery in 10/2022.    ----- Message from Bill Rizzo sent at 9/27/2022  9:37 AM CDT -----  Regarding: procedure  Type:  Patient Returning Call    Who Called:patient     Who Left Message for Patient:office    Does the patient know what this is regarding?:missed call     Would the patient rather a call back or a response via [facility]? Call back     Best Call Back Number:[phone]  Additional Information: n/a

## 2022-10-04 ENCOUNTER — PATIENT MESSAGE (OUTPATIENT)
Dept: ADMINISTRATIVE | Facility: OTHER | Age: 52
End: 2022-10-04
Payer: MEDICAID

## 2022-10-04 DIAGNOSIS — M17.11 PRIMARY OSTEOARTHRITIS OF RIGHT KNEE: Primary | ICD-10-CM

## 2022-10-06 ENCOUNTER — TELEPHONE (OUTPATIENT)
Dept: PREADMISSION TESTING | Facility: HOSPITAL | Age: 52
End: 2022-10-06
Payer: MEDICAID

## 2022-10-06 DIAGNOSIS — M79.609 PAIN IN EXTREMITY, UNSPECIFIED EXTREMITY: ICD-10-CM

## 2022-10-06 DIAGNOSIS — E11.69 TYPE 2 DIABETES MELLITUS WITH OTHER SPECIFIED COMPLICATION, UNSPECIFIED WHETHER LONG TERM INSULIN USE: ICD-10-CM

## 2022-10-06 DIAGNOSIS — Z01.818 PRE-OP TESTING: Primary | ICD-10-CM

## 2022-10-06 NOTE — TELEPHONE ENCOUNTER
----- Message from Nila Hughes RN sent at 10/6/2022  8:57 AM CDT -----  (10/26) Please schedule labs (A1C, CBC, BMP, PT/INR).  Thanks,  Nila

## 2022-10-06 NOTE — ANESTHESIA PAT ROS NOTE
10/06/2022  Candy Huynh is a 52 y.o., female.      Pre-op Assessment          Review of Systems       Anesthesia Assessment: Preoperative EQUATION    Planned Procedure: Procedure(s) (LRB):  ARTHROPLASTY, KNEE, TOTAL RIGHT: BALDO - NEXGEN (Right)  Requested Anesthesia Type:Regional  Surgeon: Nolberto Fraser MD  Service: Orthopedics  Known or anticipated Date of Surgery:10/26/2022    Surgeon notes: reviewed    Electronic QUestionnaire Assessment completed via nurse interview with patient.        Triage considerations:     The patient has no apparent active cardiac condition (No unstable coronary Syndrome such as severe unstable angina or recent [<1 month] myocardial infarction, decompensated CHF, severe valvular   disease or significant arrhythmia)    Previous anesthesia records:GETA, MAC, and No problems  9/6/22   REPAIR, ROTATOR CUFF, ARTHROSCOPIC (Right: Shoulder)   DECOMPRESSION, SUBACROMIAL SPACE   RECONSTRUCTION, ACROMIOCLAVICULAR JOINT (Shoulder)   Airway:  Mallampati: II   Mouth Opening: Normal  Tongue: Normal  Method of Intubation: Video Laryngoscopy Mask Ventilation: Easy Intubated: Postinduction Blade: [place] #3 Airway Device Size: 7.5 Cuff Inflation: Minimal occlusive pressure Placement Verified By: Capnometry Complicating Factors: None Findings Post-Intubation: Bilateral breath sounds;Atraumatic/Condition of teeth unchanged Secured at: Lips Complications: None    Last PCP note: within 3 months , within [facility] , Dr. John Kunthara  Subspecialty notes: Ortho    Other important co-morbidities: DM2, GERD, HTN, Morbid Obesity, and Depression, GATITO (ncw CPAP), Asthma (stable, PRN inhalers), Fibromyalgia, Vertigo, R Rotator Cuff repair (9/16/22 Dr. Orr)       Tests already available:  Available tests,  within [facility] .   7/20/22 CBC, CMP, PT/INR, A1C, APTT, Sed Rate, CRP  7/20/22 EKG              Instructions given. (See in Nurse's note)    Optimization:  Anesthesia Preop Clinic Assessment  Indicated Will Schedule POC    Medical Opinion Indicated: Dr. Orr-shoulder sx on 9/6/22       Sub-specialist consult indicated:   [facility] NP      Plan:    Testing:  A1C, BMP, Hematology Profile, and PT/INR   Pre-anesthesia  visit       Visit focus: possible regional anesthesia and/or nerve block      Consultation: [facility] NP for medical and anesthesia optimization     Patient  has previously scheduled Medical Appointment: 10/11    Navigation: Tests Scheduled.              Consults scheduled.             Results will be tracked by Preop Clinic.     10/6/22 MD Nila Castanon Jr., RN  Caller: Unspecified (Today,  9:09 AM)  OK with me    Previous Messages     ----- Message -----   From: Nila Hughes RN   Sent: 10/6/2022   9:11 AM CDT   To: Ministerio Orr Jr., MD, *     Dr. Orr,     This patient is scheduled for surgery (Right Knee Arthroplasty) on 10/26/22 with Dr. Fraser. Requesting clearance from Right Rotator Cuff Repair on 9/6/22.    Please advise. Will await your response.     Thanks in advance,   Nila Hughes RN                                                                        [facility]        10/12/22     Patient is optimized for surgery.         Jc Martinez NP  Perioperative Medicine  [facility]

## 2022-10-06 NOTE — PRE-PROCEDURE INSTRUCTIONS
Chart review; triage plan initiated.    Spoke to patient regarding upcoming scheduled surgery.  Name, , and allergies verified.  Medication record updated.  Medical, surgical, and anesthesia history reviewed.  Instructed to hold vitamins, supplements and NSAIDs for one week prior to surgery. Informed that the remaining medication instructions will be provided at the POC. Pt verbalized understanding.

## 2022-10-10 PROBLEM — K21.9 GASTROESOPHAGEAL REFLUX DISEASE WITHOUT ESOPHAGITIS: Status: ACTIVE | Noted: 2022-10-10

## 2022-10-10 NOTE — ASSESSMENT & PLAN NOTE
Currently takes: metformin   Most recent A1c is 5.8.    No regular accuchecks.      Denies peripheral neuropathy. Denies visual changes. Up to date with eye exams  Micro changes: Denies- Retinopathy, Nephropathy   Macro changes: Denies-  Carotid, Coronary , Peripheral disease     Maintain healthy weight. Exercise at least 150 minutes weekly. Encouraged diet rich in nutrients such as fruits, vegetables, and whole grains; reduce sugar intake from cakes, candy, and sugared drinks.

## 2022-10-10 NOTE — ASSESSMENT & PLAN NOTE
Treated with: fluoxetine; followed per PCP.  Denies suicidal/homicidal ideations  Recommend good sleep (7-8 hrs), healthy eating, and limit alcohol.

## 2022-10-10 NOTE — ASSESSMENT & PLAN NOTE
Patient would benefit from weight loss and has not set goals to achieve success.   Lifestyle changes should be made by eating healthy, exercising at least 150 minutes weekly, and avoiding sedentary behavior.

## 2022-10-10 NOTE — ASSESSMENT & PLAN NOTE
Current BP  at goal today.    Taking: amlodipine/losartan  Keeping a healthy weight/BMI can help with better BP control    Lifestyle changes to reduce systolic BP:  exercise 30 minutes per day,  5 days per week or 150 minutes weekly; sodium reduction and avoidance of high salt foods such as processed meats, frozen meals and  fast foods.     BP acceptable for surgery. I recommend monitoring BP during perioperative period as uncontrolled pain can elevate blood pressure.

## 2022-10-10 NOTE — ASSESSMENT & PLAN NOTE
Currently being treated with Protonix daily; controlled. Knows food triggers   Encouraged to elevate HOB and avoid laying down for 2-3 hours after meals.   Weight loss encouraged as well as dietary changes such as reduction or avoidance of fatty foods, caffeine, spicy foods, and chocolate.      Esophagram 5/11/21    IMPRESSION:   Within normal limits esophagram.     Recorded fluoroscopy time: 48 seconds   Spot images: 8       Electronically Signed By: CLARISSA Mensah 5/11/2021 10:02 CDT  Exam End: 05/11/21 09:49

## 2022-10-11 ENCOUNTER — HOSPITAL ENCOUNTER (OUTPATIENT)
Dept: RADIOLOGY | Facility: HOSPITAL | Age: 52
Discharge: HOME | End: 2022-10-11
Attending: ORTHOPAEDIC SURGERY
Payer: MEDICAID

## 2022-10-11 ENCOUNTER — PATIENT MESSAGE (OUTPATIENT)
Dept: ADMINISTRATIVE | Facility: OTHER | Age: 52
End: 2022-10-11
Payer: MEDICAID

## 2022-10-11 ENCOUNTER — OFFICE VISIT (OUTPATIENT)
Dept: ORTHOPEDICS | Facility: CLINIC | Age: 52
End: 2022-10-11
Payer: MEDICAID

## 2022-10-11 ENCOUNTER — OFFICE VISIT (OUTPATIENT)
Dept: INTERNAL MEDICINE | Facility: CLINIC | Age: 52
End: 2022-10-11
Payer: MEDICAID

## 2022-10-11 VITALS — HEIGHT: 63 IN | BODY MASS INDEX: 43.59 KG/M2 | WEIGHT: 246 LBS

## 2022-10-11 VITALS
TEMPERATURE: 98 F | HEIGHT: 63 IN | WEIGHT: 248 LBS | OXYGEN SATURATION: 97 % | SYSTOLIC BLOOD PRESSURE: 138 MMHG | DIASTOLIC BLOOD PRESSURE: 78 MMHG | HEART RATE: 65 BPM | BODY MASS INDEX: 43.94 KG/M2

## 2022-10-11 DIAGNOSIS — M17.11 PRIMARY OSTEOARTHRITIS OF RIGHT KNEE: Primary | ICD-10-CM

## 2022-10-11 DIAGNOSIS — E11.69 TYPE 2 DIABETES MELLITUS WITH OTHER SPECIFIED COMPLICATION, UNSPECIFIED WHETHER LONG TERM INSULIN USE: ICD-10-CM

## 2022-10-11 DIAGNOSIS — I10 HYPERTENSION, UNSPECIFIED TYPE: ICD-10-CM

## 2022-10-11 DIAGNOSIS — E66.01 MORBID OBESITY WITH BMI OF 45.0-49.9, ADULT: ICD-10-CM

## 2022-10-11 DIAGNOSIS — G47.33 OSA (OBSTRUCTIVE SLEEP APNEA): ICD-10-CM

## 2022-10-11 DIAGNOSIS — M79.7 FIBROMYALGIA: ICD-10-CM

## 2022-10-11 DIAGNOSIS — M17.11 PRIMARY OSTEOARTHRITIS OF RIGHT KNEE: ICD-10-CM

## 2022-10-11 DIAGNOSIS — F32.A DEPRESSION, UNSPECIFIED DEPRESSION TYPE: ICD-10-CM

## 2022-10-11 DIAGNOSIS — J45.20 MILD INTERMITTENT ASTHMA WITHOUT COMPLICATION: ICD-10-CM

## 2022-10-11 DIAGNOSIS — Z86.73 PERSONAL HISTORY OF TIA (TRANSIENT ISCHEMIC ATTACK): ICD-10-CM

## 2022-10-11 DIAGNOSIS — K21.9 GASTROESOPHAGEAL REFLUX DISEASE WITHOUT ESOPHAGITIS: ICD-10-CM

## 2022-10-11 DIAGNOSIS — D64.9 NORMOCYTIC ANEMIA: ICD-10-CM

## 2022-10-11 PROCEDURE — 99214 PR OFFICE/OUTPT VISIT, EST, LEVL IV, 30-39 MIN: ICD-10-PCS | Mod: S$PBB,,, | Performed by: NURSE PRACTITIONER

## 2022-10-11 PROCEDURE — 73560 X-RAY EXAM OF KNEE 1 OR 2: CPT | Mod: 26,RT,, | Performed by: RADIOLOGY

## 2022-10-11 PROCEDURE — 4010F ACE/ARB THERAPY RXD/TAKEN: CPT | Mod: CPTII,,, | Performed by: NURSE PRACTITIONER

## 2022-10-11 PROCEDURE — 99999 PR PBB SHADOW E&M-EST. PATIENT-LVL IV: CPT | Mod: PBBFAC,,, | Performed by: NURSE PRACTITIONER

## 2022-10-11 PROCEDURE — 73560 X-RAY EXAM OF KNEE 1 OR 2: CPT | Mod: TC,RT

## 2022-10-11 PROCEDURE — 4010F PR ACE/ARB THEARPY RXD/TAKEN: ICD-10-PCS | Mod: CPTII,,, | Performed by: NURSE PRACTITIONER

## 2022-10-11 PROCEDURE — 99214 OFFICE O/P EST MOD 30 MIN: CPT | Mod: PBBFAC,27 | Performed by: NURSE PRACTITIONER

## 2022-10-11 PROCEDURE — 3044F HG A1C LEVEL LT 7.0%: CPT | Mod: CPTII,,, | Performed by: NURSE PRACTITIONER

## 2022-10-11 PROCEDURE — 3044F PR MOST RECENT HEMOGLOBIN A1C LEVEL <7.0%: ICD-10-PCS | Mod: CPTII,,, | Performed by: NURSE PRACTITIONER

## 2022-10-11 PROCEDURE — 99999 PR PBB SHADOW E&M-EST. PATIENT-LVL IV: ICD-10-PCS | Mod: PBBFAC,,, | Performed by: NURSE PRACTITIONER

## 2022-10-11 PROCEDURE — 3008F BODY MASS INDEX DOCD: CPT | Mod: CPTII,,, | Performed by: NURSE PRACTITIONER

## 2022-10-11 PROCEDURE — 99214 OFFICE O/P EST MOD 30 MIN: CPT | Mod: PBBFAC | Performed by: NURSE PRACTITIONER

## 2022-10-11 PROCEDURE — 1160F PR REVIEW ALL MEDS BY PRESCRIBER/CLIN PHARMACIST DOCUMENTED: ICD-10-PCS | Mod: CPTII,,, | Performed by: NURSE PRACTITIONER

## 2022-10-11 PROCEDURE — 3075F PR MOST RECENT SYSTOLIC BLOOD PRESS GE 130-139MM HG: ICD-10-PCS | Mod: CPTII,,, | Performed by: NURSE PRACTITIONER

## 2022-10-11 PROCEDURE — 73560 XR KNEE 1 OR 2 VIEW RIGHT: ICD-10-PCS | Mod: 26,RT,, | Performed by: RADIOLOGY

## 2022-10-11 PROCEDURE — 3078F PR MOST RECENT DIASTOLIC BLOOD PRESSURE < 80 MM HG: ICD-10-PCS | Mod: CPTII,,, | Performed by: NURSE PRACTITIONER

## 2022-10-11 PROCEDURE — 3078F DIAST BP <80 MM HG: CPT | Mod: CPTII,,, | Performed by: NURSE PRACTITIONER

## 2022-10-11 PROCEDURE — 1160F RVW MEDS BY RX/DR IN RCRD: CPT | Mod: CPTII,,, | Performed by: NURSE PRACTITIONER

## 2022-10-11 PROCEDURE — 3008F PR BODY MASS INDEX (BMI) DOCUMENTED: ICD-10-PCS | Mod: CPTII,,, | Performed by: NURSE PRACTITIONER

## 2022-10-11 PROCEDURE — 99214 OFFICE O/P EST MOD 30 MIN: CPT | Mod: S$PBB,,, | Performed by: NURSE PRACTITIONER

## 2022-10-11 PROCEDURE — 3075F SYST BP GE 130 - 139MM HG: CPT | Mod: CPTII,,, | Performed by: NURSE PRACTITIONER

## 2022-10-11 PROCEDURE — 1159F MED LIST DOCD IN RCRD: CPT | Mod: CPTII,,, | Performed by: NURSE PRACTITIONER

## 2022-10-11 PROCEDURE — 1159F PR MEDICATION LIST DOCUMENTED IN MEDICAL RECORD: ICD-10-PCS | Mod: CPTII,,, | Performed by: NURSE PRACTITIONER

## 2022-10-11 RX ORDER — HYDROCODONE BITARTRATE AND ACETAMINOPHEN 7.5; 325 MG/1; MG/1
1 TABLET ORAL EVERY 6 HOURS PRN
COMMUNITY
End: 2022-10-25 | Stop reason: HOSPADM

## 2022-10-11 NOTE — OUTPATIENT SUBJECTIVE & OBJECTIVE
Outpatient Subjective & Objective      Chief Complaint: Preoperative evaulation, perioperative medical management, and complication reduction plan.     Functional Capacity:  Can walk slowly up one flight of stairs without CP/SOB. Able to walk around hospital today without symptoms.       Anesthesia issues: None    Difficulty mouth opening: No    Steroid use in the last 12 months:  No    Dental Issues: No    Family anesthesia difficulty: None       Family Hx of Thrombosis: None    Past Medical History:   Diagnosis Date    Allergy     Anemia     Anxiety     Asthma     denies- on outside problem list in Care Everywhere    Depression     Diabetes mellitus     Diabetes mellitus, type 2     Fibromyalgia     GERD (gastroesophageal reflux disease)     Hypertension     Stroke     TIA    Vertigo          Past Medical History Pertinent Negatives:   Diagnosis Date Noted    CHF (congestive heart failure) 10/11/2022    COPD (chronic obstructive pulmonary disease) 10/11/2022    Coronary artery disease 10/11/2022    Deep vein thrombosis 10/11/2022    Disorder of kidney and ureter 10/11/2022    Hyperlipidemia 10/11/2022    Pulmonary embolism 10/11/2022    Seizures 10/11/2022    Thyroid disease 10/11/2022         Past Surgical History:   Procedure Laterality Date    ARTHROSCOPIC REPAIR OF ROTATOR CUFF OF SHOULDER Right 2022    Procedure: REPAIR, ROTATOR CUFF, ARTHROSCOPIC;  Surgeon: Ministerio Orr Jr., MD;  Location: [facility] OR;  Service: Orthopedics;  Laterality: Right;  need opus system  Orthodox notifed CC     ARTHROSCOPY OF KNEE Left 2021    Procedure: ARTHROSCOPY, KNEE;  Surgeon: Donnie Campuzano MD;  Location: [facility] OR;  Service: Orthopedics;  Laterality: Left;     SECTION      DECOMPRESSION OF SUBACROMIAL SPACE  2022    Procedure: DECOMPRESSION, SUBACROMIAL SPACE;  Surgeon: Ministerio Orr Jr., MD;  Location: [facility] OR;  Service: Orthopedics;;    EXCISION OF MASS OF EXTREMITY Left 2019    Procedure:  EXCISION, MASS, EXTREMITY;  Surgeon: Honorio Pulido IV, MD;  Location: [facility] OR;  Service: Orthopedics;  Laterality: Left;  excision lipoma  Request Lacie    HERNIA REPAIR      HYSTERECTOMY      KNEE ARTHROSCOPY W/ MENISCECTOMY  1/11/2021    Procedure: ARTHROSCOPY, KNEE, WITH MENISCECTOMY;  Surgeon: Donnie Campuzano MD;  Location: [facility] OR;  Service: Orthopedics;;    NASAL SEPTOPLASTY      RECONSTRUCTION OF ACROMIOCLAVICULAR JOINT  9/6/2022    Procedure: RECONSTRUCTION, ACROMIOCLAVICULAR JOINT;  Surgeon: Ministerio Orr Jr., MD;  Location: [facility] OR;  Service: Orthopedics;;       Review of Systems   Constitutional:  Negative for chills, fatigue, fever and unexpected weight change.   HENT:  Positive for postnasal drip and tinnitus. Negative for dental problem, hearing loss, rhinorrhea, sore throat and trouble swallowing.    Eyes:  Positive for itching (occasional). Negative for photophobia, pain, discharge and visual disturbance.   Respiratory:  Positive for cough. Negative for apnea, chest tightness, shortness of breath and wheezing.    Cardiovascular:  Positive for leg swelling (RLE). Negative for chest pain and palpitations.   Gastrointestinal:  Positive for constipation (stool softener). Negative for abdominal pain, blood in stool, nausea and vomiting.        Denies Fatty liver, Hepatitis   Endocrine: Positive for heat intolerance. Negative for cold intolerance.   Genitourinary:  Negative for decreased urine volume, difficulty urinating, dysuria, frequency, hematuria and urgency.   Musculoskeletal:  Positive for arthralgias (right knee) and gait problem. Negative for back pain, neck pain and neck stiffness.   Skin:  Negative for rash and wound.   Allergic/Immunologic: Positive for environmental allergies.   Neurological:  Positive for dizziness (occasional symptoms; treated with rest) and headaches (migraines). Negative for tremors, seizures, syncope, weakness and numbness.   Hematological:  Negative for adenopathy.  "Does not bruise/bleed easily.   Psychiatric/Behavioral:  Negative for confusion, sleep disturbance and suicidal ideas.             VITALS  Visit Vitals  /78 (BP Location: Left arm, Patient Position: Sitting)   Pulse 65   Temp 98 °F (36.7 °C) (Oral)   Ht 5' 3" (1.6 m)   Wt 112.5 kg (248 lb)   SpO2 97%   BMI 43.93 kg/m²          Physical Exam  Vitals reviewed.   Constitutional:       General: She is not in acute distress.     Appearance: She is well-developed. She is morbidly obese.   HENT:      Head: Normocephalic.      Nose: Nose normal.      Mouth/Throat:      Pharynx: No oropharyngeal exudate.   Eyes:      General:         Right eye: No discharge.         Left eye: No discharge.      Conjunctiva/sclera: Conjunctivae normal.      Pupils: Pupils are equal, round, and reactive to light.   Neck:      Thyroid: No thyromegaly.      Vascular: No carotid bruit or JVD.      Trachea: No tracheal deviation.   Cardiovascular:      Rate and Rhythm: Normal rate and regular rhythm.      Pulses:           Carotid pulses are 2+ on the right side and 2+ on the left side.       Dorsalis pedis pulses are 2+ on the right side and 2+ on the left side.        Posterior tibial pulses are 2+ on the right side and 2+ on the left side.      Heart sounds: Normal heart sounds. No murmur heard.  Pulmonary:      Effort: Pulmonary effort is normal. No respiratory distress.      Breath sounds: Normal breath sounds. No stridor. No wheezing, rhonchi or rales.   Abdominal:      General: Bowel sounds are normal. There is no distension.      Palpations: Abdomen is soft.      Tenderness: There is no abdominal tenderness. There is no guarding.   Musculoskeletal:      Cervical back: Normal range of motion.      Right lower le+ Pitting Edema present.      Left lower le+ Pitting Edema present.   Lymphadenopathy:      Cervical: No cervical adenopathy.   Skin:     General: Skin is warm and dry.      Capillary Refill: Capillary refill takes less " than 2 seconds.      Findings: No erythema or rash.   Neurological:      Mental Status: She is alert and oriented to person, place, and time.      Coordination: Coordination normal.        Significant Labs:  Lab Results   Component Value Date    WBC 5.31 10/11/2022    HGB 11.5 (L) 10/11/2022    HCT 36.4 (L) 10/11/2022     10/11/2022    ALT 32 07/20/2022    AST 24 07/20/2022     10/11/2022    K 4.0 10/11/2022     10/11/2022    CREATININE 0.8 10/11/2022    BUN 11 10/11/2022    CO2 29 10/11/2022    INR 1.0 10/11/2022    HGBA1C 5.8 (H) 10/11/2022       Diagnostic Studies: No relevant studies.    EKG:   Results for orders placed or performed in visit on 07/20/22   EKG 12-lead    Collection Time: 07/20/22  1:34 PM    Narrative    Test Reason :     Vent. Rate : 060 BPM     Atrial Rate : 060 BPM     P-R Int : 158 ms          QRS Dur : 086 ms      QT Int : 430 ms       P-R-T Axes : 073 027 044 degrees     QTc Int : 430 ms    Normal sinus rhythm  Normal ECG  When compared with ECG of 05-JAN-2021 12:42,  T wave amplitude has decreased in Anterior leads  Confirmed by Wyatt Stuart MD ([id number]) on 7/21/2022 11:19:56 AM    Referred By: LYDIA QUESADA           Confirmed By:Wyatt Stuart MD         Active Cardiac Conditions: None      Revised Cardiac Risk Index   High -Risk Surgery  Intraperitoneal; Intrathoracic; suprainguinal vascular Yes- + 1 No- 0   History of Ischemic Heart Disease   (Hx of MI/positive exercise test/current chest pain due to ischemia/use of nitrate therapy/EKG with pathological Q waves) Yes- + 1 No- 0   History of CHF  (Pulmonary edema/bilateral rales or S3 gallop/PND/CXR showing pulmonary vascular redistribution) Yes- + 1 No- 0   History of CVA   (Prior stroke or TIA) Yes- + 1 No- 0   Pre-operative treatment with insulin Yes- + 1 No- 0   Pre-operative creatinine > 2mg/dl Yes- + 1 No- 0   Total: 1      Risk Status:  Estimated risk of cardiac complications after non-cardiac surgery using the  Revised Cardiac Risk Index for Preoperative risk is 6.0 %      ARISCAT (Canet) risk index: Intermediate: 13.3% risk of post-op pulmonary complications.    American Society of Anesthesiologists Physical Status classification (ASA): 3    Arozull respiratory failure index: 0.5 %         No further cardiac workup needed prior to surgery.    Outpatient Subjective & Objective

## 2022-10-11 NOTE — DISCHARGE INSTRUCTIONS
Your surgery has been scheduled for:__________________________________________    You should report to:  ____[facility], located on the [place] side of the first floor of the           [facility] ([phone])  ____[facility], located on the [facility] side of the            Second floor of the [facility] ([phone])  ____3rd Floor [facility] located on the [facility] side of the [facility] [phone]  __X__[facility] [address] [address] Check in on the first floor of the hospital.  Please Note   Tell your doctor if you take Aspirin, products containing Aspirin, herbal medications  or blood thinners, such as Coumadin, Ticlid, or Plavix.  (Consult your provider regarding holding or stopping before surgery).  Arrange for someone to drive you home following surgery.  You will not be allowed to leave the surgical facility alone or drive yourself home following sedation and anesthesia.  Before Surgery  Stop taking all herbal medications 7 days prior to surgery  No Motrin/Advil (Ibuprofen)  7 day before surgery  No Aleve (Naproxen) 7 days before surgery  Stop Taking Asprin, products containing Asprin __7___days before surgery  No Goody's/BC  Powder 7 days before surgery  Refrain from drinking alcoholic beverages for 24hours before and after surgery  Stop or limit smoking at least 24 hours prior to surgery  You may take Tylenol for pain  Night before Surgery  Do not eat or drink after midnight  Take a shower or bath (shower is recommended).  Bathe with Hibiclens soap or an antibacterial soap from the neck down.  If not supplied by your surgeon, hibiclens soap will need to be purchased over the counter in pharmacy.  Rinse soap off thoroughly.  Shampoo your hair with your regular shampoo  The Day of Surgery  Take another bath or shower with hibiclens or any  antibacterial soap, to reduce the chance of infection.  Take heart and blood pressure medications with a small sip of water, as advised by the perioperative team.  Do not take fluid pills  You may brush your teeth and rinse your mouth, but do not swall any additional water.   Do not apply perfumes, powder, body lotions or deodorant on the day of surgery.  Nail polish should be removed.  Do not wear makeup or moisturizer  Wear comfortable clothes, such as a button front shirt and loose fitting pants.  Leave all jewelry, including body piercings, and valuables at home.    Bring any devices you will neeed after surgery such as crutches or canes.  If you have sleep apnea, please bring your CPAP machine  In the event that your physical condition changes including the onset of a cold or respiratory illness, or if you have to delay or cancel your surgery, please notify your surgeon.

## 2022-10-11 NOTE — HPI
This is a 52 y.o. female  who presents today with daughter for a preoperative evaluation in preparation for an Orthopedic  procedure.  Scheduled for  right knee arthroplasty.   Surgery is indicated for osteoarthritis of right knee .   Patient is new to me.  Details of current problem: The duration of problem is  4 years. History of fall to right knee. S/P right knee scope in January 2021.   Reports symptoms of  constant aching pain to right knee with catching/locking.   Aggravating factors include: walking, standing  and decreased ADLs.   Relieving factors are rest.     Reports pain: 7/10 to right knee ; uses cane prn for assistance with mobility.    The history has been obtained by speaking with the patient and reviewing the electronic medical record and/or outside health information. Significant health conditions for the perioperative period are discussed below in assessment and plan.     Patient reports current health status to be Fair.  Denies any new symptoms before surgery.

## 2022-10-11 NOTE — ASSESSMENT & PLAN NOTE
Per outside problem list; patient denies.   Treated with Albuterol MDI prn ; uses inhaler 2-3x weekly.   Followed per outside ENT- reports history of allergies and bronchitis. Also taking Flonase/Astelin/Claritin/Singulair.  Sats today = 97%  No distress noted today.     I recommend possible use of inhaled bronchodilators if patient develops perioperative bronchospasm.

## 2022-10-11 NOTE — ASSESSMENT & PLAN NOTE
Dx'd a couple of years ago while staying in [place] after [place].   Reports found incidentally on imaging of brain.   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   No residual deficits  Not on ASA/statin  Control risk factors such as HTN/DM/Obesity and reduce salt intake.    No carotid studies available

## 2022-10-11 NOTE — ASSESSMENT & PLAN NOTE
CPAP compliance: No.    Recommend weight loss, increased exercise.  Recommend caution with sedating medication that can cause respiratory depression.   Patients with untreated GATITO have an increased risk of stroke, HTN, and heart disease.

## 2022-10-11 NOTE — PROGRESS NOTES
[facility]  Progress Note    Patient Name: Candy Huynh  MRN: [id number]  Date of Evaluation- 10/12/2022  PCP- Chris Romano MD    Future cases for Candy Huynh [[id number]]       Case ID Status Date Time Robinson Procedure Provider Location    [id number] [facility] 10/26/2022  1:12  ARTHROPLASTY, KNEE, TOTAL RIGHT: BALDO - NEXGEN Nolberto Fraser MD [[id number]] [facility]            HPI:  This is a 52 y.o. female  who presents today with daughter for a preoperative evaluation in preparation for an Orthopedic  procedure.  Scheduled for  right knee arthroplasty.   Surgery is indicated for osteoarthritis of right knee .   Patient is new to me.  Details of current problem: The duration of problem is  4 years. History of fall to right knee. S/P right knee scope in January 2021.   Reports symptoms of  constant aching pain to right knee with catching/locking.   Aggravating factors include: walking, standing  and decreased ADLs.   Relieving factors are rest.     Reports pain: 7/10 to right knee ; uses cane prn for assistance with mobility.    The history has been obtained by speaking with the patient and reviewing the electronic medical record and/or outside health information. Significant health conditions for the perioperative period are discussed below in assessment and plan.     Patient reports current health status to be Fair.  Denies any new symptoms before surgery.        Subjective/ Objective:     Chief Complaint: Preoperative evaulation, perioperative medical management, and complication reduction plan.     Functional Capacity:  Can walk slowly up one flight of stairs without CP/SOB. Able to walk around hospital today without symptoms.       Anesthesia issues: None    Difficulty mouth opening: No    Steroid use in the last 12 months:  No    Dental Issues: No    Family anesthesia difficulty: None       Family Hx of Thrombosis: None    Past Medical History:   Diagnosis Date    Allergy     Anemia      Anxiety     Asthma     denies- on outside problem list in Care Everywhere    Depression     Diabetes mellitus     Diabetes mellitus, type 2     Fibromyalgia     GERD (gastroesophageal reflux disease)     Hypertension     Stroke     TIA    Vertigo          Past Medical History Pertinent Negatives:   Diagnosis Date Noted    CHF (congestive heart failure) 10/11/2022    COPD (chronic obstructive pulmonary disease) 10/11/2022    Coronary artery disease 10/11/2022    Deep vein thrombosis 10/11/2022    Disorder of kidney and ureter 10/11/2022    Hyperlipidemia 10/11/2022    Pulmonary embolism 10/11/2022    Seizures 10/11/2022    Thyroid disease 10/11/2022         Past Surgical History:   Procedure Laterality Date    ARTHROSCOPIC REPAIR OF ROTATOR CUFF OF SHOULDER Right 2022    Procedure: REPAIR, ROTATOR CUFF, ARTHROSCOPIC;  Surgeon: Ministerio Orr Jr., MD;  Location: [facility];  Service: Orthopedics;  Laterality: Right;  need opus system  Tenriism notifed CC     ARTHROSCOPY OF KNEE Left 2021    Procedure: ARTHROSCOPY, KNEE;  Surgeon: Donnie Campuzano MD;  Location: [facility];  Service: Orthopedics;  Laterality: Left;     SECTION      DECOMPRESSION OF SUBACROMIAL SPACE  2022    Procedure: DECOMPRESSION, SUBACROMIAL SPACE;  Surgeon: Ministerio Orr Jr., MD;  Location: [facility] OR;  Service: Orthopedics;;    EXCISION OF MASS OF EXTREMITY Left 2019    Procedure: EXCISION, MASS, EXTREMITY;  Surgeon: Honorio Pulido IV, MD;  Location: [facility] OR;  Service: Orthopedics;  Laterality: Left;  excision lipoma  Request Lacie    HERNIA REPAIR      HYSTERECTOMY      KNEE ARTHROSCOPY W/ MENISCECTOMY  2021    Procedure: ARTHROSCOPY, KNEE, WITH MENISCECTOMY;  Surgeon: Donnie Campuzano MD;  Location: [facility] OR;  Service: Orthopedics;;    NASAL SEPTOPLASTY      RECONSTRUCTION OF ACROMIOCLAVICULAR JOINT  2022    Procedure: RECONSTRUCTION, ACROMIOCLAVICULAR JOINT;  Surgeon: Ministerio Orr Jr., MD;  Location: [facility] OR;   "Service: Orthopedics;;       Review of Systems   Constitutional:  Negative for chills, fatigue, fever and unexpected weight change.   HENT:  Positive for postnasal drip and tinnitus. Negative for dental problem, hearing loss, rhinorrhea, sore throat and trouble swallowing.    Eyes:  Positive for itching (occasional). Negative for photophobia, pain, discharge and visual disturbance.   Respiratory:  Positive for cough. Negative for apnea, chest tightness, shortness of breath and wheezing.    Cardiovascular:  Positive for leg swelling (RLE). Negative for chest pain and palpitations.   Gastrointestinal:  Positive for constipation (stool softener). Negative for abdominal pain, blood in stool, nausea and vomiting.        Denies Fatty liver, Hepatitis   Endocrine: Positive for heat intolerance. Negative for cold intolerance.   Genitourinary:  Negative for decreased urine volume, difficulty urinating, dysuria, frequency, hematuria and urgency.   Musculoskeletal:  Positive for arthralgias (right knee) and gait problem. Negative for back pain, neck pain and neck stiffness.   Skin:  Negative for rash and wound.   Allergic/Immunologic: Positive for environmental allergies.   Neurological:  Positive for dizziness (occasional symptoms; treated with rest) and headaches (migraines). Negative for tremors, seizures, syncope, weakness and numbness.   Hematological:  Negative for adenopathy. Does not bruise/bleed easily.   Psychiatric/Behavioral:  Negative for confusion, sleep disturbance and suicidal ideas.             VITALS  Visit Vitals  /78 (BP Location: Left arm, Patient Position: Sitting)   Pulse 65   Temp 98 °F (36.7 °C) (Oral)   Ht 5' 3" (1.6 m)   Wt 112.5 kg (248 lb)   SpO2 97%   BMI 43.93 kg/m²          Physical Exam  Vitals reviewed.   Constitutional:       General: She is not in acute distress.     Appearance: She is well-developed. She is morbidly obese.   HENT:      Head: Normocephalic.      Nose: Nose normal.      " Mouth/Throat:      Pharynx: No oropharyngeal exudate.   Eyes:      General:         Right eye: No discharge.         Left eye: No discharge.      Conjunctiva/sclera: Conjunctivae normal.      Pupils: Pupils are equal, round, and reactive to light.   Neck:      Thyroid: No thyromegaly.      Vascular: No carotid bruit or JVD.      Trachea: No tracheal deviation.   Cardiovascular:      Rate and Rhythm: Normal rate and regular rhythm.      Pulses:           Carotid pulses are 2+ on the right side and 2+ on the left side.       Dorsalis pedis pulses are 2+ on the right side and 2+ on the left side.        Posterior tibial pulses are 2+ on the right side and 2+ on the left side.      Heart sounds: Normal heart sounds. No murmur heard.  Pulmonary:      Effort: Pulmonary effort is normal. No respiratory distress.      Breath sounds: Normal breath sounds. No stridor. No wheezing, rhonchi or rales.   Abdominal:      General: Bowel sounds are normal. There is no distension.      Palpations: Abdomen is soft.      Tenderness: There is no abdominal tenderness. There is no guarding.   Musculoskeletal:      Cervical back: Normal range of motion.      Right lower le+ Pitting Edema present.      Left lower le+ Pitting Edema present.   Lymphadenopathy:      Cervical: No cervical adenopathy.   Skin:     General: Skin is warm and dry.      Capillary Refill: Capillary refill takes less than 2 seconds.      Findings: No erythema or rash.   Neurological:      Mental Status: She is alert and oriented to person, place, and time.      Coordination: Coordination normal.        Significant Labs:  Lab Results   Component Value Date    WBC 5.31 10/11/2022    HGB 11.5 (L) 10/11/2022    HCT 36.4 (L) 10/11/2022     10/11/2022    ALT 32 2022    AST 24 2022     10/11/2022    K 4.0 10/11/2022     10/11/2022    CREATININE 0.8 10/11/2022    BUN 11 10/11/2022    CO2 29 10/11/2022    INR 1.0 10/11/2022    HGBA1C 5.8  (H) 10/11/2022       Diagnostic Studies: No relevant studies.    EKG:   Results for orders placed or performed in visit on 07/20/22   EKG 12-lead    Collection Time: 07/20/22  1:34 PM    Narrative    Test Reason :     Vent. Rate : 060 BPM     Atrial Rate : 060 BPM     P-R Int : 158 ms          QRS Dur : 086 ms      QT Int : 430 ms       P-R-T Axes : 073 027 044 degrees     QTc Int : 430 ms    Normal sinus rhythm  Normal ECG  When compared with ECG of 05-JAN-2021 12:42,  T wave amplitude has decreased in Anterior leads  Confirmed by Wyatt Stuart MD ([id number]) on 7/21/2022 11:19:56 AM    Referred By: LYDIA QUESADA           Confirmed By:Wyatt Stuart MD         Active Cardiac Conditions: None      Revised Cardiac Risk Index   High -Risk Surgery  Intraperitoneal; Intrathoracic; suprainguinal vascular Yes- + 1 No- 0   History of Ischemic Heart Disease   (Hx of MI/positive exercise test/current chest pain due to ischemia/use of nitrate therapy/EKG with pathological Q waves) Yes- + 1 No- 0   History of CHF  (Pulmonary edema/bilateral rales or S3 gallop/PND/CXR showing pulmonary vascular redistribution) Yes- + 1 No- 0   History of CVA   (Prior stroke or TIA) Yes- + 1 No- 0   Pre-operative treatment with insulin Yes- + 1 No- 0   Pre-operative creatinine > 2mg/dl Yes- + 1 No- 0   Total: 1      Risk Status:  Estimated risk of cardiac complications after non-cardiac surgery using the Revised Cardiac Risk Index for Preoperative risk is 6.0 %      ARISCAT (Canet) risk index: Intermediate: 13.3% risk of post-op pulmonary complications.    American Society of Anesthesiologists Physical Status classification (ASA): 3    [facility] respiratory failure index: 0.5 %         No further cardiac workup needed prior to surgery.                 Assessment/Plan:     Depression  Treated with: fluoxetine; followed per PCP.  Denies suicidal/homicidal ideations  Recommend good sleep (7-8 hrs), healthy eating, and limit alcohol.      Hypertension  Current BP  at goal today.    Taking: amlodipine/losartan  Keeping a healthy weight/BMI can help with better BP control    Lifestyle changes to reduce systolic BP:  exercise 30 minutes per day,  5 days per week or 150 minutes weekly; sodium reduction and avoidance of high salt foods such as processed meats, frozen meals and  fast foods.     BP acceptable for surgery. I recommend monitoring BP during perioperative period as uncontrolled pain can elevate blood pressure.           Morbid obesity with BMI of 45.0-49.9, adult  Patient would benefit from weight loss and has not set goals to achieve success.   Lifestyle changes should be made by eating healthy, exercising at least 150 minutes weekly, and avoiding sedentary behavior.       Diabetes mellitus  Currently takes: metformin   Most recent A1c is 5.8.    No regular accuchecks.      Denies peripheral neuropathy. Denies visual changes. Up to date with eye exams  Micro changes: Denies- Retinopathy, Nephropathy   Macro changes: Denies-  Carotid, Coronary , Peripheral disease     Maintain healthy weight. Exercise at least 150 minutes weekly. Encouraged diet rich in nutrients such as fruits, vegetables, and whole grains; reduce sugar intake from cakes, candy, and sugared drinks.    Primary osteoarthritis of right knee  Scheduled with Dr. Fraser on 10/26/22 for right knee arthroplasty.    Fibromyalgia  Not currently treated  Generalized body aches      Gastroesophageal reflux disease without esophagitis  Currently being treated with Protonix daily; controlled. Knows food triggers   Encouraged to elevate HOB and avoid laying down for 2-3 hours after meals.   Weight loss encouraged as well as dietary changes such as reduction or avoidance of fatty foods, caffeine, spicy foods, and chocolate.      Esophagram 5/11/21    IMPRESSION:   Within normal limits esophagram.     Recorded fluoroscopy time: 48 seconds   Spot images: 8       Electronically Signed By: CLARISSA  Lencho Mensah 5/11/2021 10:02 CDT  Exam End: 05/11/21 09:49         Personal history of TIA (transient ischemic attack)  Dx'd a couple of years ago while staying in [place] after [place].   Reports found incidentally on imaging of brain.   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   No residual deficits  Not on ASA/statin  Control risk factors such as HTN/DM/Obesity and reduce salt intake.    No carotid studies available    Mild intermittent asthma without complication  Per outside problem list; patient denies.   Treated with Albuterol MDI prn ; uses inhaler 2-3x weekly.   Followed per outside ENT- reports history of allergies and bronchitis. Also taking Flonase/Astelin/Claritin/Singulair.  Sats today = 97%  No distress noted today.     I recommend possible use of inhaled bronchodilators if patient develops perioperative bronchospasm.        GATITO (obstructive sleep apnea)  CPAP compliance: No.    Recommend weight loss, increased exercise.  Recommend caution with sedating medication that can cause respiratory depression.   Patients with untreated GATITO have an increased risk of stroke, HTN, and heart disease.        Normocytic anemia  Current labs:  Hgb- 11.5     Hct-   36.4; stable. Possibly related to Mobic use.  Recommend monitoring during perioperative period.       Discussion/Management of Perioperative Care    Thromboembolic prophylaxis (VTE) Care: Risk factors for thrombosis include: morbid obesity and surgical procedure.  I recommend prophylaxis of thromboembolism with the use of compression stockings/pneumatic devices, and/or pharmacologic agents. The benefits should outweigh the risks for pharmacologic prophylaxis in the perioperative period. I also encourage early ambulation if not contraindicated during the post-operative period.    Risk factors for post-operative pulmonary complications include:diabetes mellitus, HTN, and vascular disease. To reduce the risk of pulmonary  complications, prophylactic recommendations include: incentive spirometry use/deep breathing, early ambulation, and pain control.    I recommend monitoring sodium during the perioperative period. Pt. is currently on an SSRI which can be associated with SIADH. Surgical procedures are associated with hypersecretion of ADH as well.    Risk factors for renal complications include: diabetes mellitus, HTN, and vascular disease. To reduce the risk of postoperative renal complications, I recommend the patient maintain adequate fluid volume status by drinking 2 liters of water daily.  Avoid/reduce NSAIDS and RAO-2 inhibitors use as well as IV contrast for renal protection.    I recommend the use of appropriate prophylactic antibiotics to reduce the risk of surgical site infections.    Delirium risk factors include opioid use. I recommend to avoid/reduce use of benzodiazepine use (not for patients who take on a regular basis), anticholinergics, Benadryl,  and agents that may cause postoperative serotonin syndrome.  Controlled pain can decrease the risk for postop delirium and since opioids are used for postoperative pain control, I suggest using the lowest dose for the shortest amount of time necessary for pain management.     The patient is at an increased risk for urinary retention due to : possible regional anesthesia. I recommend to avoid/decrease the use of benzodiazepines, anticholinergics, and Benadryl in the perioperative period. I also recommend using opioids for the shortest period of time if possible.        Diabetes Mellitus-I recommend monitoring the glucose in the perioperative period ( Before meals and bed time,if the patient is on oral feeds or every 6 hourly ,if the patient is NPO ).   Blood glucose target in hospitalized patients is 140-180. Oral Hypoglycemic agents are generally avoided during the hospital stay . If glucose is consistently elevated ,I recommend using a basal prandial Insulin regimen to  control the glucose - as elevated glucose can be associated with adverse surgical outcomes. Please consider involving Hospital Medicine or Endocrinology , if any help is needed with Glucose control. Patient will be instructed based on the pre op clinic guidelines about adjustment of diabetic treatment (If applicable). These guidelines are per recommendations of the Endocrinology department.      This visit was focused on Preoperative evaluation, Perioperative Medical management, complication reduction plans. I suggest that the patient follows up with primary care or relevant sub specialists for ongoing health care.    I appreciate the opportunity to be involved in this patients care. Please feel free to contact me if there were any questions about this consultation.    Patient is optimized for surgery.       Jc Martinez NP  Perioperative Medicine  [facility]

## 2022-10-12 PROBLEM — D64.9 NORMOCYTIC ANEMIA: Status: ACTIVE | Noted: 2022-10-12

## 2022-10-12 NOTE — ASSESSMENT & PLAN NOTE
Current labs:  Hgb- 11.5     Hct-   36.4; stable. Possibly related to Mobic use.  Recommend monitoring during perioperative period.

## 2022-10-13 ENCOUNTER — PATIENT MESSAGE (OUTPATIENT)
Dept: ORTHOPEDICS | Facility: CLINIC | Age: 52
End: 2022-10-13
Payer: MEDICAID

## 2022-10-13 RX ORDER — FENTANYL CITRATE 50 UG/ML
100 INJECTION, SOLUTION INTRAMUSCULAR; INTRAVENOUS
Status: CANCELLED | OUTPATIENT
Start: 2022-10-13 | End: 2022-10-14

## 2022-10-13 RX ORDER — TALC
6 POWDER (GRAM) TOPICAL NIGHTLY PRN
Status: CANCELLED | OUTPATIENT
Start: 2022-10-13

## 2022-10-13 RX ORDER — OXYCODONE HYDROCHLORIDE 5 MG/1
5 TABLET ORAL
Status: CANCELLED | OUTPATIENT
Start: 2022-10-13

## 2022-10-13 RX ORDER — ONDANSETRON 2 MG/ML
4 INJECTION INTRAMUSCULAR; INTRAVENOUS EVERY 8 HOURS PRN
Status: CANCELLED | OUTPATIENT
Start: 2022-10-13

## 2022-10-13 RX ORDER — ASPIRIN 81 MG/1
81 TABLET ORAL 2 TIMES DAILY
Status: CANCELLED | OUTPATIENT
Start: 2022-10-13

## 2022-10-13 RX ORDER — MORPHINE SULFATE 2 MG/ML
2 INJECTION, SOLUTION INTRAMUSCULAR; INTRAVENOUS
Status: CANCELLED | OUTPATIENT
Start: 2022-10-13

## 2022-10-13 RX ORDER — BISACODYL 10 MG
10 SUPPOSITORY, RECTAL RECTAL EVERY 12 HOURS PRN
Status: CANCELLED | OUTPATIENT
Start: 2022-10-13

## 2022-10-13 RX ORDER — NALOXONE HCL 0.4 MG/ML
0.02 VIAL (ML) INJECTION
Status: CANCELLED | OUTPATIENT
Start: 2022-10-13

## 2022-10-13 RX ORDER — OXYCODONE HYDROCHLORIDE 5 MG/1
10 TABLET ORAL
Status: CANCELLED | OUTPATIENT
Start: 2022-10-13

## 2022-10-13 RX ORDER — ACETAMINOPHEN 500 MG
1000 TABLET ORAL
Status: CANCELLED | OUTPATIENT
Start: 2022-10-13

## 2022-10-13 RX ORDER — MUPIROCIN 20 MG/G
1 OINTMENT TOPICAL 2 TIMES DAILY
Status: CANCELLED | OUTPATIENT
Start: 2022-10-13 | End: 2022-10-18

## 2022-10-13 RX ORDER — MELOXICAM 15 MG/1
15 TABLET ORAL ONCE
Status: CANCELLED | OUTPATIENT
Start: 2022-10-13 | End: 2022-10-13

## 2022-10-13 RX ORDER — FAMOTIDINE 20 MG/1
20 TABLET, FILM COATED ORAL 2 TIMES DAILY
Status: CANCELLED | OUTPATIENT
Start: 2022-10-13

## 2022-10-13 RX ORDER — FENTANYL CITRATE 50 UG/ML
25 INJECTION, SOLUTION INTRAMUSCULAR; INTRAVENOUS EVERY 5 MIN PRN
Status: CANCELLED | OUTPATIENT
Start: 2022-10-13

## 2022-10-13 RX ORDER — SODIUM CHLORIDE 9 MG/ML
INJECTION, SOLUTION INTRAVENOUS
Status: CANCELLED | OUTPATIENT
Start: 2022-10-13

## 2022-10-13 RX ORDER — PREGABALIN 75 MG/1
75 CAPSULE ORAL NIGHTLY
Status: CANCELLED | OUTPATIENT
Start: 2022-10-13

## 2022-10-13 RX ORDER — ACETAMINOPHEN 500 MG
1000 TABLET ORAL EVERY 6 HOURS
Status: CANCELLED | OUTPATIENT
Start: 2022-10-13

## 2022-10-13 RX ORDER — SODIUM CHLORIDE 9 MG/ML
INJECTION, SOLUTION INTRAVENOUS CONTINUOUS
Status: CANCELLED | OUTPATIENT
Start: 2022-10-13 | End: 2022-10-14

## 2022-10-13 RX ORDER — MELOXICAM 15 MG/1
15 TABLET ORAL DAILY
Status: CANCELLED | OUTPATIENT
Start: 2022-10-14

## 2022-10-13 RX ORDER — PREGABALIN 75 MG/1
75 CAPSULE ORAL
Status: CANCELLED | OUTPATIENT
Start: 2022-10-13

## 2022-10-13 RX ORDER — SODIUM CHLORIDE 0.9 % (FLUSH) 0.9 %
10 SYRINGE (ML) INJECTION
Status: CANCELLED | OUTPATIENT
Start: 2022-10-13

## 2022-10-13 RX ORDER — LIDOCAINE HYDROCHLORIDE 10 MG/ML
1 INJECTION, SOLUTION EPIDURAL; INFILTRATION; INTRACAUDAL; PERINEURAL
Status: CANCELLED | OUTPATIENT
Start: 2022-10-13

## 2022-10-13 RX ORDER — MIDAZOLAM HYDROCHLORIDE 1 MG/ML
4 INJECTION INTRAMUSCULAR; INTRAVENOUS
Status: CANCELLED | OUTPATIENT
Start: 2022-10-13 | End: 2022-10-14

## 2022-10-13 RX ORDER — TRAMADOL HYDROCHLORIDE 50 MG/1
50 TABLET ORAL EVERY 6 HOURS PRN
Qty: 40 TABLET | Refills: 0 | Status: SHIPPED | OUTPATIENT
Start: 2022-10-13 | End: 2022-10-23

## 2022-10-13 RX ORDER — METHOCARBAMOL 750 MG/1
750 TABLET, FILM COATED ORAL 3 TIMES DAILY
Status: CANCELLED | OUTPATIENT
Start: 2022-10-13

## 2022-10-13 RX ORDER — MUPIROCIN 20 MG/G
1 OINTMENT TOPICAL
Status: CANCELLED | OUTPATIENT
Start: 2022-10-13

## 2022-10-13 RX ORDER — AMOXICILLIN 250 MG
1 CAPSULE ORAL 2 TIMES DAILY
Status: CANCELLED | OUTPATIENT
Start: 2022-10-13

## 2022-10-13 RX ORDER — POLYETHYLENE GLYCOL 3350 17 G/17G
17 POWDER, FOR SOLUTION ORAL DAILY
Status: CANCELLED | OUTPATIENT
Start: 2022-10-14

## 2022-10-13 RX ORDER — PROCHLORPERAZINE EDISYLATE 5 MG/ML
5 INJECTION INTRAMUSCULAR; INTRAVENOUS EVERY 6 HOURS PRN
Status: CANCELLED | OUTPATIENT
Start: 2022-10-13

## 2022-10-13 NOTE — PROGRESS NOTES
"[facility]   Physical Therapy Initial Evaluation     Date: 10/14/2022   Name: Candy Huynh  Clinic Number: [id number]    Therapy Diagnosis:   Encounter Diagnoses   Name Primary?    S/P right rotator cuff repair     Decreased range of motion of right shoulder     Decreased strength of upper extremity      Physician: Mirlande Parada PA-C    Physician Orders: PT Eval and Treat   Medical Diagnosis from Referral: Z98.890 (ICD-10-CM) - S/P right rotator cuff repair  Evaluation Date: 10/14/2022  Authorization Period Expiration: 9/21/2023  Plan of Care Expiration: 11/25/2022  Progress Note Due: 10th visit  Visit # / Visits authorized: 1/ 1   FOTO: 58%/41%    Referral Note: Rotator Cuff Repair Protocol  Frequency: 1-2x a week x 6weeks  Activity: Emphasis on PROM, no strengthening, continue sling during activities  Precautions: Standard, anemic     Time In: 1:25 pm  Time Out: 2:00 pm  Total Appointment Time (timed & untimed codes): 35 minutes      SUBJECTIVE     Date of surgery: 9/6/2022    History of current condition - Candy reports: unable to recall a mechanism of injury. Patient educated regarding concerns with future TKA  and being able to place weight through shoulder when ambulating with walker. 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  Prior Level of Function: independent with all household and personal ADL's  Current Level of Function: relies on left arm for all upper extremity needs    Pain:  Current 5/10, worst 10/10, best 5/10   Location: right shoulder    Description: Sharp  Aggravating Factors: moving the arm  Easing Factors: pain medication and rest    Patients goals: improve use of right shoulder     Medical History:   Past Medical History:   Diagnosis Date    " Allergy     Anemia     Anxiety     Asthma     denies- on outside problem list in Care Everywhere    Depression     Diabetes mellitus     Diabetes mellitus, type 2     Fibromyalgia     GERD (gastroesophageal reflux disease)     Hypertension     Stroke     TIA    Vertigo        Surgical History:   Candy Huynh  has a past surgical history that includes Hysterectomy;  section; Nasal septoplasty; Excision of mass of extremity (Left, 2019); Hernia repair; Arthroscopy of knee (Left, 2021); Knee arthroscopy w/ meniscectomy (2021); Arthroscopic repair of rotator cuff of shoulder (Right, 2022); Decompression of subacromial space (2022); and Reconstruction of acromioclavicular joint (2022).    Medications:   Candy has a current medication list which includes the following prescription(s): albuterol, amlodipine, azelastine, epinephrine, ergocalciferol, estradiol, fluoxetine, fluticasone propionate, folic acid, hydrocodone-acetaminophen, loratadine, losartan, meloxicam, metformin, metronidazole, montelukast, nicotine polacrilex, pantoprazole, prazosin, premarin, tramadol, and tramadol.    Allergies:   Review of patient's allergies indicates:   Allergen Reactions    Morphine Other (See Comments)     shake          OBJECTIVE     Posture: forward head/rounded shoulders    Sensation: dermatomes intact    Shoulder Range of Motion:   R (Passive) L Active (Passive)   Flexion 150  WNL   Abduction 110 WNL   ER 80 WNL   IR 60 WNL     Functional assessment:  L ER: T4  L IR: T7    Upper Extremity Strength: manual muscle grades below   Right  Left   Shoulder FLEX NT Shoulder FLEX 4+/5   Shoulder ABD NT Shoulder ABD 4+/5   Shoulder ER NT Shoulder ER 3+/5   Shoulder IR NT Shoulder IR 3+/5   Elbow FLEX NT Elbow Flex 5/5   Elbow EXT NT Elbow EXT 5/5   Wrist FLEX NT Wrist FLEX 5/5   Wrist EXT NT Wrist EXT 5/5     Limitation/Restriction for FOTO Shoulder Survey    Therapist reviewed FOTO scores for  Candy Huynh on 10/14/2022.   FOTO documents entered into [employer] - see Media section.    Limitation Score: 58%         TREATMENT     Total Treatment time (time-based codes) separate from Evaluation: 00 minutes      Candy received the treatments listed below:      therapeutic exercises to develop strength, endurance, and ROM for 00 minutes including:  Scapular retractions  Shoulder shrugs      PATIENT EDUCATION AND HOME EXERCISES     Education provided:   - HEP    Written Home Exercises Provided: yes. Exercises were reviewed and Candy was able to demonstrate them prior to the end of the session.  Candy demonstrated good  understanding of the education provided. See EMR under Patient Instructions for exercises provided during therapy sessions.    ASSESSMENT     Candy is a 52 y.o. female referred to outpatient Physical Therapy with a medical diagnosis of S/P right rotator cuff repair. Patient presents s/p 5 weeks  right rotator cuff repair (suture anchor x1), SA decompression, and AC jt resection. Patient demonstrates poor posture, decreased motor control of scapulothoracic mm, and poor scapular mobility during GH joint mobility. Patient demonstrated near full R shoulder PROM with empty end feels and no exacerbation of pain. Pt would benefit from scapulothoracic strengthening and motor control activities to reduce R shoulder pain and improve posture per protocol. PROM only per protocol.      Patient prognosis is Good.   Patient will benefit from skilled outpatient Physical Therapy to address the deficits stated above and in the chart below, provide patient /family education, and to maximize patientt's level of independence.     Plan of care discussed with patient: Yes  Patient's spiritual, cultural and educational needs considered and patient is agreeable to the plan of care and goals as stated below:     Anticipated Barriers for therapy: none    Medical Necessity is demonstrated by the  following  History  Co-morbidities and personal factors that may impact the plan of care Co-morbidities:   COPD/asthma, diabetes, high BMI, HTN, and TIA, superficial tumor removed on R side of head    Personal Factors:   no deficits     high   Examination  Body Structures and Functions, activity limitations and participation restrictions that may impact the plan of care Body Regions:   upper extremities    Body Systems:    gross symmetry  ROM  strength  motor control    Participation Restrictions:   none    Activity limitations:   Learning and applying knowledge  no deficits    General Tasks and Commands  no deficits    Communication  no deficits    Mobility  lifting and carrying objects  driving (bike, car, motorcycle)    Self care  dressing    Domestic Life  shopping  cooking  doing house work (cleaning house, washing dishes, laundry)    Interactions/Relationships  no deficits    Life Areas  no deficits    Community and Social Life  no deficits         low   Clinical Presentation stable and uncomplicated low   Decision Making/ Complexity Score: low     Goals:  Short Term Goals(4 weeks)  1. Pt will be independent with HEP to assist PT treatment in restoring pain free motion of the right shoulder.   2. Pt will improve impaired shoulder MMTs 1/2 grade B to improve strength for functional tasks.   3. Pt will demonstrate improved postural awareness requiring less than 3 VC during therapeutic activity.   4. Pt will improve R shoulder flexion/ abduction to >/=  degrees to improve functional mobility of UEs      Long Term Goals (8 Weeks):   1. Pt will improve FOTO score to </= 41% to demonstrate improvements in functional mobility including carrying, moving, and handling objects   2. Pt will improve impaired shoulder MMTs 1 grade B to improve strength for household duties.   3. Pt will demonstrate improved perscapular strength and endurance to show improved OH mobility and activity tolerance.    4. Pt will improve R  shoulder abduction to >/= 160 deg to improve functional mobility of UEs     PLAN   Plan of care Certification: 10/14/2022 to 11/25/2022.    Outpatient Physical Therapy 1 times weekly for 6 weeks to include the following interventions: Electrical Stimulation TEN, Gait Training, Manual Therapy, Moist Heat/ Ice, Neuromuscular Re-ed, Orthotic Management and Training, Patient Education, Self Care, Therapeutic Activities, Therapeutic Exercise, Ultrasound, and functional dry needling .     Aggie Fernandez, PT      I CERTIFY THE NEED FOR THESE SERVICES FURNISHED UNDER THIS PLAN OF TREATMENT AND WHILE UNDER MY CARE   Physician's comments:     Physician's Signature: ___________________________________________________

## 2022-10-13 NOTE — H&P
CC:  Right knee pain    Candy Huynh is a 52 y.o. female with history of Right knee pain. Pain is worse with activity and weight bearing.  Patient has experienced interference of activities of daily living due to decreased range of motion and an increase in joint pain and swelling.  Patient has failed non-operative treatment including NSAIDs, corticosteroid injections, viscosupplement injections, and activity modification.  Candy Huynh currently ambulates independently.     Relevant medical conditions of significance in perioperative period:  HTN- on medication managed by pcp  Nicotine use  GERD- on medication managed by pcp    Past Medical History:   Diagnosis Date    Allergy     Anemia     Anxiety     Asthma     denies- on outside problem list in Care Everywhere    Depression     Diabetes mellitus     Diabetes mellitus, type 2     Fibromyalgia     GERD (gastroesophageal reflux disease)     Hypertension     Stroke     TIA    Vertigo        Past Surgical History:   Procedure Laterality Date    ARTHROSCOPIC REPAIR OF ROTATOR CUFF OF SHOULDER Right 2022    Procedure: REPAIR, ROTATOR CUFF, ARTHROSCOPIC;  Surgeon: Ministerio Orr Jr., MD;  Location: [facility] OR;  Service: Orthopedics;  Laterality: Right;  need opus system  jose roberto notifed CC     ARTHROSCOPY OF KNEE Left 2021    Procedure: ARTHROSCOPY, KNEE;  Surgeon: Donnie Campuzano MD;  Location: [facility] OR;  Service: Orthopedics;  Laterality: Left;     SECTION      DECOMPRESSION OF SUBACROMIAL SPACE  2022    Procedure: DECOMPRESSION, SUBACROMIAL SPACE;  Surgeon: Ministerio Orr Jr., MD;  Location: [facility] OR;  Service: Orthopedics;;    EXCISION OF MASS OF EXTREMITY Left 2019    Procedure: EXCISION, MASS, EXTREMITY;  Surgeon: Honorio Pulido IV, MD;  Location: [facility] OR;  Service: Orthopedics;  Laterality: Left;  excision lipoma  Request Lacie    HERNIA REPAIR      HYSTERECTOMY      KNEE ARTHROSCOPY W/ MENISCECTOMY  2021     Procedure: ARTHROSCOPY, KNEE, WITH MENISCECTOMY;  Surgeon: Donnie Campuzano MD;  Location: [facility] OR;  Service: Orthopedics;;    NASAL SEPTOPLASTY      RECONSTRUCTION OF ACROMIOCLAVICULAR JOINT  2022    Procedure: RECONSTRUCTION, ACROMIOCLAVICULAR JOINT;  Surgeon: Ministerio Orr Jr., MD;  Location: [facility] OR;  Service: Orthopedics;;       Family History   Problem Relation Age of Onset    No Known Problems Mother     Hypertension Father     Hypertension Sister     No Known Problems Sister     No Known Problems Brother     No Known Problems Daughter     No Known Problems Daughter     No Known Problems Daughter     No Known Problems Son        Review of patient's allergies indicates:   Allergen Reactions    Morphine Other (See Comments)     shake         Current Outpatient Medications:     albuterol (PROVENTIL/VENTOLIN HFA) 90 mcg/actuation inhaler, SMARTSI Puff(s) By Mouth Every 4 Hours PRN, Disp: , Rfl:     amLODIPine (NORVASC) 5 MG tablet, Take 5 mg by mouth once daily., Disp: , Rfl:     azelastine (ASTELIN) 137 mcg (0.1 %) nasal spray, 1 spray once daily., Disp: , Rfl:     EPINEPHrine (EPIPEN) 0.3 mg/0.3 mL AtIn, as directed, Disp: , Rfl:     ergocalciferol (ERGOCALCIFEROL) 50,000 unit Cap, Take 50,000 Units by mouth every 7 days., Disp: , Rfl:     FLUoxetine 20 MG capsule, Take 40 mg by mouth once daily., Disp: , Rfl:     fluticasone propionate (FLONASE) 50 mcg/actuation nasal spray, 1 spray by Each Nostril route once daily., Disp: , Rfl:     folic acid (FOLVITE) 1 MG tablet, Take 1,000 mcg by mouth once daily., Disp: , Rfl:     loratadine (CLARITIN) 10 mg tablet, Take 10 mg by mouth once daily., Disp: , Rfl:     losartan (COZAAR) 100 MG tablet, Take 100 mg by mouth once daily., Disp: , Rfl:     meloxicam (MOBIC) 15 MG tablet, Take 1 tablet (15 mg total) by mouth once daily., Disp: 30 tablet, Rfl: 3    metFORMIN (GLUCOPHAGE) 1000 MG tablet, Take 1,000 mg by mouth 2 (two) times daily with meals., Disp: , Rfl:      "montelukast (SINGULAIR) 10 mg tablet, , Disp: , Rfl:     nicotine polacrilex (NICORETTE) 4 MG Gum, SMARTSI By Mouth Every 1-2 Hours, Disp: , Rfl:     pantoprazole (PROTONIX) 40 MG tablet, TAKE 1 TABLET BY MOUTH ONCE DAILY 30 MINUTES BEFORE BREAKFAST, Disp: , Rfl:     prazosin (MINIPRESS) 1 MG Cap, TAKE 1 CAPSULE BY MOUTH EVERY NIGHT AT BEDTIME FOR 7 DAYS; THEN TAKE 2 CAPSULES BY MOUTH EVERY NIGHT AT BEDTIME FOR 7 DAYS; THEN TAKE 3 CAPSULES BY MOUTH EVERY NIGHT AT BEDTIME THEREAFTER AS DIRECTED BY PHYSICIAN, Disp: , Rfl:     PREMARIN 0.625 mg tablet, Take 0.625 mg by mouth once daily., Disp: , Rfl:     traMADoL (ULTRAM) 50 mg tablet, Take 50 mg by mouth every 6 (six) hours as needed., Disp: , Rfl:     estradioL (ESTRACE) 0.01 % (0.1 mg/gram) vaginal cream, Place vaginally., Disp: , Rfl:     HYDROcodone-acetaminophen (NORCO) 7.5-325 mg per tablet, Take 1 tablet by mouth every 6 (six) hours as needed for Pain., Disp: , Rfl:     metroNIDAZOLE (FLAGYL) 500 MG tablet, Take 500 mg by mouth 2 (two) times daily., Disp: , Rfl:     traMADoL (ULTRAM) 50 mg tablet, Take 1 tablet (50 mg total) by mouth every 6 (six) hours as needed for Pain., Disp: 40 tablet, Rfl: 0    Review of Systems:  Constitutional: no fever or chills  Eyes: no visual changes  ENT: no nasal congestion or sore throat  Respiratory: no cough or shortness of breath  Cardiovascular: no chest pain or palpitations  Gastrointestinal: no nausea or vomiting, tolerating diet  Genitourinary: no hematuria or dysuria  Integument/Breast: no rash or pruritis  Hematologic/Lymphatic: no easy bruising or lymphadenopathy  Musculoskeletal: positive for knee pain  Neurological: no seizures or tremors  Behavioral/Psych: no auditory or visual hallucinations  Endocrine: no heat or cold intolerance    PE:  Ht 5' 3" (1.6 m)   Wt 111.6 kg (246 lb)   BMI 43.58 kg/m²   General: Pleasant, cooperative, NAD   Gait: antalgic  HEENT: NCAT, sclera nonicteric   Lungs: Respirations clear " bilaterally; equal and unlabored.   CV: S1S2; 2+ bilateral upper and lower extremity pulses.   Skin: Intact throughout with no rashes, erythema, or lesions  Extremities: No LE edema,  no erythema or warmth of the skin in either lower extremity.    Right knee exam:  Knee Range of Motion:normal, pain with passive range of motion  Effusion:none  Condition of skin:intact  Location of tenderness:Medial joint line   Strength:normal  Stability: stable to testing    Hip Examination: painless PROM of hip     Radiographs: Radiographs reveal advanced degenerative changes including subchondral cyst formation, subchondral sclerosis, osteophyte formation, joint space narrowing.     Knee Alignment: normal    Diagnosis: Primary osteoarthritis Right knee    Plan: Right total knee arthroplasty    Due to the serious nature of total joint infection and the high prevalence of community acquired MRSA, vancomycin will be used perioperatively.

## 2022-10-13 NOTE — PROGRESS NOTES
Cadny Huynh is a 52 y.o. year old here today for pre surgery optimization visit  in preparation for a Right total knee arthroplasty to be performed by Dr. Fraser  on 10/26/2022.  she was last seen and treated in the clinic on Visit date not found. she will be medically optimized by the pre op center. There has been no significant change in medical status since last visit. No fever, chills, malaise, or unexplained weight change.      Allergies, Medications, past medical and surgical history reviewed.    Focused examination performed.    Patient declined to see surgeon today. All questions answered. Patient encouraged to call with questions. Contact information given.     Pre, sofie, and post operative procedures and expectations discussed. Goals of successful surgery reviewed and include manageable pain levels, surgical site free of infection, medication management, and ambulation with PT/OT assistance. Healthy weight management discussed with patient and caregiver who were receptive to eduction of healthy diet and activity. No other necessary lifestyle changes identified. Educated patient about signs and symptoms of infection, medication management, anticoagulation therapy, risk of tobacco and alcohol use, and self-care to promote healing. Surgical guide given for future reference. Hibiclens given to patient with instructions. All questions were answered.     Candy Huynh verbalized an understanding to the education and goals. Patient has displayed readiness to engage in care and is ready to proceed with surgery.  Patient reports her daughter is able and ready to provide assistance at home after discharge.    Surgical and blood consents signed.    Candy Huynh will contact us if there are any questions, concerns, or changes in medical status prior to surgery.       Joint class: private    COVID-19 test date: vaccinated     Patient has discussed discharge planning with surgeon. Patient will be  discharged to home following surgery.   patient will be scheduled with [facility]. Scheduled at [place].    30 minutes of time was spent on patient education, review of records, templating, H&P, , appointment scheduling and optimizing patient for surgery.

## 2022-10-14 ENCOUNTER — CLINICAL SUPPORT (OUTPATIENT)
Dept: REHABILITATION | Facility: HOSPITAL | Age: 52
End: 2022-10-14
Payer: MEDICAID

## 2022-10-14 DIAGNOSIS — Z98.890 S/P RIGHT ROTATOR CUFF REPAIR: ICD-10-CM

## 2022-10-14 DIAGNOSIS — M25.611 DECREASED RANGE OF MOTION OF RIGHT SHOULDER: ICD-10-CM

## 2022-10-14 DIAGNOSIS — R29.898 DECREASED STRENGTH OF UPPER EXTREMITY: ICD-10-CM

## 2022-10-14 PROCEDURE — 97161 PT EVAL LOW COMPLEX 20 MIN: CPT

## 2022-10-14 NOTE — PLAN OF CARE
"[facility]   Physical Therapy Initial Evaluation     Date: 10/14/2022   Name: Candy Huynh  Clinic Number: [id number]    Therapy Diagnosis:   Encounter Diagnoses   Name Primary?    S/P right rotator cuff repair     Decreased range of motion of right shoulder     Decreased strength of upper extremity        Physician: Mirlande Parada PA-C    Physician Orders: PT Eval and Treat   Medical Diagnosis from Referral: Z98.890 (ICD-10-CM) - S/P right rotator cuff repair  Evaluation Date: 10/14/2022  Authorization Period Expiration: 9/21/2023  Plan of Care Expiration: 11/25/2022  Progress Note Due: 10th visit  Visit # / Visits authorized: 1/ 1   FOTO: 58%/41%    Referral Note: Rotator Cuff Repair Protocol  Frequency: 1-2x a week x 6weeks  Activity: Emphasis on PROM, no strengthening, continue sling during activities  Precautions: Standard, anemic     Time In: 1:25 pm  Time Out: 2:00 pm  Total Appointment Time (timed & untimed codes): 35 minutes      SUBJECTIVE     Date of surgery: 9/6/2022    History of current condition - Candy reports: unable to recall a mechanism of injury. Patient educated regarding concerns with future TKA  and being able to place weight through shoulder when ambulating with walker. 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  Prior Level of Function: independent with all household and personal ADL's  Current Level of Function: relies on left arm for all upper extremity needs    Pain:  Current 5/10, worst 10/10, best 5/10   Location: right shoulder    Description: Sharp  Aggravating Factors: moving the arm  Easing Factors: pain medication and rest    Patients goals: improve use of right shoulder     Medical History:   Past Medical History:   Diagnosis Date    " Allergy     Anemia     Anxiety     Asthma     denies- on outside problem list in Care Everywhere    Depression     Diabetes mellitus     Diabetes mellitus, type 2     Fibromyalgia     GERD (gastroesophageal reflux disease)     Hypertension     Stroke     TIA    Vertigo        Surgical History:   Candy Huynh  has a past surgical history that includes Hysterectomy;  section; Nasal septoplasty; Excision of mass of extremity (Left, 2019); Hernia repair; Arthroscopy of knee (Left, 2021); Knee arthroscopy w/ meniscectomy (2021); Arthroscopic repair of rotator cuff of shoulder (Right, 2022); Decompression of subacromial space (2022); and Reconstruction of acromioclavicular joint (2022).    Medications:   Candy has a current medication list which includes the following prescription(s): albuterol, amlodipine, azelastine, epinephrine, ergocalciferol, estradiol, fluoxetine, fluticasone propionate, folic acid, hydrocodone-acetaminophen, loratadine, losartan, meloxicam, metformin, metronidazole, montelukast, nicotine polacrilex, pantoprazole, prazosin, premarin, tramadol, and tramadol.    Allergies:   Review of patient's allergies indicates:   Allergen Reactions    Morphine Other (See Comments)     shake          OBJECTIVE     Posture: forward head/rounded shoulders; presents in sling    Sensation: dermatomes intact    Shoulder Range of Motion:   R (Passive) L Active (Passive)   Flexion 150  WNL   Abduction 110 WNL   ER 80 WNL   IR 60 WNL     Functional assessment:  L ER: T4  L IR: T7    Upper Extremity Strength: manual muscle grades below   Right  Left   Shoulder FLEX NT Shoulder FLEX 4+/5   Shoulder ABD NT Shoulder ABD 4+/5   Shoulder ER NT Shoulder ER 3+/5   Shoulder IR NT Shoulder IR 3+/5   Elbow FLEX NT Elbow Flex 5/5   Elbow EXT NT Elbow EXT 5/5   Wrist FLEX NT Wrist FLEX 5/5   Wrist EXT NT Wrist EXT 5/5     Limitation/Restriction for FOTO Shoulder Survey    Therapist reviewed  FOTO scores for Candy Huynh on 10/14/2022.   FOTO documents entered into [employer] - see Media section.    Limitation Score: 58%         TREATMENT     Total Treatment time (time-based codes) separate from Evaluation: 00 minutes      Candy received the treatments listed below:      therapeutic exercises to develop strength, endurance, and ROM for 00 minutes including:  Scapular retractions  Shoulder shrugs      PATIENT EDUCATION AND HOME EXERCISES     Education provided:   - HEP    Written Home Exercises Provided: yes. Exercises were reviewed and Candy was able to demonstrate them prior to the end of the session.  Candy demonstrated good  understanding of the education provided. See EMR under Patient Instructions for exercises provided during therapy sessions.    ASSESSMENT     Candy is a 52 y.o. female referred to outpatient Physical Therapy with a medical diagnosis of S/P right rotator cuff repair. Patient presents s/p 5 weeks  right rotator cuff repair (suture anchor x1), SA decompression, and AC jt resection. Patient demonstrates poor posture, decreased motor control of scapulothoracic mm, and poor scapular mobility during GH joint mobility. Patient demonstrated near full R shoulder PROM with empty end feels and no exacerbation of pain. Pt would benefit from scapulothoracic strengthening and motor control activities to reduce R shoulder pain and improve posture per protocol. PROM only per protocol.      Patient prognosis is Good.   Patient will benefit from skilled outpatient Physical Therapy to address the deficits stated above and in the chart below, provide patient /family education, and to maximize patientt's level of independence.     Plan of care discussed with patient: Yes  Patient's spiritual, cultural and educational needs considered and patient is agreeable to the plan of care and goals as stated below:     Anticipated Barriers for therapy: none    Medical Necessity is demonstrated by the  following  History  Co-morbidities and personal factors that may impact the plan of care Co-morbidities:   COPD/asthma, diabetes, high BMI, HTN, and TIA, superficial tumor removed on R side of head    Personal Factors:   no deficits     high   Examination  Body Structures and Functions, activity limitations and participation restrictions that may impact the plan of care Body Regions:   upper extremities    Body Systems:    gross symmetry  ROM  strength  motor control    Participation Restrictions:   none    Activity limitations:   Learning and applying knowledge  no deficits    General Tasks and Commands  no deficits    Communication  no deficits    Mobility  lifting and carrying objects  driving (bike, car, motorcycle)    Self care  dressing    Domestic Life  shopping  cooking  doing house work (cleaning house, washing dishes, laundry)    Interactions/Relationships  no deficits    Life Areas  no deficits    Community and Social Life  no deficits         low   Clinical Presentation stable and uncomplicated low   Decision Making/ Complexity Score: low     Goals:  Short Term Goals(4 weeks)  1. Pt will be independent with HEP to assist PT treatment in restoring pain free motion of the right shoulder.   2. Pt will improve impaired shoulder MMTs 1/2 grade B to improve strength for functional tasks.   3. Pt will demonstrate improved postural awareness requiring less than 3 VC during therapeutic activity.   4. Pt will improve R shoulder flexion/ abduction to >/=  degrees to improve functional mobility of UEs      Long Term Goals (8 Weeks):   1. Pt will improve FOTO score to </= 41% to demonstrate improvements in functional mobility including carrying, moving, and handling objects   2. Pt will improve impaired shoulder MMTs 1 grade B to improve strength for household duties.   3. Pt will demonstrate improved perscapular strength and endurance to show improved OH mobility and activity tolerance.    4. Pt will improve R  shoulder abduction to >/= 160 deg to improve functional mobility of UEs     PLAN   Plan of care Certification: 10/14/2022 to 11/25/2022.    Outpatient Physical Therapy 1-2 times weekly for 6 weeks to include the following interventions: Electrical Stimulation TEN, Gait Training, Manual Therapy, Moist Heat/ Ice, Neuromuscular Re-ed, Orthotic Management and Training, Patient Education, Self Care, Therapeutic Activities, Therapeutic Exercise, Ultrasound, and functional dry needling.     Aggie Fernandez, PT      I CERTIFY THE NEED FOR THESE SERVICES FURNISHED UNDER THIS PLAN OF TREATMENT AND WHILE UNDER MY CARE   Physician's comments:     Physician's Signature: ___________________________________________________

## 2022-10-16 ENCOUNTER — HOSPITAL ENCOUNTER (EMERGENCY)
Facility: HOSPITAL | Age: 52
Discharge: HOME | End: 2022-10-16
Attending: EMERGENCY MEDICINE
Payer: MEDICAID

## 2022-10-16 VITALS
BODY MASS INDEX: 43.93 KG/M2 | WEIGHT: 248 LBS | SYSTOLIC BLOOD PRESSURE: 165 MMHG | RESPIRATION RATE: 20 BRPM | TEMPERATURE: 98 F | OXYGEN SATURATION: 96 % | DIASTOLIC BLOOD PRESSURE: 85 MMHG | HEART RATE: 60 BPM

## 2022-10-16 DIAGNOSIS — M54.50 ACUTE MIDLINE LOW BACK PAIN WITHOUT SCIATICA: Primary | ICD-10-CM

## 2022-10-16 PROBLEM — J34.3 HYPERTROPHY OF NASAL TURBINATES: Status: ACTIVE | Noted: 2019-11-05

## 2022-10-16 PROCEDURE — 99284 EMERGENCY DEPT VISIT MOD MDM: CPT

## 2022-10-16 PROCEDURE — 96372 THER/PROPH/DIAG INJ SC/IM: CPT | Performed by: PHYSICIAN ASSISTANT

## 2022-10-16 PROCEDURE — 25000003 PHARM REV CODE 250: Performed by: PHYSICIAN ASSISTANT

## 2022-10-16 PROCEDURE — 99284 PR EMERGENCY DEPT VISIT,LEVEL IV: ICD-10-PCS | Mod: ,,, | Performed by: EMERGENCY MEDICINE

## 2022-10-16 PROCEDURE — 99284 EMERGENCY DEPT VISIT MOD MDM: CPT | Mod: ,,, | Performed by: EMERGENCY MEDICINE

## 2022-10-16 PROCEDURE — 63600175 PHARM REV CODE 636 W HCPCS: Performed by: PHYSICIAN ASSISTANT

## 2022-10-16 RX ORDER — LIDOCAINE 50 MG/G
1 PATCH TOPICAL
Status: DISCONTINUED | OUTPATIENT
Start: 2022-10-16 | End: 2022-10-16 | Stop reason: HOSPADM

## 2022-10-16 RX ORDER — KETOROLAC TROMETHAMINE 30 MG/ML
15 INJECTION, SOLUTION INTRAMUSCULAR; INTRAVENOUS
Status: COMPLETED | OUTPATIENT
Start: 2022-10-16 | End: 2022-10-16

## 2022-10-16 RX ORDER — LIDOCAINE 50 MG/G
1 PATCH TOPICAL DAILY
Qty: 15 PATCH | Refills: 2 | Status: SHIPPED | OUTPATIENT
Start: 2022-10-16 | End: 2023-07-28

## 2022-10-16 RX ORDER — METHOCARBAMOL 500 MG/1
1000 TABLET, FILM COATED ORAL 3 TIMES DAILY PRN
Qty: 30 TABLET | Refills: 0 | Status: SHIPPED | OUTPATIENT
Start: 2022-10-16 | End: 2022-10-21

## 2022-10-16 RX ORDER — NAPROXEN 500 MG/1
500 TABLET ORAL 2 TIMES DAILY PRN
Qty: 20 TABLET | Refills: 0 | Status: SHIPPED | OUTPATIENT
Start: 2022-10-16 | End: 2022-10-25 | Stop reason: HOSPADM

## 2022-10-16 RX ORDER — OXYCODONE HYDROCHLORIDE 5 MG/1
5 TABLET ORAL
Status: COMPLETED | OUTPATIENT
Start: 2022-10-16 | End: 2022-10-16

## 2022-10-16 RX ORDER — ACETAMINOPHEN 500 MG
1000 TABLET ORAL
Status: COMPLETED | OUTPATIENT
Start: 2022-10-16 | End: 2022-10-16

## 2022-10-16 RX ORDER — TRIAMCINOLONE ACETONIDE 40 MG/ML
80 INJECTION, SUSPENSION INTRA-ARTICULAR; INTRAMUSCULAR
Status: COMPLETED | OUTPATIENT
Start: 2022-10-16 | End: 2022-10-16

## 2022-10-16 RX ADMIN — KETOROLAC TROMETHAMINE 15 MG: 30 INJECTION, SOLUTION INTRAMUSCULAR; INTRAVENOUS at 05:10

## 2022-10-16 RX ADMIN — OXYCODONE 5 MG: 5 TABLET ORAL at 05:10

## 2022-10-16 RX ADMIN — LIDOCAINE 1 PATCH: 50 PATCH CUTANEOUS at 05:10

## 2022-10-16 RX ADMIN — TRIAMCINOLONE ACETONIDE 80 MG: 200 INJECTION, SUSPENSION INTRA-ARTICULAR; INTRAMUSCULAR at 05:10

## 2022-10-16 RX ADMIN — ACETAMINOPHEN 1000 MG: 500 TABLET ORAL at 05:10

## 2022-10-16 NOTE — DISCHARGE INSTRUCTIONS
Diagnosis: Back pain    Suspect your back pain is due to pain in your muscles or sciatica.  The most important treatment for this is time and stretching.  You were given a steroid shot and pain medicine in the emergency department.  I am prescribing numbing patches, anti-inflammatory pain medicine and muscle relaxants to treat your symptoms.  Do not drive or drink alcohol while taking muscle relaxants.  Please be aware that steroids can make your blood sugar higher so make sure to stay hydrated, avoid sugary foods and check your blood sugar frequently.    Please schedule an appointment with your primary care doctor for follow-up. If you start to have any new or worsening symptoms, please come back to the emergency department.

## 2022-10-16 NOTE — ED NOTES
Patient states mid lower back pain x 3 days, states after PT for shoulder 3 days ago began with pains, deneis known injury, Taking pain meds for shoulder, no meds today No routine meds today Pain worse with mvmt

## 2022-10-16 NOTE — ED NOTES
Patient identifiers verified and correct for Ms Lino   C/C: Mid lower back pain SEE NN  APPEARANCE: awake and alert in NAD.  SKIN: warm, dry and intact. No breakdown or bruising.  MUSCULOSKELETAL: Patient moving all extremities spontaneously, no obvious swelling or deformities noted. Ambulates independently.  RESPIRATORY: Denies shortness of breath.Respirations unlabored.   CARDIAC: Denies CP, 2+ distal pulses; no peripheral edema  ABDOMEN: S/ND/NT, Denies nausea  : voids spontaneously, denies difficulty  Neurologic: AAO x 4; follows commands equal strength in all extremities; denies numbness/tingling. Denies dizziness  Denies new weakness, reports non radiating mid back pain

## 2022-10-16 NOTE — ED PROVIDER NOTES
Encounter Date: 10/16/2022       History     Chief Complaint   Patient presents with    Back Pain     Lower back pain, 2 days ago. Seen at PT, began to hurt after. Hx sciatica      52-year-old female with history of well-controlled DM 2, fibromyalgia, GERD, TIA, hypertension, sciatica presents for pain for 2 days after doing physical therapy for her shoulder.  Pain feels like a tightness the middle of her low back and is nonradiating.  Worse with movement.  She takes tramadol for her shoulder pain without significant improvement.  She denies any other complaints, no numbness/tingling/weakness, saddle anesthesia, bowel or bladder dysfunction, fevers/chills, abdominal pain or chest pain.  She did not take her blood pressure medicine today.    Review of patient's allergies indicates:   Allergen Reactions    Morphine Other (See Comments)     shake     Past Medical History:   Diagnosis Date    Allergy     Anemia     Anxiety     Asthma     denies- on outside problem list in Care Everywhere    Depression     Diabetes mellitus     Diabetes mellitus, type 2     Fibromyalgia     GERD (gastroesophageal reflux disease)     Hypertension     Stroke     TIA    Vertigo      Past Surgical History:   Procedure Laterality Date    ARTHROSCOPIC REPAIR OF ROTATOR CUFF OF SHOULDER Right 2022    Procedure: REPAIR, ROTATOR CUFF, ARTHROSCOPIC;  Surgeon: Ministerio Orr Jr., MD;  Location: [facility] OR;  Service: Orthopedics;  Laterality: Right;  need opus system  Buddhism notifed CC     ARTHROSCOPY OF KNEE Left 2021    Procedure: ARTHROSCOPY, KNEE;  Surgeon: Donnie Campuzano MD;  Location: [facility] OR;  Service: Orthopedics;  Laterality: Left;     SECTION      DECOMPRESSION OF SUBACROMIAL SPACE  2022    Procedure: DECOMPRESSION, SUBACROMIAL SPACE;  Surgeon: Ministerio Orr Jr., MD;  Location: [facility] OR;  Service: Orthopedics;;    EXCISION OF MASS OF EXTREMITY Left 2019    Procedure: EXCISION, MASS, EXTREMITY;  Surgeon: Honorio  Ashok HOLT MD;  Location: [facility] OR;  Service: Orthopedics;  Laterality: Left;  excision lipoma  Request Lacie    HERNIA REPAIR      HYSTERECTOMY      KNEE ARTHROSCOPY W/ MENISCECTOMY  1/11/2021    Procedure: ARTHROSCOPY, KNEE, WITH MENISCECTOMY;  Surgeon: Donnie Campuzano MD;  Location: [facility] OR;  Service: Orthopedics;;    NASAL SEPTOPLASTY      RECONSTRUCTION OF ACROMIOCLAVICULAR JOINT  9/6/2022    Procedure: RECONSTRUCTION, ACROMIOCLAVICULAR JOINT;  Surgeon: Ministerio Orr Jr., MD;  Location: [facility] OR;  Service: Orthopedics;;     Family History   Problem Relation Age of Onset    No Known Problems Mother     Hypertension Father     Hypertension Sister     No Known Problems Sister     No Known Problems Brother     No Known Problems Daughter     No Known Problems Daughter     No Known Problems Daughter     No Known Problems Son      Social History     Tobacco Use    Smoking status: Former    Smokeless tobacco: Never   Substance Use Topics    Alcohol use: Yes     Comment: occasional    Drug use: Never     Review of Systems   Constitutional:  Negative for fever.   HENT:  Negative for sore throat.    Respiratory:  Negative for shortness of breath.    Cardiovascular:  Negative for chest pain.   Gastrointestinal:  Negative for nausea.   Genitourinary:  Negative for dysuria.   Musculoskeletal:  Positive for back pain. Negative for arthralgias, gait problem, joint swelling, myalgias, neck pain and neck stiffness.   Skin:  Negative for rash.   Neurological:  Negative for weakness and numbness.   Hematological:  Does not bruise/bleed easily.     Physical Exam     Initial Vitals [10/16/22 1553]   BP Pulse Resp Temp SpO2   (!) 197/95 71 18 98.3 °F (36.8 °C) 100 %      MAP       --         Physical Exam    Nursing note and vitals reviewed.  Constitutional: She appears well-developed and well-nourished. She is Obese .   HENT:   Head: Normocephalic and atraumatic.   Eyes: EOM are normal. Pupils are equal, round, and reactive to  light.   Neck: Neck supple.   Normal range of motion.  Cardiovascular:  Normal rate, regular rhythm, normal heart sounds and intact distal pulses.     Exam reveals no gallop and no friction rub.       No murmur heard.  Pulmonary/Chest: Breath sounds normal. No respiratory distress. She has no wheezes. She has no rhonchi. She has no rales. She exhibits no tenderness.   Musculoskeletal:         General: Normal range of motion.      Cervical back: Normal range of motion and neck supple.      Comments: No tenderness to palpation of C or T-spine.  There is some low lumbar midline paraspinous tenderness     Neurological: She is alert and oriented to person, place, and time. She has normal strength. No sensory deficit.   Skin: Skin is warm and dry.   Psychiatric: She has a normal mood and affect.       ED Course   Procedures  Labs Reviewed - No data to display         Imaging Results    None          Medications   LIDOcaine 5 % patch 1 patch (1 patch Transdermal Patch Applied 10/16/22 1727)   ketorolac injection 15 mg (15 mg Intramuscular Given 10/16/22 1723)   acetaminophen tablet 1,000 mg (1,000 mg Oral Given 10/16/22 1719)   oxyCODONE immediate release tablet 5 mg (5 mg Oral Given 10/16/22 1719)   triamcinolone acetonide injection 80 mg (80 mg Intramuscular Given 10/16/22 1725)     Medical Decision Making:   History:   Old Medical Records: I decided to obtain old medical records.  Old Records Summarized: records from clinic visits.       <> Summary of Records: HgB A1c checked 10/11/2022, 5.8  Initial Assessment:   52-year-old female presenting for atraumatic back pain.  He is hypertensive 197/95, do not take blood pressure medication today.  Otherwise normal vitals.  Differential Diagnosis:   Sciatica   Muscle strain  Muscle spasm   Trauma to suggest fracture   No red flags for cauda equina syndrome  Clinical Tests:   Lab Tests: Reviewed  ED Management:  Will treat with Kenalog C6 and instruct to follow up with PCP and  return to ED for worsening symptoms. Stressed the importance of follow-up, strict ED return precautions given.  Patient voiced understanding and is comfortable with discharge. I discussed this patient with my supervising physician.                          Clinical Impression:   Final diagnoses:  [M54.50] Acute midline low back pain without sciatica (Primary)      ED Disposition Condition    Discharge Stable          ED Prescriptions       Medication Sig Dispense Start Date End Date Auth. Provider    LIDOcaine (LIDODERM) 5 % Place 1 patch onto the skin once daily. Remove & Discard patch within 12 hours or as directed by MD 15 patch 10/16/2022 -- Kassandra Aviles PA-C    naproxen (NAPROSYN) 500 MG tablet Take 1 tablet (500 mg total) by mouth 2 (two) times daily as needed (Pain). 20 tablet 10/16/2022 -- Kassandra Avilse PA-C    methocarbamoL (ROBAXIN) 500 MG Tab Take 2 tablets (1,000 mg total) by mouth 3 (three) times daily as needed (Pain/ spasm). Do not drive or drink alcohol while taking this medication 30 tablet 10/16/2022 10/21/2022 Kassandra Aviles PA-C          Follow-up Information       Follow up With Specialties Details Why Contact Info Additional Information    Chris Romano MD Family Medicine Schedule an appointment as soon as possible for a visit in 1 week  [address], [facility] [address]  [facility] [address] [phone]       [facility] Schedule an appointment as soon as possible for a visit  As needed [address], [address]  [facility] [address] [phone] [facility] - [facility], 4th Floor Please park in [address] and use [place] elevators    [facility] - Emergency Dept Emergency Medicine Go to  If symptoms worsen [address]  [facility] [address] [phone]              Kassandra Aviles PA-C  10/16/22 8023

## 2022-10-18 ENCOUNTER — PATIENT MESSAGE (OUTPATIENT)
Dept: ADMINISTRATIVE | Facility: OTHER | Age: 52
End: 2022-10-18
Payer: MEDICAID

## 2022-10-21 ENCOUNTER — TELEPHONE (OUTPATIENT)
Dept: ORTHOPEDICS | Facility: CLINIC | Age: 52
End: 2022-10-21
Payer: MEDICAID

## 2022-10-21 ENCOUNTER — TELEPHONE (OUTPATIENT)
Dept: REHABILITATION | Facility: HOSPITAL | Age: 52
End: 2022-10-21
Payer: MEDICAID

## 2022-10-21 ENCOUNTER — DOCUMENTATION ONLY (OUTPATIENT)
Dept: REHABILITATION | Facility: HOSPITAL | Age: 52
End: 2022-10-21
Payer: MEDICAID

## 2022-10-21 NOTE — TELEPHONE ENCOUNTER
Therapist called and spoke to patient regarding cancellation of previous two therapy appointments. Patient stated that she has not been in to therapy since her initial evaluation secondary to exacerbation of low back pain and having been in the ER recently. She states that she would like to continue on 10/28/2022 after her TKA to rehab both her shoulder and knee joint together. She plans to follow up with a back and spine doctor after she completes rehab.    Ayesha Bueno, PTA  10/21/2022

## 2022-10-21 NOTE — TELEPHONE ENCOUNTER
I called the patient today regarding surgery on 10/26/2022 with Dr. Nolberto Fraser. I informed the patient that her surgery will be at  [facility] ([facility] [place] [address]). I informed the patient they must arrive at 8:00am and their surgery will start at 10:00am.     I reminded the patient about her COVID-19 swab test. The patient has received the COVID-19 vaccine and records of the vaccine are in the patient's chart. Therefore this patient does not need to complete a pre-procedural Covid-19 test..    I reminded the patient that they cannot eat or drink after midnight, the night before surgery.     I reminded the patient to be careful of their skin over the next few days to make sure they do not get any cuts, scratches or scrapes.     I informed the patient that she will do a myChart Virtual Visit - Audio with our Orthopedic Nurse Navigator on 10/28/2022. I informed the patient to log into their virtual appointment 15 minutes before their scheduled time.    The patient verbalized understanding and has no further questions.

## 2022-10-21 NOTE — PROGRESS NOTES
Physical Therapy: No show/Cancellation of Visit  Date: 10/21/2022    Patient cancelled today's PT appointment. Reason for cancellation: none given. Patient's next scheduled appointment is 10/24/2022.     Initial Evaluation: 10/14/2022  Plan of Care Explanation: 11/25/2022  Cancel: 2  No show: 0    Therapist: Ayesha Bueno PTA

## 2022-10-22 ENCOUNTER — PATIENT OUTREACH (OUTPATIENT)
Dept: EMERGENCY MEDICINE | Facility: HOSPITAL | Age: 52
End: 2022-10-22
Payer: MEDICAID

## 2022-10-22 NOTE — PROGRESS NOTES
Celena Bolden LPN  ED Navigator  Emergency Department    [place] [facility]  Role: Community Health Worker    Date: 10/22/2022  Patient Name: Candy Huynh  MRN: [id number]  PCP: Chris Romano MD    Assessment:     Candy Huynh is a 52 y.o. female who has presented to ED for Back Pain. Patient has visited the ED 1 times in the past 3 months. Patient did not contact PCP.     ED Navigator Initial Assessment    ED Navigator Enrollment Documentation  Consent to Services  Does patient consent to completing the assessment?: Yes  Contact  Method of Initial Contact: Phone  Transportation  Does the patient have issues with Transportation?: No  Does the patient have transportation to and from healthcare appointments?: Yes  Insurance Coverage  Do you have coverage/adequate coverage?: Yes  Type/kind of coverage: [facility] [employer] [facility] [employer] [place]ICA[place]  Is patient able to afford co-pays/deductibles?: Yes  Is patient able to afford HME or supplies?: Yes  Does patient have an established [facility] PCP?: No  Does patient need assistance finding a PCP?: Yes  Does the patient have a lack of adequate coverage?: No  Specialist Appointment  Did the patient come to the ED to see a specialist?: No  Does the patient have a pending specialist referral?: No  Does the patient have a specialist appointment made?: Yes  Is the patient able to access a timely specialist appointment?: Yes  PCP Follow Up Appointment  Has the patient had an appointment with a primary care provider in the past year?: Yes  Approximate date: 10/11/22  Provider: Jc Martinez NP  Does the patient have a follow up appontment with a PCP?: No  When was the last time you saw your PCP?: 10/11/22  Why does the patient not have a follow up scheduled?: Other (see comments) (Comment: Would like new PCP)  Medications  Is patient able to afford medication?: Yes  Is patient unable to get medication due to lack of transportation?: No  Psychological  Does the  patient have psycho-social concerns?: Yes  What concerns does the patient have?: Anxiety and/or Depression  Food  Does the patient have concerns about food?: No  Communication/Education  Does the patient have limited English proficiency/English not primary language?: No  Does patient have low literacy and/or low health literacy?: Yes  Does patient have concerns with care?: No  Does patient have dissatisfaction with care?: No  Other Financial Concerns  Does the patient have immediate financial distress?: No  Does the patient have general financial concerns?: Yes  Other Social Barriers/Concerns  Does the patient have any additional barriers or concerns?: Unable to afford utilities  Primary Barrier  Barriers identified: Cognitive barrier (health literacy, language and communication, etc.)  Root Cause of ED Utilization: Patient Knowledge/Low Health Literacy  Plan to address Patient Knowledge/Low Health Literacy: Provided information for [facility] On [facility] 24/7 Nurse triage line [phone] or 1-866-[facility] ([phone])  Next steps: Provided Education  Was education/educational materials provided surrounding PCP services/creating a medical home?: Yes Was education verbal or written?: Written     Was education/educational materials provided surrounding low cost, healthy foods?: Yes Was education verbal or written?: Written     Was education/educational materials provided surrounding other items? If so, use comment to explain.: Yes (Comment: Behavioral health resources, smoking cessation information, utility assistance resources) Was education verbal or written?: Written   Plan: Utility payment assistance resource given for their region  Expected Date of Follow Up 1: 10/26/22  Additional Documentation: Spoke with patient today and she was agreeable to enrollment and subsequent F/U calls. Pt. Experiences stress/anxiety she reports so information to get scheduled with [facility] was provided to the  patient to schedule this week since they were closed at the time of call and she was instructed to call for any assistance scheduling and pt verbalized understanding. Pt. Would like a new [facility] PCP. Scheduling assistance provided and will F/U this week to get patient scheduled. Pt. Is a current smoker and requested smoking cessation program contact info. Pt. Would like resources for utility assistance. [facility] form, [place] Virtual Visit Flyer, [facility] PCP scheduling assistance, [facility] on call RN#, and Heart Healthy Diet education all sent to email as well.          Social History     Socioeconomic History    Marital status:    Tobacco Use    Smoking status: Former    Smokeless tobacco: Never   Substance and Sexual Activity    Alcohol use: Yes     Comment: occasional    Drug use: Never    Sexual activity: Yes     Partners: Male     Social Determinants of Health     Financial Resource Strain: Medium Risk    Difficulty of Paying Living Expenses: Somewhat hard   Food Insecurity: No Food Insecurity    Worried About Running Out of Food in the Last Year: Never true    Ran Out of Food in the Last Year: Never true   Transportation Needs: No Transportation Needs    Lack of Transportation (Medical): No    Lack of Transportation (Non-Medical): No   Stress: Stress Concern Present    Feeling of Stress : To some extent   Social Connections: Unknown    Marital Status:    Housing Stability: Unknown    Unable to Pay for Housing in the Last Year: No    Unstable Housing in the Last Year: No       Plan:   Spoke with patient today and she was agreeable to enrollment and subsequent F/U calls. Pt. Experiences stress/anxiety she reports so information to get scheduled with [facility] was provided to the patient to schedule this week since they were closed at the time of call and she was instructed to call for any assistance scheduling and pt verbalized understanding. Pt. Would like a new [facility]  PCP. Scheduling assistance provided and will F/U this week to get patient scheduled. Pt. Is a current smoker and requested smoking cessation program contact info. Pt. Would like resources for utility assistance. [facility] form, [place] Virtual Visit Flyer, [facility] PCP scheduling assistance, [facility] on call RN#, and Heart Healthy Diet education all sent to email as well.

## 2022-10-24 ENCOUNTER — PATIENT MESSAGE (OUTPATIENT)
Dept: ORTHOPEDICS | Facility: CLINIC | Age: 52
End: 2022-10-24
Payer: MEDICAID

## 2022-10-24 ENCOUNTER — PATIENT MESSAGE (OUTPATIENT)
Dept: ADMINISTRATIVE | Facility: OTHER | Age: 52
End: 2022-10-24
Payer: MEDICAID

## 2022-10-25 ENCOUNTER — ANESTHESIA EVENT (OUTPATIENT)
Dept: SURGERY | Facility: HOSPITAL | Age: 52
End: 2022-10-25
Payer: MEDICAID

## 2022-10-25 RX ORDER — DEXTROMETHORPHAN HYDROBROMIDE, GUAIFENESIN 5; 100 MG/5ML; MG/5ML
650 LIQUID ORAL
Qty: 120 TABLET | Refills: 0 | Status: ON HOLD | OUTPATIENT
Start: 2022-10-25 | End: 2023-08-08 | Stop reason: HOSPADM

## 2022-10-25 RX ORDER — ASPIRIN 81 MG/1
81 TABLET ORAL 2 TIMES DAILY
Qty: 60 TABLET | Refills: 0 | Status: SHIPPED | OUTPATIENT
Start: 2022-10-25 | End: 2022-11-26

## 2022-10-25 RX ORDER — DOCUSATE SODIUM 100 MG/1
100 CAPSULE, LIQUID FILLED ORAL 2 TIMES DAILY PRN
Qty: 60 CAPSULE | Refills: 0 | Status: SHIPPED | OUTPATIENT
Start: 2022-10-25 | End: 2022-10-31

## 2022-10-25 RX ORDER — METHOCARBAMOL 750 MG/1
750 TABLET, FILM COATED ORAL 3 TIMES DAILY PRN
Qty: 30 TABLET | Refills: 0 | Status: SHIPPED | OUTPATIENT
Start: 2022-10-25 | End: 2022-11-03 | Stop reason: SDUPTHER

## 2022-10-25 RX ORDER — OXYCODONE HYDROCHLORIDE 5 MG/1
TABLET ORAL
Qty: 50 TABLET | Refills: 0 | Status: SHIPPED | OUTPATIENT
Start: 2022-10-25 | End: 2022-11-03 | Stop reason: SDUPTHER

## 2022-10-25 RX ORDER — PREGABALIN 75 MG/1
75 CAPSULE ORAL NIGHTLY
Qty: 14 CAPSULE | Refills: 0 | Status: SHIPPED | OUTPATIENT
Start: 2022-10-25 | End: 2022-10-31 | Stop reason: SDUPTHER

## 2022-10-26 ENCOUNTER — ANESTHESIA (OUTPATIENT)
Dept: SURGERY | Facility: HOSPITAL | Age: 52
End: 2022-10-26
Payer: MEDICAID

## 2022-10-26 ENCOUNTER — HOSPITAL ENCOUNTER (OUTPATIENT)
Facility: HOSPITAL | Age: 52
Discharge: HOME | End: 2022-10-27
Attending: ORTHOPAEDIC SURGERY | Admitting: ORTHOPAEDIC SURGERY
Payer: MEDICAID

## 2022-10-26 DIAGNOSIS — M17.11 PRIMARY OSTEOARTHRITIS OF RIGHT KNEE: ICD-10-CM

## 2022-10-26 LAB
POCT GLUCOSE: 105 MG/DL (ref 70–110)
POCT GLUCOSE: 127 MG/DL (ref 70–110)
POCT GLUCOSE: 163 MG/DL (ref 70–110)
POCT GLUCOSE: 99 MG/DL (ref 70–110)

## 2022-10-26 PROCEDURE — 99900035 HC TECH TIME PER 15 MIN (STAT)

## 2022-10-26 PROCEDURE — 25000003 PHARM REV CODE 250: Performed by: NURSE ANESTHETIST, CERTIFIED REGISTERED

## 2022-10-26 PROCEDURE — 63600175 PHARM REV CODE 636 W HCPCS: Performed by: ORTHOPAEDIC SURGERY

## 2022-10-26 PROCEDURE — 36000710: Performed by: ORTHOPAEDIC SURGERY

## 2022-10-26 PROCEDURE — 27201423 OPTIME MED/SURG SUP & DEVICES STERILE SUPPLY: Performed by: ORTHOPAEDIC SURGERY

## 2022-10-26 PROCEDURE — 27447 TOTAL KNEE ARTHROPLASTY: CPT | Mod: RT,,, | Performed by: ORTHOPAEDIC SURGERY

## 2022-10-26 PROCEDURE — 25000003 PHARM REV CODE 250: Performed by: NURSE PRACTITIONER

## 2022-10-26 PROCEDURE — 97530 THERAPEUTIC ACTIVITIES: CPT

## 2022-10-26 PROCEDURE — 25000003 PHARM REV CODE 250

## 2022-10-26 PROCEDURE — 94799 UNLISTED PULMONARY SVC/PX: CPT

## 2022-10-26 PROCEDURE — 64448 RIGHT ADDUCTOR CANAL CATHETER: ICD-10-PCS | Mod: 59,RT,, | Performed by: STUDENT IN AN ORGANIZED HEALTH CARE EDUCATION/TRAINING PROGRAM

## 2022-10-26 PROCEDURE — 63600175 PHARM REV CODE 636 W HCPCS: Performed by: NURSE PRACTITIONER

## 2022-10-26 PROCEDURE — 63600175 PHARM REV CODE 636 W HCPCS

## 2022-10-26 PROCEDURE — 63600175 PHARM REV CODE 636 W HCPCS: Performed by: STUDENT IN AN ORGANIZED HEALTH CARE EDUCATION/TRAINING PROGRAM

## 2022-10-26 PROCEDURE — 64448 NJX AA&/STRD FEM NRV NFS IMG: CPT | Mod: 59,RT,, | Performed by: STUDENT IN AN ORGANIZED HEALTH CARE EDUCATION/TRAINING PROGRAM

## 2022-10-26 PROCEDURE — D9220A PRA ANESTHESIA: ICD-10-PCS | Mod: CRNA,,, | Performed by: NURSE ANESTHETIST, CERTIFIED REGISTERED

## 2022-10-26 PROCEDURE — 63600175 PHARM REV CODE 636 W HCPCS: Performed by: NURSE ANESTHETIST, CERTIFIED REGISTERED

## 2022-10-26 PROCEDURE — 36000711: Performed by: ORTHOPAEDIC SURGERY

## 2022-10-26 PROCEDURE — 27100025 HC TUBING, SET FLUID WARMER: Performed by: ANESTHESIOLOGY

## 2022-10-26 PROCEDURE — 01402 ANES OPN/ARTH TOT KNE ARTHRP: CPT | Performed by: ORTHOPAEDIC SURGERY

## 2022-10-26 PROCEDURE — 27200688 HC TRAY, SPINAL-HYPER/ ISOBARIC: Performed by: ANESTHESIOLOGY

## 2022-10-26 PROCEDURE — 76942 ECHO GUIDE FOR BIOPSY: CPT | Performed by: STUDENT IN AN ORGANIZED HEALTH CARE EDUCATION/TRAINING PROGRAM

## 2022-10-26 PROCEDURE — 97162 PT EVAL MOD COMPLEX 30 MIN: CPT

## 2022-10-26 PROCEDURE — D9220A PRA ANESTHESIA: Mod: CRNA,,, | Performed by: NURSE ANESTHETIST, CERTIFIED REGISTERED

## 2022-10-26 PROCEDURE — 71000039 HC RECOVERY, EACH ADD'L HOUR: Performed by: ORTHOPAEDIC SURGERY

## 2022-10-26 PROCEDURE — 63600175 PHARM REV CODE 636 W HCPCS: Performed by: ANESTHESIOLOGY

## 2022-10-26 PROCEDURE — 27447 PR TOTAL KNEE ARTHROPLASTY: ICD-10-PCS | Mod: RT,,, | Performed by: ORTHOPAEDIC SURGERY

## 2022-10-26 PROCEDURE — 76942 RIGHT ADDUCTOR CANAL CATHETER: ICD-10-PCS | Mod: 26,,, | Performed by: STUDENT IN AN ORGANIZED HEALTH CARE EDUCATION/TRAINING PROGRAM

## 2022-10-26 PROCEDURE — D9220A PRA ANESTHESIA: Mod: ANES,,, | Performed by: ANESTHESIOLOGY

## 2022-10-26 PROCEDURE — C1713 ANCHOR/SCREW BN/BN,TIS/BN: HCPCS | Performed by: ORTHOPAEDIC SURGERY

## 2022-10-26 PROCEDURE — 37000008 HC ANESTHESIA 1ST 15 MINUTES: Performed by: ORTHOPAEDIC SURGERY

## 2022-10-26 PROCEDURE — 25000003 PHARM REV CODE 250: Performed by: ANESTHESIOLOGY

## 2022-10-26 PROCEDURE — 94761 N-INVAS EAR/PLS OXIMETRY MLT: CPT | Mod: 59

## 2022-10-26 PROCEDURE — C1776 JOINT DEVICE (IMPLANTABLE): HCPCS | Performed by: ORTHOPAEDIC SURGERY

## 2022-10-26 PROCEDURE — 97165 OT EVAL LOW COMPLEX 30 MIN: CPT

## 2022-10-26 PROCEDURE — 71000033 HC RECOVERY, INTIAL HOUR: Performed by: ORTHOPAEDIC SURGERY

## 2022-10-26 PROCEDURE — 37000009 HC ANESTHESIA EA ADD 15 MINS: Performed by: ORTHOPAEDIC SURGERY

## 2022-10-26 PROCEDURE — 97535 SELF CARE MNGMENT TRAINING: CPT

## 2022-10-26 PROCEDURE — 82962 GLUCOSE BLOOD TEST: CPT | Performed by: ORTHOPAEDIC SURGERY

## 2022-10-26 PROCEDURE — D9220A PRA ANESTHESIA: ICD-10-PCS | Mod: ANES,,, | Performed by: ANESTHESIOLOGY

## 2022-10-26 DEVICE — TIBIAL INSERT POST SZ CD 3-4 1: Type: IMPLANTABLE DEVICE | Site: KNEE | Status: FUNCTIONAL

## 2022-10-26 DEVICE — PLUG TAPER: Type: IMPLANTABLE DEVICE | Site: KNEE | Status: FUNCTIONAL

## 2022-10-26 DEVICE — CEMENT BONE W/GENT SIMPLEX HV: Type: IMPLANTABLE DEVICE | Site: KNEE | Status: FUNCTIONAL

## 2022-10-26 DEVICE — FEMORAL POST STBLZD SZ D RIGH: Type: IMPLANTABLE DEVICE | Site: KNEE | Status: FUNCTIONAL

## 2022-10-26 DEVICE — TIBIAL NEXGEN PRECOAT STEM: Type: IMPLANTABLE DEVICE | Site: KNEE | Status: FUNCTIONAL

## 2022-10-26 RX ORDER — OXYCODONE HYDROCHLORIDE 10 MG/1
10 TABLET ORAL
Status: DISCONTINUED | OUTPATIENT
Start: 2022-10-26 | End: 2022-10-27 | Stop reason: HOSPADM

## 2022-10-26 RX ORDER — FENTANYL CITRATE 50 UG/ML
100 INJECTION, SOLUTION INTRAMUSCULAR; INTRAVENOUS
Status: DISCONTINUED | OUTPATIENT
Start: 2022-10-26 | End: 2022-10-26 | Stop reason: HOSPADM

## 2022-10-26 RX ORDER — POLYETHYLENE GLYCOL 3350 17 G/17G
17 POWDER, FOR SOLUTION ORAL DAILY
Status: DISCONTINUED | OUTPATIENT
Start: 2022-10-26 | End: 2022-10-27 | Stop reason: HOSPADM

## 2022-10-26 RX ORDER — ROPIVACAINE HYDROCHLORIDE 5 MG/ML
INJECTION, SOLUTION EPIDURAL; INFILTRATION; PERINEURAL
Status: COMPLETED | OUTPATIENT
Start: 2022-10-26 | End: 2022-10-26

## 2022-10-26 RX ORDER — FAMOTIDINE 10 MG/ML
20 INJECTION INTRAVENOUS 2 TIMES DAILY
Status: DISCONTINUED | OUTPATIENT
Start: 2022-10-26 | End: 2022-10-26

## 2022-10-26 RX ORDER — LOSARTAN POTASSIUM 25 MG/1
100 TABLET ORAL DAILY
Status: DISCONTINUED | OUTPATIENT
Start: 2022-10-26 | End: 2022-10-27 | Stop reason: HOSPADM

## 2022-10-26 RX ORDER — INSULIN ASPART 100 [IU]/ML
0-5 INJECTION, SOLUTION INTRAVENOUS; SUBCUTANEOUS
Status: DISCONTINUED | OUTPATIENT
Start: 2022-10-26 | End: 2022-10-27 | Stop reason: HOSPADM

## 2022-10-26 RX ORDER — TRANEXAMIC ACID 100 MG/ML
1000 INJECTION, SOLUTION INTRAVENOUS
Status: DISCONTINUED | OUTPATIENT
Start: 2022-10-26 | End: 2022-10-26 | Stop reason: HOSPADM

## 2022-10-26 RX ORDER — METHOCARBAMOL 750 MG/1
750 TABLET, FILM COATED ORAL 3 TIMES DAILY
Status: DISCONTINUED | OUTPATIENT
Start: 2022-10-26 | End: 2022-10-27 | Stop reason: HOSPADM

## 2022-10-26 RX ORDER — FAMOTIDINE 20 MG/1
20 TABLET, FILM COATED ORAL 2 TIMES DAILY
Status: DISCONTINUED | OUTPATIENT
Start: 2022-10-26 | End: 2022-10-27 | Stop reason: HOSPADM

## 2022-10-26 RX ORDER — PROCHLORPERAZINE EDISYLATE 5 MG/ML
5 INJECTION INTRAMUSCULAR; INTRAVENOUS EVERY 6 HOURS PRN
Status: DISCONTINUED | OUTPATIENT
Start: 2022-10-26 | End: 2022-10-27 | Stop reason: HOSPADM

## 2022-10-26 RX ORDER — ONDANSETRON 2 MG/ML
INJECTION INTRAMUSCULAR; INTRAVENOUS
Status: DISCONTINUED | OUTPATIENT
Start: 2022-10-26 | End: 2022-10-26

## 2022-10-26 RX ORDER — PREGABALIN 75 MG/1
75 CAPSULE ORAL NIGHTLY
Status: DISCONTINUED | OUTPATIENT
Start: 2022-10-26 | End: 2022-10-27 | Stop reason: HOSPADM

## 2022-10-26 RX ORDER — PANTOPRAZOLE SODIUM 40 MG/1
40 TABLET, DELAYED RELEASE ORAL DAILY
Status: DISCONTINUED | OUTPATIENT
Start: 2022-10-26 | End: 2022-10-27 | Stop reason: HOSPADM

## 2022-10-26 RX ORDER — ONDANSETRON 2 MG/ML
4 INJECTION INTRAMUSCULAR; INTRAVENOUS EVERY 8 HOURS PRN
Status: DISCONTINUED | OUTPATIENT
Start: 2022-10-26 | End: 2022-10-27 | Stop reason: HOSPADM

## 2022-10-26 RX ORDER — FENTANYL CITRATE 50 UG/ML
INJECTION, SOLUTION INTRAMUSCULAR; INTRAVENOUS
Status: DISCONTINUED | OUTPATIENT
Start: 2022-10-26 | End: 2022-10-26

## 2022-10-26 RX ORDER — KETAMINE HCL IN 0.9 % NACL 50 MG/5 ML
SYRINGE (ML) INTRAVENOUS
Status: DISCONTINUED | OUTPATIENT
Start: 2022-10-26 | End: 2022-10-26

## 2022-10-26 RX ORDER — CEFAZOLIN SODIUM 1 G/3ML
INJECTION, POWDER, FOR SOLUTION INTRAMUSCULAR; INTRAVENOUS
Status: DISCONTINUED | OUTPATIENT
Start: 2022-10-26 | End: 2022-10-26

## 2022-10-26 RX ORDER — EPINEPHRINE 1 MG/ML
0.3 INJECTION, SOLUTION, CONCENTRATE INTRAVENOUS ONCE AS NEEDED
Status: DISCONTINUED | OUTPATIENT
Start: 2022-10-26 | End: 2022-10-27 | Stop reason: HOSPADM

## 2022-10-26 RX ORDER — FLUOXETINE 10 MG/1
40 CAPSULE ORAL DAILY
Status: DISCONTINUED | OUTPATIENT
Start: 2022-10-26 | End: 2022-10-27 | Stop reason: HOSPADM

## 2022-10-26 RX ORDER — ACETAMINOPHEN 500 MG
1000 TABLET ORAL EVERY 6 HOURS
Status: DISCONTINUED | OUTPATIENT
Start: 2022-10-26 | End: 2022-10-27 | Stop reason: HOSPADM

## 2022-10-26 RX ORDER — BISACODYL 10 MG
10 SUPPOSITORY, RECTAL RECTAL EVERY 12 HOURS PRN
Status: DISCONTINUED | OUTPATIENT
Start: 2022-10-26 | End: 2022-10-27 | Stop reason: HOSPADM

## 2022-10-26 RX ORDER — MUPIROCIN 20 MG/G
1 OINTMENT TOPICAL 2 TIMES DAILY
Status: DISCONTINUED | OUTPATIENT
Start: 2022-10-26 | End: 2022-10-27 | Stop reason: HOSPADM

## 2022-10-26 RX ORDER — MORPHINE SULFATE 2 MG/ML
2 INJECTION, SOLUTION INTRAMUSCULAR; INTRAVENOUS
Status: DISCONTINUED | OUTPATIENT
Start: 2022-10-26 | End: 2022-10-27 | Stop reason: HOSPADM

## 2022-10-26 RX ORDER — GLUCAGON 1 MG
1 KIT INJECTION
Status: DISCONTINUED | OUTPATIENT
Start: 2022-10-26 | End: 2022-10-27 | Stop reason: HOSPADM

## 2022-10-26 RX ORDER — DEXAMETHASONE SODIUM PHOSPHATE 4 MG/ML
INJECTION, SOLUTION INTRA-ARTICULAR; INTRALESIONAL; INTRAMUSCULAR; INTRAVENOUS; SOFT TISSUE
Status: DISCONTINUED | OUTPATIENT
Start: 2022-10-26 | End: 2022-10-26

## 2022-10-26 RX ORDER — SODIUM CHLORIDE 9 MG/ML
INJECTION, SOLUTION INTRAVENOUS CONTINUOUS
Status: DISCONTINUED | OUTPATIENT
Start: 2022-10-26 | End: 2022-10-27 | Stop reason: HOSPADM

## 2022-10-26 RX ORDER — ASPIRIN 81 MG/1
81 TABLET ORAL 2 TIMES DAILY
Status: DISCONTINUED | OUTPATIENT
Start: 2022-10-26 | End: 2022-10-27 | Stop reason: HOSPADM

## 2022-10-26 RX ORDER — MAG HYDROX/ALUMINUM HYD/SIMETH 200-200-20
30 SUSPENSION, ORAL (FINAL DOSE FORM) ORAL
Status: DISCONTINUED | OUTPATIENT
Start: 2022-10-26 | End: 2022-10-27 | Stop reason: HOSPADM

## 2022-10-26 RX ORDER — FAMOTIDINE 10 MG/ML
20 INJECTION INTRAVENOUS 2 TIMES DAILY
Status: DISCONTINUED | OUTPATIENT
Start: 2022-10-26 | End: 2022-10-26 | Stop reason: HOSPADM

## 2022-10-26 RX ORDER — FAMOTIDINE 10 MG/ML
INJECTION INTRAVENOUS
Status: COMPLETED
Start: 2022-10-26 | End: 2022-10-26

## 2022-10-26 RX ORDER — ROPIVACAINE HYDROCHLORIDE 2 MG/ML
INJECTION, SOLUTION EPIDURAL; INFILTRATION; PERINEURAL CONTINUOUS
Status: DISCONTINUED | OUTPATIENT
Start: 2022-10-26 | End: 2022-10-27 | Stop reason: HOSPADM

## 2022-10-26 RX ORDER — HYDROMORPHONE HYDROCHLORIDE 1 MG/ML
1 INJECTION, SOLUTION INTRAMUSCULAR; INTRAVENOUS; SUBCUTANEOUS ONCE
Status: COMPLETED | OUTPATIENT
Start: 2022-10-26 | End: 2022-10-26

## 2022-10-26 RX ORDER — MUPIROCIN 20 MG/G
1 OINTMENT TOPICAL
Status: COMPLETED | OUTPATIENT
Start: 2022-10-26 | End: 2022-10-26

## 2022-10-26 RX ORDER — IBUPROFEN 200 MG
24 TABLET ORAL
Status: DISCONTINUED | OUTPATIENT
Start: 2022-10-26 | End: 2022-10-27 | Stop reason: HOSPADM

## 2022-10-26 RX ORDER — SODIUM CHLORIDE 0.9 % (FLUSH) 0.9 %
10 SYRINGE (ML) INJECTION
Status: DISCONTINUED | OUTPATIENT
Start: 2022-10-26 | End: 2022-10-26 | Stop reason: HOSPADM

## 2022-10-26 RX ORDER — ERGOCALCIFEROL 1.25 MG/1
50000 CAPSULE ORAL
Status: DISCONTINUED | OUTPATIENT
Start: 2022-10-27 | End: 2022-10-27 | Stop reason: HOSPADM

## 2022-10-26 RX ORDER — AMLODIPINE BESYLATE 5 MG/1
5 TABLET ORAL DAILY
Status: DISCONTINUED | OUTPATIENT
Start: 2022-10-26 | End: 2022-10-27 | Stop reason: HOSPADM

## 2022-10-26 RX ORDER — AMOXICILLIN 250 MG
1 CAPSULE ORAL 2 TIMES DAILY
Status: DISCONTINUED | OUTPATIENT
Start: 2022-10-26 | End: 2022-10-27 | Stop reason: HOSPADM

## 2022-10-26 RX ORDER — TRANEXAMIC ACID 100 MG/ML
INJECTION, SOLUTION INTRAVENOUS
Status: DISCONTINUED | OUTPATIENT
Start: 2022-10-26 | End: 2022-10-26

## 2022-10-26 RX ORDER — LIDOCAINE HYDROCHLORIDE 10 MG/ML
1 INJECTION, SOLUTION EPIDURAL; INFILTRATION; INTRACAUDAL; PERINEURAL
Status: DISCONTINUED | OUTPATIENT
Start: 2022-10-26 | End: 2022-10-26 | Stop reason: HOSPADM

## 2022-10-26 RX ORDER — FLUTICASONE PROPIONATE 50 MCG
1 SPRAY, SUSPENSION (ML) NASAL DAILY
Status: DISCONTINUED | OUTPATIENT
Start: 2022-10-26 | End: 2022-10-27 | Stop reason: HOSPADM

## 2022-10-26 RX ORDER — FAMOTIDINE 10 MG/ML
20 INJECTION INTRAVENOUS 2 TIMES DAILY
Status: CANCELLED | OUTPATIENT
Start: 2022-10-26

## 2022-10-26 RX ORDER — MIDAZOLAM HYDROCHLORIDE 1 MG/ML
4 INJECTION INTRAMUSCULAR; INTRAVENOUS
Status: DISCONTINUED | OUTPATIENT
Start: 2022-10-26 | End: 2022-10-26 | Stop reason: HOSPADM

## 2022-10-26 RX ORDER — SUCRALFATE 1 G/10ML
1 SUSPENSION ORAL EVERY 6 HOURS
Status: DISCONTINUED | OUTPATIENT
Start: 2022-10-26 | End: 2022-10-27 | Stop reason: HOSPADM

## 2022-10-26 RX ORDER — IBUPROFEN 200 MG
16 TABLET ORAL
Status: DISCONTINUED | OUTPATIENT
Start: 2022-10-26 | End: 2022-10-27 | Stop reason: HOSPADM

## 2022-10-26 RX ORDER — CEFAZOLIN SODIUM 1 G/3ML
2 INJECTION, POWDER, FOR SOLUTION INTRAMUSCULAR; INTRAVENOUS
Status: DISCONTINUED | OUTPATIENT
Start: 2022-10-26 | End: 2022-10-26 | Stop reason: HOSPADM

## 2022-10-26 RX ORDER — VANCOMYCIN HYDROCHLORIDE 1 G/20ML
INJECTION, POWDER, LYOPHILIZED, FOR SOLUTION INTRAVENOUS
Status: DISCONTINUED | OUTPATIENT
Start: 2022-10-26 | End: 2022-10-26 | Stop reason: HOSPADM

## 2022-10-26 RX ORDER — CEFAZOLIN SODIUM 2 G/50ML
2 SOLUTION INTRAVENOUS
Status: COMPLETED | OUTPATIENT
Start: 2022-10-26 | End: 2022-10-27

## 2022-10-26 RX ORDER — ALBUTEROL SULFATE 90 UG/1
1 AEROSOL, METERED RESPIRATORY (INHALATION) EVERY 4 HOURS PRN
Status: DISCONTINUED | OUTPATIENT
Start: 2022-10-26 | End: 2022-10-27 | Stop reason: HOSPADM

## 2022-10-26 RX ORDER — TALC
6 POWDER (GRAM) TOPICAL NIGHTLY PRN
Status: DISCONTINUED | OUTPATIENT
Start: 2022-10-26 | End: 2022-10-26 | Stop reason: HOSPADM

## 2022-10-26 RX ORDER — FENTANYL CITRATE 50 UG/ML
25 INJECTION, SOLUTION INTRAMUSCULAR; INTRAVENOUS EVERY 5 MIN PRN
Status: DISCONTINUED | OUTPATIENT
Start: 2022-10-26 | End: 2022-10-26 | Stop reason: HOSPADM

## 2022-10-26 RX ORDER — MELOXICAM 7.5 MG/1
15 TABLET ORAL ONCE
Status: COMPLETED | OUTPATIENT
Start: 2022-10-26 | End: 2022-10-26

## 2022-10-26 RX ORDER — OXYCODONE HYDROCHLORIDE 5 MG/1
5 TABLET ORAL
Status: DISCONTINUED | OUTPATIENT
Start: 2022-10-26 | End: 2022-10-27 | Stop reason: HOSPADM

## 2022-10-26 RX ORDER — PROPOFOL 10 MG/ML
VIAL (ML) INTRAVENOUS CONTINUOUS PRN
Status: DISCONTINUED | OUTPATIENT
Start: 2022-10-26 | End: 2022-10-26

## 2022-10-26 RX ORDER — NALOXONE HCL 0.4 MG/ML
0.02 VIAL (ML) INJECTION
Status: DISCONTINUED | OUTPATIENT
Start: 2022-10-26 | End: 2022-10-27 | Stop reason: HOSPADM

## 2022-10-26 RX ORDER — SODIUM CITRATE AND CITRIC ACID MONOHYDRATE 334; 500 MG/5ML; MG/5ML
30 SOLUTION ORAL ONCE
Status: DISCONTINUED | OUTPATIENT
Start: 2022-10-26 | End: 2022-10-27 | Stop reason: HOSPADM

## 2022-10-26 RX ORDER — CETIRIZINE HYDROCHLORIDE 10 MG/1
10 TABLET ORAL DAILY
Status: DISCONTINUED | OUTPATIENT
Start: 2022-10-26 | End: 2022-10-27 | Stop reason: HOSPADM

## 2022-10-26 RX ORDER — PREGABALIN 75 MG/1
75 CAPSULE ORAL
Status: COMPLETED | OUTPATIENT
Start: 2022-10-26 | End: 2022-10-26

## 2022-10-26 RX ORDER — METFORMIN HYDROCHLORIDE 500 MG/1
1000 TABLET ORAL 2 TIMES DAILY WITH MEALS
Status: DISCONTINUED | OUTPATIENT
Start: 2022-10-26 | End: 2022-10-27 | Stop reason: HOSPADM

## 2022-10-26 RX ORDER — FAMOTIDINE 10 MG/ML
INJECTION INTRAVENOUS
Status: DISCONTINUED | OUTPATIENT
Start: 2022-10-26 | End: 2022-10-26

## 2022-10-26 RX ORDER — ACETAMINOPHEN 500 MG
1000 TABLET ORAL
Status: COMPLETED | OUTPATIENT
Start: 2022-10-26 | End: 2022-10-26

## 2022-10-26 RX ORDER — SODIUM CHLORIDE 9 MG/ML
INJECTION, SOLUTION INTRAVENOUS
Status: COMPLETED | OUTPATIENT
Start: 2022-10-26 | End: 2022-10-26

## 2022-10-26 RX ORDER — MONTELUKAST SODIUM 10 MG/1
10 TABLET ORAL DAILY
Status: DISCONTINUED | OUTPATIENT
Start: 2022-10-26 | End: 2022-10-27 | Stop reason: HOSPADM

## 2022-10-26 RX ORDER — MIDAZOLAM HYDROCHLORIDE 1 MG/ML
INJECTION INTRAMUSCULAR; INTRAVENOUS
Status: DISCONTINUED | OUTPATIENT
Start: 2022-10-26 | End: 2022-10-26

## 2022-10-26 RX ADMIN — SODIUM CHLORIDE: 0.9 INJECTION, SOLUTION INTRAVENOUS at 09:10

## 2022-10-26 RX ADMIN — AMLODIPINE BESYLATE 5 MG: 5 TABLET ORAL at 04:10

## 2022-10-26 RX ADMIN — HYDROMORPHONE HYDROCHLORIDE 1 MG: 1 INJECTION, SOLUTION INTRAMUSCULAR; INTRAVENOUS; SUBCUTANEOUS at 04:10

## 2022-10-26 RX ADMIN — ROPIVACAINE HYDROCHLORIDE 10 ML: 5 INJECTION EPIDURAL; INFILTRATION; PERINEURAL at 10:10

## 2022-10-26 RX ADMIN — MUPIROCIN 1 G: 20 OINTMENT TOPICAL at 09:10

## 2022-10-26 RX ADMIN — ACETAMINOPHEN 1000 MG: 500 TABLET ORAL at 04:10

## 2022-10-26 RX ADMIN — SODIUM CHLORIDE: 0.9 INJECTION, SOLUTION INTRAVENOUS at 11:10

## 2022-10-26 RX ADMIN — DEXAMETHASONE SODIUM PHOSPHATE 4 MG: 4 INJECTION, SOLUTION INTRAMUSCULAR; INTRAVENOUS at 11:10

## 2022-10-26 RX ADMIN — SUCRALFATE 1 G: 1 SUSPENSION ORAL at 06:10

## 2022-10-26 RX ADMIN — OXYCODONE HYDROCHLORIDE 10 MG: 10 TABLET ORAL at 10:10

## 2022-10-26 RX ADMIN — MELOXICAM 15 MG: 7.5 TABLET ORAL at 09:10

## 2022-10-26 RX ADMIN — OXYCODONE 5 MG: 5 TABLET ORAL at 03:10

## 2022-10-26 RX ADMIN — PANTOPRAZOLE SODIUM 40 MG: 40 TABLET, DELAYED RELEASE ORAL at 04:10

## 2022-10-26 RX ADMIN — SODIUM CHLORIDE: 0.9 INJECTION, SOLUTION INTRAVENOUS at 02:10

## 2022-10-26 RX ADMIN — METHOCARBAMOL 750 MG: 750 TABLET ORAL at 09:10

## 2022-10-26 RX ADMIN — METHOCARBAMOL 750 MG: 750 TABLET ORAL at 02:10

## 2022-10-26 RX ADMIN — MIDAZOLAM HYDROCHLORIDE 2 MG: 1 INJECTION, SOLUTION INTRAMUSCULAR; INTRAVENOUS at 09:10

## 2022-10-26 RX ADMIN — POLYETHYLENE GLYCOL 3350 17 G: 17 POWDER, FOR SOLUTION ORAL at 04:10

## 2022-10-26 RX ADMIN — FAMOTIDINE 20 MG: 10 INJECTION INTRAVENOUS at 10:10

## 2022-10-26 RX ADMIN — PREGABALIN 75 MG: 75 CAPSULE ORAL at 09:10

## 2022-10-26 RX ADMIN — FAMOTIDINE 20 MG: 20 TABLET, FILM COATED ORAL at 09:10

## 2022-10-26 RX ADMIN — VANCOMYCIN HYDROCHLORIDE 1500 MG: 1.5 INJECTION, POWDER, LYOPHILIZED, FOR SOLUTION INTRAVENOUS at 09:10

## 2022-10-26 RX ADMIN — LOSARTAN POTASSIUM 100 MG: 25 TABLET, FILM COATED ORAL at 04:10

## 2022-10-26 RX ADMIN — SENNOSIDES AND DOCUSATE SODIUM 1 TABLET: 50; 8.6 TABLET ORAL at 09:10

## 2022-10-26 RX ADMIN — MIDAZOLAM HYDROCHLORIDE 2 MG: 1 INJECTION, SOLUTION INTRAMUSCULAR; INTRAVENOUS at 11:10

## 2022-10-26 RX ADMIN — FENTANYL CITRATE 10 MCG: 50 INJECTION, SOLUTION INTRAMUSCULAR; INTRAVENOUS at 11:10

## 2022-10-26 RX ADMIN — FENTANYL CITRATE 100 MCG: 50 INJECTION INTRAMUSCULAR; INTRAVENOUS at 09:10

## 2022-10-26 RX ADMIN — TRANEXAMIC ACID 1000 MG: 100 INJECTION, SOLUTION INTRAVENOUS at 11:10

## 2022-10-26 RX ADMIN — ACETAMINOPHEN 1000 MG: 500 TABLET ORAL at 10:10

## 2022-10-26 RX ADMIN — ALUMINUM HYDROXIDE, MAGNESIUM HYDROXIDE, AND SIMETHICONE 30 ML: 200; 200; 20 SUSPENSION ORAL at 09:10

## 2022-10-26 RX ADMIN — OXYCODONE 5 MG: 5 TABLET ORAL at 06:10

## 2022-10-26 RX ADMIN — FLUOXETINE 40 MG: 10 CAPSULE ORAL at 06:10

## 2022-10-26 RX ADMIN — MEPIVACAINE HYDROCHLORIDE 3 ML: 15 INJECTION, SOLUTION EPIDURAL; INFILTRATION at 11:10

## 2022-10-26 RX ADMIN — CEFAZOLIN 2 G: 330 INJECTION, POWDER, FOR SOLUTION INTRAMUSCULAR; INTRAVENOUS at 11:10

## 2022-10-26 RX ADMIN — FAMOTIDINE 20 MG: 10 INJECTION, SOLUTION INTRAVENOUS at 11:10

## 2022-10-26 RX ADMIN — Medication: at 02:10

## 2022-10-26 RX ADMIN — SODIUM CHLORIDE, SODIUM GLUCONATE, SODIUM ACETATE, POTASSIUM CHLORIDE, MAGNESIUM CHLORIDE, SODIUM PHOSPHATE, DIBASIC, AND POTASSIUM PHOSPHATE: .53; .5; .37; .037; .03; .012; .00082 INJECTION, SOLUTION INTRAVENOUS at 11:10

## 2022-10-26 RX ADMIN — SODIUM CHLORIDE, SODIUM GLUCONATE, SODIUM ACETATE, POTASSIUM CHLORIDE, MAGNESIUM CHLORIDE, SODIUM PHOSPHATE, DIBASIC, AND POTASSIUM PHOSPHATE: .53; .5; .37; .037; .03; .012; .00082 INJECTION, SOLUTION INTRAVENOUS at 01:10

## 2022-10-26 RX ADMIN — TRANEXAMIC ACID 1000 MG: 100 INJECTION, SOLUTION INTRAVENOUS at 12:10

## 2022-10-26 RX ADMIN — CEFAZOLIN SODIUM 2 G: 2 SOLUTION INTRAVENOUS at 07:10

## 2022-10-26 RX ADMIN — ASPIRIN 81 MG: 81 TABLET, COATED ORAL at 09:10

## 2022-10-26 RX ADMIN — PROPOFOL 100 MCG/KG/MIN: 10 INJECTION, EMULSION INTRAVENOUS at 11:10

## 2022-10-26 RX ADMIN — MONTELUKAST 10 MG: 10 TABLET, FILM COATED ORAL at 04:10

## 2022-10-26 RX ADMIN — METFORMIN HYDROCHLORIDE 1000 MG: 500 TABLET ORAL at 07:10

## 2022-10-26 RX ADMIN — ACETAMINOPHEN 1000 MG: 500 TABLET ORAL at 09:10

## 2022-10-26 RX ADMIN — ONDANSETRON 4 MG: 2 INJECTION INTRAMUSCULAR; INTRAVENOUS at 11:10

## 2022-10-26 RX ADMIN — Medication 30 MG: at 11:10

## 2022-10-26 RX ADMIN — OXYCODONE 5 MG: 5 TABLET ORAL at 02:10

## 2022-10-26 NOTE — PLAN OF CARE
Nursing pre-op complete. Pt calm, will continue to monitor. Pt's mother visiting at bedside. Mother has pt's cell phone. Pt's clothing placed in locker #5, 1 bag. Pt states walker and cane at home. Ok for pt to leave false lashes on per Dr Marsh.

## 2022-10-26 NOTE — PT/OT/SLP EVAL
Occupational Therapy   Evaluation    Name: Candy Huynh  MRN: [id number]  Admitting Diagnosis:  Primary osteoarthritis of right knee  Recent Surgery: Procedure(s) (LRB):  ARTHROPLASTY, KNEE, TOTAL RIGHT: BALDO - NEXGEN (Right) Day of Surgery    Recommendations:     Discharge Recommendations: outpatient PT  Discharge Equipment Recommendations:  bedside commode, walker, rolling  Barriers to discharge:  None    Assessment:     Candy Huynh is a 52 y.o. female with a medical diagnosis of Primary osteoarthritis of right knee.  She presents with s/p R TKA. Pt completed sit<>stand from bedside chair with CGA, UBD with Min (A), LBD with Mod (A), and toileting/bathing anteriorly (after toileting incident in prior PT session) with CGA. Pt was primarily limited due to c/o of severe pain on this date. Performance deficits affecting function: weakness, impaired endurance, impaired self care skills, impaired functional mobility, gait instability, impaired balance, pain, decreased safety awareness, decreased lower extremity function, decreased ROM, orthopedic precautions.      Rehab Prognosis: Good; patient would benefit from acute skilled OT services to address these deficits and reach maximum level of function.       Plan:     Patient to be seen daily to address the above listed problems via self-care/home management, therapeutic activities, therapeutic exercises  Plan of Care Expires: 10/28/22  Plan of Care Reviewed with: patient, daughter, mother    Subjective     Chief Complaint: pain  Patient/Family Comments/goals: to be pain free    Occupational Profile:  Living Environment: [facility] [place] [facility] [place] [facility] [place] [facility] [place] [facility] [place]. She has a 1/2 bath on the 1st floor and plans to reside downstairs during her recovery.  Previous level of function: PTA, pt was Mod (I) with ADLs and functional mobility using a SPC as needed. She (+) drives  Roles and Routines: daughter, mother; enjoys dancing  Equipment Used at  Home:  cane, straight  Assistance upon Discharge: family    Pain/Comfort:  Pain Rating 1:  (unrated)  Location - Side 1: Right  Location - Orientation 1: generalized  Location 1: knee  Pain Addressed 1: Pre-medicate for activity, Reposition, Distraction, Cessation of Activity  Pain Rating Post-Intervention 1:  (unrated)    Patients cultural, spiritual, Yazdanism conflicts given the current situation: no    Objective:     Communicated with: RN prior to session.  Patient found up in chair with telemetry, cryotherapy, FCD, perineural catheter, peripheral IV (nimbus) upon OT entry to room.    General Precautions: Standard, fall   Orthopedic Precautions:RLE weight bearing as tolerated   Braces: N/A  Respiratory Status: Room air    Occupational Performance:    Bed Mobility:    Not assessed- pt OOB on bedside chair beginning & end of session    Functional Mobility/Transfers:  Patient completed Sit <> Stand Transfer with contact guard assistance  with  rolling walker   Functional Mobility: NT due to pt deferring with c/o of severe pain    Activities of Daily Living:  Upper Body Dressing: minimum assistance for IV management to don/doff gowns anteriorly/posteriorly  Lower Body Dressing: moderate assistance to don/doff R sock and pt able to don/doff L sock in long sitting  Toileting: contact guard assistance for balance as pt maintained unilateral support on RW with R UE and cleansed anteriorly and upper thigh regions with L UE in standing    Cognitive/Visual Perceptual:  Cognitive/Psychosocial Skills:     -       Oriented to: Person, Place, Time, and Situation   -       Follows Commands/attention:Follows multistep  commands  -       Safety awareness/insight to disability: intact   -       Mood/Affect/Coping skills/emotional control: Cooperative and Lethargic    Physical Exam:  Balance:    -       fair standing balance with RW; good sitting balance  Postural examination/scapula alignment:    -       Rounded shoulders  -        Forward head  Upper Extremity Range of Motion:     -       Right Upper Extremity: WFL  -       Left Upper Extremity: WFL  Upper Extremity Strength:    -       Right Upper Extremity: WFL  -       Left Upper Extremity: WFL    AMPAC 6 Click ADL:  AMPAC Total Score: 19    Treatment & Education:  -Education on weightbearing precautions  -Education on importance of OOB activity to improve overall activity tolerance and promote recovery  -Pt educated to call for assistance and to transfer with hospital staff only  -Provided education regarding role of OT, POC, & discharge recommendations with pt and family verbalizing understanding.  Pt had no further questions & when asked whether there were any concerns pt reported none.     Patient left up in chair with all lines intact, call button in reach, RN notified, and family present    GOALS:   Multidisciplinary Problems       Occupational Therapy Goals          Problem: Occupational Therapy    Goal Priority Disciplines Outcome Interventions   Occupational Therapy Goal     OT, PT/OT Ongoing, Progressing    Description: Goals to be met by: 10/28/2022     Patient will increase functional independence with ADLs by performing:    UE Dressing with Modified [place].  LE Dressing with Supervision.  Grooming while standing with Modified [place].  Toileting from toilet/bedside commode with Modified [place] for hygiene and clothing management.   Toilet transfer to toilet/bedside commode with Modified [place] and LRAD.                         History:     Past Medical History:   Diagnosis Date    Allergy     Anemia     Anxiety     Asthma     denies- on outside problem list in Care Everywhere    Depression     Diabetes mellitus     Diabetes mellitus, type 2     Fibromyalgia     GERD (gastroesophageal reflux disease)     Hypertension     Stroke     TIA    Vertigo          Past Surgical History:   Procedure Laterality Date    ARTHROSCOPIC REPAIR OF ROTATOR CUFF OF  SHOULDER Right 2022    Procedure: REPAIR, ROTATOR CUFF, ARTHROSCOPIC;  Surgeon: Ministerio Orr Jr., MD;  Location: [facility] OR;  Service: Orthopedics;  Laterality: Right;  need opus system  jose roberto notifed CC     ARTHROSCOPY OF KNEE Left 2021    Procedure: ARTHROSCOPY, KNEE;  Surgeon: Donnie Campuzano MD;  Location: [facility] OR;  Service: Orthopedics;  Laterality: Left;     SECTION      DECOMPRESSION OF SUBACROMIAL SPACE  2022    Procedure: DECOMPRESSION, SUBACROMIAL SPACE;  Surgeon: Ministerio Orr Jr., MD;  Location: [facility] OR;  Service: Orthopedics;;    EXCISION OF MASS OF EXTREMITY Left 2019    Procedure: EXCISION, MASS, EXTREMITY;  Surgeon: Honorio Pulido IV, MD;  Location: [facility] OR;  Service: Orthopedics;  Laterality: Left;  excision lipoma  Request Lacie    HERNIA REPAIR      HYSTERECTOMY      KNEE ARTHROSCOPY W/ MENISCECTOMY  2021    Procedure: ARTHROSCOPY, KNEE, WITH MENISCECTOMY;  Surgeon: Donnie Campuzano MD;  Location: [facility] OR;  Service: Orthopedics;;    NASAL SEPTOPLASTY      RECONSTRUCTION OF ACROMIOCLAVICULAR JOINT  2022    Procedure: RECONSTRUCTION, ACROMIOCLAVICULAR JOINT;  Surgeon: Ministerio Orr Jr., MD;  Location: [facility];  Service: Orthopedics;;       Time Tracking:     OT Date of Treatment: 10/26/22  OT Start Time: 1559  OT Stop Time: 1619  OT Total Time (min): 20 min    Billable Minutes:Evaluation 10  Self Care/Home Management 10    10/26/2022

## 2022-10-26 NOTE — NURSING
BP Rechecked post pain med administration.  .  MD made aware. Patient also  Diabetic with  pre dinner . Dr Villalobos aware and will place diabetic order set .

## 2022-10-26 NOTE — NURSING TRANSFER
Nursing Transfer Note      10/26/2022     Reason patient is being transferred: recovery suites    Transfer To: 306    Transfer via bed    Transfer with alaris pump, nimbus pump    Transported by rn, pct    Medicines sent: yes    Any special needs or follow-up needed: none    Chart send with patient: Yes    Notified: mother

## 2022-10-26 NOTE — ANESTHESIA PROCEDURE NOTES
Spinal    Diagnosis: knee pain  Patient location during procedure: OR  Start time: 10/26/2022 11:20 AM  Timeout: 10/26/2022 11:18 AM  End time: 10/26/2022 11:25 AM    Staffing  Authorizing Provider: Jace Martin MD  Performing Provider: Jace Martin MD    Preanesthetic Checklist  Completed: patient identified, IV checked, site marked, risks and benefits discussed, surgical consent, monitors and equipment checked, pre-op evaluation and timeout performed  Spinal Block  Patient position: sitting  Prep: ChloraPrep  Patient monitoring: heart rate  Approach: midline  Location: L3-4  Injection technique: single shot  CSF Fluid: clear free-flowing CSF  Needle  Needle type: pencil-tip   Needle gauge: 25 G  Needle length: 5 in  Additional Documentation: incremental injection, no paresthesia on injection and negative aspiration for heme  Needle localization: anatomical landmarks  Assessment   Dermatomal levels determined by pinch or prick  Ease of block: easy  Patient's tolerance of the procedure: no complaints and comfortable throughout block  Medications:    Medications: mepivacaine (CARBOCAINE) injection 15 mg/mL (1.5%) - Other   3 mL - 10/26/2022 11:25:00 AM

## 2022-10-26 NOTE — H&P
CC:  Right knee pain     Candy Huynh is a 52 y.o. female with history of Right knee pain. Pain is worse with activity and weight bearing.  Patient has experienced interference of activities of daily living due to decreased range of motion and an increase in joint pain and swelling.  Patient has failed non-operative treatment including NSAIDs, corticosteroid injections, viscosupplement injections, and activity modification.  Candy Huynh currently ambulates independently.      Relevant medical conditions of significance in perioperative period:  HTN- on medication managed by pcp  Nicotine use  GERD- on medication managed by pcp          Past Medical History:   Diagnosis Date    Allergy      Anemia      Anxiety      Asthma       denies- on outside problem list in Care Everywhere    Depression      Diabetes mellitus      Diabetes mellitus, type 2      Fibromyalgia      GERD (gastroesophageal reflux disease)      Hypertension      Stroke       TIA    Vertigo                 Past Surgical History:   Procedure Laterality Date    ARTHROSCOPIC REPAIR OF ROTATOR CUFF OF SHOULDER Right 2022     Procedure: REPAIR, ROTATOR CUFF, ARTHROSCOPIC;  Surgeon: Ministerio Orr Jr., MD;  Location: [facility] OR;  Service: Orthopedics;  Laterality: Right;  need opus system  jose roberto notifed CC     ARTHROSCOPY OF KNEE Left 2021     Procedure: ARTHROSCOPY, KNEE;  Surgeon: Donnie Campuzano MD;  Location: [facility] OR;  Service: Orthopedics;  Laterality: Left;     SECTION        DECOMPRESSION OF SUBACROMIAL SPACE   2022     Procedure: DECOMPRESSION, SUBACROMIAL SPACE;  Surgeon: Ministerio Orr Jr., MD;  Location: [facility] OR;  Service: Orthopedics;;    EXCISION OF MASS OF EXTREMITY Left 2019     Procedure: EXCISION, MASS, EXTREMITY;  Surgeon: Honorio Pulido IV, MD;  Location: [facility] OR;  Service: Orthopedics;  Laterality: Left;  excision lipoma  Request Lacie    HERNIA REPAIR        HYSTERECTOMY        KNEE  ARTHROSCOPY W/ MENISCECTOMY   2021     Procedure: ARTHROSCOPY, KNEE, WITH MENISCECTOMY;  Surgeon: Donnie Campuzano MD;  Location: [facility] OR;  Service: Orthopedics;;    NASAL SEPTOPLASTY        RECONSTRUCTION OF ACROMIOCLAVICULAR JOINT   2022     Procedure: RECONSTRUCTION, ACROMIOCLAVICULAR JOINT;  Surgeon: Ministerio Orr Jr., MD;  Location: [facility] OR;  Service: Orthopedics;;               Family History   Problem Relation Age of Onset    No Known Problems Mother      Hypertension Father      Hypertension Sister      No Known Problems Sister      No Known Problems Brother      No Known Problems Daughter      No Known Problems Daughter      No Known Problems Daughter      No Known Problems Son                 Review of patient's allergies indicates:   Allergen Reactions    Morphine Other (See Comments)       shake            Current Outpatient Medications:     albuterol (PROVENTIL/VENTOLIN HFA) 90 mcg/actuation inhaler, SMARTSI Puff(s) By Mouth Every 4 Hours PRN, Disp: , Rfl:     amLODIPine (NORVASC) 5 MG tablet, Take 5 mg by mouth once daily., Disp: , Rfl:     azelastine (ASTELIN) 137 mcg (0.1 %) nasal spray, 1 spray once daily., Disp: , Rfl:     EPINEPHrine (EPIPEN) 0.3 mg/0.3 mL AtIn, as directed, Disp: , Rfl:     ergocalciferol (ERGOCALCIFEROL) 50,000 unit Cap, Take 50,000 Units by mouth every 7 days., Disp: , Rfl:     FLUoxetine 20 MG capsule, Take 40 mg by mouth once daily., Disp: , Rfl:     fluticasone propionate (FLONASE) 50 mcg/actuation nasal spray, 1 spray by Each Nostril route once daily., Disp: , Rfl:     folic acid (FOLVITE) 1 MG tablet, Take 1,000 mcg by mouth once daily., Disp: , Rfl:     loratadine (CLARITIN) 10 mg tablet, Take 10 mg by mouth once daily., Disp: , Rfl:     losartan (COZAAR) 100 MG tablet, Take 100 mg by mouth once daily., Disp: , Rfl:     meloxicam (MOBIC) 15 MG tablet, Take 1 tablet (15 mg total) by mouth once daily., Disp: 30 tablet, Rfl: 3    metFORMIN (GLUCOPHAGE) 1000 MG  "tablet, Take 1,000 mg by mouth 2 (two) times daily with meals., Disp: , Rfl:     montelukast (SINGULAIR) 10 mg tablet, , Disp: , Rfl:     nicotine polacrilex (NICORETTE) 4 MG Gum, SMARTSI By Mouth Every 1-2 Hours, Disp: , Rfl:     pantoprazole (PROTONIX) 40 MG tablet, TAKE 1 TABLET BY MOUTH ONCE DAILY 30 MINUTES BEFORE BREAKFAST, Disp: , Rfl:     prazosin (MINIPRESS) 1 MG Cap, TAKE 1 CAPSULE BY MOUTH EVERY NIGHT AT BEDTIME FOR 7 DAYS; THEN TAKE 2 CAPSULES BY MOUTH EVERY NIGHT AT BEDTIME FOR 7 DAYS; THEN TAKE 3 CAPSULES BY MOUTH EVERY NIGHT AT BEDTIME THEREAFTER AS DIRECTED BY PHYSICIAN, Disp: , Rfl:     PREMARIN 0.625 mg tablet, Take 0.625 mg by mouth once daily., Disp: , Rfl:     traMADoL (ULTRAM) 50 mg tablet, Take 50 mg by mouth every 6 (six) hours as needed., Disp: , Rfl:     estradioL (ESTRACE) 0.01 % (0.1 mg/gram) vaginal cream, Place vaginally., Disp: , Rfl:     HYDROcodone-acetaminophen (NORCO) 7.5-325 mg per tablet, Take 1 tablet by mouth every 6 (six) hours as needed for Pain., Disp: , Rfl:     metroNIDAZOLE (FLAGYL) 500 MG tablet, Take 500 mg by mouth 2 (two) times daily., Disp: , Rfl:     traMADoL (ULTRAM) 50 mg tablet, Take 1 tablet (50 mg total) by mouth every 6 (six) hours as needed for Pain., Disp: 40 tablet, Rfl: 0     Review of Systems:  Constitutional: no fever or chills  Eyes: no visual changes  ENT: no nasal congestion or sore throat  Respiratory: no cough or shortness of breath  Cardiovascular: no chest pain or palpitations  Gastrointestinal: no nausea or vomiting, tolerating diet  Genitourinary: no hematuria or dysuria  Integument/Breast: no rash or pruritis  Hematologic/Lymphatic: no easy bruising or lymphadenopathy  Musculoskeletal: positive for knee pain  Neurological: no seizures or tremors  Behavioral/Psych: no auditory or visual hallucinations  Endocrine: no heat or cold intolerance     PE:  Ht 5' 3" (1.6 m)   Wt 111.6 kg (246 lb)   BMI 43.58 kg/m²   General: Pleasant, cooperative, " NAD   Gait: antalgic  HEENT: NCAT, sclera nonicteric   Lungs: Respirations clear bilaterally; equal and unlabored.   CV: S1S2; 2+ bilateral upper and lower extremity pulses.   Skin: Intact throughout with no rashes, erythema, or lesions  Extremities: No LE edema,  no erythema or warmth of the skin in either lower extremity.     Right knee exam:  Knee Range of Motion:5-90, pain with passive range of motion  Effusion:none  Condition of skin:intact  Location of tenderness:Medial joint line   Strength:normal  Stability: stable to testing     Hip Examination: painless PROM of hip      Radiographs: Radiographs reveal advanced degenerative changes including subchondral cyst formation, subchondral sclerosis, osteophyte formation, joint space narrowing.      Knee Alignment: normal     Diagnosis: Primary osteoarthritis Right knee     Plan: Right total knee arthroplasty.   I explained to the patient that stiff knees make stiff knee replacements, and that they should not expect to achieve more flexion postoperatively than they have preoperatively.     Due to the serious nature of total joint infection and the high prevalence of community acquired MRSA, vancomycin will be used perioperatively.

## 2022-10-26 NOTE — ANESTHESIA PROCEDURE NOTES
Right adductor canal catheter    Patient location during procedure: pre-op   Block not for primary anesthetic.  Reason for block: at surgeon's request and post-op pain management   Post-op Pain Location: Right leg pain   Start time: 10/26/2022 9:56 AM  Timeout: 10/26/2022 9:55 AM   End time: 10/26/2022 10:05 AM    Staffing  Authorizing Provider: Jace Martin MD  Performing Provider: Rodrigo Marsh MD    Preanesthetic Checklist  Completed: patient identified, IV checked, site marked, risks and benefits discussed, surgical consent, monitors and equipment checked, pre-op evaluation and timeout performed  Peripheral Block  Patient position: supine  Prep: ChloraPrep and site prepped and draped  Patient monitoring: heart rate, cardiac monitor, continuous pulse ox, continuous capnometry and frequent blood pressure checks  Block type: adductor canal  Laterality: right  Injection technique: continuous  Needle  Needle type: Tuohy   Needle gauge: 17 G  Needle length: 3.5 in  Needle localization: anatomical landmarks and ultrasound guidance  Catheter type: spring wound  Catheter size: 19 G  Test dose: lidocaine 1.5% with Epi 1-to-200,000 and negative   -ultrasound image captured on disc.  Assessment  Injection assessment: negative aspiration, negative parasthesia and local visualized surrounding nerve  Paresthesia pain: none  Heart rate change: no  Slow fractionated injection: yes  Pain Tolerance: comfortable throughout block and no complaints  Medications:    Medications: ropivacaine (NAROPIN) injection 0.5% - Perineural   10 mL - 10/26/2022 10:05:00 AM    Additional Notes  VSS.  [facility] RN monitoring vitals throughout procedure.  Patient tolerated procedure well.

## 2022-10-26 NOTE — PLAN OF CARE
Problem: Occupational Therapy  Goal: Occupational Therapy Goal  Description: Goals to be met by: 10/28/2022     Patient will increase functional independence with ADLs by performing:    UE Dressing with Modified Maverick.  LE Dressing with Supervision.  Grooming while standing with Modified Maverick.  Toileting from toilet/bedside commode with Modified Maverick for hygiene and clothing management.   Toilet transfer to toilet/bedside commode with Modified Maverick and LRAD.    Outcome: Ongoing, Progressing   OT eval completed with goals established. Pt completed sit<>stands with CGA, UBD with Min (A), and LBD with Mod (A).

## 2022-10-26 NOTE — OP NOTE
Evelio Right TKA  OPERATIVE NOTE    Date of Operation:   10/26/2022    Providers Performing:   Surgeon(s):  Nolberto Fraser MD  Assistant: Bereket Durand MD    Operative Procedure:   Right Total Knee Arthroplasty, CPT 40547    Preoperative Diagnosis:   Right Knee Osteoarthritis, ICD-10 M17.11    Postoperative Diagnosis:   SAME    Components Used:       Implant Name Type Inv. Item Serial No.  Lot No. LRB No. Used Action   CEMENT BONE W/GENT SIMPLEX HV - [id number]  CEMENT BONE W/GENT SIMPLEX HV  [employer] [id number] Right 2 Implanted   TIBIAL INSERT POST SZ CD 3-4 1 - [id number]  TIBIAL INSERT POST SZ CD 3-4 1  BALDO,INC [id number] Right 1 Implanted   FEMORAL POST STBLZD SZ D RIGH - [id number]  FEMORAL POST STBLZD SZ D RIGH  BALDO,INC [id number] Right 1 Implanted   PLUG TAPER - [id number]  PLUG TAPER  [employer]DO,INC [id number] Right 1 Implanted   TIBIAL NEXGEN PRECOAT STEM - [id number]  TIBIAL NEXGEN PRECOAT STEM  BALDO,INC [id number] Right 1 Implanted       Anesthesia:   Spinal  ACC    Estimated Blood Loss:   100 cc    Specimens:   None    Complications:   None    Indications:   The patient has failed non-operative measures including medications, injections and activity modifications for their right knee.  After an explanation of risks and benefits of knee arthroplasty surgery, the patient wishes to proceed with surgery.    Operative Notes:   After the induction of satisfactory anesthesia, the patient's right lower extremity was prepped and draped in the usual sterile fashion with the tourniquet cuff in place. The extremity was exsanguinated with an Esmarch bandage, and the tourniquet inflated. An anterior midline incision was made, and the dissection was carried sharply through the subcutaneous tissues and prepatellar bursa layer which was reflected medially. A modified medial parapatellar arthrotomy was performed, and the patella was subluxated laterally. There was a moderate amount of clear joint  fluid. Most of the fat pad was excised, and the medial release was completed around to the mid coronal point, subsequently to the posteromedial corner, sparing the semimembranosus and the pes anserinus. A drill hole was made in the distal femur, and the canal was suctioned. The sword was placed in 5° of valgus. The distal femoral cut was made. The femur was sized, and the secondary femoral cuts were made in 3° external rotation.The knee was flexed, and a drill hole was made in the footprint of the ACL. The canal was suctioned. An intramedullary alignment destiny was placed with good purchase in the isthmus. The upper tibial cut was made perpendicular in both planes. This wafer of bone was excised, and the extension gap was checked and found to be symmetric and satisfactory.  The flexion gap was checked after removing cruciate and meniscal remnants, and was found to be satisfactory with a spacer block. Flexion and extension gaps were symmetric. The femoral finishing guide was used, and trials were inserted. We used a tibial baseplate template which gave us good coverage. The rotational position of it was marked with the Bovie in extension after ranging the knee.  We had full extension without hyperextension, full flexion, good stability in both.  The patella was not resurfaced since it had normal articular cartilage, normal morphology, tracked well in the anterior femoral flange.  The trials were removed, and the tibia was drilled and punched. The bone was cleaned with pulsatile lavage and dried thoroughly. The components were impacted in the usual fashion with Simplex HV plus gentamicin cement. Patellar tracking was central without lateral release. The knee was irrigated with Betadine and 3 liters of pulsatile lavage.  Vancomycin powder 1 g was placed in the knee joint. The arthrotomy was repaired with running and nterrupted figure-of-eight sutures of #1 Vicryl. The prepatellar bursa tissue was closed with interrupted 0  Vicryl. The skin was closed with interrupted, inverted 2-0 Vicryl, running 3-0 Monocryl, and Dermabond.  An Aquacel and Bender dressing was applied, and the patient was taken to the PACU in stable condition without apparent orthopedic or anesthetic complications.    Sponge and needle counts were correct.    The first-assistant was critical to all steps of the operation, including retraction and leg stabilization during exposure and bone preparation, as well as the deep and superficial wound closure.  Dr. Fraser performed and/or supervised the key portions of this surgical procedure, including evaluation of the bone cuts, reshaping of the bony elements, and insertion of the provisional and final components.    Postoperative Plan:  The patient will be anticoagulated with 81mg aspirin twice daily for one month.   They will receive 24 hours of post-operative antibiotics.    Activity will be weight bearing as tolerated.    Signed by: Nolberto Fraser MD

## 2022-10-26 NOTE — ANESTHESIA PREPROCEDURE EVALUATION
10/26/2022  Candy Huynh is a 52 y.o., female   Pre-operative evaluation for Procedure(s) (LRB):  ARTHROPLASTY, KNEE, TOTAL RIGHT: BALDO - NEXGEN (Right)    Candy Huynh is a 52 y.o. female     Patient Active Problem List   Diagnosis    Lipoma of arm    Quadriceps weakness    Gait abnormality    Decreased range of motion (ROM) of left knee    Decreased functional mobility    Acute pain of left knee    Knee pain    Degenerative tear of left medial meniscus    Degenerative tear of lateral meniscus, left    Morbid obesity with BMI of 45.0-49.9, adult    Difficulty walking    Status post arthroscopy of knee    Primary osteoarthritis of right knee    Tear of right supraspinatus tendon    Rotator cuff impingement syndrome of right shoulder    Biceps tendinitis of right upper extremity    Depression    Diabetes mellitus    Hypertension    Fibromyalgia    Gastroesophageal reflux disease without esophagitis    Personal history of TIA (transient ischemic attack)    Mild intermittent asthma without complication    GATITO (obstructive sleep apnea)    Normocytic anemia    Decreased range of motion of right shoulder    Decreased strength of upper extremity    Hypertrophy of nasal turbinates       Review of patient's allergies indicates:   Allergen Reactions    Morphine Other (See Comments)     shake       No current facility-administered medications on file prior to encounter.     Current Outpatient Medications on File Prior to Encounter   Medication Sig Dispense Refill    albuterol (PROVENTIL/VENTOLIN HFA) 90 mcg/actuation inhaler SMARTSI Puff(s) By Mouth Every 4 Hours PRN      amLODIPine (NORVASC) 5 MG tablet Take 5 mg by mouth once daily.      azelastine (ASTELIN) 137 mcg (0.1 %) nasal spray 1 spray once daily.      EPINEPHrine (EPIPEN) 0.3 mg/0.3 mL AtIn as directed       ergocalciferol (ERGOCALCIFEROL) 50,000 unit Cap Take 50,000 Units by mouth every 7 days.      FLUoxetine 20 MG capsule Take 40 mg by mouth once daily.      fluticasone propionate (FLONASE) 50 mcg/actuation nasal spray 1 spray by Each Nostril route once daily.      folic acid (FOLVITE) 1 MG tablet Take 1,000 mcg by mouth once daily.      loratadine (CLARITIN) 10 mg tablet Take 10 mg by mouth once daily.      losartan (COZAAR) 100 MG tablet Take 100 mg by mouth once daily.      meloxicam (MOBIC) 15 MG tablet Take 1 tablet (15 mg total) by mouth once daily. 30 tablet 3    metFORMIN (GLUCOPHAGE) 1000 MG tablet Take 1,000 mg by mouth 2 (two) times daily with meals.      montelukast (SINGULAIR) 10 mg tablet       pantoprazole (PROTONIX) 40 MG tablet TAKE 1 TABLET BY MOUTH ONCE DAILY 30 MINUTES BEFORE BREAKFAST      PREMARIN 0.625 mg tablet Take 0.625 mg by mouth once daily.      estradioL (ESTRACE) 0.01 % (0.1 mg/gram) vaginal cream Place vaginally.      nicotine polacrilex (NICORETTE) 4 MG Gum SMARTSI By Mouth Every 1-2 Hours      prazosin (MINIPRESS) 1 MG Cap TAKE 1 CAPSULE BY MOUTH EVERY NIGHT AT BEDTIME FOR 7 DAYS; THEN TAKE 2 CAPSULES BY MOUTH EVERY NIGHT AT BEDTIME FOR 7 DAYS; THEN TAKE 3 CAPSULES BY MOUTH EVERY NIGHT AT BEDTIME THEREAFTER AS DIRECTED BY PHYSICIAN         Past Surgical History:   Procedure Laterality Date    ARTHROSCOPIC REPAIR OF ROTATOR CUFF OF SHOULDER Right 2022    Procedure: REPAIR, ROTATOR CUFF, ARTHROSCOPIC;  Surgeon: Ministerio Orr Jr., MD;  Location: [facility] OR;  Service: Orthopedics;  Laterality: Right;  need opus system  Anabaptism notifed CC     ARTHROSCOPY OF KNEE Left 2021    Procedure: ARTHROSCOPY, KNEE;  Surgeon: Donnie Campuzano MD;  Location: [facility] OR;  Service: Orthopedics;  Laterality: Left;     SECTION      DECOMPRESSION OF SUBACROMIAL SPACE  2022    Procedure: DECOMPRESSION, SUBACROMIAL SPACE;  Surgeon: Ministerio Orr Jr., MD;  Location:  [facility] OR;  Service: Orthopedics;;    EXCISION OF MASS OF EXTREMITY Left 12/6/2019    Procedure: EXCISION, MASS, EXTREMITY;  Surgeon: Honorio Pulido IV, MD;  Location: [facility] OR;  Service: Orthopedics;  Laterality: Left;  excision lipoma  Request Lacie    HERNIA REPAIR      HYSTERECTOMY      KNEE ARTHROSCOPY W/ MENISCECTOMY  1/11/2021    Procedure: ARTHROSCOPY, KNEE, WITH MENISCECTOMY;  Surgeon: Donnie Campuzano MD;  Location: [facility] OR;  Service: Orthopedics;;    NASAL SEPTOPLASTY      RECONSTRUCTION OF ACROMIOCLAVICULAR JOINT  9/6/2022    Procedure: RECONSTRUCTION, ACROMIOCLAVICULAR JOINT;  Surgeon: Ministerio Orr Jr., MD;  Location: [facility] OR;  Service: Orthopedics;;     Lab Results   Component Value Date    WBC 5.31 10/11/2022    HGB 11.5 (L) 10/11/2022    HCT 36.4 (L) 10/11/2022    MCV 92 10/11/2022     10/11/2022         Pre-op Assessment    I have reviewed the Patient Summary Reports.     I have reviewed the Nursing Notes. I have reviewed the NPO Status.   I have reviewed the Medications.     Review of Systems  Anesthesia Hx:  No problems with previous Anesthesia Denies Hx of Anesthetic complications  History of prior surgery of interest to airway management or planning: Denies Family Hx of Anesthesia complications.   Denies Personal Hx of Anesthesia complications.   Social:  Former Smoker, Social Alcohol Use    Hematology/Oncology:  Hematology Normal   Oncology Normal     EENT/Dental:EENT/Dental Normal   Cardiovascular:   Exercise tolerance: good Hypertension    Pulmonary:  Pulmonary Normal    Renal/:  Renal/ Normal     Hepatic/GI:   GERD    Musculoskeletal:   Arthritis     Neurological:   CVA Neuromuscular Disease,    Endocrine:   Diabetes, type 2  Morbid Obesity / BMI > 40  Psych:   depression          Physical Exam  General: Well nourished, Cooperative, Alert and Oriented    Airway:  Mallampati: III   Mouth Opening: Normal  TM Distance: Normal  Tongue: Normal  Neck ROM: Normal  ROM    Dental:  Intact    Chest/Lungs:  Clear to auscultation, Normal Respiratory Rate    Heart:  Rate: Normal  Rhythm: Regular Rhythm        Anesthesia Plan  Type of Anesthesia, risks & benefits discussed:    Anesthesia Type: Epidural, CSE  Intra-op Monitoring Plan: Standard ASA Monitors  Post Op Pain Control Plan: epidural analgesia  Informed Consent: Informed consent signed with the Patient and all parties understand the risks and agree with anesthesia plan.  All questions answered. Patient consented to blood products? Yes  ASA Score: 2    Ready For Surgery From Anesthesia Perspective.     .

## 2022-10-26 NOTE — PLAN OF CARE
Patient tolerated PT session fair due to right knee pain. Patient ambulated 6 steps from bed to bedside commode and 6 steps from bedside commode to bedside chair with RW and contact guard assistance. No LOB or SOB noted. Patient has an OPPT appointment on 10/28/2022.       Problem: Physical Therapy  Goal: Physical Therapy Goal  Description: Goals to be met by: 10/28/2022    Patient will increase functional independence with mobility by performin. Supine to sit with supervision  2. Sit to stand transfer with Supervision  3. Gait x200 feet with Supervision using Rolling Walker  4. Ascend/Descend 1 curb step with Stand by assistance using Rolling Walker  5. Lower extremity exercise program x30 reps per handout, with supervision        Outcome: Ongoing, Progressing

## 2022-10-26 NOTE — NURSING
Pt C/O pain 10 /10 and is crying and shaking.  Dr. Villalobos made aware and ordered 1 mg dilaudid IV .  SBP elevated in 190s at that time. Dr. Villalobos aware .

## 2022-10-26 NOTE — PT/OT/SLP EVAL
Physical Therapy Evaluation and Treatment     Patient Name:  Candy Huynh   MRN:  [id number]    Recommendations:     Discharge Recommendations:  outpatient PT   Discharge Equipment Recommendations: bedside commode, walker, rolling   Barriers to discharge: None    Assessment:     Candy Huynh is a 52 y.o. female admitted with a medical diagnosis of Primary osteoarthritis of right knee.  She presents with the following impairments/functional limitations:  weakness, impaired functional mobility, gait instability, impaired balance, decreased lower extremity function, pain, decreased ROM, impaired skin, orthopedic precautions. She is s/p R rotator cuff repair on 9/6/2022. She stated that she only went to 1 outpatient appointment and has not gone back since. Patient tolerated PT session fair due to right knee pain. Patient ambulated 6 steps from bed to bedside commode and 6 steps from bedside commode to bedside chair with RW and contact guard assistance. No LOB or SOB noted. Patient has an OPPT appointment on 10/28/2022.     Rehab Prognosis: Good; patient would benefit from acute skilled PT services to address these deficits and reach maximum level of function.    Recent Surgery: Procedure(s) (LRB):  ARTHROPLASTY, KNEE, TOTAL RIGHT: BALDO - NEXGEN (Right) Day of Surgery    Plan:     During this hospitalization, patient to be seen daily to address the identified rehab impairments via gait training, therapeutic activities, therapeutic exercises and progress toward the following goals:    Plan of Care Expires:  10/28/22    Subjective     Chief Complaint: Right knee pain.   Patient/Family Comments/goals: To dance.   Pain/Comfort:  Pain Rating 1:  (yes but did not rate with number)  Location - Side 1: Right  Location 1: knee  Pain Addressed 1: Pre-medicate for activity, Reposition, Distraction, Cessation of Activity, Nurse notified    Patients cultural, spiritual, Oriental orthodox conflicts given the current situation:   n/a    Living Environment:  Patient lives with her daughter in a 2 story town home with 1 step to enter. She moved all her stuff to the 1st floor for her recovery. Prior to admission, patients level of function was modified independent with straight cane as needed for functional mobility. Equipment at home: cane, straight and bath bench. Upon discharge, patient will have assistance from daughter and family.    Objective:     Communicated with RN prior to session.  Patient found supine with cryotherapy, FCD, perineural catheter, peripheral IV (Nimbus) upon PT entry to room.    General Precautions: Standard, fall   Orthopedic Precautions:RLE weight bearing as tolerated   Braces: N/A  Respiratory Status: Room air    Exams:  Cognitive Exam:  Patient is oriented to Person, Place, Time, and Situation  Gross Motor Coordination:  WFL  Sensation:    -       Intact  RLE ROM: WFL except limited at knee due to pain  RLE Strength: appears WFL but did not formally assess due to pain and recent surgery  LLE ROM: WFL  LLE Strength: WFL    Functional Mobility:  Bed Mobility:     Scooting: contact guard assistance  Supine to Sit: contact guard assistance  Transfers:     Sit to Stand:  contact guard assistance with rolling walker x1 from bed, x1 from bedside commode, and x1 from bedside chair   Toilet Transfer (bed to bedside commode): 6 steps with contact guard assistance and rolling walker    Gait: Patient ambulated 6 steps from bedside commode to bedside chair with RW and contact guard assistance. Patient limited in ambulation distance due to right knee pain.     AM-PAC 6 CLICK MOBILITY  Total Score:17       Treatment & Education:  Patient and family educated in:  -PT role and POC  -safety with transfers including hand placement  -gait sequencing and RW management  -OOB activity to maximize recovery including ambulating with nursing staff assistance and RW while in the hospital     Patient left up in chair with all lines intact,  call button in reach, and RN and family present.    GOALS:   Multidisciplinary Problems       Physical Therapy Goals          Problem: Physical Therapy    Goal Priority Disciplines Outcome Goal Variances Interventions   Physical Therapy Goal     PT, PT/OT Ongoing, Progressing     Description: Goals to be met by: 10/28/2022    Patient will increase functional independence with mobility by performin. Supine to sit with supervision  2. Sit to stand transfer with Supervision  3. Gait x200 feet with Supervision using Rolling Walker  4. Ascend/Descend 1 curb step with Stand by assistance using Rolling Walker  5. Lower extremity exercise program x30 reps per handout, with supervision                             History:     Past Medical History:   Diagnosis Date    Allergy     Anemia     Anxiety     Asthma     denies- on outside problem list in Care Everywhere    Depression     Diabetes mellitus     Diabetes mellitus, type 2     Fibromyalgia     GERD (gastroesophageal reflux disease)     Hypertension     Stroke     TIA    Vertigo        Past Surgical History:   Procedure Laterality Date    ARTHROSCOPIC REPAIR OF ROTATOR CUFF OF SHOULDER Right 2022    Procedure: REPAIR, ROTATOR CUFF, ARTHROSCOPIC;  Surgeon: Ministerio Orr Jr., MD;  Location: [facility] OR;  Service: Orthopedics;  Laterality: Right;  need opus system  Yazidism notifed CC     ARTHROSCOPY OF KNEE Left 2021    Procedure: ARTHROSCOPY, KNEE;  Surgeon: Donnie Campuzano MD;  Location: [facility] OR;  Service: Orthopedics;  Laterality: Left;     SECTION      DECOMPRESSION OF SUBACROMIAL SPACE  2022    Procedure: DECOMPRESSION, SUBACROMIAL SPACE;  Surgeon: Ministerio Orr Jr., MD;  Location: [facility] OR;  Service: Orthopedics;;    EXCISION OF MASS OF EXTREMITY Left 2019    Procedure: EXCISION, MASS, EXTREMITY;  Surgeon: Honorio Pulido IV, MD;  Location: [facility] OR;  Service: Orthopedics;  Laterality: Left;  excision lipoma  Request Lacie    HERNIA  REPAIR      HYSTERECTOMY      KNEE ARTHROSCOPY W/ MENISCECTOMY  1/11/2021    Procedure: ARTHROSCOPY, KNEE, WITH MENISCECTOMY;  Surgeon: Donnie Campuzano MD;  Location: [facility] OR;  Service: Orthopedics;;    NASAL SEPTOPLASTY      RECONSTRUCTION OF ACROMIOCLAVICULAR JOINT  9/6/2022    Procedure: RECONSTRUCTION, ACROMIOCLAVICULAR JOINT;  Surgeon: Ministerio Orr Jr., MD;  Location: [facility] OR;  Service: Orthopedics;;       Time Tracking:     PT Received On: 10/26/22  PT Start Time: 1536     PT Stop Time: 1555  PT Total Time (min): 19 min     Billable Minutes: Evaluation 9 and Therapeutic Activity 10      10/26/2022

## 2022-10-27 ENCOUNTER — OFFICE VISIT (OUTPATIENT)
Dept: ORTHOPEDICS | Facility: CLINIC | Age: 52
End: 2022-10-27
Payer: MEDICAID

## 2022-10-27 VITALS
OXYGEN SATURATION: 98 % | HEIGHT: 63 IN | HEART RATE: 72 BPM | DIASTOLIC BLOOD PRESSURE: 70 MMHG | BODY MASS INDEX: 43.94 KG/M2 | WEIGHT: 248 LBS | RESPIRATION RATE: 16 BRPM | TEMPERATURE: 98 F | SYSTOLIC BLOOD PRESSURE: 141 MMHG

## 2022-10-27 VITALS — BODY MASS INDEX: 43.94 KG/M2 | HEIGHT: 63 IN | WEIGHT: 248 LBS

## 2022-10-27 DIAGNOSIS — M75.101 TEAR OF RIGHT SUPRASPINATUS TENDON: Primary | ICD-10-CM

## 2022-10-27 LAB — POCT GLUCOSE: 116 MG/DL (ref 70–110)

## 2022-10-27 PROCEDURE — 99024 POSTOP FOLLOW-UP VISIT: CPT | Mod: 95,,, | Performed by: ORTHOPAEDIC SURGERY

## 2022-10-27 PROCEDURE — 25000003 PHARM REV CODE 250

## 2022-10-27 PROCEDURE — 99212 OFFICE O/P EST SF 10 MIN: CPT | Mod: ,,, | Performed by: ANESTHESIOLOGY

## 2022-10-27 PROCEDURE — 97116 GAIT TRAINING THERAPY: CPT

## 2022-10-27 PROCEDURE — 63600175 PHARM REV CODE 636 W HCPCS: Performed by: ANESTHESIOLOGY

## 2022-10-27 PROCEDURE — 97535 SELF CARE MNGMENT TRAINING: CPT

## 2022-10-27 PROCEDURE — 25000003 PHARM REV CODE 250: Performed by: NURSE PRACTITIONER

## 2022-10-27 PROCEDURE — 99212 PR OFFICE/OUTPT VISIT, EST, LEVL II, 10-19 MIN: ICD-10-PCS | Mod: ,,, | Performed by: ANESTHESIOLOGY

## 2022-10-27 PROCEDURE — 3044F PR MOST RECENT HEMOGLOBIN A1C LEVEL <7.0%: ICD-10-PCS | Mod: CPTII,95,, | Performed by: ORTHOPAEDIC SURGERY

## 2022-10-27 PROCEDURE — 25000003 PHARM REV CODE 250: Performed by: ANESTHESIOLOGY

## 2022-10-27 PROCEDURE — 3044F HG A1C LEVEL LT 7.0%: CPT | Mod: CPTII,95,, | Performed by: ORTHOPAEDIC SURGERY

## 2022-10-27 PROCEDURE — 63600175 PHARM REV CODE 636 W HCPCS

## 2022-10-27 PROCEDURE — 97530 THERAPEUTIC ACTIVITIES: CPT

## 2022-10-27 PROCEDURE — 97110 THERAPEUTIC EXERCISES: CPT

## 2022-10-27 PROCEDURE — 1159F PR MEDICATION LIST DOCUMENTED IN MEDICAL RECORD: ICD-10-PCS | Mod: CPTII,95,, | Performed by: ORTHOPAEDIC SURGERY

## 2022-10-27 PROCEDURE — 99024 PR POST-OP FOLLOW-UP VISIT: ICD-10-PCS | Mod: 95,,, | Performed by: ORTHOPAEDIC SURGERY

## 2022-10-27 PROCEDURE — 4010F PR ACE/ARB THEARPY RXD/TAKEN: ICD-10-PCS | Mod: CPTII,95,, | Performed by: ORTHOPAEDIC SURGERY

## 2022-10-27 PROCEDURE — 1159F MED LIST DOCD IN RCRD: CPT | Mod: CPTII,95,, | Performed by: ORTHOPAEDIC SURGERY

## 2022-10-27 PROCEDURE — 4010F ACE/ARB THERAPY RXD/TAKEN: CPT | Mod: CPTII,95,, | Performed by: ORTHOPAEDIC SURGERY

## 2022-10-27 RX ORDER — HYDROMORPHONE HYDROCHLORIDE 1 MG/ML
0.5 INJECTION, SOLUTION INTRAMUSCULAR; INTRAVENOUS; SUBCUTANEOUS ONCE
Status: COMPLETED | OUTPATIENT
Start: 2022-10-27 | End: 2022-10-27

## 2022-10-27 RX ORDER — MELOXICAM 15 MG/1
15 TABLET ORAL DAILY
Status: DISCONTINUED | OUTPATIENT
Start: 2022-10-27 | End: 2022-10-27 | Stop reason: HOSPADM

## 2022-10-27 RX ADMIN — SENNOSIDES AND DOCUSATE SODIUM 1 TABLET: 50; 8.6 TABLET ORAL at 08:10

## 2022-10-27 RX ADMIN — SUCRALFATE 1 G: 1 SUSPENSION ORAL at 12:10

## 2022-10-27 RX ADMIN — AMLODIPINE BESYLATE 5 MG: 5 TABLET ORAL at 08:10

## 2022-10-27 RX ADMIN — CEFAZOLIN SODIUM 2 G: 2 SOLUTION INTRAVENOUS at 03:10

## 2022-10-27 RX ADMIN — OXYCODONE 5 MG: 5 TABLET ORAL at 10:10

## 2022-10-27 RX ADMIN — SUCRALFATE 1 G: 1 SUSPENSION ORAL at 06:10

## 2022-10-27 RX ADMIN — FLUOXETINE 40 MG: 10 CAPSULE ORAL at 08:10

## 2022-10-27 RX ADMIN — ACETAMINOPHEN 1000 MG: 500 TABLET ORAL at 04:10

## 2022-10-27 RX ADMIN — PANTOPRAZOLE SODIUM 40 MG: 40 TABLET, DELAYED RELEASE ORAL at 08:10

## 2022-10-27 RX ADMIN — POLYETHYLENE GLYCOL 3350 17 G: 17 POWDER, FOR SOLUTION ORAL at 08:10

## 2022-10-27 RX ADMIN — ASPIRIN 81 MG: 81 TABLET, COATED ORAL at 08:10

## 2022-10-27 RX ADMIN — HYDROMORPHONE HYDROCHLORIDE 0.5 MG: 1 INJECTION, SOLUTION INTRAMUSCULAR; INTRAVENOUS; SUBCUTANEOUS at 09:10

## 2022-10-27 RX ADMIN — OXYCODONE HYDROCHLORIDE 10 MG: 10 TABLET ORAL at 01:10

## 2022-10-27 RX ADMIN — ALUMINUM HYDROXIDE, MAGNESIUM HYDROXIDE, AND SIMETHICONE 30 ML: 200; 200; 20 SUSPENSION ORAL at 06:10

## 2022-10-27 RX ADMIN — LOSARTAN POTASSIUM 100 MG: 25 TABLET, FILM COATED ORAL at 08:10

## 2022-10-27 RX ADMIN — METHOCARBAMOL 750 MG: 750 TABLET ORAL at 08:10

## 2022-10-27 RX ADMIN — MONTELUKAST 10 MG: 10 TABLET, FILM COATED ORAL at 08:10

## 2022-10-27 RX ADMIN — MUPIROCIN 1 G: 20 OINTMENT TOPICAL at 08:10

## 2022-10-27 RX ADMIN — MELOXICAM 15 MG: 15 TABLET ORAL at 08:10

## 2022-10-27 RX ADMIN — OXYCODONE HYDROCHLORIDE 10 MG: 10 TABLET ORAL at 04:10

## 2022-10-27 RX ADMIN — METFORMIN HYDROCHLORIDE 1000 MG: 500 TABLET ORAL at 08:10

## 2022-10-27 RX ADMIN — SODIUM CHLORIDE: 0.9 INJECTION, SOLUTION INTRAVENOUS at 03:10

## 2022-10-27 NOTE — NURSING
[facility] Pump teaching done w/patient & patients Mom .Instructed to remove on day 5,   10/31     at 12 noon or when pump alarms infusion complete. Demonstrated and explained how to remove catheter.Pt and pts      Mom       verbalized understanding.  All questions answered. [facility] Pump contract signed, home care instxn pamphlet, home care supplies provided to patient, placed in discharge folder. Encouraged to contact anesthesia using # on brochure with any questions/concerns re: pain, side effects.. Instructed to call [facility] for any pump related issues. Informed that pt/fmly will receive follow up calls.

## 2022-10-27 NOTE — ASSESSMENT & PLAN NOTE
52 y.o. female POD1 s/p right TKA 10.26.2    Pain control: multimodal  PT/OT: WBAT RLE with walker.   DVT PPx: ASA 81 BID, FCDs at all times when not ambulating  Abx: postop Ancef  Labs: glucose 127  Drain: None  Rod: None    Dispo: f/u PT recs. DC home today

## 2022-10-27 NOTE — PT/OT/SLP PROGRESS
"Occupational Therapy   Treatment/Discharge Summary    Name: Candy Huynh  MRN: [id number]  Admitting Diagnosis:  Primary osteoarthritis of right knee  1 Day Post-Op    Recommendations:     Discharge Recommendations: outpatient PT  Discharge Equipment Recommendations:  bedside commode, walker, rolling  Barriers to discharge:  None    Assessment:     Candy Huynh is a 52 y.o. female with a medical diagnosis of Primary osteoarthritis of right knee.  She presents with s/p R TKA. Pt completed bed mobility supine<>sit x2 trials with anesthesiology visiting during session to assess surgical LE and administer medication in supine. She completed bed mobility and LBD with supervision and grooming/hygiene and UBD with Mod (I). Pt is appropriate for D/C home. Performance deficits affecting function are weakness, impaired endurance, impaired self care skills, impaired functional mobility, gait instability, impaired balance, pain, decreased safety awareness, decreased lower extremity function, decreased ROM, orthopedic precautions, impaired sensation.     Rehab Prognosis:  Good; patient would benefit from acute skilled OT services to address these deficits and reach maximum level of function.       Plan:     D/C from OT    Subjective   "[place] my knee hurts"  Pain/Comfort:  Pain Rating 1:  (unrated)  Location - Side 1: Right  Location - Orientation 1: generalized  Location 1: knee  Pain Addressed 1: Pre-medicate for activity, Reposition, Distraction, Cessation of Activity  Pain Rating Post-Intervention 1: 8/10    Objective:     Communicated with: RN prior to session.  Patient found HOB elevated with cryotherapy, FCD, perineural catheter (Nimbus) upon OT entry to room.    General Precautions: Standard, fall   Orthopedic Precautions:RLE weight bearing as tolerated   Braces: N/A  Respiratory Status: Room air     Occupational Performance:     Bed Mobility:    Patient completed Scooting/Bridging with supervision  Patient " completed Supine to Sit with supervision  Patient completed Sit to Supine with supervision     Functional Mobility/Transfers:  Patient completed Sit <> Stand Transfer with supervision  with  rolling walker   Patient completed Bed <> Chair Transfer using Step Transfer technique with supervision with rolling walker  Functional Mobility: Pt completed functional ambulation EOB>bathroom sink>EOB then EOB>bedside chair with SPV and RW  No LOB observed    Activities of Daily Living:  Grooming: modified independence to brush hair and teeth in standing at sink  Upper Body Dressing: modified independence to doff gown and don overhead dress  Lower Body Dressing: supervision to don underwear and manipulate above hips in standing. Pt was able to don/doff B socks and was unable to don slip on slippers d/t absence of full back shoes to ensure safety      [facility] 6 Click ADL: 24    Treatment & Education:  -Pt educated to dress surgical site first and further dressing techniques s/p surgery  -Pt educated on hand placement for transfers  -Pt educated on RW placement for ADLs  -Pt educated on proper foot wear s/p surgery  -Pt educated on car transfer technique  -Pt educated on weightbearing and surgical precautions with pt verbalizing 1/1 correctly  -Pt educated on role of OT and plan of care s/p surgery at [facility], white board updated     Patient left up in chair with all lines intact, call button in reach, RN notified, and mother present    GOALS:   Multidisciplinary Problems       Occupational Therapy Goals          Problem: Occupational Therapy    Goal Priority Disciplines Outcome Interventions   Occupational Therapy Goal     OT, PT/OT Ongoing, Progressing    Description: Goals to be met by: 10/28/2022     Patient will increase functional independence with ADLs by performing:    UE Dressing with Modified [place].  LE Dressing with Supervision.  Grooming while standing with Modified [place].  Toileting from  toilet/bedside commode with Modified [place] for hygiene and clothing management.   Toilet transfer to toilet/bedside commode with Modified [place] and LRAD.                         Time Tracking:     OT Date of Treatment: 10/27/22  OT Start Time: 0844  OT Stop Time: 0927  OT Total Time (min): 43 min    Billable Minutes:Self Care/Home Management 23  Therapeutic Activity 20    OT/INDER: OT          10/27/2022

## 2022-10-27 NOTE — PROGRESS NOTES
Acute Pain Service and Perioperative Surgical Home Progress Note    HPI  Candy Huynh is a 52 y.o., female,     Interval history  52-year-old female status post right knee replacement, no acute events overnight    Surgery:  Procedure(s) (LRB):  ARTHROPLASTY, KNEE, TOTAL RIGHT: BALDO - NEXGEN (Right)    Post Op Day #: 1    Catheter type: Perineural Adductor Canal    Infusion type: Ropivacaine 0.2%, with a 4cc automatic bolus every 3 hours, combined with a 5 cc patient controlled bolus available [id number]    Problem List:    Active Hospital Problems    Diagnosis  POA    *Primary osteoarthritis of right knee [M17.11]  Yes      Resolved Hospital Problems   No resolved problems to display.       Subjective:       General appearance of alert, oriented, no complaints   Pain with rest: 3    Numbers   Pain with movement: 4    Numbers   Side Effects    1. Pruritis No    2. Nausea No    3. Motor Blockade Yes, 0=Ability to raise lower extremities off bed    4. Sedation No, 1=awake and alert    Schedule Medications:    acetaminophen  1,000 mg Oral Q6H    aluminum-magnesium hydroxide-simethicone  30 mL Oral QID (AC & HS)    amLODIPine  5 mg Oral Daily    aspirin  81 mg Oral BID    cetirizine  10 mg Oral Daily    ergocalciferol  50,000 Units Oral Q7 Days    estrogens (conjugated)  0.625 mg Oral Daily    famotidine  20 mg Oral BID    FLUoxetine  40 mg Oral Daily    fluticasone propionate  1 spray Each Nostril Daily    losartan  100 mg Oral Daily    meloxicam  15 mg Oral Daily    metFORMIN  1,000 mg Oral BID WM    methocarbamoL  750 mg Oral TID    montelukast  10 mg Oral Daily    mupirocin  1 g Nasal BID    pantoprazole  40 mg Oral Daily    polyethylene glycol  17 g Oral Daily    pregabalin  75 mg Oral QHS    senna-docusate 8.6-50 mg  1 tablet Oral BID    sodium citrate-citric acid 500-334 mg/5 ml  30 mL Oral Once    sucralfate  1 g Oral Q6H        Continuous Infusions:   sodium chloride 0.9% 150 mL/hr at 10/27/22 3781     ropivacaine (PF) 2 mg/ml (0.2%)          PRN Medications:  albuterol, bisacodyL, dextrose 10%, dextrose 10%, EPINEPHrine (PF), glucagon (human recombinant), glucose, glucose, insulin aspart U-100, morphine, naloxone, ondansetron, oxyCODONE, oxyCODONE, prochlorperazine       Antibiotics:  Antibiotics (From admission, onward)      Start     Stop Route Frequency Ordered    10/26/22 2100  mupirocin 2 % ointment 1 g         10/31 2059 Nasl 2 times daily 10/26/22 1542               Objective:     Catheter site clean, dry, intact          Vital Signs (Most Recent):  Temp: 98 °F (36.7 °C) (10/27/22 0419)  Pulse: (!) 59 (10/27/22 0419)  Resp: 16 (10/27/22 0442)  BP: (!) 141/73 (10/27/22 0419)  SpO2: 100 % (10/27/22 0419)   Vital Signs Range (Last 24H):  Temp:  [97.3 °F (36.3 °C)-98.2 °F (36.8 °C)]   Pulse:  [51-79]   Resp:  [10-22]   BP: ()/()   SpO2:  [96 %-100 %]          I & O (Last 24H):  Intake/Output Summary (Last 24 hours) at 10/27/2022 0548  Last data filed at 10/27/2022 0330  Gross per 24 hour   Intake 2400 ml   Output 2900 ml   Net -500 ml       Physical Exam:    GA: Alert, comfortable, no acute distress.   Pulmonary: Clear to auscultation A/P/L. No wheezing, crackles, or rhonchi.  Cardiac: RRR S1 & S2 w/o rubs/murmurs/gallops.   Abdominal:Bowel sounds present. No tenderness to palpation or distension. No appreciable hepatosplenomegaly.   Skin: No jaundice, rashes, or visible lesions.         Laboratory:  CBC: No results for input(s): WBC, RBC, HGB, HCT, PLT, MCV, MCH, MCHC in the last 72 hours.    BMP: No results for input(s): NA, K, CO2, CL, BUN, CREATININE, GLU, MG, PHOS, CALCIUM in the last 72 hours.    No results for input(s): PT, INR, PROTIME, APTT in the last 72 hours.      Anticoagulant dose aspirin 81 mg administered at 9:02 p.m..    Assessment:  Doing well, no acute events overnight.  Pain is well controlled.  Had a 1 time dose of Dilaudid given yesterday which helped control her pain.  Good  plantar flexion and dorsiflexion of the right foot.  Good sensation.       Pain control adequate    Plan:     1) Pain:    Adductor canal perineural catheter in place and infusing. Good level. Multimodal pain regimen with acetaminophen, Celebrex, Lyrica, and prn oxycodone given  Will continue to monitor. Plan to discharge with Nimbus pain pump on POD #1.   2) diabetes, continue metformin, sliding scale insulin   3) hypertension and GERD, continue current regimen   4) FEN/GI: Tolerating liquids, advance diet as tolerated. No flatus. No BM.   5) Dispo: Pt working well with PT/OT. Case management and SW for placement. Plan for discharge POD #1.        Evaluator Neno Horton PA-C

## 2022-10-27 NOTE — SUBJECTIVE & OBJECTIVE
Principal Problem:Primary osteoarthritis of right knee    Principal Orthopedic Problem: same    Interval History: Patient seen and examined at bedside.  No acute events overnight. Pain issues last night. Stable this am. Patient refusing ice pack. walked 6 feet with PT yesterday. Elevated BP overnight. In the 140s systolic this am.       Review of patient's allergies indicates:   Allergen Reactions    Morphine Other (See Comments)     shake       Current Facility-Administered Medications   Medication    0.9%  NaCl infusion    acetaminophen tablet 1,000 mg    albuterol inhaler 1 puff    aluminum-magnesium hydroxide-simethicone 200-200-20 mg/5 mL suspension 30 mL    amLODIPine tablet 5 mg    aspirin EC tablet 81 mg    bisacodyL suppository 10 mg    cetirizine tablet 10 mg    dextrose 10% bolus 125 mL    dextrose 10% bolus 250 mL    EPINEPHrine (PF) injection 0.3 mg    ergocalciferol capsule 50,000 Units    estrogens (conjugated) tablet 0.625 mg    famotidine tablet 20 mg    FLUoxetine capsule 40 mg    fluticasone propionate 50 mcg/actuation nasal spray 50 mcg    glucagon (human recombinant) injection 1 mg    glucose chewable tablet 16 g    glucose chewable tablet 24 g    insulin aspart U-100 pen 0-5 Units    losartan tablet 100 mg    meloxicam tablet 15 mg    metFORMIN tablet 1,000 mg    methocarbamoL tablet 750 mg    montelukast tablet 10 mg    morphine injection 2 mg    mupirocin 2 % ointment 1 g    naloxone 0.4 mg/mL injection 0.02 mg    ondansetron injection 4 mg    oxyCODONE immediate release tablet 5 mg    oxyCODONE immediate release tablet Tab 10 mg    pantoprazole EC tablet 40 mg    polyethylene glycol packet 17 g    pregabalin capsule 75 mg    prochlorperazine injection Soln 5 mg    ropivacaine 0.2% [facility] PainPRO Pump infusion 500 ML    senna-docusate 8.6-50 mg per tablet 1 tablet    sodium citrate-citric acid 500-334 mg/5 ml solution 30 mL    sucralfate 100 mg/mL suspension 1 g     Objective:     Vital Signs  "(Most Recent):  Temp: 98 °F (36.7 °C) (10/27/22 0419)  Pulse: (!) 59 (10/27/22 0419)  Resp: 16 (10/27/22 0442)  BP: (!) 141/73 (10/27/22 0419)  SpO2: 100 % (10/27/22 0419)   Vital Signs (24h Range):  Temp:  [97.3 °F (36.3 °C)-98.2 °F (36.8 °C)] 98 °F (36.7 °C)  Pulse:  [51-79] 59  Resp:  [10-22] 16  SpO2:  [96 %-100 %] 100 %  BP: ()/() 141/73     Weight: 112.5 kg (248 lb)  Height: 5' 3" (160 cm)  Body mass index is 43.93 kg/m².      Intake/Output Summary (Last 24 hours) at 10/27/2022 0607  Last data filed at 10/27/2022 0330  Gross per 24 hour   Intake 2400 ml   Output 2900 ml   Net -500 ml       Ortho/SPM Exam  AAOx4  NAD  Reg rate  No increased WOB  Dressing c/d/i  No Polar ice in place  SILT T/SP/DP/Bell/Sa  Motor intact T/SP/DP  WWP extremities  FCDs in place and functioning    Significant Labs: None    Significant Imaging: I have reviewed and interpreted all pertinent imaging results/findings.  "

## 2022-10-27 NOTE — ANESTHESIA POSTPROCEDURE EVALUATION
Anesthesia Post Evaluation    Patient: Candy Huynh    Procedure(s) Performed: Procedure(s) (LRB):  ARTHROPLASTY, KNEE, TOTAL RIGHT: BALDO - NEXGEN (Right)    Final Anesthesia Type: spinal      Patient location during evaluation: PACU  Patient participation: Yes- Able to Participate  Level of consciousness: awake and alert and oriented  Post-procedure vital signs: reviewed and stable  Pain management: adequate  Airway patency: patent    PONV status at discharge: No PONV  Anesthetic complications: no      Cardiovascular status: blood pressure returned to baseline and hemodynamically stable  Respiratory status: unassisted, spontaneous ventilation and room air  Hydration status: euvolemic  Follow-up not needed.          Vitals Value Taken Time   /70 10/27/22 1052   Temp 36.8 °C (98.2 °F) 10/27/22 1052   Pulse 72 10/27/22 1052   Resp 16 10/27/22 1052   SpO2 98 % 10/27/22 1052         Event Time   Out of Recovery 15:39:00         Pain/Lisa Score: Pain Rating Prior to Med Admin: 6 (10/27/2022 10:52 AM)  Pain Rating Post Med Admin: 4 (10/27/2022  5:40 AM)  Lisa Score: 9 (10/26/2022  2:55 PM)

## 2022-10-27 NOTE — PLAN OF CARE
Problem: Adult Inpatient Plan of Care  Goal: Plan of Care Review  Outcome: Ongoing, Progressing  Flowsheets (Taken 10/27/2022 0724)  Plan of Care Reviewed With: patient     Problem: Adult Inpatient Plan of Care  Goal: Patient-Specific Goal (Individualized)  Outcome: Ongoing, Progressing  Flowsheets (Taken 10/27/2022 0724)  Anxieties, Fears or Concerns: General  Individualized Care Needs: Keep family up to date  Patient-Specific Goals (Include Timeframe): Post op pain control     Problem: Diabetes Comorbidity  Goal: Blood Glucose Level Within Targeted Range  Intervention: Monitor and Manage Glycemia  Flowsheets (Taken 10/27/2022 0724)  Glycemic Management: blood glucose monitored     Problem: Pain Acute  Goal: Acceptable Pain Control and Functional Ability  Intervention: Develop Pain Management Plan  Flowsheets (Taken 10/27/2022 0724)  Pain Management Interventions:   cold applied   care clustered       PT received AAO x 4. Bed locked and in lowest position. Oriented to room and callbell.  Fall precautions maintained. Non skid socks on while out of bed. Pt instructed to call for assistance, skin integrity maintained,  ice maintained .  Callbell placed within reach and use encouraged.

## 2022-10-27 NOTE — PT/OT/SLP PROGRESS
"Physical Therapy Treatment and Discharge    Patient Name:  Candy Huynh   MRN:  [id number]    Recommendations:     Discharge Recommendations:  outpatient PT   Discharge Equipment Recommendations: bedside commode, walker, rolling   Barriers to discharge: None    Assessment:     Candy Huynh is a 52 y.o. female admitted with a medical diagnosis of Primary osteoarthritis of right knee.  She presents with the following impairments/functional limitations:  weakness, impaired functional mobility, gait instability, impaired balance, decreased lower extremity function, pain, decreased ROM, impaired skin, orthopedic precautions. Patient tolerated PT session fairly well due to right knee pain. Patient ambulated 60ft with RW and supervision . No LOB or SOB noted. Patient ascended/descended 6" curb step with RW and contact guard assistance. Patient performed R LE therex. Patient has an OPPT appointment on 10/28/2022. Patient ready to discharge home from PT standpoint.     Rehab Prognosis: Good; patient would benefit from acute skilled PT services to address these deficits and reach maximum level of function.    Recent Surgery: Procedure(s) (LRB):  ARTHROPLASTY, KNEE, TOTAL RIGHT: BALDO - NEXGEN (Right) 1 Day Post-Op    Plan:     During this hospitalization, patient to be seen daily to address the identified rehab impairments via gait training, therapeutic activities, therapeutic exercises and progress toward the following goals:    Plan of Care Expires:  10/28/22    Subjective     Chief Complaint: Right knee pain. Limited in exercises because the pain was starting to get too high.   Patient/Family Comments/goals: To walk without pain.   Pain/Comfort:  Pain Rating 1:  (yes but did not rate with number)  Location - Side 1: Right  Location 1: knee  Pain Addressed 1: Pre-medicate for activity, Reposition, Distraction, Cessation of Activity      Objective:     Communicated with RN prior to session.  Patient found up in " "chair with cryotherapy, perineural catheter (Nimbus) upon PT entry to room. Mother present during PT session.     General Precautions: Standard, fall   Orthopedic Precautions:RLE weight bearing as tolerated   Braces:  n/a  Respiratory Status: Room air     Functional Mobility:  Mat Mobility:     Supine to Sit: supervision  Sit to Supine: supervision  Transfers:     Sit to Stand:  supervision with rolling walker x2 from bedside chair   Gait: Patient ambulated 60ft with Rolling Walker and supervision using 3-point gait. Patient demonstrated decreased irma and decreased step length during gait due to pain, decreased ROM, and decreased strength.  Stairs:  Patient ascended/descended 6" curb step with RW and contact guard assistance.       AM-PAC 6 CLICK MOBILITY  Turning over in bed (including adjusting bedclothes, sheets and blankets)?: 4  Sitting down on and standing up from a chair with arms (e.g., wheelchair, bedside commode, etc.): 4  Moving from lying on back to sitting on the side of the bed?: 4  Moving to and from a bed to a chair (including a wheelchair)?: 4  Need to walk in hospital room?: 4  Climbing 3-5 steps with a railing?: 3  Basic Mobility Total Score: 23       Treatment & Education:  Patient educated in and performed R LE exercises x10 reps for ankle pumps, quad set, glute set, and x3 reps for SAQ over bolster, heel slide, hip abd/add, and LAQ.    Patient and mother educated in:  -PT role and POC  -safety with transfers including hand placement  -gait sequencing and RW management  -OOB activity to maximize recovery including ambulating at home to prevent DVT   -van transfer  -curb training  -HEP for therex at home with handout provided     Patient left up in chair with all lines intact, call button in reach, RN notified, and mother present.    GOALS:   Multidisciplinary Problems       Physical Therapy Goals       Not on file              Multidisciplinary Problems (Resolved)          Problem: Physical " Therapy    Goal Priority Disciplines Outcome Goal Variances Interventions   Physical Therapy Goal   (Resolved)     PT, PT/OT Met     Description: Goals to be met by: 10/28/2022    Patient will increase functional independence with mobility by performin. Supine to sit with supervision  2. Sit to stand transfer with Supervision  3. Gait x200 feet with Supervision using Rolling Walker  4. Ascend/Descend 1 curb step with Stand by assistance using Rolling Walker  5. Lower extremity exercise program x30 reps per handout, with supervision                             Time Tracking:     PT Received On: 10/27/22  PT Start Time: 947     PT Stop Time: 1010  PT Total Time (min): 23 min     Billable Minutes: Gait Training 13 and Therapeutic Exercise 10    Treatment Type: Treatment  PT/PTA: PT       10/27/2022

## 2022-10-27 NOTE — PROGRESS NOTES
[facility])  Orthopedics  Progress Note    Patient Name: Candy Huynh  MRN: [id number]  Admission Date: 10/26/2022  Hospital Length of Stay: 0 days  Attending Provider: Nolberto Fraser MD  Primary Care Provider: Chris Romano MD  Follow-up For: Procedure(s) (LRB):  ARTHROPLASTY, KNEE, TOTAL RIGHT: BALDO - NEXGEN (Right)    Post-Operative Day: 1 Day Post-Op  Subjective:     Principal Problem:Primary osteoarthritis of right knee    Principal Orthopedic Problem: same    Interval History: Patient seen and examined at bedside.  No acute events overnight. Pain issues last night. Stable this am. Patient refusing ice pack. walked 6 feet with PT yesterday. Elevated BP overnight. In the 140s systolic this am.       Review of patient's allergies indicates:   Allergen Reactions    Morphine Other (See Comments)     shake       Current Facility-Administered Medications   Medication    0.9%  NaCl infusion    acetaminophen tablet 1,000 mg    albuterol inhaler 1 puff    aluminum-magnesium hydroxide-simethicone 200-200-20 mg/5 mL suspension 30 mL    amLODIPine tablet 5 mg    aspirin EC tablet 81 mg    bisacodyL suppository 10 mg    cetirizine tablet 10 mg    dextrose 10% bolus 125 mL    dextrose 10% bolus 250 mL    EPINEPHrine (PF) injection 0.3 mg    ergocalciferol capsule 50,000 Units    estrogens (conjugated) tablet 0.625 mg    famotidine tablet 20 mg    FLUoxetine capsule 40 mg    fluticasone propionate 50 mcg/actuation nasal spray 50 mcg    glucagon (human recombinant) injection 1 mg    glucose chewable tablet 16 g    glucose chewable tablet 24 g    insulin aspart U-100 pen 0-5 Units    losartan tablet 100 mg    meloxicam tablet 15 mg    metFORMIN tablet 1,000 mg    methocarbamoL tablet 750 mg    montelukast tablet 10 mg    morphine injection 2 mg    mupirocin 2 % ointment 1 g    naloxone 0.4 mg/mL injection 0.02 mg    ondansetron injection 4 mg    oxyCODONE immediate  "release tablet 5 mg    oxyCODONE immediate release tablet Tab 10 mg    pantoprazole EC tablet 40 mg    polyethylene glycol packet 17 g    pregabalin capsule 75 mg    prochlorperazine injection Soln 5 mg    ropivacaine 0.2% [facility] PainPRO Pump infusion 500 ML    senna-docusate 8.6-50 mg per tablet 1 tablet    sodium citrate-citric acid 500-334 mg/5 ml solution 30 mL    sucralfate 100 mg/mL suspension 1 g     Objective:     Vital Signs (Most Recent):  Temp: 98 °F (36.7 °C) (10/27/22 0419)  Pulse: (!) 59 (10/27/22 0419)  Resp: 16 (10/27/22 0442)  BP: (!) 141/73 (10/27/22 0419)  SpO2: 100 % (10/27/22 0419)   Vital Signs (24h Range):  Temp:  [97.3 °F (36.3 °C)-98.2 °F (36.8 °C)] 98 °F (36.7 °C)  Pulse:  [51-79] 59  Resp:  [10-22] 16  SpO2:  [96 %-100 %] 100 %  BP: ()/() 141/73     Weight: 112.5 kg (248 lb)  Height: 5' 3" (160 cm)  Body mass index is 43.93 kg/m².      Intake/Output Summary (Last 24 hours) at 10/27/2022 0607  Last data filed at 10/27/2022 0330  Gross per 24 hour   Intake 2400 ml   Output 2900 ml   Net -500 ml       Ortho/SPM Exam  AAOx4  NAD  Reg rate  No increased WOB  Dressing c/d/i  No Polar ice in place  SILT T/SP/DP/Bell/Sa  Motor intact T/SP/DP  WWP extremities  FCDs in place and functioning    Significant Labs: None    Significant Imaging: I have reviewed and interpreted all pertinent imaging results/findings.    Assessment/Plan:     * Primary osteoarthritis of right knee  52 y.o. female POD1 s/p right TKA 10.26.2    Pain control: multimodal  PT/OT: WBAT RLE with walker.   DVT PPx: ASA 81 BID, FCDs at all times when not ambulating  Abx: postop Ancef  Labs: glucose 127  Drain: None  Rod: None    Dispo: f/u PT recs. DC home today              He Engle MD  Orthopedics  [place] [facility] ([facility])  "

## 2022-10-27 NOTE — PROGRESS NOTES
Established Patient - Audio Only Telehealth Visit     The patient location is:  At home  The chief complaint leading to consultation is:  Right shoulder rotator cuff repair  Visit type: Virtual visit with audio only (telephone)  Total time spent with patient:  10 minutes       The reason for the audio only service rather than synchronous audio and video virtual visit was related to technical difficulties or patient preference/necessity.     Each patient to whom I provide medical services by telemedicine is:  (1) informed of the relationship between the physician and patient and the respective role of any other health care provider with respect to management of the patient; and (2) notified that they may decline to receive medical services by telemedicine and may withdraw from such care at any time. Patient verbally consented to receive this service via voice-only telephone call.       HPI:  52-year-old female status post recent total knee replacement  She is also about 6-7 weeks status post rotator cuff repair right shoulder     Assessment and plan:  We went over her current treatment plan including continued physical therapy   She will be using a walker after a knee replacement so I recommend she avoid any painful weight-bearing activities on the shoulder  Advance slowly   Advil or Motrin by mouth  Follow-up 3-4 weeks                        This service was not originating from a related E/M service provided within the previous 7 days nor will  to an E/M service or procedure within the next 24 hours or my soonest available appointment.  Prevailing standard of care was able to be met in this audio-only visit.

## 2022-10-27 NOTE — PLAN OF CARE
Problem: Adult Inpatient Plan of Care  Goal: Plan of Care Review  Outcome: Ongoing, Progressing  Goal: Patient-Specific Goal (Individualized)  Outcome: Ongoing, Progressing  Goal: Absence of Hospital-Acquired Illness or Injury  Outcome: Ongoing, Progressing  Goal: Optimal Comfort and Wellbeing  Outcome: Ongoing, Progressing  Goal: Readiness for Transition of Care  Outcome: Ongoing, Progressing     Problem: Diabetes Comorbidity  Goal: Blood Glucose Level Within Targeted Range  Outcome: Ongoing, Progressing     Problem: Pain Acute  Goal: Acceptable Pain Control and Functional Ability  Outcome: Ongoing, Progressing     Pt remained free from falls or injuries this shift. Pt independent in repositioning. No skin breakdown noticed. Pt rested well through the night.  Medicated q3hrs prn pain. Nimbus in use. Ambulating and voiding well. VSS. Pt refused ice pack to knee as she states it increases her pain

## 2022-10-27 NOTE — DISCHARGE SUMMARY
[facility])  Orthopedics  Discharge Summary      Patient Name: Candy Huynh  MRN: [id number]  Admission Date: 10/26/2022  Hospital Length of Stay: 0 days  Discharge Date and Time:  10/27/2022 6:12 AM  Attending Physician: Nolberto Fraser MD   Discharging Provider: He Engle MD  Primary Care Provider: Chris Romano MD    HPI:    Candy Huynh is a 52 y.o. female with history of Right knee pain. Pain is worse with activity and weight bearing.  Patient has experienced interference of activities of daily living due to decreased range of motion and an increase in joint pain and swelling.  Patient has failed non-operative treatment including NSAIDs, corticosteroid injections, viscosupplement injections, and activity modification.  Candy Huynh currently ambulates independently.      Relevant medical conditions of significance in perioperative period:  HTN- on medication managed by pcp  Nicotine use  GERD- on medication managed by pcp    Procedure(s) (LRB):  ARTHROPLASTY, KNEE, TOTAL RIGHT: BALDO - NEXGEN (Right)      Hospital Course: the patient arrived to pre-op area for proper pre-operative management.  Upon completion of pre-operative preparation, the patient was taken back to the operative theatre.  A Right TKAwas performed without complication and the patient was transported to the post anesthesia care unit in stable condition. After appropriate recovery from the anaesthetic agents used during the surgery the patient was transferred to the floor where they received pain medication and physical therapy. The following day, when the patient was deemed safe for DC, they were discharged home.        Consults (From admission, onward)          Status Ordering Provider     Inpatient consult to Respiratory Care  Once        Provider:  (Not yet assigned)    GODWIN Perez     Inpatient consult to Respiratory Care  Once        Provider:  (Not yet assigned)     Acknowledged GODWIN DORMAN     Inpatient consult to Social Work  Once        Provider:  (Not yet assigned)    Acknowledged GODWIN DORMAN     Inpatient consult to Pain Management  Once        Provider:  (Not yet assigned)    STEFANO Bueno     Inpatient consult to Respiratory Care  Once        Provider:  (Not yet assigned)    STEFANO Bueno            Significant Diagnostic Studies: No pertinent studies.    Pending Diagnostic Studies:       None          Final Active Diagnoses:    Diagnosis Date Noted POA    PRINCIPAL PROBLEM:  Primary osteoarthritis of right knee [M17.11] 04/28/2022 Yes      Problems Resolved During this Admission:      Discharged Condition: stable    Disposition: Home or Self Care    Follow Up:    Patient Instructions:      Activity as tolerated     Sponge bath only until clinic visit     Keep surgical extremity elevated     Lifting restrictions   Order Comments: No strenuous exercise or lifting of > 10 lbs     Weight bearing as tolerated     No driving, operating heavy equipment or signing legal documents while taking pain medication     Leave dressing on - Keep it clean, dry, and intact until clinic visit   Order Comments: Do not remove surgical dressing for 2 weeks post-op. This will be done only by MD at initial post-op visit. If dressing is completely saturated, replace with identical dressing - silver-impregnated hydrocolloid dressing.     Do not get dressings wet. Do not shower.     If dressing continues to be saturated or there are signs of infection, please call [facility] [phone] for further instructions and to make appt to be seen.     Call MD for:  temperature >100.4     Call MD for:  persistent nausea and vomiting     Call MD for:  severe uncontrolled pain     Call MD for:  difficulty breathing, headache or visual disturbances     Call MD for:  redness, tenderness, or signs of infection (pain, swelling, redness, odor or green/yellow discharge  around incision site)     Call MD for:  hives     Call MD for:  persistent dizziness or light-headedness     Call MD for:  extreme fatigue     Medications:  Reconciled Home Medications:      Medication List        START taking these medications      acetaminophen 650 MG Tbsr  Commonly known as: TYLENOL  Take 1 tablet (650 mg total) by mouth every 6 to 8 hours as needed (pain).     aspirin 81 MG EC tablet  Commonly known as: ECOTRIN  Take 1 tablet (81 mg total) by mouth 2 (two) times daily.     docusate sodium 100 MG capsule  Commonly known as: COLACE  Take 1 capsule (100 mg total) by mouth 2 (two) times daily as needed for Constipation.     methocarbamoL 750 MG Tab  Commonly known as: ROBAXIN  Take 1 tablet (750 mg total) by mouth 3 (three) times daily as needed (muscle spasms).     oxyCODONE 5 MG immediate release tablet  Commonly known as: ROXICODONE  Take 1-2 tablets by mouth every 4-6 hours as needed for pain     pregabalin 75 MG capsule  Commonly known as: LYRICA  Take 1 capsule (75 mg total) by mouth every evening. for 14 days            CONTINUE taking these medications      meloxicam 15 MG tablet  Commonly known as: MOBIC  Take 1 tablet (15 mg total) by mouth once daily.            ASK your doctor about these medications      albuterol 90 mcg/actuation inhaler  Commonly known as: PROVENTIL/VENTOLIN HFA  SMARTSI Puff(s) By Mouth Every 4 Hours PRN     amLODIPine 5 MG tablet  Commonly known as: NORVASC  Take 5 mg by mouth once daily.     azelastine 137 mcg (0.1 %) nasal spray  Commonly known as: ASTELIN  1 spray once daily.     EPINEPHrine 0.3 mg/0.3 mL Atin  Commonly known as: EPIPEN  as directed     ergocalciferol 50,000 unit Cap  Commonly known as: ERGOCALCIFEROL  Take 50,000 Units by mouth every 7 days.     estradioL 0.01 % (0.1 mg/gram) vaginal cream  Commonly known as: ESTRACE  Place vaginally.     FLUoxetine 20 MG capsule  Take 40 mg by mouth once daily.     fluticasone propionate 50 mcg/actuation  nasal spray  Commonly known as: FLONASE  1 spray by Each Nostril route once daily.     folic acid 1 MG tablet  Commonly known as: FOLVITE  Take 1,000 mcg by mouth once daily.     LIDOcaine 5 %  Commonly known as: LIDODERM  Place 1 patch onto the skin once daily. Remove & Discard patch within 12 hours or as directed by MD     loratadine 10 mg tablet  Commonly known as: CLARITIN  Take 10 mg by mouth once daily.     losartan 100 MG tablet  Commonly known as: COZAAR  Take 100 mg by mouth once daily.     metFORMIN 1000 MG tablet  Commonly known as: GLUCOPHAGE  Take 1,000 mg by mouth 2 (two) times daily with meals.     montelukast 10 mg tablet  Commonly known as: SINGULAIR     nicotine polacrilex 4 MG Gum  Commonly known as: NICORETTE  SMARTSI By Mouth Every 1-2 Hours     pantoprazole 40 MG tablet  Commonly known as: PROTONIX  TAKE 1 TABLET BY MOUTH ONCE DAILY 30 MINUTES BEFORE BREAKFAST     prazosin 1 MG Cap  Commonly known as: MINIPRESS  TAKE 1 CAPSULE BY MOUTH EVERY NIGHT AT BEDTIME FOR 7 DAYS; THEN TAKE 2 CAPSULES BY MOUTH EVERY NIGHT AT BEDTIME FOR 7 DAYS; THEN TAKE 3 CAPSULES BY MOUTH EVERY NIGHT AT BEDTIME THEREAFTER AS DIRECTED BY PHYSICIAN     PREMARIN 0.625 MG tablet  Generic drug: estrogens (conjugated)  Take 0.625 mg by mouth once daily.              He Engle MD  Orthopedics  [facility])

## 2022-10-28 ENCOUNTER — PATIENT MESSAGE (OUTPATIENT)
Dept: ORTHOPEDICS | Facility: CLINIC | Age: 52
End: 2022-10-28

## 2022-10-28 ENCOUNTER — DOCUMENTATION ONLY (OUTPATIENT)
Dept: REHABILITATION | Facility: HOSPITAL | Age: 52
End: 2022-10-28

## 2022-10-28 ENCOUNTER — CLINICAL SUPPORT (OUTPATIENT)
Dept: ORTHOPEDICS | Facility: CLINIC | Age: 52
End: 2022-10-28
Payer: MEDICAID

## 2022-10-28 DIAGNOSIS — Z96.659 STATUS POST KNEE REPLACEMENT, UNSPECIFIED LATERALITY: Primary | ICD-10-CM

## 2022-10-28 PROCEDURE — 99499 UNLISTED E&M SERVICE: CPT | Mod: 95,,, | Performed by: ORTHOPAEDIC SURGERY

## 2022-10-28 PROCEDURE — 99499 NO LOS: ICD-10-PCS | Mod: 95,,, | Performed by: ORTHOPAEDIC SURGERY

## 2022-10-28 RX ORDER — NALOXONE HYDROCHLORIDE 4 MG/.1ML
1 SPRAY NASAL ONCE
Qty: 1 EACH | Refills: 0 | Status: SHIPPED | OUTPATIENT
Start: 2022-10-28 | End: 2022-10-28

## 2022-10-28 RX ORDER — HYDROMORPHONE HYDROCHLORIDE 2 MG/1
4 TABLET ORAL
Qty: 50 TABLET | Refills: 0 | Status: ON HOLD | OUTPATIENT
Start: 2022-10-28 | End: 2023-08-08 | Stop reason: HOSPADM

## 2022-10-28 NOTE — PROGRESS NOTES
Pt reports 15 out of 10 pain s/p knee replacement 2 days ago. New rx sent to [facility] pharmacy as requested. I explained to patient that she can't take both the oxycodone and dilaudid and that I am sending narcan which she has to have in case of increased somnolence.

## 2022-10-28 NOTE — PROGRESS NOTES
I called the patient today regarding her surgery with Dr. Nolberto Fraser . The patient had a right TKA on 10/26.     Pain Scale:  15  / 10- pt states pain meds are not working, she states they did not work while in the hospital, she thought the nurse was going to inform the doctor to change her pain med. Informed her that I will talk with Donna in regards to her pain and call her back.     Any issues with Fever: No.    Any issues with medications (specifically DVT prophylaxis): No. ASA 81 BID    Any issues with bowel movements:  Passing jess: No.                                                                 Urination: No.                                                                 Constipation: No.    Completing at home exercises: Yes:     Any concerns regarding their dressing/bandage:  No.    Patient confirmed first OP-PT appointment:  [facility] on  10/31 at am. PT unable to make appt that is scheduled today due to pain    Any other concerns: Yes:         The patient was informed that if they have any urgent issues with their bandage, medications or any other health concerns regarding their surgery to call the 24/7 Orthopedic Post-op Hot Line at [phone]. The patient was reminded that if they have any chest pain or shortness of breath to call 911 or go to the ER.    The patient verbalized understanding and does not have any other questions

## 2022-10-28 NOTE — PROGRESS NOTES
10/28/2022  12:20 pm    PT reached out to patient over the telephone. PT spoke to patient's daughter stating patient would call back. PT received staff message stating patient will not attend PT today secondary to pain.    Patient's next scheduled appointment is 10/31/2022.    Aggie Fernandez PT DPT

## 2022-10-31 ENCOUNTER — TELEPHONE (OUTPATIENT)
Dept: REHABILITATION | Facility: HOSPITAL | Age: 52
End: 2022-10-31
Payer: MEDICAID

## 2022-10-31 ENCOUNTER — DOCUMENTATION ONLY (OUTPATIENT)
Dept: REHABILITATION | Facility: HOSPITAL | Age: 52
End: 2022-10-31
Payer: MEDICAID

## 2022-10-31 ENCOUNTER — TELEPHONE (OUTPATIENT)
Dept: ORTHOPEDICS | Facility: CLINIC | Age: 52
End: 2022-10-31
Payer: MEDICAID

## 2022-10-31 ENCOUNTER — CLINICAL SUPPORT (OUTPATIENT)
Dept: REHABILITATION | Facility: HOSPITAL | Age: 52
End: 2022-10-31
Payer: MEDICAID

## 2022-10-31 ENCOUNTER — PATIENT MESSAGE (OUTPATIENT)
Dept: ADMINISTRATIVE | Facility: OTHER | Age: 52
End: 2022-10-31
Payer: MEDICAID

## 2022-10-31 DIAGNOSIS — G62.9 NEUROPATHY: Primary | ICD-10-CM

## 2022-10-31 DIAGNOSIS — Z96.651 STATUS POST RIGHT KNEE REPLACEMENT: ICD-10-CM

## 2022-10-31 DIAGNOSIS — M25.661 DECREASED RANGE OF MOTION (ROM) OF RIGHT KNEE: ICD-10-CM

## 2022-10-31 DIAGNOSIS — G89.29 CHRONIC PAIN OF RIGHT KNEE: ICD-10-CM

## 2022-10-31 DIAGNOSIS — M25.561 CHRONIC PAIN OF RIGHT KNEE: ICD-10-CM

## 2022-10-31 PROCEDURE — 97110 THERAPEUTIC EXERCISES: CPT

## 2022-10-31 PROCEDURE — 97162 PT EVAL MOD COMPLEX 30 MIN: CPT

## 2022-10-31 RX ORDER — PREGABALIN 75 MG/1
75 CAPSULE ORAL 2 TIMES DAILY
Qty: 60 CAPSULE | Refills: 0 | Status: SHIPPED | OUTPATIENT
Start: 2022-10-31 | End: 2022-11-03 | Stop reason: SDUPTHER

## 2022-10-31 NOTE — TELEPHONE ENCOUNTER
Pt called hotline stating she is have electricity, shooting type pain on the top of her right foot as well as R thigh. Notified Donna TAYLOR, advised pt she may take lyrica 75 mg twice a day and is to take one now. She states she took one an hour ago with no relief. Informed pt she may take another one now per Donna TAYLOR.     Pt requests a refill of Lyrica to be sent to:    [facility]  - [place] [address]. Phone:  [phone]   Fax:  [phone]      Message forwarded to Donna TAYLOR, pt pleased and verbalized understanding.

## 2022-10-31 NOTE — PLAN OF CARE
[facility]  Physical Therapy Initial Evaluation    Date: 10/31/2022   Name: Candy Huynh  Clinic Number: [id number]    Therapy Diagnosis: No diagnosis found.  Physician: Mirlande Parada PA-C    Physician Orders: PT Eval and Treat   Medical Diagnosis from Referral: M17.11 (ICD-10-CM) - Primary osteoarthritis of right knee  Evaluation Date: 10/31/2022  Authorization Period Expiration: 12/31/2022  Plan of Care Expiration: 12/31/2022  Visit # / Visits authorized: 1/ 1    Time In: 1505  Time Out: 1605  Total Appointment Time (timed & untimed codes): 60 minutes    Precautions: Standard and Diabetes    Subjective   Date of surgery R RCR: 09/06/2022  Date of surgery R TKA: 10/26/2022  History of current condition - Candy reports:     Patient reports she underwent R TKA last Wednesday. She missed her appointment last week for physical therapy due to pain. She is ambulating with a FWW and only walking short distances e.g. to restroom. Reports pain is managed primarily with pain medications with ice only occasionally. She messaged orthopedics earlier today concerning electricity shooting pain on the top of her R foot and thigh. She has been taking lyrica currently without relief.     She states that she currently is reliant on family for transportation and needs appointments after 3 pm during the week.    She recently underwent R RCR ~ 7 weeks ago. She reports no complaints with R shoulder at current time and is able to use FWW without increase in R shoulder pain.    Goals - ADLs without complaint       Medical History:   Past Medical History:   Diagnosis Date    Allergy     Anemia     Anxiety     Asthma     denies- on outside problem list in Care Everywhere    Depression     Diabetes mellitus     Diabetes mellitus, type 2     Fibromyalgia     GERD (gastroesophageal reflux disease)     Hypertension     Stroke     TIA    Vertigo        Surgical History:   Candy Huynh  has a past  surgical history that includes Hysterectomy;  section; Nasal septoplasty; Excision of mass of extremity (Left, 2019); Hernia repair; Arthroscopy of knee (Left, 2021); Knee arthroscopy w/ meniscectomy (2021); Arthroscopic repair of rotator cuff of shoulder (Right, 2022); Decompression of subacromial space (2022); Reconstruction of acromioclavicular joint (2022); and Total knee arthroplasty (Right, 10/26/2022).    Medications:   Candy has a current medication list which includes the following prescription(s): acetaminophen, albuterol, amlodipine, aspirin, azelastine, docusate sodium, epinephrine, ergocalciferol, estradiol, fluoxetine, fluticasone propionate, hydromorphone, lidocaine, loratadine, losartan, meloxicam, metformin, methocarbamol, montelukast, oxycodone, pantoprazole, prazosin, pregabalin, and premarin.    Allergies:   Review of patient's allergies indicates:   Allergen Reactions    Morphine Other (See Comments)     shake        Imaging, see imaging    Prior Therapy: recently for Right RCT  Social History:  lives with their family  Occupation: not specified  Prior Level of Function: R knee pain with ADLs  Current Level of Function: amb with FWW    Pain:  Current 10/10, worst 15/10, best 7/10   Location: { right knee(s)   Description: Aching, Dull, Sharp, Electric, and Shooting  Aggravating Factors: all activities [place] weight bearing  Easing Factors: rest and pain medication    Pts goals: ADLs without complaint    Objective     Observation: Aquacel in place; pain pump removed. Mod knee swelling    Gait: Amb with step too pattern using FWW; inadequate knee extension on LR and stance     Edema: moderate as expected post-op              Palpation: TTP global [place] medial joint line     Sensation:  Intact    Range of Motion:   Knee Right Active Left Active Right Passive Left Passive   Flexion 70 125 86 125   Extension Lacking 10 deg 0 Lacking 5 deg *pain +10      Ankle Right  Active Left Active   DF limited WNL   PF WNL WNL     Lower Extremity Strength:    Formal MMT not performed 2/2 POD# 5 and increased pain.    Poor to Fair Quad activation noted R LE.   Able to perform with significant lag on SLR     Joint Mobility: hypomobile as expected post-op.     Flexibility:  Grossly hypomobile quad, hamstring and gastroc as expected on post-op R LE    Special Tests:  Not performed 2/2 post-op.    Functional tests:   SLS EO: Not performed 2/2 post-op.  SLS EC: Not performed 2/2 post-op.  SL Squat: Not performed 2/2 post-op.  DL Squat: Not performed 2/2 post-op.  Step-down: Not performed 2/2 post-op.      Limitation/Restriction for FOTO Knee Survey    Therapist reviewed FOTO scores for Candy Huynh on 10/31/2022.   FOTO documents entered into [employer] - see Media section.    Limitation Score: see intake%         TREATMENT   Treatment Time In: 1525  Treatment Time Out: 1605  Total Treatment time (time-based codes) separate from Evaluation: 30 minutes + 10- min ice    Date of surgery R TKA: 10/26/2022  POD 5    Candy received therapeutic exercises to develop strength, endurance, ROM, flexibility, posture, and core stabilization for 30 minutes including:    Patient education - importance of HEP, importance of swelling management, importance of gradual progression of activities; s/s of DVT and infection prophylaxis.    LLLD heel prop in chair x10 min  Quad set 10x10 sec  LAQ 5x10 sec hold ea  LAQ 3x30 sec reps ea  Heel slides AAROM seated 30x5 sec  Calf / HS stretch with towel 3x30 sec ea  Hip abduction / adduction slides 30x5 sec ea      NEXT VISIT - Bike and hip extension ROM      Candy received cold pack for 10 minutes to R knee.    Home Exercises and Patient Education Provided    Education provided:   - See above    Written Home Exercises Provided: yes.  Exercises were reviewed and patient was able to demonstrate them prior to the end of the session.  Patient demonstrated good   understanding of the education provided.     See EMR under Patient Instructions for exercisesprovided 10/31/2022.    Assessment   Candy is a 52 y.o. female referred to outpatient Physical Therapy with a medical diagnosis of M17.11 (ICD-10-CM) - Primary osteoarthritis of right knee. Pt presents with post op pain and swelling, decreased ROM, joint hypomobility, flexibility deficits, Quad activation deficits, LE strength deficits, and possible neural tension [place] of femoral nerve. Impairments and post op timelines preventing ability to perform normal ADLs w/o complaint.    Pt prognosis is Fair.   Pt will benefit from skilled outpatient Physical Therapy to address the deficits stated above and in the chart below, provide pt/family education, and to maximize pt's level of independence.     Plan of care discussed with patient: Yes  Pt's spiritual, cultural and educational needs considered and patient is agreeable to the plan of care and goals as stated below:     Anticipated Barriers for therapy: diabetes, transportation, BMI    Medical Necessity is demonstrated by the following  History  Co-morbidities and personal factors that may impact the plan of care Co-morbidities:   diabetes, high BMI, and transportation assistance required    Personal Factors:   no deficits     high   Examination  Body Structures and Functions, activity limitations and participation restrictions that may impact the plan of care Body Regions:   lower extremities    Body Systems:    ROM  strength  balance  gait  transfers  transitions  motor control  motor learning  edema  scar formation    Participation Restrictions:   covid-19    Activity limitations:   Learning and applying knowledge  no deficits    General Tasks and Commands  no deficits    Communication  no deficits    Mobility  walking  driving (bike, car, motorcycle)    Self care  dressing    Domestic Life  no deficits    Interactions/Relationships  no deficits    Life Areas  no  deficits    Community and Social Life  no deficits         high   Clinical Presentation evolving clinical presentation with changing clinical characteristics moderate   Decision Making/ Complexity Score: moderate     GOALS: Short Term Goals: 0-3 weeks  1.Report decreased R knee pain  < / =  4/10  to increase tolerance for amb  2. Increase knee ROM to 0-120 in order to be able to perform ADLs without difficulty.  3. Increase strength by 1/3 MMT grade in Quads to increase tolerance for ADL and work activities.  4. Pt to tolerate HEP to improve ROM and independence with ADL's     Long Term Goals: 3-6 weeks  1. Able to amb w/o deviation or complaint  2.Patient goal: ADLs without complaint  3.Increase strength to >/= 4+/5 in Quad and hip musculature to increase tolerance for ADL and work activities.  4. Pt will report at CJ level (20-40% impaired) on FOTO knee to demonstrate increase in LE function with every day tasks.     Plan   Plan of care Certification: 10/31/2022 to 12/31/2022.    Outpatient Physical Therapy 2-3 times weekly for 6-8 weeks to include the following interventions: Gait Training, Manual Therapy, Moist Heat/ Ice, Neuromuscular Re-ed, Patient Education, Self Care, Therapeutic Activities, and Therapeutic Exercise.     PETAR PERKINS, PT, DPT, OCS

## 2022-10-31 NOTE — PROGRESS NOTES
10/31/2022    PT called patient to confirm patient evaluation for this afternoon 10/31/2022 at 3 pm. Patient reported she is pain and will try to come this afternoon. Patient requested call back from PT supervisor. Request delivered to supervisor Evelyne Paniagua.    Aggie Fernandez PT, DPT

## 2022-11-01 ENCOUNTER — TELEPHONE (OUTPATIENT)
Dept: ORTHOPEDICS | Facility: CLINIC | Age: 52
End: 2022-11-01
Payer: MEDICAID

## 2022-11-01 NOTE — TELEPHONE ENCOUNTER
----- Message from Virginia Kamara MA sent at 11/1/2022 10:10 AM CDT -----  Contact: Shahla Shi    ----- Message -----  From: Aggie Pappas  Sent: 11/1/2022   9:58 AM CDT  To: Norbert Thompson Staff    Vernon is calling from [facility] in regards to pt medication: HYDROmorphone (DILAUDID) 2 MG tablet 50 tablet.   Pharmacy stated that the pt is allergic to Morphine which the RX contains. Pharmacy is requesting alternative to be sent over.     Confirmed contact below:   Contact Name:Vernon Shi   Phone Number: [phone]

## 2022-11-01 NOTE — TELEPHONE ENCOUNTER
Returned call to [facility] and let them know that the allergy was shakes and we don't have anything else to try for her pain which is unmanaged with oxycodone.

## 2022-11-03 ENCOUNTER — PATIENT MESSAGE (OUTPATIENT)
Dept: ADMINISTRATIVE | Facility: OTHER | Age: 52
End: 2022-11-03
Payer: MEDICAID

## 2022-11-03 ENCOUNTER — PATIENT MESSAGE (OUTPATIENT)
Dept: ORTHOPEDICS | Facility: CLINIC | Age: 52
End: 2022-11-03
Payer: MEDICAID

## 2022-11-03 ENCOUNTER — TELEPHONE (OUTPATIENT)
Dept: ORTHOPEDICS | Facility: CLINIC | Age: 52
End: 2022-11-03
Payer: MEDICAID

## 2022-11-03 DIAGNOSIS — Z96.651 STATUS POST RIGHT KNEE REPLACEMENT: ICD-10-CM

## 2022-11-03 DIAGNOSIS — G62.9 NEUROPATHY: ICD-10-CM

## 2022-11-03 RX ORDER — PREGABALIN 75 MG/1
75 CAPSULE ORAL 2 TIMES DAILY
Qty: 60 CAPSULE | Refills: 0 | Status: SHIPPED | OUTPATIENT
Start: 2022-11-03 | End: 2022-11-11

## 2022-11-03 RX ORDER — METHOCARBAMOL 750 MG/1
750 TABLET, FILM COATED ORAL 3 TIMES DAILY PRN
Qty: 30 TABLET | Refills: 1 | Status: SHIPPED | OUTPATIENT
Start: 2022-11-03 | End: 2022-11-16 | Stop reason: SDUPTHER

## 2022-11-03 RX ORDER — OXYCODONE HYDROCHLORIDE 5 MG/1
TABLET ORAL
Qty: 50 TABLET | Refills: 0 | Status: SHIPPED | OUTPATIENT
Start: 2022-11-03 | End: 2022-11-16 | Stop reason: SDUPTHER

## 2022-11-03 NOTE — TELEPHONE ENCOUNTER
----- Message from Virginia Kamara MA sent at 11/3/2022  4:31 PM CDT -----  Regarding: FW: MESSAGE UPDATE - MEDICATION  Contact: pretty Palacios    ----- Message -----  From: Kim Gao  Sent: 11/3/2022   4:30 PM CDT  To: Norbert Thompson Staff  Subject: MESSAGE UPDATE - MEDICATION                      Suzanne olsen/ Shahla Shi stated that the pt just picked up the HYDROmorphone (DILAUDID) 2 MG tablet 2 days ago and now have received a prescription for oxyCODONE (ROXICODONE) 5 MG immediate release tablet Suzanne ask if it's ok to fill the new prescription      Contact info   [phone] Phone

## 2022-11-03 NOTE — TELEPHONE ENCOUNTER
Spoke to Suzanne and informed not to fill the OXY until the pt is out of Dilaudid per Donna NP. Clarified the Lyrica is 75 mg BID. Understanding verbalized.

## 2022-11-07 ENCOUNTER — PATIENT MESSAGE (OUTPATIENT)
Dept: ADMINISTRATIVE | Facility: OTHER | Age: 52
End: 2022-11-07
Payer: MEDICAID

## 2022-11-07 ENCOUNTER — DOCUMENTATION ONLY (OUTPATIENT)
Dept: REHABILITATION | Facility: HOSPITAL | Age: 52
End: 2022-11-07

## 2022-11-07 ENCOUNTER — CLINICAL SUPPORT (OUTPATIENT)
Dept: REHABILITATION | Facility: HOSPITAL | Age: 52
End: 2022-11-07
Payer: MEDICAID

## 2022-11-07 DIAGNOSIS — G89.29 CHRONIC PAIN OF RIGHT KNEE: Primary | ICD-10-CM

## 2022-11-07 DIAGNOSIS — Z96.651 STATUS POST RIGHT KNEE REPLACEMENT: Primary | ICD-10-CM

## 2022-11-07 DIAGNOSIS — M25.661 DECREASED RANGE OF MOTION (ROM) OF RIGHT KNEE: ICD-10-CM

## 2022-11-07 DIAGNOSIS — M25.561 CHRONIC PAIN OF RIGHT KNEE: Primary | ICD-10-CM

## 2022-11-07 NOTE — PROGRESS NOTES
"Patient arrived 17 min late to appointment and asked the  if she could come to the back of the clinic. She was visibly and verbally upset at the start of visit about distance to walk to the back of the clinic and that she walked back without assistance; patient did not notify any employees of need for assistance to walk to the back of the clinic. She initially expressed that she did not want to continue physical therapy at this location.     Upon talking with the patient, she was agreeable to continuing PT in clinic today. Had patient warm up performing half circles on the bike for ROM with PT supervision. She patrizia this fairly well and had patient set up on table to start ROM therex. Went to grab heat to help with comfort on therex and when I returned she became frustrated with the exercise and stated she wanted to quit therapy and go home. I offered to continue with PT to help manage pain. Patient stated "I don't want your help" and proceeded to get up and leave clinic. She was assisted out of clinic via wheelchair with the assistance of other clinicians.     Nolberto Elise PT, DPT, OCS      "

## 2022-11-08 ENCOUNTER — PATIENT OUTREACH (OUTPATIENT)
Dept: EMERGENCY MEDICINE | Facility: OTHER | Age: 52
End: 2022-11-08
Payer: MEDICAID

## 2022-11-08 NOTE — PROGRESS NOTES
Spoke to patient today and she stated she was able to get in to see her previous primary care and she also got in to see someone for behavioral health so she no longer needs assistance with scheduling. Pt stated she would like utility assistance resources resent to her. I sent her the following:   [facility] Support Housing Block [place]- A rental assistance program that will pay either a deposit or first months rent on a place to live but not both for those who have a mental health diagnosis and are a client of either the [facility] or [facility]. Services: Electric Service Payment Assistance, Gas Service Payment Assistance, Rental Deposit Assistance, Rent Payment Assistance, Water Service Payment Assistance. [address] [place], [place] [address] [phone]  Emergency Rent/Mortgage and Utility Assistance  This [facility] program provides families with utility payments and rent/mortgage payments to avert cut-off or evictions, thus enabling them to keep their residence.  Services: Electric Service Payment Assistance, Gas Service Payment Assistance, Mortgage Payment Assistance, Rental Deposit Assistance, Rent Payment Assistance, Water Service Payment Assistance. [address] [place] [address] [phone]  [facility] Housing Assistance Program  This [facility] program provides families with utility payments and rent/mortgage payments to avert cut-off or evictions, thus enabling them to keep their residence.  Services: Electric Service Payment Assistance, Gas Service Payment Assistance, Mortgage Payment Assistance, Rental Deposit Assistance, Rent Payment Assistance, Water Service Payment Assistance. [address] [place] [address] [phone]  Low-Income Home Energy Assistance Program  [employer] provides assistance to eligible households in meeting the costs of home energy for heating/cooling.  Services: Electric  Service Payment Assistance, Gas Service Payment Assistance  [address], [address]  [place], [place] [address] [phone]  [facility]  Services: Appliances, Burial/Cremation Expense Assistance, Electric Service Payment Assistance, Gas Service Payment Assistance, General Clothing Provision, Occasional Emergency Food Assistance, Prescription Expense Assistance, Rent Payment Assistance, Water Service Payment Assistance  [place] [address] [phone]  Instructed pt to call with any future needs/concerns and she verbalized understanding.

## 2022-11-10 ENCOUNTER — PATIENT MESSAGE (OUTPATIENT)
Dept: ADMINISTRATIVE | Facility: OTHER | Age: 52
End: 2022-11-10
Payer: MEDICAID

## 2022-11-10 ENCOUNTER — CLINICAL SUPPORT (OUTPATIENT)
Dept: REHABILITATION | Facility: HOSPITAL | Age: 52
End: 2022-11-10
Payer: MEDICAID

## 2022-11-10 DIAGNOSIS — M25.561 CHRONIC PAIN OF RIGHT KNEE: Primary | ICD-10-CM

## 2022-11-10 DIAGNOSIS — G89.29 CHRONIC PAIN OF RIGHT KNEE: Primary | ICD-10-CM

## 2022-11-10 DIAGNOSIS — M25.661 DECREASED RANGE OF MOTION (ROM) OF RIGHT KNEE: ICD-10-CM

## 2022-11-10 PROCEDURE — 97110 THERAPEUTIC EXERCISES: CPT

## 2022-11-11 ENCOUNTER — OFFICE VISIT (OUTPATIENT)
Dept: ORTHOPEDICS | Facility: CLINIC | Age: 52
End: 2022-11-11
Payer: MEDICAID

## 2022-11-11 DIAGNOSIS — Z96.651 STATUS POST RIGHT KNEE REPLACEMENT: ICD-10-CM

## 2022-11-11 DIAGNOSIS — G62.9 NEUROPATHY: ICD-10-CM

## 2022-11-11 PROCEDURE — 99213 OFFICE O/P EST LOW 20 MIN: CPT | Mod: PBBFAC | Performed by: NURSE PRACTITIONER

## 2022-11-11 PROCEDURE — 1160F RVW MEDS BY RX/DR IN RCRD: CPT | Mod: CPTII,,, | Performed by: NURSE PRACTITIONER

## 2022-11-11 PROCEDURE — 1160F PR REVIEW ALL MEDS BY PRESCRIBER/CLIN PHARMACIST DOCUMENTED: ICD-10-PCS | Mod: CPTII,,, | Performed by: NURSE PRACTITIONER

## 2022-11-11 PROCEDURE — 99024 PR POST-OP FOLLOW-UP VISIT: ICD-10-PCS | Mod: ,,, | Performed by: NURSE PRACTITIONER

## 2022-11-11 PROCEDURE — 4010F ACE/ARB THERAPY RXD/TAKEN: CPT | Mod: CPTII,,, | Performed by: NURSE PRACTITIONER

## 2022-11-11 PROCEDURE — 99024 POSTOP FOLLOW-UP VISIT: CPT | Mod: ,,, | Performed by: NURSE PRACTITIONER

## 2022-11-11 PROCEDURE — 3044F HG A1C LEVEL LT 7.0%: CPT | Mod: CPTII,,, | Performed by: NURSE PRACTITIONER

## 2022-11-11 PROCEDURE — 99999 PR PBB SHADOW E&M-EST. PATIENT-LVL III: ICD-10-PCS | Mod: PBBFAC,,, | Performed by: NURSE PRACTITIONER

## 2022-11-11 PROCEDURE — 99999 PR PBB SHADOW E&M-EST. PATIENT-LVL III: CPT | Mod: PBBFAC,,, | Performed by: NURSE PRACTITIONER

## 2022-11-11 PROCEDURE — 4010F PR ACE/ARB THEARPY RXD/TAKEN: ICD-10-PCS | Mod: CPTII,,, | Performed by: NURSE PRACTITIONER

## 2022-11-11 PROCEDURE — 1159F PR MEDICATION LIST DOCUMENTED IN MEDICAL RECORD: ICD-10-PCS | Mod: CPTII,,, | Performed by: NURSE PRACTITIONER

## 2022-11-11 PROCEDURE — 1159F MED LIST DOCD IN RCRD: CPT | Mod: CPTII,,, | Performed by: NURSE PRACTITIONER

## 2022-11-11 PROCEDURE — 3044F PR MOST RECENT HEMOGLOBIN A1C LEVEL <7.0%: ICD-10-PCS | Mod: CPTII,,, | Performed by: NURSE PRACTITIONER

## 2022-11-11 RX ORDER — PREGABALIN 75 MG/1
150 CAPSULE ORAL 2 TIMES DAILY
Qty: 60 CAPSULE | Refills: 1 | Status: SHIPPED | OUTPATIENT
Start: 2022-11-11 | End: 2022-11-11

## 2022-11-11 RX ORDER — PREGABALIN 75 MG/1
150 CAPSULE ORAL 2 TIMES DAILY
Qty: 60 CAPSULE | Refills: 1 | Status: SHIPPED | OUTPATIENT
Start: 2022-11-11 | End: 2022-12-20

## 2022-11-11 NOTE — PROGRESS NOTES
Candy Huynh presents for initial post-operative visit following a right total knee arthroplasty performed by Dr. Fraser on 10/26/2022.      Exam:   There were no vitals taken for this visit.   Ambulating well with assistive device.  Incision is clean and dry without drainage or erythema.   ROM:-5-90    Initial post-operative radiographs reviewed today revealing a well fixed and aligned prosthesis.    A/P:  2 weeks s/p right total knee replacement  - The patient was advised to keep the incision clean and dry for the next 24 hours after which she may wash the area with antibacterial soap in the shower. Will not submerge incision until one month post op.  - Outpatient PT: pt recently moved to the [place] location and that is going well  - Dr. Fraser came to the room to evaluate the patient as well.  - Continue aspirin for 1 month post op.  - Pain medication increased lyrica to 150mg twice a day for continued neuropathy unrelieved with 75mg twice a day  - Follow up in 2 weeks with surgeon. Pt will call clinic with problems/concerns.

## 2022-11-11 NOTE — PROGRESS NOTES
"  Physical Therapy Daily Treatment Note     Name: Candy King [facility]  Clinic Number: [id number]    Therapy Diagnosis:   Encounter Diagnoses   Name Primary?    Chronic pain of right knee Yes    Decreased range of motion (ROM) of right knee      Physician: Mirlande Parada PA-C    Visit Date: 11/10/2022    Physician Orders: PT Eval and Treat   Medical Diagnosis from Referral: M17.11 (ICD-10-CM) - Primary osteoarthritis of right knee  Evaluation Date: 10/31/2022  Authorization Period Expiration: 12/31/2022  Plan of Care Expiration: 12/31/2022  Visit # / Visits authorized: 1/ 1  FOTO: 2/10    Time In: 6:05 pm (late arrival)   Time Out: 6:38 pm   Total Billable Time: 33 minutes    Precautions: Standard and Diabetes    Subjective     Pt reports: that today has been a rough day, (R) knee is hurting. Pt stated that she has follow up apt with MD office tomorrow. Pt stated that she has been doing HEP to the best of her ability.   She was compliant with home exercise program.  Functional change: progressing     Pain: 7/10 (R) knee      Objective       Candy received therapeutic exercises to develop strength, ROM, and flexibility for 33 minutes including:  Quad sets 5"x20 towel under ankle  SAQ 2x10 w/Min A  Gastroc stretch w/strap 3x30"   Heel slides w/strap  5" 2x10   Seated marches 2x10   LAQ 2x10 2"   Knee extension stretch 2' with heel propped on chair      (R) knee AROM:  7- 90 degrees      Home Exercises Provided and Patient Education Provided     Education provided:   - HEP   - Reviewed icing/elevating (R) knee/LE with pt - pt verbalized understanding    Written Home Exercises Provided: yes.  Exercises were reviewed and Candy was able to demonstrate them prior to the end of the session.  Candy demonstrated good  understanding of the education provided.     See EMR under Patient Instructions for exercises provided 11/10/2022.      Assessment     Pt demo improvements in (R) knee flexion and extension AROM compared to " measurements taken on initial evaluation. Pt tolerated therapy session without any adverse reactions.   Candy Is progressing towards her goals.   Pt prognosis is Good.     Pt will continue to benefit from skilled outpatient physical therapy to address the deficits listed in the problem list box on initial evaluation, provide pt/family education and to maximize pt's level of independence in the home and community environment.     Pt's spiritual, cultural and educational needs considered and pt agreeable to plan of care and goals.    Anticipated barriers to physical therapy: co-morbidities, transportation    Goals:     GOALS: Short Term Goals: 0-3 weeks  1.Report decreased R knee pain  < / =  4/10  to increase tolerance for amb  2. Increase knee ROM to 0-120 in order to be able to perform ADLs without difficulty.  3. Increase strength by 1/3 MMT grade in Quads to increase tolerance for ADL and work activities.  4. Pt to tolerate HEP to improve ROM and independence with ADL's     Long Term Goals: 3-6 weeks  1. Able to amb w/o deviation or complaint  2.Patient goal: ADLs without complaint  3.Increase strength to >/= 4+/5 in Quad and hip musculature to increase tolerance for ADL and work activities.  4. Pt will report at CJ level (20-40% impaired) on FOTO knee to demonstrate increase in LE function with every day tasks.     Plan     Continue per POC, progress as tolerated     Tosha Lopez, PT

## 2022-11-13 NOTE — PROGRESS NOTES
"  Physical Therapy Daily Treatment Note     [facility]  [facility] Number: [id number]    Therapy Diagnosis:   No diagnosis found.    Physician: Nolberto Fraser MD    Visit Date: 11/14/2022    Physician Orders: PT Eval and Treat   Medical Diagnosis from Referral: M17.11 (ICD-10-CM) - Primary osteoarthritis of right knee  Evaluation Date: 10/31/2022  Authorization Period Expiration: 12/31/2022  Plan of Care Expiration: 12/31/2022  Visit # / Visits authorized: 1/ 1, 1/12  FOTO: 1/10  DOS: 10/26/22    Time In: 3:25 pm (late arrival)   Time Out: 4:20pm   Total Billable Time: 47 minutes    Precautions: Standard and Diabetes    Subjective     Pt reports: her knee is feeling a little better. She reports she can use her shoulder for all that she needs to do and it does not bother her much. She had her bandage removed last week.   She was compliant with home exercise program.  Functional change: progressing     Pain: 7/10 (R) knee      Objective     (R) knee AROM:  lacking 10 deg- 116 degrees      Candy received therapeutic exercises to develop strength, ROM, and flexibility for 47 minutes including:    Recumbent bike: 5' for ROM   Quad sets 5"x20 towel under ankle  SAQ 2x10 w/Min A  Gastroc stretch w/strap 3x30"   Heel slides w/strap  5" 2x10 not performed    Seated marches 2x10   LAQ not performed    VMO extensions in sitting 2x10 2"   Knee extension stretch 2' with heel propped on chair not performed       Ice applied to R knee for 8 min after session (in LLLD position for 3 min)      Home Exercises Provided and Patient Education Provided     Education provided:   - HEP   - Reviewed icing/elevating (R) knee/LE with pt - pt verbalized understanding  -reviewed HEP and exercise modifications     Written Home Exercises Provided: yes.  Exercises were reviewed and Candy was able to demonstrate them prior to the end of the session.  Candy demonstrated good  understanding of the education provided.     See EMR " under Patient Instructions for exercises provided 11/10/2022.      Assessment     Pt tolerated session well. She was able to complete full revolutions on recumbent bike, however complained of posterior knee pain and cramping with gastroc strap stretch. Pt continues to lack R knee Ext range of motion, but demonstrates significant improvement in knee flexion range of motion.     Candy Is progressing towards her goals.   Pt prognosis is Good.     Pt will continue to benefit from skilled outpatient physical therapy to address the deficits listed in the problem list box on initial evaluation, provide pt/family education and to maximize pt's level of independence in the home and community environment.     Pt's spiritual, cultural and educational needs considered and pt agreeable to plan of care and goals.    Anticipated barriers to physical therapy: co-morbidities, transportation    Goals:     GOALS: Short Term Goals: 0-3 weeks  1.Report decreased R knee pain  < / =  4/10  to increase tolerance for amb  2. Increase knee ROM to 0-120 in order to be able to perform ADLs without difficulty.  3. Increase strength by 1/3 MMT grade in Quads to increase tolerance for ADL and work activities.  4. Pt to tolerate HEP to improve ROM and independence with ADL's     Long Term Goals: 3-6 weeks  1. Able to amb w/o deviation or complaint  2.Patient goal: ADLs without complaint  3.Increase strength to >/= 4+/5 in Quad and hip musculature to increase tolerance for ADL and work activities.  4. Pt will report at CJ level (20-40% impaired) on FOTO knee to demonstrate increase in LE function with every day tasks.     Plan     Continue per POC, progress as tolerated     Stephanie Thibodeaux, PT

## 2022-11-14 ENCOUNTER — CLINICAL SUPPORT (OUTPATIENT)
Dept: REHABILITATION | Facility: HOSPITAL | Age: 52
End: 2022-11-14
Payer: MEDICAID

## 2022-11-14 DIAGNOSIS — M25.561 CHRONIC PAIN OF RIGHT KNEE: Primary | ICD-10-CM

## 2022-11-14 DIAGNOSIS — M25.661 DECREASED RANGE OF MOTION (ROM) OF RIGHT KNEE: ICD-10-CM

## 2022-11-14 DIAGNOSIS — G89.29 CHRONIC PAIN OF RIGHT KNEE: Primary | ICD-10-CM

## 2022-11-14 PROCEDURE — 97110 THERAPEUTIC EXERCISES: CPT

## 2022-11-16 DIAGNOSIS — Z96.651 STATUS POST RIGHT KNEE REPLACEMENT: ICD-10-CM

## 2022-11-16 DIAGNOSIS — G62.9 NEUROPATHY: ICD-10-CM

## 2022-11-16 DIAGNOSIS — M25.551 RIGHT HIP PAIN: Primary | ICD-10-CM

## 2022-11-16 RX ORDER — OXYCODONE HYDROCHLORIDE 5 MG/1
TABLET ORAL
Qty: 50 TABLET | Refills: 0 | Status: SHIPPED | OUTPATIENT
Start: 2022-11-16 | End: 2022-11-23 | Stop reason: SDUPTHER

## 2022-11-16 RX ORDER — METHOCARBAMOL 750 MG/1
750 TABLET, FILM COATED ORAL 3 TIMES DAILY PRN
Qty: 60 TABLET | Refills: 1 | Status: SHIPPED | OUTPATIENT
Start: 2022-11-16 | End: 2022-12-20 | Stop reason: SDUPTHER

## 2022-11-16 NOTE — PROGRESS NOTES
Pt requests refill of pain medication and muscle relaxer. Refills sent in as requested.  Pt is also having right hip pain and would like an xray for further evaluation of her hip as well. Xray ordered. She says she will coordinate with her  for PT.

## 2022-11-17 ENCOUNTER — CLINICAL SUPPORT (OUTPATIENT)
Dept: REHABILITATION | Facility: HOSPITAL | Age: 52
End: 2022-11-17
Payer: MEDICAID

## 2022-11-17 DIAGNOSIS — G89.29 CHRONIC PAIN OF RIGHT KNEE: Primary | ICD-10-CM

## 2022-11-17 DIAGNOSIS — M25.661 DECREASED RANGE OF MOTION (ROM) OF RIGHT KNEE: ICD-10-CM

## 2022-11-17 DIAGNOSIS — M25.561 CHRONIC PAIN OF RIGHT KNEE: Primary | ICD-10-CM

## 2022-11-17 PROCEDURE — 97110 THERAPEUTIC EXERCISES: CPT | Mod: CQ

## 2022-11-17 NOTE — PROGRESS NOTES
"  Physical Therapy Daily Treatment Note     Name: Candy King [facility]  Clinic Number: [id number]    Therapy Diagnosis:   Encounter Diagnoses   Name Primary?    Chronic pain of right knee Yes    Decreased range of motion (ROM) of right knee        Physician: Nolberto Fraser MD    Visit Date: 11/17/2022    Physician Orders: PT Eval and Treat   Medical Diagnosis from Referral: M17.11 (ICD-10-CM) - Primary osteoarthritis of right knee  Evaluation Date: 10/31/2022  Authorization Period Expiration: 12/31/2022  Plan of Care Expiration: 12/31/2022  Visit # / Visits authorized: 1/ 1, 1/12  FOTO: 2/10  DOS: 10/26/22    Time In: 2:23 pm (late arrival)   Time Out: 3:10 pm   Total Billable Time: 39 minutes    Precautions: Standard and Diabetes    Subjective     Pt reports: that she intermittently gets pain in the back of her knee and feels pain on the outside of her knee where she used to be "bow legged". Pt states that she would like to get off of the walker soon.   She was compliant with home exercise program.  Functional change: progressing     Pain: 6/10 (R) knee      Objective     (R) knee AROM:  lacking 8 deg- 118 degrees      Candy received therapeutic exercises to develop strength, ROM, and flexibility for 39 minutes including:    Recumbent bike: 5' for ROM   Quad sets 5"x20 towel under ankle  SAQ 2x10 NO assist   Gastroc stretch w/strap 3x30"   Heel slides w/strap  5" 2x10   Seated marches 2x10   LAQ not performed    VMO extensions in sitting 2x10 2"   Knee extension stretch 2' with heel propped on chair not performed   HS curl c/ RTB 2x10       Ice applied to R knee for 8 min after session (in LLLD position for 3 min)      Home Exercises Provided and Patient Education Provided     Education provided:   - HEP   - Reviewed icing/elevating (R) knee/LE with pt - pt verbalized understanding  -reviewed HEP and exercise modifications     Written Home Exercises Provided: yes.  Exercises were reviewed and Candy was able to " demonstrate them prior to the end of the session.  Candy demonstrated good  understanding of the education provided.     See EMR under Patient Instructions for exercises provided 11/10/2022.      Assessment     Pt with slight improvements in knee flexion and extension compared to previous session. Pt able to complete transitional movements without assistance and independent RLE movements, as well as no assistance needed during SAQs. Discomfort noted in posterior knee during knee extension stretching during ice at the end of session. Will continue to monitor pt's response to therapy sessions and progress per her tolerance and POC.     Candy Is progressing towards her goals.   Pt prognosis is Good.     Pt will continue to benefit from skilled outpatient physical therapy to address the deficits listed in the problem list box on initial evaluation, provide pt/family education and to maximize pt's level of independence in the home and community environment.     Pt's spiritual, cultural and educational needs considered and pt agreeable to plan of care and goals.    Anticipated barriers to physical therapy: co-morbidities, transportation    Goals:     GOALS: Short Term Goals: 0-3 weeks  1.Report decreased R knee pain  < / =  4/10  to increase tolerance for amb  2. Increase knee ROM to 0-120 in order to be able to perform ADLs without difficulty.  3. Increase strength by 1/3 MMT grade in Quads to increase tolerance for ADL and work activities.  4. Pt to tolerate HEP to improve ROM and independence with ADL's     Long Term Goals: 3-6 weeks  1. Able to amb w/o deviation or complaint  2.Patient goal: ADLs without complaint  3.Increase strength to >/= 4+/5 in Quad and hip musculature to increase tolerance for ADL and work activities.  4. Pt will report at CJ level (20-40% impaired) on FOTO knee to demonstrate increase in LE function with every day tasks.     Plan     Continue per POC, progress as tolerated     Michelle  [place], MILDRED

## 2022-11-23 ENCOUNTER — HOSPITAL ENCOUNTER (OUTPATIENT)
Dept: RADIOLOGY | Facility: HOSPITAL | Age: 52
Discharge: HOME | End: 2022-11-23
Attending: NURSE PRACTITIONER
Payer: MEDICAID

## 2022-11-23 ENCOUNTER — OFFICE VISIT (OUTPATIENT)
Dept: ORTHOPEDICS | Facility: CLINIC | Age: 52
End: 2022-11-23
Payer: MEDICAID

## 2022-11-23 VITALS — BODY MASS INDEX: 41.45 KG/M2 | WEIGHT: 233.94 LBS | HEIGHT: 63 IN

## 2022-11-23 DIAGNOSIS — G62.9 NEUROPATHY: ICD-10-CM

## 2022-11-23 DIAGNOSIS — Z96.651 STATUS POST RIGHT KNEE REPLACEMENT: ICD-10-CM

## 2022-11-23 DIAGNOSIS — M25.551 RIGHT HIP PAIN: ICD-10-CM

## 2022-11-23 PROCEDURE — 1159F PR MEDICATION LIST DOCUMENTED IN MEDICAL RECORD: ICD-10-PCS | Mod: CPTII,,, | Performed by: ORTHOPAEDIC SURGERY

## 2022-11-23 PROCEDURE — 3008F PR BODY MASS INDEX (BMI) DOCUMENTED: ICD-10-PCS | Mod: CPTII,,, | Performed by: ORTHOPAEDIC SURGERY

## 2022-11-23 PROCEDURE — 99213 OFFICE O/P EST LOW 20 MIN: CPT | Mod: PBBFAC | Performed by: ORTHOPAEDIC SURGERY

## 2022-11-23 PROCEDURE — 99024 POSTOP FOLLOW-UP VISIT: CPT | Mod: ,,, | Performed by: ORTHOPAEDIC SURGERY

## 2022-11-23 PROCEDURE — 1159F MED LIST DOCD IN RCRD: CPT | Mod: CPTII,,, | Performed by: ORTHOPAEDIC SURGERY

## 2022-11-23 PROCEDURE — 1160F RVW MEDS BY RX/DR IN RCRD: CPT | Mod: CPTII,,, | Performed by: ORTHOPAEDIC SURGERY

## 2022-11-23 PROCEDURE — 3044F HG A1C LEVEL LT 7.0%: CPT | Mod: CPTII,,, | Performed by: ORTHOPAEDIC SURGERY

## 2022-11-23 PROCEDURE — 73502 X-RAY EXAM HIP UNI 2-3 VIEWS: CPT | Mod: 26,RT,, | Performed by: RADIOLOGY

## 2022-11-23 PROCEDURE — 3008F BODY MASS INDEX DOCD: CPT | Mod: CPTII,,, | Performed by: ORTHOPAEDIC SURGERY

## 2022-11-23 PROCEDURE — 73502 XR HIP WITH PELVIS WHEN PERFORMED, 2 OR 3  VIEWS RIGHT: ICD-10-PCS | Mod: 26,RT,, | Performed by: RADIOLOGY

## 2022-11-23 PROCEDURE — 4010F ACE/ARB THERAPY RXD/TAKEN: CPT | Mod: CPTII,,, | Performed by: ORTHOPAEDIC SURGERY

## 2022-11-23 PROCEDURE — 3044F PR MOST RECENT HEMOGLOBIN A1C LEVEL <7.0%: ICD-10-PCS | Mod: CPTII,,, | Performed by: ORTHOPAEDIC SURGERY

## 2022-11-23 PROCEDURE — 99999 PR PBB SHADOW E&M-EST. PATIENT-LVL III: ICD-10-PCS | Mod: PBBFAC,,, | Performed by: ORTHOPAEDIC SURGERY

## 2022-11-23 PROCEDURE — 73502 X-RAY EXAM HIP UNI 2-3 VIEWS: CPT | Mod: TC,RT

## 2022-11-23 PROCEDURE — 99024 PR POST-OP FOLLOW-UP VISIT: ICD-10-PCS | Mod: ,,, | Performed by: ORTHOPAEDIC SURGERY

## 2022-11-23 PROCEDURE — 1160F PR REVIEW ALL MEDS BY PRESCRIBER/CLIN PHARMACIST DOCUMENTED: ICD-10-PCS | Mod: CPTII,,, | Performed by: ORTHOPAEDIC SURGERY

## 2022-11-23 PROCEDURE — 4010F PR ACE/ARB THEARPY RXD/TAKEN: ICD-10-PCS | Mod: CPTII,,, | Performed by: ORTHOPAEDIC SURGERY

## 2022-11-23 PROCEDURE — 99999 PR PBB SHADOW E&M-EST. PATIENT-LVL III: CPT | Mod: PBBFAC,,, | Performed by: ORTHOPAEDIC SURGERY

## 2022-11-23 RX ORDER — OXYCODONE HYDROCHLORIDE 5 MG/1
TABLET ORAL
Qty: 30 TABLET | Refills: 0 | Status: SHIPPED | OUTPATIENT
Start: 2022-11-23 | End: 2022-12-20 | Stop reason: SDUPTHER

## 2022-11-23 NOTE — PROGRESS NOTES
Subjective:      Patient ID: Candy Huynh is a 52 y.o. female.    Chief Complaint:   No chief complaint on file.    HPI    Candy Huynh is here for a 4 week postoperative visit for her right knee. She also complains of hip pain at this time. Her knee is doing quite well. She only complains of some tenderness located over the incision. For her hip, she said she was trying to get off the commode and twisted a weird way and it began to hurt but then got better. She said she was also walking with the RW and had a twisting motion that aggravated the hip. She is in a wheelchair today mainly because of the distance from the parking lot to the clinic. She has been walking at home with the RW. She has no other complaints at this time.        Objective:        Ortho/SPM Exam    MSK:  Right Lower Extremity  Inspection  - Skin intact throughout, no open wounds  - There is incisional tenderness over the knee  - Mild knee swelling  - No ecchymosis, erythema, or signs of cellulitis  Palpation  - TTP over incision  - Mild TTP over lateral hip  - No joint line tenderness  Range of motion  - AROM and PROM of the hip, knee, ankle, and foot intact without pain  - ROM at knee 5-115  Neurovascular  - TA/EHL/Gastroc/FHL assessed in isolation without deficit  - SILT throughout  - Compartments soft  - DP palpated   - Capillary Refill <3s  - Negative Log roll  - Negative Stinchfield  - Muscle tone normal    Physical Exam  Imaging Review: I independently reviewed and interpreted the imaging and my findings are as follows: Right hip with mild degenerative changes and joint space narrowing      Assessment:     No diagnosis found.     Plan:     - Patient given refill of pain medication  - Instructed to keep icing the knee and continue working on extension   - FU in 2 weeks for 6 week postop visit     The patient's pathophysiology was explained in detail with reference to x-rays, models, other visual aids as appropriate.  Treatment  options were discussed in detail.  Questions were invited and answered to the patient's satisfaction.    Amos Cole MD  Orthopedic Surgery

## 2022-11-28 ENCOUNTER — CLINICAL SUPPORT (OUTPATIENT)
Dept: REHABILITATION | Facility: HOSPITAL | Age: 52
End: 2022-11-28
Payer: MEDICAID

## 2022-11-28 DIAGNOSIS — G89.29 CHRONIC PAIN OF RIGHT KNEE: Primary | ICD-10-CM

## 2022-11-28 DIAGNOSIS — M25.661 DECREASED RANGE OF MOTION (ROM) OF RIGHT KNEE: ICD-10-CM

## 2022-11-28 DIAGNOSIS — M25.561 CHRONIC PAIN OF RIGHT KNEE: Primary | ICD-10-CM

## 2022-11-28 PROCEDURE — 97110 THERAPEUTIC EXERCISES: CPT | Mod: CQ

## 2022-11-28 NOTE — PROGRESS NOTES
"  Physical Therapy Daily Treatment Note     Name: Candy King [facility]  Clinic Number: [id number]    Therapy Diagnosis:   Encounter Diagnoses   Name Primary?    Chronic pain of right knee Yes    Decreased range of motion (ROM) of right knee          Physician: Mirlande Parada PA-C    Visit Date: 11/28/2022    Physician Orders: PT Eval and Treat   Medical Diagnosis from Referral: M17.11 (ICD-10-CM) - Primary osteoarthritis of right knee  Evaluation Date: 10/31/2022  Authorization Period Expiration: 12/31/2022  Plan of Care Expiration: 12/31/2022  Visit # / Visits authorized: 1/ 1, 2/12  FOTO: 2/10  DOS: 10/26/22    Time In: 4:10 pm (late arrival)   Time Out: 4:57 pm   Total Billable Time: 39 minutes    Precautions: Standard and Diabetes    Subjective     Pt reports: that she has been having some pain in the back of her knee and is has increased lately.    She was compliant with home exercise program.  Functional change: use of less restrictive AD     Pain: 6/10 (R) knee      Objective     (R) knee AROM:  lacking 1 deg- 118 degrees      Candy received therapeutic exercises to develop strength, ROM, and flexibility for 29 minutes including:    Recumbent bike: 5' for ROM   TKE OTB 2x10   Standing heel raises 3x10   Step ups lvl 2 (lead c/ RLE) 2x10     Not performed:   Quad sets 5"x20 towel under ankle  SAQ 2x10 NO assist   Gastroc stretch w/strap 3x30"   Heel slides w/strap  5" 2x10   Seated marches 2x10   LAQ not performed    VMO extensions in sitting 2x10 2"   Knee extension stretch 2' with heel propped on chair not performed   HS curl c/ RTB 2x10       manual therapy techniques:  were applied to the: (R) knee for 10 minutes, including:  Seated tibial distraction  Seated A/P and P/A mobilizations         Ice applied to R knee for 8 min after session (in LLLD position for 3 min)      Home Exercises Provided and Patient Education Provided     Education provided:   - HEP   - Reviewed icing/elevating (R) knee/LE with pt " - pt verbalized understanding  -reviewed HEP and exercise modifications     Written Home Exercises Provided: yes.  Exercises were reviewed and Candy was able to demonstrate them prior to the end of the session.  Candy demonstrated good  understanding of the education provided.     See EMR under Patient Instructions for exercises provided 11/10/2022.      Assessment     Pt presented to therapy with SPC instead of walker. Adjusted patient's cane to correct height. Pt with complaints of increased pain in posterior knee, therefore provided manual therapy technqiues to reduce pain and improve mobility. Pt noted some pain relief following manual therapy, however did not quantify on pain scale. Pt is ambulating with decreased knee flexion at heel strike and throughout stance phase. Began incorporating standing therex this session in order to work towards goals of improved ADLs and functional mobility. Will continue to progress pt per her tolerance and POC during upcoming sessions.     Candy Is progressing towards her goals.   Pt prognosis is Good.     Pt will continue to benefit from skilled outpatient physical therapy to address the deficits listed in the problem list box on initial evaluation, provide pt/family education and to maximize pt's level of independence in the home and community environment.     Pt's spiritual, cultural and educational needs considered and pt agreeable to plan of care and goals.    Anticipated barriers to physical therapy: co-morbidities, transportation    Goals:     GOALS: Short Term Goals: 0-3 weeks  1.Report decreased R knee pain  < / =  4/10  to increase tolerance for amb  2. Increase knee ROM to 0-120 in order to be able to perform ADLs without difficulty.  3. Increase strength by 1/3 MMT grade in Quads to increase tolerance for ADL and work activities.  4. Pt to tolerate HEP to improve ROM and independence with ADL's     Long Term Goals: 3-6 weeks  1. Able to amb w/o deviation or  complaint  2.Patient goal: ADLs without complaint  3.Increase strength to >/= 4+/5 in Quad and hip musculature to increase tolerance for ADL and work activities.  4. Pt will report at CJ level (20-40% impaired) on FOTO knee to demonstrate increase in LE function with every day tasks.     Plan     Continue per POC, progress as tolerated     Michelle [place], PTA

## 2022-12-05 ENCOUNTER — CLINICAL SUPPORT (OUTPATIENT)
Dept: REHABILITATION | Facility: HOSPITAL | Age: 52
End: 2022-12-05
Payer: MEDICAID

## 2022-12-05 ENCOUNTER — TELEPHONE (OUTPATIENT)
Dept: ORTHOPEDICS | Facility: CLINIC | Age: 52
End: 2022-12-05
Payer: MEDICAID

## 2022-12-05 DIAGNOSIS — G89.29 CHRONIC PAIN OF RIGHT KNEE: Primary | ICD-10-CM

## 2022-12-05 DIAGNOSIS — M25.561 CHRONIC PAIN OF RIGHT KNEE: Primary | ICD-10-CM

## 2022-12-05 DIAGNOSIS — M25.661 DECREASED RANGE OF MOTION (ROM) OF RIGHT KNEE: ICD-10-CM

## 2022-12-05 PROCEDURE — 97110 THERAPEUTIC EXERCISES: CPT | Mod: CQ
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
## 2022-12-08 ENCOUNTER — CLINICAL SUPPORT (OUTPATIENT)
Dept: REHABILITATION | Facility: HOSPITAL | Age: 52
End: 2022-12-08
Payer: MEDICAID

## 2022-12-08 DIAGNOSIS — G89.29 CHRONIC PAIN OF RIGHT KNEE: Primary | ICD-10-CM

## 2022-12-08 DIAGNOSIS — M25.661 DECREASED RANGE OF MOTION (ROM) OF RIGHT KNEE: ICD-10-CM

## 2022-12-08 DIAGNOSIS — M25.561 CHRONIC PAIN OF RIGHT KNEE: Primary | ICD-10-CM

## 2022-12-08 PROCEDURE — 97110 THERAPEUTIC EXERCISES: CPT | Mod: CQ

## 2022-12-08 NOTE — PROGRESS NOTES
"  Physical Therapy Daily Treatment Note     Name: Candy King [facility]  Clinic Number: [id number]    Therapy Diagnosis:   Encounter Diagnoses   Name Primary?    Chronic pain of right knee Yes    Decreased range of motion (ROM) of right knee              Physician: Nolberto Fraser MD    Visit Date: 12/8/2022    Physician Orders: PT Eval and Treat   Medical Diagnosis from Referral: M17.11 (ICD-10-CM) - Primary osteoarthritis of right knee  Evaluation Date: 10/31/2022  Authorization Period Expiration: 12/31/2022  Plan of Care Expiration: 12/31/2022  Visit # / Visits authorized: 1/ 1, 5/12  FOTO: 4/10  DOS: 10/26/22    Time In: 4:04 pm  Time Out: 4:51  Total Billable Time: 47 minutes    Precautions: Standard and Diabetes    Subjective     Pt reports: Pt said her pain has decreasing and noticing progress. At home she stated she is walking around without a cane more.   Pt was compliant with HEP  Functional change: use of less restrictive AD     Pain: 4/10 (R) knee      Objective     (R) knee AROM:  lacking 1 deg- 119 degrees      Candy received therapeutic exercises to develop strength, ROM, and flexibility for 47 minutes including:    Recumbent bike: 5' for ROM   HS s 3x30" RLE  Seated marches 2x10   LAQ 2x10  Standing heel raises 3x10   HS curl c/ RTB 2x10   Step ups lvl 2 (lead c/ RLE) 2x10   Step down lvl 2 (lead c/ LLE) 2x10  Lateral step downs c/ 4" 2x10   Heel slides w/strap  5" 2x10   Standing HS curls 2lb (R) 2x10       Not performed:   Quad sets 5"x20 towel under ankle  SAQ 2x10   Gastroc stretch w/strap 3x30"   VMO extensions in sitting 2x10 2"   Knee extension stretch 2' with heel propped on chair not performed   Shuttle squats 2 straps 2x10  TKE OTB 2x10  Half foam step overs 4x      manual therapy techniques:  were applied to the: (R) knee for 0 minutes, including:  Seated tibial distraction  Seated A/P and P/A mobilizations         Ice applied to R knee for 0 min after session (in LLLD position for 3 " "min)      Home Exercises Provided and Patient Education Provided     Education provided:   - HEP   - Reviewed icing/elevating (R) knee/LE with pt - pt verbalized understanding  -reviewed HEP and exercise modifications     Written Home Exercises Provided: yes.  Exercises were reviewed and Candy was able to demonstrate them prior to the end of the session.  Candy demonstrated good  understanding of the education provided.     See EMR under Patient Instructions for exercises provided 11/10/2022.      Assessment     Pt noted feeling pain at the lateral knee throughout the session. Pt listed subjective reports c/ descending stairs due to R foot catching.  Added in lateral step downs on 4" to incorporate quad strengthening. Encouraged patient to speak with doctor about returning to driving at upcoming follow-up appointment. Continue following POC for next session     Candy Is progressing towards her goals.   Pt prognosis is Good.     Pt will continue to benefit from skilled outpatient physical therapy to address the deficits listed in the problem list box on initial evaluation, provide pt/family education and to maximize pt's level of independence in the home and community environment.     Pt's spiritual, cultural and educational needs considered and pt agreeable to plan of care and goals.    Anticipated barriers to physical therapy: co-morbidities, transportation    Goals:     GOALS: Short Term Goals: 0-3 weeks  1.Report decreased R knee pain  < / =  4/10  to increase tolerance for amb  2. Increase knee ROM to 0-120 in order to be able to perform ADLs without difficulty.  3. Increase strength by 1/3 MMT grade in Quads to increase tolerance for ADL and work activities.  4. Pt to tolerate HEP to improve ROM and independence with ADL's     Long Term Goals: 3-6 weeks  1. Able to amb w/o deviation or complaint  2.Patient goal: ADLs without complaint  3.Increase strength to >/= 4+/5 in Quad and hip musculature to " increase tolerance for ADL and work activities.  4. Pt will report at CJ level (20-40% impaired) on FOTO knee to demonstrate increase in LE function with every day tasks.     Plan     Continue per POC, progress as tolerated     LISA Chopra, PTA

## 2022-12-11 ENCOUNTER — HOSPITAL ENCOUNTER (EMERGENCY)
Facility: HOSPITAL | Age: 52
Discharge: LEFT BEFORE DISCHARGE | End: 2022-12-11
Attending: EMERGENCY MEDICINE
Payer: MEDICAID

## 2022-12-11 VITALS
HEART RATE: 89 BPM | DIASTOLIC BLOOD PRESSURE: 76 MMHG | WEIGHT: 233 LBS | BODY MASS INDEX: 41.27 KG/M2 | SYSTOLIC BLOOD PRESSURE: 135 MMHG | TEMPERATURE: 99 F | RESPIRATION RATE: 18 BRPM | OXYGEN SATURATION: 99 %

## 2022-12-11 DIAGNOSIS — M79.605 LEFT LEG PAIN: ICD-10-CM

## 2022-12-11 DIAGNOSIS — M54.9 BACK PAIN, UNSPECIFIED BACK LOCATION, UNSPECIFIED BACK PAIN LATERALITY, UNSPECIFIED CHRONICITY: Primary | ICD-10-CM

## 2022-12-11 DIAGNOSIS — M79.604 RIGHT LEG PAIN: ICD-10-CM

## 2022-12-11 LAB
ALBUMIN SERPL BCP-MCNC: 3.4 G/DL (ref 3.5–5.2)
ALP SERPL-CCNC: 102 U/L (ref 55–135)
ALT SERPL W/O P-5'-P-CCNC: 21 U/L (ref 10–44)
ANION GAP SERPL CALC-SCNC: 8 MMOL/L (ref 8–16)
AST SERPL-CCNC: 10 U/L (ref 10–40)
BASOPHILS # BLD AUTO: 0.03 K/UL (ref 0–0.2)
BASOPHILS NFR BLD: 0.4 % (ref 0–1.9)
BILIRUB SERPL-MCNC: 0.3 MG/DL (ref 0.1–1)
BUN SERPL-MCNC: 10 MG/DL (ref 6–20)
CALCIUM SERPL-MCNC: 9.3 MG/DL (ref 8.7–10.5)
CHLORIDE SERPL-SCNC: 105 MMOL/L (ref 95–110)
CO2 SERPL-SCNC: 29 MMOL/L (ref 23–29)
CREAT SERPL-MCNC: 0.7 MG/DL (ref 0.5–1.4)
CRP SERPL-MCNC: 10.6 MG/L (ref 0–8.2)
DIFFERENTIAL METHOD: ABNORMAL
EOSINOPHIL # BLD AUTO: 0.1 K/UL (ref 0–0.5)
EOSINOPHIL NFR BLD: 1.2 % (ref 0–8)
ERYTHROCYTE [DISTWIDTH] IN BLOOD BY AUTOMATED COUNT: 14.7 % (ref 11.5–14.5)
ERYTHROCYTE [SEDIMENTATION RATE] IN BLOOD BY PHOTOMETRIC METHOD: 34 MM/HR (ref 0–36)
EST. GFR  (NO RACE VARIABLE): >60 ML/MIN/1.73 M^2
GLUCOSE SERPL-MCNC: 92 MG/DL (ref 70–110)
HCT VFR BLD AUTO: 33.7 % (ref 37–48.5)
HGB BLD-MCNC: 10.8 G/DL (ref 12–16)
IMM GRANULOCYTES # BLD AUTO: 0.01 K/UL (ref 0–0.04)
IMM GRANULOCYTES NFR BLD AUTO: 0.1 % (ref 0–0.5)
LYMPHOCYTES # BLD AUTO: 2.6 K/UL (ref 1–4.8)
LYMPHOCYTES NFR BLD: 38.4 % (ref 18–48)
MCH RBC QN AUTO: 29.6 PG (ref 27–31)
MCHC RBC AUTO-ENTMCNC: 32 G/DL (ref 32–36)
MCV RBC AUTO: 92 FL (ref 82–98)
MONOCYTES # BLD AUTO: 0.4 K/UL (ref 0.3–1)
MONOCYTES NFR BLD: 6 % (ref 4–15)
NEUTROPHILS # BLD AUTO: 3.7 K/UL (ref 1.8–7.7)
NEUTROPHILS NFR BLD: 53.9 % (ref 38–73)
NRBC BLD-RTO: 0 /100 WBC
PLATELET # BLD AUTO: 372 K/UL (ref 150–450)
PMV BLD AUTO: 10.4 FL (ref 9.2–12.9)
POTASSIUM SERPL-SCNC: 4.2 MMOL/L (ref 3.5–5.1)
PROT SERPL-MCNC: 7.2 G/DL (ref 6–8.4)
RBC # BLD AUTO: 3.65 M/UL (ref 4–5.4)
SODIUM SERPL-SCNC: 142 MMOL/L (ref 136–145)
WBC # BLD AUTO: 6.85 K/UL (ref 3.9–12.7)

## 2022-12-11 PROCEDURE — 99284 EMERGENCY DEPT VISIT MOD MDM: CPT | Mod: ,,, | Performed by: EMERGENCY MEDICINE

## 2022-12-11 PROCEDURE — 99284 PR EMERGENCY DEPT VISIT,LEVEL IV: ICD-10-PCS | Mod: ,,, | Performed by: EMERGENCY MEDICINE

## 2022-12-11 PROCEDURE — 85025 COMPLETE CBC W/AUTO DIFF WBC: CPT | Performed by: EMERGENCY MEDICINE

## 2022-12-11 PROCEDURE — 99284 EMERGENCY DEPT VISIT MOD MDM: CPT | Mod: 25

## 2022-12-11 PROCEDURE — 25000003 PHARM REV CODE 250: Performed by: EMERGENCY MEDICINE

## 2022-12-11 PROCEDURE — 80053 COMPREHEN METABOLIC PANEL: CPT | Performed by: EMERGENCY MEDICINE

## 2022-12-11 PROCEDURE — 85652 RBC SED RATE AUTOMATED: CPT | Performed by: EMERGENCY MEDICINE

## 2022-12-11 PROCEDURE — 86140 C-REACTIVE PROTEIN: CPT | Performed by: EMERGENCY MEDICINE

## 2022-12-11 RX ORDER — LIDOCAINE 50 MG/G
1 PATCH TOPICAL
Status: DISCONTINUED | OUTPATIENT
Start: 2022-12-11 | End: 2022-12-12 | Stop reason: HOSPADM

## 2022-12-11 RX ORDER — OXYCODONE AND ACETAMINOPHEN 5; 325 MG/1; MG/1
1 TABLET ORAL
Status: COMPLETED | OUTPATIENT
Start: 2022-12-11 | End: 2022-12-11

## 2022-12-11 RX ADMIN — OXYCODONE HYDROCHLORIDE AND ACETAMINOPHEN 1 TABLET: 5; 325 TABLET ORAL at 04:12

## 2022-12-11 RX ADMIN — LIDOCAINE 1 PATCH: 50 PATCH CUTANEOUS at 04:12

## 2022-12-11 NOTE — PROVIDER PROGRESS NOTES - EMERGENCY DEPT.
Encounter Date: 12/11/2022    ED Physician Progress Notes        Physician Note:   I received this patient in sign out from the previous team.  I have discussed the patient's history and presentation in the ED with Dr. Cerrato  The patient is a 52 y.o. female who presented to the ED with Back Pain (Lower back pain and R knee pain x2 days. Hx of TKR in September )    Significant history and PE findings: recent tkr with pain, compensatory pain on other ride  Significant diagnostic results: pending  Pending studies and/or consultations: Xrs, ortho consult  Disposition:  Will continue to monitor, update patient on results of testing and determine appropriate additional treatment for the duration of stay in ED.  Pertinent lab and imaging findings are below.  Dmitriy Silva DO 5:39 PM 12/11/2022           7:40 PM  Ortho has evaluated the patient at bedside and stated that they reviewed the films   Awaiting orthopedic attending discussion in ultimate disposition, likely plan for discharge home with continued outpatient follow-up as the films were normal, ultrasound normal  The patient states that lidocaine patches do not stick to her skin.  When offered alternative outpatient regimen such as roll-on lidocaine which can be purchased over-the-counter at any local pharmacy, she abruptly stated that she did not want to talk to me anymore.  She is already on extensive pain control regimen at home, and that I feel this is the most reasonable addition to her regimen, so I gave them options on where to find this medication in a pharmacy.    8:45 PM  I was notified multiple times by nursing staff that the patient would like to go home.  She was advised to wait (orthopedic resident still had yet to discuss case with attending) however she declined and was discharged.      Impression:     Postoperative changes of right total knee arthroplasty.  No apparent complication.     No acute fracture or dislocation.     Electronically signed  by resident: Nolberto Reddy  Date:                                            12/11/2022  Time:                                           17:30     Electronically signed by: Wali Dooley MD  Date:                                            12/11/2022  Time:                                           18:06    Impression:     1. No acute displaced fracture or dislocation of the left hip.        Electronically signed by: Neno Morrow MD  Date:                                            12/11/2022  Time:                                           17:36    Impression:     No acute fracture or traumatic malalignment.     Mild lower lumbar facet arthropathy.  Follow-up, as clinically warranted.     Electronically signed by resident: Nolberto Reddy  Date:                                            12/11/2022  Time:                                           17:25     Electronically signed by: Wali Dooley MD  Date:                                            12/11/2022  Time:                                           18:08    Impression:     Postoperative changes of right total knee arthroplasty.  No apparent complication.     No acute fracture or dislocation.     Electronically signed by resident: Nolberto Reddy  Date:                                            12/11/2022  Time:                                           17:30     Electronically signed by: Wali Dooley MD  Date:                                            12/11/2022  Time:                                           18:06

## 2022-12-11 NOTE — ED PROVIDER NOTES
Encounter Date: 12/11/2022    SCRIBE #1 NOTE: I, Manasa Carrera, am scribing for, and in the presence of,  Nedra Cerrato MD. I have scribed the entire note.     History     Chief Complaint   Patient presents with    Back Pain     Lower back pain and R knee pain x2 days. Hx of TKR in September      Time patient was seen by the provider: 3:33 PM      The patient is a 52 y.o. female with co-morbidities including HTN, DM, fibromyalgia, DM type 2, TIA, and a PSHx of left knee arthroscopy (1/11/21) and total right knee arthroplasty (10/26/22) who presents to the ED with a complaint of back pain with onset 2 days. She had a total right knee replacement 1.5 months ago and is still having pain. She endorses more increased swelling focal to the knee. She denies any skin changes to the knee. She has been using a cane and limping and thinks her pain in her left side of her back is secondary to overcompensation. The pain is across her back but primarily to the left side. She also endorses left low back pain radiating to the hip, right knee pain, and right lower leg pain. The back and left hip pain is the worst. She has not had any new trauma to her legs / back. She had a follow up with orthopedic surgery on 11/23. She takes Oxycodone, Lyrica, Methocarbamol, and Mobic for her pain which she last took last night. She has an appointment with her orthopedic surgeon on 12/13 as well. She denies any fevers, chest pain, SOB, or abdominal pain.     The history is provided by the patient and medical records. No  was used.   Review of patient's allergies indicates:   Allergen Reactions    Morphine Other (See Comments)     shake     Past Medical History:   Diagnosis Date    Allergy     Anemia     Anxiety     Asthma     denies- on outside problem list in Care Everywhere    Depression     Diabetes mellitus     Diabetes mellitus, type 2     Fibromyalgia     GERD (gastroesophageal reflux disease)     Hypertension      Stroke     TIA    Vertigo      Past Surgical History:   Procedure Laterality Date    ARTHROSCOPIC REPAIR OF ROTATOR CUFF OF SHOULDER Right 2022    Procedure: REPAIR, ROTATOR CUFF, ARTHROSCOPIC;  Surgeon: Ministerio Orr Jr., MD;  Location: [facility];  Service: Orthopedics;  Laterality: Right;  need opus system  jose roberto notifed CC     ARTHROSCOPY OF KNEE Left 2021    Procedure: ARTHROSCOPY, KNEE;  Surgeon: Donnie Campuzano MD;  Location: [facility];  Service: Orthopedics;  Laterality: Left;     SECTION      DECOMPRESSION OF SUBACROMIAL SPACE  2022    Procedure: DECOMPRESSION, SUBACROMIAL SPACE;  Surgeon: Ministerio Orr Jr., MD;  Location: [facility] OR;  Service: Orthopedics;;    EXCISION OF MASS OF EXTREMITY Left 2019    Procedure: EXCISION, MASS, EXTREMITY;  Surgeon: Honorio Pulido IV, MD;  Location: [facility];  Service: Orthopedics;  Laterality: Left;  excision lipoma  Request Lacie    HERNIA REPAIR      HYSTERECTOMY      KNEE ARTHROSCOPY W/ MENISCECTOMY  2021    Procedure: ARTHROSCOPY, KNEE, WITH MENISCECTOMY;  Surgeon: Donnie Campuzano MD;  Location: [facility];  Service: Orthopedics;;    NASAL SEPTOPLASTY      RECONSTRUCTION OF ACROMIOCLAVICULAR JOINT  2022    Procedure: RECONSTRUCTION, ACROMIOCLAVICULAR JOINT;  Surgeon: Ministerio Orr Jr., MD;  Location: [facility];  Service: Orthopedics;;    TOTAL KNEE ARTHROPLASTY Right 10/26/2022    Procedure: ARTHROPLASTY, KNEE, TOTAL RIGHT: BALDO - NEXGEN;  Surgeon: Nolberto Fraser MD;  Location: [facility];  Service: Orthopedics;  Laterality: Right;     Family History   Problem Relation Age of Onset    No Known Problems Mother     Hypertension Father     Hypertension Sister     No Known Problems Sister     No Known Problems Brother     No Known Problems Daughter     No Known Problems Daughter     No Known Problems Daughter     No Known Problems Son      Social History     Tobacco Use    Smoking status: Former    Smokeless tobacco: Never   Substance Use  Topics    Alcohol use: Yes     Comment: occasional    Drug use: Never     Review of Systems   Constitutional:  Negative for fever.   HENT:  Negative for sore throat.    Eyes:  Negative for visual disturbance.   Respiratory:  Negative for shortness of breath.    Cardiovascular:  Negative for chest pain.   Gastrointestinal:  Negative for abdominal pain and nausea.   Endocrine: Negative for polyuria.   Genitourinary:  Negative for dysuria.   Musculoskeletal:  Positive for back pain and joint swelling (right knee).        + Right knee and lower leg pain.   + Left hip pain.   Skin:  Negative for color change and rash.   Neurological:  Negative for weakness.   Hematological:  Does not bruise/bleed easily.   Psychiatric/Behavioral:  Negative for confusion.      Physical Exam     Initial Vitals [12/11/22 1447]   BP Pulse Resp Temp SpO2   135/76 89 16 99.2 °F (37.3 °C) 99 %      MAP       --         Physical Exam  Consititutional: Pt is awake, alert, and oriented x 4. Uncomfortable when she is moving about.  HEENT: PERRL; EOMI; nares patent; op clear; mmm without lesions.  Neck: Supple with good ROM.  CV: Normal rate; regular rhythm; no mrg. Heart sounds normal. No peripheral edema. 2+ radials bilateral and symmetric.  Respiratory: CTA bilaterally with no focal rales, ronchi, or wheezes.  GI: Abdomen soft, NTND. No rebound. No guarding. BS normal.  MSK: No long bone deformities; Mild right knee effusion. Normal extensor mechanism of the right knee. AROM from 0 to 90 of flexion with some increased discomfort at the extreme flexion. 5/5 dorsiflexion. 5/5 plantar flexion. + EHL. No calf tenderness or lower extremity swelling on that right side other than what is found focally at the knee as above. There is an incision to the right knee that is vertically oriented and healing well without redness or warmth or dehiscence.   There is diffuse tenderness to palpation anywhere in the lower back and around the left hip that is worse  with any movement although the patient does have motor and sensation intact to that left side.   Neuro: MAEW. Sensation to fine touch is grossly intact through distal RLE.   Skin: No skin lesions or rash. Right knee has a vertically oriented surgical scar, healed well, with no surrounding redness.    ED Course   Procedures  Labs Reviewed   CBC W/ AUTO DIFFERENTIAL   COMPREHENSIVE METABOLIC PANEL   SEDIMENTATION RATE   C-REACTIVE PROTEIN          Imaging Results    None          Medications   LIDOcaine 5 % patch 1 patch (1 patch Transdermal Patch Applied 12/11/22 1612)   oxyCODONE-acetaminophen 5-325 mg per tablet 1 tablet (1 tablet Oral Given 12/11/22 1611)     Medical Decision Making:   History:   Old Medical Records: I decided to obtain old medical records.  Initial Assessment:   52 y.o. female with history of right total knee arthroplasty on 10/27. She is now here with worsening right knee pain and left lower back and hip pain after increased mobilization on the knee.  Differential Diagnosis:   I feel that this is most likely due to the fact that she has been up more on this knee and is now compensating for some of this discomfort on the left side. Although this is a diagnosis of exclusion. She does not have any infectious symptoms and the right knee does not appear to be a septic knee. I will consult orthopedics for further guidance early on in her stay given her recent post operative status. We will also rule out DVT.   Independently Interpreted Test(s):   I have ordered and independently interpreted X-rays - see prior notes.  Clinical Tests:   Lab Tests: Ordered and Reviewed  Radiological Study: Ordered and Reviewed  ED Management:  I spoke with orthopedics and we are currently placing labs (including inflammatory markers), plain films, and ultrasound of the RLE to rule out DVT which have been ordered. I treated the patient with oral pain medication with percocet. We are currently waiting for those results and  the orthopedic consult at time of sign out of care. The patient is in stable condition.   ED Diagnosis:  1. Acute right knee pain.   2. Acute lower back pain.   3. Above diagnoses complicated by recent total knee arthroplasty on the right.  Other:   I have discussed this case with another health care provider.       <> Summary of the Discussion: Orthopedic Surgery        Scribe Attestation:   Scribe #1: I performed the above scribed service and the documentation accurately describes the services I performed. I attest to the accuracy of the note.    Attending Attestation:           Physician Attestation for Scribe:  Physician Attestation Statement for Scribe #1: I, Nedra Cerrato MD, reviewed documentation, as scribed by Manasa Carrera in my presence, and it is both accurate and complete.                        Clinical Impression:   Final diagnoses:  [M79.604] Right leg pain  [M79.605] Left leg pain               Nedra Cerrato MD  12/13/22 [id number]

## 2022-12-11 NOTE — ED NOTES
"The patient c/o back pain. Right total knee replacement oct 26th. Back pain off and on "for a while" but started hurting again about 2 days ago and is now difficult to get up from sitting position. When she walks it hurts.denies recent falls. Uses a cane. Lower back pain across the entire back. Described as a soreness and likes it "locking up". States yesterday she was bent over and couldn't straighten up. Takes mobic and prn meds at home. Oxycodone she has for knee pain. Patient is sitting in a wheelchair in intake 3 and has her cane with her. Is wearing a mask  "

## 2022-12-12 ENCOUNTER — TELEPHONE (OUTPATIENT)
Dept: ORTHOPEDICS | Facility: CLINIC | Age: 52
End: 2022-12-12
Payer: MEDICAID

## 2022-12-12 NOTE — ED NOTES
Patient continues to request to leave. MD informed. Per MD, pt is awaiting ortho consult prior to DC. Pt informed of this. IV removed per patient request. Pt agreeing to wait for ortho at this time

## 2022-12-12 NOTE — ED NOTES
Patient requesting to leave x3. MD aware. Pt once again informed of plan of care. Pt and family left ED despite explanation that patient should wait for evaluation by orthopedic dr. ATWOOD notified.

## 2022-12-12 NOTE — CONSULTS
"[facility] - Emergency Dept  Orthopedics  Consult Note    Patient Name: Candy Huynh  MRN: [id number]  Admission Date: 12/11/2022  Hospital Length of Stay: 0 days  Attending Provider: Nedra Cerrato MD  Primary Care Provider: Chris Romano MD      Inpatient consult to Orthopedic Surgery  Consult performed by: Davide Pedro MD  Consult ordered by: Nedra Cerrato MD        Subjective:     Principal Problem:<principal problem not specified>    Chief Complaint:   Chief Complaint   Patient presents with    Back Pain     Lower back pain and R knee pain x2 days. Hx of TKR in September         HPI: Candy Huynh is a 52 y.o. female who recently underwent right TKA with Dr. Fraser on 10/27/22 presenting with low back pain. She reports two days of acute worsening of her left paraspinal low back pain. Pain is located in the left paraspinal area and radiates to the hip. She denies any new falls or trauma Patient denies any head trauma or LOC. The patient denies prior hx of falls. Patient denies numbness and tingling. Denies any other musculoskeletal pain or injuries.   Walks w/out assisted devices at baseline, but has been using cane for 2-3 weeks due to post-operative state. Doesn't take any anticoagulation at baseline.  They deny IV drug use.  They deny tobacco use, but report nicotine gum use.   They report occasional alcohol use.   They deny immunosuppressant medications.  They deny chemotherapy.  They deny radiation therapy.   With regards to her right TKA, she denies any issue other than pain when she begins to ambulate. She states that when she rises she has to "take the pressure off" her knee, but is then able to ambulate. She reports knee effusion that has been stable since surgery.        Past Medical History:   Diagnosis Date    Allergy     Anemia     Anxiety     Asthma     denies- on outside problem list in Care Everywhere    Depression     Diabetes mellitus     Diabetes mellitus, type 2     " Fibromyalgia     GERD (gastroesophageal reflux disease)     Hypertension     Stroke     TIA    Vertigo        Past Surgical History:   Procedure Laterality Date    ARTHROSCOPIC REPAIR OF ROTATOR CUFF OF SHOULDER Right 2022    Procedure: REPAIR, ROTATOR CUFF, ARTHROSCOPIC;  Surgeon: Ministerio Orr Jr., MD;  Location: [facility];  Service: Orthopedics;  Laterality: Right;  need opus system  jose roberto notifed CC     ARTHROSCOPY OF KNEE Left 2021    Procedure: ARTHROSCOPY, KNEE;  Surgeon: Donnie Campuzano MD;  Location: [facility] OR;  Service: Orthopedics;  Laterality: Left;     SECTION      DECOMPRESSION OF SUBACROMIAL SPACE  2022    Procedure: DECOMPRESSION, SUBACROMIAL SPACE;  Surgeon: Ministerio Orr Jr., MD;  Location: [facility] OR;  Service: Orthopedics;;    EXCISION OF MASS OF EXTREMITY Left 2019    Procedure: EXCISION, MASS, EXTREMITY;  Surgeon: Honorio Pulido IV, MD;  Location: [facility];  Service: Orthopedics;  Laterality: Left;  excision lipoma  Request Lacie    HERNIA REPAIR      HYSTERECTOMY      KNEE ARTHROSCOPY W/ MENISCECTOMY  2021    Procedure: ARTHROSCOPY, KNEE, WITH MENISCECTOMY;  Surgeon: Donnie Campuzano MD;  Location: [facility];  Service: Orthopedics;;    NASAL SEPTOPLASTY      RECONSTRUCTION OF ACROMIOCLAVICULAR JOINT  2022    Procedure: RECONSTRUCTION, ACROMIOCLAVICULAR JOINT;  Surgeon: Ministerio Orr Jr., MD;  Location: [facility];  Service: Orthopedics;;    TOTAL KNEE ARTHROPLASTY Right 10/26/2022    Procedure: ARTHROPLASTY, KNEE, TOTAL RIGHT: BALDO - NEXGEN;  Surgeon: Nolberto Fraser MD;  Location: [facility];  Service: Orthopedics;  Laterality: Right;       Review of patient's allergies indicates:   Allergen Reactions    Morphine Other (See Comments)     shake       Current Facility-Administered Medications   Medication    LIDOcaine 5 % patch 1 patch     Current Outpatient Medications   Medication Sig    acetaminophen (TYLENOL) 650 MG TbSR Take 1 tablet (650  mg total) by mouth every 6 to 8 hours as needed (pain).    albuterol (PROVENTIL/VENTOLIN HFA) 90 mcg/actuation inhaler SMARTSI Puff(s) By Mouth Every 4 Hours PRN    amLODIPine (NORVASC) 5 MG tablet Take 5 mg by mouth once daily.    aspirin (ECOTRIN) 81 MG EC tablet Take 1 tablet (81 mg total) by mouth 2 (two) times daily.    azelastine (ASTELIN) 137 mcg (0.1 %) nasal spray 1 spray once daily.    EPINEPHrine (EPIPEN) 0.3 mg/0.3 mL AtIn as directed    ergocalciferol (ERGOCALCIFEROL) 50,000 unit Cap Take 50,000 Units by mouth every 7 days.    estradioL (ESTRACE) 0.01 % (0.1 mg/gram) vaginal cream Place vaginally.    FLUoxetine 20 MG capsule Take 40 mg by mouth once daily.    fluticasone propionate (FLONASE) 50 mcg/actuation nasal spray 1 spray by Each Nostril route once daily.    HYDROmorphone (DILAUDID) 2 MG tablet Take 2 tablets (4 mg total) by mouth every 3 (three) hours as needed for Pain.    LIDOcaine (LIDODERM) 5 % Place 1 patch onto the skin once daily. Remove & Discard patch within 12 hours or as directed by MD    loratadine (CLARITIN) 10 mg tablet Take 10 mg by mouth once daily.    losartan (COZAAR) 100 MG tablet Take 100 mg by mouth once daily.    meloxicam (MOBIC) 15 MG tablet Take 1 tablet (15 mg total) by mouth once daily.    metFORMIN (GLUCOPHAGE) 1000 MG tablet Take 1,000 mg by mouth 2 (two) times daily with meals.    methocarbamoL (ROBAXIN) 750 MG Tab Take 1 tablet (750 mg total) by mouth 3 (three) times daily as needed (muscle spasms).    montelukast (SINGULAIR) 10 mg tablet     oxyCODONE (ROXICODONE) 5 MG immediate release tablet Take 1-2 tablets by mouth every 4-6 hours as needed for pain    pantoprazole (PROTONIX) 40 MG tablet TAKE 1 TABLET BY MOUTH ONCE DAILY 30 MINUTES BEFORE BREAKFAST    prazosin (MINIPRESS) 1 MG Cap TAKE 1 CAPSULE BY MOUTH EVERY NIGHT AT BEDTIME FOR 7 DAYS; THEN TAKE 2 CAPSULES BY MOUTH EVERY NIGHT AT BEDTIME FOR 7 DAYS; THEN TAKE 3 CAPSULES BY MOUTH EVERY  NIGHT AT BEDTIME THEREAFTER AS DIRECTED BY PHYSICIAN    pregabalin (LYRICA) 75 MG capsule Take 2 capsules (150 mg total) by mouth 2 (two) times daily. for 14 days    PREMARIN 0.625 mg tablet Take 0.625 mg by mouth once daily.    STOOL SOFTENER 100 mg capsule Take 1 softgel by mouth twice daily     Family History       Problem Relation (Age of Onset)    Hypertension Father, Sister    No Known Problems Mother, Sister, Brother, Daughter, Daughter, Daughter, Son          Tobacco Use    Smoking status: Former    Smokeless tobacco: Never   Substance and Sexual Activity    Alcohol use: Yes     Comment: occasional    Drug use: Never    Sexual activity: Yes     Partners: Male     ROS  Constitutional: negative for fevers or chills  Eyes: negative visual changes or eye discharge  ENT: negative for ear pain or sore throat  Respiratory: negative for shortness of breath or cough  Cardiovascular: negative for chest pain or palpitations  Gastrointestinal: negative for abdominal pain, nausea, or vomiting  Genitourinary: negative for dysuria and flank pain  Neurological: negative for headaches or dizziness  Musculoskeletal: positive for low back pain    Objective:     Vital Signs (Most Recent):  Temp: 99.2 °F (37.3 °C) (12/11/22 1447)  Pulse: 89 (12/11/22 1447)  Resp: 18 (12/11/22 1611)  BP: 135/76 (12/11/22 1447)  SpO2: 99 % (12/11/22 1447) Vital Signs (24h Range):  Temp:  [99.2 °F (37.3 °C)] 99.2 °F (37.3 °C)  Pulse:  [89] 89  Resp:  [16-18] 18  SpO2:  [99 %] 99 %  BP: (135)/(76) 135/76     Weight: 105.7 kg (233 lb)     Body mass index is 41.27 kg/m².    No intake or output data in the 24 hours ending 12/11/22 2111    Ortho/SPM Exam  Gen:  No acute distress, well-developed, well nourished.  CV:  Peripherally well-perfused.  Respiratory:  Normal respiratory effort. No accessory muscle use.   Head/Neck:  Normocephalic.  Atraumatic. Sclera anicteric. TM. Neck supple.  Neuro: CN 2-12 grossly intact. No FND. Awake. Alert.  Oriented to person, place, time, and situation.      MSK:  Right Lower Extremity  Inspection  - Skin intact throughout, no open wounds, well healed midline TKA incision  - Suprapatellar effusion present  - No ecchymosis, erythema, or signs of cellulitis  Palpation  - TTP to tibial tubercle  Range of motion  - AROM and PROM of the hip, knee, ankle, and foot intact without pain  Stability  - No evidence of joint dislocation or abnormal laxity  Neurovascular  - TA/EHL/Gastroc/FHL assessed in isolation without deficit  - SILT throughout  - Compartments soft  - DP palpated   - Muscle tone normal    Left Lower Extremity  Palpation  - NonTTP throughout  Range of motion  - AROM and PROM of the hip, knee, ankle, and foot intact without pain  Stability  - No evidence of joint dislocation or abnormal laxity  Neurovascular  - TA/EHL/Gastroc/FHL assessed in isolation without deficit  - SILT throughout  - Compartments soft  - DP palpated   - Muscle tone normal      Significant Labs: CBC:   Recent Labs   Lab 12/11/22  1722   WBC 6.85   HGB 10.8*   HCT 33.7*        CMP:   Recent Labs   Lab 12/11/22  1722      K 4.2      CO2 29   GLU 92   BUN 10   CREATININE 0.7   CALCIUM 9.3   PROT 7.2   ALBUMIN 3.4*   BILITOT 0.3   ALKPHOS 102   AST 10   ALT 21   ANIONGAP 8     CRP:   Recent Labs   Lab 12/11/22  1722   CRP 10.6*     Lab Results   Component Value Date    SEDRATE 34 12/11/2022       All pertinent labs within the past 24 hours have been reviewed.    Significant Imaging: X-Ray: I have reviewed all pertinent results/findings and my personal findings are:  TKA in place, no acute fracture or dislocation  I have reviewed all pertinent imaging results/findings.    Assessment/Plan:     Primary osteoarthritis of right knee  Candy Huynh is a 52 y.o. female s/p R TKA with Dr. Fraser on 10/27/22. Presented to ED for low back pain. Knee has been doing well overall. Small increase in CRP, but suspect it is baseline for  the patient or due to acute pain flare as last CRP was also slightly elevated.     - WBAT, ROMAT RLE  - Pain control: MM  - Continue cane for comfort  - No acute orthopedic intervention for right TKA  - Follow up in clinic, patient will be contacted with appointment details          Davide Pedro MD  Orthopedics  [facility] - [facility] Dept

## 2022-12-12 NOTE — SUBJECTIVE & OBJECTIVE
Past Medical History:   Diagnosis Date    Allergy     Anemia     Anxiety     Asthma     denies- on outside problem list in Care Everywhere    Depression     Diabetes mellitus     Diabetes mellitus, type 2     Fibromyalgia     GERD (gastroesophageal reflux disease)     Hypertension     Stroke     TIA    Vertigo        Past Surgical History:   Procedure Laterality Date    ARTHROSCOPIC REPAIR OF ROTATOR CUFF OF SHOULDER Right 2022    Procedure: REPAIR, ROTATOR CUFF, ARTHROSCOPIC;  Surgeon: Ministerio Orr Jr., MD;  Location: [facility];  Service: Orthopedics;  Laterality: Right;  need opus system  [employer] notifed CC     ARTHROSCOPY OF KNEE Left 2021    Procedure: ARTHROSCOPY, KNEE;  Surgeon: Donnie Campuzano MD;  Location: [facility] OR;  Service: Orthopedics;  Laterality: Left;     SECTION      DECOMPRESSION OF SUBACROMIAL SPACE  2022    Procedure: DECOMPRESSION, SUBACROMIAL SPACE;  Surgeon: Ministerio Orr Jr., MD;  Location: [facility];  Service: Orthopedics;;    EXCISION OF MASS OF EXTREMITY Left 2019    Procedure: EXCISION, MASS, EXTREMITY;  Surgeon: Honorio Pulido IV, MD;  Location: [facility];  Service: Orthopedics;  Laterality: Left;  excision lipoma  Request Lacie    HERNIA REPAIR      HYSTERECTOMY      KNEE ARTHROSCOPY W/ MENISCECTOMY  2021    Procedure: ARTHROSCOPY, KNEE, WITH MENISCECTOMY;  Surgeon: Donnie Campuzano MD;  Location: [facility] OR;  Service: Orthopedics;;    NASAL SEPTOPLASTY      RECONSTRUCTION OF ACROMIOCLAVICULAR JOINT  2022    Procedure: RECONSTRUCTION, ACROMIOCLAVICULAR JOINT;  Surgeon: Ministerio Orr Jr., MD;  Location: [facility];  Service: Orthopedics;;    TOTAL KNEE ARTHROPLASTY Right 10/26/2022    Procedure: ARTHROPLASTY, KNEE, TOTAL RIGHT: BALDO - NEXGEN;  Surgeon: Nolberto Fraser MD;  Location: [facility] OR;  Service: Orthopedics;  Laterality: Right;       Review of patient's allergies indicates:   Allergen Reactions    Morphine Other (See Comments)     shake        Current Facility-Administered Medications   Medication    LIDOcaine 5 % patch 1 patch     Current Outpatient Medications   Medication Sig    acetaminophen (TYLENOL) 650 MG TbSR Take 1 tablet (650 mg total) by mouth every 6 to 8 hours as needed (pain).    albuterol (PROVENTIL/VENTOLIN HFA) 90 mcg/actuation inhaler SMARTSI Puff(s) By Mouth Every 4 Hours PRN    amLODIPine (NORVASC) 5 MG tablet Take 5 mg by mouth once daily.    aspirin (ECOTRIN) 81 MG EC tablet Take 1 tablet (81 mg total) by mouth 2 (two) times daily.    azelastine (ASTELIN) 137 mcg (0.1 %) nasal spray 1 spray once daily.    EPINEPHrine (EPIPEN) 0.3 mg/0.3 mL AtIn as directed    ergocalciferol (ERGOCALCIFEROL) 50,000 unit Cap Take 50,000 Units by mouth every 7 days.    estradioL (ESTRACE) 0.01 % (0.1 mg/gram) vaginal cream Place vaginally.    FLUoxetine 20 MG capsule Take 40 mg by mouth once daily.    fluticasone propionate (FLONASE) 50 mcg/actuation nasal spray 1 spray by Each Nostril route once daily.    HYDROmorphone (DILAUDID) 2 MG tablet Take 2 tablets (4 mg total) by mouth every 3 (three) hours as needed for Pain.    LIDOcaine (LIDODERM) 5 % Place 1 patch onto the skin once daily. Remove & Discard patch within 12 hours or as directed by MD    loratadine (CLARITIN) 10 mg tablet Take 10 mg by mouth once daily.    losartan (COZAAR) 100 MG tablet Take 100 mg by mouth once daily.    meloxicam (MOBIC) 15 MG tablet Take 1 tablet (15 mg total) by mouth once daily.    metFORMIN (GLUCOPHAGE) 1000 MG tablet Take 1,000 mg by mouth 2 (two) times daily with meals.    methocarbamoL (ROBAXIN) 750 MG Tab Take 1 tablet (750 mg total) by mouth 3 (three) times daily as needed (muscle spasms).    montelukast (SINGULAIR) 10 mg tablet     oxyCODONE (ROXICODONE) 5 MG immediate release tablet Take 1-2 tablets by mouth every 4-6 hours as needed for pain    pantoprazole (PROTONIX) 40 MG tablet TAKE 1 TABLET BY MOUTH ONCE DAILY 30 MINUTES BEFORE BREAKFAST     prazosin (MINIPRESS) 1 MG Cap TAKE 1 CAPSULE BY MOUTH EVERY NIGHT AT BEDTIME FOR 7 DAYS; THEN TAKE 2 CAPSULES BY MOUTH EVERY NIGHT AT BEDTIME FOR 7 DAYS; THEN TAKE 3 CAPSULES BY MOUTH EVERY NIGHT AT BEDTIME THEREAFTER AS DIRECTED BY PHYSICIAN    pregabalin (LYRICA) 75 MG capsule Take 2 capsules (150 mg total) by mouth 2 (two) times daily. for 14 days    PREMARIN 0.625 mg tablet Take 0.625 mg by mouth once daily.    STOOL SOFTENER 100 mg capsule Take 1 softgel by mouth twice daily     Family History       Problem Relation (Age of Onset)    Hypertension Father, Sister    No Known Problems Mother, Sister, Brother, Daughter, Daughter, Daughter, Son          Tobacco Use    Smoking status: Former    Smokeless tobacco: Never   Substance and Sexual Activity    Alcohol use: Yes     Comment: occasional    Drug use: Never    Sexual activity: Yes     Partners: Male     ROS  Constitutional: negative for fevers or chills  Eyes: negative visual changes or eye discharge  ENT: negative for ear pain or sore throat  Respiratory: negative for shortness of breath or cough  Cardiovascular: negative for chest pain or palpitations  Gastrointestinal: negative for abdominal pain, nausea, or vomiting  Genitourinary: negative for dysuria and flank pain  Neurological: negative for headaches or dizziness  Musculoskeletal: positive for low back pain    Objective:     Vital Signs (Most Recent):  Temp: 99.2 °F (37.3 °C) (12/11/22 1447)  Pulse: 89 (12/11/22 1447)  Resp: 18 (12/11/22 1611)  BP: 135/76 (12/11/22 1447)  SpO2: 99 % (12/11/22 1447) Vital Signs (24h Range):  Temp:  [99.2 °F (37.3 °C)] 99.2 °F (37.3 °C)  Pulse:  [89] 89  Resp:  [16-18] 18  SpO2:  [99 %] 99 %  BP: (135)/(76) 135/76     Weight: 105.7 kg (233 lb)     Body mass index is 41.27 kg/m².    No intake or output data in the 24 hours ending 12/11/22 2111    Ortho/SPM Exam  Gen:  No acute distress, well-developed, well nourished.  CV:  Peripherally well-perfused.  Respiratory:  Normal  respiratory effort. No accessory muscle use.   Head/Neck:  Normocephalic.  Atraumatic. Sclera anicteric. TM. Neck supple.  Neuro: CN 2-12 grossly intact. No FND. Awake. Alert. Oriented to person, place, time, and situation.      MSK:  Right Lower Extremity  Inspection  - Skin intact throughout, no open wounds, well healed midline TKA incision  - Suprapatellar effusion present  - No ecchymosis, erythema, or signs of cellulitis  Palpation  - TTP to tibial tubercle  Range of motion  - AROM and PROM of the hip, knee, ankle, and foot intact without pain  Stability  - No evidence of joint dislocation or abnormal laxity  Neurovascular  - TA/EHL/Gastroc/FHL assessed in isolation without deficit  - SILT throughout  - Compartments soft  - DP palpated   - Muscle tone normal    Left Lower Extremity  Palpation  - NonTTP throughout  Range of motion  - AROM and PROM of the hip, knee, ankle, and foot intact without pain  Stability  - No evidence of joint dislocation or abnormal laxity  Neurovascular  - TA/EHL/Gastroc/FHL assessed in isolation without deficit  - SILT throughout  - Compartments soft  - DP palpated   - Muscle tone normal      Significant Labs: CBC:   Recent Labs   Lab 12/11/22  1722   WBC 6.85   HGB 10.8*   HCT 33.7*        CMP:   Recent Labs   Lab 12/11/22  1722      K 4.2      CO2 29   GLU 92   BUN 10   CREATININE 0.7   CALCIUM 9.3   PROT 7.2   ALBUMIN 3.4*   BILITOT 0.3   ALKPHOS 102   AST 10   ALT 21   ANIONGAP 8     CRP:   Recent Labs   Lab 12/11/22  1722   CRP 10.6*     Lab Results   Component Value Date    SEDRATE 34 12/11/2022       All pertinent labs within the past 24 hours have been reviewed.    Significant Imaging: X-Ray: I have reviewed all pertinent results/findings and my personal findings are:  TKA in place, no acute fracture or dislocation  I have reviewed all pertinent imaging results/findings.

## 2022-12-12 NOTE — DISCHARGE INSTRUCTIONS
"You were evaluated in the emergency department today for back pain and leg pain following your total knee replacement.  Although there were no findings of concern to necessitate admission to the hospital or warrant immediate surgical intervention, disease exists on a spectrum and your disease process may progress.  If this is the case, please watch your symptoms and return to the emergency department if you feel worse and are unable to discuss care with your primary care doctor in follow up in the next several days.  Specific information regarding your complaint has been provided.  Thank you for choosing [facility]!    You should continue your pain regimen outpatient and continue outpatient orthopedic care.  You should try to find lidocaine medicine in "roll on" or ointment form and apply this to your back where it hurts, if the patches do not work for you.    "

## 2022-12-12 NOTE — ASSESSMENT & PLAN NOTE
Candy Huynh is a 52 y.o. female s/p R TKA with Dr. Fraser on 10/27/22. Presented to ED for low back pain. Knee has been doing well overall. Small increase in CRP, but suspect it is baseline for the patient or due to acute pain flare as last CRP was also slightly elevated.     - WBAT, ROMAT RLE  - Pain control: MM  - Continue cane for comfort  - No acute orthopedic intervention for right TKA  - Follow up in clinic, patient will be contacted with appointment details

## 2022-12-12 NOTE — HPI
"Candy Huynh is a 52 y.o. female who recently underwent right TKA with Dr. Fraser on 10/27/22 presenting with low back pain. She reports two days of acute worsening of her left paraspinal low back pain. Pain is located in the left paraspinal area and radiates to the hip. She denies any new falls or trauma Patient denies any head trauma or LOC. The patient denies prior hx of falls. Patient denies numbness and tingling. Denies any other musculoskeletal pain or injuries.   Walks w/out assisted devices at baseline, but has been using cane for 2-3 weeks due to post-operative state. Doesn't take any anticoagulation at baseline.  They deny IV drug use.  They deny tobacco use, but report nicotine gum use.   They report occasional alcohol use.   They deny immunosuppressant medications.  They deny chemotherapy.  They deny radiation therapy.   With regards to her right TKA, she denies any issue other than pain when she begins to ambulate. She states that when she rises she has to "take the pressure off" her knee, but is then able to ambulate. She reports knee effusion that has been stable since surgery.    "

## 2022-12-12 NOTE — ED NOTES
Patient requesting to leave x 2. MD informed. Pt once again informed that ortho will be at bedside as soon as possible, ortho is currently in room speaking with another patient.

## 2022-12-12 NOTE — TELEPHONE ENCOUNTER
Pt has apt with Dr Fraser 12/13.   ----- Message from Davide ePdro MD sent at 12/11/2022  9:15 PM CST -----  Hi,    Can we get her clinic follow up for her ED visit in one to two weeks?    Thank you,  Davide Pedro

## 2022-12-20 ENCOUNTER — OFFICE VISIT (OUTPATIENT)
Dept: ORTHOPEDICS | Facility: CLINIC | Age: 52
End: 2022-12-20
Payer: MEDICAID

## 2022-12-20 VITALS — WEIGHT: 237.56 LBS | HEIGHT: 63 IN | BODY MASS INDEX: 42.09 KG/M2

## 2022-12-20 DIAGNOSIS — G89.29 CHRONIC BILATERAL LOW BACK PAIN WITHOUT SCIATICA: Primary | ICD-10-CM

## 2022-12-20 DIAGNOSIS — Z96.651 STATUS POST RIGHT KNEE REPLACEMENT: ICD-10-CM

## 2022-12-20 DIAGNOSIS — G62.9 NEUROPATHY: ICD-10-CM

## 2022-12-20 DIAGNOSIS — M54.50 CHRONIC BILATERAL LOW BACK PAIN WITHOUT SCIATICA: Primary | ICD-10-CM

## 2022-12-20 PROCEDURE — 4010F PR ACE/ARB THEARPY RXD/TAKEN: ICD-10-PCS | Mod: CPTII,,, | Performed by: ORTHOPAEDIC SURGERY

## 2022-12-20 PROCEDURE — 3044F PR MOST RECENT HEMOGLOBIN A1C LEVEL <7.0%: ICD-10-PCS | Mod: CPTII,,, | Performed by: ORTHOPAEDIC SURGERY

## 2022-12-20 PROCEDURE — 3008F PR BODY MASS INDEX (BMI) DOCUMENTED: ICD-10-PCS | Mod: CPTII,,, | Performed by: ORTHOPAEDIC SURGERY

## 2022-12-20 PROCEDURE — 1159F PR MEDICATION LIST DOCUMENTED IN MEDICAL RECORD: ICD-10-PCS | Mod: CPTII,,, | Performed by: ORTHOPAEDIC SURGERY

## 2022-12-20 PROCEDURE — 3044F HG A1C LEVEL LT 7.0%: CPT | Mod: CPTII,,, | Performed by: ORTHOPAEDIC SURGERY

## 2022-12-20 PROCEDURE — 1160F RVW MEDS BY RX/DR IN RCRD: CPT | Mod: CPTII,,, | Performed by: ORTHOPAEDIC SURGERY

## 2022-12-20 PROCEDURE — 99999 PR PBB SHADOW E&M-EST. PATIENT-LVL V: CPT | Mod: PBBFAC,,, | Performed by: ORTHOPAEDIC SURGERY

## 2022-12-20 PROCEDURE — 99024 POSTOP FOLLOW-UP VISIT: CPT | Mod: ,,, | Performed by: ORTHOPAEDIC SURGERY

## 2022-12-20 PROCEDURE — 99999 PR PBB SHADOW E&M-EST. PATIENT-LVL V: ICD-10-PCS | Mod: PBBFAC,,, | Performed by: ORTHOPAEDIC SURGERY

## 2022-12-20 PROCEDURE — 4010F ACE/ARB THERAPY RXD/TAKEN: CPT | Mod: CPTII,,, | Performed by: ORTHOPAEDIC SURGERY

## 2022-12-20 PROCEDURE — 3008F BODY MASS INDEX DOCD: CPT | Mod: CPTII,,, | Performed by: ORTHOPAEDIC SURGERY

## 2022-12-20 PROCEDURE — 99024 PR POST-OP FOLLOW-UP VISIT: ICD-10-PCS | Mod: ,,, | Performed by: ORTHOPAEDIC SURGERY

## 2022-12-20 PROCEDURE — 99215 OFFICE O/P EST HI 40 MIN: CPT | Mod: PBBFAC | Performed by: ORTHOPAEDIC SURGERY

## 2022-12-20 PROCEDURE — 1160F PR REVIEW ALL MEDS BY PRESCRIBER/CLIN PHARMACIST DOCUMENTED: ICD-10-PCS | Mod: CPTII,,, | Performed by: ORTHOPAEDIC SURGERY

## 2022-12-20 PROCEDURE — 1159F MED LIST DOCD IN RCRD: CPT | Mod: CPTII,,, | Performed by: ORTHOPAEDIC SURGERY

## 2022-12-20 RX ORDER — METHOCARBAMOL 750 MG/1
750 TABLET, FILM COATED ORAL 3 TIMES DAILY PRN
Qty: 60 TABLET | Refills: 1 | Status: ON HOLD | OUTPATIENT
Start: 2022-12-20 | End: 2023-08-08 | Stop reason: HOSPADM

## 2022-12-20 RX ORDER — OXYCODONE HYDROCHLORIDE 5 MG/1
TABLET ORAL
Qty: 28 TABLET | Refills: 0 | Status: SHIPPED | OUTPATIENT
Start: 2022-12-20 | End: 2023-01-12 | Stop reason: ALTCHOICE

## 2022-12-20 RX ORDER — MELOXICAM 15 MG/1
15 TABLET ORAL DAILY
Qty: 30 TABLET | Refills: 3 | Status: SHIPPED | OUTPATIENT
Start: 2022-12-20 | End: 2023-02-07 | Stop reason: SDUPTHER

## 2022-12-20 NOTE — PROGRESS NOTES
Subjective:      Patient ID: Candy Huynh is a 52 y.o. female.    Chief Complaint:   Post-op Evaluation    HPI  She comes in about 7 weeks out from her right JOANN.  She has up to 5/10 pain, but usually less.  No falls.  She feels her knee is good.  No new symptoms to report.  She has been bothered by her chronic low back pain, and in fact had an ER visit for this.      Objective:        Ortho/SPM Exam  On exam, the scar is well healed without redness or tenderness.  There is no effusion.  Active range of motion is 0-115° without crepitus.  No instability to varus/valgus stress in extension or flexion.  No excessive sagittal plane instability.  Calves soft, nontender.  Distal neurovascular intact.        IMAGING:  Weightbearing x-rays show well-fixed and positioned total knee arthroplasty without signs of loosening or other complications  Assessment:   Progressing well status post TKA      Plan:   Medications refilled, and we referred her to Pain Management for further assessment and management of her back pain.  See me in 3 weeks for progress check.    The patient's pathophysiology was explained in detail with reference to x-rays, models, other visual aids as appropriate.  Treatment options were discussed in detail.  Questions were invited and answered to the patient's satisfaction.      Nolberto Fraser MD  Orthopedic Surgery

## 2022-12-28 NOTE — PLAN OF CARE
[facility] - [facility] (Hospital)  Discharge Final Note    Primary Care Provider: Chris Romano MD    Expected Discharge Date: 10/27/2022    Final Discharge Note (most recent)       Final Note - 10/27/22 1122          Final Note    Assessment Type Final Discharge Note     Anticipated Discharge Disposition Home or Self Care     Hospital Resources/Appts/Education Provided Provided patient/caregiver with written discharge plan information;Appointments scheduled by Navigator/Coordinator

## 2022-12-28 NOTE — PLAN OF CARE
10/26/22 1623   STINSON Message   Medicare Outpatient and Observation Notification regarding financial responsibility Given to patient/caregiver;Explained to patient/caregiver;Signed/date by patient/caregiver   Date STINSON was signed 10/26/22   Time STINSON was signed [id number]

## 2023-01-12 ENCOUNTER — PATIENT MESSAGE (OUTPATIENT)
Dept: ADMINISTRATIVE | Facility: OTHER | Age: 53
End: 2023-01-12
Payer: MEDICAID

## 2023-01-12 ENCOUNTER — OFFICE VISIT (OUTPATIENT)
Dept: ORTHOPEDICS | Facility: CLINIC | Age: 53
End: 2023-01-12
Payer: MEDICAID

## 2023-01-12 DIAGNOSIS — Z96.651 STATUS POST RIGHT KNEE REPLACEMENT: Primary | ICD-10-CM

## 2023-01-12 PROCEDURE — 1159F MED LIST DOCD IN RCRD: CPT | Mod: CPTII,,, | Performed by: ORTHOPAEDIC SURGERY

## 2023-01-12 PROCEDURE — 1160F RVW MEDS BY RX/DR IN RCRD: CPT | Mod: CPTII,,, | Performed by: ORTHOPAEDIC SURGERY

## 2023-01-12 PROCEDURE — 99999 PR PBB SHADOW E&M-EST. PATIENT-LVL II: ICD-10-PCS | Mod: PBBFAC,,, | Performed by: ORTHOPAEDIC SURGERY

## 2023-01-12 PROCEDURE — 99024 POSTOP FOLLOW-UP VISIT: CPT | Mod: ,,, | Performed by: ORTHOPAEDIC SURGERY

## 2023-01-12 PROCEDURE — 99999 PR PBB SHADOW E&M-EST. PATIENT-LVL II: CPT | Mod: PBBFAC,,, | Performed by: ORTHOPAEDIC SURGERY

## 2023-01-12 PROCEDURE — 1160F PR REVIEW ALL MEDS BY PRESCRIBER/CLIN PHARMACIST DOCUMENTED: ICD-10-PCS | Mod: CPTII,,, | Performed by: ORTHOPAEDIC SURGERY

## 2023-01-12 PROCEDURE — 1159F PR MEDICATION LIST DOCUMENTED IN MEDICAL RECORD: ICD-10-PCS | Mod: CPTII,,, | Performed by: ORTHOPAEDIC SURGERY

## 2023-01-12 PROCEDURE — 99212 OFFICE O/P EST SF 10 MIN: CPT | Mod: PBBFAC | Performed by: ORTHOPAEDIC SURGERY

## 2023-01-12 PROCEDURE — 99024 PR POST-OP FOLLOW-UP VISIT: ICD-10-PCS | Mod: ,,, | Performed by: ORTHOPAEDIC SURGERY

## 2023-01-12 RX ORDER — METHYLPREDNISOLONE 4 MG/1
TABLET ORAL
Qty: 1 EACH | Refills: 0 | Status: SHIPPED | OUTPATIENT
Start: 2023-01-12 | End: 2023-02-02

## 2023-01-12 RX ORDER — HYDROCODONE BITARTRATE AND ACETAMINOPHEN 7.5; 325 MG/1; MG/1
1 TABLET ORAL EVERY 6 HOURS PRN
Status: ON HOLD | COMMUNITY
End: 2024-03-28 | Stop reason: HOSPADM

## 2023-01-14 NOTE — PROGRESS NOTES
Subjective:      Patient ID: Candy Huynh is a 52 y.o. female.    Chief Complaint:   Post-op Evaluation of the Right Knee    HPI  She comes in about 9 weeks out from right TKA.  Her pain is down to 6/10, and seems to be improving steadily over time.  She is walking without supports now.  No new symptoms to report.      Objective:        Ortho/SPM Exam    On exam, the scar is well healed without redness or tenderness.  There is no effusion, but some tender boggy synovitis.  Active range of motion is 0-115° without crepitus.  No instability to varus/valgus stress in extension or flexion.  No excessive sagittal plane instability.  Calves soft, nontender.  Distal neurovascular intact.        IMAGING:  None today  Assessment:   Progressing better status post right TKA      Plan:   We had a long discussion of pain management and medications were refilled today.  I think a lot of her pain is likely related to inflammation, so we will add a Medrol Dosepak to the regimen.  Home exercise program was reviewed in detail.  See me in 3 weeks for her 12 week visit.    The patient's pathophysiology was explained in detail with reference to x-rays, models, other visual aids as appropriate.  Treatment options were discussed in detail.  Questions were invited and answered to the patient's satisfaction.      Nolberto Fraser MD  Orthopedic Surgery

## 2023-02-07 ENCOUNTER — OFFICE VISIT (OUTPATIENT)
Dept: ORTHOPEDICS | Facility: CLINIC | Age: 53
End: 2023-02-07
Payer: MEDICAID

## 2023-02-07 VITALS — HEIGHT: 63 IN | WEIGHT: 237.44 LBS | BODY MASS INDEX: 42.07 KG/M2

## 2023-02-07 DIAGNOSIS — Z96.651 STATUS POST RIGHT KNEE REPLACEMENT: Primary | ICD-10-CM

## 2023-02-07 PROCEDURE — 99214 OFFICE O/P EST MOD 30 MIN: CPT | Mod: PBBFAC | Performed by: ORTHOPAEDIC SURGERY

## 2023-02-07 PROCEDURE — 1160F RVW MEDS BY RX/DR IN RCRD: CPT | Mod: CPTII,,, | Performed by: ORTHOPAEDIC SURGERY

## 2023-02-07 PROCEDURE — 99999 PR PBB SHADOW E&M-EST. PATIENT-LVL IV: CPT | Mod: PBBFAC,,, | Performed by: ORTHOPAEDIC SURGERY

## 2023-02-07 PROCEDURE — 1160F PR REVIEW ALL MEDS BY PRESCRIBER/CLIN PHARMACIST DOCUMENTED: ICD-10-PCS | Mod: CPTII,,, | Performed by: ORTHOPAEDIC SURGERY

## 2023-02-07 PROCEDURE — 99024 POSTOP FOLLOW-UP VISIT: CPT | Mod: ,,, | Performed by: ORTHOPAEDIC SURGERY

## 2023-02-07 PROCEDURE — 1159F PR MEDICATION LIST DOCUMENTED IN MEDICAL RECORD: ICD-10-PCS | Mod: CPTII,,, | Performed by: ORTHOPAEDIC SURGERY

## 2023-02-07 PROCEDURE — 3008F PR BODY MASS INDEX (BMI) DOCUMENTED: ICD-10-PCS | Mod: CPTII,,, | Performed by: ORTHOPAEDIC SURGERY

## 2023-02-07 PROCEDURE — 4010F ACE/ARB THERAPY RXD/TAKEN: CPT | Mod: CPTII,,, | Performed by: ORTHOPAEDIC SURGERY

## 2023-02-07 PROCEDURE — 4010F PR ACE/ARB THEARPY RXD/TAKEN: ICD-10-PCS | Mod: CPTII,,, | Performed by: ORTHOPAEDIC SURGERY

## 2023-02-07 PROCEDURE — 1159F MED LIST DOCD IN RCRD: CPT | Mod: CPTII,,, | Performed by: ORTHOPAEDIC SURGERY

## 2023-02-07 PROCEDURE — 99999 PR PBB SHADOW E&M-EST. PATIENT-LVL IV: ICD-10-PCS | Mod: PBBFAC,,, | Performed by: ORTHOPAEDIC SURGERY

## 2023-02-07 PROCEDURE — 99024 PR POST-OP FOLLOW-UP VISIT: ICD-10-PCS | Mod: ,,, | Performed by: ORTHOPAEDIC SURGERY

## 2023-02-07 PROCEDURE — 3008F BODY MASS INDEX DOCD: CPT | Mod: CPTII,,, | Performed by: ORTHOPAEDIC SURGERY

## 2023-02-07 RX ORDER — MELOXICAM 15 MG/1
15 TABLET ORAL DAILY
Qty: 30 TABLET | Refills: 3 | Status: SHIPPED | OUTPATIENT
Start: 2023-02-07 | End: 2023-08-21 | Stop reason: SDUPTHER

## 2023-02-11 NOTE — PROGRESS NOTES
Subjective:      Patient ID: Candy Huynh is a 52 y.o. female.    Chief Complaint:   Post-op Evaluation    HPI  She comes in about 12 weeks out from right TKA.  Although she still complains of some pain, she is generally pleased with her progress.  She is walking without supports and has little limitation of her activities.  No new symptoms to report.      Objective:        Ortho/SPM Exam    On exam, the scar is well healed without redness or tenderness.  There is no effusion.  Active range of motion is 0-110° without crepitus.  No instability to varus/valgus stress in extension or flexion.  No excessive sagittal plane instability.  Calves soft, nontender.  Distal neurovascular intact.        IMAGING:  None today  Assessment:   Doing well status post right TKA      Plan:   I told her that although she feels stiff, she has exceeded expectations given her preop range of motion of about 5-95.  Anniversary follow-up with x-rays.    The patient's pathophysiology was explained in detail with reference to x-rays, models, other visual aids as appropriate.  Treatment options were discussed in detail.  Questions were invited and answered to the patient's satisfaction.      Nolberto Fraser MD  Orthopedic Surgery

## 2023-03-13 ENCOUNTER — PATIENT OUTREACH (OUTPATIENT)
Dept: EMERGENCY MEDICINE | Facility: OTHER | Age: 53
End: 2023-03-13
Payer: MEDICAID

## 2023-03-13 NOTE — PROGRESS NOTES
Unable to reach patient by phone for second F/U. Will attempt third F/U at a later date. Closing encounter.

## 2023-04-13 ENCOUNTER — PATIENT OUTREACH (OUTPATIENT)
Dept: EMERGENCY MEDICINE | Facility: OTHER | Age: 53
End: 2023-04-13
Payer: MEDICAID

## 2023-04-13 NOTE — PROGRESS NOTES
Unable to reach patient by phone for third F/U. Sent third F/U e-mail successfully instructing pt to call with any future needs/concerns. Closing encounter.

## 2023-06-19 ENCOUNTER — TELEPHONE (OUTPATIENT)
Dept: ORTHOPEDICS | Facility: CLINIC | Age: 53
End: 2023-06-19
Payer: MEDICAID

## 2023-06-19 NOTE — TELEPHONE ENCOUNTER
Pt called hotline requesting an appt for her left knee with Dr. Fraser. Appt scheduled. Pt pleased and verbalized understanding.

## 2023-06-29 DIAGNOSIS — M25.562 LEFT KNEE PAIN, UNSPECIFIED CHRONICITY: Primary | ICD-10-CM

## 2023-07-06 ENCOUNTER — TELEPHONE (OUTPATIENT)
Dept: ORTHOPEDICS | Facility: CLINIC | Age: 53
End: 2023-07-06
Payer: MEDICAID

## 2023-07-06 NOTE — TELEPHONE ENCOUNTER
The patient called the joint hotline. The patient thought her appointment was tomorrow and not today. She wants to come in to discuss scheduling L TKA. The patient is scheduled for tomorrow. The patient verbalized understanding and has no further questions.

## 2023-07-07 ENCOUNTER — HOSPITAL ENCOUNTER (OUTPATIENT)
Dept: RADIOLOGY | Facility: HOSPITAL | Age: 53
Discharge: HOME | End: 2023-07-07
Attending: ORTHOPAEDIC SURGERY
Payer: MEDICAID

## 2023-07-07 ENCOUNTER — OFFICE VISIT (OUTPATIENT)
Dept: ORTHOPEDICS | Facility: CLINIC | Age: 53
End: 2023-07-07
Payer: MEDICAID

## 2023-07-07 VITALS — HEIGHT: 63 IN | BODY MASS INDEX: 43.45 KG/M2 | WEIGHT: 245.25 LBS

## 2023-07-07 DIAGNOSIS — M25.562 LEFT KNEE PAIN, UNSPECIFIED CHRONICITY: ICD-10-CM

## 2023-07-07 DIAGNOSIS — M17.12 PRIMARY OSTEOARTHRITIS OF LEFT KNEE: ICD-10-CM

## 2023-07-07 DIAGNOSIS — M25.562 LEFT KNEE PAIN, UNSPECIFIED CHRONICITY: Primary | ICD-10-CM

## 2023-07-07 PROCEDURE — 73564 XR KNEE ORTHO LEFT WITH FLEXION: ICD-10-PCS | Mod: 26,LT,, | Performed by: RADIOLOGY

## 2023-07-07 PROCEDURE — 99999 PR PBB SHADOW E&M-EST. PATIENT-LVL IV: ICD-10-PCS | Mod: PBBFAC,,, | Performed by: ORTHOPAEDIC SURGERY

## 2023-07-07 PROCEDURE — 1160F PR REVIEW ALL MEDS BY PRESCRIBER/CLIN PHARMACIST DOCUMENTED: ICD-10-PCS | Mod: CPTII,,, | Performed by: ORTHOPAEDIC SURGERY

## 2023-07-07 PROCEDURE — 1159F MED LIST DOCD IN RCRD: CPT | Mod: CPTII,,, | Performed by: ORTHOPAEDIC SURGERY

## 2023-07-07 PROCEDURE — 99214 OFFICE O/P EST MOD 30 MIN: CPT | Mod: S$PBB,,, | Performed by: ORTHOPAEDIC SURGERY

## 2023-07-07 PROCEDURE — 73562 X-RAY EXAM OF KNEE 3: CPT | Mod: 26,RT,, | Performed by: RADIOLOGY

## 2023-07-07 PROCEDURE — 1159F PR MEDICATION LIST DOCUMENTED IN MEDICAL RECORD: ICD-10-PCS | Mod: CPTII,,, | Performed by: ORTHOPAEDIC SURGERY

## 2023-07-07 PROCEDURE — 4010F PR ACE/ARB THEARPY RXD/TAKEN: ICD-10-PCS | Mod: CPTII,,, | Performed by: ORTHOPAEDIC SURGERY

## 2023-07-07 PROCEDURE — 4010F ACE/ARB THERAPY RXD/TAKEN: CPT | Mod: CPTII,,, | Performed by: ORTHOPAEDIC SURGERY

## 2023-07-07 PROCEDURE — 3008F PR BODY MASS INDEX (BMI) DOCUMENTED: ICD-10-PCS | Mod: CPTII,,, | Performed by: ORTHOPAEDIC SURGERY

## 2023-07-07 PROCEDURE — 3008F BODY MASS INDEX DOCD: CPT | Mod: CPTII,,, | Performed by: ORTHOPAEDIC SURGERY

## 2023-07-07 PROCEDURE — 99214 PR OFFICE/OUTPT VISIT, EST, LEVL IV, 30-39 MIN: ICD-10-PCS | Mod: S$PBB,,, | Performed by: ORTHOPAEDIC SURGERY

## 2023-07-07 PROCEDURE — 99214 OFFICE O/P EST MOD 30 MIN: CPT | Mod: PBBFAC | Performed by: ORTHOPAEDIC SURGERY

## 2023-07-07 PROCEDURE — 73564 X-RAY EXAM KNEE 4 OR MORE: CPT | Mod: 26,LT,, | Performed by: RADIOLOGY

## 2023-07-07 PROCEDURE — 73564 X-RAY EXAM KNEE 4 OR MORE: CPT | Mod: TC,LT

## 2023-07-07 PROCEDURE — 1160F RVW MEDS BY RX/DR IN RCRD: CPT | Mod: CPTII,,, | Performed by: ORTHOPAEDIC SURGERY

## 2023-07-07 PROCEDURE — 99999 PR PBB SHADOW E&M-EST. PATIENT-LVL IV: CPT | Mod: PBBFAC,,, | Performed by: ORTHOPAEDIC SURGERY

## 2023-07-07 PROCEDURE — 73562 XR KNEE ORTHO LEFT WITH FLEXION: ICD-10-PCS | Mod: 26,RT,, | Performed by: RADIOLOGY

## 2023-07-07 NOTE — PROGRESS NOTES
Subjective:      Patient ID: Candy Huynh is a 52 y.o. female.    Chief Complaint:   Pain of the Left Knee    Candy Huynh is a 52 y.o. female with PMH of right TKA (10/26/22) and right-sided sciatica,  who presents with left knee pain that has been present for over a year.  She states that she has been having no problems with the right knee but he has been having increased anterior left knee pain with some associated swelling.  States that her pain is most noticeable when going up stairs at home.   Denies any recent trauma but has a history left meniscal repair performed in 2021.  She is currently scheduled to have a right side RFA for her sciatica on Monday (7/10).          Objective:        Ortho/SPM Exam    RLE  On exam, the scar is well healed without redness or tenderness.  There is no effusion.  Active range of motion is 0-120° without crepitus.  No instability to varus/valgus stress in extension or flexion.  No excessive sagittal plane instability.  Calves soft, nontender.  Distal neurovascular intact.    LLE  No open wounds at the left knee.  Noted to have moderate swelling medially along with tenderness to palpation at the anteromedial joint line.  Active range of motion is 0 to 100°.  Otherwise she is no tenderness to palpation throughout her left lower extremity and is neurovascularly intact distally.        IMAGING:    X-ray of bilateral knees showing right total knee arthroplasty hardware well-fixed and well-aligned without signs of loosening.  Left knee with Kellgren Nithin grade 3 osteoarthritis; significant medial joint space narrowing noted.  Assessment:     Primary osteoarthritis of left knee   Doing well status post right TKA      Plan:     Surgical and nonsurgical treatment options were discussed extensively with patient.  Will proceed with scheduling her for Left TKA, and she will follow-up with pain management for continued treatment of her right-sided sciatic pain.    The  patient's pathophysiology was explained in detail with reference to x-rays, models, other visual aids as appropriate.  Treatment options were discussed in detail.  Questions were invited and answered to the patient's satisfaction.      Nolberto Fraser MD  Orthopedic Surgery

## 2023-07-12 PROBLEM — M17.12 PRIMARY OSTEOARTHRITIS OF LEFT KNEE: Status: ACTIVE | Noted: 2023-07-12

## 2023-07-24 ENCOUNTER — PATIENT MESSAGE (OUTPATIENT)
Dept: ADMINISTRATIVE | Facility: OTHER | Age: 53
End: 2023-07-24
Payer: MEDICAID

## 2023-07-24 DIAGNOSIS — M17.12 PRIMARY OSTEOARTHRITIS OF LEFT KNEE: Primary | ICD-10-CM

## 2023-07-25 ENCOUNTER — PATIENT MESSAGE (OUTPATIENT)
Dept: ADMINISTRATIVE | Facility: OTHER | Age: 53
End: 2023-07-25
Payer: MEDICAID

## 2023-07-26 ENCOUNTER — PATIENT MESSAGE (OUTPATIENT)
Dept: ADMINISTRATIVE | Facility: OTHER | Age: 53
End: 2023-07-26
Payer: MEDICAID

## 2023-07-28 ENCOUNTER — HOSPITAL ENCOUNTER (OUTPATIENT)
Dept: RADIOLOGY | Facility: HOSPITAL | Age: 53
Discharge: HOME | End: 2023-07-28
Attending: NURSE PRACTITIONER
Payer: MEDICAID

## 2023-07-28 ENCOUNTER — OFFICE VISIT (OUTPATIENT)
Dept: ORTHOPEDICS | Facility: CLINIC | Age: 53
End: 2023-07-28
Payer: MEDICAID

## 2023-07-28 VITALS — WEIGHT: 238.44 LBS | HEIGHT: 63 IN | BODY MASS INDEX: 42.25 KG/M2

## 2023-07-28 DIAGNOSIS — M17.12 PRIMARY OSTEOARTHRITIS OF LEFT KNEE: Primary | ICD-10-CM

## 2023-07-28 DIAGNOSIS — Z96.652 S/P TOTAL KNEE REPLACEMENT USING CEMENT, LEFT: Primary | ICD-10-CM

## 2023-07-28 DIAGNOSIS — M17.12 PRIMARY OSTEOARTHRITIS OF LEFT KNEE: ICD-10-CM

## 2023-07-28 PROCEDURE — 4010F PR ACE/ARB THEARPY RXD/TAKEN: ICD-10-PCS | Mod: CPTII,,, | Performed by: NURSE PRACTITIONER

## 2023-07-28 PROCEDURE — 73560 XR KNEE 1 OR 2 VIEW LEFT: ICD-10-PCS | Mod: 26,LT,, | Performed by: RADIOLOGY

## 2023-07-28 PROCEDURE — 3044F PR MOST RECENT HEMOGLOBIN A1C LEVEL <7.0%: ICD-10-PCS | Mod: CPTII,,, | Performed by: NURSE PRACTITIONER

## 2023-07-28 PROCEDURE — 99215 OFFICE O/P EST HI 40 MIN: CPT | Mod: PBBFAC | Performed by: NURSE PRACTITIONER

## 2023-07-28 PROCEDURE — 99999 PR PBB SHADOW E&M-EST. PATIENT-LVL V: CPT | Mod: PBBFAC,,, | Performed by: NURSE PRACTITIONER

## 2023-07-28 PROCEDURE — 73560 X-RAY EXAM OF KNEE 1 OR 2: CPT | Mod: 26,LT,, | Performed by: RADIOLOGY

## 2023-07-28 PROCEDURE — 1159F MED LIST DOCD IN RCRD: CPT | Mod: CPTII,,, | Performed by: NURSE PRACTITIONER

## 2023-07-28 PROCEDURE — 1160F PR REVIEW ALL MEDS BY PRESCRIBER/CLIN PHARMACIST DOCUMENTED: ICD-10-PCS | Mod: CPTII,,, | Performed by: NURSE PRACTITIONER

## 2023-07-28 PROCEDURE — 4010F ACE/ARB THERAPY RXD/TAKEN: CPT | Mod: CPTII,,, | Performed by: NURSE PRACTITIONER

## 2023-07-28 PROCEDURE — 3044F HG A1C LEVEL LT 7.0%: CPT | Mod: CPTII,,, | Performed by: NURSE PRACTITIONER

## 2023-07-28 PROCEDURE — 99999 PR PBB SHADOW E&M-EST. PATIENT-LVL V: ICD-10-PCS | Mod: PBBFAC,,, | Performed by: NURSE PRACTITIONER

## 2023-07-28 PROCEDURE — 3008F BODY MASS INDEX DOCD: CPT | Mod: CPTII,,, | Performed by: NURSE PRACTITIONER

## 2023-07-28 PROCEDURE — 73560 X-RAY EXAM OF KNEE 1 OR 2: CPT | Mod: TC,LT

## 2023-07-28 PROCEDURE — 99214 PR OFFICE/OUTPT VISIT, EST, LEVL IV, 30-39 MIN: ICD-10-PCS | Mod: S$PBB,,, | Performed by: NURSE PRACTITIONER

## 2023-07-28 PROCEDURE — 99214 OFFICE O/P EST MOD 30 MIN: CPT | Mod: S$PBB,,, | Performed by: NURSE PRACTITIONER

## 2023-07-28 PROCEDURE — 1160F RVW MEDS BY RX/DR IN RCRD: CPT | Mod: CPTII,,, | Performed by: NURSE PRACTITIONER

## 2023-07-28 PROCEDURE — 1159F PR MEDICATION LIST DOCUMENTED IN MEDICAL RECORD: ICD-10-PCS | Mod: CPTII,,, | Performed by: NURSE PRACTITIONER

## 2023-07-28 PROCEDURE — 3008F PR BODY MASS INDEX (BMI) DOCUMENTED: ICD-10-PCS | Mod: CPTII,,, | Performed by: NURSE PRACTITIONER

## 2023-07-28 NOTE — PROGRESS NOTES
"Patient arrived to the pre-op center at 12:02 pm wanting to be seen NOW. Her scheduled appointment time is 2:30 pm. Unfortunately our provider has a 1:00 pm patient and cannot see this patient early. She insisted on seeing the supervisor so I went to the lobby to speak with her. She said "This is because I am a black woman, ya'll are racist, I want to see the  over you. I spoke with our medical director of anesthesia (Dr.Colleen Nettles) who advised me to re-schedule the patient if needed. Patient refused. I let Nava Cagle LPN for Ortho know what took place. She also let  know who said "Her surgery will be cancelled and re-scheduled if she is not optimized. Patient left.      "

## 2023-07-30 NOTE — ASSESSMENT & PLAN NOTE
Treated with: Paxil/hydroxyzine-  not working well; has appt. with psych 8/2/23 for possible adjustment with medication.   Denies suicidal/homicidal ideations  Recommend good sleep (7-8 hrs), healthy eating, and limit alcohol.

## 2023-07-30 NOTE — ASSESSMENT & PLAN NOTE
Currently being treated with Protonix  Knows food triggers  Encouraged to elevate HOB and avoid laying down for 2-3 hours after meals.   Weight loss encouraged as well as dietary changes such as reduction or avoidance of fatty foods, caffeine, spicy foods, and chocolate.

## 2023-07-30 NOTE — ASSESSMENT & PLAN NOTE
Current BP  controlled today; 133/74.    Taking: amlodipine/losartan- did not take     Lifestyle changes to reduce systolic BP:   exercise 30 minutes per day,  5 days per week or 150 minutes weekly; sodium reduction and avoidance of high salt foods such as processed meats, frozen meals and  fast foods.   Keeping a healthy weight/BMI can help with better BP control    BP acceptable for surgery. I recommend monitoring BP during perioperative period as uncontrolled pain can elevate blood pressure.

## 2023-07-30 NOTE — ASSESSMENT & PLAN NOTE
Scheduled with Dr. Fraser on 8/7/23 for left knee arthroplasty.    Cimetidine Counseling:  I discussed with the patient the risks of Cimetidine including but not limited to gynecomastia, headache, diarrhea, nausea, drowsiness, arrhythmias, pancreatitis, skin rashes, psychosis, bone marrow suppression and kidney toxicity.

## 2023-07-30 NOTE — ASSESSMENT & PLAN NOTE
Per outside problem list; patient denies.   Treated with Albuterol MDI prn ; uses inhaler 2-3x weekly.  Last use 2 days ago for chronic coughing and SOB.   Followed per outside ENT- reports history of allergies and bronchitis. Also taking Flonase/Astelin/Claritin/Singulair.  Sats today = 99%  No distress noted today.      I recommend possible use of inhaled bronchodilators if patient develops perioperative bronchospasm.

## 2023-07-30 NOTE — ASSESSMENT & PLAN NOTE
Dx'd a couple of years ago while staying in [place] after [place].   Reports found incidentally on imaging of brain.   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   No residual deficits  On ASA/statin  Control risk factors such as HTN/DM/Obesity and reduce salt intake.     No carotid studies available   Pt resting on the stretcher. Pt provided with warm blanket for comfort. No concerns or complaints are voiced at this present time.  Pt remains in NOAD

## 2023-07-30 NOTE — ASSESSMENT & PLAN NOTE
Currently takes:   Ozempic/  metformin  Most recent A1c is 5.5  Average accuchecks are 130s before eating .     Denies peripheral neuropathy.   Denies visual changes. Up to date with eye exams.  Micro changes: Denies- Retinopathy, Nephropathy   Macro changes: Denies-  Carotid, Coronary , Peripheral disease     Maintain healthy weight. Exercise at least 150 minutes weekly. Encouraged diet rich in nutrients such as fruits, vegetables, and whole grains; reduce sugar intake from cakes, candy, and sugared drinks.

## 2023-07-31 ENCOUNTER — HOSPITAL ENCOUNTER (OUTPATIENT)
Dept: CARDIOLOGY | Facility: CLINIC | Age: 53
Discharge: HOME | End: 2023-07-31
Payer: MEDICAID

## 2023-07-31 ENCOUNTER — PATIENT MESSAGE (OUTPATIENT)
Dept: ADMINISTRATIVE | Facility: OTHER | Age: 53
End: 2023-07-31
Payer: MEDICAID

## 2023-07-31 ENCOUNTER — OFFICE VISIT (OUTPATIENT)
Dept: INTERNAL MEDICINE | Facility: CLINIC | Age: 53
End: 2023-07-31
Payer: MEDICAID

## 2023-07-31 VITALS
SYSTOLIC BLOOD PRESSURE: 133 MMHG | BODY MASS INDEX: 44.12 KG/M2 | DIASTOLIC BLOOD PRESSURE: 74 MMHG | OXYGEN SATURATION: 99 % | TEMPERATURE: 99 F | WEIGHT: 249 LBS | HEART RATE: 75 BPM | HEIGHT: 63 IN

## 2023-07-31 DIAGNOSIS — M54.50 CHRONIC LOW BACK PAIN WITHOUT SCIATICA, UNSPECIFIED BACK PAIN LATERALITY: ICD-10-CM

## 2023-07-31 DIAGNOSIS — J45.20 MILD INTERMITTENT ASTHMA WITHOUT COMPLICATION: ICD-10-CM

## 2023-07-31 DIAGNOSIS — G89.29 CHRONIC LOW BACK PAIN WITHOUT SCIATICA, UNSPECIFIED BACK PAIN LATERALITY: ICD-10-CM

## 2023-07-31 DIAGNOSIS — I10 HYPERTENSION, UNSPECIFIED TYPE: ICD-10-CM

## 2023-07-31 DIAGNOSIS — M79.605 PAIN OF LEFT LOWER EXTREMITY: ICD-10-CM

## 2023-07-31 DIAGNOSIS — G47.33 OSA (OBSTRUCTIVE SLEEP APNEA): ICD-10-CM

## 2023-07-31 DIAGNOSIS — R60.0 BILATERAL LEG EDEMA: ICD-10-CM

## 2023-07-31 DIAGNOSIS — Z86.73 PERSONAL HISTORY OF TIA (TRANSIENT ISCHEMIC ATTACK): ICD-10-CM

## 2023-07-31 DIAGNOSIS — K21.9 GASTROESOPHAGEAL REFLUX DISEASE WITHOUT ESOPHAGITIS: ICD-10-CM

## 2023-07-31 DIAGNOSIS — E66.01 MORBID OBESITY WITH BMI OF 40.0-44.9, ADULT: ICD-10-CM

## 2023-07-31 DIAGNOSIS — F32.A DEPRESSION, UNSPECIFIED DEPRESSION TYPE: ICD-10-CM

## 2023-07-31 DIAGNOSIS — E11.69 TYPE 2 DIABETES MELLITUS WITH OTHER SPECIFIED COMPLICATION, UNSPECIFIED WHETHER LONG TERM INSULIN USE: ICD-10-CM

## 2023-07-31 DIAGNOSIS — F11.90 CHRONIC, CONTINUOUS USE OF OPIOIDS: ICD-10-CM

## 2023-07-31 DIAGNOSIS — M17.12 PRIMARY OSTEOARTHRITIS OF LEFT KNEE: ICD-10-CM

## 2023-07-31 DIAGNOSIS — Z01.818 PREOPERATIVE EXAMINATION: Primary | ICD-10-CM

## 2023-07-31 PROCEDURE — 3075F SYST BP GE 130 - 139MM HG: CPT | Mod: CPTII,,, | Performed by: NURSE PRACTITIONER

## 2023-07-31 PROCEDURE — 99999 PR PBB SHADOW E&M-EST. PATIENT-LVL V: CPT | Mod: PBBFAC,,, | Performed by: NURSE PRACTITIONER

## 2023-07-31 PROCEDURE — 1160F RVW MEDS BY RX/DR IN RCRD: CPT | Mod: CPTII,,, | Performed by: NURSE PRACTITIONER

## 2023-07-31 PROCEDURE — 3008F BODY MASS INDEX DOCD: CPT | Mod: CPTII,,, | Performed by: NURSE PRACTITIONER

## 2023-07-31 PROCEDURE — 1159F PR MEDICATION LIST DOCUMENTED IN MEDICAL RECORD: ICD-10-PCS | Mod: CPTII,,, | Performed by: NURSE PRACTITIONER

## 2023-07-31 PROCEDURE — 1159F MED LIST DOCD IN RCRD: CPT | Mod: CPTII,,, | Performed by: NURSE PRACTITIONER

## 2023-07-31 PROCEDURE — 3044F PR MOST RECENT HEMOGLOBIN A1C LEVEL <7.0%: ICD-10-PCS | Mod: CPTII,,, | Performed by: NURSE PRACTITIONER

## 2023-07-31 PROCEDURE — 3078F PR MOST RECENT DIASTOLIC BLOOD PRESSURE < 80 MM HG: ICD-10-PCS | Mod: CPTII,,, | Performed by: NURSE PRACTITIONER

## 2023-07-31 PROCEDURE — 93010 ELECTROCARDIOGRAM REPORT: CPT | Mod: S$PBB,,, | Performed by: INTERNAL MEDICINE

## 2023-07-31 PROCEDURE — 93005 ELECTROCARDIOGRAM TRACING: CPT | Mod: PBBFAC | Performed by: INTERNAL MEDICINE

## 2023-07-31 PROCEDURE — 3075F PR MOST RECENT SYSTOLIC BLOOD PRESS GE 130-139MM HG: ICD-10-PCS | Mod: CPTII,,, | Performed by: NURSE PRACTITIONER

## 2023-07-31 PROCEDURE — 3044F HG A1C LEVEL LT 7.0%: CPT | Mod: CPTII,,, | Performed by: NURSE PRACTITIONER

## 2023-07-31 PROCEDURE — 99215 OFFICE O/P EST HI 40 MIN: CPT | Mod: PBBFAC | Performed by: NURSE PRACTITIONER

## 2023-07-31 PROCEDURE — 3078F DIAST BP <80 MM HG: CPT | Mod: CPTII,,, | Performed by: NURSE PRACTITIONER

## 2023-07-31 PROCEDURE — 3008F PR BODY MASS INDEX (BMI) DOCUMENTED: ICD-10-PCS | Mod: CPTII,,, | Performed by: NURSE PRACTITIONER

## 2023-07-31 PROCEDURE — 99215 OFFICE O/P EST HI 40 MIN: CPT | Mod: S$PBB,,, | Performed by: NURSE PRACTITIONER

## 2023-07-31 PROCEDURE — 99215 PR OFFICE/OUTPT VISIT, EST, LEVL V, 40-54 MIN: ICD-10-PCS | Mod: S$PBB,,, | Performed by: NURSE PRACTITIONER

## 2023-07-31 PROCEDURE — 93010 EKG 12-LEAD: ICD-10-PCS | Mod: S$PBB,,, | Performed by: INTERNAL MEDICINE

## 2023-07-31 PROCEDURE — 4010F PR ACE/ARB THEARPY RXD/TAKEN: ICD-10-PCS | Mod: CPTII,,, | Performed by: NURSE PRACTITIONER

## 2023-07-31 PROCEDURE — 4010F ACE/ARB THERAPY RXD/TAKEN: CPT | Mod: CPTII,,, | Performed by: NURSE PRACTITIONER

## 2023-07-31 PROCEDURE — 99999 PR PBB SHADOW E&M-EST. PATIENT-LVL V: ICD-10-PCS | Mod: PBBFAC,,, | Performed by: NURSE PRACTITIONER

## 2023-07-31 PROCEDURE — 1160F PR REVIEW ALL MEDS BY PRESCRIBER/CLIN PHARMACIST DOCUMENTED: ICD-10-PCS | Mod: CPTII,,, | Performed by: NURSE PRACTITIONER

## 2023-07-31 NOTE — ASSESSMENT & PLAN NOTE
I suggest avoidance of added salt and voidance of NSAID's- unless advised or ordered. I recommend limb elevation and david hose use during perioperative period.

## 2023-07-31 NOTE — OUTPATIENT SUBJECTIVE & OBJECTIVE
Outpatient Subjective & Objective      Chief Complaint: Preoperative evaulation, perioperative medical management, and complication reduction plan.     Functional Capacity: grocery shopping without CP/SOB. Parked in garage and walked to POC without CP/SOB.       Anesthesia issues: None    Difficulty mouth opening: No    Steroid use in the last 12 months:  No    Dental Issues: None    Family anesthesia difficulty: None           Family Hx of Thrombosis: None    Past Medical History:   Diagnosis Date    Allergy     Anemia     Anxiety     Asthma     denies- on outside problem list in Care Everywhere    Depression     Diabetes mellitus     Diabetes mellitus, type 2     Fibromyalgia     GERD (gastroesophageal reflux disease)     Hypertension     Stroke     TIA    Vertigo          Past Medical History Pertinent Negatives:   Diagnosis Date Noted    CHF (congestive heart failure) 10/11/2022    COPD (chronic obstructive pulmonary disease) 10/11/2022    Coronary artery disease 10/11/2022    Deep vein thrombosis 10/11/2022    Disorder of kidney and ureter 10/11/2022    Hyperlipidemia 10/11/2022    Pulmonary embolism 10/11/2022    Seizures 10/11/2022    Thyroid disease 10/11/2022         Past Surgical History:   Procedure Laterality Date    ARTHROSCOPIC REPAIR OF ROTATOR CUFF OF SHOULDER Right 2022    Procedure: REPAIR, ROTATOR CUFF, ARTHROSCOPIC;  Surgeon: Ministerio Orr Jr., MD;  Location: [facility] OR;  Service: Orthopedics;  Laterality: Right;  need opus system  [employer]oo notifed CC     ARTHROSCOPY OF KNEE Left 2021    Procedure: ARTHROSCOPY, KNEE;  Surgeon: Donnie Campuzano MD;  Location: [facility] OR;  Service: Orthopedics;  Laterality: Left;     SECTION      DECOMPRESSION OF SUBACROMIAL SPACE  2022    Procedure: DECOMPRESSION, SUBACROMIAL SPACE;  Surgeon: Ministerio Orr Jr., MD;  Location: [facility] OR;  Service: Orthopedics;;    EXCISION OF MASS OF EXTREMITY Left 2019    Procedure: EXCISION, MASS,  EXTREMITY;  Surgeon: Honorio Pulido IV, MD;  Location: [facility] OR;  Service: Orthopedics;  Laterality: Left;  excision lipoma  Request Lacie    HERNIA REPAIR      HYSTERECTOMY      KNEE ARTHROSCOPY W/ MENISCECTOMY  1/11/2021    Procedure: ARTHROSCOPY, KNEE, WITH MENISCECTOMY;  Surgeon: Donnie Campuzano MD;  Location: [facility] OR;  Service: Orthopedics;;    NASAL SEPTOPLASTY      RECONSTRUCTION OF ACROMIOCLAVICULAR JOINT  9/6/2022    Procedure: RECONSTRUCTION, ACROMIOCLAVICULAR JOINT;  Surgeon: Ministerio Orr Jr., MD;  Location: [facility] OR;  Service: Orthopedics;;    TOTAL KNEE ARTHROPLASTY Right 10/26/2022    Procedure: ARTHROPLASTY, KNEE, TOTAL RIGHT: BALDO - NEXGEN;  Surgeon: Nolberto Fraser MD;  Location: [facility] OR;  Service: Orthopedics;  Laterality: Right;       Review of Systems   Constitutional:  Negative for chills, fatigue, fever and unexpected weight change.   HENT:  Negative for congestion, dental problem, hearing loss, rhinorrhea, sore throat, tinnitus and trouble swallowing.    Eyes:  Negative for photophobia, pain, discharge and visual disturbance. Eye itching: occasional.  Respiratory:  Negative for apnea, cough, chest tightness, shortness of breath and wheezing.    Cardiovascular:  Positive for leg swelling. Negative for chest pain and palpitations.   Gastrointestinal:  Negative for abdominal pain, blood in stool, constipation, diarrhea, nausea and vomiting.        Denies Fatty liver, Hepatitis   Endocrine: Positive for cold intolerance.   Genitourinary:  Negative for decreased urine volume, difficulty urinating, dysuria, frequency, hematuria and urgency.   Musculoskeletal:  Positive for arthralgias (left hip), back pain, gait problem and neck pain (occasional). Negative for neck stiffness.   Skin:  Negative for rash and wound.   Allergic/Immunologic: Positive for environmental allergies.   Neurological:  Positive for dizziness (occasional- attributes to headaches- Imitrex prn) and headaches. Negative for  "tremors, seizures, syncope, weakness and numbness.   Hematological:  Negative for adenopathy. Does not bruise/bleed easily.   Psychiatric/Behavioral:  Negative for confusion, sleep disturbance and suicidal ideas.               VITALS  Visit Vitals  /74 (BP Location: Left arm, Patient Position: Sitting)   Pulse 75   Temp 99.1 °F (37.3 °C) (Oral)   Ht 5' 3" (1.6 m)   Wt 112.9 kg (249 lb)   SpO2 99%   BMI 44.11 kg/m²          Physical Exam  Vitals reviewed.   Constitutional:       General: She is not in acute distress.     Appearance: She is well-developed. She is morbidly obese.   HENT:      Head: Normocephalic.      Nose: Nose normal.      Mouth/Throat:      Pharynx: No oropharyngeal exudate.   Eyes:      General:         Right eye: No discharge.         Left eye: No discharge.      Conjunctiva/sclera: Conjunctivae normal.      Pupils: Pupils are equal, round, and reactive to light.   Neck:      Thyroid: No thyromegaly.      Vascular: No carotid bruit or JVD.      Trachea: No tracheal deviation.   Cardiovascular:      Rate and Rhythm: Normal rate and regular rhythm.      Pulses:           Carotid pulses are 2+ on the right side and 2+ on the left side.       Dorsalis pedis pulses are 2+ on the right side and 2+ on the left side.        Posterior tibial pulses are 2+ on the right side and 2+ on the left side.      Heart sounds: Normal heart sounds. No murmur heard.  Pulmonary:      Effort: Pulmonary effort is normal. No respiratory distress.      Breath sounds: Normal breath sounds. No stridor. No wheezing, rhonchi or rales.   Abdominal:      General: Bowel sounds are normal. There is no distension.      Palpations: Abdomen is soft.      Tenderness: There is no abdominal tenderness. There is no guarding.   Musculoskeletal:      Cervical back: Normal range of motion. No pain with movement.      Right lower le+ Pitting Edema present.      Left lower le+ Pitting Edema present.   Lymphadenopathy:      " Cervical: No cervical adenopathy.   Skin:     General: Skin is warm and dry.      Capillary Refill: Capillary refill takes less than 2 seconds.      Findings: No erythema or rash.   Neurological:      Mental Status: She is alert and oriented to person, place, and time.          Significant Labs:  Lab Results   Component Value Date    WBC 6.85 12/11/2022    HGB 10.8 (L) 12/11/2022    HCT 33.7 (L) 12/11/2022     12/11/2022    CHOL 216 (H) 05/11/2023    TRIG 120 05/11/2023    HDL 61 05/11/2023    ALT 21 12/11/2022    AST 10 12/11/2022     12/11/2022    K 4.2 12/11/2022     12/11/2022    CREATININE 0.7 12/11/2022    BUN 10 12/11/2022    CO2 29 12/11/2022    INR 0.9 07/31/2023    HGBA1C 5.5 07/31/2023       Diagnostic Studies: No relevant studies.    EKG:   Results for orders placed or performed during the hospital encounter of 07/31/23   EKG 12-lead    Collection Time: 07/31/23 11:53 AM    Narrative    Test Reason : I10,    Vent. Rate : 074 BPM     Atrial Rate : 074 BPM     P-R Int : 164 ms          QRS Dur : 086 ms      QT Int : 384 ms       P-R-T Axes : 081 012 044 degrees     QTc Int : 426 ms    Normal sinus rhythm with sinus arrhythmia  Normal ECG  When compared with ECG of 20-JUL-2022 13:34,  No significant change was found  Confirmed by ALEXY SHANNON MD ([id number]) on 7/31/2023 12:53:38 PM    Referred By: CLARY SOLITARIO           Confirmed By:ALEXY SHANNON MD           Active Cardiac Conditions: None      Revised Cardiac Risk Index   High -Risk Surgery  Intraperitoneal; Intrathoracic; suprainguinal vascular Yes- + 1 No- 0   History of Ischemic Heart Disease   (Hx of MI/positive exercise test/current chest pain due to ischemia/use of nitrate therapy/EKG with pathological Q waves) Yes- + 1 No- 0   History of CHF  (Pulmonary edema/bilateral rales or S3 gallop/PND/CXR showing pulmonary vascular redistribution) Yes- + 1 No- 0   History of CVA   (Prior stroke or TIA) Yes- + 1 No- 0   Pre-operative  treatment with insulin Yes- + 1 No- 0   Pre-operative creatinine > 2mg/dl Yes- + 1 No- 0   Total:1      Risk Status:  Estimated risk of cardiac complications after non-cardiac surgery using the Revised Cardiac Risk Index for Preoperative risk is 6.0 %      ARISCAT (Canet) risk index: Intermediate: 13.3% risk of post-op pulmonary complications.    American Society of Anesthesiologists Physical Status classification (ASA): 3    Caryl respiratory failure index: 0.5 %         No further cardiac workup needed prior to surgery.    Outpatient Subjective & Objective

## 2023-07-31 NOTE — ASSESSMENT & PLAN NOTE
Patient may have opioid tolerance due to chronic continuous opioid use. I recommend monitoring for opioid tolerance during the postoperative period and plan pain control that will assist the patient in the hospital as well as through the discharge process.   Taking Norco for back pain

## 2023-07-31 NOTE — ASSESSMENT & PLAN NOTE
No loss of bladder or bowel control    Denies N/T to buttocks, perineum and inner surfaces of the thighs (saddle anesthesia)    Followed per outside Pain Management     Recent imaging: Xray L-Spine 12/11/22  FINDINGS:  AP lumbar alignment is maintained. No spondylolisthesis.  Vertebral body heights are well maintained.  Mild intervertebral disc space height loss at T12-L1 and L1-L2. Mild lower lumbar facet arthropathy, worst at L4-L5 and L5-S1, possibly contributing to foraminal narrowing.     Nonobstructive bowel gas pattern.  Pelvic phleboliths noted.  Partially imaged ribs are unremarkable.     Impression:     No acute fracture or traumatic malalignment.     Mild lower lumbar facet arthropathy.  Follow-up, as clinically warranted.

## 2023-07-31 NOTE — HPI
This is a 52 y.o. female  who presents today for a preoperative evaluation in preparation for left knee arthoplasty.   Surgery is indicated for   osteoarthritis of left knee  .  I have seen  this patient before in October 2022 for preop eval before right knee surgery.   The history has been obtained by speaking with the patient and reviewing the electronic medical record and/or outside health information. Significant health conditions for the perioperative period are discussed below in assessment and plan.   Patient reports current health status to be Poor.  Denies any new symptoms before surgery.

## 2023-07-31 NOTE — PROGRESS NOTES
[facility]  [facility] Note    Patient Name: Candy Huynh  MRN: [id number]  Date of Evaluation- 07/31/2023  PCP- Chris Romano MD    Future cases for Candy Huynh [[id number]]       Case ID Status Date Time Robinson Procedure Provider Location    [id number] [facility] 8/7/2023  7:00  ARTHROPLASTY, KNEE, TOTAL: LEFT: BALDO NEXGEN Nolberto Fraser MD [[id number]] [facility] OR            HPI:  This is a 52 y.o. female  who presents today for a preoperative evaluation in preparation for left knee arthoplasty.   Surgery is indicated for   osteoarthritis of left knee  .  I have seen  this patient before in October 2022 for preop eval before right knee surgery.   The history has been obtained by speaking with the patient and reviewing the electronic medical record and/or outside health information. Significant health conditions for the perioperative period are discussed below in assessment and plan.   Patient reports current health status to be Poor.  Denies any new symptoms before surgery.        Subjective/ Objective:     Chief Complaint: Preoperative evaulation, perioperative medical management, and complication reduction plan.     Functional Capacity: grocery shopping without CP/SOB. Parked in garage and walked to POC without CP/SOB.       Anesthesia issues: None    Difficulty mouth opening: No    Steroid use in the last 12 months:  No    Dental Issues: None    Family anesthesia difficulty: None           Family Hx of Thrombosis: None    Past Medical History:   Diagnosis Date    Allergy     Anemia     Anxiety     Asthma     denies- on outside problem list in Care Everywhere    Depression     Diabetes mellitus     Diabetes mellitus, type 2     Fibromyalgia     GERD (gastroesophageal reflux disease)     Hypertension     Stroke     TIA    Vertigo          Past Medical History Pertinent Negatives:   Diagnosis Date Noted    CHF (congestive heart failure) 10/11/2022    COPD (chronic obstructive pulmonary disease)  10/11/2022    Coronary artery disease 10/11/2022    Deep vein thrombosis 10/11/2022    Disorder of kidney and ureter 10/11/2022    Hyperlipidemia 10/11/2022    Pulmonary embolism 10/11/2022    Seizures 10/11/2022    Thyroid disease 10/11/2022         Past Surgical History:   Procedure Laterality Date    ARTHROSCOPIC REPAIR OF ROTATOR CUFF OF SHOULDER Right 2022    Procedure: REPAIR, ROTATOR CUFF, ARTHROSCOPIC;  Surgeon: Ministerio Orr Jr., MD;  Location: [facility] OR;  Service: Orthopedics;  Laterality: Right;  need opus system  jose roberto notifed CC     ARTHROSCOPY OF KNEE Left 2021    Procedure: ARTHROSCOPY, KNEE;  Surgeon: Donnie Campuzano MD;  Location: [facility] OR;  Service: Orthopedics;  Laterality: Left;     SECTION      DECOMPRESSION OF SUBACROMIAL SPACE  2022    Procedure: DECOMPRESSION, SUBACROMIAL SPACE;  Surgeon: Ministerio Orr Jr., MD;  Location: [facility] OR;  Service: Orthopedics;;    EXCISION OF MASS OF EXTREMITY Left 2019    Procedure: EXCISION, MASS, EXTREMITY;  Surgeon: Honorio Pulido IV, MD;  Location: [facility] OR;  Service: Orthopedics;  Laterality: Left;  excision lipoma  Request Lacie    HERNIA REPAIR      HYSTERECTOMY      KNEE ARTHROSCOPY W/ MENISCECTOMY  2021    Procedure: ARTHROSCOPY, KNEE, WITH MENISCECTOMY;  Surgeon: Donnie Campuzano MD;  Location: [facility] OR;  Service: Orthopedics;;    NASAL SEPTOPLASTY      RECONSTRUCTION OF ACROMIOCLAVICULAR JOINT  2022    Procedure: RECONSTRUCTION, ACROMIOCLAVICULAR JOINT;  Surgeon: Ministerio Orr Jr., MD;  Location: [facility] OR;  Service: Orthopedics;;    TOTAL KNEE ARTHROPLASTY Right 10/26/2022    Procedure: ARTHROPLASTY, KNEE, TOTAL RIGHT: BALDO - NEXGEN;  Surgeon: Nolberto Fraser MD;  Location: [facility] OR;  Service: Orthopedics;  Laterality: Right;       Review of Systems   Constitutional:  Negative for chills, fatigue, fever and unexpected weight change.   HENT:  Negative for congestion, dental problem, hearing loss, rhinorrhea, sore  "throat, tinnitus and trouble swallowing.    Eyes:  Negative for photophobia, pain, discharge and visual disturbance. Eye itching: occasional.  Respiratory:  Negative for apnea, cough, chest tightness, shortness of breath and wheezing.    Cardiovascular:  Positive for leg swelling. Negative for chest pain and palpitations.   Gastrointestinal:  Negative for abdominal pain, blood in stool, constipation, diarrhea, nausea and vomiting.        Denies Fatty liver, Hepatitis   Endocrine: Positive for cold intolerance.   Genitourinary:  Negative for decreased urine volume, difficulty urinating, dysuria, frequency, hematuria and urgency.   Musculoskeletal:  Positive for arthralgias (left hip), back pain, gait problem and neck pain (occasional). Negative for neck stiffness.   Skin:  Negative for rash and wound.   Allergic/Immunologic: Positive for environmental allergies.   Neurological:  Positive for dizziness (occasional- attributes to headaches- Imitrex prn) and headaches. Negative for tremors, seizures, syncope, weakness and numbness.   Hematological:  Negative for adenopathy. Does not bruise/bleed easily.   Psychiatric/Behavioral:  Negative for confusion, sleep disturbance and suicidal ideas.               VITALS  Visit Vitals  /74 (BP Location: Left arm, Patient Position: Sitting)   Pulse 75   Temp 99.1 °F (37.3 °C) (Oral)   Ht 5' 3" (1.6 m)   Wt 112.9 kg (249 lb)   SpO2 99%   BMI 44.11 kg/m²          Physical Exam  Vitals reviewed.   Constitutional:       General: She is not in acute distress.     Appearance: She is well-developed. She is morbidly obese.   HENT:      Head: Normocephalic.      Nose: Nose normal.      Mouth/Throat:      Pharynx: No oropharyngeal exudate.   Eyes:      General:         Right eye: No discharge.         Left eye: No discharge.      Conjunctiva/sclera: Conjunctivae normal.      Pupils: Pupils are equal, round, and reactive to light.   Neck:      Thyroid: No thyromegaly.      Vascular: No " carotid bruit or JVD.      Trachea: No tracheal deviation.   Cardiovascular:      Rate and Rhythm: Normal rate and regular rhythm.      Pulses:           Carotid pulses are 2+ on the right side and 2+ on the left side.       Dorsalis pedis pulses are 2+ on the right side and 2+ on the left side.        Posterior tibial pulses are 2+ on the right side and 2+ on the left side.      Heart sounds: Normal heart sounds. No murmur heard.  Pulmonary:      Effort: Pulmonary effort is normal. No respiratory distress.      Breath sounds: Normal breath sounds. No stridor. No wheezing, rhonchi or rales.   Abdominal:      General: Bowel sounds are normal. There is no distension.      Palpations: Abdomen is soft.      Tenderness: There is no abdominal tenderness. There is no guarding.   Musculoskeletal:      Cervical back: Normal range of motion. No pain with movement.      Right lower le+ Pitting Edema present.      Left lower le+ Pitting Edema present.   Lymphadenopathy:      Cervical: No cervical adenopathy.   Skin:     General: Skin is warm and dry.      Capillary Refill: Capillary refill takes less than 2 seconds.      Findings: No erythema or rash.   Neurological:      Mental Status: She is alert and oriented to person, place, and time.          Significant Labs:  Lab Results   Component Value Date    WBC 6.85 2022    HGB 10.8 (L) 2022    HCT 33.7 (L) 2022     2022    CHOL 216 (H) 2023    TRIG 120 2023    HDL 61 2023    ALT 21 2022    AST 10 2022     2022    K 4.2 2022     2022    CREATININE 0.7 2022    BUN 10 2022    CO2 29 2022    INR 0.9 2023    HGBA1C 5.5 2023       Diagnostic Studies: No relevant studies.    EKG:   Results for orders placed or performed during the hospital encounter of 23   EKG 12-lead    Collection Time: 23 11:53 AM    Narrative    Test Reason : I10,    Vent. Rate  : 074 BPM     Atrial Rate : 074 BPM     P-R Int : 164 ms          QRS Dur : 086 ms      QT Int : 384 ms       P-R-T Axes : 081 012 044 degrees     QTc Int : 426 ms    Normal sinus rhythm with sinus arrhythmia  Normal ECG  When compared with ECG of 20-JUL-2022 13:34,  No significant change was found  Confirmed by ALEXY SHANNON MD ([id number]) on 7/31/2023 12:53:38 PM    Referred By: CLARY SOLITARIO           Confirmed By:ALEXY SHANNON MD           Active Cardiac Conditions: None      Revised Cardiac Risk Index   High -Risk Surgery  Intraperitoneal; Intrathoracic; suprainguinal vascular Yes- + 1 No- 0   History of Ischemic Heart Disease   (Hx of MI/positive exercise test/current chest pain due to ischemia/use of nitrate therapy/EKG with pathological Q waves) Yes- + 1 No- 0   History of CHF  (Pulmonary edema/bilateral rales or S3 gallop/PND/CXR showing pulmonary vascular redistribution) Yes- + 1 No- 0   History of CVA   (Prior stroke or TIA) Yes- + 1 No- 0   Pre-operative treatment with insulin Yes- + 1 No- 0   Pre-operative creatinine > 2mg/dl Yes- + 1 No- 0   Total:1      Risk Status:  Estimated risk of cardiac complications after non-cardiac surgery using the Revised Cardiac Risk Index for Preoperative risk is 6.0 %      ARISCAT (Canet) risk index: Intermediate: 13.3% risk of post-op pulmonary complications.    American Society of Anesthesiologists Physical Status classification (ASA): 3    [facility] respiratory failure index: 0.5 %         No further cardiac workup needed prior to surgery.          Orders Placed This Encounter    Protime-INR    Hemoglobin A1C    EKG 12-lead           Assessment/Plan:     Personal history of TIA (transient ischemic attack)  Dx'd a couple of years ago while staying in [place] after [place].   Reports found incidentally on imaging of brain.   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   No residual deficits  On ASA/statin  Control risk factors such as  HTN/DM/Obesity and reduce salt intake.     No carotid studies available    Depression  Treated with: Paxil/hydroxyzine-  not working well; has appt. with psych 8/2/23 for possible adjustment with medication.   Denies suicidal/homicidal ideations  Recommend good sleep (7-8 hrs), healthy eating, and limit alcohol.     Mild intermittent asthma without complication  Per outside problem list; patient denies.   Treated with Albuterol MDI prn ; uses inhaler 2-3x weekly.  Last use 2 days ago for chronic coughing and SOB.   Followed per outside ENT- reports history of allergies and bronchitis. Also taking Flonase/Astelin/Claritin/Singulair.  Sats today = 99%  No distress noted today.      I recommend possible use of inhaled bronchodilators if patient develops perioperative bronchospasm.     Hypertension  Current BP  controlled today; 133/74.    Taking: amlodipine/losartan- did not take     Lifestyle changes to reduce systolic BP:   exercise 30 minutes per day,  5 days per week or 150 minutes weekly; sodium reduction and avoidance of high salt foods such as processed meats, frozen meals and  fast foods.   Keeping a healthy weight/BMI can help with better BP control    BP acceptable for surgery. I recommend monitoring BP during perioperative period as uncontrolled pain can elevate blood pressure.           Morbid obesity with BMI of 40.0-44.9, adult  Lifestyle changes should be made by eating healthy, exercising at least 150 minutes weekly, and avoiding sedentary behavior.       Diabetes mellitus  Currently takes:   Ozempic/  metformin  Most recent A1c is 5.5  Average accuchecks are 130s before eating .     Denies peripheral neuropathy.   Denies visual changes. Up to date with eye exams.  Micro changes: Denies- Retinopathy, Nephropathy   Macro changes: Denies-  Carotid, Coronary , Peripheral disease     Maintain healthy weight. Exercise at least 150 minutes weekly. Encouraged diet rich in nutrients such as fruits, vegetables,  and whole grains; reduce sugar intake from cakes, candy, and sugared drinks.    Gastroesophageal reflux disease without esophagitis  Currently being treated with Protonix  Knows food triggers  Encouraged to elevate HOB and avoid laying down for 2-3 hours after meals.   Weight loss encouraged as well as dietary changes such as reduction or avoidance of fatty foods, caffeine, spicy foods, and chocolate.        Primary osteoarthritis of left knee  Scheduled with Dr. Fraser on 8/7/23 for left knee arthroplasty.     GATITO (obstructive sleep apnea)  CPAP compliance: Yes.      Recommend caution with sedating medication that can cause respiratory depression.         Bilateral leg edema  I suggest avoidance of added salt and voidance of NSAID's- unless advised or ordered. I recommend limb elevation and david hose use during perioperative period.    Chronic low back pain without sciatica  No loss of bladder or bowel control    Denies N/T to buttocks, perineum and inner surfaces of the thighs (saddle anesthesia)    Followed per outside Pain Management     Recent imaging: Xray L-Spine 12/11/22  FINDINGS:  AP lumbar alignment is maintained. No spondylolisthesis.  Vertebral body heights are well maintained.  Mild intervertebral disc space height loss at T12-L1 and L1-L2. Mild lower lumbar facet arthropathy, worst at L4-L5 and L5-S1, possibly contributing to foraminal narrowing.     Nonobstructive bowel gas pattern.  Pelvic phleboliths noted.  Partially imaged ribs are unremarkable.     Impression:     No acute fracture or traumatic malalignment.     Mild lower lumbar facet arthropathy.  Follow-up, as clinically warranted.         Chronic, continuous use of opioids  Patient may have opioid tolerance due to chronic continuous opioid use. I recommend monitoring for opioid tolerance during the postoperative period and plan pain control that will assist the patient in the hospital as well as through the discharge process.   Taking Norco  for back pain        Discussion/Management of Perioperative Care    Thromboembolic prophylaxis (VTE) Care: Risk factors for thrombosis include: morbid obesity and surgical procedure.  I recommend prophylaxis of thromboembolism with the use of compression stockings/pneumatic devices, and/or pharmacologic agents. The benefits should outweigh the risks for pharmacologic prophylaxis in the perioperative period. I also encourage early ambulation if not contraindicated during the post-operative period.    Risk factors for post-operative pulmonary complications include:diabetes mellitus, HTN, and surgery > 3 hours. To reduce the risk of pulmonary complications, prophylactic recommendations include: incentive spirometry use/deep breathing, early ambulation, and pain control.    I recommend monitoring sodium during the perioperative period. Pt. is currently on an SSRI which can be associated with SIADH. Surgical procedures are associated with hypersecretion of ADH as well.    Risk factors for renal complications include: diabetes mellitus and HTN. To reduce the risk of postoperative renal complications, I recommend the patient maintain adequate fluid volume status by drinking 2 liters of water daily.  Avoid/reduce NSAIDS and RAO-2 inhibitors use as well as IV contrast for renal protection.    I recommend the use of appropriate prophylactic antibiotics to reduce the risk of surgical site infections.    Delirium risk factors include opioid use. I recommend to avoid/reduce use of benzodiazepine use (not for patients who take on a regular basis), anticholinergics, Benadryl,  and agents that may cause postoperative serotonin syndrome.  Controlled pain can decrease the risk for postop delirium and since opioids are used for postoperative pain control, I suggest using the lowest dose for the shortest amount of time necessary for pain management.     The patient is at an increased risk for urinary retention due to : possible  regional anesthesia. I recommend to avoid/decrease the use of benzodiazepines, anticholinergics, and Benadryl in the perioperative period. I also recommend using opioids for the shortest period of time if possible.        Diabetes Mellitus-I recommend monitoring the glucose in the perioperative period ( Before meals and bed time,if the patient is on oral feeds or every 6 hourly ,if the patient is NPO ).   Blood glucose target in hospitalized patients is 140-180. Oral Hypoglycemic agents are generally avoided during the hospital stay . If glucose is consistently elevated ,I recommend using a basal prandial Insulin regimen to control the glucose - as elevated glucose can be associated with adverse surgical outcomes. Please consider involving Hospital Medicine or Endocrinology , if any help is needed with Glucose control. Patient will be instructed based on the pre op clinic guidelines about adjustment of diabetic treatment (If applicable). These guidelines are per recommendations of the Endocrinology department.      This visit was focused on Preoperative evaluation, Perioperative Medical management, complication reduction plans. I suggest that the patient follows up with primary care or relevant sub specialists for ongoing health care.    I appreciate the opportunity to be involved in this patients care. Please feel free to contact me if there were any questions about this consultation.        I spent a total of 48 minutes on the day of the visit.This includes face to face time and non-face to face time preparing to see the patient (e.g., review of tests), obtaining and/or reviewing separately obtained history, documenting clinical information in the electronic or other health record, independently interpreting results and communicating results to the patient/family/caregiver, or care coordinator.       Patient is optimized for surgery.       Jc Martinez, NP  Perioperative Medicine  [facility]

## 2023-08-02 RX ORDER — SODIUM CHLORIDE 0.9 % (FLUSH) 0.9 %
10 SYRINGE (ML) INJECTION
Status: CANCELLED | OUTPATIENT
Start: 2023-08-02

## 2023-08-02 RX ORDER — SODIUM CHLORIDE 9 MG/ML
INJECTION, SOLUTION INTRAVENOUS CONTINUOUS
Status: CANCELLED | OUTPATIENT
Start: 2023-08-02 | End: 2023-08-03

## 2023-08-02 RX ORDER — METHOCARBAMOL 750 MG/1
750 TABLET, FILM COATED ORAL 3 TIMES DAILY
Status: CANCELLED | OUTPATIENT
Start: 2023-08-02

## 2023-08-02 RX ORDER — CELECOXIB 200 MG/1
200 CAPSULE ORAL DAILY
Status: CANCELLED | OUTPATIENT
Start: 2023-08-03

## 2023-08-02 RX ORDER — NALOXONE HCL 0.4 MG/ML
0.02 VIAL (ML) INJECTION
Status: CANCELLED | OUTPATIENT
Start: 2023-08-02

## 2023-08-02 RX ORDER — PROCHLORPERAZINE EDISYLATE 5 MG/ML
5 INJECTION INTRAMUSCULAR; INTRAVENOUS EVERY 6 HOURS PRN
Status: CANCELLED | OUTPATIENT
Start: 2023-08-02

## 2023-08-02 RX ORDER — MIDAZOLAM HYDROCHLORIDE 1 MG/ML
4 INJECTION INTRAMUSCULAR; INTRAVENOUS
Status: CANCELLED | OUTPATIENT
Start: 2023-08-02 | End: 2023-08-03

## 2023-08-02 RX ORDER — PREGABALIN 75 MG/1
75 CAPSULE ORAL
Status: CANCELLED | OUTPATIENT
Start: 2023-08-02

## 2023-08-02 RX ORDER — ASPIRIN 81 MG/1
81 TABLET ORAL 2 TIMES DAILY
Status: CANCELLED | OUTPATIENT
Start: 2023-08-02

## 2023-08-02 RX ORDER — OXYCODONE HYDROCHLORIDE 5 MG/1
10 TABLET ORAL
Status: CANCELLED | OUTPATIENT
Start: 2023-08-02

## 2023-08-02 RX ORDER — POLYETHYLENE GLYCOL 3350 17 G/17G
17 POWDER, FOR SOLUTION ORAL DAILY
Status: CANCELLED | OUTPATIENT
Start: 2023-08-03

## 2023-08-02 RX ORDER — CEFAZOLIN SODIUM 2 G/50ML
2 SOLUTION INTRAVENOUS
Status: CANCELLED | OUTPATIENT
Start: 2023-08-02 | End: 2023-08-03

## 2023-08-02 RX ORDER — MORPHINE SULFATE 2 MG/ML
2 INJECTION, SOLUTION INTRAMUSCULAR; INTRAVENOUS
Status: CANCELLED | OUTPATIENT
Start: 2023-08-02

## 2023-08-02 RX ORDER — LIDOCAINE HYDROCHLORIDE 10 MG/ML
1 INJECTION, SOLUTION EPIDURAL; INFILTRATION; INTRACAUDAL; PERINEURAL
Status: CANCELLED | OUTPATIENT
Start: 2023-08-02

## 2023-08-02 RX ORDER — BISACODYL 10 MG
10 SUPPOSITORY, RECTAL RECTAL EVERY 12 HOURS PRN
Status: CANCELLED | OUTPATIENT
Start: 2023-08-02

## 2023-08-02 RX ORDER — AMOXICILLIN 250 MG
1 CAPSULE ORAL 2 TIMES DAILY
Status: CANCELLED | OUTPATIENT
Start: 2023-08-02

## 2023-08-02 RX ORDER — MUPIROCIN 20 MG/G
1 OINTMENT TOPICAL 2 TIMES DAILY
Status: CANCELLED | OUTPATIENT
Start: 2023-08-02 | End: 2023-08-07

## 2023-08-02 RX ORDER — TALC
6 POWDER (GRAM) TOPICAL NIGHTLY PRN
Status: CANCELLED | OUTPATIENT
Start: 2023-08-02

## 2023-08-02 RX ORDER — ACETAMINOPHEN 500 MG
1000 TABLET ORAL EVERY 6 HOURS
Status: CANCELLED | OUTPATIENT
Start: 2023-08-02

## 2023-08-02 RX ORDER — FENTANYL CITRATE 50 UG/ML
25 INJECTION, SOLUTION INTRAMUSCULAR; INTRAVENOUS EVERY 5 MIN PRN
Status: CANCELLED | OUTPATIENT
Start: 2023-08-02

## 2023-08-02 RX ORDER — SODIUM CHLORIDE 9 MG/ML
INJECTION, SOLUTION INTRAVENOUS
Status: CANCELLED | OUTPATIENT
Start: 2023-08-02

## 2023-08-02 RX ORDER — CELECOXIB 200 MG/1
400 CAPSULE ORAL ONCE
Status: CANCELLED | OUTPATIENT
Start: 2023-08-02 | End: 2023-08-02

## 2023-08-02 RX ORDER — ACETAMINOPHEN 500 MG
1000 TABLET ORAL
Status: CANCELLED | OUTPATIENT
Start: 2023-08-02

## 2023-08-02 RX ORDER — FENTANYL CITRATE 50 UG/ML
100 INJECTION, SOLUTION INTRAMUSCULAR; INTRAVENOUS
Status: CANCELLED | OUTPATIENT
Start: 2023-08-02 | End: 2023-08-03

## 2023-08-02 RX ORDER — CEFAZOLIN SODIUM 2 G/50ML
2 SOLUTION INTRAVENOUS
Status: CANCELLED | OUTPATIENT
Start: 2023-08-02

## 2023-08-02 RX ORDER — MUPIROCIN 20 MG/G
1 OINTMENT TOPICAL
Status: CANCELLED | OUTPATIENT
Start: 2023-08-02

## 2023-08-02 RX ORDER — PREGABALIN 75 MG/1
75 CAPSULE ORAL NIGHTLY
Status: CANCELLED | OUTPATIENT
Start: 2023-08-02

## 2023-08-02 RX ORDER — OXYCODONE HYDROCHLORIDE 5 MG/1
5 TABLET ORAL
Status: CANCELLED | OUTPATIENT
Start: 2023-08-02

## 2023-08-02 RX ORDER — ONDANSETRON 2 MG/ML
4 INJECTION INTRAMUSCULAR; INTRAVENOUS EVERY 8 HOURS PRN
Status: CANCELLED | OUTPATIENT
Start: 2023-08-02

## 2023-08-02 RX ORDER — FAMOTIDINE 20 MG/1
20 TABLET, FILM COATED ORAL 2 TIMES DAILY
Status: CANCELLED | OUTPATIENT
Start: 2023-08-02

## 2023-08-02 NOTE — H&P
CC:  Left knee pain    Candy Huynh is a 52 y.o. female with history of Left knee pain. Pain is worse with activity and weight bearing.  Patient has experienced interference of activities of daily living due to decreased range of motion and an increase in joint pain and swelling.  Patient has failed non-operative treatment including NSAIDs, corticosteroid injections, viscosupplement injections, and activity modification.  Candy Huynh currently ambulates independently.     Relevant medical conditions of significance in perioperative period:  HTN- on medication managed by pcp  DM- on medication managed by pcp  GERD- on medication managed by pcp  HLD- on medication managed by pcp    Past Medical History:   Diagnosis Date    Allergy     Anemia     Anxiety     Asthma     denies- on outside problem list in Care Everywhere    Depression     Diabetes mellitus     Diabetes mellitus, type 2     Fibromyalgia     GERD (gastroesophageal reflux disease)     Hypertension     Stroke     TIA    Vertigo        Past Surgical History:   Procedure Laterality Date    ARTHROSCOPIC REPAIR OF ROTATOR CUFF OF SHOULDER Right 2022    Procedure: REPAIR, ROTATOR CUFF, ARTHROSCOPIC;  Surgeon: Ministerio Orr Jr., MD;  Location: [facility] OR;  Service: Orthopedics;  Laterality: Right;  need opus system  Buddhism notifed CC     ARTHROSCOPY OF KNEE Left 2021    Procedure: ARTHROSCOPY, KNEE;  Surgeon: Donnie Campuzano MD;  Location: [facility] OR;  Service: Orthopedics;  Laterality: Left;     SECTION      DECOMPRESSION OF SUBACROMIAL SPACE  2022    Procedure: DECOMPRESSION, SUBACROMIAL SPACE;  Surgeon: Ministerio Orr Jr., MD;  Location: [facility] OR;  Service: Orthopedics;;    EXCISION OF MASS OF EXTREMITY Left 2019    Procedure: EXCISION, MASS, EXTREMITY;  Surgeon: Honorio Pulido IV, MD;  Location: [facility] OR;  Service: Orthopedics;  Laterality: Left;  excision lipoma  Request Lacie    HERNIA REPAIR      HYSTERECTOMY       KNEE ARTHROSCOPY W/ MENISCECTOMY  2021    Procedure: ARTHROSCOPY, KNEE, WITH MENISCECTOMY;  Surgeon: Donnie Campuzano MD;  Location: [facility] OR;  Service: Orthopedics;;    NASAL SEPTOPLASTY      RECONSTRUCTION OF ACROMIOCLAVICULAR JOINT  2022    Procedure: RECONSTRUCTION, ACROMIOCLAVICULAR JOINT;  Surgeon: Ministerio Orr Jr., MD;  Location: [facility] OR;  Service: Orthopedics;;    TOTAL KNEE ARTHROPLASTY Right 10/26/2022    Procedure: ARTHROPLASTY, KNEE, TOTAL RIGHT: BALDO - NEXGEN;  Surgeon: Nolberto Fraser MD;  Location: [facility] OR;  Service: Orthopedics;  Laterality: Right;       Family History   Problem Relation Age of Onset    No Known Problems Mother     Hypertension Father     Hypertension Sister     No Known Problems Sister     No Known Problems Brother     No Known Problems Daughter     No Known Problems Daughter     No Known Problems Daughter     No Known Problems Son        Review of patient's allergies indicates:   Allergen Reactions    Adhesive Blisters     Specifically EKG lead pads    Morphine Other (See Comments)     shake         Current Outpatient Medications:     acetaminophen (TYLENOL) 650 MG TbSR, Take 1 tablet (650 mg total) by mouth every 6 to 8 hours as needed (pain)., Disp: 120 tablet, Rfl: 0    albuterol (PROVENTIL/VENTOLIN HFA) 90 mcg/actuation inhaler, SMARTSI Puff(s) By Mouth Every 4 Hours PRN, Disp: , Rfl:     amLODIPine (NORVASC) 5 MG tablet, Take 5 mg by mouth once daily., Disp: , Rfl:     aspirin (ECOTRIN) 81 MG EC tablet, Take 1 tablet (81 mg total) by mouth 2 (two) times daily., Disp: 60 tablet, Rfl: 0    azelastine (ASTELIN) 137 mcg (0.1 %) nasal spray, 1 spray once daily., Disp: , Rfl:     cetirizine (ZYRTEC) 10 MG tablet, Take 10 mg by mouth every evening., Disp: , Rfl:     cholecalciferol, vitamin D3, 1,250 mcg (50,000 unit) capsule, Take 50,000 Units by mouth every 7 days., Disp: , Rfl:     diazePAM (VALIUM) 2 MG tablet, SMARTSI-2 Tablet(s) By Mouth, Disp: , Rfl:      EPINEPHrine (EPIPEN) 0.3 mg/0.3 mL AtIn, as directed, Disp: , Rfl:     ergocalciferol (ERGOCALCIFEROL) 50,000 unit Cap, Take 50,000 Units by mouth every 7 days., Disp: , Rfl:     FEROSUL 325 mg (65 mg iron) Tab tablet, Take by mouth every other day., Disp: , Rfl:     FLUoxetine 20 MG capsule, Take 40 mg by mouth once daily., Disp: , Rfl:     fluticasone propionate (FLONASE) 50 mcg/actuation nasal spray, 1 spray by Each Nostril route once daily., Disp: , Rfl:     folic acid (FOLVITE) 1 MG tablet, Take 1,000 mcg by mouth., Disp: , Rfl:     HYDROcodone-acetaminophen (NORCO) 7.5-325 mg per tablet, Take 1 tablet by mouth every 6 (six) hours as needed for Pain., Disp: , Rfl:     HYDROmorphone (DILAUDID) 2 MG tablet, Take 2 tablets (4 mg total) by mouth every 3 (three) hours as needed for Pain., Disp: 50 tablet, Rfl: 0    hydrOXYzine pamoate (VISTARIL) 25 MG Cap, Take 25 mg by mouth daily as needed., Disp: , Rfl:     loratadine (CLARITIN) 10 mg tablet, Take 10 mg by mouth once daily., Disp: , Rfl:     losartan (COZAAR) 100 MG tablet, Take 100 mg by mouth once daily., Disp: , Rfl:     meclizine (ANTIVERT) 25 mg tablet, Take 25 mg by mouth., Disp: , Rfl:     meloxicam (MOBIC) 15 MG tablet, Take 1 tablet (15 mg total) by mouth once daily., Disp: 30 tablet, Rfl: 3    metFORMIN (GLUCOPHAGE) 1000 MG tablet, Take 1,000 mg by mouth 2 (two) times daily with meals., Disp: , Rfl:     methocarbamoL (ROBAXIN) 750 MG Tab, Take 1 tablet (750 mg total) by mouth 3 (three) times daily as needed (muscle spasms)., Disp: 60 tablet, Rfl: 1    montelukast (SINGULAIR) 10 mg tablet, , Disp: , Rfl:     MOVANTIK 25 mg tablet, Take 25 mg by mouth., Disp: , Rfl:     nicotine polacrilex (NICORETTE) 4 MG Gum, SMARTSI Each By Mouth PRN, Disp: , Rfl:     ondansetron (ZOFRAN-ODT) 8 MG TbDL, Take 8 mg by mouth every 8 (eight) hours as needed., Disp: , Rfl:     pantoprazole (PROTONIX) 40 MG tablet, TAKE 1 TABLET BY MOUTH ONCE DAILY 30 MINUTES BEFORE  "BREAKFAST, Disp: , Rfl:     paroxetine (PAXIL) 10 MG tablet, Take 10 mg by mouth every morning., Disp: , Rfl:     prazosin (MINIPRESS) 1 MG Cap, TAKE 1 CAPSULE BY MOUTH EVERY NIGHT AT BEDTIME FOR 7 DAYS; THEN TAKE 2 CAPSULES BY MOUTH EVERY NIGHT AT BEDTIME FOR 7 DAYS; THEN TAKE 3 CAPSULES BY MOUTH EVERY NIGHT AT BEDTIME THEREAFTER AS DIRECTED BY PHYSICIAN, Disp: , Rfl:     PREMARIN 0.625 mg tablet, Take 0.625 mg by mouth once daily., Disp: , Rfl:     rosuvastatin (CRESTOR) 10 MG tablet, Take 10 mg by mouth., Disp: , Rfl:     semaglutide (OZEMPIC) 0.25 mg or 0.5 mg (2 mg/3 mL) pen injector, Inject 0.25 mg into the skin every 7 (seven) days, Disp: , Rfl:     STOOL SOFTENER 100 mg capsule, Take 1 softgel by mouth twice daily, Disp: 60 capsule, Rfl: 11    sumatriptan (IMITREX) 100 MG tablet, Take 100 mg by mouth 2 (two) times daily as needed., Disp: , Rfl:     Review of Systems:  Constitutional: no fever or chills  Eyes: no visual changes  ENT: no nasal congestion or sore throat  Respiratory: no cough or shortness of breath  Cardiovascular: no chest pain or palpitations  Gastrointestinal: no nausea or vomiting, tolerating diet  Genitourinary: no hematuria or dysuria  Integument/Breast: no rash or pruritis  Hematologic/Lymphatic: no easy bruising or lymphadenopathy  Musculoskeletal: positive for knee pain  Neurological: no seizures or tremors  Behavioral/Psych: no auditory or visual hallucinations  Endocrine: no heat or cold intolerance    PE:  Ht 5' 3" (1.6 m)   Wt 108.2 kg (238 lb 6.8 oz)   BMI 42.24 kg/m²   General: Pleasant, cooperative, NAD   Gait: antalgic  HEENT: NCAT, sclera nonicteric   Lungs: Respirations clear bilaterally; equal and unlabored.   CV: S1S2; 2+ bilateral upper and lower extremity pulses.   Skin: Intact throughout with no rashes, erythema, or lesions  Extremities: No LE edema,  no erythema or warmth of the skin in either lower extremity.    Left knee exam:  Knee Range of Motion:normal, pain with " passive range of motion  Effusion:none  Condition of skin:intact  Location of tenderness:Medial joint line   Strength:normal  Stability: stable to testing    Hip Examination: painless PROM of hip     Radiographs: Radiographs reveal advanced degenerative changes including subchondral cyst formation, subchondral sclerosis, osteophyte formation, joint space narrowing.     Knee Alignment: normal    Diagnosis: Primary osteoarthritis Left knee    Plan: Left total knee arthroplasty    Due to the serious nature of total joint infection and the high prevalence of community acquired MRSA, vancomycin will be used perioperatively.

## 2023-08-02 NOTE — PROGRESS NOTES
Candy Huynh is a 52 y.o. year old here today for pre surgery optimization visit  in preparation for a Left total knee arthroplasty to be performed by Dr. Fraser  on 8/7/2023.  she was last seen and treated in the clinic on 7/7/2023. she will be medically optimized by the pre op center. There has been no significant change in medical status since last visit. No fever, chills, malaise, or unexplained weight change.      Allergies, Medications, past medical and surgical history reviewed.    Focused examination performed.    Patient declined to see surgeon today. All questions answered. Patient encouraged to call with questions. Contact information given.     Pre, sofie, and post operative procedures and expectations discussed. Goals of successful surgery reviewed and include manageable pain levels, surgical site free of infection, medication management, and ambulation with PT/OT assistance. Healthy weight management discussed with patient and caregiver who were receptive to eduction of healthy diet and activity. No other necessary lifestyle changes identified. Educated patient about signs and symptoms of infection, medication management, anticoagulation therapy, risk of tobacco and alcohol use, and self-care to promote healing. Surgical guide given for future reference. Hibiclens given to patient with instructions. All questions were answered.     Candy Huynh verbalized an understanding to the education and goals. Patient has displayed readiness to engage in care and is ready to proceed with surgery.  Patient reports her family is able and ready to provide assistance at home after discharge.    Surgical and blood consents signed.    Candy Huynh will contact us if there are any questions, concerns, or changes in medical status prior to surgery.       Patient has discussed discharge planning with surgeon. Patient will be discharged to home following surgery.   patient will be scheduled with [facility].  [facility] [place] [facility] Orders reentered for scheduling    30 minutes of time was spent on patient education, review of records, templating, H&P, , appointment scheduling and optimizing patient for surgery.

## 2023-08-04 ENCOUNTER — TELEPHONE (OUTPATIENT)
Dept: ORTHOPEDICS | Facility: CLINIC | Age: 53
End: 2023-08-04
Payer: MEDICAID

## 2023-08-04 ENCOUNTER — PATIENT MESSAGE (OUTPATIENT)
Dept: ORTHOPEDICS | Facility: CLINIC | Age: 53
End: 2023-08-04
Payer: MEDICAID

## 2023-08-04 ENCOUNTER — ANESTHESIA EVENT (OUTPATIENT)
Dept: SURGERY | Facility: HOSPITAL | Age: 53
End: 2023-08-04
Payer: MEDICAID

## 2023-08-06 DIAGNOSIS — Z96.652 S/P TOTAL KNEE REPLACEMENT USING CEMENT, LEFT: Primary | ICD-10-CM

## 2023-08-06 RX ORDER — METHOCARBAMOL 750 MG/1
750 TABLET, FILM COATED ORAL 4 TIMES DAILY PRN
Qty: 40 TABLET | Refills: 0 | Status: ON HOLD | OUTPATIENT
Start: 2023-08-06 | End: 2023-08-08 | Stop reason: SDUPTHER

## 2023-08-06 RX ORDER — OXYCODONE HYDROCHLORIDE 5 MG/1
TABLET ORAL
Qty: 30 TABLET | Refills: 0 | Status: ON HOLD | OUTPATIENT
Start: 2023-08-06 | End: 2023-08-08 | Stop reason: HOSPADM

## 2023-08-06 RX ORDER — DEXTROMETHORPHAN HYDROBROMIDE, GUAIFENESIN 5; 100 MG/5ML; MG/5ML
650 LIQUID ORAL EVERY 8 HOURS
Qty: 120 TABLET | Refills: 0 | Status: ON HOLD | OUTPATIENT
Start: 2023-08-06 | End: 2023-08-08 | Stop reason: SDUPTHER

## 2023-08-06 RX ORDER — ASPIRIN 81 MG/1
81 TABLET ORAL 2 TIMES DAILY
Qty: 60 TABLET | Refills: 0 | Status: SHIPPED | OUTPATIENT
Start: 2023-08-06 | End: 2023-10-23

## 2023-08-06 RX ORDER — PANTOPRAZOLE SODIUM 40 MG/1
40 TABLET, DELAYED RELEASE ORAL DAILY
Qty: 30 TABLET | Refills: 0 | Status: ON HOLD | OUTPATIENT
Start: 2023-08-06 | End: 2023-08-08 | Stop reason: HOSPADM

## 2023-08-06 RX ORDER — AMOXICILLIN 250 MG
1 CAPSULE ORAL DAILY
Qty: 30 TABLET | Refills: 0 | Status: SHIPPED | OUTPATIENT
Start: 2023-08-06

## 2023-08-07 ENCOUNTER — ANESTHESIA (OUTPATIENT)
Dept: SURGERY | Facility: HOSPITAL | Age: 53
End: 2023-08-07
Payer: MEDICAID

## 2023-08-07 ENCOUNTER — HOSPITAL ENCOUNTER (OUTPATIENT)
Facility: HOSPITAL | Age: 53
Discharge: HOME | End: 2023-08-08
Attending: ORTHOPAEDIC SURGERY | Admitting: ORTHOPAEDIC SURGERY
Payer: MEDICAID

## 2023-08-07 DIAGNOSIS — Z96.652 S/P TOTAL KNEE REPLACEMENT USING CEMENT, LEFT: ICD-10-CM

## 2023-08-07 DIAGNOSIS — M17.12 PRIMARY OSTEOARTHRITIS OF LEFT KNEE: ICD-10-CM

## 2023-08-07 LAB
POCT GLUCOSE: 118 MG/DL (ref 70–110)
POCT GLUCOSE: 147 MG/DL (ref 70–110)
POCT GLUCOSE: 71 MG/DL (ref 70–110)
POCT GLUCOSE: 90 MG/DL (ref 70–110)

## 2023-08-07 PROCEDURE — 64448 NJX AA&/STRD FEM NRV NFS IMG: CPT | Performed by: ANESTHESIOLOGY

## 2023-08-07 PROCEDURE — 94761 N-INVAS EAR/PLS OXIMETRY MLT: CPT

## 2023-08-07 PROCEDURE — 27447 PR TOTAL KNEE ARTHROPLASTY: ICD-10-PCS | Mod: LT,,, | Performed by: ORTHOPAEDIC SURGERY

## 2023-08-07 PROCEDURE — 25000003 PHARM REV CODE 250: Performed by: ORTHOPAEDIC SURGERY

## 2023-08-07 PROCEDURE — 27447 TOTAL KNEE ARTHROPLASTY: CPT | Mod: LT,,, | Performed by: ORTHOPAEDIC SURGERY

## 2023-08-07 PROCEDURE — C1713 ANCHOR/SCREW BN/BN,TIS/BN: HCPCS | Performed by: ORTHOPAEDIC SURGERY

## 2023-08-07 PROCEDURE — D9220A PRA ANESTHESIA: ICD-10-PCS | Mod: CRNA,,, | Performed by: NURSE ANESTHETIST, CERTIFIED REGISTERED

## 2023-08-07 PROCEDURE — 25000003 PHARM REV CODE 250: Performed by: NURSE ANESTHETIST, CERTIFIED REGISTERED

## 2023-08-07 PROCEDURE — 63600175 PHARM REV CODE 636 W HCPCS: Performed by: ORTHOPAEDIC SURGERY

## 2023-08-07 PROCEDURE — 27000190 HC CPAP FULL FACE MASK W/VALVE

## 2023-08-07 PROCEDURE — 27201423 OPTIME MED/SURG SUP & DEVICES STERILE SUPPLY: Performed by: ORTHOPAEDIC SURGERY

## 2023-08-07 PROCEDURE — 36000711: Performed by: ORTHOPAEDIC SURGERY

## 2023-08-07 PROCEDURE — 37000008 HC ANESTHESIA 1ST 15 MINUTES: Performed by: ORTHOPAEDIC SURGERY

## 2023-08-07 PROCEDURE — 63600175 PHARM REV CODE 636 W HCPCS: Performed by: NURSE PRACTITIONER

## 2023-08-07 PROCEDURE — 37000009 HC ANESTHESIA EA ADD 15 MINS: Performed by: ORTHOPAEDIC SURGERY

## 2023-08-07 PROCEDURE — 63600175 PHARM REV CODE 636 W HCPCS: Performed by: PHYSICIAN ASSISTANT

## 2023-08-07 PROCEDURE — 64448 PERIPHERAL BLOCK: ICD-10-PCS | Mod: 59,LT,, | Performed by: ANESTHESIOLOGY

## 2023-08-07 PROCEDURE — D9220A PRA ANESTHESIA: Mod: CRNA,,, | Performed by: NURSE ANESTHETIST, CERTIFIED REGISTERED

## 2023-08-07 PROCEDURE — 25000003 PHARM REV CODE 250: Performed by: NURSE PRACTITIONER

## 2023-08-07 PROCEDURE — 36000710: Performed by: ORTHOPAEDIC SURGERY

## 2023-08-07 PROCEDURE — C1776 JOINT DEVICE (IMPLANTABLE): HCPCS | Performed by: ORTHOPAEDIC SURGERY

## 2023-08-07 PROCEDURE — 71000039 HC RECOVERY, EACH ADD'L HOUR: Performed by: ORTHOPAEDIC SURGERY

## 2023-08-07 PROCEDURE — 63600175 PHARM REV CODE 636 W HCPCS: Performed by: NURSE ANESTHETIST, CERTIFIED REGISTERED

## 2023-08-07 PROCEDURE — 27000221 HC OXYGEN, UP TO 24 HOURS

## 2023-08-07 PROCEDURE — D9220A PRA ANESTHESIA: Mod: ANES,,, | Performed by: ANESTHESIOLOGY

## 2023-08-07 PROCEDURE — D9220A PRA ANESTHESIA: ICD-10-PCS | Mod: ANES,,, | Performed by: ANESTHESIOLOGY

## 2023-08-07 PROCEDURE — 82962 GLUCOSE BLOOD TEST: CPT | Performed by: ORTHOPAEDIC SURGERY

## 2023-08-07 PROCEDURE — 63600175 PHARM REV CODE 636 W HCPCS: Performed by: ANESTHESIOLOGY

## 2023-08-07 PROCEDURE — 71000033 HC RECOVERY, INTIAL HOUR: Performed by: ORTHOPAEDIC SURGERY

## 2023-08-07 PROCEDURE — 25000003 PHARM REV CODE 250: Performed by: PHYSICIAN ASSISTANT

## 2023-08-07 PROCEDURE — 99900035 HC TECH TIME PER 15 MIN (STAT)

## 2023-08-07 PROCEDURE — 94660 CPAP INITIATION&MGMT: CPT

## 2023-08-07 DEVICE — COMPONENT FEMORAL LEFT SIZE D: Type: IMPLANTABLE DEVICE | Site: KNEE | Status: FUNCTIONAL

## 2023-08-07 DEVICE — PLUG TAPER: Type: IMPLANTABLE DEVICE | Site: KNEE | Status: FUNCTIONAL

## 2023-08-07 DEVICE — ARITCULAR FLEX FIXED: Type: IMPLANTABLE DEVICE | Site: KNEE | Status: FUNCTIONAL

## 2023-08-07 DEVICE — CEMENT BONE SMPLX HV GENTMYCN: Type: IMPLANTABLE DEVICE | Site: KNEE | Status: FUNCTIONAL

## 2023-08-07 DEVICE — TIBIAL NEXGEN PRECOAT STEM: Type: IMPLANTABLE DEVICE | Site: KNEE | Status: FUNCTIONAL

## 2023-08-07 RX ORDER — MIDAZOLAM HYDROCHLORIDE 1 MG/ML
1 INJECTION INTRAMUSCULAR; INTRAVENOUS
Status: DISPENSED | OUTPATIENT
Start: 2023-08-07

## 2023-08-07 RX ORDER — IBUPROFEN 200 MG
24 TABLET ORAL
Status: DISCONTINUED | OUTPATIENT
Start: 2023-08-07 | End: 2023-08-08 | Stop reason: HOSPADM

## 2023-08-07 RX ORDER — METHOCARBAMOL 750 MG/1
750 TABLET, FILM COATED ORAL 3 TIMES DAILY
Status: DISCONTINUED | OUTPATIENT
Start: 2023-08-07 | End: 2023-08-08 | Stop reason: HOSPADM

## 2023-08-07 RX ORDER — PREGABALIN 75 MG/1
75 CAPSULE ORAL
Status: COMPLETED | OUTPATIENT
Start: 2023-08-07 | End: 2023-08-07

## 2023-08-07 RX ORDER — SODIUM CHLORIDE 9 MG/ML
INJECTION, SOLUTION INTRAVENOUS
Status: DISCONTINUED | OUTPATIENT
Start: 2023-08-07 | End: 2023-08-07 | Stop reason: HOSPADM

## 2023-08-07 RX ORDER — ACETAMINOPHEN 500 MG
1000 TABLET ORAL
Status: COMPLETED | OUTPATIENT
Start: 2023-08-07 | End: 2023-08-07

## 2023-08-07 RX ORDER — PROCHLORPERAZINE EDISYLATE 5 MG/ML
5 INJECTION INTRAMUSCULAR; INTRAVENOUS EVERY 6 HOURS PRN
Status: DISCONTINUED | OUTPATIENT
Start: 2023-08-07 | End: 2023-08-08 | Stop reason: HOSPADM

## 2023-08-07 RX ORDER — LIDOCAINE HYDROCHLORIDE 20 MG/ML
INJECTION INTRAVENOUS
Status: DISCONTINUED | OUTPATIENT
Start: 2023-08-07 | End: 2023-08-07

## 2023-08-07 RX ORDER — ROPIVACAINE HYDROCHLORIDE 2 MG/ML
INJECTION, SOLUTION EPIDURAL; INFILTRATION; PERINEURAL CONTINUOUS
Status: DISPENSED | OUTPATIENT
Start: 2023-08-07

## 2023-08-07 RX ORDER — ROPIVACAINE/EPI/CLONIDINE/KET 2.46-0.005
SYRINGE (ML) INJECTION
Status: DISCONTINUED | OUTPATIENT
Start: 2023-08-07 | End: 2023-08-07 | Stop reason: HOSPADM

## 2023-08-07 RX ORDER — MIDAZOLAM HYDROCHLORIDE 1 MG/ML
INJECTION, SOLUTION INTRAMUSCULAR; INTRAVENOUS
Status: DISCONTINUED | OUTPATIENT
Start: 2023-08-07 | End: 2023-08-07

## 2023-08-07 RX ORDER — TALC
6 POWDER (GRAM) TOPICAL NIGHTLY PRN
Status: DISCONTINUED | OUTPATIENT
Start: 2023-08-07 | End: 2023-08-07 | Stop reason: HOSPADM

## 2023-08-07 RX ORDER — PANTOPRAZOLE SODIUM 40 MG/1
40 TABLET, DELAYED RELEASE ORAL DAILY
Status: DISCONTINUED | OUTPATIENT
Start: 2023-08-08 | End: 2023-08-08 | Stop reason: HOSPADM

## 2023-08-07 RX ORDER — PROPOFOL 10 MG/ML
INJECTION, EMULSION INTRAVENOUS
Status: DISCONTINUED | OUTPATIENT
Start: 2023-08-07 | End: 2023-08-07

## 2023-08-07 RX ORDER — IBUPROFEN 200 MG
16 TABLET ORAL
Status: DISCONTINUED | OUTPATIENT
Start: 2023-08-07 | End: 2023-08-08 | Stop reason: HOSPADM

## 2023-08-07 RX ORDER — ONDANSETRON 2 MG/ML
INJECTION INTRAMUSCULAR; INTRAVENOUS
Status: DISCONTINUED | OUTPATIENT
Start: 2023-08-07 | End: 2023-08-07

## 2023-08-07 RX ORDER — NALOXONE HCL 0.4 MG/ML
0.02 VIAL (ML) INJECTION
Status: DISCONTINUED | OUTPATIENT
Start: 2023-08-07 | End: 2023-08-08 | Stop reason: HOSPADM

## 2023-08-07 RX ORDER — FENTANYL CITRATE 50 UG/ML
25 INJECTION, SOLUTION INTRAMUSCULAR; INTRAVENOUS EVERY 5 MIN PRN
Status: COMPLETED | OUTPATIENT
Start: 2023-08-07 | End: 2023-08-07

## 2023-08-07 RX ORDER — BISACODYL 10 MG
10 SUPPOSITORY, RECTAL RECTAL EVERY 12 HOURS PRN
Status: DISCONTINUED | OUTPATIENT
Start: 2023-08-07 | End: 2023-08-08 | Stop reason: HOSPADM

## 2023-08-07 RX ORDER — CELECOXIB 200 MG/1
400 CAPSULE ORAL ONCE
Status: COMPLETED | OUTPATIENT
Start: 2023-08-07 | End: 2023-08-07

## 2023-08-07 RX ORDER — POLYETHYLENE GLYCOL 3350 17 G/17G
17 POWDER, FOR SOLUTION ORAL DAILY
Status: DISCONTINUED | OUTPATIENT
Start: 2023-08-08 | End: 2023-08-08 | Stop reason: HOSPADM

## 2023-08-07 RX ORDER — OXYCODONE HYDROCHLORIDE 10 MG/1
10 TABLET ORAL
Status: DISCONTINUED | OUTPATIENT
Start: 2023-08-07 | End: 2023-08-08 | Stop reason: HOSPADM

## 2023-08-07 RX ORDER — SODIUM CHLORIDE 0.9 % (FLUSH) 0.9 %
10 SYRINGE (ML) INJECTION
Status: DISCONTINUED | OUTPATIENT
Start: 2023-08-07 | End: 2023-08-07 | Stop reason: HOSPADM

## 2023-08-07 RX ORDER — PHENYLEPHRINE HYDROCHLORIDE 10 MG/ML
INJECTION INTRAVENOUS
Status: DISCONTINUED | OUTPATIENT
Start: 2023-08-07 | End: 2023-08-07

## 2023-08-07 RX ORDER — DEXAMETHASONE SODIUM PHOSPHATE 4 MG/ML
INJECTION, SOLUTION INTRA-ARTICULAR; INTRALESIONAL; INTRAMUSCULAR; INTRAVENOUS; SOFT TISSUE
Status: DISCONTINUED | OUTPATIENT
Start: 2023-08-07 | End: 2023-08-07

## 2023-08-07 RX ORDER — PREGABALIN 75 MG/1
75 CAPSULE ORAL NIGHTLY
Status: DISCONTINUED | OUTPATIENT
Start: 2023-08-07 | End: 2023-08-08

## 2023-08-07 RX ORDER — LIDOCAINE HYDROCHLORIDE 10 MG/ML
1 INJECTION, SOLUTION EPIDURAL; INFILTRATION; INTRACAUDAL; PERINEURAL
Status: DISCONTINUED | OUTPATIENT
Start: 2023-08-07 | End: 2023-08-07 | Stop reason: HOSPADM

## 2023-08-07 RX ORDER — KETAMINE HCL IN 0.9 % NACL 50 MG/5 ML
SYRINGE (ML) INTRAVENOUS
Status: DISCONTINUED | OUTPATIENT
Start: 2023-08-07 | End: 2023-08-07

## 2023-08-07 RX ORDER — MUPIROCIN 20 MG/G
1 OINTMENT TOPICAL
Status: COMPLETED | OUTPATIENT
Start: 2023-08-07 | End: 2023-08-07

## 2023-08-07 RX ORDER — SODIUM CHLORIDE 9 MG/ML
INJECTION, SOLUTION INTRAVENOUS CONTINUOUS
Status: DISCONTINUED | OUTPATIENT
Start: 2023-08-07 | End: 2023-08-08 | Stop reason: HOSPADM

## 2023-08-07 RX ORDER — ONDANSETRON 2 MG/ML
4 INJECTION INTRAMUSCULAR; INTRAVENOUS EVERY 8 HOURS PRN
Status: DISCONTINUED | OUTPATIENT
Start: 2023-08-07 | End: 2023-08-08 | Stop reason: HOSPADM

## 2023-08-07 RX ORDER — HALOPERIDOL 5 MG/ML
0.5 INJECTION INTRAMUSCULAR EVERY 10 MIN PRN
Status: DISCONTINUED | OUTPATIENT
Start: 2023-08-07 | End: 2023-08-07 | Stop reason: HOSPADM

## 2023-08-07 RX ORDER — FENTANYL CITRATE 50 UG/ML
100 INJECTION, SOLUTION INTRAMUSCULAR; INTRAVENOUS
Status: DISPENSED | OUTPATIENT
Start: 2023-08-07

## 2023-08-07 RX ORDER — ACETAMINOPHEN 500 MG
1000 TABLET ORAL EVERY 6 HOURS
Status: DISCONTINUED | OUTPATIENT
Start: 2023-08-07 | End: 2023-08-08 | Stop reason: HOSPADM

## 2023-08-07 RX ORDER — FAMOTIDINE 10 MG/ML
INJECTION INTRAVENOUS
Status: DISCONTINUED | OUTPATIENT
Start: 2023-08-07 | End: 2023-08-07

## 2023-08-07 RX ORDER — HYDROMORPHONE HYDROCHLORIDE 1 MG/ML
1 INJECTION, SOLUTION INTRAMUSCULAR; INTRAVENOUS; SUBCUTANEOUS
Status: COMPLETED | OUTPATIENT
Start: 2023-08-07 | End: 2023-08-07

## 2023-08-07 RX ORDER — METFORMIN HYDROCHLORIDE 500 MG/1
1000 TABLET ORAL 2 TIMES DAILY WITH MEALS
Status: DISCONTINUED | OUTPATIENT
Start: 2023-08-08 | End: 2023-08-08 | Stop reason: HOSPADM

## 2023-08-07 RX ORDER — SODIUM CHLORIDE 9 MG/ML
INJECTION, SOLUTION INTRAVENOUS CONTINUOUS PRN
Status: DISCONTINUED | OUTPATIENT
Start: 2023-08-07 | End: 2023-08-07

## 2023-08-07 RX ORDER — FENTANYL CITRATE 50 UG/ML
25 INJECTION, SOLUTION INTRAMUSCULAR; INTRAVENOUS EVERY 5 MIN PRN
Status: DISCONTINUED | OUTPATIENT
Start: 2023-08-07 | End: 2023-08-07

## 2023-08-07 RX ORDER — FAMOTIDINE 20 MG/1
20 TABLET, FILM COATED ORAL 2 TIMES DAILY
Status: DISCONTINUED | OUTPATIENT
Start: 2023-08-07 | End: 2023-08-07

## 2023-08-07 RX ORDER — ATORVASTATIN CALCIUM 20 MG/1
40 TABLET, FILM COATED ORAL DAILY
Status: DISCONTINUED | OUTPATIENT
Start: 2023-08-08 | End: 2023-08-08 | Stop reason: HOSPADM

## 2023-08-07 RX ORDER — FENTANYL CITRATE 50 UG/ML
100 INJECTION, SOLUTION INTRAMUSCULAR; INTRAVENOUS
Status: DISCONTINUED | OUTPATIENT
Start: 2023-08-07 | End: 2023-08-07 | Stop reason: HOSPADM

## 2023-08-07 RX ORDER — ASPIRIN 81 MG/1
81 TABLET ORAL 2 TIMES DAILY
Status: DISCONTINUED | OUTPATIENT
Start: 2023-08-07 | End: 2023-08-08 | Stop reason: HOSPADM

## 2023-08-07 RX ORDER — AMLODIPINE BESYLATE 5 MG/1
5 TABLET ORAL DAILY
Status: DISCONTINUED | OUTPATIENT
Start: 2023-08-08 | End: 2023-08-08 | Stop reason: HOSPADM

## 2023-08-07 RX ORDER — INSULIN ASPART 100 [IU]/ML
0-5 INJECTION, SOLUTION INTRAVENOUS; SUBCUTANEOUS
Status: DISCONTINUED | OUTPATIENT
Start: 2023-08-07 | End: 2023-08-08 | Stop reason: HOSPADM

## 2023-08-07 RX ORDER — MORPHINE SULFATE 2 MG/ML
2 INJECTION, SOLUTION INTRAMUSCULAR; INTRAVENOUS
Status: DISCONTINUED | OUTPATIENT
Start: 2023-08-07 | End: 2023-08-07

## 2023-08-07 RX ORDER — AMOXICILLIN 250 MG
1 CAPSULE ORAL 2 TIMES DAILY
Status: DISCONTINUED | OUTPATIENT
Start: 2023-08-07 | End: 2023-08-08 | Stop reason: HOSPADM

## 2023-08-07 RX ORDER — ROPIVACAINE HYDROCHLORIDE 5 MG/ML
INJECTION, SOLUTION EPIDURAL; INFILTRATION; PERINEURAL
Status: COMPLETED | OUTPATIENT
Start: 2023-08-07 | End: 2023-08-07

## 2023-08-07 RX ORDER — LOSARTAN POTASSIUM 25 MG/1
100 TABLET ORAL DAILY
Status: DISCONTINUED | OUTPATIENT
Start: 2023-08-08 | End: 2023-08-08 | Stop reason: HOSPADM

## 2023-08-07 RX ORDER — MIDAZOLAM HYDROCHLORIDE 1 MG/ML
4 INJECTION INTRAMUSCULAR; INTRAVENOUS
Status: DISCONTINUED | OUTPATIENT
Start: 2023-08-07 | End: 2023-08-07 | Stop reason: HOSPADM

## 2023-08-07 RX ORDER — GLUCAGON 1 MG
1 KIT INJECTION
Status: DISCONTINUED | OUTPATIENT
Start: 2023-08-07 | End: 2023-08-08 | Stop reason: HOSPADM

## 2023-08-07 RX ORDER — MUPIROCIN 20 MG/G
1 OINTMENT TOPICAL 2 TIMES DAILY
Status: DISCONTINUED | OUTPATIENT
Start: 2023-08-07 | End: 2023-08-08 | Stop reason: HOSPADM

## 2023-08-07 RX ORDER — OXYCODONE HYDROCHLORIDE 5 MG/1
5 TABLET ORAL
Status: DISCONTINUED | OUTPATIENT
Start: 2023-08-07 | End: 2023-08-08 | Stop reason: HOSPADM

## 2023-08-07 RX ORDER — VANCOMYCIN HYDROCHLORIDE 1 G/20ML
INJECTION, POWDER, LYOPHILIZED, FOR SOLUTION INTRAVENOUS
Status: DISCONTINUED | OUTPATIENT
Start: 2023-08-07 | End: 2023-08-07 | Stop reason: HOSPADM

## 2023-08-07 RX ORDER — PAROXETINE 10 MG/1
10 TABLET, FILM COATED ORAL EVERY MORNING
Status: DISCONTINUED | OUTPATIENT
Start: 2023-08-08 | End: 2023-08-08

## 2023-08-07 RX ORDER — HYDROMORPHONE HYDROCHLORIDE 1 MG/ML
0.2 INJECTION, SOLUTION INTRAMUSCULAR; INTRAVENOUS; SUBCUTANEOUS EVERY 5 MIN PRN
Status: DISCONTINUED | OUTPATIENT
Start: 2023-08-07 | End: 2023-08-07 | Stop reason: HOSPADM

## 2023-08-07 RX ADMIN — DEXAMETHASONE SODIUM PHOSPHATE 8 MG: 4 INJECTION, SOLUTION INTRAMUSCULAR; INTRAVENOUS at 12:08

## 2023-08-07 RX ADMIN — CEFAZOLIN 2 G: 2 INJECTION, POWDER, FOR SOLUTION INTRAMUSCULAR; INTRAVENOUS at 09:08

## 2023-08-07 RX ADMIN — FENTANYL CITRATE 25 MCG: 50 INJECTION INTRAMUSCULAR; INTRAVENOUS at 03:08

## 2023-08-07 RX ADMIN — Medication: at 03:08

## 2023-08-07 RX ADMIN — HYDROMORPHONE HYDROCHLORIDE 0.2 MG: 1 INJECTION, SOLUTION INTRAMUSCULAR; INTRAVENOUS; SUBCUTANEOUS at 04:08

## 2023-08-07 RX ADMIN — PROPOFOL 75 MCG/KG/MIN: 10 INJECTION, EMULSION INTRAVENOUS at 12:08

## 2023-08-07 RX ADMIN — FENTANYL CITRATE 25 MCG: 50 INJECTION, SOLUTION INTRAMUSCULAR; INTRAVENOUS at 12:08

## 2023-08-07 RX ADMIN — SODIUM CHLORIDE, SODIUM GLUCONATE, SODIUM ACETATE, POTASSIUM CHLORIDE, MAGNESIUM CHLORIDE, SODIUM PHOSPHATE, DIBASIC, AND POTASSIUM PHOSPHATE: .53; .5; .37; .037; .03; .012; .00082 INJECTION, SOLUTION INTRAVENOUS at 01:08

## 2023-08-07 RX ADMIN — SODIUM CHLORIDE: 9 INJECTION, SOLUTION INTRAVENOUS at 10:08

## 2023-08-07 RX ADMIN — HYDROMORPHONE HYDROCHLORIDE 1 MG: 1 INJECTION, SOLUTION INTRAMUSCULAR; INTRAVENOUS; SUBCUTANEOUS at 09:08

## 2023-08-07 RX ADMIN — LIDOCAINE HYDROCHLORIDE 60 MG: 20 INJECTION INTRAVENOUS at 12:08

## 2023-08-07 RX ADMIN — ONDANSETRON 4 MG: 2 INJECTION INTRAMUSCULAR; INTRAVENOUS at 12:08

## 2023-08-07 RX ADMIN — SENNOSIDES AND DOCUSATE SODIUM 1 TABLET: 50; 8.6 TABLET ORAL at 09:08

## 2023-08-07 RX ADMIN — TRANEXAMIC ACID 1000 MG: 100 INJECTION INTRAVENOUS at 02:08

## 2023-08-07 RX ADMIN — FAMOTIDINE 20 MG: 10 INJECTION, SOLUTION INTRAVENOUS at 12:08

## 2023-08-07 RX ADMIN — CEFAZOLIN 2 G: 2 INJECTION, POWDER, FOR SOLUTION INTRAMUSCULAR; INTRAVENOUS at 12:08

## 2023-08-07 RX ADMIN — ACETAMINOPHEN 1000 MG: 500 TABLET ORAL at 10:08

## 2023-08-07 RX ADMIN — MUPIROCIN 1 G: 20 OINTMENT TOPICAL at 10:08

## 2023-08-07 RX ADMIN — OXYCODONE HYDROCHLORIDE 5 MG: 5 TABLET ORAL at 04:08

## 2023-08-07 RX ADMIN — MEPIVACAINE HYDROCHLORIDE 2.25 ML: 20 INJECTION, SOLUTION EPIDURAL; INFILTRATION at 12:08

## 2023-08-07 RX ADMIN — FENTANYL CITRATE 50 MCG: 50 INJECTION INTRAMUSCULAR; INTRAVENOUS at 11:08

## 2023-08-07 RX ADMIN — PREGABALIN 75 MG: 75 CAPSULE ORAL at 10:08

## 2023-08-07 RX ADMIN — SODIUM CHLORIDE: 0.9 INJECTION, SOLUTION INTRAVENOUS at 04:08

## 2023-08-07 RX ADMIN — PHENYLEPHRINE HYDROCHLORIDE 100 MCG: 10 INJECTION INTRAVENOUS at 02:08

## 2023-08-07 RX ADMIN — TRANEXAMIC ACID 1000 MG: 100 INJECTION INTRAVENOUS at 12:08

## 2023-08-07 RX ADMIN — PHENYLEPHRINE HYDROCHLORIDE 100 MCG: 10 INJECTION INTRAVENOUS at 01:08

## 2023-08-07 RX ADMIN — Medication 20 MG: at 12:08

## 2023-08-07 RX ADMIN — PREGABALIN 75 MG: 75 CAPSULE ORAL at 09:08

## 2023-08-07 RX ADMIN — MUPIROCIN 1 G: 20 OINTMENT TOPICAL at 09:08

## 2023-08-07 RX ADMIN — SODIUM CHLORIDE 1000 ML: 9 INJECTION, SOLUTION INTRAVENOUS at 10:08

## 2023-08-07 RX ADMIN — ROPIVACAINE HYDROCHLORIDE 7.5 ML: 5 INJECTION EPIDURAL; INFILTRATION; PERINEURAL at 11:08

## 2023-08-07 RX ADMIN — VANCOMYCIN HYDROCHLORIDE 1500 MG: 1.5 INJECTION, POWDER, LYOPHILIZED, FOR SOLUTION INTRAVENOUS at 10:08

## 2023-08-07 RX ADMIN — MIDAZOLAM HYDROCHLORIDE 1 MG: 1 INJECTION, SOLUTION INTRAMUSCULAR; INTRAVENOUS at 12:08

## 2023-08-07 RX ADMIN — PHENYLEPHRINE HYDROCHLORIDE 200 MCG: 10 INJECTION INTRAVENOUS at 01:08

## 2023-08-07 RX ADMIN — OXYCODONE HYDROCHLORIDE 5 MG: 5 TABLET ORAL at 03:08

## 2023-08-07 RX ADMIN — ASPIRIN 81 MG: 81 TABLET, COATED ORAL at 09:08

## 2023-08-07 RX ADMIN — ACETAMINOPHEN 1000 MG: 500 TABLET ORAL at 11:08

## 2023-08-07 RX ADMIN — METHOCARBAMOL 750 MG: 750 TABLET ORAL at 09:08

## 2023-08-07 RX ADMIN — MIDAZOLAM 2 MG: 1 INJECTION INTRAMUSCULAR; INTRAVENOUS at 11:08

## 2023-08-07 RX ADMIN — OXYCODONE HYDROCHLORIDE 10 MG: 10 TABLET ORAL at 07:08

## 2023-08-07 RX ADMIN — CELECOXIB 400 MG: 200 CAPSULE ORAL at 10:08

## 2023-08-07 NOTE — PT/OT/SLP PROGRESS
Physical Therapy      Patient Name:  Candy Huynh   MRN:  [id number]    Patient not seen today secondary to  (late surgery). Will follow-up 8/8.

## 2023-08-07 NOTE — PLAN OF CARE
Pre procedure complete. Pt's mother at bedside, she will hold pt belongings. Pt resting comfortably with all questions addressed and call light in reach.

## 2023-08-07 NOTE — HOSPITAL COURSE
On 8/7/2023, the patient arrived to the [facility] for proper pre-operative management.  Upon completion of pre-operative preparation, the patient was taken back to the operative theatre. Left total knee arthroplasty was performed without complication and the patient was transported to the post anesthesia care unit in stable condition.  After appropriate recovery from the anaesthetic agents used during the surgery, the patient was then transported to the hospital inpatient floor.  The interim of the hospital stay from arrival on the floor up to discharge has been uncomplicated. The patient has tolerated regular diet.  The patient's pain has been controlled using a multimodal approach. Currently, the patient's pain is well controlled on an oral regimen.  The patient has been voiding without difficulty.  The patient began participation in physical therapy after surgery and has progressed throughout the entire hospital stay.  Currently, the patient's progress is sufficient to allow the them to be discharged to home safely.  The patient agrees with this assessment and desires a discharge today.

## 2023-08-07 NOTE — OP NOTE
Evelio Left TKA  OPERATIVE NOTE    Date of Operation:   8/7/2023    Providers Performing:   Surgeon(s):  Nolberto Fraser MD  Assistant: KELSIE Durand MD    Operative Procedure:   Left Total Knee Arthroplasty, CPT 31562    Preoperative Diagnosis:   Left Knee Osteoarthritis, ICD-10 M17.12    Postoperative Diagnosis:   SAME    Components Used:     Implant Name Type Inv. Item Serial No.  Lot No. LRB No. Used Action   CEMENT BONE SMPLX HV GENTMYCN - [id number]  CEMENT BONE SMPLX HV GENTMYCN  [employer] [id number] Left 2 Implanted   COMPONENT FEMORAL LEFT SIZE D - [id number]  COMPONENT FEMORAL LEFT SIZE D  BALDO,INC [id number] Left 1 Implanted   ARITCULAR FLEX FIXED - [id number]  ARITCULAR FLEX FIXED  BALDO,INC [id number] Left 1 Implanted   TIBIAL NEXGEN PRECOAT STEM - [id number]  TIBIAL NEXGEN PRECOAT STEM  BALDO,INC [id number] Left 1 Implanted   PLUG TAPER - [id number]  PLUG TAPER  BALDO,INC [id number] Left 1 Implanted   10 mm Prolong poly    Anesthesia:   Spinal  ACC  MATEO cocktail: ELMO    Estimated Blood Loss:   100 cc    Specimens:   None    Complications:   None    Indications:   The patient has failed non-operative measures including medications, injections and activity modifications for their left knee.  Previous successful right TKA with same system and implants.  After an explanation of risks and benefits of knee arthroplasty surgery, the patient wishes to proceed with surgery.    Operative Notes:   After the induction of satisfactory anesthesia, the patient's left lower extremity was prepped and draped in the usual sterile fashion with the tourniquet cuff in place. The extremity was exsanguinated with an Esmarch bandage, and the tourniquet inflated to 250 mmHg. An anterior midline incision was made, and the dissection was carried sharply through the subcutaneous tissues and prepatellar bursa layer which was reflected medially. A modified medial parapatellar arthrotomy was performed, and the  patella was subluxated laterally. There was a large amount of clear joint fluid. Most of the fat pad was excised, and the medial release was completed around to the mid-coronal point. A drill hole was made in the distal femur, and the canal was suctioned. The sword was placed in 5° of valgus. The distal femoral cut was made. The femur was sized, and the secondary femoral cuts were made in 3° external rotation. The knee was maximally flexed, and a drill hole was made in the footprint of the ACL. The canal was suctioned. An intramedullary alignment destiny was placed with good purchase in the isthmus. The upper tibial cut was made perpendicular in both planes.  The flexion gap was checked after removing cruciate and meniscal remnants, and was found to be satisfactory with a spacer block once appropriate releases at the joint level were performed. Flexion and extension gaps were symmetric. The femoral finishing guide was used, and trials were inserted. We used a tibial baseplate template which gave us good coverage. The rotational position of it was marked with the Bovie after ranging the knee.  We had full extension without hyperextension, full flexion, good stability in both. The patella was not resurfaced since it had nearly normal articular cartilage, normal morphology, tracked well in the anterior femoral flange. The trials were removed, and the tibia was drilled and punched in line with the previous rotational niesha. The bone was cleaned with pulsatile lavage and dried thoroughly. The components were impacted in the usual fashion with Simplex HV plus gentamicin cement. Patellar tracking was central without lateral release. A lateral patellaplasty was performed. The knee was irrigated with Betadine and 3 liters of pulsatile lavage.  Local anesthetic infiltrated around the joint.  Vancomycin powder 1 g was placed in the knee joint. The arthrotomy was repaired with running and interrupted figure-of-eight sutures of #1  Vicryl. The prepatellar bursa tissue was closed with interrupted 0 Vicryl. The skin was closed with interrupted, inverted 2-0 Vicryl, running 3-0 Monocryl, and Dermabond.  An Aquacel and Bender dressing was applied, and the patient was taken to the PACU in stable condition without apparent orthopedic or anesthetic complications.    Sponge and needle counts were correct.    The first-assistant was critical to all steps of the operation, including retraction and leg stabilization during exposure and bone preparation, as well as the deep and superficial wound closure.  Dr. Fraser performed and/or supervised the key portions of this surgical procedure, including evaluation of the bone cuts, reshaping of the bony elements, and insertion of the provisional and final components.    Postoperative Plan:  The patient will be anticoagulated with 81mg aspirin twice daily for one month.   They will receive 24 hours of post-operative antibiotics, + oral cefadroxil extended prophylaxis protocol due to adiposity.    Activity will be weight bearing as tolerated.    Signed by: Nolberto Fraser MD

## 2023-08-07 NOTE — ANESTHESIA PROCEDURE NOTES
Spinal    Diagnosis: Knee pain  Patient location during procedure: OR  Start time: 8/7/2023 12:26 PM  Timeout: 8/7/2023 12:25 PM  End time: 8/7/2023 12:36 PM    Staffing  Authorizing Provider: Jurgen Vernon MD  Performing Provider: Jurgen Vernon MD    Staffing  Performed by: Jurgen Vernon MD  Authorized by: Jurgen Vernon MD    Preanesthetic Checklist  Completed: patient identified, IV checked, site marked, risks and benefits discussed, surgical consent, monitors and equipment checked, pre-op evaluation and timeout performed  Spinal Block  Patient position: sitting  Prep: ChloraPrep  Patient monitoring: heart rate, continuous pulse ox, frequent blood pressure checks and cardiac monitor  Approach: midline  Location: L3-4  Injection technique: single shot  CSF Fluid: clear free-flowing CSF  Needle  Needle type: pencil-tip   Needle gauge: 25 G  Needle length: 5 in  Additional Documentation: negative aspiration for heme, incremental injection and no paresthesia on injection  Needle localization: anatomical landmarks  Assessment  Ease of block: moderate  Patient's tolerance of the procedure: comfortable throughout block  Medications:    Medications: mepivacaine (CARBOCAINE) injection 20 mg/mL (2%) - Intrathecal   2.25 mL - 8/7/2023 12:36:00 PM

## 2023-08-07 NOTE — TRANSFER OF CARE
"Anesthesia Transfer of Care Note    Patient: Candy Huynh    Procedure(s) Performed: Procedure(s) (LRB):  ARTHROPLASTY, KNEE, TOTAL: LEFT: BALDO NEXGEN (Left)    Patient location: PACU    Anesthesia Type: spinal and MAC    Transport from OR: Transported from OR on room air with adequate spontaneous ventilation    Post pain: adequate analgesia    Post assessment: no apparent anesthetic complications    Post vital signs: stable    Level of consciousness: awake    Nausea/Vomiting: no nausea/vomiting    Complications: none    Transfer of care protocol was followed      Last vitals:   Visit Vitals  BP 95/52 (BP Location: Left arm, Patient Position: Lying)   Pulse 61   Temp 37.1 °C (98.7 °F) (Temporal)   Resp 18   Ht 5' 3" (1.6 m)   Wt 112.9 kg (249 lb)   SpO2 100%   Breastfeeding No   BMI 44.11 kg/m²     "

## 2023-08-07 NOTE — NURSING
Patient admitted to unit as ordered. Patient brought up in hospital bed x staff assist. No s/s of acute distress. Condition stable. NS infusing at 150 mL/hr. Alert, verbal and oriented x 4. Dressing to left knee clean, dry and intact. Nimbus intact. Call light placed within easy reach.

## 2023-08-07 NOTE — ANESTHESIA POSTPROCEDURE EVALUATION
Anesthesia Post Evaluation    Patient: Candy Huynh    Procedure(s) Performed: Procedure(s) (LRB):  ARTHROPLASTY, KNEE, TOTAL: LEFT: BALDO NEXGEN (Left)    Final Anesthesia Type: spinal      Patient location during evaluation: PACU  Patient participation: Yes- Able to Participate  Level of consciousness: awake and alert  Post-procedure vital signs: reviewed and stable  Pain management: adequate  Airway patency: patent    PONV status at discharge: No PONV  Anesthetic complications: no      Cardiovascular status: hemodynamically stable  Respiratory status: unassisted  Hydration status: euvolemic  Follow-up not needed.          Vitals Value Taken Time   /85 08/07/23 1602   Temp 36.6 °C (97.8 °F) 08/07/23 1500   Pulse 77 08/07/23 1604   Resp 19 08/07/23 1604   SpO2 95 % 08/07/23 1604   Vitals shown include unvalidated device data.      No case tracking events are documented in the log.      Pain/Lisa Score: Pain Rating Prior to Med Admin: 10 (8/7/2023  3:51 PM)  Lisa Score: 8 (8/7/2023  2:56 PM)

## 2023-08-07 NOTE — PT/OT/SLP PROGRESS
Occupational Therapy      Patient Name:  Candy Huynh   MRN:  [id number]    Patient not seen today secondary to  (late sx. Will follow-up tomorrow.    8/7/2023

## 2023-08-07 NOTE — NURSING TRANSFER
Nursing Transfer Note      8/7/2023   5:27 PM    Nurse giving handoff:GRACIA Meadows  Nurse receiving handoff:GRACIA Dietz    Reason patient is being transferred: Extended Recovery    Transfer: Pacu to Extended Recovery Unit    Transfer via bed    Transfer with Nimbus pump, IV pump and ice pack    Transported by RNs    Transfer Vital Signs:  Blood Pressure:150 85  Heart Rate:78  O2:96%  Temperature:97.8  Respirations:21    Telemetry:   Order for Tele Monitor? no  Additional Lines: no    4eyes on Skin:     Medicines sent: no    Any special needs or follow-up needed: no    Patient belongings transferred with patient: Yes    Chart send with patient: Yes    Notified: GRACIA Dietz and Mother    Patient reassessed at: 8/7/2023 1635  Upon arrival to floor:

## 2023-08-07 NOTE — ANESTHESIA PROCEDURE NOTES
Peripheral Block    Patient location during procedure: pre-op   Block not for primary anesthetic.  Reason for block: at surgeon's request and post-op pain management   Post-op Pain Location: Left knee   Start time: 8/7/2023 11:22 AM  Timeout: 8/7/2023 11:20 AM   End time: 8/7/2023 11:30 AM    Staffing  Authorizing Provider: Jurgen Vernon MD  Performing Provider: Jurgen Vernon MD    Staffing  Performed by: Jurgen Vernon MD  Authorized by: Jugren Vernon MD    Preanesthetic Checklist  Completed: patient identified, IV checked, site marked, risks and benefits discussed, surgical consent, monitors and equipment checked, pre-op evaluation and timeout performed  Peripheral Block  Patient position: supine  Prep: ChloraPrep and site prepped and draped  Patient monitoring: heart rate, cardiac monitor, continuous pulse ox, continuous capnometry and frequent blood pressure checks  Block type: adductor canal  Laterality: left  Injection technique: continuous  Needle  Needle type: Tuohy   Needle gauge: 17 G  Needle length: 3.5 in  Needle localization: anatomical landmarks and ultrasound guidance  Catheter type: spring wound  Catheter size: 19 G  Test dose: lidocaine 1.5% with Epi 1-to-200,000 and negative   -ultrasound image captured on disc.  Assessment  Injection assessment: negative aspiration, negative parasthesia and local visualized surrounding nerve  Paresthesia pain: none  Heart rate change: no  Slow fractionated injection: yes  Pain Tolerance: comfortable throughout block and no complaints  Medications:    Medications: ropivacaine (NAROPIN) injection 0.5% - Perineural   7.5 mL - 8/7/2023 11:28:00 AM    Additional Notes  VSS.  [facility] RN monitoring vitals throughout procedure.  Patient tolerated procedure well.     15cc 0.25% ropivacaine

## 2023-08-07 NOTE — H&P
CC:  Left knee pain     Candy Huynh is a 52 y.o. female with history of Left knee pain. Pain is worse with activity and weight bearing.  Patient has experienced interference of activities of daily living due to decreased range of motion and an increase in joint pain and swelling.  Patient has failed non-operative treatment including NSAIDs, corticosteroid injections, viscosupplement injections, and activity modification.  Candy Huynh currently ambulates independently.      Relevant medical conditions of significance in perioperative period:  HTN- on medication managed by pcp  DM- on medication managed by pcp  GERD- on medication managed by pcp  HLD- on medication managed by pcp          Past Medical History:   Diagnosis Date    Allergy      Anemia      Anxiety      Asthma       denies- on outside problem list in Care Everywhere    Depression      Diabetes mellitus      Diabetes mellitus, type 2      Fibromyalgia      GERD (gastroesophageal reflux disease)      Hypertension      Stroke       TIA    Vertigo                 Past Surgical History:   Procedure Laterality Date    ARTHROSCOPIC REPAIR OF ROTATOR CUFF OF SHOULDER Right 2022     Procedure: REPAIR, ROTATOR CUFF, ARTHROSCOPIC;  Surgeon: Ministerio Orr Jr., MD;  Location: [facility] OR;  Service: Orthopedics;  Laterality: Right;  need opus system  Confucianism notifed CC     ARTHROSCOPY OF KNEE Left 2021     Procedure: ARTHROSCOPY, KNEE;  Surgeon: Donnie Campuzano MD;  Location: [facility] OR;  Service: Orthopedics;  Laterality: Left;     SECTION        DECOMPRESSION OF SUBACROMIAL SPACE   2022     Procedure: DECOMPRESSION, SUBACROMIAL SPACE;  Surgeon: Ministerio Orr Jr., MD;  Location: [facility] OR;  Service: Orthopedics;;    EXCISION OF MASS OF EXTREMITY Left 2019     Procedure: EXCISION, MASS, EXTREMITY;  Surgeon: Honorio Pulido IV, MD;  Location: [facility] OR;  Service: Orthopedics;  Laterality: Left;  excision lipoma  Request Lacie     HERNIA REPAIR        HYSTERECTOMY        KNEE ARTHROSCOPY W/ MENISCECTOMY   2021     Procedure: ARTHROSCOPY, KNEE, WITH MENISCECTOMY;  Surgeon: Donnie Campuzano MD;  Location: [facility] OR;  Service: Orthopedics;;    NASAL SEPTOPLASTY        RECONSTRUCTION OF ACROMIOCLAVICULAR JOINT   2022     Procedure: RECONSTRUCTION, ACROMIOCLAVICULAR JOINT;  Surgeon: Ministerio Orr Jr., MD;  Location: [facility] OR;  Service: Orthopedics;;    TOTAL KNEE ARTHROPLASTY Right 10/26/2022     Procedure: ARTHROPLASTY, KNEE, TOTAL RIGHT: BALDO - NEXGEN;  Surgeon: Nolberto Fraser MD;  Location: [facility] OR;  Service: Orthopedics;  Laterality: Right;               Family History   Problem Relation Age of Onset    No Known Problems Mother      Hypertension Father      Hypertension Sister      No Known Problems Sister      No Known Problems Brother      No Known Problems Daughter      No Known Problems Daughter      No Known Problems Daughter      No Known Problems Son                 Review of patient's allergies indicates:   Allergen Reactions    Adhesive Blisters       Specifically EKG lead pads    Morphine Other (See Comments)       shake            Current Outpatient Medications:     acetaminophen (TYLENOL) 650 MG TbSR, Take 1 tablet (650 mg total) by mouth every 6 to 8 hours as needed (pain)., Disp: 120 tablet, Rfl: 0    albuterol (PROVENTIL/VENTOLIN HFA) 90 mcg/actuation inhaler, SMARTSI Puff(s) By Mouth Every 4 Hours PRN, Disp: , Rfl:     amLODIPine (NORVASC) 5 MG tablet, Take 5 mg by mouth once daily., Disp: , Rfl:     aspirin (ECOTRIN) 81 MG EC tablet, Take 1 tablet (81 mg total) by mouth 2 (two) times daily., Disp: 60 tablet, Rfl: 0    azelastine (ASTELIN) 137 mcg (0.1 %) nasal spray, 1 spray once daily., Disp: , Rfl:     cetirizine (ZYRTEC) 10 MG tablet, Take 10 mg by mouth every evening., Disp: , Rfl:     cholecalciferol, vitamin D3, 1,250 mcg (50,000 unit) capsule, Take 50,000 Units by mouth every 7 days., Disp: , Rfl:      diazePAM (VALIUM) 2 MG tablet, SMARTSI-2 Tablet(s) By Mouth, Disp: , Rfl:     EPINEPHrine (EPIPEN) 0.3 mg/0.3 mL AtIn, as directed, Disp: , Rfl:     ergocalciferol (ERGOCALCIFEROL) 50,000 unit Cap, Take 50,000 Units by mouth every 7 days., Disp: , Rfl:     FEROSUL 325 mg (65 mg iron) Tab tablet, Take by mouth every other day., Disp: , Rfl:     FLUoxetine 20 MG capsule, Take 40 mg by mouth once daily., Disp: , Rfl:     fluticasone propionate (FLONASE) 50 mcg/actuation nasal spray, 1 spray by Each Nostril route once daily., Disp: , Rfl:     folic acid (FOLVITE) 1 MG tablet, Take 1,000 mcg by mouth., Disp: , Rfl:     HYDROcodone-acetaminophen (NORCO) 7.5-325 mg per tablet, Take 1 tablet by mouth every 6 (six) hours as needed for Pain., Disp: , Rfl:     HYDROmorphone (DILAUDID) 2 MG tablet, Take 2 tablets (4 mg total) by mouth every 3 (three) hours as needed for Pain., Disp: 50 tablet, Rfl: 0    hydrOXYzine pamoate (VISTARIL) 25 MG Cap, Take 25 mg by mouth daily as needed., Disp: , Rfl:     loratadine (CLARITIN) 10 mg tablet, Take 10 mg by mouth once daily., Disp: , Rfl:     losartan (COZAAR) 100 MG tablet, Take 100 mg by mouth once daily., Disp: , Rfl:     meclizine (ANTIVERT) 25 mg tablet, Take 25 mg by mouth., Disp: , Rfl:     meloxicam (MOBIC) 15 MG tablet, Take 1 tablet (15 mg total) by mouth once daily., Disp: 30 tablet, Rfl: 3    metFORMIN (GLUCOPHAGE) 1000 MG tablet, Take 1,000 mg by mouth 2 (two) times daily with meals., Disp: , Rfl:     methocarbamoL (ROBAXIN) 750 MG Tab, Take 1 tablet (750 mg total) by mouth 3 (three) times daily as needed (muscle spasms)., Disp: 60 tablet, Rfl: 1    montelukast (SINGULAIR) 10 mg tablet, , Disp: , Rfl:     MOVANTIK 25 mg tablet, Take 25 mg by mouth., Disp: , Rfl:     nicotine polacrilex (NICORETTE) 4 MG Gum, SMARTSI Each By Mouth PRN, Disp: , Rfl:     ondansetron (ZOFRAN-ODT) 8 MG TbDL, Take 8 mg by mouth every 8 (eight) hours as needed., Disp: , Rfl:     pantoprazole  "(PROTONIX) 40 MG tablet, TAKE 1 TABLET BY MOUTH ONCE DAILY 30 MINUTES BEFORE BREAKFAST, Disp: , Rfl:     paroxetine (PAXIL) 10 MG tablet, Take 10 mg by mouth every morning., Disp: , Rfl:     prazosin (MINIPRESS) 1 MG Cap, TAKE 1 CAPSULE BY MOUTH EVERY NIGHT AT BEDTIME FOR 7 DAYS; THEN TAKE 2 CAPSULES BY MOUTH EVERY NIGHT AT BEDTIME FOR 7 DAYS; THEN TAKE 3 CAPSULES BY MOUTH EVERY NIGHT AT BEDTIME THEREAFTER AS DIRECTED BY PHYSICIAN, Disp: , Rfl:     PREMARIN 0.625 mg tablet, Take 0.625 mg by mouth once daily., Disp: , Rfl:     rosuvastatin (CRESTOR) 10 MG tablet, Take 10 mg by mouth., Disp: , Rfl:     semaglutide (OZEMPIC) 0.25 mg or 0.5 mg (2 mg/3 mL) pen injector, Inject 0.25 mg into the skin every 7 (seven) days, Disp: , Rfl:     STOOL SOFTENER 100 mg capsule, Take 1 softgel by mouth twice daily, Disp: 60 capsule, Rfl: 11    sumatriptan (IMITREX) 100 MG tablet, Take 100 mg by mouth 2 (two) times daily as needed., Disp: , Rfl:      Review of Systems:  Constitutional: no fever or chills  Eyes: no visual changes  ENT: no nasal congestion or sore throat  Respiratory: no cough or shortness of breath  Cardiovascular: no chest pain or palpitations  Gastrointestinal: no nausea or vomiting, tolerating diet  Genitourinary: no hematuria or dysuria  Integument/Breast: no rash or pruritis  Hematologic/Lymphatic: no easy bruising or lymphadenopathy  Musculoskeletal: positive for knee pain  Neurological: no seizures or tremors  Behavioral/Psych: no auditory or visual hallucinations  Endocrine: no heat or cold intolerance     PE:  Ht 5' 3" (1.6 m)   Wt 108.2 kg (238 lb 6.8 oz)   BMI 42.24 kg/m²   General: Pleasant, cooperative, NAD   Gait: antalgic  HEENT: NCAT, sclera nonicteric   Lungs: Respirations clear bilaterally; equal and unlabored.   CV: S1S2; 2+ bilateral upper and lower extremity pulses.   Skin: Intact throughout with no rashes, erythema, or lesions  Extremities: No LE edema,  no erythema or warmth of the skin in " either lower extremity.     Left knee exam:  Knee Range of Motion:0-100, pain with passive range of motion  Effusion:none  Condition of skin:intact  Location of tenderness:Medial joint line   Strength:normal  Stability: stable to testing     Hip Examination: painless PROM of hip      Radiographs: Radiographs reveal advanced degenerative changes including subchondral cyst formation, subchondral sclerosis, osteophyte formation, joint space narrowing.      Knee Alignment: varus     Diagnosis: Primary osteoarthritis Left knee     Plan: Left total knee arthroplasty.   I explained to the patient that stiff knees make stiff knee replacements, and that they should not expect to achieve more flexion postoperatively than they have preoperatively.     Due to the serious nature of total joint infection and the high prevalence of community acquired MRSA, vancomycin will be used perioperatively.

## 2023-08-07 NOTE — PLAN OF CARE
Patient still cannot planter or dorisflex. Anesthesia assessed Nimbus in working order. Said to contact surgeon.Called Dr. Rojas no answer. Texting him now.

## 2023-08-07 NOTE — ASSESSMENT & PLAN NOTE
Candy Huynh is a 52 y.o. female who is now s/p L-total knee arthroplasty (8/7)    Pain: MM  PT/OT: WBAT with walker   Abx: 24hr postop ancef   DVT ppx: ASA 81mg bid     Dispo: f/u PT recs

## 2023-08-07 NOTE — PROGRESS NOTES
Dr. Grossman at bedside, patient cannot dorsiflex operative foot. No new orders.    Ortho resident on call paged and will call Dr. Fraser.  In the meantime, ALYX Phillip RN text Dr. Fraser for further instructions.

## 2023-08-07 NOTE — ANESTHESIA PREPROCEDURE EVALUATION
08/07/2023  Candy Huynh is a 52 y.o., female.    Pre-operative evaluation for Procedure(s) (LRB):  ARTHROPLASTY, KNEE, TOTAL: LEFT: BALDO NEXGEN (Left)    Candy Huynh is a 52 y.o. female     Patient Active Problem List   Diagnosis    Lipoma of arm    Quadriceps weakness    Gait abnormality    Decreased range of motion (ROM) of left knee    Decreased functional mobility    Acute pain of left knee    Knee pain    Degenerative tear of left medial meniscus    Degenerative tear of lateral meniscus, left    Morbid obesity with BMI of 40.0-44.9, adult    Difficulty walking    Status post arthroscopy of knee    Primary osteoarthritis of right knee    Tear of right supraspinatus tendon    Rotator cuff impingement syndrome of right shoulder    Biceps tendinitis of right upper extremity    Depression    Diabetes mellitus    Hypertension    Fibromyalgia    Gastroesophageal reflux disease without esophagitis    Personal history of TIA (transient ischemic attack)    Mild intermittent asthma without complication    GATITO (obstructive sleep apnea)    Normocytic anemia    Decreased range of motion of right shoulder    Decreased strength of upper extremity    Hypertrophy of nasal turbinates    Pain in right knee    Decreased range of motion (ROM) of right knee    Status post right knee replacement    Primary osteoarthritis of left knee    Bilateral leg edema    Chronic low back pain without sciatica    Chronic, continuous use of opioids       Review of patient's allergies indicates:   Allergen Reactions    Adhesive Blisters     Specifically EKG lead pads    Morphine Other (See Comments)     shake       No current facility-administered medications on file prior to encounter.     Current Outpatient Medications on File Prior to Encounter   Medication Sig Dispense Refill     amLODIPine (NORVASC) 5 MG tablet Take 5 mg by mouth once daily.      ergocalciferol (ERGOCALCIFEROL) 50,000 unit Cap Take 50,000 Units by mouth every 7 days.      loratadine (CLARITIN) 10 mg tablet Take 10 mg by mouth once daily.      losartan (COZAAR) 100 MG tablet Take 100 mg by mouth once daily.      meloxicam (MOBIC) 15 MG tablet Take 1 tablet (15 mg total) by mouth once daily. 30 tablet 3    metFORMIN (GLUCOPHAGE) 1000 MG tablet Take 1,000 mg by mouth 2 (two) times daily with meals.      methocarbamoL (ROBAXIN) 750 MG Tab Take 1 tablet (750 mg total) by mouth 3 (three) times daily as needed (muscle spasms). 60 tablet 1    montelukast (SINGULAIR) 10 mg tablet       pantoprazole (PROTONIX) 40 MG tablet TAKE 1 TABLET BY MOUTH ONCE DAILY 30 MINUTES BEFORE BREAKFAST      PREMARIN 0.625 mg tablet Take 0.625 mg by mouth once daily.      acetaminophen (TYLENOL) 650 MG TbSR Take 1 tablet (650 mg total) by mouth every 6 to 8 hours as needed (pain). 120 tablet 0    albuterol (PROVENTIL/VENTOLIN HFA) 90 mcg/actuation inhaler SMARTSI Puff(s) By Mouth Every 4 Hours PRN      azelastine (ASTELIN) 137 mcg (0.1 %) nasal spray 1 spray once daily.      EPINEPHrine (EPIPEN) 0.3 mg/0.3 mL AtIn as directed      FLUoxetine 20 MG capsule Take 40 mg by mouth once daily.      fluticasone propionate (FLONASE) 50 mcg/actuation nasal spray 1 spray by Each Nostril route once daily.      HYDROcodone-acetaminophen (NORCO) 7.5-325 mg per tablet Take 1 tablet by mouth every 6 (six) hours as needed for Pain.      HYDROmorphone (DILAUDID) 2 MG tablet Take 2 tablets (4 mg total) by mouth every 3 (three) hours as needed for Pain. 50 tablet 0    prazosin (MINIPRESS) 1 MG Cap TAKE 1 CAPSULE BY MOUTH EVERY NIGHT AT BEDTIME FOR 7 DAYS; THEN TAKE 2 CAPSULES BY MOUTH EVERY NIGHT AT BEDTIME FOR 7 DAYS; THEN TAKE 3 CAPSULES BY MOUTH EVERY NIGHT AT BEDTIME THEREAFTER AS DIRECTED BY PHYSICIAN      STOOL SOFTENER 100 mg capsule Take 1  softgel by mouth twice daily 60 capsule 11       Past Surgical History:   Procedure Laterality Date    ARTHROSCOPIC REPAIR OF ROTATOR CUFF OF SHOULDER Right 2022    Procedure: REPAIR, ROTATOR CUFF, ARTHROSCOPIC;  Surgeon: Ministerio Orr Jr., MD;  Location: [facility];  Service: Orthopedics;  Laterality: Right;  need opus system  jose roberto notifed CC     ARTHROSCOPY OF KNEE Left 2021    Procedure: ARTHROSCOPY, KNEE;  Surgeon: Donnei Campuzano MD;  Location: [facility];  Service: Orthopedics;  Laterality: Left;     SECTION      DECOMPRESSION OF SUBACROMIAL SPACE  2022    Procedure: DECOMPRESSION, SUBACROMIAL SPACE;  Surgeon: Ministerio Orr Jr., MD;  Location: [facility];  Service: Orthopedics;;    EXCISION OF MASS OF EXTREMITY Left 2019    Procedure: EXCISION, MASS, EXTREMITY;  Surgeon: Honorio Pulido IV, MD;  Location: [facility];  Service: Orthopedics;  Laterality: Left;  excision lipoma  Request Lacie    HERNIA REPAIR      HYSTERECTOMY      KNEE ARTHROSCOPY W/ MENISCECTOMY  2021    Procedure: ARTHROSCOPY, KNEE, WITH MENISCECTOMY;  Surgeon: Donnie Campuzano MD;  Location: [facility];  Service: Orthopedics;;    NASAL SEPTOPLASTY      RECONSTRUCTION OF ACROMIOCLAVICULAR JOINT  2022    Procedure: RECONSTRUCTION, ACROMIOCLAVICULAR JOINT;  Surgeon: Ministerio Orr Jr., MD;  Location: [facility];  Service: Orthopedics;;    TOTAL KNEE ARTHROPLASTY Right 10/26/2022    Procedure: ARTHROPLASTY, KNEE, TOTAL RIGHT: BALDO - NEXGEN;  Surgeon: Nolberto Fraser MD;  Location: [facility];  Service: Orthopedics;  Laterality: Right;       Social History     Socioeconomic History    Marital status:    Tobacco Use    Smoking status: Former     Current packs/day: 0.00    Smokeless tobacco: Never   Substance and Sexual Activity    Alcohol use: Yes     Comment: occasional    Drug use: Never    Sexual activity: Yes     Partners: Male     Social Determinants of Health     Financial Resource Strain:  "Medium Risk (10/22/2022)    Overall Financial Resource Strain (CARDIA)     Difficulty of Paying Living Expenses: Somewhat hard   Food Insecurity: No Food Insecurity (10/22/2022)    Hunger Vital Sign     Worried About Running Out of Food in the Last Year: Never true     Ran Out of Food in the Last Year: Never true   Transportation Needs: No Transportation Needs (10/22/2022)    PRAPARE - Transportation     Lack of Transportation (Medical): No     Lack of Transportation (Non-Medical): No   Stress: Stress Concern Present (10/22/2022)    [employer] - Occupational Stress Questionnaire     Feeling of Stress : To some extent   Social Connections: Unknown (10/22/2022)    Social Connection and Isolation Panel [NHANES]     Marital Status:    Housing Stability: Unknown (10/22/2022)    Housing Stability Vital Sign     Unable to Pay for Housing in the Last Year: No     Unstable Housing in the Last Year: No         CBC: No results for input(s): "WBC", "RBC", "HGB", "HCT", "PLT", "MCV", "MCH", "MCHC" in the last 72 hours.    CMP: No results for input(s): "NA", "K", "CL", "CO2", "BUN", "CREATININE", "GLU", "MG", "PHOS", "CALCIUM", "ALBUMIN", "PROT", "ALKPHOS", "ALT", "AST", "BILITOT" in the last 72 hours.    INR  No results for input(s): "PT", "INR", "PROTIME", "APTT" in the last 72 hours.        Diagnostic Studies:      EKD Echo:  No results found for this or any previous visit.        Pre-op Assessment    I have reviewed the Patient Summary Reports.    I have reviewed the NPO Status.   I have reviewed the Medications.     Review of Systems  Anesthesia Hx:  No problems with previous Anesthesia   Denies Personal Hx of Anesthesia complications.   Cardiovascular:   Exercise tolerance: poor Hypertension    Pulmonary:   Sleep Apnea, CPAP    Renal/:  Renal/ Normal     Hepatic/GI:   GERD, well controlled    Neurological:   CVA, no residual symptoms Denies Seizures.    Endocrine:   " Diabetes        Physical Exam  General: Cooperative, Alert and Oriented    Airway:  Mallampati: III / II  Mouth Opening: Normal  TM Distance: Normal  Tongue: Normal  Neck ROM: Normal ROM    Dental:  Intact        Anesthesia Plan  Type of Anesthesia, risks & benefits discussed:    Anesthesia Type: Spinal, Regional  Intra-op Monitoring Plan: Standard ASA Monitors  Post Op Pain Control Plan: multimodal analgesia, peripheral nerve block and IV/PO Opioids PRN  Induction:  IV  Informed Consent: Informed consent signed with the Patient and all parties understand the risks and agree with anesthesia plan.  All questions answered.   ASA Score: 3  Day of Surgery Review of History & Physical: H&P Update referred to the surgeon/provider.  Anesthesia Plan Notes: Last dose ozempic 1 week ago    Ready For Surgery From Anesthesia Perspective.     .

## 2023-08-08 ENCOUNTER — TELEPHONE (OUTPATIENT)
Dept: ORTHOPEDICS | Facility: CLINIC | Age: 53
End: 2023-08-08
Payer: MEDICAID

## 2023-08-08 VITALS
SYSTOLIC BLOOD PRESSURE: 123 MMHG | DIASTOLIC BLOOD PRESSURE: 68 MMHG | WEIGHT: 249 LBS | OXYGEN SATURATION: 95 % | HEART RATE: 74 BPM | RESPIRATION RATE: 18 BRPM | HEIGHT: 63 IN | BODY MASS INDEX: 44.12 KG/M2 | TEMPERATURE: 99 F

## 2023-08-08 LAB — POCT GLUCOSE: 122 MG/DL (ref 70–110)

## 2023-08-08 PROCEDURE — 99900035 HC TECH TIME PER 15 MIN (STAT)

## 2023-08-08 PROCEDURE — 25000003 PHARM REV CODE 250: Performed by: PHYSICIAN ASSISTANT

## 2023-08-08 PROCEDURE — 63600175 PHARM REV CODE 636 W HCPCS: Performed by: NURSE PRACTITIONER

## 2023-08-08 PROCEDURE — 97535 SELF CARE MNGMENT TRAINING: CPT

## 2023-08-08 PROCEDURE — 97165 OT EVAL LOW COMPLEX 30 MIN: CPT

## 2023-08-08 PROCEDURE — 25000003 PHARM REV CODE 250: Performed by: STUDENT IN AN ORGANIZED HEALTH CARE EDUCATION/TRAINING PROGRAM

## 2023-08-08 PROCEDURE — 63600175 PHARM REV CODE 636 W HCPCS: Performed by: PHYSICIAN ASSISTANT

## 2023-08-08 PROCEDURE — 97110 THERAPEUTIC EXERCISES: CPT

## 2023-08-08 PROCEDURE — 99212 PR OFFICE/OUTPT VISIT, EST, LEVL II, 10-19 MIN: ICD-10-PCS | Mod: ,,, | Performed by: ANESTHESIOLOGY

## 2023-08-08 PROCEDURE — 97116 GAIT TRAINING THERAPY: CPT

## 2023-08-08 PROCEDURE — 99212 OFFICE O/P EST SF 10 MIN: CPT | Mod: ,,, | Performed by: ANESTHESIOLOGY

## 2023-08-08 PROCEDURE — 97161 PT EVAL LOW COMPLEX 20 MIN: CPT

## 2023-08-08 PROCEDURE — 94761 N-INVAS EAR/PLS OXIMETRY MLT: CPT

## 2023-08-08 PROCEDURE — 25000003 PHARM REV CODE 250: Performed by: NURSE PRACTITIONER

## 2023-08-08 RX ORDER — CELECOXIB 200 MG/1
200 CAPSULE ORAL DAILY
Status: DISCONTINUED | OUTPATIENT
Start: 2023-08-08 | End: 2023-08-08 | Stop reason: HOSPADM

## 2023-08-08 RX ORDER — HYDROMORPHONE HYDROCHLORIDE 1 MG/ML
1 INJECTION, SOLUTION INTRAMUSCULAR; INTRAVENOUS; SUBCUTANEOUS
Status: DISCONTINUED | OUTPATIENT
Start: 2023-08-08 | End: 2023-08-08 | Stop reason: HOSPADM

## 2023-08-08 RX ORDER — DEXTROMETHORPHAN HYDROBROMIDE, GUAIFENESIN 5; 100 MG/5ML; MG/5ML
650 LIQUID ORAL 2 TIMES DAILY
Qty: 120 TABLET | Refills: 0 | Status: SHIPPED | OUTPATIENT
Start: 2023-08-08 | End: 2023-10-23

## 2023-08-08 RX ORDER — HYDROMORPHONE HYDROCHLORIDE 1 MG/ML
0.5 INJECTION, SOLUTION INTRAMUSCULAR; INTRAVENOUS; SUBCUTANEOUS
Status: COMPLETED | OUTPATIENT
Start: 2023-08-08 | End: 2023-08-08

## 2023-08-08 RX ORDER — METHOCARBAMOL 750 MG/1
750 TABLET, FILM COATED ORAL 4 TIMES DAILY PRN
Qty: 40 TABLET | Refills: 0 | Status: SHIPPED | OUTPATIENT
Start: 2023-08-08 | End: 2023-08-18

## 2023-08-08 RX ORDER — PAROXETINE 10 MG/1
10 TABLET, FILM COATED ORAL EVERY MORNING
Status: DISCONTINUED | OUTPATIENT
Start: 2023-08-08 | End: 2023-08-08 | Stop reason: HOSPADM

## 2023-08-08 RX ORDER — HYDROCODONE BITARTRATE AND ACETAMINOPHEN 10; 325 MG/1; MG/1
1 TABLET ORAL EVERY 6 HOURS PRN
Qty: 28 TABLET | Refills: 0 | Status: ON HOLD | OUTPATIENT
Start: 2023-08-08 | End: 2023-08-12 | Stop reason: HOSPADM

## 2023-08-08 RX ADMIN — OXYCODONE HYDROCHLORIDE 10 MG: 10 TABLET ORAL at 01:08

## 2023-08-08 RX ADMIN — OXYCODONE HYDROCHLORIDE 10 MG: 10 TABLET ORAL at 11:08

## 2023-08-08 RX ADMIN — METFORMIN HYDROCHLORIDE 1000 MG: 500 TABLET ORAL at 08:08

## 2023-08-08 RX ADMIN — PAROXETINE HYDROCHLORIDE 10 MG: 10 TABLET, FILM COATED ORAL at 02:08

## 2023-08-08 RX ADMIN — HYDROMORPHONE HYDROCHLORIDE 0.5 MG: 1 INJECTION, SOLUTION INTRAMUSCULAR; INTRAVENOUS; SUBCUTANEOUS at 02:08

## 2023-08-08 RX ADMIN — CELECOXIB 200 MG: 200 CAPSULE ORAL at 08:08

## 2023-08-08 RX ADMIN — ASPIRIN 81 MG: 81 TABLET, COATED ORAL at 08:08

## 2023-08-08 RX ADMIN — CEFAZOLIN 2 G: 2 INJECTION, POWDER, FOR SOLUTION INTRAMUSCULAR; INTRAVENOUS at 04:08

## 2023-08-08 RX ADMIN — ATORVASTATIN CALCIUM 40 MG: 20 TABLET, FILM COATED ORAL at 08:08

## 2023-08-08 RX ADMIN — MUPIROCIN 1 G: 20 OINTMENT TOPICAL at 08:08

## 2023-08-08 RX ADMIN — METHOCARBAMOL 750 MG: 750 TABLET ORAL at 08:08

## 2023-08-08 RX ADMIN — AMLODIPINE BESYLATE 5 MG: 5 TABLET ORAL at 08:08

## 2023-08-08 RX ADMIN — LOSARTAN POTASSIUM 100 MG: 25 TABLET, FILM COATED ORAL at 08:08

## 2023-08-08 RX ADMIN — OXYCODONE HYDROCHLORIDE 10 MG: 10 TABLET ORAL at 05:08

## 2023-08-08 RX ADMIN — OXYCODONE HYDROCHLORIDE 5 MG: 5 TABLET ORAL at 08:08

## 2023-08-08 RX ADMIN — PANTOPRAZOLE SODIUM 40 MG: 40 TABLET, DELAYED RELEASE ORAL at 08:08

## 2023-08-08 RX ADMIN — ACETAMINOPHEN 1000 MG: 500 TABLET ORAL at 05:08

## 2023-08-08 NOTE — NURSING
Questioning when blood sugar is to be checked. Informed accuchecks are checked at 0645. Requesting blood sugar to be checked at this time. Has just ate food brought in for snack. SERGIO Lazcano informed and replied to IC message to check blood sugar at normal time and not now. Pt. And mother informed.

## 2023-08-08 NOTE — PLAN OF CARE
Problem: Adult Inpatient Plan of Care  Goal: Plan of Care Review  8/8/2023 0634 by Sirisha Chacon RN  Outcome: Ongoing, Progressing  8/8/2023 0629 by Sirisha Chacon RN  Outcome: Ongoing, Progressing     Problem: Diabetes Comorbidity  Goal: Blood Glucose Level Within Targeted Range  8/8/2023 0634 by Sirisha Chacon, GRACIA  Outcome: Ongoing, Progressing  8/8/2023 0629 by Sirisha Chacon RN  Outcome: Ongoing, Progressing     Problem: Pain Acute  Goal: Acceptable Pain Control and Functional Ability  8/8/2023 0634 by Sirisha Chacon RN  Outcome: Ongoing, Not Progressing  8/8/2023 0629 by Sirisha Chacon RN  Outcome: Ongoing, Not Progressing     Problem: Bleeding (Knee Arthroplasty)  Goal: Absence of Bleeding  8/8/2023 0634 by Sirisha Chacon RN  Outcome: Ongoing, Progressing  8/8/2023 0629 by Sirisha Chacon RN  Outcome: Ongoing, Progressing     Problem: Neurovascular Compromise (Knee Arthroplasty)  Goal: Intact Neurovascular Status  8/8/2023 0634 by Sirisha Chacon RN  Outcome: Ongoing, Progressing  8/8/2023 0629 by Sirisha Chacon RN  Outcome: Ongoing, Progressing     Problem: Infection (Knee Arthroplasty)  Goal: Absence of Infection Signs and Symptoms  Outcome: Ongoing, Progressing     POC reviewed at bedside. Questions and concerns addressed. VSS. Now able to lift LLE off pillow. Able to dorsiflex and plantar flex LLE. LLE warm to touch. Denies numbness or tingling to LLE. Reports pain not controlled but refused last dose of Dilaudid ordered by SERGIO Lazcano. Nimbus pump in use. Placed bed in low and locked position. Call light within reach. Side rails up x2. Instructed to call for any needs. Verbalized understanding of all instructions. Frequent rounds. No falls/injuries this shift. Mother at bedside.

## 2023-08-08 NOTE — NURSING
GRACIA Mcneil spoke with SERGIO Lazcano regarding anxiety. SERGIO Lazcano stated to GRACIA Mcneil that he would order another one time dose of Dilaudid and change order for Paxil to be given now.

## 2023-08-08 NOTE — PLAN OF CARE
Problem: Occupational Therapy  Goal: Occupational Therapy Goal  Description: Pt is currently functioning at their baseline in ADLs and functional mobility. Therefore, further skilled OT intervention is not warranted at this time. Pt is appropriate to discharge home. Please re-consult if pt's functional status changes.   Outcome: Met   OT eval complete.

## 2023-08-08 NOTE — ADDENDUM NOTE
Addendum  created 08/08/23 0715 by Trace Mccoy MD    Charge Capture section accepted, Cosign clinical note with attestation

## 2023-08-08 NOTE — ADDENDUM NOTE
Addendum  created 08/08/23 1023 by Trace Mccoy MD    Order list changed, Pharmacy for encounter modified

## 2023-08-08 NOTE — PT/OT/SLP EVAL
"Physical Therapy Evaluation, Treatment, and Discharge Note    Patient Name:  Candy Huynh   MRN:  [id number]    Recommendations:     Discharge Recommendations: outpatient PT  Discharge Equipment Recommendations: none   Barriers to discharge: None    Assessment:     Candy Huynh is a 52 y.o. female admitted with a medical diagnosis of Primary osteoarthritis of left knee. Patient tolerated PT session well. Patient ambulated 150ft with RW and supervision. No LOB or SOB noted. Patient ascended/descended 6" curb step with RW and contact guard assistance. Patient performed L LE therex x10 reps. Patient has an OPPT appointment on 8/9/2023. Patient ready to discharge home from PT standpoint.      Recent Surgery: Procedure(s) (LRB):  ARTHROPLASTY, KNEE, TOTAL: LEFT: BALDO NEXGEN (Left) 1 Day Post-Op    Plan:     During this hospitalization, patient does not require further acute PT services.  Please re-consult if situation changes.      Subjective     Chief Complaint: Left knee pain.   Patient/Family Comments/goals: To walk without pain.   Pain/Comfort:  Pain Rating 1:  (yes but did not rate with number)  Location - Side 1: Left  Location 1: knee  Pain Addressed 1: Pre-medicate for activity, Distraction, Reposition    Patients cultural, spiritual, Ep[employer]alian conflicts given the current situation:  n/a    Living Environment:  Patient lives alone in a 2SH with 1 step to enter. She will be staying on the first floor until she needs to go upstairs in several weeks to get a shower. Prior to admission, patients level of function was independent for functional mobility. Equipment at home: bedside commode, walker, rolling. Upon discharge, patient will have assistance from daughter and mother.    Objective:     Communicated with RN prior to session.  Patient found up in chair with cryotherapy, perineural catheter (Nimbus) upon PT entry to room. Mother present in room.     General Precautions: Standard, fall    Orthopedic " "Precautions:LLE weight bearing as tolerated   Braces: N/A  Respiratory Status: Room air    Exams:  Cognitive Exam:  Patient is oriented to Person, Place, Time, and Situation  Gross Motor Coordination:  WFL  Sensation:    -       Intact  RLE ROM: WFL  RLE Strength: WFL  LLE ROM: WFL except limited at knee due to pain  LLE Strength: appears WFL but did not formally assess due to pain and recent surgery    Functional Mobility:  Mat Mobility:     Supine to Sit: supervision  Sit to Supine: supervision  Transfers:     Sit to Stand:  supervision with rolling walker x1 from bedside chair and x1 from mat   Gait: Patient ambulated 150ft with Rolling Walker and supervision using 3-point gait. Patient demonstrated decreased irma and decreased step length during gait due to pain, decreased ROM, and decreased strength.  Stairs:  Patient ascended/descended 6" curb step with RW and contact guard assistance.      Treatment and Education:  Patient educated in and performed L LE exercises x10 reps for ankle pumps, quad set, glute set, SAQ over bolster, heel slides, hip abd/add, SLR, and LAQ.     Patient and mother educated in:  -PT role and POC  -safety with transfers including hand placement  -gait sequencing and RW management  -OOB activity to maximize recovery including ambulating at home to prevent DVT   -car transfer  -curb training  -HEP for therex at home with handout provided       AM-PAC 6 CLICK MOBILITY  Total Score:23     Patient left up in chair with all lines intact, call button in reach, RN notified, and mother present.    GOALS:   Multidisciplinary Problems       Physical Therapy Goals       Not on file              Multidisciplinary Problems (Resolved)          Problem: Physical Therapy    Goal Priority Disciplines Outcome Goal Variances Interventions   Physical Therapy Goal   (Resolved)     PT, PT/OT Met            Problem: Physical Therapy    Goal Priority Disciplines Outcome Goal Variances Interventions "   Physical Therapy Goal   (Resolved)     PT, PT/OT Met                         History:     Past Medical History:   Diagnosis Date    Allergy     Anemia     Anxiety     Asthma     denies- on outside problem list in Care Everywhere    Depression     Diabetes mellitus     Diabetes mellitus, type 2     Fibromyalgia     GERD (gastroesophageal reflux disease)     Hypertension     Stroke     TIA    Vertigo        Past Surgical History:   Procedure Laterality Date    ARTHROSCOPIC REPAIR OF ROTATOR CUFF OF SHOULDER Right 2022    Procedure: REPAIR, ROTATOR CUFF, ARTHROSCOPIC;  Surgeon: Ministerio Orr Jr., MD;  Location: [facility];  Service: Orthopedics;  Laterality: Right;  need opus system  Mandaeism notifed CC     ARTHROSCOPY OF KNEE Left 2021    Procedure: ARTHROSCOPY, KNEE;  Surgeon: Donnie Campuzano MD;  Location: [facility] OR;  Service: Orthopedics;  Laterality: Left;     SECTION      DECOMPRESSION OF SUBACROMIAL SPACE  2022    Procedure: DECOMPRESSION, SUBACROMIAL SPACE;  Surgeon: Ministerio Orr Jr., MD;  Location: [facility];  Service: Orthopedics;;    EXCISION OF MASS OF EXTREMITY Left 2019    Procedure: EXCISION, MASS, EXTREMITY;  Surgeon: Honorio Pulido IV, MD;  Location: [facility] OR;  Service: Orthopedics;  Laterality: Left;  excision lipoma  Request Lacie    HERNIA REPAIR      HYSTERECTOMY      KNEE ARTHROSCOPY W/ MENISCECTOMY  2021    Procedure: ARTHROSCOPY, KNEE, WITH MENISCECTOMY;  Surgeon: Donnie Campuzano MD;  Location: [facility] OR;  Service: Orthopedics;;    NASAL SEPTOPLASTY      RECONSTRUCTION OF ACROMIOCLAVICULAR JOINT  2022    Procedure: RECONSTRUCTION, ACROMIOCLAVICULAR JOINT;  Surgeon: Ministerio Orr Jr., MD;  Location: [facility];  Service: Orthopedics;;    TOTAL KNEE ARTHROPLASTY Right 10/26/2022    Procedure: ARTHROPLASTY, KNEE, TOTAL RIGHT: BALDO - NEXGEN;  Surgeon: Nolberto Fraser MD;  Location: [facility] OR;  Service: Orthopedics;  Laterality: Right;    TOTAL KNEE ARTHROPLASTY Left  8/7/2023    Procedure: ARTHROPLASTY, KNEE, TOTAL: LEFT: BALDO NEXGEN;  Surgeon: Nolberto Fraser MD;  Location: [facility];  Service: Orthopedics;  Laterality: Left;       Time Tracking:     PT Received On: 08/08/23  PT Start Time: 1106     PT Stop Time: 1135  PT Total Time (min): 29 min     Billable Minutes: Evaluation 6, Gait Training 13, and Therapeutic Exercise 10    08/08/2023

## 2023-08-08 NOTE — PLAN OF CARE
Pt discharged from unit.  Pt aaox4, vss, no s/s of distress noted.  Dressing to left knee remains cdi.  Discharge instructions given to pt and she verbalized understanding.  Home meds and f/u appts reviewed as well.  Pt will  meds at [facility].  Pt instructed not to take tylenol d/t prescription for Vicodin and she verbalized understanding.  Pt left unit via w/c and was accompanied to front of hospital to meet ride.  All personal belongings sent with pt.

## 2023-08-08 NOTE — DISCHARGE SUMMARY
[facility])  Orthopedics  Discharge Summary      Patient Name: Candy Huynh  MRN: [id number]  Admission Date: 8/7/2023  Hospital Length of Stay: 0 days  Discharge Date and Time:  08/08/2023 12:08 PM  Attending Physician: Nolberto Fraser MD   Discharging Provider: KELSIE Durand MD  Primary Care Provider: [place], Primary Doctor    HPI:   No notes on file    Procedure(s) (LRB):  ARTHROPLASTY, KNEE, TOTAL: LEFT: BALDO NEXGEN (Left)      Hospital Course:  On 8/7/2023, the patient arrived to the [facility] for proper pre-operative management.  Upon completion of pre-operative preparation, the patient was taken back to the operative theatre. Left total knee arthroplasty was performed without complication and the patient was transported to the post anesthesia care unit in stable condition.  After appropriate recovery from the anaesthetic agents used during the surgery, the patient was then transported to the hospital inpatient floor.  The interim of the hospital stay from arrival on the floor up to discharge has been uncomplicated. The patient has tolerated regular diet.  The patient's pain has been controlled using a multimodal approach. Currently, the patient's pain is well controlled on an oral regimen.  The patient has been voiding without difficulty.  The patient began participation in physical therapy after surgery and has progressed throughout the entire hospital stay.  Currently, the patient's progress is sufficient to allow the them to be discharged to home safely.  The patient agrees with this assessment and desires a discharge today.       Goals of Care Treatment Preferences:  Code Status: Full Code      Consults (From admission, onward)        Status Ordering Provider     Inpatient consult to Respiratory Care  Once        Provider:  (Not yet assigned)    Acknowledged GODWIN DORMAN     Inpatient consult to Respiratory Care  Once        Provider:  (Not yet assigned)    Acknowledged  GODWIN DORMAN     Inpatient consult to Social Work  Once        Provider:  (Not yet assigned)    GODWIN Perez     Inpatient consult to Pain Management  Once        Provider:  (Not yet assigned)    GODWIN Perez     Inpatient consult to Respiratory Care  Once        Provider:  (Not yet assigned)    GODWIN Perez          Significant Diagnostic Studies:     Pending Diagnostic Studies:     None        Final Active Diagnoses:    Diagnosis Date Noted POA    PRINCIPAL PROBLEM:  Primary osteoarthritis of left knee [M17.12] 07/12/2023 Yes      Problems Resolved During this Admission:      Discharged Condition: good    Disposition: Home or Self Care    Follow Up:     Upcoming Appointments     Visit Type Date Time Department    ESTABLISHED PATIENT - VIRTUAL 8/9/2023 10:00 AM [facility]    POST-OP 8/21/2023  1:40 PM [facility]          Patient Instructions:      Activity as tolerated     Sponge bath only until clinic visit     Keep surgical extremity elevated     Lifting restrictions   Order Comments: No strenuous exercise or lifting of > 10 lbs     Weight bearing as tolerated     No driving, operating heavy equipment or signing legal documents while taking pain medication     Leave dressing on - Keep it clean, dry, and intact until clinic visit   Order Comments: Do not remove surgical dressing for 2 weeks post-op. This will be done only by MD at initial post-op visit. If dressing is completely saturated, replace with identical dressing - silver-impregnated hydrocolloid dressing.     Do not get dressings wet. Do not shower.     If dressing continues to be saturated or there are signs of infection, please call [facility] [phone] for further instructions and to make appt to be seen.     Call MD for:  temperature >100.4     Call MD for:  persistent nausea and vomiting     Call MD for:  severe uncontrolled pain     Call MD for:  difficulty breathing,  headache or visual disturbances     Call MD for:  redness, tenderness, or signs of infection (pain, swelling, redness, odor or green/yellow discharge around incision site)     Call MD for:  hives     Call MD for:  persistent dizziness or light-headedness     Call MD for:  extreme fatigue     Medications:  Reconciled Home Medications:      Medication List      CHANGE how you take these medications    acetaminophen 650 MG Tbsr  Commonly known as: TYLENOL  Take 1 tablet (650 mg total) by mouth 2 (two) times a day.  What changed:   · when to take this  · Another medication with the same name was removed. Continue taking this medication, and follow the directions you see here.     * HYDROcodone-acetaminophen  mg per tablet  Commonly known as: NORCO  Take 1 tablet by mouth every 6 (six) hours as needed for Pain (Take for 1 week, then resume Norco 7.5mg tablets).  What changed: You were already taking a medication with the same name, and this prescription was added. Make sure you understand how and when to take each.     * HYDROcodone-acetaminophen 7.5-325 mg per tablet  Commonly known as: NORCO  Take 1 tablet by mouth every 6 (six) hours as needed for Pain.  What changed: Another medication with the same name was added. Make sure you understand how and when to take each.     methocarbamoL 750 MG Tab  Commonly known as: ROBAXIN  Take 1 tablet (750 mg total) by mouth 4 (four) times daily as needed (muscle spasms).  What changed: Another medication with the same name was removed. Continue taking this medication, and follow the directions you see here.     pantoprazole 40 MG tablet  Commonly known as: PROTONIX  TAKE 1 TABLET BY MOUTH ONCE DAILY 30 MINUTES BEFORE BREAKFAST  What changed: Another medication with the same name was removed. Continue taking this medication, and follow the directions you see here.         * This list has 2 medication(s) that are the same as other medications prescribed for you. Read the  directions carefully, and ask your doctor or other care provider to review them with you.            CONTINUE taking these medications    albuterol 90 mcg/actuation inhaler  Commonly known as: PROVENTIL/VENTOLIN HFA  SMARTSI Puff(s) By Mouth Every 4 Hours PRN     amLODIPine 5 MG tablet  Commonly known as: NORVASC  Take 5 mg by mouth once daily.     aspirin 81 MG EC tablet  Commonly known as: ECOTRIN  Take 1 tablet (81 mg total) by mouth 2 (two) times a day.     azelastine 137 mcg (0.1 %) nasal spray  Commonly known as: ASTELIN  1 spray once daily.     cetirizine 10 MG tablet  Commonly known as: ZYRTEC  Take 10 mg by mouth every evening.     cholecalciferol (vitamin D3) 1,250 mcg (50,000 unit) capsule  Take 50,000 Units by mouth every 7 days.     diazePAM 2 MG tablet  Commonly known as: VALIUM  SMARTSI-2 Tablet(s) By Mouth     EPINEPHrine 0.3 mg/0.3 mL Atin  Commonly known as: EPIPEN  as directed     ergocalciferol 50,000 unit Cap  Commonly known as: ERGOCALCIFEROL  Take 50,000 Units by mouth every 7 days.     FeroSuL 325 mg (65 mg iron) Tab tablet  Generic drug: ferrous sulfate  Take by mouth every other day.     FLUoxetine 20 MG capsule  Take 40 mg by mouth once daily.     fluticasone propionate 50 mcg/actuation nasal spray  Commonly known as: FLONASE  1 spray by Each Nostril route once daily.     folic acid 1 MG tablet  Commonly known as: FOLVITE  Take 1,000 mcg by mouth.     loratadine 10 mg tablet  Commonly known as: CLARITIN  Take 10 mg by mouth once daily.     losartan 100 MG tablet  Commonly known as: COZAAR  Take 100 mg by mouth once daily.     meclizine 25 mg tablet  Commonly known as: ANTIVERT  Take 25 mg by mouth.     meloxicam 15 MG tablet  Commonly known as: MOBIC  Take 1 tablet (15 mg total) by mouth once daily.     metFORMIN 1000 MG tablet  Commonly known as: GLUCOPHAGE  Take 1,000 mg by mouth 2 (two) times daily with meals.     montelukast 10 mg tablet  Commonly known as: SINGULAIR     MOVANTIK  25 mg tablet  Generic drug: naloxegoL  Take 25 mg by mouth.     nicotine polacrilex 4 MG Gum  Commonly known as: NICORETTE  SMARTSI Each By Mouth PRN     ondansetron 8 MG Tbdl  Commonly known as: ZOFRAN-ODT  Take 8 mg by mouth every 8 (eight) hours as needed.     OZEMPIC 0.25 mg or 0.5 mg (2 mg/3 mL) pen injector  Generic drug: semaglutide  Inject 0.25 mg into the skin every 7 (seven) days     paroxetine 10 MG tablet  Commonly known as: PAXIL  Take 10 mg by mouth every morning.     PREMARIN 0.625 MG tablet  Generic drug: estrogens (conjugated)  Take 0.625 mg by mouth once daily.     rosuvastatin 10 MG tablet  Commonly known as: CRESTOR  Take 10 mg by mouth.     senna-docusate 8.6-50 mg 8.6-50 mg per tablet  Commonly known as: SENNA WITH DOCUSATE SODIUM  Take 1 tablet by mouth once daily.     STOOL SOFTENER 100 MG capsule  Generic drug: docusate sodium  Take 1 softgel by mouth twice daily     sumatriptan 100 MG tablet  Commonly known as: IMITREX  Take 100 mg by mouth 2 (two) times daily as needed.        STOP taking these medications    HYDROmorphone 2 MG tablet  Commonly known as: DILAUDID     hydrOXYzine pamoate 25 MG Cap  Commonly known as: VISTARIL     oxyCODONE 5 MG immediate release tablet  Commonly known as: ROXICODONE     prazosin 1 MG Cap  Commonly known as: MINIPRESS            KELSIE Durand MD  Orthopedics  [facility]

## 2023-08-08 NOTE — NURSING
"When as if still wanted Oxycodone pt. Looked at mother and stated "you see how she is talking to me?" Nurse genny Mccormack remained in room during this conversation. Gave Oxycodone and Tylenol at same time.   "

## 2023-08-08 NOTE — PLAN OF CARE
Problem: Adult Inpatient Plan of Care  Goal: Absence of Hospital-Acquired Illness or Injury  Intervention: Identify and Manage Fall Risk  Flowsheets (Taken 8/7/2023 1917)  Safety Promotion/Fall Prevention:   assistive device/personal item within reach   Fall Risk reviewed with patient/family   instructed to call staff for mobility   side rails raised x 2   lighting adjusted   medications reviewed     Problem: Adult Inpatient Plan of Care  Goal: Absence of Hospital-Acquired Illness or Injury  Intervention: Prevent Skin Injury  Flowsheets (Taken 8/7/2023 1917)  Body Position: supine  Skin Protection: tubing/devices free from skin contact     Problem: Adult Inpatient Plan of Care  Goal: Absence of Hospital-Acquired Illness or Injury  Intervention: Prevent and Manage VTE (Venous Thromboembolism) Risk  Flowsheets (Taken 8/7/2023 1917)  Activity Management: Ankle pumps - L1  VTE Prevention/Management:   intravenous hydration   remove, assess skin, and reapply foot pump   dorsiflexion/plantar flexion performed     Problem: Adult Inpatient Plan of Care  Goal: Absence of Hospital-Acquired Illness or Injury  Intervention: Prevent Infection  Flowsheets (Taken 8/7/2023 1917)  Infection Prevention: hand hygiene promoted     Problem: Diabetes Comorbidity  Goal: Blood Glucose Level Within Targeted Range  Intervention: Monitor and Manage Glycemia  Flowsheets (Taken 8/7/2023 1917)  Glycemic Management: blood glucose monitored     Problem: Pain Acute  Goal: Acceptable Pain Control and Functional Ability  Intervention: Develop Pain Management Plan  Flowsheets (Taken 8/7/2023 1917)  Pain Management Interventions:   care clustered   cold applied   quiet environment facilitated     Problem: Pain Acute  Goal: Acceptable Pain Control and Functional Ability  Intervention: Prevent or Manage Pain  Flowsheets (Taken 8/7/2023 1917)  Medication Review/Management: medications reviewed     Problem: Pain Acute  Goal: Acceptable Pain Control and  Functional Ability  Intervention: Optimize Psychosocial Wellbeing  Flowsheets (Taken 8/7/2023 1917)  Supportive Measures: active listening utilized     Problem: Bleeding (Knee Arthroplasty)  Goal: Absence of Bleeding  Intervention: Monitor and Manage Bleeding  Flowsheets (Taken 8/7/2023 1917)  Bleeding Management: dressing monitored     Problem: Fall Injury Risk  Goal: Absence of Fall and Fall-Related Injury  Intervention: Identify and Manage Contributors  Flowsheets (Taken 8/7/2023 1917)  Medication Review/Management: medications reviewed     Problem: Fall Injury Risk  Goal: Absence of Fall and Fall-Related Injury  Intervention: Promote Injury-Free Environment  Flowsheets (Taken 8/7/2023 1917)  Safety Promotion/Fall Prevention:   assistive device/personal item within reach   Fall Risk reviewed with patient/family   instructed to call staff for mobility   side rails raised x 2   lighting adjusted   medications reviewed

## 2023-08-08 NOTE — NURSING
Nimbus Pump teaching done with pt and pt's mother. Instructions given on how to remove catheter on day 5, Saturday 8/12 at noon.  Verbalized understanding.  Pt was also made aware of toxicity side effects:  numbness or tingling around mouth, metallic taste in mouth, ringing in ears, sob, etc and instructed to clamp tubing and notify Anesthesia.  Pt made aware to call Anesthesia for any questions or complications with Nimbus Pump, phone numbers located on brochure and sticker on catheter site.  Extra gauze and tegaderm given to pt upon discharge.

## 2023-08-08 NOTE — TELEPHONE ENCOUNTER
"Pt called hotline requesting " ice chest machine" asked if she is speaking of the polar ice cube. Pt states she was told by her insurance company that it would be covered if the doctor orders it as medically necessary. Informed pt that to my knowledge the Polar care cube is not covered by insurance. Pt states she thinks it is different, stating she can not remember the exact name. She states she thinks it is " medical ice chest or machine". Informed will forward the message to Dr. Fraser and Donna to advise. Advised pt accept the Ice sleeve with ice packs prior to discharge, as they are free and she may not be able to get the " ice machine. Pt verbalized understanding.  "

## 2023-08-08 NOTE — NURSING
Went to room to give additional dose of Dilaudid that SERIGO Lazcano ordered and pt. Started yelling at this writer and KAUSHAL Mccormack. Stated she did not want the Dilaudid. Mother has pt. Turned to her side and underpads on bed wet. KAUSHAL Mccormack offered to change pads and pt. Stated not to change pads and would just lay in wet bed.

## 2023-08-08 NOTE — NURSING
Spoke with SERGIO Lazcano regarding pain control. SERGIO Lazcano came to bedside and spoke with patient regarding pain management and new order that he was going to place in computer for a change in pain med.

## 2023-08-08 NOTE — SUBJECTIVE & OBJECTIVE
Principal Problem:Primary osteoarthritis of left knee    Principal Orthopedic Problem: as above; now s/p L-TKA    Interval History: POD1.  Afebrile and vitals stable overnight.  Pain has been reportedly poorly controlled.  Not seen yesterday by PT.     Review of patient's allergies indicates:   Allergen Reactions    Adhesive Blisters     Specifically EKG lead pads    Morphine Other (See Comments)     shake       Current Facility-Administered Medications   Medication    0.9%  NaCl infusion    acetaminophen tablet 1,000 mg    amLODIPine tablet 5 mg    aspirin EC tablet 81 mg    atorvastatin tablet 40 mg    bisacodyL suppository 10 mg    celecoxib capsule 200 mg    fentaNYL 50 mcg/mL injection 100 mcg    glucagon (human recombinant) injection 1 mg    glucagon (human recombinant) injection 1 mg    glucose chewable tablet 16 g    glucose chewable tablet 16 g    glucose chewable tablet 24 g    glucose chewable tablet 24 g    HYDROmorphone injection 1 mg    insulin aspart U-100 pen 0-5 Units    losartan tablet 100 mg    metFORMIN tablet 1,000 mg    methocarbamoL tablet 750 mg    midazolam (VERSED) 1 mg/mL injection 1 mg    mupirocin 2 % ointment 1 g    naloxone 0.4 mg/mL injection 0.02 mg    ondansetron injection 4 mg    oxyCODONE immediate release tablet 5 mg    oxyCODONE immediate release tablet Tab 10 mg    pantoprazole EC tablet 40 mg    paroxetine tablet 10 mg    polyethylene glycol packet 17 g    pregabalin capsule 75 mg    prochlorperazine injection Soln 5 mg    ropivacaine 0.2% Nimbus PainPRO Pump infusion 500 ML    senna-docusate 8.6-50 mg per tablet 1 tablet     Objective:     Vital Signs (Most Recent):  Temp: 97.3 °F (36.3 °C) (08/08/23 0406)  Pulse: 68 (08/08/23 0406)  Resp: 18 (08/08/23 0504)  BP: 124/85 (08/08/23 0406)  SpO2: 96 % (08/08/23 0406) Vital Signs (24h Range):  Temp:  [96.8 °F (36 °C)-98.7 °F (37.1 °C)] 97.3 °F (36.3 °C)  Pulse:  [61-83] 68  Resp:  [11-21] 18  SpO2:  [93 %-100 %] 96 %  BP:  "()/(52-95) 124/85     Weight: 112.9 kg (249 lb)  Height: 5' 3" (160 cm)  Body mass index is 44.11 kg/m².      Intake/Output Summary (Last 24 hours) at 8/8/2023 0652  Last data filed at 8/8/2023 0431  Gross per 24 hour   Intake 4230 ml   Output 2400 ml   Net 1830 ml        Ortho/SPM Exam  AAOx4  NAD  Reg rate  No increased WOB    LLE:  Dressing c/d/i  SILT T/SP/DP/Bell/Sa  Motor intact T/SP/DP  WWP extremities  FCDs in place and functioning      Significant Labs: All pertinent labs within the past 24 hours have been reviewed.    Significant Imaging: I have reviewed all pertinent imaging results/findings.  "

## 2023-08-08 NOTE — PT/OT/SLP EVAL
Occupational Therapy   Evaluation and Discharge Note    Name: Candy Huynh  MRN: [id number]  Admitting Diagnosis: Primary osteoarthritis of left knee  Recent Surgery: Procedure(s) (LRB):  ARTHROPLASTY, KNEE, TOTAL: LEFT: BALDO NEXGEN (Left) 1 Day Post-Op    Recommendations:     Discharge Recommendations: home  Discharge Equipment Recommendations: none  Barriers to discharge:  None    Assessment:     Candy Huynh is a 52 y.o. female with a medical diagnosis of Primary osteoarthritis of left knee. Pt presents s/p (L) TKA POD1. Pt performed supine>sit with SPV, toilet t/f with SBA and RW, UBD with Mod (I), LBD with SPV, and grooming/hygiene with Mod (I). Education provided on weightbearing precautions. At this time, patient is functioning at their prior level of function and does not require further acute OT services. Pt is appropriate for discharge home.    Plan:     During this hospitalization, patient does not require further acute OT services.  Please re-consult if situation changes.    Plan of Care Reviewed with: patient, mother    Subjective     Chief Complaint: L LE pain  Patient/Family Comments/goals: to be pain free    Occupational Profile:  Living Environment: Pt lives alone in a 2 story [facility] with no RYANN. Pt will reside on the 1st floor during her recovery with 1/2 bath. T/s combo with bath bench on 2nd floor  Previous level of function: (I) with ADLs and functional mobility  Roles and Routines: (-) work/drive  Equipment Used at home: bedside commode, bath bench, cane, quad  Assistance upon Discharge: mother    Pain/Comfort:  Pain Rating 1: 10/10  Location - Side 1: Left  Location - Orientation 1: generalized  Location 1: knee  Pain Addressed 1: Pre-medicate for activity, Reposition, Distraction  Pain Rating Post-Intervention 1: 10/10    Patients cultural, spiritual, Denominational conflicts given the current situation: no    Objective:     Communicated with: Callie FAGAN prior to session.  Patient  found HOB elevated with cryotherapy, telemetry, FCD, PureWick, perineural catheter (nimbus) upon OT entry to room.    General Precautions: Standard, fall  Orthopedic Precautions: LLE weight bearing as tolerated  Braces: N/A  Respiratory Status: Room air     Occupational Performance:    Bed Mobility:    Patient completed Scooting/Bridging with supervision  Patient completed Supine to Sit with supervision    Functional Mobility/Transfers:  Patient completed Sit <> Stand Transfer with stand by assistance  with  rolling walker   Patient completed Toilet Transfer Step Transfer technique with stand by assistance with  rolling walker  Functional Mobility: Pt engaged in functional mobility to simulate household/community distances EOB>toilet (BSC over toilet) and toilet>bedside chair with CGA progressing to close SBA and RW in order to maximize functional endurance and standing balance required for engagement in occupations of choice   No LOB or SOB noted  Cues for sequencing    Activities of Daily Living:  Grooming: modified independence to wash hands and brush teeth standing at sink with RW  Upper Body Dressing: modified independence to don dress and bra sitting EOB  Lower Body Dressing: supervision to thread b/l feet through underwear/shorts and manage above hips; SPV to don b/l slip on shoes sitting EOB    Cognitive/Visual Perceptual:  Cognitive/Psychosocial Skills:     -       Oriented to: Person, Place, Time, and Situation   -       Follows Commands/attention:Follows multistep  commands  -       Safety awareness/insight to disability: intact   -       Mood/Affect/Coping skills/emotional control: Cooperative    Physical Exam:  Upper Extremity Range of Motion:     -       Right Upper Extremity: WFL  -       Left Upper Extremity: WFL  Upper Extremity Strength:    -       Right Upper Extremity: WFL  -       Left Upper Extremity: WFL    AMPAC 6 Click ADL:  AMPAC Total Score: 24    Treatment & Education:  -Pt educated to  dress surgical site first and further dressing techniques s/p surgery  -Pt educated on hand placement for transfers  -Pt educated on RW placement for ADLs  -Pt educated on proper foot wear s/p surgery  -Pt educated on car transfer technique  -Pt educated on positioning of sx LE during performance of functional activities vs rest/sleep  -Pt educated on weightbearing and surgical precautions with pt verbalizing 1/1 correctly  -Pt educated to call for assistance and to transfer with hospital staff only  -Pt educated on role of OT and plan of care s/p surgery at [facility], white board updated     Patient left up in chair with all lines intact, call button in reach, RN notified, and mother present    GOALS:   Multidisciplinary Problems       Occupational Therapy Goals       Not on file              Multidisciplinary Problems (Resolved)          Problem: Occupational Therapy    Goal Priority Disciplines Outcome Interventions   Occupational Therapy Goal   (Resolved)     OT, PT/OT Met    Description: Pt is currently functioning at their baseline in ADLs and functional mobility. Therefore, further skilled OT intervention is not warranted at this time. Pt is appropriate to discharge home. Please re-consult if pt's functional status changes.                        History:     Past Medical History:   Diagnosis Date    Allergy     Anemia     Anxiety     Asthma     denies- on outside problem list in Care Everywhere    Depression     Diabetes mellitus     Diabetes mellitus, type 2     Fibromyalgia     GERD (gastroesophageal reflux disease)     Hypertension     Stroke     TIA    Vertigo          Past Surgical History:   Procedure Laterality Date    ARTHROSCOPIC REPAIR OF ROTATOR CUFF OF SHOULDER Right 9/6/2022    Procedure: REPAIR, ROTATOR CUFF, ARTHROSCOPIC;  Surgeon: Ministerio Orr Jr., MD;  Location: [facility];  Service: Orthopedics;  Laterality: Right;  need opus system  jose roberto notifed CC 8/29    ARTHROSCOPY  OF KNEE Left 2021    Procedure: ARTHROSCOPY, KNEE;  Surgeon: Donnie Campuzano MD;  Location: [facility] OR;  Service: Orthopedics;  Laterality: Left;     SECTION      DECOMPRESSION OF SUBACROMIAL SPACE  2022    Procedure: DECOMPRESSION, SUBACROMIAL SPACE;  Surgeon: Ministerio Orr Jr., MD;  Location: [facility] OR;  Service: Orthopedics;;    EXCISION OF MASS OF EXTREMITY Left 2019    Procedure: EXCISION, MASS, EXTREMITY;  Surgeon: Honorio Pulido IV, MD;  Location: [facility] OR;  Service: Orthopedics;  Laterality: Left;  excision lipoma  Request Lacie    HERNIA REPAIR      HYSTERECTOMY      KNEE ARTHROSCOPY W/ MENISCECTOMY  2021    Procedure: ARTHROSCOPY, KNEE, WITH MENISCECTOMY;  Surgeon: Donnie Campuzano MD;  Location: [facility];  Service: Orthopedics;;    NASAL SEPTOPLASTY      RECONSTRUCTION OF ACROMIOCLAVICULAR JOINT  2022    Procedure: RECONSTRUCTION, ACROMIOCLAVICULAR JOINT;  Surgeon: Ministerio Orr Jr., MD;  Location: [facility] OR;  Service: Orthopedics;;    TOTAL KNEE ARTHROPLASTY Right 10/26/2022    Procedure: ARTHROPLASTY, KNEE, TOTAL RIGHT: BALDO - NEXGEN;  Surgeon: Nolberto Fraser MD;  Location: [facility] OR;  Service: Orthopedics;  Laterality: Right;    TOTAL KNEE ARTHROPLASTY Left 2023    Procedure: ARTHROPLASTY, KNEE, TOTAL: LEFT: BALDO NEXGEN;  Surgeon: Nolberto Fraser MD;  Location: [facility];  Service: Orthopedics;  Laterality: Left;       Time Tracking:     OT Date of Treatment: 23  OT Start Time: 824  OT Stop Time: 856  OT Total Time (min): 32 min    Billable Minutes:Evaluation 9  Self Care/Home Management 2023

## 2023-08-08 NOTE — PROGRESS NOTES
Acute Pain Service and Perioperative Surgical Home Progress Note    HPI  Candy Huynh is a 52 y.o., female, status post left total knee arthroplasty.  After surgery patient was having issues elevating the leg off of the bed.  She had normal range of motion of the ankle and normal sensation.  Catheter site is clean and dry. There was no evidence of footdrop.  Throughout the night the patient's pain was very difficult to control.  She was treated with additional IV medication for breakthrough pain, additional Lyrica as she normally takes 400 mg daily at home, and with a multimodal regimen.  5:00 p.m. assessment patient was able to elevate her leg off the bed.  Throughout the evening the patient became very upset with a few staff members.  At 5:00 a.m. she wanted to leave against medical advice.  I notified the Orthopedic team on-call.  Additional Dilaudid given and attempted to calm the pt down, but she remained unsatisfied.     Interval history      Surgery:  Procedure(s) (LRB):  ARTHROPLASTY, KNEE, TOTAL: LEFT: BALDO NEXGEN (Left)    Post Op Day #: 1    Catheter type: Perineural Adductor Canal    Infusion type: Ropivacaine 0.2%, with a 7cc automatic bolus every 3 hours, combined with a 5 cc patient controlled bolus available [id number]    Problem List:    Active Hospital Problems    Diagnosis  POA    *Primary osteoarthritis of left knee [M17.12]  Yes      Resolved Hospital Problems   No resolved problems to display.       Subjective:       General appearance of alert, oriented, no complaints   Pain with rest: 2    Numbers   Pain with movement: 10    Numbers   Side Effects    1. Pruritis No    2. Nausea No    3. Motor Blockade Yes, 0=Ability to raise lower extremities off bed    4. Sedation No, 1=awake and alert    Schedule Medications:    acetaminophen  1,000 mg Oral Q6H    amLODIPine  5 mg Oral Daily    aspirin  81 mg Oral BID    atorvastatin  40 mg Oral Daily    celecoxib  200 mg Oral Daily    HYDROmorphone  1  "mg Intravenous ED 1 Time    losartan  100 mg Oral Daily    metFORMIN  1,000 mg Oral BID WM    methocarbamoL  750 mg Oral TID    mupirocin  1 g Nasal BID    pantoprazole  40 mg Oral Daily    paroxetine  10 mg Oral QAM    polyethylene glycol  17 g Oral Daily    pregabalin  75 mg Oral QHS    senna-docusate 8.6-50 mg  1 tablet Oral BID        Continuous Infusions:   sodium chloride 0.9% 150 mL/hr at 08/07/23 1621    ropivacaine 0.2% Nimbus PainPRO Pump infusion 500 ML          PRN Medications:  bisacodyL, fentaNYL, glucagon (human recombinant), glucagon (human recombinant), glucose, glucose, glucose, glucose, insulin aspart U-100, midazolam, naloxone, ondansetron, oxyCODONE, oxyCODONE, prochlorperazine       Antibiotics:  Antibiotics (From admission, onward)      Start     Stop Route Frequency Ordered    08/07/23 2100  mupirocin 2 % ointment 1 g         08/12/23 2059 Nasl 2 times daily 08/07/23 1821               Objective:     Catheter site clean, dry, intact          Vital Signs (Most Recent):  Temp: 97.3 °F (36.3 °C) (08/08/23 0406)  Pulse: 68 (08/08/23 0406)  Resp: 18 (08/08/23 0504)  BP: 124/85 (08/08/23 0406)  SpO2: 96 % (08/08/23 0406) Vital Signs Range (Last 24H):  Temp:  [96.8 °F (36 °C)-98.7 °F (37.1 °C)]   Pulse:  [61-83]   Resp:  [11-21]   BP: ()/(52-95)   SpO2:  [93 %-100 %]          I & O (Last 24H):  Intake/Output Summary (Last 24 hours) at 8/8/2023 0568  Last data filed at 8/8/2023 0431  Gross per 24 hour   Intake 4230 ml   Output 2400 ml   Net 1830 ml       Physical Exam:    GA: Alert, comfortable, no acute distress.   Pulmonary: Clear to auscultation. Normal RR.    Cardiac: regular rate and rhythm.      Laboratory: reviewed in chart  CBC: No results for input(s): "WBC", "RBC", "HGB", "HCT", "PLT", "MCV", "MCH", "MCHC" in the last 72 hours.    BMP: No results for input(s): "NA", "K", "CO2", "CL", "BUN", "CREATININE", "GLU", "MG", "PHOS", "CALCIUM" in the last 72 hours.    No results for input(s): " ""PT", "INR", "PROTIME", "APTT" in the last 72 hours.          Assessment:  See above       Pain control adequate    Plan:     1) Pain: Adductor canal perineural catheter in place and infusing. Dressing in tact.  Multimodal pain regimen ordered which includes acetaminophen, celecoxib, pregabalin, and prn oxycodone.  Will continue to monitor. Plan to discharge with Nimbus pain pump.   2) diabetes cont home regimen and SSI   3)Pain control - multimodal regimen, IV dilaudid for break through pain   4) FEN/GI: Tolerating regular diet.     5) Dispo: Pt working well with PT/OT. Case management and SW following along for setting up home health and physical therapy. Plan to discharge home this am.          Evaluator Neno Horton PA-C                  "

## 2023-08-08 NOTE — NURSING
Yelling at staff regarding pain management. KAUSHAL Mccormack in room and observed and listened to yelling. Called SERGIO Lazcano who came to bedside and pt. Yelling at SERGIO Lazcano. SERGIO Lazcano informed pt. She was not to be yelling at him or any other staff members. Pt. Stating that she wants to be discharged. SERGIO Lazcano informed pt. She could leave at any time and that this is not a detention. Pt. Continued to yell at SERGIO Lazcano who was looking at computer to see when last Dilaudid was given. SERGIO Lazcano informed pt. He had ordered and Dilaudid had been given and walked out of room.

## 2023-08-08 NOTE — PLAN OF CARE
Problem: Diabetes Comorbidity  Goal: Blood Glucose Level Within Targeted Range  Intervention: Monitor and Manage Glycemia  Flowsheets (Taken 8/8/2023 1250)  Glycemic Management: blood glucose monitored     Problem: Pain Acute  Goal: Acceptable Pain Control and Functional Ability  Intervention: Prevent or Manage Pain  Flowsheets (Taken 8/8/2023 1250)  Sensory Stimulation Regulation:   care clustered   lighting decreased   quiet environment promoted  Medication Review/Management: medications reviewed     Problem: Pain Acute  Goal: Acceptable Pain Control and Functional Ability  Intervention: Optimize Psychosocial Wellbeing  Flowsheets (Taken 8/8/2023 1250)  Supportive Measures:   active listening utilized   positive reinforcement provided     Problem: Adjustment to Surgery (Knee Arthroplasty)  Goal: Optimal Coping  Intervention: Support Psychosocial Response to Surgery and Mobility Changes  Flowsheets (Taken 8/8/2023 1250)  Supportive Measures:   active listening utilized   positive reinforcement provided     Problem: Bleeding (Knee Arthroplasty)  Goal: Absence of Bleeding  Intervention: Monitor and Manage Bleeding  Flowsheets (Taken 8/8/2023 1250)  Bleeding Management: dressing monitored     Problem: Functional Ability Impaired (Knee Arthroplasty)  Goal: Optimal Functional Ability  Intervention: Promote Optimal Functional Status  Flowsheets (Taken 8/8/2023 1250)  Activity Management: Ankle pumps - L1     Problem: Infection (Knee Arthroplasty)  Goal: Absence of Infection Signs and Symptoms  Intervention: Prevent or Manage Infection  Flowsheets (Taken 8/8/2023 1250)  Infection Management: aseptic technique maintained     Problem: Pain (Knee Arthroplasty)  Goal: Acceptable Pain Control  Intervention: Prevent or Manage Pain  Flowsheets (Taken 8/8/2023 1250)  Pain Management Interventions:   care clustered   cold applied   pain management plan reviewed with patient/caregiver     Problem: Fall Injury Risk  Goal: Absence  of Fall and Fall-Related Injury  Intervention: Promote Injury-Free Environment  Flowsheets (Taken 8/8/2023 1250)  Safety Promotion/Fall Prevention:   assistive device/personal item within reach   Fall Risk reviewed with patient/family   lighting adjusted   in recliner, wheels locked   instructed to call staff for mobility       Throughout shift, pt remained oriented x 4, calm, respirations even and unlabored.  Safety precautions maintained throughout shift, remained free of falls/injury.  IVF maintained/dcd. Pt voiding w/o difficulty. Pain assessment completed q 2 hrs, pain managed w/meds as ordered.        Plan of Care: Patient verbalized understanding of plan of care. Patient progressed with therapy today. Adjustments to plan included: change in pain med for home. Patient denies needs at this time.  Adequate for discharge at this time.

## 2023-08-08 NOTE — PLAN OF CARE
[place] [facility] (Hospital)    HOME HEALTH ORDERS  FACE TO FACE ENCOUNTER    Patient Name: Candy Huynh  YOB: 1970    PCP: [place], Primary Doctor   PCP Address: None  PCP Phone Number: None  PCP Fax: None    Encounter Date: 08/08/2023    Admit to Home Health    Diagnoses:  Active Hospital Problems    Diagnosis  POA    *Primary osteoarthritis of left knee [M17.12]  Yes      Resolved Hospital Problems   No resolved problems to display.       Future Appointments   Date Time Provider Department Center   8/9/2023 10:00 AM TELEMED NURSE, [facility] ORTHO [facility] ORTHO [facility] [place]   8/9/2023  4:00 PM Shlomo Samson, PT [facility]   8/21/2023  1:40 PM Donna Toussaint, NP [facility]   9/19/2023 11:15 AM Nolberot Fraser MD [facility]TH[facility] [address]   10/27/2023 11:30 AM Nolberto Fraser MD [facility] ORTHO [address]           I have seen and examined this patient face to face today. My clinical findings that support the need for the home health skilled services and home bound status are the following:  Weakness/numbness causing balance and gait disturbance due to Joint Replacement and Surgery making it taxing to leave home.  Requiring assistive device to leave home due to unsteady gait caused by Joint Replacement and Surgery.    Allergies:  Review of patient's allergies indicates:   Allergen Reactions    Adhesive Blisters     Specifically EKG lead pads    Morphine Other (See Comments)     shake       Diet: diabetic diet: 2000 calorie    Activities: Weightbearing as tolerated with walker     Nursing:   SN to complete comprehensive assessment including routine vital signs. Instruct on disease process and s/s of complications to report to MD. Follow specific home health arthoplasty protocol. Review/verify medication list sent home with the patient at time of discharge  and instruct patient/caregiver as needed. If coumadin ordered, coumadin clinic to manage INR with INR draws 2x per week  with a goal to maintain INR between 1.8 and 2.2. Frequency may be adjusted depending on start of care date.    Notify MD if SBP > 160 or < 90; DBP > 90 or < 50; HR > 120 or < 50; Temp > 101    Home Medical Equipment:  Walker, 3-1 bedside commode, transfer tub bench    CONSULTS:    Physical Therapy to evaluate and treat. Evaluate for home safety and equipment needs; Establish/upgrade home exercise program. Perform / instruct on therapeutic exercises, gait training, transfer training, and Range of Motion.    OTHER:  Occupational Therapy to evaluate and treat. Evaluate home environment for safety and equipment needs. Perform/Instruct on transfers, ADL training, ROM, and therapeutic exercises.    WOUND CARE ORDERS  Follow Home Health specific protocol.    Assess Surgical Incision/DSRG each TX  Aquacel AG drsg applied post-op leave on 14 days post op. Call MD if any drainage reaches border to border of drsg horizontally, s/s of infection, temp >101, induration, swelling or redness.  If dressing is removed per MD order, then apply island dressing, change/teach caregiver to perform daily dressing change if island dressing present.     Medications: Review discharge medications with patient and family and provide education.      Current Discharge Medication List        START taking these medications    Details   HYDROcodone-acetaminophen (NORCO)  mg per tablet Take 1 tablet by mouth every 6 (six) hours as needed for Pain (Take for 1 week, then resume Norco 7.5mg tablets).  Qty: 28 tablet, Refills: 0    Comments: Quantity prescribed more than 7 day supply? No. Please deliver to bedside           CONTINUE these medications which have CHANGED    Details   acetaminophen (TYLENOL) 650 MG TbSR Take 1 tablet (650 mg total) by mouth 2 (two) times a day.  Qty: 120 tablet, Refills: 0    Associated Diagnoses: S/P total knee replacement using cement, left      methocarbamoL (ROBAXIN) 750 MG Tab Take 1 tablet (750 mg total) by  mouth 4 (four) times daily as needed (muscle spasms).  Qty: 40 tablet, Refills: 0    Comments: Please deliver to bedside  Associated Diagnoses: S/P total knee replacement using cement, left           CONTINUE these medications which have NOT CHANGED    Details   amLODIPine (NORVASC) 5 MG tablet Take 5 mg by mouth once daily.      aspirin (ECOTRIN) 81 MG EC tablet Take 1 tablet (81 mg total) by mouth 2 (two) times a day.  Qty: 60 tablet, Refills: 0    Comments: Bedside delivery. Surgery   Associated Diagnoses: S/P total knee replacement using cement, left      cetirizine (ZYRTEC) 10 MG tablet Take 10 mg by mouth every evening.      cholecalciferol, vitamin D3, 1,250 mcg (50,000 unit) capsule Take 50,000 Units by mouth every 7 days.      diazePAM (VALIUM) 2 MG tablet SMARTSI-2 Tablet(s) By Mouth      ergocalciferol (ERGOCALCIFEROL) 50,000 unit Cap Take 50,000 Units by mouth every 7 days.      FEROSUL 325 mg (65 mg iron) Tab tablet Take by mouth every other day.      folic acid (FOLVITE) 1 MG tablet Take 1,000 mcg by mouth.      loratadine (CLARITIN) 10 mg tablet Take 10 mg by mouth once daily.      losartan (COZAAR) 100 MG tablet Take 100 mg by mouth once daily.      meclizine (ANTIVERT) 25 mg tablet Take 25 mg by mouth.      meloxicam (MOBIC) 15 MG tablet Take 1 tablet (15 mg total) by mouth once daily.  Qty: 30 tablet, Refills: 3      metFORMIN (GLUCOPHAGE) 1000 MG tablet Take 1,000 mg by mouth 2 (two) times daily with meals.      montelukast (SINGULAIR) 10 mg tablet       MOVANTIK 25 mg tablet Take 25 mg by mouth.      nicotine polacrilex (NICORETTE) 4 MG Gum SMARTSI Each By Mouth PRN      pantoprazole (PROTONIX) 40 MG tablet TAKE 1 TABLET BY MOUTH ONCE DAILY 30 MINUTES BEFORE BREAKFAST      paroxetine (PAXIL) 10 MG tablet Take 10 mg by mouth every morning.      PREMARIN 0.625 mg tablet Take 0.625 mg by mouth once daily.      rosuvastatin (CRESTOR) 10 MG tablet Take 10 mg by mouth.      sumatriptan  (IMITREX) 100 MG tablet Take 100 mg by mouth 2 (two) times daily as needed.      albuterol (PROVENTIL/VENTOLIN HFA) 90 mcg/actuation inhaler SMARTSI Puff(s) By Mouth Every 4 Hours PRN      azelastine (ASTELIN) 137 mcg (0.1 %) nasal spray 1 spray once daily.      EPINEPHrine (EPIPEN) 0.3 mg/0.3 mL AtIn as directed      FLUoxetine 20 MG capsule Take 40 mg by mouth once daily.      fluticasone propionate (FLONASE) 50 mcg/actuation nasal spray 1 spray by Each Nostril route once daily.      HYDROcodone-acetaminophen (NORCO) 7.5-325 mg per tablet Take 1 tablet by mouth every 6 (six) hours as needed for Pain.      ondansetron (ZOFRAN-ODT) 8 MG TbDL Take 8 mg by mouth every 8 (eight) hours as needed.      semaglutide (OZEMPIC) 0.25 mg or 0.5 mg (2 mg/3 mL) pen injector Inject 0.25 mg into the skin every 7 (seven) days      senna-docusate 8.6-50 mg (SENNA WITH DOCUSATE SODIUM) 8.6-50 mg per tablet Take 1 tablet by mouth once daily.  Qty: 30 tablet, Refills: 0    Associated Diagnoses: S/P total knee replacement using cement, left      STOOL SOFTENER 100 mg capsule Take 1 softgel by mouth twice daily  Qty: 60 capsule, Refills: 11    Comments: [facility] IS PATIENTS NEW PHARM[facility] - PLEASE SEND ALL FUTURE MEDS HERE.           STOP taking these medications       oxyCODONE (ROXICODONE) 5 MG immediate release tablet Comments:   Reason for Stopping:         HYDROmorphone (DILAUDID) 2 MG tablet Comments:   Reason for Stopping:         hydrOXYzine pamoate (VISTARIL) 25 MG Cap Comments:   Reason for Stopping:         prazosin (MINIPRESS) 1 MG Cap Comments:   Reason for Stopping:               I certify that this patient is confined to her home and needs physical therapy and occupational therapy.

## 2023-08-08 NOTE — PROGRESS NOTES
[facility])  Orthopedics  Progress Note    Patient Name: Candy Huynh  MRN: [id number]  Admission Date: 8/7/2023  Hospital Length of Stay: 0 days  Attending Provider: Nolberto Fraser MD  Primary Care Provider: [facility]  Follow-up For: Procedure(s) (LRB):   ARTHROPLASTY, KNEE, TOTAL: LEFT: BALDO NEXGEN (Left)    Post-Operative Day: 1 Day Post-Op  Subjective:     Principal Problem:Primary osteoarthritis of left knee    Principal Orthopedic Problem: as above; now s/p L-TKA    Interval History: POD1.  Afebrile and vitals stable overnight.  Pain has been reportedly poorly controlled.  Not seen yesterday by PT.     Review of patient's allergies indicates:   Allergen Reactions    Adhesive Blisters     Specifically EKG lead pads    Morphine Other (See Comments)     shake       Current Facility-Administered Medications   Medication    0.9%  NaCl infusion    acetaminophen tablet 1,000 mg    amLODIPine tablet 5 mg    aspirin EC tablet 81 mg    atorvastatin tablet 40 mg    bisacodyL suppository 10 mg    celecoxib capsule 200 mg    fentaNYL 50 mcg/mL injection 100 mcg    glucagon (human recombinant) injection 1 mg    glucagon (human recombinant) injection 1 mg    glucose chewable tablet 16 g    glucose chewable tablet 16 g    glucose chewable tablet 24 g    glucose chewable tablet 24 g    HYDROmorphone injection 1 mg    insulin aspart U-100 pen 0-5 Units    losartan tablet 100 mg    metFORMIN tablet 1,000 mg    methocarbamoL tablet 750 mg    midazolam (VERSED) 1 mg/mL injection 1 mg    mupirocin 2 % ointment 1 g    naloxone 0.4 mg/mL injection 0.02 mg    ondansetron injection 4 mg    oxyCODONE immediate release tablet 5 mg    oxyCODONE immediate release tablet Tab 10 mg    pantoprazole EC tablet 40 mg    paroxetine tablet 10 mg    polyethylene glycol packet 17 g    pregabalin capsule 75 mg    prochlorperazine injection Soln 5 mg    ro[facility]% [facility]  "PainPRO Pump infusion 500 ML    senna-docusate 8.6-50 mg per tablet 1 tablet     Objective:     Vital Signs (Most Recent):  Temp: 97.3 °F (36.3 °C) (08/08/23 0406)  Pulse: 68 (08/08/23 0406)  Resp: 18 (08/08/23 0504)  BP: 124/85 (08/08/23 0406)  SpO2: 96 % (08/08/23 0406) Vital Signs (24h Range):  Temp:  [96.8 °F (36 °C)-98.7 °F (37.1 °C)] 97.3 °F (36.3 °C)  Pulse:  [61-83] 68  Resp:  [11-21] 18  SpO2:  [93 %-100 %] 96 %  BP: ()/(52-95) 124/85     Weight: 112.9 kg (249 lb)  Height: 5' 3" (160 cm)  Body mass index is 44.11 kg/m².      Intake/Output Summary (Last 24 hours) at 8/8/2023 0652  Last data filed at 8/8/2023 0431  Gross per 24 hour   Intake 4230 ml   Output 2400 ml   Net 1830 ml        Ortho/SPM Exam  AAOx4  NAD  Reg rate  No increased WOB    LLE:  Dressing c/d/i  SILT T/SP/DP/Bell/Sa  Motor intact T/SP/DP  WWP extremities  FCDs in place and functioning      Significant Labs: All pertinent labs within the past 24 hours have been reviewed.    Significant Imaging: I have reviewed all pertinent imaging results/findings.    Assessment/Plan:     * Primary osteoarthritis of left knee  Candy Huynh is a 52 y.o. female who is now s/p L-total knee arthroplasty (8/7)    Pain: MM  PT/OT: WBAT with walker   Abx: 24hr postop ancef   DVT ppx: ASA 81mg bid   Drains: none  Rod: none    Dispo: f/u PT recs        KELSIE Durand MD  Orthopedics  [facility] ([facility])  "

## 2023-08-08 NOTE — PLAN OF CARE
[facility])  Discharge Final Note    Primary Care Provider: No, Primary Doctor    Expected Discharge Date: 8/8/2023    Final Discharge Note (most recent)       Final Note - 08/08/23 1514          Final Note    Assessment Type Final Discharge Note     Anticipated Discharge Disposition Home or Self Care     What phone number can be called within the next 1-3 days to see how you are doing after discharge? --   [phone]    Hospital Resources/Appts/Education Provided Appointments scheduled and added to AVS                     Important Message from Medicare           Future Appointments   Date Time Provider Department Center   8/9/2023 10:00 AM TELEMED NURSE, [facility] ORTHO [facility] ORTHO [address] [address] [place]   8/9/2023  4:00 PM Shlomo Samson, PT [facility]   8/21/2023  1:40 PM Donna Toussaint, NP [facility] [place]   9/19/2023 11:15 AM Nolberto Fraser MD [facility] ORTHO [address]   10/27/2023 11:30 AM Nolberto Fraser MD [facility] [place]     Patient discharged home to care of family on 8/8/23.    Angelic Escalera RNCM  Case Management  [facility]  [phone]

## 2023-08-08 NOTE — PLAN OF CARE
"Patient tolerated PT session well. Patient ambulated 150ft with RW and supervision. No LOB or SOB noted. Patient ascended/descended 6" curb step with RW and contact guard assistance. Patient performed L LE therex x10 reps. Patient has an OPPT appointment on 8/9/2023. Patient ready to discharge home from PT standpoint.      Problem: Physical Therapy  Goal: Physical Therapy Goal  Outcome: Met          "

## 2023-08-09 ENCOUNTER — PATIENT MESSAGE (OUTPATIENT)
Dept: ADMINISTRATIVE | Facility: OTHER | Age: 53
End: 2023-08-09
Payer: MEDICAID

## 2023-08-09 ENCOUNTER — CLINICAL SUPPORT (OUTPATIENT)
Dept: ORTHOPEDICS | Facility: CLINIC | Age: 53
End: 2023-08-09
Payer: MEDICAID

## 2023-08-09 DIAGNOSIS — Z96.659 STATUS POST KNEE REPLACEMENT, UNSPECIFIED LATERALITY: Primary | ICD-10-CM

## 2023-08-09 DIAGNOSIS — Z96.652 STATUS POST LEFT KNEE REPLACEMENT: Primary | ICD-10-CM

## 2023-08-09 PROCEDURE — 99499 UNLISTED E&M SERVICE: CPT | Mod: S$PBB,95,, | Performed by: ORTHOPAEDIC SURGERY

## 2023-08-09 PROCEDURE — 99499 NO LOS: ICD-10-PCS | Mod: S$PBB,95,, | Performed by: ORTHOPAEDIC SURGERY

## 2023-08-09 RX ORDER — CELECOXIB 200 MG/1
200 CAPSULE ORAL DAILY
Qty: 30 CAPSULE | Refills: 0 | Status: SHIPPED | OUTPATIENT
Start: 2023-08-09 | End: 2023-10-17

## 2023-08-09 NOTE — PROGRESS NOTES
I called the patient today regarding her surgery with Dr. Nolberto Fraser . The patient had a left TKA on 8/7.     Pain Scale: 9 / 10- discussed Multimodals    Any issues with Fever: No.    Any issues with medications (specifically DVT prophylaxis): No. ASA 81 BID    Any issues with bowel movements:  Passing jess: No.                                                                 Urination: No.                                                                 Constipation: No. Last BM 8/8-diarrhea- pt will hold Colace when indicated     Completing at home exercises: Yes:     Any concerns regarding their dressing/bandage:  No.    Patient confirmed first OP-PT appointment:  [facility] on  8/9 at 1600.     Any other concerns: No.        The patient was informed that if they have any urgent issues with their bandage, medications or any other health concerns regarding their surgery to call the 24/7 Orthopedic Post-op Hot Line at [phone]. The patient was reminded that if they have any chest pain or shortness of breath to call 911 or go to the ER.    The patient verbalized understanding and does not have any other questions

## 2023-08-10 ENCOUNTER — TELEPHONE (OUTPATIENT)
Dept: ORTHOPEDICS | Facility: CLINIC | Age: 53
End: 2023-08-10
Payer: MEDICAID

## 2023-08-10 ENCOUNTER — PATIENT MESSAGE (OUTPATIENT)
Dept: ADMINISTRATIVE | Facility: OTHER | Age: 53
End: 2023-08-10
Payer: MEDICAID

## 2023-08-10 NOTE — TELEPHONE ENCOUNTER
----- Message from Marc Lock MA sent at 8/10/2023 11:31 AM CDT -----  Regarding: FW: pharm narda  Contact: Kiley [phone]    ----- Message -----  From: Arelis Blake  Sent: 8/10/2023  11:30 AM CDT  To: [facility] Staff  Subject: pharm narda Cao w/[facility] calling to request celecoxib (CELEBREX) 200 MG capsule to changed to Meloxicam.     New Prescription needed. Pls call

## 2023-08-10 NOTE — TELEPHONE ENCOUNTER
Returned call to pharmacy. Celebrex is not covered by her insurance, but meloxicam is. Gave a verbal order to change the celebrex to meloxicam. Pharmacist states that they notified the patient.

## 2023-08-11 ENCOUNTER — PATIENT MESSAGE (OUTPATIENT)
Dept: ADMINISTRATIVE | Facility: OTHER | Age: 53
End: 2023-08-11
Payer: MEDICAID

## 2023-08-11 ENCOUNTER — TELEPHONE (OUTPATIENT)
Dept: ORTHOPEDICS | Facility: CLINIC | Age: 53
End: 2023-08-11
Payer: MEDICAID

## 2023-08-11 ENCOUNTER — HOSPITAL ENCOUNTER (OUTPATIENT)
Facility: HOSPITAL | Age: 53
Discharge: HOME | End: 2023-08-12
Attending: EMERGENCY MEDICINE | Admitting: PSYCHIATRY & NEUROLOGY
Payer: MEDICAID

## 2023-08-11 DIAGNOSIS — I62.9 INTRACRANIAL HEMORRHAGE: Primary | ICD-10-CM

## 2023-08-11 DIAGNOSIS — I61.9 ICH (INTRACEREBRAL HEMORRHAGE): ICD-10-CM

## 2023-08-11 DIAGNOSIS — R51.9 HEADACHE: ICD-10-CM

## 2023-08-11 DIAGNOSIS — I61.1 NONTRAUMATIC CORTICAL HEMORRHAGE OF RIGHT CEREBRAL HEMISPHERE: ICD-10-CM

## 2023-08-11 DIAGNOSIS — Z91.89 AT RISK FOR LONG QT SYNDROME: ICD-10-CM

## 2023-08-11 LAB
ALBUMIN SERPL BCP-MCNC: 3.3 G/DL (ref 3.5–5.2)
ALLENS TEST: NORMAL
ALP SERPL-CCNC: 90 U/L (ref 55–135)
ALT SERPL W/O P-5'-P-CCNC: 23 U/L (ref 10–44)
ANION GAP SERPL CALC-SCNC: 10 MMOL/L (ref 8–16)
ANISOCYTOSIS BLD QL SMEAR: SLIGHT
APTT PPP: 25.9 SEC (ref 21–32)
AST SERPL-CCNC: 22 U/L (ref 10–40)
BASOPHILS # BLD AUTO: 0.02 K/UL (ref 0–0.2)
BASOPHILS NFR BLD: 0.2 % (ref 0–1.9)
BILIRUB SERPL-MCNC: 0.4 MG/DL (ref 0.1–1)
BILIRUB UR QL STRIP: NEGATIVE
BUN SERPL-MCNC: 12 MG/DL (ref 6–20)
CALCIUM SERPL-MCNC: 9.5 MG/DL (ref 8.7–10.5)
CHLORIDE SERPL-SCNC: 105 MMOL/L (ref 95–110)
CLARITY UR REFRACT.AUTO: CLEAR
CO2 SERPL-SCNC: 27 MMOL/L (ref 23–29)
COLOR UR AUTO: YELLOW
CREAT SERPL-MCNC: 0.7 MG/DL (ref 0.5–1.4)
CRP SERPL-MCNC: 40.3 MG/L (ref 0–8.2)
DIFFERENTIAL METHOD: ABNORMAL
EOSINOPHIL # BLD AUTO: 0.2 K/UL (ref 0–0.5)
EOSINOPHIL NFR BLD: 1.8 % (ref 0–8)
ERYTHROCYTE [DISTWIDTH] IN BLOOD BY AUTOMATED COUNT: 15.1 % (ref 11.5–14.5)
ERYTHROCYTE [SEDIMENTATION RATE] IN BLOOD BY PHOTOMETRIC METHOD: 80 MM/HR (ref 0–36)
EST. GFR  (NO RACE VARIABLE): >60 ML/MIN/1.73 M^2
GLUCOSE SERPL-MCNC: 104 MG/DL (ref 70–110)
GLUCOSE UR QL STRIP: NEGATIVE
HCT VFR BLD AUTO: 34 % (ref 37–48.5)
HCV AB SERPL QL IA: NORMAL
HGB BLD-MCNC: 10.9 G/DL (ref 12–16)
HGB UR QL STRIP: NEGATIVE
HIV 1+2 AB+HIV1 P24 AG SERPL QL IA: NORMAL
IMM GRANULOCYTES # BLD AUTO: 0.02 K/UL (ref 0–0.04)
IMM GRANULOCYTES NFR BLD AUTO: 0.2 % (ref 0–0.5)
INR PPP: 1 (ref 0.8–1.2)
KETONES UR QL STRIP: NEGATIVE
LDH SERPL L TO P-CCNC: 1.51 MMOL/L (ref 0.5–2.2)
LEUKOCYTE ESTERASE UR QL STRIP: NEGATIVE
LYMPHOCYTES # BLD AUTO: 3.4 K/UL (ref 1–4.8)
LYMPHOCYTES NFR BLD: 34.9 % (ref 18–48)
MCH RBC QN AUTO: 28.6 PG (ref 27–31)
MCHC RBC AUTO-ENTMCNC: 32.1 G/DL (ref 32–36)
MCV RBC AUTO: 89 FL (ref 82–98)
MONOCYTES # BLD AUTO: 0.5 K/UL (ref 0.3–1)
MONOCYTES NFR BLD: 4.9 % (ref 4–15)
NEUTROPHILS # BLD AUTO: 5.6 K/UL (ref 1.8–7.7)
NEUTROPHILS NFR BLD: 58 % (ref 38–73)
NITRITE UR QL STRIP: NEGATIVE
NRBC BLD-RTO: 0 /100 WBC
PH UR STRIP: 7 [PH] (ref 5–8)
PLATELET # BLD AUTO: 324 K/UL (ref 150–450)
PLATELET BLD QL SMEAR: ABNORMAL
PMV BLD AUTO: 11.6 FL (ref 9.2–12.9)
POLYCHROMASIA BLD QL SMEAR: ABNORMAL
POTASSIUM SERPL-SCNC: 4 MMOL/L (ref 3.5–5.1)
PROT SERPL-MCNC: 7.1 G/DL (ref 6–8.4)
PROT UR QL STRIP: NEGATIVE
PROTHROMBIN TIME: 10.5 SEC (ref 9–12.5)
RBC # BLD AUTO: 3.81 M/UL (ref 4–5.4)
SAMPLE: NORMAL
SARS-COV-2 RDRP RESP QL NAA+PROBE: NEGATIVE
SITE: NORMAL
SODIUM SERPL-SCNC: 142 MMOL/L (ref 136–145)
SP GR UR STRIP: 1.01 (ref 1–1.03)
URN SPEC COLLECT METH UR: NORMAL
WBC # BLD AUTO: 9.74 K/UL (ref 3.9–12.7)

## 2023-08-11 PROCEDURE — 82962 GLUCOSE BLOOD TEST: CPT

## 2023-08-11 PROCEDURE — 93010 ELECTROCARDIOGRAM REPORT: CPT | Mod: 59,,, | Performed by: INTERNAL MEDICINE

## 2023-08-11 PROCEDURE — 93005 ELECTROCARDIOGRAM TRACING: CPT

## 2023-08-11 PROCEDURE — 99205 PR OFFICE/OUTPT VISIT, NEW, LEVL V, 60-74 MIN: ICD-10-PCS | Mod: ,,, | Performed by: NEUROLOGICAL SURGERY

## 2023-08-11 PROCEDURE — 87040 BLOOD CULTURE FOR BACTERIA: CPT | Performed by: EMERGENCY MEDICINE

## 2023-08-11 PROCEDURE — 84100 ASSAY OF PHOSPHORUS: CPT

## 2023-08-11 PROCEDURE — G0378 HOSPITAL OBSERVATION PER HR: HCPCS

## 2023-08-11 PROCEDURE — 80053 COMPREHEN METABOLIC PANEL: CPT

## 2023-08-11 PROCEDURE — 86803 HEPATITIS C AB TEST: CPT | Performed by: PHYSICIAN ASSISTANT

## 2023-08-11 PROCEDURE — 93010 EKG 12-LEAD: ICD-10-PCS | Mod: 59,,, | Performed by: INTERNAL MEDICINE

## 2023-08-11 PROCEDURE — 63600175 PHARM REV CODE 636 W HCPCS: Performed by: EMERGENCY MEDICINE

## 2023-08-11 PROCEDURE — 85025 COMPLETE CBC W/AUTO DIFF WBC: CPT

## 2023-08-11 PROCEDURE — 25000003 PHARM REV CODE 250: Performed by: EMERGENCY MEDICINE

## 2023-08-11 PROCEDURE — 85025 COMPLETE CBC W/AUTO DIFF WBC: CPT | Mod: 91 | Performed by: EMERGENCY MEDICINE

## 2023-08-11 PROCEDURE — 85610 PROTHROMBIN TIME: CPT | Performed by: STUDENT IN AN ORGANIZED HEALTH CARE EDUCATION/TRAINING PROGRAM

## 2023-08-11 PROCEDURE — 83605 ASSAY OF LACTIC ACID: CPT

## 2023-08-11 PROCEDURE — 25500020 PHARM REV CODE 255

## 2023-08-11 PROCEDURE — 12000002 HC ACUTE/MED SURGE SEMI-PRIVATE ROOM

## 2023-08-11 PROCEDURE — 96375 TX/PRO/DX INJ NEW DRUG ADDON: CPT

## 2023-08-11 PROCEDURE — A9585 GADOBUTROL INJECTION: HCPCS

## 2023-08-11 PROCEDURE — 87389 HIV-1 AG W/HIV-1&-2 AB AG IA: CPT | Performed by: PHYSICIAN ASSISTANT

## 2023-08-11 PROCEDURE — 80053 COMPREHEN METABOLIC PANEL: CPT | Mod: 91 | Performed by: EMERGENCY MEDICINE

## 2023-08-11 PROCEDURE — 83036 HEMOGLOBIN GLYCOSYLATED A1C: CPT

## 2023-08-11 PROCEDURE — 25500020 PHARM REV CODE 255: Performed by: EMERGENCY MEDICINE

## 2023-08-11 PROCEDURE — 84443 ASSAY THYROID STIM HORMONE: CPT

## 2023-08-11 PROCEDURE — 81003 URINALYSIS AUTO W/O SCOPE: CPT | Performed by: EMERGENCY MEDICINE

## 2023-08-11 PROCEDURE — 85730 THROMBOPLASTIN TIME PARTIAL: CPT | Performed by: STUDENT IN AN ORGANIZED HEALTH CARE EDUCATION/TRAINING PROGRAM

## 2023-08-11 PROCEDURE — 99205 OFFICE O/P NEW HI 60 MIN: CPT | Mod: ,,, | Performed by: NEUROLOGICAL SURGERY

## 2023-08-11 PROCEDURE — 86900 BLOOD TYPING SEROLOGIC ABO: CPT

## 2023-08-11 PROCEDURE — 93010 ELECTROCARDIOGRAM REPORT: CPT | Mod: ,,, | Performed by: INTERNAL MEDICINE

## 2023-08-11 PROCEDURE — 99900035 HC TECH TIME PER 15 MIN (STAT)

## 2023-08-11 PROCEDURE — U0002 COVID-19 LAB TEST NON-CDC: HCPCS | Performed by: EMERGENCY MEDICINE

## 2023-08-11 PROCEDURE — 86140 C-REACTIVE PROTEIN: CPT | Performed by: EMERGENCY MEDICINE

## 2023-08-11 PROCEDURE — 83735 ASSAY OF MAGNESIUM: CPT

## 2023-08-11 PROCEDURE — 80061 LIPID PANEL: CPT

## 2023-08-11 PROCEDURE — 99291 CRITICAL CARE FIRST HOUR: CPT

## 2023-08-11 PROCEDURE — 85652 RBC SED RATE AUTOMATED: CPT | Performed by: EMERGENCY MEDICINE

## 2023-08-11 RX ORDER — HYDRALAZINE HYDROCHLORIDE 20 MG/ML
10 INJECTION INTRAMUSCULAR; INTRAVENOUS EVERY 6 HOURS PRN
Status: DISCONTINUED | OUTPATIENT
Start: 2023-08-11 | End: 2023-08-12 | Stop reason: HOSPADM

## 2023-08-11 RX ORDER — ACETAMINOPHEN 325 MG/1
650 TABLET ORAL EVERY 6 HOURS PRN
Status: DISCONTINUED | OUTPATIENT
Start: 2023-08-11 | End: 2023-08-12 | Stop reason: HOSPADM

## 2023-08-11 RX ORDER — ONDANSETRON 2 MG/ML
4 INJECTION INTRAMUSCULAR; INTRAVENOUS EVERY 8 HOURS PRN
Status: DISCONTINUED | OUTPATIENT
Start: 2023-08-11 | End: 2023-08-12 | Stop reason: HOSPADM

## 2023-08-11 RX ORDER — LABETALOL HCL 20 MG/4 ML
10 SYRINGE (ML) INTRAVENOUS
Status: DISCONTINUED | OUTPATIENT
Start: 2023-08-11 | End: 2023-08-12 | Stop reason: HOSPADM

## 2023-08-11 RX ORDER — INSULIN ASPART 100 [IU]/ML
1-10 INJECTION, SOLUTION INTRAVENOUS; SUBCUTANEOUS EVERY 6 HOURS PRN
Status: DISCONTINUED | OUTPATIENT
Start: 2023-08-12 | End: 2023-08-12

## 2023-08-11 RX ORDER — DIPHENHYDRAMINE HYDROCHLORIDE 50 MG/ML
25 INJECTION INTRAMUSCULAR; INTRAVENOUS
Status: COMPLETED | OUTPATIENT
Start: 2023-08-11 | End: 2023-08-11

## 2023-08-11 RX ORDER — GLUCAGON 1 MG
1 KIT INJECTION
Status: DISCONTINUED | OUTPATIENT
Start: 2023-08-12 | End: 2023-08-12

## 2023-08-11 RX ORDER — LOSARTAN POTASSIUM 50 MG/1
100 TABLET ORAL DAILY
Status: DISCONTINUED | OUTPATIENT
Start: 2023-08-12 | End: 2023-08-12 | Stop reason: HOSPADM

## 2023-08-11 RX ORDER — SODIUM CHLORIDE 0.9 % (FLUSH) 0.9 %
10 SYRINGE (ML) INJECTION
Status: DISCONTINUED | OUTPATIENT
Start: 2023-08-11 | End: 2023-08-12 | Stop reason: HOSPADM

## 2023-08-11 RX ORDER — PROCHLORPERAZINE EDISYLATE 5 MG/ML
10 INJECTION INTRAMUSCULAR; INTRAVENOUS
Status: COMPLETED | OUTPATIENT
Start: 2023-08-11 | End: 2023-08-11

## 2023-08-11 RX ORDER — POLYETHYLENE GLYCOL 3350 17 G/17G
17 POWDER, FOR SOLUTION ORAL DAILY
Status: DISCONTINUED | OUTPATIENT
Start: 2023-08-12 | End: 2023-08-12 | Stop reason: HOSPADM

## 2023-08-11 RX ORDER — GADOBUTROL 604.72 MG/ML
10 INJECTION INTRAVENOUS
Status: COMPLETED | OUTPATIENT
Start: 2023-08-11 | End: 2023-08-11

## 2023-08-11 RX ORDER — AMLODIPINE BESYLATE 5 MG/1
5 TABLET ORAL DAILY
Status: DISCONTINUED | OUTPATIENT
Start: 2023-08-12 | End: 2023-08-12 | Stop reason: HOSPADM

## 2023-08-11 RX ORDER — ACETAMINOPHEN 500 MG
1000 TABLET ORAL
Status: COMPLETED | OUTPATIENT
Start: 2023-08-11 | End: 2023-08-11

## 2023-08-11 RX ORDER — ATORVASTATIN CALCIUM 40 MG/1
40 TABLET, FILM COATED ORAL DAILY
Status: DISCONTINUED | OUTPATIENT
Start: 2023-08-12 | End: 2023-08-12 | Stop reason: HOSPADM

## 2023-08-11 RX ADMIN — PROCHLORPERAZINE EDISYLATE 10 MG: 5 INJECTION INTRAMUSCULAR; INTRAVENOUS at 05:08

## 2023-08-11 RX ADMIN — ACETAMINOPHEN 1000 MG: 500 TABLET ORAL at 05:08

## 2023-08-11 RX ADMIN — GADOBUTROL 10 ML: 604.72 INJECTION INTRAVENOUS at 10:08

## 2023-08-11 RX ADMIN — DIPHENHYDRAMINE HYDROCHLORIDE 25 MG: 50 INJECTION, SOLUTION INTRAMUSCULAR; INTRAVENOUS at 05:08

## 2023-08-11 RX ADMIN — IOHEXOL 100 ML: 350 INJECTION, SOLUTION INTRAVENOUS at 06:08

## 2023-08-11 NOTE — PROVIDER PROGRESS NOTES - EMERGENCY DEPT.
"ED [place] of Care Note  6:04 PM 8/11/2023  Candy Huynh is a 52 y.o. female who presented to the ED on 8/11/2023 and has been managed by Dr. Yo, who reports patient C/O neck pain. I assumed care of patient from off-going ED physician team at 6:04 PM pending CTA, labs, re-evaluation.    On my evaluation, Candy Huynh appears well and is hemodynamically stable. Thus far, Candy Huynh has received:  Medications   diphenhydrAMINE injection 25 mg (25 mg Intravenous Given 8/11/23 1719)   prochlorperazine injection Soln 10 mg (10 mg Intravenous Given 8/11/23 1720)   acetaminophen tablet 1,000 mg (1,000 mg Oral Given 8/11/23 1718)       On my exam, I appreciate:  BP (!) 166/81   Pulse 103   Temp 99.5 °F (37.5 °C) (Oral)   Resp 18   Ht 5' 3" (1.6 m)   Wt 112.5 kg (248 lb)   SpO2 96%   Breastfeeding No   BMI 43.93 kg/m²           Additional ED course:  8:04 PM  Patient found to have right-sided intracranial parenchymal hemorrhage. Discussed with neurosurgery.    9:51 PM  Discussed with neurosurgery, MRI brain ordered, patient to be admitted to neuro ICU      Disposition: Admit  I have discussed and counseled Candy Huynh regarding exam, results, diagnosis, treatment, and plan.  ______________________  Donn Rosen MD   Emergency Medicine Physician  8/11/2023        "

## 2023-08-11 NOTE — ANESTHESIA POST-OP PAIN MANAGEMENT
Acute Pain Service Progress Note    8/11/2023 1016    Contacted patient regarding [facility] follow up. Reports pain remains reasonably well controlled with the infusion. Denies s/s of local anesthetic toxicity. Dressing remains clean, dry, and intact. Reviewed removal process with patient. Plans to discontinue the infusion by tomorrow at 12pm and remove PNC. All questions answered. Encouraged patient to contact the [facility] 24/7 support line at [phone] or the [facility] line at [phone] should any further assistance be needed. Verbalizes understanding.

## 2023-08-11 NOTE — ED PROVIDER NOTES
Encounter Date: 8/11/2023       History     Chief Complaint   Patient presents with    Neck Pain     Posterior neck pain that began last night with head pressure. Reports pain radiates down bilateral arms. Denies injury or trauma. + hx of sciatica. Ambulatory with walker s/p L knee surgery Monday. Denies any fevers or chills.      52-year-old female with a history of diabetes, hypertension, stroke, vertigo, migraines, complaining of a headache, severe, onset around 10:00 p.m. last night, initially in the bifrontal region but over the course of the day it has moved to the occipital region of her head and now radiates down her neck and into her shoulders and upper back bilaterally.  Patient reports some mild photophobia, no nausea or vomiting.  She reports some discomfort with movement of her head and possible neck stiffness.  Patient does report that she has a history of migraines and last night the headache did seem consistent with a migraine and she took Imitrex for this reason.  No fevers.  No recent URI symptoms.  Patient is 5 days status post a left knee replacement and while she has some moderate pain at the site, her postop course seems to have been mostly uneventful.    The history is provided by the patient.     Review of patient's allergies indicates:   Allergen Reactions    Adhesive Blisters     Specifically EKG lead pads    Morphine Other (See Comments)     shake     Past Medical History:   Diagnosis Date    Allergy     Anemia     Anxiety     Asthma     denies- on outside problem list in Care Everywhere    Depression     Diabetes mellitus     Diabetes mellitus, type 2     Fibromyalgia     GERD (gastroesophageal reflux disease)     Hypertension     Stroke     TIA    Vertigo      Past Surgical History:   Procedure Laterality Date    ARTHROSCOPIC REPAIR OF ROTATOR CUFF OF SHOULDER Right 9/6/2022    Procedure: REPAIR, ROTATOR CUFF, ARTHROSCOPIC;  Surgeon: Ministerio Orr Jr., MD;  Location: [facility] OR;   Service: Orthopedics;  Laterality: Right;  need opus system  [facility] notifed CC     ARTHROSCOPY OF KNEE Left 2021    Procedure: ARTHROSCOPY, KNEE;  Surgeon: Donnie Campuzano MD;  Location: [facility] OR;  Service: Orthopedics;  Laterality: Left;     SECTION      DECOMPRESSION OF SUBACROMIAL SPACE  2022    Procedure: DECOMPRESSION, SUBACROMIAL SPACE;  Surgeon: Ministerio Orr Jr., MD;  Location: [facility] OR;  Service: Orthopedics;;    EXCISION OF MASS OF EXTREMITY Left 2019    Procedure: EXCISION, MASS, EXTREMITY;  Surgeon: Honorio Pulido IV, MD;  Location: [facility] OR;  Service: Orthopedics;  Laterality: Left;  excision lipoma  Request Lacie    HERNIA REPAIR      HYSTERECTOMY      KNEE ARTHROSCOPY W/ MENISCECTOMY  2021    Procedure: ARTHROSCOPY, KNEE, WITH MENISCECTOMY;  Surgeon: Donnie Campuzano MD;  Location: [facility];  Service: Orthopedics;;    NASAL SEPTOPLASTY      RECONSTRUCTION OF ACROMIOCLAVICULAR JOINT  2022    Procedure: RECONSTRUCTION, ACROMIOCLAVICULAR JOINT;  Surgeon: Ministerio Orr Jr., MD;  Location: [facility] OR;  Service: Orthopedics;;    TOTAL KNEE ARTHROPLASTY Right 10/26/2022    Procedure: ARTHROPLASTY, KNEE, TOTAL RIGHT: BALDO - NEXGEN;  Surgeon: Nolberto Fraser MD;  Location: [facility];  Service: Orthopedics;  Laterality: Right;    TOTAL KNEE ARTHROPLASTY Left 2023    Procedure: ARTHROPLASTY, KNEE, TOTAL: LEFT: BALDO NEXGEN;  Surgeon: Nolberto Fraser MD;  Location: [facility];  Service: Orthopedics;  Laterality: Left;     Family History   Problem Relation Age of Onset    No Known Problems Mother     Hypertension Father     Hypertension Sister     No Known Problems Sister     No Known Problems Brother     No Known Problems Daughter     No Known Problems Daughter     No Known Problems Daughter     No Known Problems Son      Social History     Tobacco Use    Smoking status: Former     Current packs/day: 0.00    Smokeless tobacco: Never   Substance Use Topics    Alcohol use: Yes      Comment: occasional    Drug use: Never     Review of Systems    Physical Exam     Initial Vitals [08/11/23 1525]   BP Pulse Resp Temp SpO2   (!) 166/81 103 18 99.5 °F (37.5 °C) 96 %      MAP       --         Physical Exam    Nursing note and vitals reviewed.  Constitutional: Vital signs are normal. She appears well-developed and well-nourished. She is not diaphoretic.  Non-toxic appearance. She does not appear ill. No distress.   HENT:   Head: Normocephalic and atraumatic.   Mouth/Throat: Mucous membranes are normal. Mucous membranes are not dry.   Eyes: Conjunctivae, EOM and lids are normal. Pupils are equal, round, and reactive to light.   No photophobia appreciated   Neck: Neck supple.   Normal range of motion.  Cardiovascular:  Normal rate.           Pulmonary/Chest: No respiratory distress.   Musculoskeletal:      Cervical back: Normal range of motion and neck supple.      Comments: Left lower extremity:  There is a large intact dressing/adhesive bandage over the left knee.  There is some mild swelling to the leg, no erythema, no drainage, no significant induration noted.     Neurological: She is alert and oriented to person, place, and time. She has normal strength. No cranial nerve deficit or sensory deficit. GCS score is 15. GCS eye subscore is 4. GCS verbal subscore is 5. GCS motor subscore is 6.   Skin: Skin is dry and intact. No pallor.   Psychiatric: She has a normal mood and affect. Her speech is normal and behavior is normal.         ED Course   Procedures  Labs Reviewed   CBC W/ AUTO DIFFERENTIAL - Abnormal; Notable for the following components:       Result Value    RBC 3.81 (*)     Hemoglobin 10.9 (*)     Hematocrit 34.0 (*)     RDW 15.1 (*)     All other components within normal limits   COMPREHENSIVE METABOLIC PANEL - Abnormal; Notable for the following components:    Albumin 3.3 (*)     All other components within normal limits   SEDIMENTATION RATE - Abnormal; Notable for the following  components:    Sed Rate 80 (*)     All other components within normal limits   C-REACTIVE PROTEIN - Abnormal; Notable for the following components:    CRP 40.3 (*)     All other components within normal limits   CBC W/ AUTO DIFFERENTIAL - Abnormal; Notable for the following components:    RBC 3.45 (*)     Hemoglobin 9.9 (*)     Hematocrit 31.3 (*)     MCHC 31.6 (*)     RDW 15.0 (*)     All other components within normal limits   HIV 1 / 2 ANTIBODY    Narrative:     Release to patient->Immediate   HEPATITIS C ANTIBODY    Narrative:     Release to patient->Immediate   URINALYSIS, REFLEX TO URINE CULTURE    Narrative:     Specimen Source->Urine   SARS-COV-2 RNA AMPLIFICATION, QUAL   PROTIME-INR   APTT   HEMOGLOBIN A1C   ISTAT LACTATE   POCT GLUCOSE MONITORING CONTINUOUS        ECG Results              EKG, 12 - Lead (Final result)  Result time 08/12/23 09:56:20      Final result by Interface, Lab In [facility] (08/12/23 09:56:20)                   Narrative:    Test Reason : Z91.89,    Vent. Rate : 075 BPM     Atrial Rate : 075 BPM     P-R Int : 158 ms          QRS Dur : 084 ms      QT Int : 390 ms       P-R-T Axes : 082 009 051 degrees     QTc Int : 435 ms    Normal sinus rhythm  Normal ECG  When compared with ECG of 31-JUL-2023 11:53,  No significant change was found  Confirmed by ALEXY SHANNON MD ([id number]) on 8/12/2023 9:56:07 AM    Referred By: [email or web address]   SELF           Confirmed By:ALEXY SHANNON MD                                     EKG 12-lead (Final result)  Result time 08/12/23 08:12:51      Final result by Interface, Lab In [facility] (08/12/23 08:12:51)                   Narrative:    Test Reason : R51.9,    Vent. Rate : 086 BPM     Atrial Rate : 086 BPM     P-R Int : 148 ms          QRS Dur : 088 ms      QT Int : 356 ms       P-R-T Axes : 067 008 066 degrees     QTc Int : 426 ms    Normal sinus rhythm  Normal ECG  When compared with ECG of 31-JUL-2023 11:53,  Criteria for Septal infarct are no longer  Present  Nonspecific T wave abnormality now evident in Lateral leads  Confirmed by ALEXY SHANNON MD ([id number]) on 8/12/2023 8:12:42 AM    Referred By: [email or web address]   SELF           Confirmed By:ALEXY SHANNON MD                                  Imaging Results              MRI Brain W WO Contrast (Final result)  Result time 08/11/23 23:37:55      Final result by Jose De Jesus Pedro DO (08/11/23 23:37:55)                   Impression:      Small extra-axial mass along the right cerebral convexity containing blood or proteinaceous products.  Differential includes a dural metastasis or atypical meningioma.      Electronically signed by: Jose De Jesus Pedro  Date:    08/11/2023  Time:    23:37               Narrative:    EXAMINATION:  MRI BRAIN W WO CONTRAST    CLINICAL HISTORY:  Headache, new or worsening (Age >= 50y);Eval AV malformation;    TECHNIQUE:  Multiplanar, multisequence MR imaging of the brain was performed before and after the administration of 10 mL Gadavist intravenous contrast.    COMPARISON:  CTA head and neck from 08/11/2023.    FINDINGS:  There is no evidence of restricted diffusion to suggest an acute infarction.  There is a small extra-axial, homogeneously enhancing mass along the right cerebral convexity measuring approximately 1.3 x 0.8 x 1.0 cm.  There is no evidence of significant mass effect, vasogenic edema, or midline shift.  There is associated susceptibility, compatible with intrinsic blood or proteinaceous products.  There is no evidence of a large draining vein or evidence of flow voids to suggest a vascular malformation.  Ventricles are normal in size for age without evidence of hydrocephalus.  The brain parenchyma otherwise is within normal limits.  No extra-axial fluid collection.  Normal vascular flow voids.  Bone marrow signal intensity is normal. Paranasal sinuses and mastoid air cells are clear.                                       CTA Head and Neck (xpd) (Final result)  Result time 08/11/23  19:42:35      Final result by Pedro Esparza MD (08/11/23 19:42:35)                   Impression:      Right parietal lobe nonenhancing, peripheral hyperattenuating focus concerning for parenchymal hematoma.  Hyperdense primary CNS neoplasm or localized subarachnoid hemorrhage not entirely excluded but considered less likely.  Further evaluation/follow-up as warranted.    CTA head and neck shows no evidence of high-grade stenosis, aneurysm, or large vessel occlusion.    Electronically signed by resident: Iman Page  Date:    08/11/2023  Time:    18:23    Electronically signed by: Pedro Esparza MD  Date:    08/11/2023  Time:    19:42               Narrative:    EXAMINATION:  CTA HEAD AND NECK (XPD)    CLINICAL HISTORY:  Neuro deficit, acute, stroke suspected;Dizziness, persistent/recurrent, cardiac or vascular cause suspected;    TECHNIQUE:  Axial CT images obtained throughout the region of the head before and after the administration of intravenous contrast.  CT angiogram was performed through the cervical and intracranial vasculature during the IV bolus administration of 100mL of Omnipaque 350.  Multiplanar MPR and MIP reformats were performed.    COMPARISON:  None    FINDINGS:  CT Head:    The ventricles are normal in size without evidence of hydrocephalus.    There is a 1.1 x 0.6 cm peripheral right parietal hyperattenuating focus, concerning for intraparenchymal hemorrhage.  There is no significant adjacent edema or mass effect.  No major vascular distribution infarct.  No enhancing lesions.      The cranium appears intact. Mastoid air cells and paranasal sinuses are essentially clear.    There is a 0.3 cm hypodense nodule in the left thyroid lobe. The soft tissues of the neck are otherwise unremarkable.    The visualized portions of lung apices show mild paraseptal emphysema.    Bones show no acute abnormalities.      CTA:    There is a common trunk of the brachiocephalic and left common carotid  arteries.    The common and internal carotid arteries are normal in course and caliber. No significant stenosis in either carotid bifurcation.    The vertebral origins are patent. The cervical vertebral arteries are normal in course and caliber. Vertebrobasilar system is within normal limits without focal abnormality.  Left dominant vertebral artery.    The anterior, middle, and posterior cerebral arteries are within normal limits, without evidence of significant stenosis, focal occlusion, or intracranial aneurysm formation.  There is hypoplastic right posterior communicating artery.    The venous structures are within normal limits.                                       X-Ray Chest AP Portable (Final result)  Result time 08/11/23 18:29:50      Final result by Jose De Jesus Pedro DO (08/11/23 18:29:50)                   Impression:      No acute abnormality.      Electronically signed by: Jose De Jesus Pedro  Date:    08/11/2023  Time:    18:29               Narrative:    EXAMINATION:  XR CHEST AP PORTABLE    CLINICAL HISTORY:  Sepsis;    TECHNIQUE:  Single frontal view of the chest was performed.    COMPARISON:  07/20/2022.    FINDINGS:  The lungs are well expanded and clear. No focal opacities are seen. The pleural spaces are clear. The cardiac silhouette is unremarkable. The visualized osseous structures are unremarkable.                                       Medications   sodium chloride 0.9% flush 10 mL (has no administration in time range)   labetalol 20 mg/4 mL (5 mg/mL) IV syring (has no administration in time range)   polyethylene glycol packet 17 g (0 g Oral Hold 8/12/23 0900)   ondansetron injection 4 mg (has no administration in time range)   hydrALAZINE injection 10 mg (has no administration in time range)   acetaminophen tablet 650 mg (650 mg Oral Given 8/12/23 0852)   amLODIPine tablet 5 mg (5 mg Oral Given 8/12/23 0850)   losartan tablet 100 mg (100 mg Oral Given 8/12/23 0850)   atorvastatin tablet 40 mg  (40 mg Oral Given 8/12/23 0851)   glucagon (human recombinant) injection 1 mg (has no administration in time range)   insulin aspart U-100 pen 1-10 Units (has no administration in time range)   levETIRAcetam injection 500 mg (500 mg Intravenous Given 8/12/23 0855)   FLUoxetine capsule 40 mg (40 mg Oral Given 8/12/23 0849)   oxyCODONE immediate release tablet 5 mg (has no administration in time range)   diphenhydrAMINE injection 25 mg (25 mg Intravenous Given 8/11/23 1719)   prochlorperazine injection Soln 10 mg (10 mg Intravenous Given 8/11/23 1720)   acetaminophen tablet 1,000 mg (1,000 mg Oral Given 8/11/23 1718)   iohexoL (OMNIPAQUE 350) injection 100 mL (100 mLs Intravenous Given 8/11/23 1812)   gadobutroL (GADAVIST) injection 10 mL (10 mLs Intravenous Given 8/11/23 2259)   metoclopramide HCl injection 10 mg (10 mg Intravenous Given 8/12/23 0138)   magnesium sulfate 2g in water 50mL IVPB (premix) (0 g Intravenous Stopped 8/12/23 0447)   sodium chloride 0.9% bolus 500 mL 500 mL (0 mLs Intravenous Stopped 8/12/23 0239)   oxyCODONE immediate release tablet 5 mg (5 mg Oral Given 8/12/23 0853)     Medical Decision Making:   History:   Old Medical Records: I decided to obtain old medical records.  Old Records Summarized: records from clinic visits and records from previous admission(s).  Differential Diagnosis:   DDx would include diagnoses such as tension headache, viral syndrome, migraine and also more severe processes like  SAH, ICH, meningitis/encephalitis, central venous thrombosis, idiopathic intracranial HTN, temporal arteritis, PRES, pituitary apoplexy, vertebral/carotid artery dissection, brain mass, acute glaucoma    Given severity of headache, association with neck pain, would want to rule out more serious causes like subarachnoid hemorrhage, dissection, infection    Independently Interpreted Test(s):   I have ordered and independently interpreted X-rays - see prior notes.  I have ordered and independently  interpreted EKG Reading(s) - see prior notes  Clinical Tests:   Lab Tests: Ordered and Reviewed  Radiological Study: Ordered and Reviewed  Medical Tests: Reviewed and Ordered  ED Management:  Labs including CBC, CMP, cultures, ESR, CRP  Will give patient Compazine and Benadryl and Tylenol as she says this does have some overlap with her migraine syndrome  Will start with CTA head and neck  If this imaging studies unrevealing, patient will likely need further investigation.  The question would be whether to proceed with an MRI of her cervical spine to rule out a cervical spine infection versus going to a lumbar puncture to rule out meningitis.  Will defer to the next provider who will be taking over her care and 30 minutes.  And will partly depend on patient's response to treatment and her lab results.            Attending Attestation:         Attending Critical Care:   Critical Care Times:   ==============================================================  Total Critical Care Time - exclusive of procedural time: 30 minutes.  ==============================================================  Critical care was necessary to treat or prevent imminent or life-threatening deterioration of the following conditions: CNS failure.   Critical care was time spent personally by me on the following activities: review of x-rays / CT sent with the patient, examination of patient, obtaining history from patient or relative, review of old charts, development of treatment plan with patient or relative, ordering lab, x-rays, and/or EKG, ordering and performing treatments and interventions, evaluation of patient's response to treatment, discussion with consultants and re-evaluation of patient's conition.   Critical Care Condition: potentially life-threatening                        Clinical Impression:   Final diagnoses:  [R51.9] Headache  [I62.9] Intracranial hemorrhage (Primary)        ED Disposition Condition    Admit Stable                 Aggie Yo MD  08/12/23 2291

## 2023-08-11 NOTE — ED TRIAGE NOTES
Pt arrives to ED with c/o of neck pain radiating down her back and arms. Pt c/o of headache. Pt denies CP or SOB. Pt had left knee surgery on Monday. Pt has ropivacaine infusing to her left knee. Pt denies any numbness or tingling.

## 2023-08-11 NOTE — TELEPHONE ENCOUNTER
Pt called hotline stating she has neck pain and pressure in her head, she states that she started with HA yesterday that now id neck pain and head pressure. She states she does get some relief when she lays down but still feels head pressure. She rates the pain at worst 10/10. Spoke to Donna TAYLOR and advised pt to call anesthesia nurse/team. Confirmed pt has correct number and instructed she call now. Pt will call back if she does not reach anesthesia. Understanding verbalized.

## 2023-08-12 VITALS
DIASTOLIC BLOOD PRESSURE: 80 MMHG | TEMPERATURE: 99 F | HEART RATE: 78 BPM | BODY MASS INDEX: 45.89 KG/M2 | OXYGEN SATURATION: 96 % | WEIGHT: 259 LBS | HEIGHT: 63 IN | RESPIRATION RATE: 24 BRPM | SYSTOLIC BLOOD PRESSURE: 121 MMHG

## 2023-08-12 PROBLEM — G44.52 NEW DAILY PERSISTENT HEADACHE: Status: ACTIVE | Noted: 2023-08-12

## 2023-08-12 LAB
ABO + RH BLD: NORMAL
ALBUMIN SERPL BCP-MCNC: 3 G/DL (ref 3.5–5.2)
ALP SERPL-CCNC: 85 U/L (ref 55–135)
ALT SERPL W/O P-5'-P-CCNC: 23 U/L (ref 10–44)
AMPHET+METHAMPHET UR QL: NEGATIVE
ANION GAP SERPL CALC-SCNC: 9 MMOL/L (ref 8–16)
APTT PPP: 28.7 SEC (ref 21–32)
ASCENDING AORTA: 2.71 CM
AST SERPL-CCNC: 22 U/L (ref 10–40)
AV INDEX (PROSTH): 0.75
AV MEAN GRADIENT: 5 MMHG
AV PEAK GRADIENT: 9 MMHG
AV VALVE AREA BY VELOCITY RATIO: 2.48 CM²
AV VALVE AREA: 2.4 CM²
AV VELOCITY RATIO: 0.78
BARBITURATES UR QL SCN>200 NG/ML: NEGATIVE
BASOPHILS # BLD AUTO: 0.03 K/UL (ref 0–0.2)
BASOPHILS NFR BLD: 0.4 % (ref 0–1.9)
BENZODIAZ UR QL SCN>200 NG/ML: NEGATIVE
BILIRUB SERPL-MCNC: 0.4 MG/DL (ref 0.1–1)
BILIRUB UR QL STRIP: NEGATIVE
BLD GP AB SCN CELLS X3 SERPL QL: NORMAL
BSA FOR ECHO PROCEDURE: 2.29 M2
BUN SERPL-MCNC: 11 MG/DL (ref 6–20)
BZE UR QL SCN: NEGATIVE
CALCIUM SERPL-MCNC: 9.1 MG/DL (ref 8.7–10.5)
CANNABINOIDS UR QL SCN: NEGATIVE
CHLORIDE SERPL-SCNC: 105 MMOL/L (ref 95–110)
CHOLEST SERPL-MCNC: 108 MG/DL (ref 120–199)
CHOLEST/HDLC SERPL: 2.8 {RATIO} (ref 2–5)
CLARITY UR REFRACT.AUTO: CLEAR
CO2 SERPL-SCNC: 27 MMOL/L (ref 23–29)
COLOR UR AUTO: YELLOW
CREAT SERPL-MCNC: 0.8 MG/DL (ref 0.5–1.4)
CREAT UR-MCNC: 34 MG/DL (ref 15–325)
CV ECHO LV RWT: 0.32 CM
DIFFERENTIAL METHOD: ABNORMAL
DOP CALC AO PEAK VEL: 1.47 M/S
DOP CALC AO VTI: 28.99 CM
DOP CALC LVOT AREA: 3.2 CM2
DOP CALC LVOT DIAMETER: 2.02 CM
DOP CALC LVOT PEAK VEL: 1.14 M/S
DOP CALC LVOT STROKE VOLUME: 69.51 CM3
DOP CALCLVOT PEAK VEL VTI: 21.7 CM
E WAVE DECELERATION TIME: 166.74 MSEC
E/A RATIO: 1.15
E/E' RATIO: 6.8 M/S
ECHO LV POSTERIOR WALL: 0.8 CM (ref 0.6–1.1)
EOSINOPHIL # BLD AUTO: 0.2 K/UL (ref 0–0.5)
EOSINOPHIL NFR BLD: 2.1 % (ref 0–8)
ERYTHROCYTE [DISTWIDTH] IN BLOOD BY AUTOMATED COUNT: 15 % (ref 11.5–14.5)
EST. GFR  (NO RACE VARIABLE): >60 ML/MIN/1.73 M^2
ESTIMATED AVG GLUCOSE: 111 MG/DL (ref 68–131)
ETHANOL UR-MCNC: <10 MG/DL
FRACTIONAL SHORTENING: 46 % (ref 28–44)
GLUCOSE SERPL-MCNC: 93 MG/DL (ref 70–110)
GLUCOSE UR QL STRIP: NEGATIVE
HBA1C MFR BLD: 5.5 % (ref 4–5.6)
HCT VFR BLD AUTO: 31.3 % (ref 37–48.5)
HDLC SERPL-MCNC: 38 MG/DL (ref 40–75)
HDLC SERPL: 35.2 % (ref 20–50)
HGB BLD-MCNC: 9.9 G/DL (ref 12–16)
HGB UR QL STRIP: NEGATIVE
IMM GRANULOCYTES # BLD AUTO: 0.03 K/UL (ref 0–0.04)
IMM GRANULOCYTES NFR BLD AUTO: 0.4 % (ref 0–0.5)
INR PPP: 1 (ref 0.8–1.2)
INTERVENTRICULAR SEPTUM: 0.74 CM (ref 0.6–1.1)
IVRT: 83.73 MSEC
KETONES UR QL STRIP: NEGATIVE
LA MAJOR: 4.88 CM
LA MINOR: 4.91 CM
LA WIDTH: 3.79 CM
LDLC SERPL CALC-MCNC: 56.6 MG/DL (ref 63–159)
LEFT ATRIUM SIZE: 3.32 CM
LEFT ATRIUM VOLUME INDEX MOD: 24.4 ML/M2
LEFT ATRIUM VOLUME INDEX: 24.2 ML/M2
LEFT ATRIUM VOLUME MOD: 52.65 CM3
LEFT ATRIUM VOLUME: 52.35 CM3
LEFT INTERNAL DIMENSION IN SYSTOLE: 2.67 CM (ref 2.1–4)
LEFT VENTRICLE DIASTOLIC VOLUME INDEX: 53.61 ML/M2
LEFT VENTRICLE DIASTOLIC VOLUME: 115.8 ML
LEFT VENTRICLE MASS INDEX: 59 G/M2
LEFT VENTRICLE SYSTOLIC VOLUME INDEX: 12.1 ML/M2
LEFT VENTRICLE SYSTOLIC VOLUME: 26.16 ML
LEFT VENTRICULAR INTERNAL DIMENSION IN DIASTOLE: 4.96 CM (ref 3.5–6)
LEFT VENTRICULAR MASS: 127.56 G
LEUKOCYTE ESTERASE UR QL STRIP: NEGATIVE
LV LATERAL E/E' RATIO: 6.54 M/S
LV SEPTAL E/E' RATIO: 7.08 M/S
LYMPHOCYTES # BLD AUTO: 2.7 K/UL (ref 1–4.8)
LYMPHOCYTES NFR BLD: 32.2 % (ref 18–48)
MAGNESIUM SERPL-MCNC: 2 MG/DL (ref 1.6–2.6)
MCH RBC QN AUTO: 28.7 PG (ref 27–31)
MCHC RBC AUTO-ENTMCNC: 31.6 G/DL (ref 32–36)
MCV RBC AUTO: 91 FL (ref 82–98)
METHADONE UR QL SCN>300 NG/ML: NEGATIVE
MONOCYTES # BLD AUTO: 0.5 K/UL (ref 0.3–1)
MONOCYTES NFR BLD: 5.9 % (ref 4–15)
MV PEAK A VEL: 0.74 M/S
MV PEAK E VEL: 0.85 M/S
MV STENOSIS PRESSURE HALF TIME: 48.36 MS
MV VALVE AREA P 1/2 METHOD: 4.55 CM2
NEUTROPHILS # BLD AUTO: 4.9 K/UL (ref 1.8–7.7)
NEUTROPHILS NFR BLD: 59 % (ref 38–73)
NITRITE UR QL STRIP: NEGATIVE
NONHDLC SERPL-MCNC: 70 MG/DL
NRBC BLD-RTO: 0 /100 WBC
OPIATES UR QL SCN: ABNORMAL
PCP UR QL SCN>25 NG/ML: NEGATIVE
PH UR STRIP: 8 [PH] (ref 5–8)
PHOSPHATE SERPL-MCNC: 3.1 MG/DL (ref 2.7–4.5)
PISA TR MAX VEL: 2.3 M/S
PLATELET # BLD AUTO: 307 K/UL (ref 150–450)
PMV BLD AUTO: 11.4 FL (ref 9.2–12.9)
POCT GLUCOSE: 102 MG/DL (ref 70–110)
POCT GLUCOSE: 78 MG/DL (ref 70–110)
POCT GLUCOSE: 85 MG/DL (ref 70–110)
POTASSIUM SERPL-SCNC: 4.3 MMOL/L (ref 3.5–5.1)
PROT SERPL-MCNC: 6.7 G/DL (ref 6–8.4)
PROT UR QL STRIP: NEGATIVE
PROTHROMBIN TIME: 10.9 SEC (ref 9–12.5)
RA MAJOR: 3.95 CM
RA PRESSURE ESTIMATED: 3 MMHG
RA WIDTH: 3.67 CM
RBC # BLD AUTO: 3.45 M/UL (ref 4–5.4)
RIGHT VENTRICULAR END-DIASTOLIC DIMENSION: 3.14 CM
RV TB RVSP: 5 MMHG
SINUS: 2.61 CM
SODIUM SERPL-SCNC: 141 MMOL/L (ref 136–145)
SP GR UR STRIP: 1.02 (ref 1–1.03)
SPECIMEN OUTDATE: NORMAL
STJ: 2.27 CM
TDI LATERAL: 0.13 M/S
TDI SEPTAL: 0.12 M/S
TDI: 0.13 M/S
TOXICOLOGY INFORMATION: ABNORMAL
TR MAX PG: 21 MMHG
TRICUSPID ANNULAR PLANE SYSTOLIC EXCURSION: 2.77 CM
TRIGL SERPL-MCNC: 67 MG/DL (ref 30–150)
TSH SERPL DL<=0.005 MIU/L-ACNC: 2.51 UIU/ML (ref 0.4–4)
TV REST PULMONARY ARTERY PRESSURE: 24 MMHG
URN SPEC COLLECT METH UR: NORMAL
WBC # BLD AUTO: 8.27 K/UL (ref 3.9–12.7)
Z-SCORE OF LEFT VENTRICULAR DIMENSION IN END DIASTOLE: -3.56
Z-SCORE OF LEFT VENTRICULAR DIMENSION IN END SYSTOLE: -3.79

## 2023-08-12 PROCEDURE — 63600175 PHARM REV CODE 636 W HCPCS

## 2023-08-12 PROCEDURE — 97165 OT EVAL LOW COMPLEX 30 MIN: CPT

## 2023-08-12 PROCEDURE — 97116 GAIT TRAINING THERAPY: CPT

## 2023-08-12 PROCEDURE — G0378 HOSPITAL OBSERVATION PER HR: HCPCS

## 2023-08-12 PROCEDURE — 96366 THER/PROPH/DIAG IV INF ADDON: CPT

## 2023-08-12 PROCEDURE — 81003 URINALYSIS AUTO W/O SCOPE: CPT

## 2023-08-12 PROCEDURE — 25000003 PHARM REV CODE 250

## 2023-08-12 PROCEDURE — 25000003 PHARM REV CODE 250: Performed by: NURSE PRACTITIONER

## 2023-08-12 PROCEDURE — 99291 PR CRITICAL CARE, E/M 30-74 MINUTES: ICD-10-PCS | Mod: ,,,

## 2023-08-12 PROCEDURE — 99223 1ST HOSP IP/OBS HIGH 75: CPT | Mod: ,,, | Performed by: PSYCHIATRY & NEUROLOGY

## 2023-08-12 PROCEDURE — 96375 TX/PRO/DX INJ NEW DRUG ADDON: CPT

## 2023-08-12 PROCEDURE — 96361 HYDRATE IV INFUSION ADD-ON: CPT

## 2023-08-12 PROCEDURE — 96365 THER/PROPH/DIAG IV INF INIT: CPT

## 2023-08-12 PROCEDURE — 80307 DRUG TEST PRSMV CHEM ANLYZR: CPT | Performed by: STUDENT IN AN ORGANIZED HEALTH CARE EDUCATION/TRAINING PROGRAM

## 2023-08-12 PROCEDURE — 99291 CRITICAL CARE FIRST HOUR: CPT | Mod: ,,,

## 2023-08-12 PROCEDURE — 99213 PR OFFICE/OUTPT VISIT, EST, LEVL III, 20-29 MIN: ICD-10-PCS | Mod: ,,, | Performed by: NEUROLOGICAL SURGERY

## 2023-08-12 PROCEDURE — 97161 PT EVAL LOW COMPLEX 20 MIN: CPT

## 2023-08-12 PROCEDURE — 85610 PROTHROMBIN TIME: CPT

## 2023-08-12 PROCEDURE — 85730 THROMBOPLASTIN TIME PARTIAL: CPT

## 2023-08-12 PROCEDURE — 99223 PR INITIAL HOSPITAL CARE,LEVL III: ICD-10-PCS | Mod: ,,, | Performed by: PSYCHIATRY & NEUROLOGY

## 2023-08-12 PROCEDURE — 99213 OFFICE O/P EST LOW 20 MIN: CPT | Mod: ,,, | Performed by: NEUROLOGICAL SURGERY

## 2023-08-12 RX ORDER — MAGNESIUM SULFATE HEPTAHYDRATE 40 MG/ML
2 INJECTION, SOLUTION INTRAVENOUS ONCE
Status: COMPLETED | OUTPATIENT
Start: 2023-08-12 | End: 2023-08-12

## 2023-08-12 RX ORDER — METOCLOPRAMIDE HYDROCHLORIDE 5 MG/ML
10 INJECTION INTRAMUSCULAR; INTRAVENOUS ONCE
Status: COMPLETED | OUTPATIENT
Start: 2023-08-12 | End: 2023-08-12

## 2023-08-12 RX ORDER — LEVETIRACETAM 500 MG/5ML
500 INJECTION, SOLUTION, CONCENTRATE INTRAVENOUS EVERY 12 HOURS
Status: DISCONTINUED | OUTPATIENT
Start: 2023-08-12 | End: 2023-08-12 | Stop reason: HOSPADM

## 2023-08-12 RX ORDER — OXYCODONE HYDROCHLORIDE 5 MG/1
5 TABLET ORAL EVERY 6 HOURS PRN
Status: DISCONTINUED | OUTPATIENT
Start: 2023-08-12 | End: 2023-08-12 | Stop reason: HOSPADM

## 2023-08-12 RX ORDER — OXYCODONE HYDROCHLORIDE 5 MG/1
5 TABLET ORAL EVERY 6 HOURS PRN
Status: COMPLETED | OUTPATIENT
Start: 2023-08-12 | End: 2023-08-12

## 2023-08-12 RX ORDER — PAROXETINE HYDROCHLORIDE 20 MG/1
20 TABLET, FILM COATED ORAL EVERY MORNING
COMMUNITY
Start: 2023-08-02

## 2023-08-12 RX ORDER — METFORMIN HYDROCHLORIDE 750 MG/1
750 TABLET, EXTENDED RELEASE ORAL DAILY
COMMUNITY
Start: 2023-07-11

## 2023-08-12 RX ORDER — FLUOXETINE HYDROCHLORIDE 20 MG/1
40 CAPSULE ORAL DAILY
Status: DISCONTINUED | OUTPATIENT
Start: 2023-08-12 | End: 2023-08-12 | Stop reason: HOSPADM

## 2023-08-12 RX ORDER — OXYCODONE HYDROCHLORIDE 5 MG/1
5 TABLET ORAL EVERY 6 HOURS PRN
Status: DISCONTINUED | OUTPATIENT
Start: 2023-08-12 | End: 2023-08-12

## 2023-08-12 RX ORDER — PAROXETINE 10 MG/1
20 TABLET, FILM COATED ORAL DAILY
Status: CANCELLED | OUTPATIENT
Start: 2023-08-13

## 2023-08-12 RX ADMIN — ACETAMINOPHEN 650 MG: 325 TABLET ORAL at 02:08

## 2023-08-12 RX ADMIN — ATORVASTATIN CALCIUM 40 MG: 40 TABLET, FILM COATED ORAL at 08:08

## 2023-08-12 RX ADMIN — METOCLOPRAMIDE 10 MG: 5 INJECTION, SOLUTION INTRAMUSCULAR; INTRAVENOUS at 01:08

## 2023-08-12 RX ADMIN — FLUOXETINE 40 MG: 20 CAPSULE ORAL at 08:08

## 2023-08-12 RX ADMIN — ACETAMINOPHEN 650 MG: 325 TABLET ORAL at 08:08

## 2023-08-12 RX ADMIN — OXYCODONE HYDROCHLORIDE 5 MG: 5 TABLET ORAL at 02:08

## 2023-08-12 RX ADMIN — AMLODIPINE BESYLATE 5 MG: 5 TABLET ORAL at 08:08

## 2023-08-12 RX ADMIN — SODIUM CHLORIDE 500 ML: 9 INJECTION, SOLUTION INTRAVENOUS at 01:08

## 2023-08-12 RX ADMIN — LOSARTAN POTASSIUM 100 MG: 50 TABLET, FILM COATED ORAL at 08:08

## 2023-08-12 RX ADMIN — OXYCODONE HYDROCHLORIDE 5 MG: 5 TABLET ORAL at 08:08

## 2023-08-12 RX ADMIN — LEVETIRACETAM 500 MG: 100 INJECTION, SOLUTION INTRAVENOUS at 08:08

## 2023-08-12 RX ADMIN — MAGNESIUM SULFATE 2 G: 2 INJECTION INTRAVENOUS at 02:08

## 2023-08-12 NOTE — CONSULTS
Inpatient consult to Physical Medicine Rehab  Consult performed by: Mimi Montano NP  Consult ordered by: Jennifer Carroll, LEONARD  Reason for consult: Assess rehab needs      Reviewed patient history and current admission.  Rehab team following.  Full consult to follow.    JOHN Meeks, FNP-C  Physical Medicine & Rehabilitation   08/12/2023

## 2023-08-12 NOTE — PROGRESS NOTES
[facility]  Neurosurgery  Progress Note    Subjective:     History of Present Illness: 53 yo F with PMH of HTN and DM who comes to ED due to back neck pain and headache since yesterday. She reports she was woken up by a bad headache and neck pain last night and has had ongoing pain since then. She takes a baby aspirin every day and last took this morning. Her pain radiates from the back of her head and aches down her neck into shoulder blades.     CTA showed small right parietal hyperdensity concerning for ICH vs SAH component.  systolic on arrival.      Post-Op Info:  * No surgery found *         Interval History: 8/12: Neuro exam stable. NAEON. VSS    Medications:  Continuous Infusions:  Scheduled Meds:   amLODIPine  5 mg Oral Daily    atorvastatin  40 mg Oral Daily    FLUoxetine  40 mg Oral Daily    levETIRAcetam (Keppra) IV (PEDS and ADULTS)  500 mg Intravenous Q12H    losartan  100 mg Oral Daily    polyethylene glycol  17 g Oral Daily     PRN Meds:acetaminophen, hydrALAZINE, labetalol, ondansetron, oxyCODONE, sodium chloride 0.9%     Review of Systems  Objective:     Weight: 117.8 kg (259 lb 11.2 oz)  Body mass index is 46 kg/m².  Vital Signs (Most Recent):  Temp: 98.6 °F (37 °C) (08/12/23 1300)  Pulse: 84 (08/12/23 1400)  Resp: (!) 24 (08/12/23 1400)  BP: 135/62 (08/12/23 1300)  SpO2: 96 % (08/12/23 1400) Vital Signs (24h Range):  Temp:  [98.2 °F (36.8 °C)-99.5 °F (37.5 °C)] 98.6 °F (37 °C)  Pulse:  [] 84  Resp:  [10-33] 24  SpO2:  [93 %-99 %] 96 %  BP: (125-166)/() 135/62     Date 08/12/23 0700 - 08/13/23 0659   Shift 7380-7837 6523-5000 3089-9518 24 Hour Total   INTAKE   P.O. 402   402   Shift Total(mL/kg) 402(3.4)   402(3.4)   OUTPUT   Urine(mL/kg/hr) 1350   1350   Shift Total(mL/kg) 1350(11.5)   1350(11.5)   Weight (kg) 117.8 117.8 117.8 117.8                       Female External Urinary Catheter 08/11/23 2100 (Active)   Skin no redness;no breakdown 08/12/23  0505   Tolerance no signs/symptoms of discomfort 08/12/23 0505   Suction Continuous suction at 70 mmHg 08/12/23 0041   Date of last wick change 08/12/23 08/12/23 0041   Time of last wick change 0041 08/12/23 0041   Output (mL) 200 mL 08/12/23 0305          Physical Exam   Aox3. E4V5M6  PERRL, EOMI  CN II-XII intact grossly.  Face Symmetric, tongue midline, symmetric shoulder shrug.  BUE 5/5, BLE 5/5  Sensation intact throughout.    General: NAD  Head: Normocephalic, atraumatic.  Neck: nuchal rigidity, nontender  CV: distal pulses present  Pulm: non-labored breathing  Abd: soft, nontender  Skin: dry, intact        Neurosurgery Physical Exam    Significant Labs:  Recent Labs   Lab 08/11/23  1633 08/11/23  2340    93    141   K 4.0 4.3    105   CO2 27 27   BUN 12 11   CREATININE 0.7 0.8   CALCIUM 9.5 9.1   MG  --  2.0     Recent Labs   Lab 08/11/23  1633 08/11/23  2340   WBC 9.74 8.27   HGB 10.9* 9.9*   HCT 34.0* 31.3*    307     Recent Labs   Lab 08/11/23  2221 08/12/23  0258   INR 1.0 1.0   APTT 25.9 28.7     Microbiology Results (last 7 days)       Procedure Component Value Units Date/Time    Blood culture x two cultures. Draw prior to antibiotics. [[id number]] Collected: 08/11/23 1634    Order Status: Completed Specimen: Blood from Peripheral, Antecubital, Left Updated: 08/12/23 0115     Blood Culture, Routine No Growth to date    Narrative:      Aerobic and anaerobic    Blood culture x two cultures. Draw prior to antibiotics. [[id number]] Collected: 08/11/23 1635    Order Status: Completed Specimen: Blood from Peripheral, Antecubital, Right Updated: 08/12/23 0115     Blood Culture, Routine No Growth to date    Narrative:      Aerobic and anaerobic          Recent Lab Results  (Last 5 results in the past 24 hours)        08/12/23  1308   08/12/23  0542   08/12/23  0258   08/12/23  0237   08/12/23  0044        Alcohol, Urine       <10         Benzodiazepines       Negative         Methadone  metabolites       Negative         Phencyclidine       Negative         Albumin               Alkaline Phosphatase               ALT               Amphetamine Screen, Ur       Negative         Anion Gap               Appearance, UA       Clear         aPTT     28.7  Comment: Refer to local heparin nomogram for intensity/dose specific   therapeutic   range.             AST               Barbiturate Screen, Ur       Negative         Baso #               Basophil %               Bilirubin (UA)       Negative         BILIRUBIN TOTAL               BUN               Calcium               Chloride               Cholesterol               CO2               Cocaine (Metab.)       Negative         Color, UA       Yellow         Creatinine               Creatinine, Urine       34.0         Differential Method               eGFR               Eos #               Eosinophil %               Estimated Avg Glucose               Glucose               Glucose, UA       Negative         Gran # (ANC)               Gran %               Group & Rh               HDL               HDL/Cholesterol Ratio               Hematocrit               Hemoglobin               Hemoglobin A1C External               Immature Grans (Abs)               Immature Granulocytes               INDIRECT REJI               INR     1.0  Comment: Coumadin Therapy:  2.0 - 3.0 for INR for all indicators except mechanical heart valves  and antiphospholipid syndromes which should use 2.5 - 3.5.             Ketones, UA       Negative         LDL Cholesterol External               Leukocytes, UA       Negative         Lymph #               Lymph %               Magnesium               MCH               MCHC               MCV               Mono #               Mono %               MPV               NITRITE UA       Negative         Non-HDL Cholesterol               nRBC               Occult Blood UA       Negative         Opiate Scrn, Ur       Presumptive Positive          pH, UA       8.0         Phosphorus               Platelets               POCT Glucose 102   78       85       Potassium               PROTEIN TOTAL               Protein, UA       Negative  Comment: Recommend a 24 hour urine protein or a urine   protein/creatinine ratio if globulin induced proteinuria is  clinically suspected.           Protime     10.9           RBC               RDW               Sodium               Specific [place], UA       1.025         Specimen Outdate               Specimen UA       Urine, Clean Catch         Marijuana (THC) Metabolite       Negative         Total Cholesterol/HDL Ratio               Toxicology Information       SEE COMMENT  Comment: This screen includes the following classes of drugs at the listed   cut-off:    Benzodiazepines 200 ng/ml  Methadone 300 ng/ml  Cocaine metabolite 300 ng/ml  Opiates 300 ng/ml  Barbiturates 200 ng/ml  Amphetamines 1000 ng/ml  Marijuana metabs (THC) 50 ng/ml  Phencyclidine (PCP) 25 ng/ml    This is a screening test. If results do not correlate with clinical   presentation, then a confirmatory send out test is advised.     This report is intended for use in clinical monitoring and management   of   patients. It is not intended for use in employment related drug   testing.           Triglycerides               TSH               WBC                                      Significant Diagnostics:    MRI: MRI Brain W WO Contrast    Result Date: 8/11/2023  Small extra-axial mass along the right cerebral convexity containing blood or proteinaceous products.  Differential includes a dural metastasis or atypical meningioma. Electronically signed by: Jose De Jesus Pedro Date:    08/11/2023 Time:    23:37     Assessment/Plan:     * ICH (intracerebral hemorrhage)  51 yo F with PMH of HTN and DM who comes to ED due to back neck pain and headache since yesterday found to have small right parietal ICH vs SAH.    MRI stable. No acute infarcts. Likely meningioma for  outpatient follow-up.    - admit to [facility]CC, q1 neuro checks  - all labs and diagnostics reviewed  - keppra for seizure ppx  - elevate HOB, Na > 135  - SBP < 140  - on room air, ctm  - can continue home medication and diet    No neurosurgical intervention warranted. Please follow-up in the neurosurgery outpatient clinic.           Jurgen Betancur MD  Neurosurgery  [facility]

## 2023-08-12 NOTE — CONSULTS
[facility]  Neurosurgery  Consult Note    Inpatient consult to Neurosurgery  Consult performed by: Taylor Do MD  Consult ordered by: Donn Rosen MD        Subjective:     Chief Complaint/Reason for Admission: headache and neck pain    History of Present Illness: 51 yo F with PMH of HTN and DM who comes to ED due to back neck pain and headache since yesterday. She reports she was woken up by a bad headache and neck pain last night and has had ongoing pain since then. She takes a baby aspirin every day and last took this morning. Her pain radiates from the back of her head and aches down her neck into shoulder blades.     CTA showed small right parietal hyperdensity concerning for ICH vs SAH component.  systolic on arrival.      (Not in a hospital admission)      Review of patient's allergies indicates:   Allergen Reactions    Adhesive Blisters     Specifically EKG lead pads    Morphine Other (See Comments)     shake       Past Medical History:   Diagnosis Date    Allergy     Anemia     Anxiety     Asthma     denies- on outside problem list in Care Everywhere    Depression     Diabetes mellitus     Diabetes mellitus, type 2     Fibromyalgia     GERD (gastroesophageal reflux disease)     Hypertension     Stroke     TIA    Vertigo      Past Surgical History:   Procedure Laterality Date    ARTHROSCOPIC REPAIR OF ROTATOR CUFF OF SHOULDER Right 2022    Procedure: REPAIR, ROTATOR CUFF, ARTHROSCOPIC;  Surgeon: Ministerio Orr Jr., MD;  Location: [facility] OR;  Service: Orthopedics;  Laterality: Right;  need opus system  Latter day notifed CC     ARTHROSCOPY OF KNEE Left 2021    Procedure: ARTHROSCOPY, KNEE;  Surgeon: Donnie Campuzano MD;  Location: [facility] OR;  Service: Orthopedics;  Laterality: Left;     SECTION      DECOMPRESSION OF SUBACROMIAL SPACE  2022    Procedure: DECOMPRESSION, SUBACROMIAL SPACE;  Surgeon: Minisetrio Orr Jr., MD;  Location:  [facility] OR;  Service: Orthopedics;;    EXCISION OF MASS OF EXTREMITY Left 12/6/2019    Procedure: EXCISION, MASS, EXTREMITY;  Surgeon: Honorio Pulido IV, MD;  Location: [facility] OR;  Service: Orthopedics;  Laterality: Left;  excision lipoma  Request Lacie    HERNIA REPAIR      HYSTERECTOMY      KNEE ARTHROSCOPY W/ MENISCECTOMY  1/11/2021    Procedure: ARTHROSCOPY, KNEE, WITH MENISCECTOMY;  Surgeon: Donnie Campuzano MD;  Location: [facility] OR;  Service: Orthopedics;;    NASAL SEPTOPLASTY      RECONSTRUCTION OF ACROMIOCLAVICULAR JOINT  9/6/2022    Procedure: RECONSTRUCTION, ACROMIOCLAVICULAR JOINT;  Surgeon: Ministerio Orr Jr., MD;  Location: [facility] OR;  Service: Orthopedics;;    TOTAL KNEE ARTHROPLASTY Right 10/26/2022    Procedure: ARTHROPLASTY, KNEE, TOTAL RIGHT: BALDO - NEXGEN;  Surgeon: Nolberto Fraser MD;  Location: [facility] OR;  Service: Orthopedics;  Laterality: Right;    TOTAL KNEE ARTHROPLASTY Left 8/7/2023    Procedure: ARTHROPLASTY, KNEE, TOTAL: LEFT: BALDO NEXGEN;  Surgeon: Nolberto Fraser MD;  Location: [facility] OR;  Service: Orthopedics;  Laterality: Left;     Family History       Problem Relation (Age of Onset)    Hypertension Father, Sister    No Known Problems Mother, Sister, Brother, Daughter, Daughter, Daughter, Son          Tobacco Use    Smoking status: Former     Current packs/day: 0.00    Smokeless tobacco: Never   Substance and Sexual Activity    Alcohol use: Yes     Comment: occasional    Drug use: Never    Sexual activity: Yes     Partners: Male     Review of Systems   Neurological:  Positive for headaches.   All other systems reviewed and are negative.    Objective:     Weight: 112.5 kg (248 lb)  Body mass index is 43.93 kg/m².  Vital Signs (Most Recent):  Temp: 99.5 °F (37.5 °C) (08/11/23 1525)  Pulse: 76 (08/11/23 2109)  Resp: 20 (08/11/23 2109)  BP: 136/71 (08/11/23 2109)  SpO2: 97 % (08/11/23 2109) Vital Signs (24h Range):  Temp:  [99.5 °F (37.5 °C)] 99.5 °F (37.5 °C)  Pulse:  []  "76  Resp:  [16-20] 20  SpO2:  [96 %-97 %] 97 %  BP: (134-166)/(71-81) 136/71                                 Physical Exam  Constitutional:       Appearance: She is well-developed and well-nourished.   Eyes:      Extraocular Movements: EOM normal.      Conjunctiva/sclera: Conjunctivae normal.      Pupils: Pupils are equal, round, and reactive to light.   Cardiovascular:      Pulses: Normal pulses.   Abdominal:      Palpations: Abdomen is soft.   Neurological:      Mental Status: She is alert and oriented to person, place, and time.      Comments: AAOx4  PERRL  CN intact  BUE 5/5  RLE pain limited due to recent knee surgery, 5/5 distally  LLE 5/5  No pathological reflexes, no drift  Nuchal rigidity present   Psychiatric:         Mood and Affect: Mood and affect normal.              Physical Exam:    Constitutional: She appears well-developed and well-nourished.     Eyes: Pupils are equal, round, and reactive to light. Conjunctivae and EOM are normal.     Cardiovascular: Normal pulses.     Abdominal: Soft.     Psych/Behavior: She is alert. She is oriented to person, place, and time. She has a normal mood and affect.     Neurological:   AAOx4  PERRL  CN intact  BUE 5/5  RLE pain limited due to recent knee surgery, 5/5 distally  LLE 5/5  No pathological reflexes, no drift  Nuchal rigidity present       Significant Labs:  Recent Labs   Lab 08/11/23  1633         K 4.0      CO2 27   BUN 12   CREATININE 0.7   CALCIUM 9.5     Recent Labs   Lab 08/11/23  1633   WBC 9.74   HGB 10.9*   HCT 34.0*        No results for input(s): "LABPT", "INR", "APTT" in the last 48 hours.  Microbiology Results (last 7 days)       Procedure Component Value Units Date/Time    Blood culture x two cultures. Draw prior to antibiotics. [[id number]] Collected: 08/11/23 1634    Order Status: Sent Specimen: Blood from Peripheral, Antecubital, Left Updated: 08/11/23 1639    Blood culture x two cultures. Draw prior to antibiotics. " [[id number]] Collected: 08/11/23 1637    Order Status: Sent Specimen: Blood from Peripheral, Antecubital, Right Updated: 08/11/23 9160          All pertinent labs from the last 24 hours have been reviewed.    Significant Diagnostics:  I have reviewed all pertinent imaging results/findings within the past 24 hours.    Assessment/Plan:     ICH (intracerebral hemorrhage)  53 yo F with PMH of HTN and DM who comes to ED due to back neck pain and headache since yesterday found to have small right parietal ICH vs SAH.      - admit to NCC, q1 neuro checks  - all labs and diagnostics reviewed  - CTA showed small right parietal hyperdensity concerning for ICH vs SAH component, about 1cm. No vascular abnormalities seen  - MRI brain w wo pending for further workup  - keppra for seizure ppx  - elevate HOB, Na > 135  - SBP < 140  - on room air, ctm  - holding aspirin, no role for reversal at this time  - NPO at midnight in case of need for angiogram tomorrow    Dispo: ongoing; concern for small underlying vascular lesion at this time, needs further workup          Thank you for your consult. I will follow-up with patient. Please contact us if you have any additional questions.    Taylor Do MD  Neurosurgery  [facility]

## 2023-08-12 NOTE — ASSESSMENT & PLAN NOTE
-Patient has a history of migraine and began having a HA and neck pain that began on the night of 8/10 at 2200.  She took Imitrex at that time with little relief.  -Managed by primary team

## 2023-08-12 NOTE — ASSESSMENT & PLAN NOTE
51 yo F with PMH of HTN and DM who comes to ED due to back neck pain and headache since yesterday found to have small right parietal ICH vs SAH.      - admit to NCC, q1 neuro checks  - all labs and diagnostics reviewed  - CTA showed small right parietal hyperdensity concerning for ICH vs SAH component, about 1cm. No vascular abnormalities seen  - MRI brain w wo pending for further workup  - keppra for seizure ppx  - elevate HOB, Na > 135  - SBP < 140  - on room air, ctm  - holding aspirin, no role for reversal at this time  - NPO at midnight in case of need for angiogram tomorrow    Dispo: ongoing; concern for small underlying vascular lesion at this time, needs further workup

## 2023-08-12 NOTE — SUBJECTIVE & OBJECTIVE
Past Medical History:   Diagnosis Date    Allergy     Anemia     Anxiety     Asthma     denies- on outside problem list in Care Everywhere    Depression     Diabetes mellitus     Diabetes mellitus, type 2     Fibromyalgia     GERD (gastroesophageal reflux disease)     Hypertension     Stroke     TIA    Vertigo      Past Surgical History:   Procedure Laterality Date    ARTHROSCOPIC REPAIR OF ROTATOR CUFF OF SHOULDER Right 2022    Procedure: REPAIR, ROTATOR CUFF, ARTHROSCOPIC;  Surgeon: Ministerio Orr Jr., MD;  Location: [facility];  Service: Orthopedics;  Laterality: Right;  need opus system  [employer] notifed CC     ARTHROSCOPY OF KNEE Left 2021    Procedure: ARTHROSCOPY, KNEE;  Surgeon: Donnie Campuzano MD;  Location: [facility] OR;  Service: Orthopedics;  Laterality: Left;     SECTION      DECOMPRESSION OF SUBACROMIAL SPACE  2022    Procedure: DECOMPRESSION, SUBACROMIAL SPACE;  Surgeon: Ministerio Orr Jr., MD;  Location: [facility];  Service: Orthopedics;;    EXCISION OF MASS OF EXTREMITY Left 2019    Procedure: EXCISION, MASS, EXTREMITY;  Surgeon: Honorio Pulido IV, MD;  Location: [facility] OR;  Service: Orthopedics;  Laterality: Left;  excision lipoma  Request Lacie    HERNIA REPAIR      HYSTERECTOMY      KNEE ARTHROSCOPY W/ MENISCECTOMY  2021    Procedure: ARTHROSCOPY, KNEE, WITH MENISCECTOMY;  Surgeon: Donnie Campuzano MD;  Location: [facility] OR;  Service: Orthopedics;;    NASAL SEPTOPLASTY      RECONSTRUCTION OF ACROMIOCLAVICULAR JOINT  2022    Procedure: RECONSTRUCTION, ACROMIOCLAVICULAR JOINT;  Surgeon: Ministerio Orr Jr., MD;  Location: [facility];  Service: Orthopedics;;    TOTAL KNEE ARTHROPLASTY Right 10/26/2022    Procedure: ARTHROPLASTY, KNEE, TOTAL RIGHT: BALDO - NEXGEN;  Surgeon: Nolberto Fraser MD;  Location: [facility] OR;  Service: Orthopedics;  Laterality: Right;    TOTAL KNEE ARTHROPLASTY Left 2023    Procedure: ARTHROPLASTY, KNEE, TOTAL: LEFT: BALDO NEXGEN;  Surgeon: Evelio  Nolberto IGLESIAS MD;  Location: [facility];  Service: Orthopedics;  Laterality: Left;     Family History   Problem Relation Age of Onset    No Known Problems Mother     Hypertension Father     Hypertension Sister     No Known Problems Sister     No Known Problems Brother     No Known Problems Daughter     No Known Problems Daughter     No Known Problems Daughter     No Known Problems Son      Social History     Tobacco Use    Smoking status: Former     Current packs/day: 0.00    Smokeless tobacco: Never   Substance Use Topics    Alcohol use: Yes     Comment: occasional    Drug use: Never     Review of patient's allergies indicates:   Allergen Reactions    Adhesive Blisters     Specifically EKG lead pads    Morphine Other (See Comments)     shake       Medications: I have reviewed the current medication administration record.    Medications Prior to Admission   Medication Sig Dispense Refill Last Dose    aspirin (ECOTRIN) 81 MG EC tablet Take 1 tablet (81 mg total) by mouth 2 (two) times a day. 60 tablet 0 2023    HYDROcodone-acetaminophen (NORCO)  mg per tablet Take 1 tablet by mouth every 6 (six) hours as needed for Pain (Take for 1 week, then resume Norco 7.5mg tablets). 28 tablet 0 2023    methocarbamoL (ROBAXIN) 750 MG Tab Take 1 tablet (750 mg total) by mouth 4 (four) times daily as needed (muscle spasms). 40 tablet 0 2023    acetaminophen (TYLENOL) 650 MG TbSR Take 1 tablet (650 mg total) by mouth 2 (two) times a day. 120 tablet 0     albuterol (PROVENTIL/VENTOLIN HFA) 90 mcg/actuation inhaler SMARTSI Puff(s) By Mouth Every 4 Hours PRN       amLODIPine (NORVASC) 5 MG tablet Take 5 mg by mouth once daily.       azelastine (ASTELIN) 137 mcg (0.1 %) nasal spray 1 spray once daily.       celecoxib (CELEBREX) 200 MG capsule Take 1 capsule (200 mg total) by mouth once daily. 30 capsule 0     cetirizine (ZYRTEC) 10 MG tablet Take 10 mg by mouth every evening.       cholecalciferol, vitamin D3, 1,250  mcg (50,000 unit) capsule Take 50,000 Units by mouth every 7 days.       diazePAM (VALIUM) 2 MG tablet SMARTSI-2 Tablet(s) By Mouth       EPINEPHrine (EPIPEN) 0.3 mg/0.3 mL AtIn as directed       ergocalciferol (ERGOCALCIFEROL) 50,000 unit Cap Take 50,000 Units by mouth every 7 days.       FEROSUL 325 mg (65 mg iron) Tab tablet Take by mouth every other day.       FLUoxetine 20 MG capsule Take 40 mg by mouth once daily.       fluticasone propionate (FLONASE) 50 mcg/actuation nasal spray 1 spray by Each Nostril route once daily.       folic acid (FOLVITE) 1 MG tablet Take 1,000 mcg by mouth.       HYDROcodone-acetaminophen (NORCO) 7.5-325 mg per tablet Take 1 tablet by mouth every 6 (six) hours as needed for Pain.       loratadine (CLARITIN) 10 mg tablet Take 10 mg by mouth once daily.       losartan (COZAAR) 100 MG tablet Take 100 mg by mouth once daily.       meclizine (ANTIVERT) 25 mg tablet Take 25 mg by mouth.       meloxicam (MOBIC) 15 MG tablet Take 1 tablet (15 mg total) by mouth once daily. 30 tablet 3     metFORMIN (GLUCOPHAGE) 1000 MG tablet Take 1,000 mg by mouth 2 (two) times daily with meals.       montelukast (SINGULAIR) 10 mg tablet        MOVANTIK 25 mg tablet Take 25 mg by mouth.       nicotine polacrilex (NICORETTE) 4 MG Gum SMARTSI Each By Mouth PRN       ondansetron (ZOFRAN-ODT) 8 MG TbDL Take 8 mg by mouth every 8 (eight) hours as needed.       pantoprazole (PROTONIX) 40 MG tablet TAKE 1 TABLET BY MOUTH ONCE DAILY 30 MINUTES BEFORE BREAKFAST       paroxetine (PAXIL) 10 MG tablet Take 10 mg by mouth every morning.       PREMARIN 0.625 mg tablet Take 0.625 mg by mouth once daily.       rosuvastatin (CRESTOR) 10 MG tablet Take 10 mg by mouth.       semaglutide (OZEMPIC) 0.25 mg or 0.5 mg (2 mg/3 mL) pen injector Inject 0.25 mg into the skin every 7 (seven) days       senna-docusate 8.6-50 mg (SENNA WITH DOCUSATE SODIUM) 8.6-50 mg per tablet Take 1 tablet by mouth once daily. 30 tablet 0      STOOL SOFTENER 100 mg capsule Take 1 softgel by mouth twice daily 60 capsule 11     sumatriptan (IMITREX) 100 MG tablet Take 100 mg by mouth 2 (two) times daily as needed.          Review of Systems   Constitutional:  Negative for chills, fatigue and fever.   HENT:  Negative for drooling and trouble swallowing.    Eyes:  Negative for photophobia, pain, discharge and visual disturbance.   Respiratory:  Negative for cough, chest tightness, shortness of breath and wheezing.    Cardiovascular:  Negative for chest pain, palpitations and leg swelling.   Gastrointestinal:  Negative for abdominal pain, constipation, diarrhea, nausea and vomiting.   Endocrine: Negative for cold intolerance and heat intolerance.   Genitourinary:  Negative for dysuria, frequency, hematuria and urgency.   Musculoskeletal:  Positive for arthralgias (L knee pain) and neck pain. Negative for neck stiffness.   Skin:  Negative for rash and wound.   Allergic/Immunologic: Negative for environmental allergies and food allergies.   Neurological:  Positive for headaches. Negative for dizziness, tremors, speech difficulty, weakness, light-headedness and numbness.   Hematological:  Negative for adenopathy. Does not bruise/bleed easily.   Psychiatric/Behavioral:  Negative for agitation, confusion and hallucinations.      Objective:     Vital Signs (Most Recent):  Temp: 98.2 °F (36.8 °C) (08/12/23 0041)  Pulse: 79 (08/12/23 0105)  Resp: (!) 33 (08/12/23 0105)  BP: 136/65 (08/12/23 0105)  SpO2: 96 % (08/12/23 0105)    Vital Signs Range (Last 24H):  Temp:  [98.2 °F (36.8 °C)-99.5 °F (37.5 °C)]   Pulse:  []   Resp:  [10-33]   BP: (134-166)/(65-84)   SpO2:  [95 %-99 %]        Physical Exam  Vitals and nursing note reviewed.   Constitutional:       General: She is not in acute distress.     Appearance: She is well-developed. She is not diaphoretic.   HENT:      Head: Normocephalic and atraumatic.      Right Ear: External ear normal.      Left Ear: External  ear normal.      Nose: Nose normal.      Mouth/Throat:      Mouth: Mucous membranes are moist.   Eyes:      General: No scleral icterus.        Right eye: No discharge.         Left eye: No discharge.      Extraocular Movements: Extraocular movements intact.      Conjunctiva/sclera: Conjunctivae normal.   Cardiovascular:      Rate and Rhythm: Normal rate and regular rhythm.   Pulmonary:      Effort: Pulmonary effort is normal. No respiratory distress.   Abdominal:      General: There is no distension.      Palpations: Abdomen is soft.      Tenderness: There is no abdominal tenderness.   Musculoskeletal:      Cervical back: Normal range of motion and neck supple.      Right lower leg: No edema.      Left lower leg: No edema.   Skin:     General: Skin is warm and dry.      Capillary Refill: Capillary refill takes less than 2 seconds.   Neurological:      Mental Status: She is alert and oriented to person, place, and time.              Neurological Exam:   LOC: alert  Attention Span: Good   Language: No aphasia  Articulation: No dysarthria  Orientation: Person, Place, Time   EOM (CN III, IV, VI): Full/intact  Facial Movement (CN VII): Symmetric facial expression    Motor: Arm left  Normal 5/5  Leg left  Normal 5/5  Arm right  Normal 5/5  Leg right Normal 5/5      Laboratory:  CMP:   Recent Labs   Lab 08/11/23  2340   CALCIUM 9.1   ALBUMIN 3.0*   PROT 6.7      K 4.3   CO2 27      BUN 11   CREATININE 0.8   ALKPHOS 85   ALT 23   AST 22   BILITOT 0.4     CBC:   Recent Labs   Lab 08/11/23  2340   WBC 8.27   RBC 3.45*   HGB 9.9*   HCT 31.3*      MCV 91   MCH 28.7   MCHC 31.6*     Lipid Panel:   Recent Labs   Lab 08/11/23  2340   CHOL 108*   LDLCALC 56.6*   HDL 38*   TRIG 67     Coagulation:   Recent Labs   Lab 08/11/23  2221   INR 1.0   APTT 25.9     Hgb A1C:   Recent Labs   Lab 08/11/23  2340   HGBA1C 5.5     TSH:   Recent Labs   Lab 08/11/23  2340   TSH 2.512       Diagnostic Results:      Brain  imaging:  MRI Brain W/WO 8/12/2023 Impression:  Small extra-axial mass along the right cerebral convexity containing blood or proteinaceous products.  Differential includes a dural metastasis or atypical meningioma.    Vessel Imaging:  CTA Head and Neck 8/12/2023 Impression: Right parietal lobe nonenhancing, peripheral hyperattenuating focus concerning for parenchymal hematoma. Hyperdense primary CNS neoplasm or localized subarachnoid hemorrhage not entirely excluded but considered less likely.  Further evaluation/follow-up as warranted.  CTA head and neck shows no evidence of high-grade stenosis, aneurysm, or large vessel occlusion.    Cardiac Evaluation:   TTE pending

## 2023-08-12 NOTE — DISCHARGE INSTRUCTIONS
--Patient stable for discharge to home    --Please take prescriptions as detailed in medication list    --All questions/concerns addressed and answered    --Please followup with neurosurgery clinic in 2 weeks with CT imaging; to be arranged by Neurosurgery Clinic     --Please call immediately for persistent nausea/vomiting; or any new onset altered mentation, seizures, loss of consciousness, double vision, numbness/tingling/weakness, clear drainage from your ears or nose    Post-Injury Self-Care:  -If you take anti-platelet or anti-coagulation medications please DO NOT take these until follow-up in outpatient clinic  -We reccomend several days of cognitive rest with gradual recomencing of daily tasks over the next two weeks; avoid strenuous activities until approved in outpatient clinic  -Please DO NOT use alcohol or any ilicit substances until cleared in outpatient clinic  -Please DO NOT drive or operate heavy machinery until cleared in outpatient clinic; NEVER drive or operate heavy machinery while on narcotic pain medications  -Initially you may experience mild headaches, mild depression, difficulty concentrating, or dizziness

## 2023-08-12 NOTE — ASSESSMENT & PLAN NOTE
-Patient is s/p knee replacement 08/07/2023.  -C/o reporting 10/10 left knee pain.  -Managed by primary team.

## 2023-08-12 NOTE — H&P
[facility] - Neuro Critical Care  Neurocritical Care  History & Physical    Admit Date: 2023  Service Date: 2023  Length of Stay: 1    Subjective:     Chief Complaint: ICH (intracerebral hemorrhage)    History of Present Illness: Ms. Huynh is a 53 yo f w/ pmhx of TIA, HTN, DM, Fibromyalgia, recent knee surgery who presents to the ED due to HA and neck pain since 8/10. She reports that she was awoken by the HA that radiated down her neck to her upper back and arms.     Endorses taking ASA 81mg daily last taken  morning.     CTA w/ small R parietal hyper density concerning for ICH vs SAH component. MRI w/ w/o pending.     Admit to [facility] for continued work up and close neurologic monitoring.     Upon arrival to unit, patient reports improvement in HA/neck pain without resolution, but increased pain in L knee after transfer from ED bed to ICU bed.       Past Medical History:   Diagnosis Date    Allergy     Anemia     Anxiety     Asthma     denies- on outside problem list in Care Everywhere    Depression     Diabetes mellitus     Diabetes mellitus, type 2     Fibromyalgia     GERD (gastroesophageal reflux disease)     Hypertension     Stroke     TIA    Vertigo      Past Surgical History:   Procedure Laterality Date    ARTHROSCOPIC REPAIR OF ROTATOR CUFF OF SHOULDER Right 2022    Procedure: REPAIR, ROTATOR CUFF, ARTHROSCOPIC;  Surgeon: Ministerio Orr Jr., MD;  Location: [facility] OR;  Service: Orthopedics;  Laterality: Right;  need opus system  Hinduism notifed CC     ARTHROSCOPY OF KNEE Left 2021    Procedure: ARTHROSCOPY, KNEE;  Surgeon: Donnie Campuzano MD;  Location: [facility] OR;  Service: Orthopedics;  Laterality: Left;     SECTION      DECOMPRESSION OF SUBACROMIAL SPACE  2022    Procedure: DECOMPRESSION, SUBACROMIAL SPACE;  Surgeon: Ministerio Orr Jr., MD;  Location: [facility] OR;  Service: Orthopedics;;    EXCISION OF MASS OF EXTREMITY Left 2019    Procedure: EXCISION,  MASS, EXTREMITY;  Surgeon: Honorio Pulido IV, MD;  Location: [facility] OR;  Service: Orthopedics;  Laterality: Left;  excision lipoma  Request Lacie    HERNIA REPAIR      HYSTERECTOMY      KNEE ARTHROSCOPY W/ MENISCECTOMY  1/11/2021    Procedure: ARTHROSCOPY, KNEE, WITH MENISCECTOMY;  Surgeon: Donnie Campuzano MD;  Location: [facility] OR;  Service: Orthopedics;;    NASAL SEPTOPLASTY      RECONSTRUCTION OF ACROMIOCLAVICULAR JOINT  9/6/2022    Procedure: RECONSTRUCTION, ACROMIOCLAVICULAR JOINT;  Surgeon: Ministerio Orr Jr., MD;  Location: [facility] OR;  Service: Orthopedics;;    TOTAL KNEE ARTHROPLASTY Right 10/26/2022    Procedure: ARTHROPLASTY, KNEE, TOTAL RIGHT: BALDO - NEXGEN;  Surgeon: Nolberto Fraser MD;  Location: [facility] OR;  Service: Orthopedics;  Laterality: Right;    TOTAL KNEE ARTHROPLASTY Left 8/7/2023    Procedure: ARTHROPLASTY, KNEE, TOTAL: LEFT: BALDO NEXGEN;  Surgeon: Nolberto Fraser MD;  Location: [facility];  Service: Orthopedics;  Laterality: Left;      Current Facility-Administered Medications on File Prior to Encounter   Medication Dose Route Frequency Provider Last Rate Last Admin    fentaNYL 50 mcg/mL injection 100 mcg  100 mcg Intravenous PRN Neno Horton PA-C   25 mcg at 08/07/23 1234    midazolam (VERSED) 1 mg/mL injection 1 mg  1 mg Intravenous PRN Neno Horton PA-C   2 mg at 08/07/23 1120    ropivacaine 0.2% Nimbus PainPRO Pump infusion 500 ML   Perineural Continuous Neno Horton PA-C   New Bag at 08/07/23 1516     Current Outpatient Medications on File Prior to Encounter   Medication Sig Dispense Refill    aspirin (ECOTRIN) 81 MG EC tablet Take 1 tablet (81 mg total) by mouth 2 (two) times a day. 60 tablet 0    HYDROcodone-acetaminophen (NORCO)  mg per tablet Take 1 tablet by mouth every 6 (six) hours as needed for Pain (Take for 1 week, then resume Norco 7.5mg tablets). 28 tablet 0    methocarbamoL (ROBAXIN) 750 MG Tab Take 1 tablet (750 mg total) by mouth 4 (four)  times daily as needed (muscle spasms). 40 tablet 0    acetaminophen (TYLENOL) 650 MG TbSR Take 1 tablet (650 mg total) by mouth 2 (two) times a day. 120 tablet 0    albuterol (PROVENTIL/VENTOLIN HFA) 90 mcg/actuation inhaler SMARTSI Puff(s) By Mouth Every 4 Hours PRN      amLODIPine (NORVASC) 5 MG tablet Take 5 mg by mouth once daily.      azelastine (ASTELIN) 137 mcg (0.1 %) nasal spray 1 spray once daily.      celecoxib (CELEBREX) 200 MG capsule Take 1 capsule (200 mg total) by mouth once daily. 30 capsule 0    cetirizine (ZYRTEC) 10 MG tablet Take 10 mg by mouth every evening.      cholecalciferol, vitamin D3, 1,250 mcg (50,000 unit) capsule Take 50,000 Units by mouth every 7 days.      diazePAM (VALIUM) 2 MG tablet SMARTSI-2 Tablet(s) By Mouth      EPINEPHrine (EPIPEN) 0.3 mg/0.3 mL AtIn as directed      ergocalciferol (ERGOCALCIFEROL) 50,000 unit Cap Take 50,000 Units by mouth every 7 days.      FEROSUL 325 mg (65 mg iron) Tab tablet Take by mouth every other day.      FLUoxetine 20 MG capsule Take 40 mg by mouth once daily.      fluticasone propionate (FLONASE) 50 mcg/actuation nasal spray 1 spray by Each Nostril route once daily.      folic acid (FOLVITE) 1 MG tablet Take 1,000 mcg by mouth.      HYDROcodone-acetaminophen (NORCO) 7.5-325 mg per tablet Take 1 tablet by mouth every 6 (six) hours as needed for Pain.      loratadine (CLARITIN) 10 mg tablet Take 10 mg by mouth once daily.      losartan (COZAAR) 100 MG tablet Take 100 mg by mouth once daily.      meclizine (ANTIVERT) 25 mg tablet Take 25 mg by mouth.      meloxicam (MOBIC) 15 MG tablet Take 1 tablet (15 mg total) by mouth once daily. 30 tablet 3    metFORMIN (GLUCOPHAGE) 1000 MG tablet Take 1,000 mg by mouth 2 (two) times daily with meals.      montelukast (SINGULAIR) 10 mg tablet       MOVANTIK 25 mg tablet Take 25 mg by mouth.      nicotine polacrilex (NICORETTE) 4 MG Gum SMARTSI Each By Mouth PRN      ondansetron  (ZOFRAN-ODT) 8 MG TbDL Take 8 mg by mouth every 8 (eight) hours as needed.      pantoprazole (PROTONIX) 40 MG tablet TAKE 1 TABLET BY MOUTH ONCE DAILY 30 MINUTES BEFORE BREAKFAST      paroxetine (PAXIL) 10 MG tablet Take 10 mg by mouth every morning.      PREMARIN 0.625 mg tablet Take 0.625 mg by mouth once daily.      rosuvastatin (CRESTOR) 10 MG tablet Take 10 mg by mouth.      semaglutide (OZEMPIC) 0.25 mg or 0.5 mg (2 mg/3 mL) pen injector Inject 0.25 mg into the skin every 7 (seven) days      senna-docusate 8.6-50 mg (SENNA WITH DOCUSATE SODIUM) 8.6-50 mg per tablet Take 1 tablet by mouth once daily. 30 tablet 0    STOOL SOFTENER 100 mg capsule Take 1 softgel by mouth twice daily 60 capsule 11    sumatriptan (IMITREX) 100 MG tablet Take 100 mg by mouth 2 (two) times daily as needed.        Allergies: Adhesive and Morphine    Family History   Problem Relation Age of Onset    No Known Problems Mother     Hypertension Father     Hypertension Sister     No Known Problems Sister     No Known Problems Brother     No Known Problems Daughter     No Known Problems Daughter     No Known Problems Daughter     No Known Problems Son      Social History     Tobacco Use    Smoking status: Former     Current packs/day: 0.00    Smokeless tobacco: Never   Substance Use Topics    Alcohol use: Yes     Comment: occasional    Drug use: Never     Review of Systems   Constitutional:  Negative for chills and fever.   HENT:  Negative for sore throat and trouble swallowing.    Eyes:  Positive for photophobia. Negative for pain and visual disturbance.   Respiratory:  Negative for cough and shortness of breath.    Cardiovascular:  Negative for chest pain and palpitations.   Gastrointestinal:  Negative for abdominal pain, nausea and vomiting.   Genitourinary:  Negative for difficulty urinating and dysuria.   Musculoskeletal:  Positive for neck pain. Negative for neck stiffness.   Skin:  Positive for wound (L knee  surgery incision). Negative for rash.   Neurological:  Positive for headaches. Negative for dizziness, tremors, seizures, syncope, facial asymmetry, speech difficulty, weakness and light-headedness.   Psychiatric/Behavioral:  Negative for agitation and confusion.      Objective:     Vitals:    Temp: 98.2 °F (36.8 °C)  Pulse: 79  Rhythm: normal sinus rhythm  BP: 136/65  MAP (mmHg): 93  Resp: (!) 33  SpO2: 96 %    Temp  Min: 98.2 °F (36.8 °C)  Max: 99.5 °F (37.5 °C)  Pulse  Min: 70  Max: 103  BP  Min: 134/72  Max: 166/81  MAP (mmHg)  Min: 93  Max: 114  Resp  Min: 10  Max: 33  SpO2  Min: 95 %  Max: 99 %    No intake/output data recorded.   Unmeasured Output  Stool Occurrence: 0        Physical Exam    Constitutional: Well-nourished, well-developed. Appears stated age. Patient lying in bed on L side resting. Visitor asleep in chair at bedside.   No obvious distress.      Eyes: Clear conjunctiva. Anicteric. No discharge.   Lids without lesions.    HEENT: Normocephalic, atraumatic.   Nose and external ears atraumatic.   Mucus membranes are moist.     Cardio: Regular rate and rhythm on telemetry monitor.    Respiratory: Regular effort, no respiratory distress.    GI: Soft, non-distended, non-tender.    Skin: No erythema or rash on exposed skin. L knee swollen w/ bandage in place from recent total knee replacement.   Scaring over R knee from previous total knee replacement.     Neuro: E3 V5 M6    Arouses easily to voice. Oriented to self, time, place, and situation. Patient is answering all questions appropriately and following all commands briskly.   No facial droop. EOMI. PERRL.     Motor:   No pronator drift  CARDONA spontaneously and against gravity   RUE: 5/5  LUE: 5/5  RLE: 5/5  LLE: Exam limited due to knee swelling from recent surgery. Toe wiggle and minimal left lifting to command.     Sensory:  Sensation grossly intact          Today I personally reviewed pertinent medications, lines/drains/airways, imaging,  notably:CTH       Assessment/Plan:     Neuro  * ICH (intracerebral hemorrhage)  -Admit to [facility] for closer monitoring   -Hourly Neuro checks and VS  -Initial CTh/CTA revealed small R parietal hyperdensity concerning for ICH vs SAH component. 1.1 cm. No vascular abnormalities seen.   -MRI kei w/ w/o with venous done for eval of AV malformation or VST.   -daily ASA. Not reversed. Hold further doses.  -aPTT, PT/INR pending  -SBP < 140  -PRN labetalol and hydralazine  -NSGY consulted  -Vascular Neurology, consulted appreciate recommendations   -EKG and Echo pending   -A1c, TSH, lipid panel pending    -Daily CBC, CMP, mag, phos  -SCDs, hold DVT chemoppx in setting of acute bleed  -Keppra 500mg BID x7 day for seizure ppx  -PT/OT/SLP        Antithrombotics for secondary stroke prevention: Antiplatelets: None: Intracerebral Hemorrhage    Statins for secondary stroke prevention and hyperlipidemia, if present:   Statins: Atorvastatin- 40 mg daily    Aggressive risk factor modification: HTN, Diet, Exercise     Rehab efforts: The patient has been evaluated by a stroke team provider and the therapy needs have been fully considered based off the presenting complaints and exam findings. The following therapy evaluations are needed: PT evaluate and treat, OT evaluate and treat    Diagnostics ordered/pending: HgbA1C to assess blood glucose levels, Lipid Profile to assess cholesterol levels, TSH to assess thyroid function    VTE prophylaxis: Mechanical prophylaxis: Place SCDs    BP parameters: ICH: SBP <140        New daily persistent headache  See primary problem  -Trial migraine cocktail   -HA improving upon arrival to unit per patient     Cardiac/Vascular  Hypertension  SBP goal <140 in setting of acute ICH   -PRN labetalol, hydralazine  -continue home antihypertensives     Endocrine  Diabetes mellitus  HgbA1c 5.5  -hold home metformin while inpatient     Orthopedic  Acute pain of left knee  2/2 to recent total knee  replacement  -PT/OT as appropriate   -tylenol initially for pain management. Escalate as appropriate with neuro checks.         The patient is being Prophylaxed for:  Venous Thromboembolism with: Mechanical  Stress Ulcer with: Not Applicable   Ventilator Pneumonia with: not applicable    Activity Orders          Diet NPO: NPO starting at 08/12 0001    Turn patient starting at 08/12 0000    Elevate HOB starting at 08/11 2301    Bed rest starting at 08/11 1605        Full Code     LEVEL III     Jennifer Carroll PA-C  Neurocritical Care  [facility] - Neuro Critical Care

## 2023-08-12 NOTE — ASSESSMENT & PLAN NOTE
Candy Huynh is a 52 y.o. female with a significant medical history of HTN, GERD, fibromyalgia, DM II, s/p knee replacement (8/8/23), migraine HAs, who presents to the hospital for evaluation of neck pain.  CTA Head and Neck revealed a R parietal lobe ICH.    Antithrombotics for secondary stroke prevention: Antiplatelets: None: Intracerebral Hemorrhage    Statins for secondary stroke prevention and hyperlipidemia, if present:   Statins: None: Reason: not currently indicated, acute IPH    Aggressive risk factor modification: HTN, DM     Rehab efforts: The patient has been evaluated by a stroke team provider and the therapy needs have been fully considered based off the presenting complaints and exam findings. The following therapy evaluations are needed: None    Diagnostics ordered/pending: TTE to assess cardiac function/status     VTE prophylaxis: Mechanical prophylaxis: Place SCDs  None: Reason for No Pharmacological VTE Prophylaxis: History of systemic or intracranial bleeding    BP parameters: ICH: SBP <140

## 2023-08-12 NOTE — SUBJECTIVE & OBJECTIVE
Interval History: 8/12: Neuro exam stable. NAEON. VSS    Medications:  Continuous Infusions:  Scheduled Meds:   amLODIPine  5 mg Oral Daily    atorvastatin  40 mg Oral Daily    FLUoxetine  40 mg Oral Daily    levETIRAcetam (Keppra) IV (PEDS and ADULTS)  500 mg Intravenous Q12H    losartan  100 mg Oral Daily    polyethylene glycol  17 g Oral Daily     PRN Meds:acetaminophen, hydrALAZINE, labetalol, ondansetron, oxyCODONE, sodium chloride 0.9%     Review of Systems  Objective:     Weight: 117.8 kg (259 lb 11.2 oz)  Body mass index is 46 kg/m².  Vital Signs (Most Recent):  Temp: 98.6 °F (37 °C) (08/12/23 1300)  Pulse: 84 (08/12/23 1400)  Resp: (!) 24 (08/12/23 1400)  BP: 135/62 (08/12/23 1300)  SpO2: 96 % (08/12/23 1400) Vital Signs (24h Range):  Temp:  [98.2 °F (36.8 °C)-99.5 °F (37.5 °C)] 98.6 °F (37 °C)  Pulse:  [] 84  Resp:  [10-33] 24  SpO2:  [93 %-99 %] 96 %  BP: (125-166)/() 135/62     Date 08/12/23 0700 - 08/13/23 0659   Shift 9619-6245 6257-2162 8075-0449 24 Hour Total   INTAKE   P.O. 402   402   Shift Total(mL/kg) 402(3.4)   402(3.4)   OUTPUT   Urine(mL/kg/hr) 1350   1350   Shift Total(mL/kg) 1350(11.5)   1350(11.5)   Weight (kg) 117.8 117.8 117.8 117.8                       Female External Urinary Catheter 08/11/23 2100 (Active)   Skin no redness;no breakdown 08/12/23 0505   Tolerance no signs/symptoms of discomfort 08/12/23 0505   Suction Continuous suction at 70 mmHg 08/12/23 0041   Date of last wick change 08/12/23 08/12/23 0041   Time of last wick change 0041 08/12/23 0041   Output (mL) 200 mL 08/12/23 0305          Physical Exam   Aox3. E4V5M6  PERRL, EOMI  CN II-XII intact grossly.  Face Symmetric, tongue midline, symmetric shoulder shrug.  BUE 5/5, BLE 5/5  Sensation intact throughout.    General: NAD  Head: Normocephalic, atraumatic.  Neck: nuchal rigidity, nontender  CV: distal pulses present  Pulm: non-labored breathing  Abd: soft, nontender  Skin: dry, intact        Neurosurgery  Physical Exam    Significant Labs:  Recent Labs   Lab 08/11/23  1633 08/11/23  2340    93    141   K 4.0 4.3    105   CO2 27 27   BUN 12 11   CREATININE 0.7 0.8   CALCIUM 9.5 9.1   MG  --  2.0     Recent Labs   Lab 08/11/23  1633 08/11/23  2340   WBC 9.74 8.27   HGB 10.9* 9.9*   HCT 34.0* 31.3*    307     Recent Labs   Lab 08/11/23  2221 08/12/23  0258   INR 1.0 1.0   APTT 25.9 28.7     Microbiology Results (last 7 days)       Procedure Component Value Units Date/Time    Blood culture x two cultures. Draw prior to antibiotics. [[id number]] Collected: 08/11/23 1634    Order Status: Completed Specimen: Blood from Peripheral, Antecubital, Left Updated: 08/12/23 0115     Blood Culture, Routine No Growth to date    Narrative:      Aerobic and anaerobic    Blood culture x two cultures. Draw prior to antibiotics. [[id number]] Collected: 08/11/23 1635    Order Status: Completed Specimen: Blood from Peripheral, Antecubital, Right Updated: 08/12/23 0115     Blood Culture, Routine No Growth to date    Narrative:      Aerobic and anaerobic          Recent Lab Results  (Last 5 results in the past 24 hours)        08/12/23  1308   08/12/23  0542   08/12/23  0258   08/12/23  0237   08/12/23  0044        Alcohol, Urine       <10         Benzodiazepines       Negative         Methadone metabolites       Negative         Phencyclidine       Negative         Albumin               Alkaline Phosphatase               ALT               Amphetamine Screen, Ur       Negative         Anion Gap               Appearance, UA       Clear         aPTT     28.7  Comment: Refer to local heparin nomogram for intensity/dose specific   therapeutic   range.             AST               Barbiturate Screen, Ur       Negative         Baso #               Basophil %               Bilirubin (UA)       Negative         BILIRUBIN TOTAL               BUN               Calcium               Chloride               Cholesterol                CO2               Cocaine (Metab.)       Negative         Color, UA       Yellow         Creatinine               Creatinine, Urine       34.0         Differential Method               eGFR               Eos #               Eosinophil %               Estimated Avg Glucose               Glucose               Glucose, UA       Negative         Gran # (ANC)               Gran %               Group & Rh               HDL               HDL/Cholesterol Ratio               Hematocrit               Hemoglobin               Hemoglobin A1C External               Immature Grans (Abs)               Immature Granulocytes               INDIRECT REJI               INR     1.0  Comment: Coumadin Therapy:  2.0 - 3.0 for INR for all indicators except mechanical heart valves  and antiphospholipid syndromes which should use 2.5 - 3.5.             Ketones, UA       Negative         LDL Cholesterol External               Leukocytes, UA       Negative         Lymph #               Lymph %               Magnesium               MCH               MCHC               MCV               Mono #               Mono %               MPV               NITRITE UA       Negative         Non-HDL Cholesterol               nRBC               Occult Blood UA       Negative         Opiate Scrn, Ur       Presumptive Positive         pH, UA       8.0         Phosphorus               Platelets               POCT Glucose 102   78       85       Potassium               PROTEIN TOTAL               Protein, UA       Negative  Comment: Recommend a 24 hour urine protein or a urine   protein/creatinine ratio if globulin induced proteinuria is  clinically suspected.           Protime     10.9           RBC               RDW               Sodium               Specific [place], UA       1.025         Specimen Outdate               Specimen UA       Urine, Clean Catch         Marijuana (THC) Metabolite       Negative         Total Cholesterol/HDL Ratio                Toxicology Information       SEE COMMENT  Comment: This screen includes the following classes of drugs at the listed   cut-off:    Benzodiazepines 200 ng/ml  Methadone 300 ng/ml  Cocaine metabolite 300 ng/ml  Opiates 300 ng/ml  Barbiturates 200 ng/ml  Amphetamines 1000 ng/ml  Marijuana metabs (THC) 50 ng/ml  Phencyclidine (PCP) 25 ng/ml    This is a screening test. If results do not correlate with clinical   presentation, then a confirmatory send out test is advised.     This report is intended for use in clinical monitoring and management   of   patients. It is not intended for use in employment related drug   testing.           Triglycerides               TSH               WBC                                      Significant Diagnostics:    MRI: MRI Brain W WO Contrast    Result Date: 8/11/2023  Small extra-axial mass along the right cerebral convexity containing blood or proteinaceous products.  Differential includes a dural metastasis or atypical meningioma. Electronically signed by: Jose De Jesus Pedro Date:    08/11/2023 Time:    23:37

## 2023-08-12 NOTE — PLAN OF CARE
"[facility] Care Plan    POC reviewed with Candy Huynh and family at 0300. Pt verbalized understanding. Questions and concerns addressed. No acute events overnight. Pt progressing toward goals. Will continue to monitor. See below and flowsheets for full assessment and VS info.     -NPO since midnight   -possible angio today       Is this a stroke patient? yes- Stroke booklet reviewed with patient and family, risk factors identified for patient and stroke booklet remains at bedside for ongoing education.     Neuro:  [place] Coma Scale  Best Eye Response: 4-->(E4) spontaneous  Best Motor Response: 6-->(M6) obeys commands  Best Verbal Response: 5-->(V5) oriented  [place] Coma Scale Score: 15  Assessment Qualifiers: no eye obstruction present  Pupil PERRLA: yes     24hr Temp:  [98.2 °F (36.8 °C)-99.5 °F (37.5 °C)]     CV:   Rhythm: normal sinus rhythm  BP goals:   SBP < 140  MAP > 65    Resp:           Plan: N/A    GI/:     Diet/Nutrition Received: NPO  Last Bowel Movement: 08/09/23  Voiding Characteristics: external catheter    Intake/Output Summary (Last 24 hours) at 8/12/2023 0316  Last data filed at 8/12/2023 0305  Gross per 24 hour   Intake 456.48 ml   Output 200 ml   Net 256.48 ml     Unmeasured Output  Stool Occurrence: 0    Labs/Accuchecks:  Recent Labs   Lab 08/11/23  2340   WBC 8.27   RBC 3.45*   HGB 9.9*   HCT 31.3*         Recent Labs   Lab 08/11/23  2340      K 4.3   CO2 27      BUN 11   CREATININE 0.8   ALKPHOS 85   ALT 23   AST 22   BILITOT 0.4      Recent Labs   Lab 08/11/23  2221   INR 1.0   APTT 25.9    No results for input(s): "CPK", "CPKMB", "TROPONINI", "MB" in the last 168 hours.    Electrolytes: N/A - electrolytes WDL  Accuchecks: Q6H    Gtts:      LDA/Wounds:  Lines/Drains/Airways       Drain  Duration             Female External Urinary Catheter 08/11/23 2100 <1 day              Peripheral Intravenous Line  Duration                  Peripheral IV - Single Lumen 08/11/23 20 G " Right Antecubital 1 day         Peripheral IV - Single Lumen 08/12/23 0312 20 G Anterior;Left Upper Arm <1 day                  Wounds: No  Wound care consulted: No

## 2023-08-12 NOTE — DISCHARGE SUMMARY
[facility]  Neurosurgery  Discharge Summary      Patient Name: Candy Huynh  MRN: [id number]  Admission Date: 8/11/2023  Hospital Length of Stay: 1 days  Discharge Date and Time:  08/12/2023 2:37 PM  Attending Physician: Kartik Sloan MD   Discharging Provider: Jurgen Betancur MD  Primary Care Provider: [place], Primary Doctor    HPI:   51 yo F with PMH of HTN and DM who comes to ED due to back neck pain and headache since yesterday. She reports she was woken up by a bad headache and neck pain last night and has had ongoing pain since then. She takes a baby aspirin every day and last took this morning. Her pain radiates from the back of her head and aches down her neck into shoulder blades.     CTA showed small right parietal hyperdensity concerning for ICH vs SAH component.  systolic on arrival.      * No surgery found *     Hospital Course: 8/12: Neuro exam stable. NAEON. VSS      Goals of Care Treatment Preferences:  Code Status: Full Code      Consults:   Consults (From admission, onward)        Status Ordering Provider     Inpatient consult to Vascular (Stroke) Neurology  Once        Provider:  (Not yet assigned)    Completed JOSE NORWOOD     Inpatient consult to Vascular (Stroke) Neurology  Once        Provider:  (Not yet assigned)    Completed JOSE NORWOOD     Inpatient consult to Physical Medicine Rehab  Once        Provider:  (Not yet assigned)    Completed JOSE NORWOOD     Inpatient consult to Registered Dietitian/Nutritionist  Once        Provider:  (Not yet assigned)    Acknowledged JOSE NORWOOD     IP consult to case management/social work  Once        Provider:  (Not yet assigned)    Acknowledged JOSE NORWOOD     Inpatient consult to Neurosurgery  Once        Provider:  (Not yet assigned)    Completed RADHA BACON          Significant Diagnostic Studies: N/A    Pending Diagnostic Studies:     Procedure Component Value Units Date/Time    Echo [[id number]]      Order Status: Sent Lab Status: No result         Final Active Diagnoses:    Diagnosis Date Noted POA    PRINCIPAL PROBLEM:  ICH (intracerebral hemorrhage) [I61.9] 08/11/2023 Yes    New daily persistent headache [G44.52] 08/12/2023 Yes    Diabetes mellitus [E11.9]  Yes    Hypertension [I10]  Yes    Acute pain of left knee [M25.562] 12/17/2020 Yes      Problems Resolved During this Admission:      Discharged Condition: stable     Disposition: Home or Self Care    Follow Up:    Patient Instructions: --Patient stable for discharge to home    --Please take prescriptions as detailed in medication list    --All questions/concerns addressed and answered    --Please followup with neurosurgery clinic in 2 weeks with CT imaging; to be arranged by Neurosurgery Clinic     --Please call immediately for persistent nausea/vomiting; or any new onset altered mentation, seizures, loss of consciousness, double vision, numbness/tingling/weakness, clear drainage from your ears or nose    Post-Injury Self-Care:  -If you take anti-platelet or anti-coagulation medications please DO NOT take these until follow-up in outpatient clinic  -We reccomend several days of cognitive rest with gradual recomencing of daily tasks over the next two weeks; avoid strenuous activities until approved in outpatient clinic  -Please DO NOT use alcohol or any ilicit substances until cleared in outpatient clinic  -Please DO NOT drive or operate heavy machinery until cleared in outpatient clinic; NEVER drive or operate heavy machinery while on narcotic pain medications  -Initially you may experience mild headaches, mild depression, difficulty concentrating, or dizziness  Medications:  Reconciled Home Medications:      Medication List      CHANGE how you take these medications    HYDROcodone-acetaminophen 7.5-325 mg per tablet  Commonly known as: NORCO  Take 1 tablet by mouth every 6 (six) hours as needed for Pain.  What changed: Another medication with  the same name was removed. Continue taking this medication, and follow the directions you see here.        CONTINUE taking these medications    acetaminophen 650 MG Tbsr  Commonly known as: TYLENOL  Take 1 tablet (650 mg total) by mouth 2 (two) times a day.     albuterol 90 mcg/actuation inhaler  Commonly known as: PROVENTIL/VENTOLIN HFA  SMARTSI Puff(s) By Mouth Every 4 Hours PRN     amLODIPine 5 MG tablet  Commonly known as: NORVASC  Take 5 mg by mouth once daily.     aspirin 81 MG EC tablet  Commonly known as: ECOTRIN  Take 1 tablet (81 mg total) by mouth 2 (two) times a day.     azelastine 137 mcg (0.1 %) nasal spray  Commonly known as: ASTELIN  1 spray once daily.     celecoxib 200 MG capsule  Commonly known as: CeleBREX  Take 1 capsule (200 mg total) by mouth once daily.     cetirizine 10 MG tablet  Commonly known as: ZYRTEC  Take 10 mg by mouth every evening.     cholecalciferol (vitamin D3) 1,250 mcg (50,000 unit) capsule  Take 50,000 Units by mouth every 7 days.     ergocalciferol 50,000 unit Cap  Commonly known as: ERGOCALCIFEROL  Take 50,000 Units by mouth every 7 days.     FeroSuL 325 mg (65 mg iron) Tab tablet  Generic drug: ferrous sulfate  Take by mouth every other day.     FLUoxetine 20 MG capsule  Take 40 mg by mouth once daily.     folic acid 1 MG tablet  Commonly known as: FOLVITE  Take 1,000 mcg by mouth.     loratadine 10 mg tablet  Commonly known as: CLARITIN  Take 10 mg by mouth once daily.     losartan 100 MG tablet  Commonly known as: COZAAR  Take 100 mg by mouth once daily.     meclizine 25 mg tablet  Commonly known as: ANTIVERT  Take 25 mg by mouth.     meloxicam 15 MG tablet  Commonly known as: MOBIC  Take 1 tablet (15 mg total) by mouth once daily.     methocarbamoL 750 MG Tab  Commonly known as: ROBAXIN  Take 1 tablet (750 mg total) by mouth 4 (four) times daily as needed (muscle spasms).     nicotine polacrilex 4 MG Gum  Commonly known as: NICORETTE  SMARTSI Each By Mouth PRN      ondansetron 8 MG Tbdl  Commonly known as: ZOFRAN-ODT  Take 8 mg by mouth every 8 (eight) hours as needed.     OZEMPIC 0.25 mg or 0.5 mg (2 mg/3 mL) pen injector  Generic drug: semaglutide  Inject 0.25 mg into the skin every 7 (seven) days     paroxetine 20 MG tablet  Commonly known as: PAXIL  Take 20 mg by mouth every morning.     PREMARIN 0.625 MG tablet  Generic drug: estrogens (conjugated)  Take 0.625 mg by mouth once daily.     rosuvastatin 10 MG tablet  Commonly known as: CRESTOR  Take 10 mg by mouth.     STOOL SOFTENER 100 MG capsule  Generic drug: docusate sodium  Take 1 softgel by mouth twice daily     STOOL SOFTENER-LAXATIVE 8.6-50 mg per tablet  Generic drug: senna-docusate 8.6-50 mg  Take 1 tablet by mouth once daily.     sumatriptan 100 MG tablet  Commonly known as: IMITREX  Take 100 mg by mouth 2 (two) times daily as needed.        STOP taking these medications    diazePAM 2 MG tablet  Commonly known as: VALIUM     EPINEPHrine 0.3 mg/0.3 mL Atin  Commonly known as: EPIPEN     fluticasone propionate 50 mcg/actuation nasal spray  Commonly known as: FLONASE     montelukast 10 mg tablet  Commonly known as: SINGULAIR     MOVANTIK 25 mg tablet  Generic drug: naloxegoL     pantoprazole 40 MG tablet  Commonly known as: PROTONIX        ASK your doctor about these medications    metFORMIN 750 MG ER 24hr tablet  Commonly known as: GLUCOPHAGE-XR  Take 750 mg by mouth once daily.  Ask about: Which instructions should I use?            Jurgen Betancur MD  Neurosurgery  [facility] - Neuro Critical Care

## 2023-08-12 NOTE — NURSING
Discharged to home via w/c all discharge instructions per neurosurgery reviewed with patient , verbalizes full understanding.

## 2023-08-12 NOTE — ASSESSMENT & PLAN NOTE
51 yo F with PMH of HTN and DM who comes to ED due to back neck pain and headache since yesterday found to have small right parietal ICH vs SAH.    MRI stable. No acute infarcts. Likely meningioma for outpatient follow-up.    - admit to [facility], q1 neuro checks  - all labs and diagnostics reviewed  - keppra for seizure ppx  - elevate HOB, Na > 135  - SBP < 140  - on room air, ctm  - can continue home medication and diet    No neurosurgical intervention warranted. Please follow-up in the neurosurgery outpatient clinic.

## 2023-08-12 NOTE — NURSING
Patient arrived to [facility] from ED 31    Report from [facility]     Type of stroke/diagnosis:  ICH vs. SAH     Current symptoms: AAO x 4. Moves everything spontaneously. Head/neck pain present. Left leg weak (from total knee replacement on Monday)    Skin Assessment done: Yes. Left leg incision from Knee surgery    Wounds noted: none    Skin Assessment Verified by:  Ministerio Catalan Completed? Yes    Patient Belongings on Admit: Belongings given to mother at bedside. Multiple rings on patient     NCC notified: Jennifer HILL

## 2023-08-12 NOTE — ASSESSMENT & PLAN NOTE
SBP goal <140 in setting of acute ICH   -PRN labetalol, hydralazine  -continue home antihypertensives

## 2023-08-12 NOTE — ED NOTES
Assumed pt care at this time. The patient is awake, alert and cooperative with a calm affect, patient is aware of environment. Airway is open and patent, respirations are spontaneous, normal respiratory effort and rate noted. Skin warm and dry, moves all extremities well. No apparent distress noted. Family at bedside. Pt states no needs at this time.

## 2023-08-12 NOTE — PLAN OF CARE
Problem: Physical Therapy  Goal: Physical Therapy Goal  Description: PT goals to be met by: 9/2/23    Patient will perform supine <> sitting with supervision.  Patient will perform sit <> stand transitions with supervision using RW.  Patient will perform transfers from bed <> chair or BSC with supervision using RW.  Patient will ambulate 150 feet with supervision using RW.  Patient will ascend/descend 1 flight of steps using handrails with supervision.  Outcome: Ongoing, Progressing

## 2023-08-12 NOTE — SUBJECTIVE & OBJECTIVE
(Not in a hospital admission)      Review of patient's allergies indicates:   Allergen Reactions    Adhesive Blisters     Specifically EKG lead pads    Morphine Other (See Comments)     shake       Past Medical History:   Diagnosis Date    Allergy     Anemia     Anxiety     Asthma     denies- on outside problem list in Care Everywhere    Depression     Diabetes mellitus     Diabetes mellitus, type 2     Fibromyalgia     GERD (gastroesophageal reflux disease)     Hypertension     Stroke     TIA    Vertigo      Past Surgical History:   Procedure Laterality Date    ARTHROSCOPIC REPAIR OF ROTATOR CUFF OF SHOULDER Right 2022    Procedure: REPAIR, ROTATOR CUFF, ARTHROSCOPIC;  Surgeon: Ministerio Orr Jr., MD;  Location: [facility] OR;  Service: Orthopedics;  Laterality: Right;  need opus system  jose roberto notifed CC     ARTHROSCOPY OF KNEE Left 2021    Procedure: ARTHROSCOPY, KNEE;  Surgeon: Donnie Campuzano MD;  Location: [facility] OR;  Service: Orthopedics;  Laterality: Left;     SECTION      DECOMPRESSION OF SUBACROMIAL SPACE  2022    Procedure: DECOMPRESSION, SUBACROMIAL SPACE;  Surgeon: Ministerio Orr Jr., MD;  Location: [facility] OR;  Service: Orthopedics;;    EXCISION OF MASS OF EXTREMITY Left 2019    Procedure: EXCISION, MASS, EXTREMITY;  Surgeon: Honorio Pulido IV, MD;  Location: [facility] OR;  Service: Orthopedics;  Laterality: Left;  excision lipoma  Request Lacie    HERNIA REPAIR      HYSTERECTOMY      KNEE ARTHROSCOPY W/ MENISCECTOMY  2021    Procedure: ARTHROSCOPY, KNEE, WITH MENISCECTOMY;  Surgeon: Donnie Campuzano MD;  Location: [facility] OR;  Service: Orthopedics;;    NASAL SEPTOPLASTY      RECONSTRUCTION OF ACROMIOCLAVICULAR JOINT  2022    Procedure: RECONSTRUCTION, ACROMIOCLAVICULAR JOINT;  Surgeon: Ministerio Orr Jr., MD;  Location: [facility] OR;  Service: Orthopedics;;    TOTAL KNEE ARTHROPLASTY Right 10/26/2022    Procedure: ARTHROPLASTY, KNEE, TOTAL RIGHT: BALDO - NEXGEN;  Surgeon: Nolberto IGLESIAS  MD Evelio;  Location: [facility];  Service: Orthopedics;  Laterality: Right;    TOTAL KNEE ARTHROPLASTY Left 8/7/2023    Procedure: ARTHROPLASTY, KNEE, TOTAL: LEFT: BALDO NEXGEN;  Surgeon: Nolberto Fraser MD;  Location: [facility];  Service: Orthopedics;  Laterality: Left;     Family History       Problem Relation (Age of Onset)    Hypertension Father, Sister    No Known Problems Mother, Sister, Brother, Daughter, Daughter, Daughter, Son          Tobacco Use    Smoking status: Former     Current packs/day: 0.00    Smokeless tobacco: Never   Substance and Sexual Activity    Alcohol use: Yes     Comment: occasional    Drug use: Never    Sexual activity: Yes     Partners: Male     Review of Systems   Neurological:  Positive for headaches.   All other systems reviewed and are negative.    Objective:     Weight: 112.5 kg (248 lb)  Body mass index is 43.93 kg/m².  Vital Signs (Most Recent):  Temp: 99.5 °F (37.5 °C) (08/11/23 1525)  Pulse: 76 (08/11/23 2109)  Resp: 20 (08/11/23 2109)  BP: 136/71 (08/11/23 2109)  SpO2: 97 % (08/11/23 2109) Vital Signs (24h Range):  Temp:  [99.5 °F (37.5 °C)] 99.5 °F (37.5 °C)  Pulse:  [] 76  Resp:  [16-20] 20  SpO2:  [96 %-97 %] 97 %  BP: (134-166)/(71-81) 136/71                                 Physical Exam  Constitutional:       Appearance: She is well-developed and well-nourished.   Eyes:      Extraocular Movements: EOM normal.      Conjunctiva/sclera: Conjunctivae normal.      Pupils: Pupils are equal, round, and reactive to light.   Cardiovascular:      Pulses: Normal pulses.   Abdominal:      Palpations: Abdomen is soft.   Neurological:      Mental Status: She is alert and oriented to person, place, and time.      Comments: AAOx4  PERRL  CN intact  BUE 5/5  RLE pain limited due to recent knee surgery, 5/5 distally  LLE 5/5  No pathological reflexes, no drift  Nuchal rigidity present   Psychiatric:         Mood and Affect: Mood and affect normal.              Physical  "Exam:    Constitutional: She appears well-developed and well-nourished.     Eyes: Pupils are equal, round, and reactive to light. Conjunctivae and EOM are normal.     Cardiovascular: Normal pulses.     Abdominal: Soft.     Psych/Behavior: She is alert. She is oriented to person, place, and time. She has a normal mood and affect.     Neurological:   AAOx4  PERRL  CN intact  BUE 5/5  RLE pain limited due to recent knee surgery, 5/5 distally  LLE 5/5  No pathological reflexes, no drift  Nuchal rigidity present       Significant Labs:  Recent Labs   Lab 08/11/23  1633         K 4.0      CO2 27   BUN 12   CREATININE 0.7   CALCIUM 9.5     Recent Labs   Lab 08/11/23  1633   WBC 9.74   HGB 10.9*   HCT 34.0*        No results for input(s): "LABPT", "INR", "APTT" in the last 48 hours.  Microbiology Results (last 7 days)       Procedure Component Value Units Date/Time    Blood culture x two cultures. Draw prior to antibiotics. [[id number]] Collected: 08/11/23 1634    Order Status: Sent Specimen: Blood from Peripheral, Antecubital, Left Updated: 08/11/23 1639    Blood culture x two cultures. Draw prior to antibiotics. [[id number]] Collected: 08/11/23 1635    Order Status: Sent Specimen: Blood from Peripheral, Antecubital, Right Updated: 08/11/23 1639          All pertinent labs from the last 24 hours have been reviewed.    Significant Diagnostics:  I have reviewed all pertinent imaging results/findings within the past 24 hours.  "

## 2023-08-12 NOTE — CONSULTS
[facility]  Vascular Neurology  [facility]  Consult Note    Inpatient consult to Vascular (Stroke) Neurology  Consult performed by: Kita Kimbrough DNP, NP  Consult ordered by: Jennifer Carroll PA-C    Inpatient consult to Vascular (Stroke) Neurology  Consult performed by: Kita Kimbrough DNP, NP  Consult ordered by: Jennifer Carroll PA-C  Reason for consult: ICH        Assessment/Plan:     * ICH (intracerebral hemorrhage)  Candy Huynh is a 52 y.o. female with a significant medical history of HTN, GERD, fibromyalgia, DM II, s/p knee replacement (8/8/23), migraine HAs, who presents to the hospital for evaluation of neck pain.  CTA Head and Neck revealed a R parietal lobe ICH.    Antithrombotics for secondary stroke prevention: Antiplatelets: None: Intracerebral Hemorrhage    Statins for secondary stroke prevention and hyperlipidemia, if present:   Statins: None: Reason: not currently indicated, acute IPH    Aggressive risk factor modification: HTN, DM     Rehab efforts: The patient has been evaluated by a stroke team provider and the therapy needs have been fully considered based off the presenting complaints and exam findings. The following therapy evaluations are needed: None    Diagnostics ordered/pending: TTE to assess cardiac function/status     VTE prophylaxis: Mechanical prophylaxis: Place SCDs  None: Reason for No Pharmacological VTE Prophylaxis: History of systemic or intracranial bleeding    BP parameters: ICH: SBP <140        New daily persistent headache  -Patient has a history of migraine and began having a HA and neck pain that began on the night of 8/10 at 2200.  She took Imitrex at that time with little relief.  -Managed by primary team    Hypertension  -Stroke risk factor.  SBP< 140.  -Managed by primary team.       Diabetes mellitus  -Stroke risk factor.  A1c 5.5.  -Recommend tight glucose control, glucose < 180 but > 60.    Acute pain of left  "knee  -Patient is s/p knee replacement 08/07/2023.  -C/o reporting 10/10 left knee pain.  -Managed by primary team.        STROKE DOCUMENTATION          NIH Scale:  Interval: baseline  1a. Level of Consciousness: 0-->Alert, keenly responsive  1b. LOC Questions: 0-->Answers both questions correctly  1c. LOC Commands: 0-->Performs both tasks correctly  2. Best Gaze: 0-->Normal  3. Visual: 0-->No visual loss  4. Facial Palsy: 0-->Normal symmetrical movements  5a. Motor Arm, Left: 0-->No drift, limb holds 90 (or 45) degrees for full 10 secs  5b. Motor Arm, Right: 0-->No drift, limb holds 90 (or 45) degrees for full 10 secs  6a. Motor Leg, Left: 0-->No drift, leg holds 30 degree position for full 5 secs  6b. Motor Leg, Right: 0-->No drift, leg holds 30 degree position for full 5 secs  7. Limb Ataxia: 0-->Absent  8. Sensory: 0-->Normal, no sensory loss  9. Best Language: 0-->No aphasia, normal  10. Dysarthria: 0-->Normal  11. Extinction and Inattention (formerly Neglect): 0-->No abnormality  Total (NIH Stroke Scale): 0    Modified [place] Score: 0  [place] Coma Scale:15   ABCD2 Score:    [id number] Score:   HAS -BLED Score:   ICH Score:0  Hunt & Warren Classification:       Thrombolysis Candidate? No, CT findings (ICH, SAH, Large core infarct)     Delays to Thrombolysis?  Not Applicable    Interventional Revascularization Candidate?   Is the patient eligible for mechanical endovascular reperfusion (AGUSTIN)?  No, ICH    Delays to Thrombectomy? Not Applicable    Hemorrhagic change of an Ischemic Stroke: Does this patient have an ischemic stroke with hemorrhagic changes? No     Subjective:     History of Present Illness:  Candy Huynh is a 52 y.o. female with a significant medical history of HTN, GERD, fibromyalgia, DM II, s/p knee replacement (8/8/23), migraine HAs, who presents to the hospital for evaluation of neck pain.  Per the ED provider record:    "52-year-old female with a history of diabetes, hypertension, " "stroke, vertigo, migraines, complaining of a headache, severe, onset around 10:00 p.m. last night, initially in the bifrontal region but over the course of the day it has moved to the occipital region of her head and now radiates down her neck and into her shoulders and upper back bilaterally.  Patient reports some mild photophobia, no nausea or vomiting.  She reports some discomfort with movement of her head and possible neck stiffness.  Patient does report that she has a history of migraines and last night the headache did seem consistent with a migraine and she took Imitrex for this reason.  No fevers.  No recent URI symptoms.  Patient is 5 days status post a left knee replacement and while she has some moderate pain at the site, her postop course seems to have been mostly uneventful."    A CTA Head and neck was obtained and revealed a R parietal lobe focus concerning for R IPH, negative for high-grade stenosis, aneurysm, or LVO.  Neurosurgery, Vascular Neurology, and NCC were consulted.    Currently the patient is awake and oriented x3.  She states her headache is currently 3/10 and denies dizziness, change in speech, change in vision, N/V, or weakness.  NIHSS 0.            Past Medical History:   Diagnosis Date    Allergy     Anemia     Anxiety     Asthma     denies- on outside problem list in Care Everywhere    Depression     Diabetes mellitus     Diabetes mellitus, type 2     Fibromyalgia     GERD (gastroesophageal reflux disease)     Hypertension     Stroke     TIA    Vertigo      Past Surgical History:   Procedure Laterality Date    ARTHROSCOPIC REPAIR OF ROTATOR CUFF OF SHOULDER Right 9/6/2022    Procedure: REPAIR, ROTATOR CUFF, ARTHROSCOPIC;  Surgeon: Ministerio Orr Jr., MD;  Location: [facility] OR;  Service: Orthopedics;  Laterality: Right;  need opus system  jose roberto notifed CC 8/29    ARTHROSCOPY OF KNEE Left 1/11/2021    Procedure: ARTHROSCOPY, KNEE;  Surgeon: Donnie Campuzano MD;  Location: " [facility] OR;  Service: Orthopedics;  Laterality: Left;     SECTION      DECOMPRESSION OF SUBACROMIAL SPACE  2022    Procedure: DECOMPRESSION, SUBACROMIAL SPACE;  Surgeon: Ministerio Orr Jr., MD;  Location: [facility] OR;  Service: Orthopedics;;    EXCISION OF MASS OF EXTREMITY Left 2019    Procedure: EXCISION, MASS, EXTREMITY;  Surgeon: Honorio Pulido IV, MD;  Location: [facility] OR;  Service: Orthopedics;  Laterality: Left;  excision lipoma  Request Lacie    HERNIA REPAIR      HYSTERECTOMY      KNEE ARTHROSCOPY W/ MENISCECTOMY  2021    Procedure: ARTHROSCOPY, KNEE, WITH MENISCECTOMY;  Surgeon: Donnie Campuzano MD;  Location: [facility] OR;  Service: Orthopedics;;    NASAL SEPTOPLASTY      RECONSTRUCTION OF ACROMIOCLAVICULAR JOINT  2022    Procedure: RECONSTRUCTION, ACROMIOCLAVICULAR JOINT;  Surgeon: Ministerio Orr Jr., MD;  Location[facility];  Service: Orthopedics;;    TOTAL KNEE ARTHROPLASTY Right 10/26/2022    Procedure: ARTHROPLASTY, KNEE, TOTAL RIGHT: BALDO - NEXGEN;  Surgeon: Nolberto Fraser MD;  Location: [facility];  Service: Orthopedics;  Laterality: Right;    TOTAL KNEE ARTHROPLASTY Left 2023    Procedure: ARTHROPLASTY, KNEE, TOTAL: LEFT: BALDO NEXGEN;  Surgeon: Nolberto Fraser MD;  Location: [facility];  Service: Orthopedics;  Laterality: Left;     Family History   Problem Relation Age of Onset    No Known Problems Mother     Hypertension Father     Hypertension Sister     No Known Problems Sister     No Known Problems Brother     No Known Problems Daughter     No Known Problems Daughter     No Known Problems Daughter     No Known Problems Son      Social History     Tobacco Use    Smoking status: Former     Current packs/day: 0.00    Smokeless tobacco: Never   Substance Use Topics    Alcohol use: Yes     Comment: occasional    Drug use: Never     Review of patient's allergies indicates:   Allergen Reactions    Adhesive Blisters     Specifically EKG lead pads    Morphine  Other (See Comments)     shake       Medications: I have reviewed the current medication administration record.    Medications Prior to Admission   Medication Sig Dispense Refill Last Dose    aspirin (ECOTRIN) 81 MG EC tablet Take 1 tablet (81 mg total) by mouth 2 (two) times a day. 60 tablet 0 2023    HYDROcodone-acetaminophen (NORCO)  mg per tablet Take 1 tablet by mouth every 6 (six) hours as needed for Pain (Take for 1 week, then resume Norco 7.5mg tablets). 28 tablet 0 2023    methocarbamoL (ROBAXIN) 750 MG Tab Take 1 tablet (750 mg total) by mouth 4 (four) times daily as needed (muscle spasms). 40 tablet 0 2023    acetaminophen (TYLENOL) 650 MG TbSR Take 1 tablet (650 mg total) by mouth 2 (two) times a day. 120 tablet 0     albuterol (PROVENTIL/VENTOLIN HFA) 90 mcg/actuation inhaler SMARTSI Puff(s) By Mouth Every 4 Hours PRN       amLODIPine (NORVASC) 5 MG tablet Take 5 mg by mouth once daily.       azelastine (ASTELIN) 137 mcg (0.1 %) nasal spray 1 spray once daily.       celecoxib (CELEBREX) 200 MG capsule Take 1 capsule (200 mg total) by mouth once daily. 30 capsule 0     cetirizine (ZYRTEC) 10 MG tablet Take 10 mg by mouth every evening.       cholecalciferol, vitamin D3, 1,250 mcg (50,000 unit) capsule Take 50,000 Units by mouth every 7 days.       diazePAM (VALIUM) 2 MG tablet SMARTSI-2 Tablet(s) By Mouth       EPINEPHrine (EPIPEN) 0.3 mg/0.3 mL AtIn as directed       ergocalciferol (ERGOCALCIFEROL) 50,000 unit Cap Take 50,000 Units by mouth every 7 days.       FEROSUL 325 mg (65 mg iron) Tab tablet Take by mouth every other day.       FLUoxetine 20 MG capsule Take 40 mg by mouth once daily.       fluticasone propionate (FLONASE) 50 mcg/actuation nasal spray 1 spray by Each Nostril route once daily.       folic acid (FOLVITE) 1 MG tablet Take 1,000 mcg by mouth.       HYDROcodone-acetaminophen (NORCO) 7.5-325 mg per tablet Take 1 tablet by mouth every 6 (six)  hours as needed for Pain.       loratadine (CLARITIN) 10 mg tablet Take 10 mg by mouth once daily.       losartan (COZAAR) 100 MG tablet Take 100 mg by mouth once daily.       meclizine (ANTIVERT) 25 mg tablet Take 25 mg by mouth.       meloxicam (MOBIC) 15 MG tablet Take 1 tablet (15 mg total) by mouth once daily. 30 tablet 3     metFORMIN (GLUCOPHAGE) 1000 MG tablet Take 1,000 mg by mouth 2 (two) times daily with meals.       montelukast (SINGULAIR) 10 mg tablet        MOVANTIK 25 mg tablet Take 25 mg by mouth.       nicotine polacrilex (NICORETTE) 4 MG Gum SMARTSI Each By Mouth PRN       ondansetron (ZOFRAN-ODT) 8 MG TbDL Take 8 mg by mouth every 8 (eight) hours as needed.       pantoprazole (PROTONIX) 40 MG tablet TAKE 1 TABLET BY MOUTH ONCE DAILY 30 MINUTES BEFORE BREAKFAST       paroxetine (PAXIL) 10 MG tablet Take 10 mg by mouth every morning.       PREMARIN 0.625 mg tablet Take 0.625 mg by mouth once daily.       rosuvastatin (CRESTOR) 10 MG tablet Take 10 mg by mouth.       semaglutide (OZEMPIC) 0.25 mg or 0.5 mg (2 mg/3 mL) pen injector Inject 0.25 mg into the skin every 7 (seven) days       senna-docusate 8.6-50 mg (SENNA WITH DOCUSATE SODIUM) 8.6-50 mg per tablet Take 1 tablet by mouth once daily. 30 tablet 0     STOOL SOFTENER 100 mg capsule Take 1 softgel by mouth twice daily 60 capsule 11     sumatriptan (IMITREX) 100 MG tablet Take 100 mg by mouth 2 (two) times daily as needed.          Review of Systems   Constitutional:  Negative for chills, fatigue and fever.   HENT:  Negative for drooling and trouble swallowing.    Eyes:  Negative for photophobia, pain, discharge and visual disturbance.   Respiratory:  Negative for cough, chest tightness, shortness of breath and wheezing.    Cardiovascular:  Negative for chest pain, palpitations and leg swelling.   Gastrointestinal:  Negative for abdominal pain, constipation, diarrhea, nausea and vomiting.   Endocrine: Negative for cold  intolerance and heat intolerance.   Genitourinary:  Negative for dysuria, frequency, hematuria and urgency.   Musculoskeletal:  Positive for arthralgias (L knee pain) and neck pain. Negative for neck stiffness.   Skin:  Negative for rash and wound.   Allergic/Immunologic: Negative for environmental allergies and food allergies.   Neurological:  Positive for headaches. Negative for dizziness, tremors, speech difficulty, weakness, light-headedness and numbness.   Hematological:  Negative for adenopathy. Does not bruise/bleed easily.   Psychiatric/Behavioral:  Negative for agitation, confusion and hallucinations.      Objective:     Vital Signs (Most Recent):  Temp: 98.2 °F (36.8 °C) (08/12/23 0041)  Pulse: 79 (08/12/23 0105)  Resp: (!) 33 (08/12/23 0105)  BP: 136/65 (08/12/23 0105)  SpO2: 96 % (08/12/23 0105)    Vital Signs Range (Last 24H):  Temp:  [98.2 °F (36.8 °C)-99.5 °F (37.5 °C)]   Pulse:  []   Resp:  [10-33]   BP: (134-166)/(65-84)   SpO2:  [95 %-99 %]        Physical Exam  Vitals and nursing note reviewed.   Constitutional:       General: She is not in acute distress.     Appearance: She is well-developed. She is not diaphoretic.   HENT:      Head: Normocephalic and atraumatic.      Right Ear: External ear normal.      Left Ear: External ear normal.      Nose: Nose normal.      Mouth/Throat:      Mouth: Mucous membranes are moist.   Eyes:      General: No scleral icterus.        Right eye: No discharge.         Left eye: No discharge.      Extraocular Movements: Extraocular movements intact.      Conjunctiva/sclera: Conjunctivae normal.   Cardiovascular:      Rate and Rhythm: Normal rate and regular rhythm.   Pulmonary:      Effort: Pulmonary effort is normal. No respiratory distress.   Abdominal:      General: There is no distension.      Palpations: Abdomen is soft.      Tenderness: There is no abdominal tenderness.   Musculoskeletal:      Cervical back: Normal range of motion and neck supple.       Right lower leg: No edema.      Left lower leg: No edema.   Skin:     General: Skin is warm and dry.      Capillary Refill: Capillary refill takes less than 2 seconds.   Neurological:      Mental Status: She is alert and oriented to person, place, and time.              Neurological Exam:   LOC: alert  Attention Span: Good   Language: No aphasia  Articulation: No dysarthria  Orientation: Person, Place, Time   EOM (CN III, IV, VI): Full/intact  Facial Movement (CN VII): Symmetric facial expression    Motor: Arm left  Normal 5/5  Leg left  Normal 5/5  Arm right  Normal 5/5  Leg right Normal 5/5      Laboratory:  CMP:   Recent Labs   Lab 08/11/23  2340   CALCIUM 9.1   ALBUMIN 3.0*   PROT 6.7      K 4.3   CO2 27      BUN 11   CREATININE 0.8   ALKPHOS 85   ALT 23   AST 22   BILITOT 0.4     CBC:   Recent Labs   Lab 08/11/23  2340   WBC 8.27   RBC 3.45*   HGB 9.9*   HCT 31.3*      MCV 91   MCH 28.7   MCHC 31.6*     Lipid Panel:   Recent Labs   Lab 08/11/23  2340   CHOL 108*   LDLCALC 56.6*   HDL 38*   TRIG 67     Coagulation:   Recent Labs   Lab 08/11/23  2221   INR 1.0   APTT 25.9     Hgb A1C:   Recent Labs   Lab 08/11/23  2340   HGBA1C 5.5     TSH:   Recent Labs   Lab 08/11/23  2340   TSH 2.512       Diagnostic Results:      Brain imaging:  MRI Brain W/WO 8/12/2023 Impression:  Small extra-axial mass along the right cerebral convexity containing blood or proteinaceous products.  Differential includes a dural metastasis or atypical meningioma.    Vessel Imaging:  CTA Head and Neck 8/12/2023 Impression: Right parietal lobe nonenhancing, peripheral hyperattenuating focus concerning for parenchymal hematoma. Hyperdense primary CNS neoplasm or localized subarachnoid hemorrhage not entirely excluded but considered less likely.  Further evaluation/follow-up as warranted.  CTA head and neck shows no evidence of high-grade stenosis, aneurysm, or large vessel occlusion.    Cardiac Evaluation:   TTE  pending        Kita Kimbrough, DNP, NP  [facility] Stroke [place]  Department of Vascular Neurology   [facility] - Neuro Critical Care     This medical record was prepared using voice recognition software and may contain phonetic and/or or grammatical errors.  Garbled syntax, mangled pronounciations, and other bizarre constructions may be attributed to that system.

## 2023-08-12 NOTE — PLAN OF CARE
[facility] - Neuro Critical Care  Discharge Final Note    Primary Care Provider: [place], Primary Doctor    Expected Discharge Date: 8/12/2023    Patient to be discharged home.  The patient does not have any home needs.  Family to provide transportation home.    Future Appointments   Date Time Provider Department Center   8/14/2023 10:00 AM Shlomo Samson, PT [facility]   8/21/2023  1:40 PM Donna Toussaint, NP [facility]   9/19/2023 11:15 AM Nolberto Fraser MD [facility] [address]   10/27/2023 11:30 AM Nolberto Fraser MD [facility] [address]      Final Discharge Note (most recent)       Final Note - 08/12/23 1604          Final Note    Assessment Type Final Discharge Note     Anticipated Discharge Disposition Home or Self Care        Post-Acute Status    Post-Acute Authorization Other     Other Status No Post-Acute Service Needs     Discharge Delays None known at this time                     Important Message from Medicare

## 2023-08-12 NOTE — HPI
"Candy Huynh is a 52 y.o. female with a significant medical history of HTN, GERD, fibromyalgia, DM II, s/p knee replacement (8/8/23), migraine HAs, who presents to the hospital for evaluation of neck pain.  Per the ED provider record:    "52-year-old female with a history of diabetes, hypertension, stroke, vertigo, migraines, complaining of a headache, severe, onset around 10:00 p.m. last night, initially in the bifrontal region but over the course of the day it has moved to the occipital region of her head and now radiates down her neck and into her shoulders and upper back bilaterally.  Patient reports some mild photophobia, no nausea or vomiting.  She reports some discomfort with movement of her head and possible neck stiffness.  Patient does report that she has a history of migraines and last night the headache did seem consistent with a migraine and she took Imitrex for this reason.  No fevers.  No recent URI symptoms.  Patient is 5 days status post a left knee replacement and while she has some moderate pain at the site, her postop course seems to have been mostly uneventful."    A CTA Head and neck was obtained and revealed a R parietal lobe focus concerning for R IPH, negative for high-grade stenosis, aneurysm, or LVO.  Neurosurgery, Vascular Neurology, and NCC were consulted.    Currently the patient is awake and oriented x3.  She states her headache is currently 3/10 and denies dizziness, change in speech, change in vision, N/V, or weakness.  NIHSS 0.    "

## 2023-08-12 NOTE — PT/OT/SLP EVAL
Occupational Therapy   Evaluation    Name: Candy Huynh  MRN: [id number]  Admitting Diagnosis: ICH (intracerebral hemorrhage)  Recent Surgery: * No surgery found *      Recommendations:     Discharge Recommendations: home health OT  Discharge Equipment Recommendations:  hip kit  Barriers to discharge:  Inaccessible home environment    Assessment:     Candy Huynh is a 52 y.o. female with a medical diagnosis of ICH (intracerebral hemorrhage).  She presents with the following performance deficits affecting function: weakness, impaired endurance, impaired self care skills, impaired functional mobility, gait instability, impaired balance, decreased coordination, decreased lower extremity function, decreased safety awareness, pain, decreased ROM, impaired coordination, impaired skin, edema, orthopedic precautions.  Pt was agreeable to and participated in OT/PT evaluation.  Pt recently had a L knee replacement, but stated that prior to surgery, she was mod I with all ADLs and mobility.  Pt currently performing ADLs with set up to max A (LB dressing) and func mobility with SBA - CGA using DME.  Goals established to assist pt with returning to [facility] regarding ADLs and func mobility.  Pt will benefit from skilled OT services in order to increase her level of safety and independence with ADLs and mobility    Rehab Prognosis: Good; patient would benefit from acute skilled OT services to address these deficits and reach maximum level of function.       Plan:     Patient to be seen 4 x/week to address the above listed problems via self-care/home management, therapeutic activities, therapeutic exercises  Plan of Care Expires: 09/11/23  Plan of Care Reviewed with: patient    Subjective     Chief Complaint: headache  Patient/Family Comments/goals: return home    Occupational Profile:  Living Environment: pt lives alone in 2 story [facility] - bed/bath on 2nd floor - t/s combo for bathing  Previous level of function: mod I  with mobility and ADLs prior to recent knee sx - needs some assist with LB dressing currently due to limited L knee flexion  Roles and Routines: mother  Equipment Used at Home: bedside commode, walker, rolling  Assistance upon Discharge: adult children assist    Pain/Comfort:  Pain Rating 1: 2/10  Location 1: head  Pain Addressed 1: Pre-medicate for activity, Reposition, Distraction, Cessation of Activity  Pain Rating Post-Intervention 1: 2/10    Patients cultural, spiritual, Sikh conflicts given the current situation: no    Objective:     Communicated with: nurse prior to session.  Patient found HOB elevated with blood pressure cuff, peripheral IV, pulse ox (continuous), telemetry upon OT entry to room.    General Precautions: Standard, fall  Orthopedic Precautions: LLE weight bearing as tolerated  Braces: N/A  Respiratory Status: Room air    Occupational Performance:    Bed Mobility:    Patient completed Rolling/Turning to Left with  stand by assistance  Patient completed Scooting/Bridging with stand by assistance  Patient completed Supine to Sit with stand by assistance    Functional Mobility/Transfers:  Patient completed Sit <> Stand Transfer with contact guard assistance  with  rolling walker   Patient completed Bed <> Chair Transfer using Step Transfer technique with contact guard assistance with rolling walker  Functional Mobility: Pt able to walk short distance in room using RW with PT - refer to PT juan m for further info    Activities of Daily Living:  Feeding:  modified independence    Grooming: modified independence    Upper Body Dressing: modified independence    Lower Body Dressing: maximal assistance with socks due to limited L knee flexion  Toileting: not observed,  however pt reports she is able to perform with supervision    Cognitive/Visual Perceptual:  Cognitive/Psychosocial Skills:     -       Oriented to: Person, Place, Time, and Situation   -       Follows Commands/attention:Follows  two-step commands  -       Communication: clear/fluent  -       Memory: No Deficits noted  -       Safety awareness/insight to disability: impaired   -       Mood/Affect/Coping skills/emotional control: Appropriate to situation    Physical Exam:  Balance: -       Sitting EOB = good/independent;  Standing with RW = SBA-CGA  Postural examination/scapula alignment:    -       Rounded shoulders  -       Forward head  Skin integrity: bandage on L knee from sx  Edema:  Moderate L knee  Upper Extremity Range of Motion:   BUEs = WFL for ADLS  Upper Extremity Strength:  BUEs = WFL for ADLS   Strength:  BUEs = WFL for ADLS    AMPAC 6 Click ADL:  AMPAC Total Score: 20    Treatment & Education:  Pt completed ADLs and func mobility activities for tx session as noted above  Pt educated on role of OT and POC      Patient left up in chair with all lines intact, call button in reach, and nurse notified    GOALS:   Multidisciplinary Problems       Occupational Therapy Goals          Problem: Occupational Therapy    Goal Priority Disciplines Outcome Interventions   Occupational Therapy Goal     OT, PT/OT Ongoing, Progressing    Description: Goals to be met by: 09/12/23     Patient will increase functional independence with ADLs by performing:    UE Dressing with Modified [place].  LE Dressing with Set-up Assistance and Assistive Devices as needed.  Grooming while EOB with Modified [place].  Toileting from bedside commode with Modified [place] for hygiene and clothing management.   Toilet transfer to bedside commode with Modified [place].  Upper extremity exercise program 3x15 reps per handout, with supervision.                         History:     Past Medical History:   Diagnosis Date    Allergy     Anemia     Anxiety     Asthma     denies- on outside problem list in Care Everywhere    Depression     Diabetes mellitus     Diabetes mellitus, type 2     Fibromyalgia     GERD (gastroesophageal reflux disease)      Hypertension     Stroke     TIA    Vertigo          Past Surgical History:   Procedure Laterality Date    ARTHROSCOPIC REPAIR OF ROTATOR CUFF OF SHOULDER Right 2022    Procedure: REPAIR, ROTATOR CUFF, ARTHROSCOPIC;  Surgeon: Ministerio Orr Jr., MD;  Location: [facility];  Service: Orthopedics;  Laterality: Right;  need opus system  Alevism notifed CC     ARTHROSCOPY OF KNEE Left 2021    Procedure: ARTHROSCOPY, KNEE;  Surgeon: Donnie Campuzano MD;  [facility] OR;  Service: Orthopedics;  Laterality: Left;     SECTION      DECOMPRESSION OF SUBACROMIAL SPACE  2022    Procedure: DECOMPRESSION, SUBACROMIAL SPACE;  Surgeon: Ministerio Orr Jr., MD;  Location: [facility] OR;  Service: Orthopedics;;    EXCISION OF MASS OF EXTREMITY Left 2019    Procedure: EXCISION, MASS, EXTREMITY;  Surgeon: Honorio Pulido IV, MD;  Location: [facility];  Service: Orthopedics;  Laterality: Left;  excision lipoma  Request Lacie    HERNIA REPAIR      HYSTERECTOMY      KNEE ARTHROSCOPY W/ MENISCECTOMY  2021    Procedure: ARTHROSCOPY, KNEE, WITH MENISCECTOMY;  Surgeon: Donnie Campuzano MD;  Location: [facility];  Service: Orthopedics;;    NASAL SEPTOPLASTY      RECONSTRUCTION OF ACROMIOCLAVICULAR JOINT  2022    Procedure: RECONSTRUCTION, ACROMIOCLAVICULAR JOINT;  Surgeon: Ministerio Orr Jr., MD;  Location: [facility];  Service: Orthopedics;;    TOTAL KNEE ARTHROPLASTY Right 10/26/2022    Procedure: ARTHROPLASTY, KNEE, TOTAL RIGHT: BALDO - NEXGEN;  Surgeon: Nolberto Fraser MD;  Location: [facility];  Service: Orthopedics;  Laterality: Right;    TOTAL KNEE ARTHROPLASTY Left 2023    Procedure: ARTHROPLASTY, KNEE, TOTAL: LEFT: BALDO NEXGEN;  Surgeon: Nolberto Fraser MD;  Location: [facility];  Service: Orthopedics;  Laterality: Left;       Time Tracking:     OT Date of Treatment: 23  OT Start Time: 942  OT Stop Time: 956  OT Total Time (min): 14 min    Billable Minutes:Evaluation 10 min (coeval with  PT)    8/12/2023

## 2023-08-12 NOTE — PLAN OF CARE
[facility]  Initial Discharge Assessment       Primary Care Provider: No, Primary Doctor    Admission Diagnosis: Intracranial hemorrhage [I62.9]  ICH (intracerebral hemorrhage) [I61.9]  Headache [R51.9]  At risk for long QT syndrome [Z91.89]    Admission Date: 8/11/2023  Expected Discharge Date:     Transition of Care Barriers: Underinsured    Payor: MEDICAID / Plan: [facility] COMMUNITY PLAN [facility] [employer] ([place] MEDICAID) / Product Type: Managed Medicaid /     Extended Emergency Contact Information  Primary Emergency Contact: Fernando Joslyn  Address: [address]           [place] [address] [place]  Mobile Phone: [phone]  Relation: Daughter  Preferred language: English   needed? No  Secondary Emergency Contact: JaidenArie  Address: [address]           [place] [address] [facility]  Home Phone: [phone]  Relation: Daughter    Discharge Plan A: Home  Discharge Plan B: Rehab      [facility] [place] - [address] [address] [facility] [address]  Phone: [phone] Fax: [phone]    CM obtained discharge planning assessment with patient.  Patient provided with discharge planning booklet.    Initial Assessment (most recent)       Adult Discharge Assessment - 08/12/23 1126          Discharge Assessment    Assessment Type Discharge Planning Assessment     Confirmed/corrected address, phone number and insurance Yes     Confirmed Demographics Correct on Facesheet     Source of Information patient     Communicated LAM with patient/caregiver Date not available/Unable to determine     Reason For Admission ich     People in Home alone     Do you expect to return to your current living situation? Yes     Do you have help at home or someone to help you manage your care at home? Yes     Who are your caregiver(s) and their phone number(s)? Joslyn Fernando - daughter [phone]     Prior to  hospitilization cognitive status: Alert/Oriented     Current cognitive status: Alert/Oriented     Walking or Climbing Stairs ambulation difficulty, requires equipment     Mobility Management rolling [email or web address]     Equipment Currently Used at Home bedside commode;walker, rolling     Readmission within 30 days? No     Patient currently being followed by outpatient case management? No     Do you currently have service(s) that help you manage your care at home? No     Do you take prescription medications? Yes     Do you have prescription coverage? Yes     Coverage MEDICAID - [facility] COMMUNITY [facility] ([place] MEDICAID)     Do you have any problems affording any of your prescribed medications? No     Is the patient taking medications as prescribed? yes     Who is going to help you get home at discharge? faily     How do you get to doctors appointments? family or friend will provide     Are you on dialysis? No     Do you take coumadin? No     Discharge Plan A Home     Discharge Plan B Rehab     DME Needed Upon Discharge  none     Transition of Care Barriers Underinsured        Physical Activity    On average, how many days per week do you engage in moderate to strenuous exercise (like a brisk walk)? 0 days     On average, how many minutes do you engage in exercise at this level? 0 min        Financial Resource Strain    How hard is it for you to pay for the very basics like food, housing, medical care, and heating? Somewhat hard        Housing Stability    In the last 12 months, was there a time when you were not able to pay the mortgage or rent on time? No     In the last 12 months, was there a time when you did not have a steady place to sleep or slept in a shelter (including now)? No        Transportation Needs    In the past 12 months, has lack of transportation kept you from medical appointments or from getting medications? Yes     In the past 12 months, has lack of transportation kept you from meetings, work, or from  getting things needed for daily living? Yes        Food Insecurity    Within the past 12 months, you worried that your food would run out before you got the money to buy more. Never true     Within the past 12 months, the food you bought just didn't last and you didn't have money to get more. Never true        Stress    Do you feel stress - tense, restless, nervous, or anxious, or unable to sleep at night because your mind is troubled all the time - these days? Rather much        Social Connections    In a typical week, how many times do you talk on the phone with family, friends, or neighbors? More than three times a week     How often do you get together with friends or relatives? More than three times a week     How often do you attend Jain or Mormonism services? Never     Do you belong to any clubs or organizations such as Jain groups, unions, fraternal or athletic groups, or school groups? No     How often do you attend meetings of the clubs or organizations you belong to? Never        Alcohol Use    Q1: How often do you have a drink containing alcohol? 2-4 times a month     Q2: How many drinks containing alcohol do you have on a typical day when you are drinking? 1 or 2     Q3: How often do you have six or more drinks on one occasion? Never

## 2023-08-12 NOTE — ASSESSMENT & PLAN NOTE
2/2 to recent total knee replacement  -PT/OT as appropriate   -tylenol initially for pain management. Escalate as appropriate with neuro checks.

## 2023-08-12 NOTE — HPI
Ms. Huynh is a 53 yo f w/ pmhx of TIA, HTN, DM, Fibromyalgia, recent knee surgery who presents to the ED due to HA and neck pain since 8/10. She reports that she was awoken by the HA that radiated down her neck to her upper back and arms.     Endorses taking ASA 81mg daily last taken 8/11 morning.     CTA w/ small R parietal hyper density concerning for ICH vs SAH component. MRI w/ w/o pending.     Admit to NCC for continued work up and close neurologic monitoring.     Upon arrival to unit, patient reports improvement in HA/neck pain without resolution, but increased pain in L knee after transfer from ED bed to ICU bed.

## 2023-08-12 NOTE — PLAN OF CARE
Problem: Occupational Therapy  Goal: Occupational Therapy Goal  Description: Goals to be met by: 09/12/23     Patient will increase functional independence with ADLs by performing:    UE Dressing with Modified [place].  LE Dressing with Set-up Assistance and Assistive Devices as needed.  Grooming while EOB with Modified [place].  Toileting from bedside commode with Modified [place] for hygiene and clothing management.   Toilet transfer to bedside commode with Modified [place].  Upper extremity exercise program 3x15 reps per handout, with supervision.    Outcome: Ongoing, Progressing     Pt was agreeable to and participated in OT/PT evaluation.  Pt recently had a L knee replacement, but stated that prior to surgery, she was mod I with all ADLs and mobility.  Pt currently performing ADLs with set up to max A (LB dressing) and func mobility with SBA - CGA using DME.  Goals established to assist pt with returning to PLOF regarding ADLs and func mobility.  Pt will benefit from skilled OT services in order to increase her level of safety and independence with ADLs and mobility.      Stephanie Gilmore, OT  8/12/2023

## 2023-08-12 NOTE — ASSESSMENT & PLAN NOTE
-Admit to NCC for closer monitoring   -Hourly Neuro checks and VS  -Initial CTh/CTA revealed small R parietal hyperdensity concerning for ICH vs SAH component. 1.1 cm. No vascular abnormalities seen.   -MRI kei w/ w/o with venous done for eval of AV malformation or VST.   -daily ASA. Not reversed. Hold further doses.  -aPTT, PT/INR pending  -SBP < 140  -PRN labetalol and hydralazine  -NSGY consulted  -Vascular Neurology, consulted appreciate recommendations   -EKG and Echo pending   -A1c, TSH, lipid panel pending    -Daily CBC, CMP, mag, phos  -SCDs, hold DVT chemoppx in setting of acute bleed  -Keppra 500mg BID x7 day for seizure ppx  -PT/OT/SLP        Antithrombotics for secondary stroke prevention: Antiplatelets: None: Intracerebral Hemorrhage    Statins for secondary stroke prevention and hyperlipidemia, if present:   Statins: Atorvastatin- 40 mg daily    Aggressive risk factor modification: HTN, Diet, Exercise     Rehab efforts: The patient has been evaluated by a stroke team provider and the therapy needs have been fully considered based off the presenting complaints and exam findings. The following therapy evaluations are needed: PT evaluate and treat, OT evaluate and treat    Diagnostics ordered/pending: HgbA1C to assess blood glucose levels, Lipid Profile to assess cholesterol levels, TSH to assess thyroid function    VTE prophylaxis: Mechanical prophylaxis: Place SCDs    BP parameters: ICH: SBP <140

## 2023-08-12 NOTE — SUBJECTIVE & OBJECTIVE
Past Medical History:   Diagnosis Date    Allergy     Anemia     Anxiety     Asthma     denies- on outside problem list in Care Everywhere    Depression     Diabetes mellitus     Diabetes mellitus, type 2     Fibromyalgia     GERD (gastroesophageal reflux disease)     Hypertension     Stroke     TIA    Vertigo      Past Surgical History:   Procedure Laterality Date    ARTHROSCOPIC REPAIR OF ROTATOR CUFF OF SHOULDER Right 2022    Procedure: REPAIR, ROTATOR CUFF, ARTHROSCOPIC;  Surgeon: Ministerio Orr Jr., MD;  Location: [facility];  Service: Orthopedics;  Laterality: Right;  need opus system  Jehovah's witness notifed CC     ARTHROSCOPY OF KNEE Left 2021    Procedure: ARTHROSCOPY, KNEE;  Surgeon: Donnie Campuzano MD;  Location: [facility] OR;  Service: Orthopedics;  Laterality: Left;     SECTION      DECOMPRESSION OF SUBACROMIAL SPACE  2022    Procedure: DECOMPRESSION, SUBACROMIAL SPACE;  Surgeon: Ministerio Orr Jr., MD;  Location: [facility];  Service: Orthopedics;;    EXCISION OF MASS OF EXTREMITY Left 2019    Procedure: EXCISION, MASS, EXTREMITY;  Surgeon: Honorio Pulido IV, MD;  Location: [facility] OR;  Service: Orthopedics;  Laterality: Left;  excision lipoma  Request Lacie    HERNIA REPAIR      HYSTERECTOMY      KNEE ARTHROSCOPY W/ MENISCECTOMY  2021    Procedure: ARTHROSCOPY, KNEE, WITH MENISCECTOMY;  Surgeon: Donnie Campuzano MD;  Location: [facility] OR;  Service: Orthopedics;;    NASAL SEPTOPLASTY      RECONSTRUCTION OF ACROMIOCLAVICULAR JOINT  2022    Procedure: RECONSTRUCTION, ACROMIOCLAVICULAR JOINT;  Surgeon: Ministerio Orr Jr., MD;  Location: [facility];  Service: Orthopedics;;    TOTAL KNEE ARTHROPLASTY Right 10/26/2022    Procedure: ARTHROPLASTY, KNEE, TOTAL RIGHT: BALDO - NEXGEN;  Surgeon: Nolberto Fraser MD;  Location: [facility] OR;  Service: Orthopedics;  Laterality: Right;    TOTAL KNEE ARTHROPLASTY Left 2023    Procedure: ARTHROPLASTY, KNEE, TOTAL: LEFT: BALDO NEXGEN;  Surgeon: Evelio  Nolberto IGLESIAS MD;  Location: [facility] OR;  Service: Orthopedics;  Laterality: Left;      Current Facility-Administered Medications on File Prior to Encounter   Medication Dose Route Frequency Provider Last Rate Last Admin    fentaNYL 50 mcg/mL injection 100 mcg  100 mcg Intravenous PRN Neno Horton PA-C   25 mcg at 23 1234    midazolam (VERSED) 1 mg/mL injection 1 mg  1 mg Intravenous PRN Neno Horton PA-C   2 mg at 23 1120    [facility]% [facility] Pump infusion 500 ML   Perineural Continuous Neno Horton PA-C   New Bag at 23 1516     Current Outpatient Medications on File Prior to Encounter   Medication Sig Dispense Refill    aspirin (ECOTRIN) 81 MG EC tablet Take 1 tablet (81 mg total) by mouth 2 (two) times a day. 60 tablet 0    HYDROcodone-acetaminophen (NORCO)  mg per tablet Take 1 tablet by mouth every 6 (six) hours as needed for Pain (Take for 1 week, then resume Norco 7.5mg tablets). 28 tablet 0    methocarbamoL (ROBAXIN) 750 MG Tab Take 1 tablet (750 mg total) by mouth 4 (four) times daily as needed (muscle spasms). 40 tablet 0    acetaminophen (TYLENOL) 650 MG TbSR Take 1 tablet (650 mg total) by mouth 2 (two) times a day. 120 tablet 0    albuterol (PROVENTIL/VENTOLIN HFA) 90 mcg/actuation inhaler SMARTSI Puff(s) By Mouth Every 4 Hours PRN      amLODIPine (NORVASC) 5 MG tablet Take 5 mg by mouth once daily.      azelastine (ASTELIN) 137 mcg (0.1 %) nasal spray 1 spray once daily.      celecoxib (CELEBREX) 200 MG capsule Take 1 capsule (200 mg total) by mouth once daily. 30 capsule 0    cetirizine (ZYRTEC) 10 MG tablet Take 10 mg by mouth every evening.      cholecalciferol, vitamin D3, 1,250 mcg (50,000 unit) capsule Take 50,000 Units by mouth every 7 days.      diazePAM (VALIUM) 2 MG tablet SMARTSI-2 Tablet(s) By Mouth      EPINEPHrine (EPIPEN) 0.3 mg/0.3 mL AtIn as directed      ergocalciferol (ERGOCALCIFEROL) 50,000 unit Cap Take 50,000 Units by mouth  every 7 days.      FEROSUL 325 mg (65 mg iron) Tab tablet Take by mouth every other day.      FLUoxetine 20 MG capsule Take 40 mg by mouth once daily.      fluticasone propionate (FLONASE) 50 mcg/actuation nasal spray 1 spray by Each Nostril route once daily.      folic acid (FOLVITE) 1 MG tablet Take 1,000 mcg by mouth.      HYDROcodone-acetaminophen (NORCO) 7.5-325 mg per tablet Take 1 tablet by mouth every 6 (six) hours as needed for Pain.      loratadine (CLARITIN) 10 mg tablet Take 10 mg by mouth once daily.      losartan (COZAAR) 100 MG tablet Take 100 mg by mouth once daily.      meclizine (ANTIVERT) 25 mg tablet Take 25 mg by mouth.      meloxicam (MOBIC) 15 MG tablet Take 1 tablet (15 mg total) by mouth once daily. 30 tablet 3    metFORMIN (GLUCOPHAGE) 1000 MG tablet Take 1,000 mg by mouth 2 (two) times daily with meals.      montelukast (SINGULAIR) 10 mg tablet       MOVANTIK 25 mg tablet Take 25 mg by mouth.      nicotine polacrilex (NICORETTE) 4 MG Gum SMARTSI Each By Mouth PRN      ondansetron (ZOFRAN-ODT) 8 MG TbDL Take 8 mg by mouth every 8 (eight) hours as needed.      pantoprazole (PROTONIX) 40 MG tablet TAKE 1 TABLET BY MOUTH ONCE DAILY 30 MINUTES BEFORE BREAKFAST      paroxetine (PAXIL) 10 MG tablet Take 10 mg by mouth every morning.      PREMARIN 0.625 mg tablet Take 0.625 mg by mouth once daily.      rosuvastatin (CRESTOR) 10 MG tablet Take 10 mg by mouth.      semaglutide (OZEMPIC) 0.25 mg or 0.5 mg (2 mg/3 mL) pen injector Inject 0.25 mg into the skin every 7 (seven) days      senna-docusate 8.6-50 mg (SENNA WITH DOCUSATE SODIUM) 8.6-50 mg per tablet Take 1 tablet by mouth once daily. 30 tablet 0    STOOL SOFTENER 100 mg capsule Take 1 softgel by mouth twice daily 60 capsule 11    sumatriptan (IMITREX) 100 MG tablet Take 100 mg by mouth 2 (two) times daily as needed.        Allergies: Adhesive and Morphine    Family History   Problem Relation Age of Onset    No Known Problems Mother      Hypertension Father     Hypertension Sister     No Known Problems Sister     No Known Problems Brother     No Known Problems Daughter     No Known Problems Daughter     No Known Problems Daughter     No Known Problems Son      Social History     Tobacco Use    Smoking status: Former     Current packs/day: 0.00    Smokeless tobacco: Never   Substance Use Topics    Alcohol use: Yes     Comment: occasional    Drug use: Never     Review of Systems   Constitutional:  Negative for chills and fever.   HENT:  Negative for sore throat and trouble swallowing.    Eyes:  Positive for photophobia. Negative for pain and visual disturbance.   Respiratory:  Negative for cough and shortness of breath.    Cardiovascular:  Negative for chest pain and palpitations.   Gastrointestinal:  Negative for abdominal pain, nausea and vomiting.   Genitourinary:  Negative for difficulty urinating and dysuria.   Musculoskeletal:  Positive for neck pain. Negative for neck stiffness.   Skin:  Positive for wound (L knee surgery incision). Negative for rash.   Neurological:  Positive for headaches. Negative for dizziness, tremors, seizures, syncope, facial asymmetry, speech difficulty, weakness and light-headedness.   Psychiatric/Behavioral:  Negative for agitation and confusion.      Objective:     Vitals:    Temp: 98.2 °F (36.8 °C)  Pulse: 79  Rhythm: normal sinus rhythm  BP: 136/65  MAP (mmHg): 93  Resp: (!) 33  SpO2: 96 %    Temp  Min: 98.2 °F (36.8 °C)  Max: 99.5 °F (37.5 °C)  Pulse  Min: 70  Max: 103  BP  Min: 134/72  Max: 166/81  MAP (mmHg)  Min: 93  Max: 114  Resp  Min: 10  Max: 33  SpO2  Min: 95 %  Max: 99 %    No intake/output data recorded.   Unmeasured Output  Stool Occurrence: 0        Physical Exam    Constitutional: Well-nourished, well-developed. Appears stated age. Patient lying in bed on L side resting. Visitor asleep in chair at bedside.   No obvious distress.      Eyes: Clear conjunctiva. Anicteric. No discharge.   Lids without  lesions.    HEENT: Normocephalic, atraumatic.   Nose and external ears atraumatic.   Mucus membranes are moist.     Cardio: Regular rate and rhythm on telemetry monitor.    Respiratory: Regular effort, no respiratory distress.    GI: Soft, non-distended, non-tender.    Skin: No erythema or rash on exposed skin. L knee swollen w/ bandage in place from recent total knee replacement.   Scaring over R knee from previous total knee replacement.     Neuro: E3 V5 M6    Arouses easily to voice. Oriented to self, time, place, and situation. Patient is answering all questions appropriately and following all commands briskly.   No facial droop. EOMI. PERRL.     Motor:   No pronator drift  CARDONA spontaneously and against gravity   RUE: 5/5  LUE: 5/5  RLE: 5/5  LLE: Exam limited due to knee swelling from recent surgery. Toe wiggle and minimal left lifting to command.     Sensory:  Sensation grossly intact          Today I personally reviewed pertinent medications, lines/drains/airways, imaging, notably:CT

## 2023-08-12 NOTE — PT/OT/SLP PROGRESS
Speech Language Pathology      Candy Huynh  MRN: [id number]    Attempted to see pt for bedside swallow assessment this date. Pt NPO for pending angiogram. SLP to evaluate 08/13/23 as appropriate.     Nga James MS, CCC-SLP  Speech Language Pathologist  Pager: [phone]  Datet 8/12/2023

## 2023-08-12 NOTE — HPI
51 yo F with PMH of HTN and DM who comes to ED due to back neck pain and headache since yesterday. She reports she was woken up by a bad headache and neck pain last night and has had ongoing pain since then. She takes a baby aspirin every day and last took this morning. Her pain radiates from the back of her head and aches down her neck into shoulder blades.     CTA showed small right parietal hyperdensity concerning for ICH vs SAH component.  systolic on arrival.

## 2023-08-12 NOTE — PT/OT/SLP EVAL
Physical Therapy Evaluation    Patient Name:  Candy Huynh   MRN:  [id number]  Admit Date: 8/11/2023  Admitting Diagnosis:  ICH (intracerebral hemorrhage)  Length of Stay: 1 days  Recent Surgery: * No surgery found *      Recommendations:     Discharge Recommendations:  other (see comments)   Discharge Equipment Recommendations: hip kit   Barriers to discharge: Inaccessible home    Highest Level of Mobility: walked ~25 feet  Assistance Needed: contact guard assistance    Assessment:     Candy Huynh is a 52 y.o. female admitted with a medical diagnosis of ICH (intracerebral hemorrhage). Medical history includes recent L TKA (8/7/23). She is most limited by pain. Today, she was able to perform bed mobility, standing, transfers, and gait training. Based on clinical presentation, co-morbidities, and today's performance, patient would benefit from acute skilled physical therapy services to improve functional mobility and return to max capacity prior level of function. See detailed evaluation below:    Problem List: weakness, impaired endurance, impaired self care skills, impaired functional mobility, gait instability, impaired balance, decreased lower extremity function, pain, decreased ROM, edema, orthopedic precautions  Rehab Prognosis: Good; patient would benefit from acute skilled PT services to address these deficits and reach maximum level of function.      Plan:     During this hospitalization, patient to be seen 4 x/week to address the identified rehab impairments via therapeutic activities, gait training, therapeutic exercises, neuromuscular re-education and progress towards the established goals.    Plan of Care Expires:  09/11/23    Subjective   Communicated with RN prior to session.  Patient found HOB elevated upon PT entry to room, agreeable to evaluation.     Chief Complaint: Neck Pain (Posterior neck pain that began last night with head pressure. Reports pain radiates down bilateral arms. Denies  "injury or trauma. + hx of sciatica. Ambulatory with walker s/p L knee surgery Monday. Denies any fevers or chills. )    Patient/Family Comments/goals: to get better  Pain/Comfort:  Pain Rating 1: 2/10  Location - Orientation 1: generalized  Location 1: head  Pain Addressed 1: Pre-medicate for activity, Reposition, Distraction, Cessation of Activity  Pain Rating Post-Intervention 1:  (pain increased with mobility, but unrated)    Living Environment:  Patient lives alone in a 2 story townhouse with no steps to enter - bedroom and bathroom are upstairs     Prior Level of Function:   Patient reports being modified independent with mobility & with ADLs. Patient owns DME as follows: bedside commode, bath bench, walker, rolling, cane, quad, cane, straight.       Patient reports they will have assistance from family upon discharge.    Objective:   Patient found with: telemetry, pulse ox (continuous), peripheral IV, blood pressure cuff     General Precautions: Standard, fall   Orthopedic Precautions:LLE weight bearing as tolerated   Braces: N/A   Oxygen Device: Room Air  Vitals: /62   Pulse 84   Temp 98.6 °F (37 °C) (Oral)   Resp (!) 24   Ht 5' 3" (1.6 m)   Wt 117.8 kg (259 lb 11.2 oz)   SpO2 96%   Breastfeeding No   BMI 46.00 kg/m²     Exams:  Cognition:   Alert and Cooperative  AxOx4  Command following: Follows multistep  commands  Fluency: clear/fluent  Hearing: Intact  Vision:  Intact visual fields  Skin Integrity: L knee bandage  Sensation: intact  Coordination: intact  LE Strength:  L Lower Extremity:   Hip flexion: 2/5  Hip extension: 2+/5  Knee flexion: 2+/5  Knee extension: 2/5  Dorsiflexion: 3/5  Plantarflexion: 3/5  R Lower Extremity: WFL  LE ROM:  L Lower Extremity: limited from recent surgery; not formally tested  R Lower Extremity: WFL      Outcome Measures:  AM-PAC 6 CLICK MOBILITY  Turning over in bed (including adjusting bedclothes, sheets and blankets)?: 4  Sitting down on and standing up from " a chair with arms (e.g., wheelchair, bedside commode, etc.): 3  Moving from lying on back to sitting on the side of the bed?: 4  Moving to and from a bed to a chair (including a wheelchair)?: 3  Need to walk in hospital room?: 3  Climbing 3-5 steps with a railing?: 3  Basic Mobility Total Score: 20     Functional Mobility:    Bed Mobility:  Supine to Sit: stand by assistance  Scooting anteriorly to EOB to have both feet planted on floor: stand by assistance    Sitting Balance at Edge of Bed:  Assistance Level Required: Stand-by Assistance    Transfers:   Sit <> Stand Transfer: contact guard assistance with rolling walker   Standing Tolerance: contact guard assistance with rolling walker   Deviations: flexed posture  Bed <> Chair Transfer: Step Transfer technique with contact guard assistance with rolling walker    Gait:   Patient ambulated: ~25 feet   Patient required: contact guard  Patient used: rolling walker  Gait Deviation(s): decreased speed, decreased step length, flexed posture, antalgic gait pattern    Therapeutic Exercises:   Patient educated on and demonstrated lower extremity strengthening exercises to perform throughout the day.    Education:   Patient was educated on the following:  Role of PT, plan of care, and goals  In room safety and use of call button  Importance of continued upright mobility and exercise  Balancing rest and activity      Patient left up in chair with all lines intact, call button in reach, and RN notified.    GOALS:   Multidisciplinary Problems       Physical Therapy Goals          Problem: Physical Therapy    Goal Priority Disciplines Outcome Goal Variances Interventions   Physical Therapy Goal     PT, PT/OT Ongoing, Progressing     Description: PT goals to be met by: 9/2/23    Patient will perform supine <> sitting with supervision.  Patient will perform sit <> stand transitions with supervision using RW.  Patient will perform transfers from bed <> chair or BSC with supervision  using RW.  Patient will ambulate 150 feet with supervision using RW.  Patient will ascend/descend 1 flight of steps using handrails with supervision.                       History:     Past Medical History:   Diagnosis Date    Allergy     Anemia     Anxiety     Asthma     denies- on outside problem list in Care Everywhere    Depression     Diabetes mellitus     Diabetes mellitus, type 2     Fibromyalgia     GERD (gastroesophageal reflux disease)     Hypertension     Stroke     TIA    Vertigo        Past Surgical History:   Procedure Laterality Date    ARTHROSCOPIC REPAIR OF ROTATOR CUFF OF SHOULDER Right 2022    Procedure: REPAIR, ROTATOR CUFF, ARTHROSCOPIC;  Surgeon: Ministerio Orr Jr., MD;  Location: [facility] OR;  Service: Orthopedics;  Laterality: Right;  need opus system  jose roberto notifed CC     ARTHROSCOPY OF KNEE Left 2021    Procedure: ARTHROSCOPY, KNEE;  Surgeon: Donnie Campuzano MD;  Location: [facility];  Service: Orthopedics;  Laterality: Left;     SECTION      DECOMPRESSION OF SUBACROMIAL SPACE  2022    Procedure: DECOMPRESSION, SUBACROMIAL SPACE;  Surgeon: Minitserio Orr Jr., MD;  Location: [facility] OR;  Service: Orthopedics;;    EXCISION OF MASS OF EXTREMITY Left 2019    Procedure: EXCISION, MASS, EXTREMITY;  Surgeon: Honorio Pulido IV, MD;  Location: [facility] OR;  Service: Orthopedics;  Laterality: Left;  excision lipoma  Request Lacie    HERNIA REPAIR      HYSTERECTOMY      KNEE ARTHROSCOPY W/ MENISCECTOMY  2021    Procedure: ARTHROSCOPY, KNEE, WITH MENISCECTOMY;  Surgeon: Donnie Campuzano MD;  Location: [facility] OR;  Service: Orthopedics;;    NASAL SEPTOPLASTY      RECONSTRUCTION OF ACROMIOCLAVICULAR JOINT  2022    Procedure: RECONSTRUCTION, ACROMIOCLAVICULAR JOINT;  Surgeon: Ministerio Orr Jr., MD;  Location: [facility];  Service: Orthopedics;;    TOTAL KNEE ARTHROPLASTY Right 10/26/2022    Procedure: ARTHROPLASTY, KNEE, TOTAL RIGHT: BALDO - NEXGEN;  Surgeon: Nolberto Fraser MD;   Location: [facility] OR;  Service: Orthopedics;  Laterality: Right;    TOTAL KNEE ARTHROPLASTY Left 8/7/2023    Procedure: ARTHROPLASTY, KNEE, TOTAL: LEFT: BALDO NEXGEN;  Surgeon: Nolberto Fraser MD;  Location: [facility];  Service: Orthopedics;  Laterality: Left;       Time Tracking:     PT Received On: 08/12/23  PT Start Time: 0942     PT Stop Time: 0957  PT Total Time (min): 15 min     Additional staff present: OT - Co-evaluation with OT due to patient complexity and need for two skilled therapists to ensure safe mobilization.    Billable Minutes: Evaluation 7 and Gait Training 8    Lisa Romo, PT, DPT  8/12/2023

## 2023-08-13 ENCOUNTER — PATIENT MESSAGE (OUTPATIENT)
Dept: ADMINISTRATIVE | Facility: OTHER | Age: 53
End: 2023-08-13
Payer: MEDICAID

## 2023-08-14 ENCOUNTER — DOCUMENTATION ONLY (OUTPATIENT)
Dept: REHABILITATION | Facility: HOSPITAL | Age: 53
End: 2023-08-14

## 2023-08-14 ENCOUNTER — PATIENT MESSAGE (OUTPATIENT)
Dept: ADMINISTRATIVE | Facility: OTHER | Age: 53
End: 2023-08-14
Payer: MEDICAID

## 2023-08-14 DIAGNOSIS — Z96.652 STATUS POST LEFT KNEE REPLACEMENT: Primary | ICD-10-CM

## 2023-08-14 LAB — POCT GLUCOSE: 92 MG/DL (ref 70–110)

## 2023-08-14 RX ORDER — HYDROCODONE BITARTRATE AND ACETAMINOPHEN 10; 325 MG/1; MG/1
1 TABLET ORAL EVERY 4 HOURS PRN
Qty: 40 TABLET | Refills: 0 | Status: SHIPPED | OUTPATIENT
Start: 2023-08-14 | End: 2023-08-21 | Stop reason: SDUPTHER

## 2023-08-14 RX ORDER — PREGABALIN 200 MG/1
200 CAPSULE ORAL 2 TIMES DAILY
Qty: 60 CAPSULE | Refills: 0 | Status: SHIPPED | OUTPATIENT
Start: 2023-08-14 | End: 2023-11-13 | Stop reason: SDUPTHER

## 2023-08-14 NOTE — PROGRESS NOTES
Pt called the joint phone to ask for a refill of her norco and lyrica. She is followed by Trey Ewing MD for pain management. She requests that we fax a letter to them at [phone] to explain that we are prescribing for her pain post operatively.

## 2023-08-14 NOTE — PROGRESS NOTES
Patient missed her 3rd appointment for PT initial evaluation and has been rescheduled multiple times.     Patient was called after first and 2nd missed appointments and required specific time frames for PT visits due to transportation.

## 2023-08-15 ENCOUNTER — PATIENT MESSAGE (OUTPATIENT)
Dept: ADMINISTRATIVE | Facility: OTHER | Age: 53
End: 2023-08-15
Payer: MEDICAID

## 2023-08-15 DIAGNOSIS — Z96.652 STATUS POST LEFT KNEE REPLACEMENT: Primary | ICD-10-CM

## 2023-08-16 ENCOUNTER — PATIENT OUTREACH (OUTPATIENT)
Dept: ADMINISTRATIVE | Facility: CLINIC | Age: 53
End: 2023-08-16
Payer: MEDICAID

## 2023-08-16 ENCOUNTER — NURSE TRIAGE (OUTPATIENT)
Dept: ADMINISTRATIVE | Facility: CLINIC | Age: 53
End: 2023-08-16
Payer: MEDICAID

## 2023-08-16 LAB
BACTERIA BLD CULT: NORMAL
BACTERIA BLD CULT: NORMAL

## 2023-08-16 NOTE — TELEPHONE ENCOUNTER
Pt transferred to this NT from [facility], Natasha Velez. Pt c/o double vision that started yesterday as well as a severe headache today. Pt was discharged from the hospital on 8/12/23 after experiencing an ICH. She also notes dry mouth.    Care Advice recommends that pt be seen in ED now. She verbalizes understanding and is instructed to call back with any new/worsening sxs, questions, or concerns.   Reason for Disposition   SEVERE headache    Additional Information   Negative: Complete loss of vision in one or both eyes   Negative: SEVERE eye pain    Protocols used: Vision Loss or Change-A-OH

## 2023-08-16 NOTE — PATIENT INSTRUCTIONS
Have problems taking your drugs  Have sores or redness on your skin  Have trouble moving your bowels or emptying your bladder  Have home care problems that you need help with  Keep having seizures or they do not get better when you take drugs for them  Have personality changes like angry outbursts or thoughts of hurting yourself or others

## 2023-08-17 ENCOUNTER — TELEPHONE (OUTPATIENT)
Dept: NEUROLOGY | Facility: HOSPITAL | Age: 53
End: 2023-08-17
Payer: MEDICAID

## 2023-08-17 ENCOUNTER — PATIENT MESSAGE (OUTPATIENT)
Dept: ADMINISTRATIVE | Facility: OTHER | Age: 53
End: 2023-08-17
Payer: MEDICAID

## 2023-08-17 DIAGNOSIS — I61.1 NONTRAUMATIC CORTICAL HEMORRHAGE OF RIGHT CEREBRAL HEMISPHERE: Primary | ICD-10-CM

## 2023-08-18 PROCEDURE — G0180 MD CERTIFICATION HHA PATIENT: HCPCS | Mod: ,,, | Performed by: NURSE PRACTITIONER

## 2023-08-18 PROCEDURE — G0180 PR HOME HEALTH MD CERTIFICATION: ICD-10-PCS | Mod: ,,, | Performed by: NURSE PRACTITIONER

## 2023-08-20 ENCOUNTER — PATIENT MESSAGE (OUTPATIENT)
Dept: ADMINISTRATIVE | Facility: OTHER | Age: 53
End: 2023-08-20
Payer: MEDICAID

## 2023-08-21 ENCOUNTER — OFFICE VISIT (OUTPATIENT)
Dept: ORTHOPEDICS | Facility: CLINIC | Age: 53
End: 2023-08-21
Payer: MEDICAID

## 2023-08-21 DIAGNOSIS — Z96.652 STATUS POST LEFT KNEE REPLACEMENT: ICD-10-CM

## 2023-08-21 PROCEDURE — 99213 OFFICE O/P EST LOW 20 MIN: CPT | Mod: PBBFAC | Performed by: NURSE PRACTITIONER

## 2023-08-21 PROCEDURE — 4010F PR ACE/ARB THEARPY RXD/TAKEN: ICD-10-PCS | Mod: CPTII,,, | Performed by: NURSE PRACTITIONER

## 2023-08-21 PROCEDURE — 99024 POSTOP FOLLOW-UP VISIT: CPT | Mod: ,,, | Performed by: NURSE PRACTITIONER

## 2023-08-21 PROCEDURE — 1160F PR REVIEW ALL MEDS BY PRESCRIBER/CLIN PHARMACIST DOCUMENTED: ICD-10-PCS | Mod: CPTII,,, | Performed by: NURSE PRACTITIONER

## 2023-08-21 PROCEDURE — 99024 PR POST-OP FOLLOW-UP VISIT: ICD-10-PCS | Mod: ,,, | Performed by: NURSE PRACTITIONER

## 2023-08-21 PROCEDURE — 3044F PR MOST RECENT HEMOGLOBIN A1C LEVEL <7.0%: ICD-10-PCS | Mod: CPTII,,, | Performed by: NURSE PRACTITIONER

## 2023-08-21 PROCEDURE — 1159F MED LIST DOCD IN RCRD: CPT | Mod: CPTII,,, | Performed by: NURSE PRACTITIONER

## 2023-08-21 PROCEDURE — 3044F HG A1C LEVEL LT 7.0%: CPT | Mod: CPTII,,, | Performed by: NURSE PRACTITIONER

## 2023-08-21 PROCEDURE — 4010F ACE/ARB THERAPY RXD/TAKEN: CPT | Mod: CPTII,,, | Performed by: NURSE PRACTITIONER

## 2023-08-21 PROCEDURE — 99999 PR PBB SHADOW E&M-EST. PATIENT-LVL III: ICD-10-PCS | Mod: PBBFAC,,, | Performed by: NURSE PRACTITIONER

## 2023-08-21 PROCEDURE — 1160F RVW MEDS BY RX/DR IN RCRD: CPT | Mod: CPTII,,, | Performed by: NURSE PRACTITIONER

## 2023-08-21 PROCEDURE — 99999 PR PBB SHADOW E&M-EST. PATIENT-LVL III: CPT | Mod: PBBFAC,,, | Performed by: NURSE PRACTITIONER

## 2023-08-21 PROCEDURE — 1159F PR MEDICATION LIST DOCUMENTED IN MEDICAL RECORD: ICD-10-PCS | Mod: CPTII,,, | Performed by: NURSE PRACTITIONER

## 2023-08-21 RX ORDER — MELOXICAM 15 MG/1
15 TABLET ORAL DAILY
Qty: 30 TABLET | Refills: 3 | Status: SHIPPED | OUTPATIENT
Start: 2023-08-21 | End: 2023-11-13 | Stop reason: SDUPTHER

## 2023-08-21 RX ORDER — HYDROCODONE BITARTRATE AND ACETAMINOPHEN 10; 325 MG/1; MG/1
1 TABLET ORAL EVERY 4 HOURS PRN
Qty: 40 TABLET | Refills: 0 | Status: SHIPPED | OUTPATIENT
Start: 2023-08-21 | End: 2023-08-30 | Stop reason: SDUPTHER

## 2023-08-23 ENCOUNTER — TELEPHONE (OUTPATIENT)
Dept: NEUROSURGERY | Facility: CLINIC | Age: 53
End: 2023-08-23
Payer: MEDICAID

## 2023-08-23 NOTE — TELEPHONE ENCOUNTER
----- Message from Marion Urias sent at 8/17/2023 12:21 PM CDT -----  Regarding: Pt Advice  Contact: [phone]  CONSULT/ADVISORY    Name of Caller: Candy Huynh    Contact Preference: [phone]    Nature of Call: Pt went to [facility] ER and was admitted and was told that a f/u appt would be scheduled.  Pt spoke with Nurse Devi.  Pt is requesting a call back.

## 2023-08-24 ENCOUNTER — PATIENT MESSAGE (OUTPATIENT)
Dept: ADMINISTRATIVE | Facility: OTHER | Age: 53
End: 2023-08-24
Payer: MEDICAID

## 2023-08-24 NOTE — PROGRESS NOTES
Candy Huynh presents for initial post-operative visit following a left total knee arthroplasty performed by Dr. Fraser on 8/7/2023    Exam:     Ambulating well with assistive device.  Incision is clean and dry without drainage or erythema.   ROM:0-100    Initial post-operative radiographs reviewed today revealing a well fixed and aligned prosthesis.    A/P:  2 weeks s/p left total knee replacement    - The patient was advised to keep the incision clean and dry for the next 24 hours after which she may wash the area with antibacterial soap in the shower. Will not submerge incision until one month post op.  - Outpatient PT: she was able to get home health and is currently working with them  - Continue aspirin for 1 month post op.  - Pain medication refilled  - Follow up in 4 weeks with surgeon. Pt will call clinic with problems/concerns.

## 2023-08-26 ENCOUNTER — PATIENT MESSAGE (OUTPATIENT)
Dept: ADMINISTRATIVE | Facility: OTHER | Age: 53
End: 2023-08-26
Payer: MEDICAID

## 2023-08-30 ENCOUNTER — TELEPHONE (OUTPATIENT)
Dept: ORTHOPEDICS | Facility: CLINIC | Age: 53
End: 2023-08-30
Payer: MEDICAID

## 2023-08-30 ENCOUNTER — PATIENT MESSAGE (OUTPATIENT)
Dept: ORTHOPEDICS | Facility: CLINIC | Age: 53
End: 2023-08-30
Payer: MEDICAID

## 2023-08-30 DIAGNOSIS — Z96.652 STATUS POST LEFT KNEE REPLACEMENT: ICD-10-CM

## 2023-08-30 RX ORDER — HYDROCODONE BITARTRATE AND ACETAMINOPHEN 10; 325 MG/1; MG/1
1 TABLET ORAL EVERY 4 HOURS PRN
Qty: 40 TABLET | Refills: 0 | Status: SHIPPED | OUTPATIENT
Start: 2023-08-30 | End: 2023-09-19

## 2023-08-30 RX ORDER — HYDROCODONE BITARTRATE AND ACETAMINOPHEN 10; 325 MG/1; MG/1
1 TABLET ORAL EVERY 4 HOURS PRN
Qty: 40 TABLET | Refills: 0 | OUTPATIENT
Start: 2023-08-30 | End: 2023-09-09

## 2023-08-30 NOTE — TELEPHONE ENCOUNTER
----- Message from Ayesha Zambrano sent at 8/30/2023  2:48 PM CDT -----  Needs advice from nurse:      Who Called:pt  Regarding:returning a call to Eliza  Would the patient rather a call back or VIA [facility]?  Best Call Back number:[phone]  Additional Info:

## 2023-08-30 NOTE — TELEPHONE ENCOUNTER
HgA1c 5.1% - will continue to monitor    Spoke to pt, informed that Donna sent the NORCO to her pharmacy, but she can not increase her Lyrica again. She will defer to pain management to manage that. Pt states pain management will not prescribe anything while she is getting pain meds from Dr. Pena and Donna for her knee surgery. She states she tried to call them and they hung up on her.     Spoke to pt again after speaking with Donna and informed that Shannon called and left a message for Dr. Ewing at pain management asking that they prescribe the Lyrica. As it is not for the knee pain. She also states that they can take over prescribing all medications to better manage the pts pain. Donna provided her name and direct number for call back. Pt states she is trying to get that pain management office to refer her to another pain management doctor. Informed pt that Dr. Pena will see her for her Shoulder but she needs surgery he will need to send her to someone else. Informed her to answer Christelle ULLOA in the portal and she will help her get scheduled with Dr. Pena    Asked pt about PT for her knee, she is still getting PT 3 times a week at home. Pt pleased and verbalized understanding.

## 2023-08-30 NOTE — TELEPHONE ENCOUNTER
Pt called hotline requesting a refill of NORCO to be sent to:    [facility] [address] [place] [address] Phone:  [phone]   Fax:  [phone]          She would like to know if you her Lyrica can be increased, she states both knees hurt with shooting pain and swelling.     Let me know and I will call her back.      Message forwarded to Donna TAYLOR, pt pleased and verbalized understanding.

## 2023-08-30 NOTE — TELEPHONE ENCOUNTER
----- Message from Oliverio Rushing sent at 8/30/2023  1:10 PM CDT -----      Name of Who is Calling: LUCY ABRAHAM [[id number]]      What is the request in detail: Pt called to get a virtual appt with the provider.Please contact to further discuss and advise.          Can the clinic reply by [facility]: Y      What Number to [facility] Back [facility] not [facility]: [phone]

## 2023-08-30 NOTE — TELEPHONE ENCOUNTER
Reached out to pt in regards to appointment, pt placed call on hold- line then hung up. Attempted to return call, no answer- lvm for pt to return call re: appointment with MD.

## 2023-09-05 DIAGNOSIS — M25.511 BILATERAL SHOULDER PAIN, UNSPECIFIED CHRONICITY: Primary | ICD-10-CM

## 2023-09-05 DIAGNOSIS — M25.512 BILATERAL SHOULDER PAIN, UNSPECIFIED CHRONICITY: Primary | ICD-10-CM

## 2023-09-06 ENCOUNTER — PATIENT MESSAGE (OUTPATIENT)
Dept: ADMINISTRATIVE | Facility: OTHER | Age: 53
End: 2023-09-06
Payer: MEDICAID

## 2023-09-07 ENCOUNTER — HOSPITAL ENCOUNTER (OUTPATIENT)
Dept: RADIOLOGY | Facility: HOSPITAL | Age: 53
Discharge: HOME | End: 2023-09-07
Attending: PHYSICIAN ASSISTANT
Payer: MEDICAID

## 2023-09-07 ENCOUNTER — OFFICE VISIT (OUTPATIENT)
Dept: ORTHOPEDICS | Facility: CLINIC | Age: 53
End: 2023-09-07
Payer: MEDICAID

## 2023-09-07 ENCOUNTER — OFFICE VISIT (OUTPATIENT)
Dept: NEUROSURGERY | Facility: CLINIC | Age: 53
End: 2023-09-07
Payer: MEDICAID

## 2023-09-07 ENCOUNTER — HOSPITAL ENCOUNTER (OUTPATIENT)
Dept: RADIOLOGY | Facility: HOSPITAL | Age: 53
Discharge: HOME | End: 2023-09-07
Attending: NEUROLOGICAL SURGERY
Payer: MEDICAID

## 2023-09-07 ENCOUNTER — TELEPHONE (OUTPATIENT)
Dept: ORTHOPEDICS | Facility: CLINIC | Age: 53
End: 2023-09-07
Payer: MEDICAID

## 2023-09-07 ENCOUNTER — HOSPITAL ENCOUNTER (OUTPATIENT)
Dept: RADIOLOGY | Facility: HOSPITAL | Age: 53
Discharge: HOME | End: 2023-09-07
Attending: ORTHOPAEDIC SURGERY
Payer: MEDICAID

## 2023-09-07 VITALS — HEIGHT: 64 IN | BODY MASS INDEX: 42.56 KG/M2 | WEIGHT: 249.31 LBS

## 2023-09-07 VITALS
WEIGHT: 249 LBS | BODY MASS INDEX: 42.51 KG/M2 | DIASTOLIC BLOOD PRESSURE: 85 MMHG | HEART RATE: 80 BPM | SYSTOLIC BLOOD PRESSURE: 138 MMHG | HEIGHT: 64 IN

## 2023-09-07 DIAGNOSIS — D32.9 MENINGIOMA: Primary | ICD-10-CM

## 2023-09-07 DIAGNOSIS — M54.9 BACK PAIN, UNSPECIFIED BACK LOCATION, UNSPECIFIED BACK PAIN LATERALITY, UNSPECIFIED CHRONICITY: ICD-10-CM

## 2023-09-07 DIAGNOSIS — M25.511 BILATERAL SHOULDER PAIN, UNSPECIFIED CHRONICITY: ICD-10-CM

## 2023-09-07 DIAGNOSIS — M75.101 TEAR OF RIGHT SUPRASPINATUS TENDON: Primary | ICD-10-CM

## 2023-09-07 DIAGNOSIS — M25.512 BILATERAL SHOULDER PAIN, UNSPECIFIED CHRONICITY: ICD-10-CM

## 2023-09-07 DIAGNOSIS — I61.1 NONTRAUMATIC CORTICAL HEMORRHAGE OF RIGHT CEREBRAL HEMISPHERE: ICD-10-CM

## 2023-09-07 PROCEDURE — 4010F ACE/ARB THERAPY RXD/TAKEN: CPT | Mod: CPTII,,, | Performed by: PHYSICIAN ASSISTANT

## 2023-09-07 PROCEDURE — 72110 X-RAY EXAM L-2 SPINE 4/>VWS: CPT | Mod: 26,,, | Performed by: RADIOLOGY

## 2023-09-07 PROCEDURE — 99999 PR PBB SHADOW E&M-EST. PATIENT-LVL II: ICD-10-PCS | Mod: PBBFAC,,, | Performed by: ORTHOPAEDIC SURGERY

## 2023-09-07 PROCEDURE — 99999 PR PBB SHADOW E&M-EST. PATIENT-LVL V: CPT | Mod: PBBFAC,,, | Performed by: PHYSICIAN ASSISTANT

## 2023-09-07 PROCEDURE — 72110 X-RAY EXAM L-2 SPINE 4/>VWS: CPT | Mod: TC

## 2023-09-07 PROCEDURE — 3044F PR MOST RECENT HEMOGLOBIN A1C LEVEL <7.0%: ICD-10-PCS | Mod: CPTII,,, | Performed by: PHYSICIAN ASSISTANT

## 2023-09-07 PROCEDURE — 1159F MED LIST DOCD IN RCRD: CPT | Mod: CPTII,,, | Performed by: PHYSICIAN ASSISTANT

## 2023-09-07 PROCEDURE — 70450 CT HEAD WITHOUT CONTRAST: ICD-10-PCS | Mod: 26,,, | Performed by: RADIOLOGY

## 2023-09-07 PROCEDURE — 3079F DIAST BP 80-89 MM HG: CPT | Mod: CPTII,,, | Performed by: PHYSICIAN ASSISTANT

## 2023-09-07 PROCEDURE — 99999 PR PBB SHADOW E&M-EST. PATIENT-LVL V: ICD-10-PCS | Mod: PBBFAC,,, | Performed by: PHYSICIAN ASSISTANT

## 2023-09-07 PROCEDURE — 3075F PR MOST RECENT SYSTOLIC BLOOD PRESS GE 130-139MM HG: ICD-10-PCS | Mod: CPTII,,, | Performed by: PHYSICIAN ASSISTANT

## 2023-09-07 PROCEDURE — 99499 NO LOS: ICD-10-PCS | Mod: S$PBB,,, | Performed by: ORTHOPAEDIC SURGERY

## 2023-09-07 PROCEDURE — 70450 CT HEAD/BRAIN W/O DYE: CPT | Mod: 26,,, | Performed by: RADIOLOGY

## 2023-09-07 PROCEDURE — 99214 PR OFFICE/OUTPT VISIT, EST, LEVL IV, 30-39 MIN: ICD-10-PCS | Mod: S$PBB,,, | Performed by: PHYSICIAN ASSISTANT

## 2023-09-07 PROCEDURE — 3044F HG A1C LEVEL LT 7.0%: CPT | Mod: CPTII,,, | Performed by: PHYSICIAN ASSISTANT

## 2023-09-07 PROCEDURE — 99212 OFFICE O/P EST SF 10 MIN: CPT | Mod: PBBFAC,25 | Performed by: ORTHOPAEDIC SURGERY

## 2023-09-07 PROCEDURE — 99999 PR PBB SHADOW E&M-EST. PATIENT-LVL II: CPT | Mod: PBBFAC,,, | Performed by: ORTHOPAEDIC SURGERY

## 2023-09-07 PROCEDURE — 4010F PR ACE/ARB THEARPY RXD/TAKEN: ICD-10-PCS | Mod: CPTII,,, | Performed by: PHYSICIAN ASSISTANT

## 2023-09-07 PROCEDURE — 1159F PR MEDICATION LIST DOCUMENTED IN MEDICAL RECORD: ICD-10-PCS | Mod: CPTII,,, | Performed by: PHYSICIAN ASSISTANT

## 2023-09-07 PROCEDURE — 99214 OFFICE O/P EST MOD 30 MIN: CPT | Mod: S$PBB,,, | Performed by: PHYSICIAN ASSISTANT

## 2023-09-07 PROCEDURE — 72110 XR LUMBAR SPINE AP AND LAT WITH FLEX/EXT: ICD-10-PCS | Mod: 26,,, | Performed by: RADIOLOGY

## 2023-09-07 PROCEDURE — 3008F BODY MASS INDEX DOCD: CPT | Mod: CPTII,,, | Performed by: PHYSICIAN ASSISTANT

## 2023-09-07 PROCEDURE — 99215 OFFICE O/P EST HI 40 MIN: CPT | Mod: PBBFAC,25,27 | Performed by: PHYSICIAN ASSISTANT

## 2023-09-07 PROCEDURE — 99499 UNLISTED E&M SERVICE: CPT | Mod: S$PBB,,, | Performed by: ORTHOPAEDIC SURGERY

## 2023-09-07 PROCEDURE — 70450 CT HEAD/BRAIN W/O DYE: CPT | Mod: TC

## 2023-09-07 PROCEDURE — 3008F PR BODY MASS INDEX (BMI) DOCUMENTED: ICD-10-PCS | Mod: CPTII,,, | Performed by: PHYSICIAN ASSISTANT

## 2023-09-07 PROCEDURE — 3079F PR MOST RECENT DIASTOLIC BLOOD PRESSURE 80-89 MM HG: ICD-10-PCS | Mod: CPTII,,, | Performed by: PHYSICIAN ASSISTANT

## 2023-09-07 PROCEDURE — 3075F SYST BP GE 130 - 139MM HG: CPT | Mod: CPTII,,, | Performed by: PHYSICIAN ASSISTANT

## 2023-09-07 RX ORDER — DIAZEPAM 2 MG/1
2 TABLET ORAL EVERY 6 HOURS PRN
COMMUNITY

## 2023-09-07 RX ORDER — BLOOD-GLUCOSE METER
1 EACH MISCELLANEOUS 4 TIMES DAILY
COMMUNITY
Start: 2023-07-07

## 2023-09-07 RX ORDER — METHOCARBAMOL 750 MG/1
1 TABLET, FILM COATED ORAL 3 TIMES DAILY
COMMUNITY
Start: 2023-06-26 | End: 2023-10-23 | Stop reason: SDUPTHER

## 2023-09-07 RX ORDER — MONTELUKAST SODIUM 10 MG/1
TABLET ORAL
COMMUNITY
Start: 2023-09-02

## 2023-09-07 RX ORDER — HYDROXYZINE PAMOATE 25 MG/1
25 CAPSULE ORAL NIGHTLY
COMMUNITY
Start: 2023-08-22

## 2023-09-07 RX ORDER — COVID-19 ANTIGEN TEST
KIT MISCELLANEOUS
COMMUNITY
Start: 2023-08-21

## 2023-09-07 RX ORDER — EPINEPHRINE 0.3 MG/.3ML
INJECTION SUBCUTANEOUS
COMMUNITY

## 2023-09-07 RX ORDER — PANTOPRAZOLE SODIUM 40 MG/1
1 TABLET, DELAYED RELEASE ORAL EVERY MORNING
COMMUNITY
Start: 2023-07-07 | End: 2023-10-05

## 2023-09-07 RX ORDER — LANCETS 30 GAUGE
EACH MISCELLANEOUS
COMMUNITY
Start: 2023-06-05

## 2023-09-07 RX ORDER — TIZANIDINE 4 MG/1
4 TABLET ORAL EVERY 8 HOURS PRN
COMMUNITY
End: 2023-10-23

## 2023-09-07 RX ORDER — PEN NEEDLE, DIABETIC 32GX 5/32"
NEEDLE, DISPOSABLE MISCELLANEOUS
COMMUNITY
Start: 2023-08-22

## 2023-09-07 RX ORDER — ISOPROPYL ALCOHOL 70 ML/100ML
SWAB TOPICAL
COMMUNITY
Start: 2023-08-03

## 2023-09-07 RX ORDER — ARIPIPRAZOLE 2 MG/1
2 TABLET ORAL NIGHTLY
COMMUNITY
Start: 2023-08-02

## 2023-09-07 RX ORDER — LANCETS 33 GAUGE
EACH MISCELLANEOUS 4 TIMES DAILY
COMMUNITY
Start: 2023-08-18

## 2023-09-07 RX ORDER — FLUTICASONE PROPIONATE 50 MCG
2 SPRAY, SUSPENSION (ML) NASAL
COMMUNITY
Start: 2023-05-13

## 2023-09-07 NOTE — TELEPHONE ENCOUNTER
----- Message from Ministerio Orr Jr., MD sent at 9/7/2023  2:11 PM CDT -----  Please call her for appt in the next few weeks  Thx!

## 2023-09-07 NOTE — PROGRESS NOTES
Subjective:      Patient ID: Candy Huynh is a 52 y.o. female.    Chief Complaint:   Pain of the Right Shoulder, Pain of the Left Shoulder, Pain and Post-op Evaluation of the Right Knee, and Post-op Evaluation and Pain of the Left Knee    HPI  We had her come in today because she was calling in with complaints of shoulder pain.  She has had bilateral shoulder surgery.  No fall, accident, injury since we saw her last.  She is about a month out from TKA.      Objective:        Ortho/SPM Exam    She can forward flex well above the shoulder level.  Positive empty can test bilaterally.  Distal neurovascular intact.      IMAGING:  Patient refused  Assessment:   Bilateral shoulder pain      Plan:   Patient became very upset and angry when she had to wait to get x-rays done, and she was cursing out the staff with some filthy language.  We will request that Dr. Orr re-evaluate her shoulder pain, since he has seen her for this multiple times in the past.  No charge for today's office visit.    The patient's pathophysiology was explained in detail with reference to x-rays, models, other visual aids as appropriate.  Treatment options were discussed in detail.  Questions were invited and answered to the patient's satisfaction.      Nolberto Fraser MD  Orthopedic Surgery

## 2023-09-08 ENCOUNTER — TELEPHONE (OUTPATIENT)
Dept: ORTHOPEDICS | Facility: CLINIC | Age: 53
End: 2023-09-08
Payer: MEDICAID

## 2023-09-08 NOTE — TELEPHONE ENCOUNTER
----- Message from Ministerio Orr Jr., MD sent at 9/7/2023  5:06 PM CDT -----  No -she needs to see us if she's having any problems with shoulder  ----- Message -----  From: Felecia Erickson MA  Sent: 9/7/2023   4:46 PM CDT  To: Ministerio Orr Jr., MD    Patient was last seen in  2022 for her right shoulder. She states that she would like a second opinion regarding her shoulder pain. Please advise if you would be able to enter referral for patient to have second opinion    ----- Message -----  From: Mayela Rod  Sent: 9/7/2023   4:25 PM CDT  To: [facility] Staff    Type:  Needs Medical Advice    Who Called: pt  Would the patient rather a call back or a response via [facility]? call  Best Call Back Number: [phone]  Additional Information: pt stated she missed call regarding scheduling

## 2023-09-11 ENCOUNTER — OFFICE VISIT (OUTPATIENT)
Dept: NEUROSURGERY | Facility: CLINIC | Age: 53
End: 2023-09-11
Payer: MEDICAID

## 2023-09-11 VITALS — HEIGHT: 64 IN | BODY MASS INDEX: 42.72 KG/M2

## 2023-09-11 DIAGNOSIS — M53.3 SI (SACROILIAC) JOINT DYSFUNCTION: Primary | ICD-10-CM

## 2023-09-11 PROCEDURE — 3008F PR BODY MASS INDEX (BMI) DOCUMENTED: ICD-10-PCS | Mod: CPTII,,, | Performed by: PHYSICIAN ASSISTANT

## 2023-09-11 PROCEDURE — 1159F MED LIST DOCD IN RCRD: CPT | Mod: CPTII,,, | Performed by: PHYSICIAN ASSISTANT

## 2023-09-11 PROCEDURE — 4010F ACE/ARB THERAPY RXD/TAKEN: CPT | Mod: CPTII,,, | Performed by: PHYSICIAN ASSISTANT

## 2023-09-11 PROCEDURE — 3044F HG A1C LEVEL LT 7.0%: CPT | Mod: CPTII,,, | Performed by: PHYSICIAN ASSISTANT

## 2023-09-11 PROCEDURE — 99999 PR PBB SHADOW E&M-EST. PATIENT-LVL IV: ICD-10-PCS | Mod: PBBFAC,,, | Performed by: PHYSICIAN ASSISTANT

## 2023-09-11 PROCEDURE — 99214 OFFICE O/P EST MOD 30 MIN: CPT | Mod: S$PBB,,, | Performed by: PHYSICIAN ASSISTANT

## 2023-09-11 PROCEDURE — 99214 PR OFFICE/OUTPT VISIT, EST, LEVL IV, 30-39 MIN: ICD-10-PCS | Mod: S$PBB,,, | Performed by: PHYSICIAN ASSISTANT

## 2023-09-11 PROCEDURE — 99214 OFFICE O/P EST MOD 30 MIN: CPT | Mod: PBBFAC | Performed by: PHYSICIAN ASSISTANT

## 2023-09-11 PROCEDURE — 1159F PR MEDICATION LIST DOCUMENTED IN MEDICAL RECORD: ICD-10-PCS | Mod: CPTII,,, | Performed by: PHYSICIAN ASSISTANT

## 2023-09-11 PROCEDURE — 4010F PR ACE/ARB THEARPY RXD/TAKEN: ICD-10-PCS | Mod: CPTII,,, | Performed by: PHYSICIAN ASSISTANT

## 2023-09-11 PROCEDURE — 3008F BODY MASS INDEX DOCD: CPT | Mod: CPTII,,, | Performed by: PHYSICIAN ASSISTANT

## 2023-09-11 PROCEDURE — 99999 PR PBB SHADOW E&M-EST. PATIENT-LVL IV: CPT | Mod: PBBFAC,,, | Performed by: PHYSICIAN ASSISTANT

## 2023-09-11 PROCEDURE — 3044F PR MOST RECENT HEMOGLOBIN A1C LEVEL <7.0%: ICD-10-PCS | Mod: CPTII,,, | Performed by: PHYSICIAN ASSISTANT

## 2023-09-11 RX ORDER — METHYLPREDNISOLONE 4 MG/1
TABLET ORAL
Qty: 21 EACH | Refills: 0 | Status: SHIPPED | OUTPATIENT
Start: 2023-09-11 | End: 2023-10-02

## 2023-09-11 NOTE — PROGRESS NOTES
"Neurosurgery  Established Patient    SUBJECTIVE:     History of Present Illness: Candy Huynh is a 52 y.o. female who presents for 4 week hospital follow up for right parietal ICH vs mass. She originally presented to the ED on  with complaints of neck pain as well as headache. CTA of the head revealed non vascular abnormality but a peripheral hyper attenuating focus in the right parietal region. MRI brain w wo favored meningioma. She was eventually discharged home with follow up.    Today she is here with a follow up CT scan. She reports mild headache. Denies weakness, paresthesias, vision changes, or seizure. She endorses lower back pain that has been ongoing for sometime.       Review of patient's allergies indicates:   Allergen Reactions    Adhesive Blisters     Specifically EKG lead pads    Morphine Other (See Comments)     shake       Current Outpatient Medications   Medication Sig Dispense Refill    albuterol (PROVENTIL/VENTOLIN HFA) 90 mcg/actuation inhaler SMARTSI Puff(s) By Mouth Every 4 Hours PRN      amLODIPine (NORVASC) 5 MG tablet Take 5 mg by mouth once daily.      ARIPiprazole (ABILIFY) 2 MG Tab Take 2 mg by mouth every evening.      aspirin (ECOTRIN) 81 MG EC tablet Take 1 tablet (81 mg total) by mouth 2 (two) times a day. 60 tablet 0    azelastine (ASTELIN) 137 mcg (0.1 %) nasal spray 1 spray once daily.      BD ALINE 2ND GEN PEN NEEDLE 32 gauge x 5/32" Ndle USE ONE NEEDLE ONCE A WEEK      cetirizine (ZYRTEC) 10 MG tablet Take 10 mg by mouth every evening.      cholecalciferol, vitamin D3, 1,250 mcg (50,000 unit) capsule Take 50,000 Units by mouth every 7 days.      EASY TOUCH ALCOHOL PREP PADS PadM USE TO prepare FOR glucose testing      EPINEPHrine (EPIPEN) 0.3 mg/0.3 mL AtIn as per administration instructions      ergocalciferol (ERGOCALCIFEROL) 50,000 unit Cap Take 50,000 Units by mouth every 7 days.      FEROSUL 325 mg (65 mg iron) Tab tablet Take by mouth every other day.      " FLOWFLEX COVID-19 AG HOME TEST Kit test AS directed      fluticasone propionate (FLONASE) 50 mcg/actuation nasal spray 2 sprays by Nasal route.      folic acid (FOLVITE) 1 MG tablet Take 1,000 mcg by mouth.      loratadine (CLARITIN) 10 mg tablet Take 10 mg by mouth once daily.      losartan (COZAAR) 100 MG tablet Take 100 mg by mouth once daily.      meclizine (ANTIVERT) 25 mg tablet Take 25 mg by mouth.      meloxicam (MOBIC) 15 MG tablet Take 1 tablet (15 mg total) by mouth once daily. 30 tablet 3    metFORMIN (GLUCOPHAGE-XR) 750 MG ER 24hr tablet Take 750 mg by mouth once daily.      methocarbamoL (ROBAXIN) 750 MG Tab Take 1 tablet by mouth 3 (three) times daily.      montelukast (SINGULAIR) 10 mg tablet       nicotine polacrilex (NICORETTE) 4 MG Gum SMARTSI Each By Mouth PRN      ondansetron (ZOFRAN-ODT) 8 MG TbDL Take 8 mg by mouth every 8 (eight) hours as needed.      ONETOUCH DELICA PLUS LANCET 33 gauge Misc Apply topically 4 (four) times daily.      ONETOUCH ULTRA2 METER Misc SMARTSIG:Via Meter As Directed      ONETOUCH VERIO TEST STRIPS Strp 1 strip 4 (four) times daily.      pantoprazole (PROTONIX) 40 MG tablet Take 1 tablet by mouth every morning.      paroxetine (PAXIL) 20 MG tablet Take 20 mg by mouth every morning.      pregabalin (LYRICA) 200 MG Cap Take 1 capsule (200 mg total) by mouth 2 (two) times daily. 60 capsule 0    rosuvastatin (CRESTOR) 10 MG tablet Take 10 mg by mouth.      semaglutide (OZEMPIC) 0.25 mg or 0.5 mg (2 mg/3 mL) pen injector Inject 0.25 mg into the skin every 7 (seven) days      STOOL SOFTENER 100 mg capsule Take 1 softgel by mouth twice daily 60 capsule 11    sumatriptan (IMITREX) 100 MG tablet Take 100 mg by mouth 2 (two) times daily as needed.      acetaminophen (TYLENOL) 650 MG TbSR Take 1 tablet (650 mg total) by mouth 2 (two) times a day. (Patient not taking: Reported on 2023) 120 tablet 0    ALBUTEROL INHL Inhale 2 puffs into the lungs every 4 (four) hours as  needed.      celecoxib (CELEBREX) 200 MG capsule Take 1 capsule (200 mg total) by mouth once daily. (Patient not taking: Reported on 9/7/2023) 30 capsule 0    diazePAM (VALIUM) 2 MG tablet Take 2 mg by mouth every 6 (six) hours as needed.      FLUoxetine 20 MG capsule Take 40 mg by mouth once daily.      HYDROcodone-acetaminophen (NORCO) 7.5-325 mg per tablet Take 1 tablet by mouth every 6 (six) hours as needed for Pain.      hydrOXYzine pamoate (VISTARIL) 25 MG Cap Take 25 mg by mouth every evening.      PREMARIN 0.625 mg tablet Take 0.625 mg by mouth once daily.      senna-docusate 8.6-50 mg (SENNA WITH DOCUSATE SODIUM) 8.6-50 mg per tablet Take 1 tablet by mouth once daily. 30 tablet 0    tiZANidine (ZANAFLEX) 4 MG tablet Take 4 mg by mouth every 8 (eight) hours as needed.       No current facility-administered medications for this visit.     Facility-Administered Medications Ordered in Other Visits   Medication Dose Route Frequency Provider Last Rate Last Admin    fentaNYL 50 mcg/mL injection 100 mcg  100 mcg Intravenous PRN Neno Horton PA-C   25 mcg at 08/07/23 1234    midazolam (VERSED) 1 mg/mL injection 1 mg  1 mg Intravenous PRN Neno Horton PA-C   2 mg at 08/07/23 1120    ropivacaine 0.2% Nimbus PainPRO Pump infusion 500 ML   Perineural Continuous Neno Horton PA-C   New Bag at 08/07/23 1516       Past Medical History:   Diagnosis Date    Allergy     Anemia     Anxiety     Asthma     denies- on outside problem list in Care Everywhere    Depression     Diabetes mellitus     Diabetes mellitus, type 2     Fibromyalgia     GERD (gastroesophageal reflux disease)     Hypertension     Stroke     TIA    Vertigo      Past Surgical History:   Procedure Laterality Date    ARTHROSCOPIC REPAIR OF ROTATOR CUFF OF SHOULDER Right 9/6/2022    Procedure: REPAIR, ROTATOR CUFF, ARTHROSCOPIC;  Surgeon: Ministerio Orr Jr., MD;  Location: [facility];  Service: Orthopedics;  Laterality: Right;  need opus  system  [employer] notifed CC     ARTHROSCOPY OF KNEE Left 2021    Procedure: ARTHROSCOPY, KNEE;  Surgeon: Donnie Campuzano MD;  Location: [facility] OR;  Service: Orthopedics;  Laterality: Left;     SECTION      DECOMPRESSION OF SUBACROMIAL SPACE  2022    Procedure: DECOMPRESSION, SUBACROMIAL SPACE;  Surgeon: Ministerio Orr Jr., MD;  Location: [facility] OR;  Service: Orthopedics;;    EXCISION OF MASS OF EXTREMITY Left 2019    Procedure: EXCISION, MASS, EXTREMITY;  Surgeon: Honorio Pulido IV, MD;  Location: [facility] OR;  Service: Orthopedics;  Laterality: Left;  excision lipoma  Request Lacie    HERNIA REPAIR      HYSTERECTOMY      KNEE ARTHROSCOPY W/ MENISCECTOMY  2021    Procedure: ARTHROSCOPY, KNEE, WITH MENISCECTOMY;  Surgeon: Donnie Campuzano MD;  Location: [facility] OR;  Service: Orthopedics;;    NASAL SEPTOPLASTY      RECONSTRUCTION OF ACROMIOCLAVICULAR JOINT  2022    Procedure: RECONSTRUCTION, ACROMIOCLAVICULAR JOINT;  Surgeon: Ministerio Orr Jr., MD;  Location: [facility] OR;  Service: Orthopedics;;    TOTAL KNEE ARTHROPLASTY Right 10/26/2022    Procedure: ARTHROPLASTY, KNEE, TOTAL RIGHT: BALDO - NEXGEN;  Surgeon: Nolberto Fraser MD;  Location: [facility] OR;  Service: Orthopedics;  Laterality: Right;    TOTAL KNEE ARTHROPLASTY Left 2023    Procedure: ARTHROPLASTY, KNEE, TOTAL: LEFT: BALDO NEXGEN;  Surgeon: Nolberto Fraser MD;  Location: [facility];  Service: Orthopedics;  Laterality: Left;     Family History       Problem Relation (Age of Onset)    Hypertension Father, Sister    No Known Problems Mother, Sister, Brother, Daughter, Daughter, Daughter, Son          Social History     Socioeconomic History    Marital status:    Tobacco Use    Smoking status: Former    Smokeless tobacco: Never   Substance and Sexual Activity    Alcohol use: Yes     Comment: occasional    Drug use: Never    Sexual activity: Yes     Partners: Male     Social Determinants of Health     Financial Resource Strain:  "Medium Risk (8/12/2023)    Overall Financial Resource Strain (CARDIA)     Difficulty of Paying Living Expenses: Somewhat hard   Food Insecurity: No Food Insecurity (8/12/2023)    Hunger Vital Sign     Worried About Running Out of Food in the Last Year: Never true     Ran Out of Food in the Last Year: Never true   Transportation Needs: Unmet Transportation Needs (8/12/2023)    PRAPARE - Transportation     Lack of Transportation (Medical): Yes     Lack of Transportation (Non-Medical): Yes   Physical Activity: Inactive (8/12/2023)    Exercise Vital Sign     Days of Exercise per Week: 0 days     Minutes of Exercise per Session: 0 min   Stress: Stress Concern Present (8/12/2023)    [employer] [employer] - Occupational Stress Questionnaire     Feeling of Stress : Rather much   Social Connections: Moderately Isolated (8/12/2023)    Social Connection and Isolation Panel [NHANES]     Frequency of Communication with Friends and Family: More than three times a week     Frequency of Social Gatherings with Friends and Family: More than three times a week     Attends Muslim Services: Never     Active Member of Clubs or Organizations: No     Attends Club or Organization Meetings: Never     Marital Status:    Housing Stability: Unknown (8/12/2023)    Housing Stability Vital Sign     Unable to Pay for Housing in the Last Year: No     Unstable Housing in the Last Year: No       Review of Systems    OBJECTIVE:     Vital Signs  Pulse: 80  BP: 138/85  Pain Score: 10-Worst pain ever  Height: 5' 4" (162.6 cm)  Weight: 112.9 kg (249 lb)  Body mass index is 42.74 kg/m².    Neurosurgery Physical Exam  General: well developed, well nourished, no distress.   Head: normocephalic, atraumatic  Neurologic: Alert and oriented. Thought content appropriate.  GCS: Motor: 6/Verbal: 5/Eyes: 4 GCS Total: 15  Mental Status: Awake, Alert, Oriented x 4  Language: No aphasia  Speech: No dysarthria  Cranial nerves: face symmetric, " tongue midline, CN II-XII grossly intact.   Eyes: pupils equal, round, reactive to light with accommodation, EOMI.   Pulmonary: normal respirations, no signs of respiratory distress  Abdomen: soft, non-distended, not tender to palpation  Skin: Skin is warm, dry and intact.  Sensory: intact to light touch throughout    Motor Strength:Moves all extremities spontaneously with good tone.  No abnormal movements seen.     Finger-to-nose: intact bilaterally   Pronator drift: absent bilaterally  Gait stable, fluid.       Diagnostic Results:  CT head which I have personally reviewed from 9/7 shows an extra axial hyperattenutating focus in the right parietal region, stable when compared to prior CTA and MRI brain w wo from 8/11/23. There is no vasogenic edema or mass effect.     MRI brain w wo from 8/11/23 which I have personally reviewed along with Dr. Masterson shows a right parietal extra axial lesion that homogeneously enhances most consistent with meningioma.     ASSESSMENT/PLAN:     Candy Huynh is a 52 y.o. female who presents for hospital follow up of right parietal meningioma. Repeat CT head is stable without surrounding edema or mass effect. We will plan to see her back in 6 months with a repeat MRI brain w wo to assess for any changes. We will schedule her next available open appointment to discuss her chronic lower back pain. I have ordered XR lumbar flex/ex today to be completed prior to that appointment. She knows she can call the clinic in the meantime with any questions/concerns.

## 2023-09-11 NOTE — PROGRESS NOTES
Neurosurgery  Established Patient    SUBJECTIVE:     History of Present Illness: Candy Huynh is a 52 y.o. female with history of incidental meningioma, fibromyalgia, bilateral knee replacements, DMII, and HTN who presents for evaluation of low back and right leg pain. Patient reports low back pain began around 2022 and was located in the center of her lower back. That pain somewhat subsided but she began to experience worsening pain over her right SI joint that radiated from her right buttock into the posterior aspect of her right leg down to the knee. Occasionally, the pain will radiate posteriorly down to her right ankle. She had a right knee replacement in 2022 and then a left knee replacement in 2023. Her right buttocks pain is worse with sitting as well as walking. It is difficult to find a comfortable position. She denies true weakness or bowel/bladder issues or saddle anesthesia. She participates in physical therapy for her knee but has not participated for her back. She used to see a pain management physician outside of [facility] for her back and has undergone RFAs but she no longer wants to see this pain management physician.       Review of patient's allergies indicates:   Allergen Reactions    Adhesive Blisters     Specifically EKG lead pads    Morphine Other (See Comments)     shake       Current Outpatient Medications   Medication Sig Dispense Refill    acetaminophen (TYLENOL) 650 MG TbSR Take 1 tablet (650 mg total) by mouth 2 (two) times a day. (Patient not taking: Reported on 2023) 120 tablet 0    albuterol (PROVENTIL/VENTOLIN HFA) 90 mcg/actuation inhaler SMARTSI Puff(s) By Mouth Every 4 Hours PRN      ALBUTEROL INHL Inhale 2 puffs into the lungs every 4 (four) hours as needed.      amLODIPine (NORVASC) 5 MG tablet Take 5 mg by mouth once daily.      ARIPiprazole (ABILIFY) 2 MG Tab Take 2 mg by mouth every evening.      aspirin (ECOTRIN) 81 MG EC tablet Take 1  "tablet (81 mg total) by mouth 2 (two) times a day. 60 tablet 0    azelastine (ASTELIN) 137 mcg (0.1 %) nasal spray 1 spray once daily.      BD ALINE 2ND GEN PEN NEEDLE 32 gauge x 5/32" Ndle USE ONE NEEDLE ONCE A WEEK      celecoxib (CELEBREX) 200 MG capsule Take 1 capsule (200 mg total) by mouth once daily. (Patient not taking: Reported on 9/7/2023) 30 capsule 0    cetirizine (ZYRTEC) 10 MG tablet Take 10 mg by mouth every evening.      cholecalciferol, vitamin D3, 1,250 mcg (50,000 unit) capsule Take 50,000 Units by mouth every 7 days.      diazePAM (VALIUM) 2 MG tablet Take 2 mg by mouth every 6 (six) hours as needed.      EASY TOUCH ALCOHOL PREP PADS PadM USE TO prepare FOR glucose testing      EPINEPHrine (EPIPEN) 0.3 mg/0.3 mL AtIn as per administration instructions      ergocalciferol (ERGOCALCIFEROL) 50,000 unit Cap Take 50,000 Units by mouth every 7 days.      FEROSUL 325 mg (65 mg iron) Tab tablet Take by mouth every other day.      FLOWFLEX COVID-19 AG HOME TEST Kit test AS directed      FLUoxetine 20 MG capsule Take 40 mg by mouth once daily.      fluticasone propionate (FLONASE) 50 mcg/actuation nasal spray 2 sprays by Nasal route.      folic acid (FOLVITE) 1 MG tablet Take 1,000 mcg by mouth.      HYDROcodone-acetaminophen (NORCO) 7.5-325 mg per tablet Take 1 tablet by mouth every 6 (six) hours as needed for Pain.      hydrOXYzine pamoate (VISTARIL) 25 MG Cap Take 25 mg by mouth every evening.      loratadine (CLARITIN) 10 mg tablet Take 10 mg by mouth once daily.      losartan (COZAAR) 100 MG tablet Take 100 mg by mouth once daily.      meclizine (ANTIVERT) 25 mg tablet Take 25 mg by mouth.      meloxicam (MOBIC) 15 MG tablet Take 1 tablet (15 mg total) by mouth once daily. 30 tablet 3    metFORMIN (GLUCOPHAGE-XR) 750 MG ER 24hr tablet Take 750 mg by mouth once daily.      methocarbamoL (ROBAXIN) 750 MG Tab Take 1 tablet by mouth 3 (three) times daily.      methylPREDNISolone (MEDROL DOSEPACK) 4 mg " tablet use as directed 21 each 0    montelukast (SINGULAIR) 10 mg tablet       nicotine polacrilex (NICORETTE) 4 MG Gum SMARTSI Each By Mouth PRN      ondansetron (ZOFRAN-ODT) 8 MG TbDL Take 8 mg by mouth every 8 (eight) hours as needed.      ONETOUCH DELICA PLUS LANCET 33 gauge Misc Apply topically 4 (four) times daily.      ONETOUCH ULTRA2 METER Misc SMARTSIG:Via Meter As Directed      ONETOUCH VERIO TEST STRIPS Strp 1 strip 4 (four) times daily.      pantoprazole (PROTONIX) 40 MG tablet Take 1 tablet by mouth every morning.      paroxetine (PAXIL) 20 MG tablet Take 20 mg by mouth every morning.      pregabalin (LYRICA) 200 MG Cap Take 1 capsule (200 mg total) by mouth 2 (two) times daily. 60 capsule 0    PREMARIN 0.625 mg tablet Take 0.625 mg by mouth once daily.      rosuvastatin (CRESTOR) 10 MG tablet Take 10 mg by mouth.      semaglutide (OZEMPIC) 0.25 mg or 0.5 mg (2 mg/3 mL) pen injector Inject 0.25 mg into the skin every 7 (seven) days      senna-docusate 8.6-50 mg (SENNA WITH DOCUSATE SODIUM) 8.6-50 mg per tablet Take 1 tablet by mouth once daily. 30 tablet 0    STOOL SOFTENER 100 mg capsule Take 1 softgel by mouth twice daily 60 capsule 11    sumatriptan (IMITREX) 100 MG tablet Take 100 mg by mouth 2 (two) times daily as needed.      tiZANidine (ZANAFLEX) 4 MG tablet Take 4 mg by mouth every 8 (eight) hours as needed.       No current facility-administered medications for this visit.     Facility-Administered Medications Ordered in Other Visits   Medication Dose Route Frequency Provider Last Rate Last Admin    fentaNYL 50 mcg/mL injection 100 mcg  100 mcg Intravenous PRN Neno Horton PA-C   25 mcg at 23 1234    midazolam (VERSED) 1 mg/mL injection 1 mg  1 mg Intravenous PRN Neno Horton PA-C   2 mg at 23 1120    ropivacaine 0.2% Nimbus PainPRO Pump infusion 500 ML   Perineural Continuous Neno Horton PA-C   New Bag at 23 1516       Past Medical History:   Diagnosis  Date    Allergy     Anemia     Anxiety     Asthma     denies- on outside problem list in Care Everywhere    Depression     Diabetes mellitus     Diabetes mellitus, type 2     Fibromyalgia     GERD (gastroesophageal reflux disease)     Hypertension     Stroke     TIA    Vertigo      Past Surgical History:   Procedure Laterality Date    ARTHROSCOPIC REPAIR OF ROTATOR CUFF OF SHOULDER Right 2022    Procedure: REPAIR, ROTATOR CUFF, ARTHROSCOPIC;  Surgeon: Ministerio Orr Jr., MD;  Location: [facility] OR;  Service: Orthopedics;  Laterality: Right;  need opus system  [facility] notifed CC     ARTHROSCOPY OF KNEE Left 2021    Procedure: ARTHROSCOPY, KNEE;  Surgeon: Donnie Campuzano MD;  Location: [facility] OR;  Service: Orthopedics;  Laterality: Left;     SECTION      DECOMPRESSION OF SUBACROMIAL SPACE  2022    Procedure: DECOMPRESSION, SUBACROMIAL SPACE;  Surgeon: Ministerio Orr Jr., MD;  Location: [facility] OR;  Service: Orthopedics;;    EXCISION OF MASS OF EXTREMITY Left 2019    Procedure: EXCISION, MASS, EXTREMITY;  Surgeon: Honorio Pulido IV, MD;  Location: [facility] OR;  Service: Orthopedics;  Laterality: Left;  excision lipoma  Request Lacie    HERNIA REPAIR      HYSTERECTOMY      KNEE ARTHROSCOPY W/ MENISCECTOMY  2021    Procedure: ARTHROSCOPY, KNEE, WITH MENISCECTOMY;  Surgeon: Donnie Campuzano MD;  Location: [facility] OR;  Service: Orthopedics;;    NASAL SEPTOPLASTY      RECONSTRUCTION OF ACROMIOCLAVICULAR JOINT  2022    Procedure: RECONSTRUCTION, ACROMIOCLAVICULAR JOINT;  Surgeon: Ministerio Orr Jr., MD;  Location: [facility] OR;  Service: Orthopedics;;    TOTAL KNEE ARTHROPLASTY Right 10/26/2022    Procedure: ARTHROPLASTY, KNEE, TOTAL RIGHT: BALDO - NEXGEN;  Surgeon: Nolberto Fraser MD;  Location: [facility] OR;  Service: Orthopedics;  Laterality: Right;    TOTAL KNEE ARTHROPLASTY Left 2023    Procedure: ARTHROPLASTY, KNEE, TOTAL: LEFT: BALDO NEXGEN;  Surgeon: Nolberto Fraser MD;  Location: [facility] OR;   Service: Orthopedics;  Laterality: Left;     Family History       Problem Relation (Age of Onset)    Hypertension Father, Sister    No Known Problems Mother, Sister, Brother, Daughter, Daughter, Daughter, Son          Social History     Socioeconomic History    Marital status:    Tobacco Use    Smoking status: Former    Smokeless tobacco: Never   Substance and Sexual Activity    Alcohol use: Yes     Comment: occasional    Drug use: Never    Sexual activity: Yes     Partners: Male     Social Determinants of Health     Financial Resource Strain: Medium Risk (8/12/2023)    Overall Financial Resource Strain (CARDIA)     Difficulty of Paying Living Expenses: Somewhat hard   Food Insecurity: No Food Insecurity (8/12/2023)    Hunger Vital Sign     Worried About Running Out of Food in the Last Year: Never true     Ran Out of Food in the Last Year: Never true   Transportation Needs: Unmet Transportation Needs (8/12/2023)    PRAPARE - Transportation     Lack of Transportation (Medical): Yes     Lack of Transportation (Non-Medical): Yes   Physical Activity: Inactive (8/12/2023)    Exercise Vital Sign     Days of Exercise per Week: 0 days     Minutes of Exercise per Session: 0 min   Stress: Stress Concern Present (8/12/2023)    [employer] - Occupational Stress Questionnaire     Feeling of Stress : Rather much   Social Connections: Moderately Isolated (8/12/2023)    Social Connection and Isolation Panel [NHANES]     Frequency of Communication with Friends and Family: More than three times a week     Frequency of Social Gatherings with Friends and Family: More than three times a week     Attends Baptism Services: Never     Active Member of Clubs or Organizations: No     Attends Club or Organization Meetings: Never     Marital Status:    Housing Stability: Unknown (8/12/2023)    Housing Stability Vital Sign     Unable to Pay for Housing in the Last Year: No     Unstable Housing in the  "Last Year: No       Review of Systems    OBJECTIVE:     Vital Signs  Pain Score:   9  Height: 5' 4.02" (162.6 cm)  Body mass index is 42.72 kg/m².    Neurosurgery Physical Exam  General: well developed, well nourished, no distress.   Head: normocephalic, atraumatic  Neurologic: Alert and oriented. Thought content appropriate.  GCS: Motor: 6/Verbal: 5/Eyes: 4 GCS Total: 15  Mental Status: Awake, Alert, Oriented x 4  Language: No aphasia  Speech: No dysarthria  Cranial nerves: face symmetric, tongue midline, CN II-XII grossly intact.   Eyes: pupils equal, round, reactive to light with accommodation, EOMI.   Pulmonary: normal respirations, no signs of respiratory distress  Abdomen: soft, non-distended, not tender to palpation  Skin: Skin is warm, dry and intact.  Sensory: intact to light touch throughout    Motor Strength:Moves all extremities spontaneously with good tone.  No abnormal movements seen.     Strength  Deltoids Triceps Biceps Wrist Extension Wrist Flexion Hand    Upper: R 5/5 5/5 5/5 5/5 5/5 5/5    L 5/5 5/5 5/5 5/5 5/5 5/5     Iliopsoas Quadriceps Knee  Flexion Tibialis  anterior Gastro- cnemius EHL   Lower: R 4/5 5/5 5/5 5/5 5/5 5/5    L 4+/5 4/5 4/5 5/5 5/5 5/5   Left HF/KE/KF weakness due to recent left knee surgery     Right HF weakness 2/2 pain from right SI joint     Gait antalgic with slight right limp     TTP of right SI joint  Positive straight leg raise on right     Diagnostic Results:  XR flex/ex of lumbar spine which I have personally reviewed shows a mild anterolisthesis of S1 on L5 without dynamic instability. Otherwise normal alignment without significant spondylosis.     ASSESSMENT/PLAN:     Candy Huynh is a 52 y.o. female with right sacroiliitis and sciatica. I would like to get her in physical therapy targeted on SI joint stretching as well as core strengthening. We will trial a medrol dosepack. I would also like to get her in for an SI joint injection. She would like to be " referred to a new pain management specialist therefore we will send a referral. I will plan to see her back following the PT and injection. She knows she can call the clinic in the meantime with any questions or concerns.

## 2023-09-17 ENCOUNTER — EXTERNAL HOME HEALTH (OUTPATIENT)
Dept: HOME HEALTH SERVICES | Facility: HOSPITAL | Age: 53
End: 2023-09-17
Payer: MEDICAID

## 2023-09-18 ENCOUNTER — TELEPHONE (OUTPATIENT)
Dept: ORTHOPEDICS | Facility: CLINIC | Age: 53
End: 2023-09-18
Payer: MEDICAID

## 2023-09-18 NOTE — TELEPHONE ENCOUNTER
----- Message from Josi Sandra sent at 9/18/2023  1:49 PM CDT -----  Type:  Sooner Apoointment Request    Caller is requesting a sooner appointment.  Caller declined first available appointment listed below.  Caller will not accept being placed on the waitlist and is requesting a message be sent to doctor.  Name of Caller: Candy Huynh  When is the first available appointment?11/16/23  Symptoms: Shoulder pain (both)  Would the patient rather a call back or a response via [facility]?  call  Best Call Back Number: [phone]  Additional Information: Pt would like to be seen a soon as possible.  Pt was scheduled today 9/18/23 but missed the appt.

## 2023-09-18 NOTE — TELEPHONE ENCOUNTER
"Spoke with Lnio, pt states " I missed my appointment this morning as I overslept from my pain medication I took last night"  Patient informed MD next available isn't until  October. Patient then became upset, stating, " So he can't refer me nor treat me!' I then explained to patient, as she stated she had an appointment this morning and missed I can try to get her in sooner with our PA at [facility]. Patient declined appointment stating she can only come to the [place] office.  I reiterated, provider is currently full. However, I can schedule next available with Dr Orr and place her on wait-list in case of a cancellation- which she will be notified of. Patient accepted and v/u.   "

## 2023-09-21 ENCOUNTER — TELEPHONE (OUTPATIENT)
Dept: NEUROSURGERY | Facility: CLINIC | Age: 53
End: 2023-09-21
Payer: MEDICAID

## 2023-09-21 NOTE — TELEPHONE ENCOUNTER
----- Message from Bailey Soares sent at 9/20/2023 12:06 PM CDT -----  Regarding: medication  Contact: @[phone]  Pt called in regards to speaking to someone in regard to a tens unit (it is like electric shock)  she is trying to get to .....Please call and adv @[phone]

## 2023-09-21 NOTE — TELEPHONE ENCOUNTER
----- Message from Roxy Vidal PA-C sent at 9/21/2023  9:33 AM CDT -----  Regarding: RE: medication  Contact: @[phone]  No   ----- Message -----  From: Aneta Salazar MA  Sent: 9/20/2023   1:39 PM CDT  To: Roxy Vidal PA-C  Subject: FW: medication                                   Hi    Did you order a TENS unit for this patient?    Thanks  ----- Message -----  From: Bailey Soares  Sent: 9/20/2023  12:08 PM CDT  To: [facility] Staff  Subject: medication                                       Pt called in regards to speaking to someone in regard to a tens unit (it is like electric shock)  she is trying to get to .....Please call and adv @[phone]

## 2023-09-22 ENCOUNTER — TELEPHONE (OUTPATIENT)
Dept: NEUROSURGERY | Facility: CLINIC | Age: 53
End: 2023-09-22
Payer: MEDICAID

## 2023-09-22 NOTE — TELEPHONE ENCOUNTER
Returned call to patient advised her that she will need to contact Pain Management to order TENS unit. Patient was not happy an hung up the phone in my face.

## 2023-09-22 NOTE — TELEPHONE ENCOUNTER
----- Message from Morenaalbinokamar Hillman sent at 9/21/2023 12:10 PM CDT -----  Regarding: return call  Contact: pt [phone]  PATIENT CALL    Pt returning Aneta's call. States that she would like to request a TENS unit based on advice that she rec'd during her last visit. Referred to pain mgmt and PT/OT who was unable to help and would like for you to review her records. 10/10 pain daily.  Please call pt at [phone] to advise

## 2023-09-26 ENCOUNTER — TELEPHONE (OUTPATIENT)
Dept: ORTHOPEDICS | Facility: CLINIC | Age: 53
End: 2023-09-26
Payer: MEDICAID

## 2023-09-26 NOTE — TELEPHONE ENCOUNTER
Spoke with Candy, in regards to a sooner appointment with Dr. Orr. I informed patient that unfortunately Dr. Orr is completely booked, and first available appointment is her scheduled date. I also informed patient that appointment has been added to wait list, for sooner time. All questions answered and patient verbalized understanding.

## 2023-09-26 NOTE — TELEPHONE ENCOUNTER
----- Message from Ayesha Zambrano sent at 9/26/2023  3:14 PM CDT -----  Needs advice from nurse:      Who Called:pt  Regarding:would like a call back/personal  Would the patient rather a call back or VIA [facility]?  Best Call Back number:[phone]  Additional Info:

## 2023-10-06 ENCOUNTER — TELEPHONE (OUTPATIENT)
Dept: ORTHOPEDICS | Facility: CLINIC | Age: 53
End: 2023-10-06
Payer: MEDICAID

## 2023-10-06 NOTE — TELEPHONE ENCOUNTER
Pt called hotline stating she just spoke to her  with [facility] and was informed Dr. Fraser will see her for her knee. Pt asked if there will be repercussions for filing a grievance against our dept. Informed pt Dr. Fraser will see and that there will not be any repercussions. Pt asked if she can get a refill of pain medication. Asked  Dr. Fraser and informed pt she needs to see him first. Appt scheduled, pt verbalized understanding.

## 2023-10-06 NOTE — TELEPHONE ENCOUNTER
[place] with [facility] called hotline stating the pt would like to know if Dr. Fraser will see her for her knee. She filed a grievance but would like to be seen. Informed Houston that Ms Huynh called our dept and terminated the relationship. Asked Dr. Fraser and informed [place] that yes he will see her as he never said he would not see her, she cancelled the appts. [place] state she will inform the pt. Understanding verbalized.

## 2023-10-16 ENCOUNTER — TELEPHONE (OUTPATIENT)
Dept: ORTHOPEDICS | Facility: CLINIC | Age: 53
End: 2023-10-16
Payer: MEDICAID

## 2023-10-16 NOTE — TELEPHONE ENCOUNTER
Spoke with patient. Patient called requesting a sooner appointment. Informed patient that if we have any cancellations on Thursday she will be notified. Patient verbalized understanding.

## 2023-10-16 NOTE — TELEPHONE ENCOUNTER
----- Message from Adrienne Bender sent at 10/16/2023 10:10 AM CDT -----  Type:  Sooner Appoointment Request    Caller is requesting a sooner appointment.  Caller declined first available appointment listed below.  Caller will not accept being placed on the waitlist and is requesting a message be sent to doctor.  Name of Caller:patient  When is the first available appointment?10/23/23  Symptoms:shoulder pain  Would the patient rather a call back or a response via [facility]? call  Best Call Back Number:[phone]  Additional Information: none

## 2023-10-16 NOTE — TELEPHONE ENCOUNTER
----- Message from Adrienne Bender sent at 10/16/2023 10:10 AM CDT -----  Type:  Sooner Appoointment Request    Caller is requesting a sooner appointment.  Caller declined first available appointment listed below.  Caller will not accept being placed on the waitlist and is requesting a message be sent to doctor.  Name of Caller:patient  When is the first available appointment?10/23/23  Symptoms:shoulder pain  Would the patient rather a call back or a response via [facility]? call  Best Call Back Number:[phone]  Additional Information: none

## 2023-10-17 ENCOUNTER — HOSPITAL ENCOUNTER (OUTPATIENT)
Dept: RADIOLOGY | Facility: HOSPITAL | Age: 53
Discharge: HOME | End: 2023-10-17
Attending: ORTHOPAEDIC SURGERY
Payer: MEDICAID

## 2023-10-17 ENCOUNTER — OFFICE VISIT (OUTPATIENT)
Dept: ORTHOPEDICS | Facility: CLINIC | Age: 53
End: 2023-10-17
Payer: MEDICAID

## 2023-10-17 VITALS — BODY MASS INDEX: 40.88 KG/M2 | WEIGHT: 239.44 LBS | HEIGHT: 64 IN

## 2023-10-17 DIAGNOSIS — Z96.652 STATUS POST LEFT KNEE REPLACEMENT: ICD-10-CM

## 2023-10-17 DIAGNOSIS — Z96.652 STATUS POST LEFT KNEE REPLACEMENT: Primary | ICD-10-CM

## 2023-10-17 PROCEDURE — 99024 POSTOP FOLLOW-UP VISIT: CPT | Mod: ,,, | Performed by: ORTHOPAEDIC SURGERY

## 2023-10-17 PROCEDURE — 4010F ACE/ARB THERAPY RXD/TAKEN: CPT | Mod: CPTII,,, | Performed by: ORTHOPAEDIC SURGERY

## 2023-10-17 PROCEDURE — 1160F PR REVIEW ALL MEDS BY PRESCRIBER/CLIN PHARMACIST DOCUMENTED: ICD-10-PCS | Mod: CPTII,,, | Performed by: ORTHOPAEDIC SURGERY

## 2023-10-17 PROCEDURE — 73562 X-RAY EXAM OF KNEE 3: CPT | Mod: 26,50,, | Performed by: RADIOLOGY

## 2023-10-17 PROCEDURE — 99024 PR POST-OP FOLLOW-UP VISIT: ICD-10-PCS | Mod: ,,, | Performed by: ORTHOPAEDIC SURGERY

## 2023-10-17 PROCEDURE — 3044F PR MOST RECENT HEMOGLOBIN A1C LEVEL <7.0%: ICD-10-PCS | Mod: CPTII,,, | Performed by: ORTHOPAEDIC SURGERY

## 2023-10-17 PROCEDURE — 99999 PR PBB SHADOW E&M-EST. PATIENT-LVL IV: CPT | Mod: PBBFAC,,, | Performed by: ORTHOPAEDIC SURGERY

## 2023-10-17 PROCEDURE — 73562 X-RAY EXAM OF KNEE 3: CPT | Mod: TC,50

## 2023-10-17 PROCEDURE — 1159F PR MEDICATION LIST DOCUMENTED IN MEDICAL RECORD: ICD-10-PCS | Mod: CPTII,,, | Performed by: ORTHOPAEDIC SURGERY

## 2023-10-17 PROCEDURE — 1159F MED LIST DOCD IN RCRD: CPT | Mod: CPTII,,, | Performed by: ORTHOPAEDIC SURGERY

## 2023-10-17 PROCEDURE — 73562 XR KNEE ORTHO BILAT: ICD-10-PCS | Mod: 26,50,, | Performed by: RADIOLOGY

## 2023-10-17 PROCEDURE — 1160F RVW MEDS BY RX/DR IN RCRD: CPT | Mod: CPTII,,, | Performed by: ORTHOPAEDIC SURGERY

## 2023-10-17 PROCEDURE — 4010F PR ACE/ARB THEARPY RXD/TAKEN: ICD-10-PCS | Mod: CPTII,,, | Performed by: ORTHOPAEDIC SURGERY

## 2023-10-17 PROCEDURE — 99214 OFFICE O/P EST MOD 30 MIN: CPT | Mod: PBBFAC | Performed by: ORTHOPAEDIC SURGERY

## 2023-10-17 PROCEDURE — 3044F HG A1C LEVEL LT 7.0%: CPT | Mod: CPTII,,, | Performed by: ORTHOPAEDIC SURGERY

## 2023-10-17 PROCEDURE — 99999 PR PBB SHADOW E&M-EST. PATIENT-LVL IV: ICD-10-PCS | Mod: PBBFAC,,, | Performed by: ORTHOPAEDIC SURGERY

## 2023-10-17 RX ORDER — OXYCODONE HYDROCHLORIDE 5 MG/1
5 TABLET ORAL EVERY 4 HOURS PRN
Qty: 40 TABLET | Refills: 0 | Status: SHIPPED | OUTPATIENT
Start: 2023-10-17 | End: 2024-04-02 | Stop reason: ALTCHOICE

## 2023-10-19 PROBLEM — Z96.652 STATUS POST LEFT KNEE REPLACEMENT: Status: ACTIVE | Noted: 2023-10-19

## 2023-10-19 NOTE — PROGRESS NOTES
Subjective:      Patient ID: Candy Huynh is a 53 y.o. female.    Chief Complaint:   Pain of the Left Knee and Post-op Evaluation (LTKA)    HPI  She comes in about 2 months out from left TKA.  Her pain is still 6/10, but she is walking without supports in generally progressing well.  No new symptoms to report.      Objective:        Ortho/SPM Exam    On exam, the scar is well healed without redness or tenderness.  There is no effusion.  Active range of motion is 0-115° without crepitus.  No instability to varus/valgus stress in extension or flexion.  No excessive sagittal plane instability.  Calves soft, nontender.  Distal neurovascular intact.        IMAGING:  Weightbearing x-rays show well-fixed and positioned total knee arthroplasty without signs of loosening or other complications  Assessment:   Progressing well status post left TKA      Plan:   Home exercise program and pain management regimen reviewed in detail.  Return in 4 weeks for 12 week visit.    The patient's pathophysiology was explained in detail with reference to x-rays, models, other visual aids as appropriate.  Treatment options were discussed in detail.  Questions were invited and answered to the patient's satisfaction.      Nolberto Fraser MD  Orthopedic Surgery

## 2023-10-20 ENCOUNTER — TELEPHONE (OUTPATIENT)
Dept: ORTHOPEDICS | Facility: CLINIC | Age: 53
End: 2023-10-20
Payer: MEDICAID

## 2023-10-20 DIAGNOSIS — M25.519 SHOULDER PAIN, UNSPECIFIED CHRONICITY, UNSPECIFIED LATERALITY: Primary | ICD-10-CM

## 2023-10-20 NOTE — TELEPHONE ENCOUNTER
----- Message from Bree Navarro sent at 10/20/2023  2:07 PM CDT -----  Type:  Needs Medical Advice    Who Called: PT  Symptoms (please be specific):    How long has patient had these symptoms:    Pharmacy name and phone #:    Would the patient rather a call back or a response via [facility]?   Best Call Back Number: [phone]  Additional Information: PT IS WANTING PROVIDER TO ORDER A MRI OF BOTH SHOULDERS PRIOR TO COMING

## 2023-10-20 NOTE — TELEPHONE ENCOUNTER
Spoke with Candy- pt was informed, she will need to be assist in clinic with MD prior to additional imaging, as there is a protocol with insurance co. Patient v/u- appointment has been confirmed with pt for Monday 10/23.

## 2023-10-23 ENCOUNTER — HOSPITAL ENCOUNTER (OUTPATIENT)
Dept: RADIOLOGY | Facility: HOSPITAL | Age: 53
Discharge: HOME | End: 2023-10-23
Attending: ORTHOPAEDIC SURGERY
Payer: MEDICAID

## 2023-10-23 ENCOUNTER — TELEPHONE (OUTPATIENT)
Dept: ORTHOPEDICS | Facility: CLINIC | Age: 53
End: 2023-10-23
Payer: MEDICAID

## 2023-10-23 ENCOUNTER — OFFICE VISIT (OUTPATIENT)
Dept: ORTHOPEDICS | Facility: CLINIC | Age: 53
End: 2023-10-23
Payer: MEDICAID

## 2023-10-23 VITALS — BODY MASS INDEX: 41.07 KG/M2 | HEIGHT: 64 IN

## 2023-10-23 DIAGNOSIS — M25.519 SHOULDER PAIN, UNSPECIFIED CHRONICITY, UNSPECIFIED LATERALITY: ICD-10-CM

## 2023-10-23 DIAGNOSIS — M75.21 BICEPS TENDINITIS OF RIGHT UPPER EXTREMITY: Primary | ICD-10-CM

## 2023-10-23 PROBLEM — M75.101 TEAR OF RIGHT SUPRASPINATUS TENDON: Status: RESOLVED | Noted: 2022-06-15 | Resolved: 2023-10-23

## 2023-10-23 PROCEDURE — 73030 XR SHOULDER COMPLETE 2 OR MORE VIEWS BILATERAL: ICD-10-PCS | Mod: 26,50,, | Performed by: INTERNAL MEDICINE

## 2023-10-23 PROCEDURE — 99213 PR OFFICE/OUTPT VISIT, EST, LEVL III, 20-29 MIN: ICD-10-PCS | Mod: S$PBB,25,, | Performed by: ORTHOPAEDIC SURGERY

## 2023-10-23 PROCEDURE — 99999PBSHW PR PBB SHADOW TECHNICAL ONLY FILED TO HB: ICD-10-PCS | Mod: PBBFAC,,,

## 2023-10-23 PROCEDURE — 4010F ACE/ARB THERAPY RXD/TAKEN: CPT | Mod: CPTII,,, | Performed by: ORTHOPAEDIC SURGERY

## 2023-10-23 PROCEDURE — 1159F PR MEDICATION LIST DOCUMENTED IN MEDICAL RECORD: ICD-10-PCS | Mod: CPTII,,, | Performed by: ORTHOPAEDIC SURGERY

## 2023-10-23 PROCEDURE — 20610 DRAIN/INJ JOINT/BURSA W/O US: CPT | Mod: 50,S$PBB,, | Performed by: ORTHOPAEDIC SURGERY

## 2023-10-23 PROCEDURE — 99999 PR PBB SHADOW E&M-EST. PATIENT-LVL III: CPT | Mod: PBBFAC,,, | Performed by: ORTHOPAEDIC SURGERY

## 2023-10-23 PROCEDURE — 4010F PR ACE/ARB THEARPY RXD/TAKEN: ICD-10-PCS | Mod: CPTII,,, | Performed by: ORTHOPAEDIC SURGERY

## 2023-10-23 PROCEDURE — 3008F PR BODY MASS INDEX (BMI) DOCUMENTED: ICD-10-PCS | Mod: CPTII,,, | Performed by: ORTHOPAEDIC SURGERY

## 2023-10-23 PROCEDURE — 99999 PR PBB SHADOW E&M-EST. PATIENT-LVL III: ICD-10-PCS | Mod: PBBFAC,,, | Performed by: ORTHOPAEDIC SURGERY

## 2023-10-23 PROCEDURE — 73030 X-RAY EXAM OF SHOULDER: CPT | Mod: TC,50,PN

## 2023-10-23 PROCEDURE — 1159F MED LIST DOCD IN RCRD: CPT | Mod: CPTII,,, | Performed by: ORTHOPAEDIC SURGERY

## 2023-10-23 PROCEDURE — 99999PBSHW PR PBB SHADOW TECHNICAL ONLY FILED TO HB: Mod: PBBFAC,,,

## 2023-10-23 PROCEDURE — 73030 X-RAY EXAM OF SHOULDER: CPT | Mod: 26,50,, | Performed by: INTERNAL MEDICINE

## 2023-10-23 PROCEDURE — 3008F BODY MASS INDEX DOCD: CPT | Mod: CPTII,,, | Performed by: ORTHOPAEDIC SURGERY

## 2023-10-23 PROCEDURE — 20610 DRAIN/INJ JOINT/BURSA W/O US: CPT | Mod: 50,PBBFAC,PN | Performed by: ORTHOPAEDIC SURGERY

## 2023-10-23 PROCEDURE — 99213 OFFICE O/P EST LOW 20 MIN: CPT | Mod: S$PBB,25,, | Performed by: ORTHOPAEDIC SURGERY

## 2023-10-23 PROCEDURE — 3044F HG A1C LEVEL LT 7.0%: CPT | Mod: CPTII,,, | Performed by: ORTHOPAEDIC SURGERY

## 2023-10-23 PROCEDURE — 3044F PR MOST RECENT HEMOGLOBIN A1C LEVEL <7.0%: ICD-10-PCS | Mod: CPTII,,, | Performed by: ORTHOPAEDIC SURGERY

## 2023-10-23 PROCEDURE — 20610 PR DRAIN/INJECT LARGE JOINT/BURSA: ICD-10-PCS | Mod: 50,S$PBB,, | Performed by: ORTHOPAEDIC SURGERY

## 2023-10-23 PROCEDURE — 99213 OFFICE O/P EST LOW 20 MIN: CPT | Mod: PBBFAC,PN,25 | Performed by: ORTHOPAEDIC SURGERY

## 2023-10-23 RX ORDER — CYCLOBENZAPRINE HCL 10 MG
10 TABLET ORAL 2 TIMES DAILY
Qty: 60 TABLET | Refills: 1 | Status: SHIPPED | OUTPATIENT
Start: 2023-10-23 | End: 2023-12-22

## 2023-10-23 RX ORDER — PANTOPRAZOLE SODIUM 40 MG/1
40 TABLET, DELAYED RELEASE ORAL
COMMUNITY
Start: 2023-10-18

## 2023-10-23 RX ORDER — TRIAMCINOLONE ACETONIDE 40 MG/ML
40 INJECTION, SUSPENSION INTRA-ARTICULAR; INTRAMUSCULAR
Status: COMPLETED | OUTPATIENT
Start: 2023-10-23 | End: 2023-10-23

## 2023-10-23 RX ORDER — METHOCARBAMOL 750 MG/1
750 TABLET, FILM COATED ORAL 2 TIMES DAILY
Qty: 40 TABLET | Refills: 1 | Status: SHIPPED | OUTPATIENT
Start: 2023-10-23 | End: 2023-10-23

## 2023-10-23 RX ADMIN — TRIAMCINOLONE ACETONIDE 40 MG: 40 INJECTION, SUSPENSION INTRA-ARTICULAR; INTRAMUSCULAR at 02:10

## 2023-10-23 NOTE — PROGRESS NOTES
"Subjective:      Patient ID: Candy Huynh is a 53 y.o. female.    Chief Complaint: Shoulder Pain (BILATERAL SHOULDERS)      HPI  Candy Huynh is a  53 y.o. female presenting today for bilateral shoulder pain.  There was not a history of trauma.  Onset of symptoms began several months ago   She recently had bilateral knee replacement surgery which seemed to aggravate her shoulders probably as a result of using the walker   She has been through physical therapy versus shoulders recently without much improvement   She is having pain in both shoulders   She did have previous rotator cuff surgery on the right shoulder a year ago   She is taking Mobic without much improvement.      Review of patient's allergies indicates:   Allergen Reactions    Adhesive Blisters     Specifically EKG lead pads    Morphine Other (See Comments)     shake         Current Outpatient Medications   Medication Sig Dispense Refill    albuterol (PROVENTIL/VENTOLIN HFA) 90 mcg/actuation inhaler SMARTSI Puff(s) By Mouth Every 4 Hours PRN      ALBUTEROL INHL Inhale 2 puffs into the lungs every 4 (four) hours as needed.      amLODIPine (NORVASC) 5 MG tablet Take 5 mg by mouth once daily.      ARIPiprazole (ABILIFY) 2 MG Tab Take 2 mg by mouth every evening.      aspirin (ECOTRIN) 81 MG EC tablet Take 1 tablet (81 mg total) by mouth 2 (two) times a day. 60 tablet 0    azelastine (ASTELIN) 137 mcg (0.1 %) nasal spray 1 spray once daily.      BD ALINE 2ND GEN PEN NEEDLE 32 gauge x 5/32" Ndle USE ONE NEEDLE ONCE A WEEK      cetirizine (ZYRTEC) 10 MG tablet Take 10 mg by mouth every evening.      cholecalciferol, vitamin D3, 1,250 mcg (50,000 unit) capsule Take 50,000 Units by mouth every 7 days.      EASY TOUCH ALCOHOL PREP PADS PadM USE TO prepare FOR glucose testing      EPINEPHrine (EPIPEN) 0.3 mg/0.3 mL AtIn as per administration instructions      ergocalciferol (ERGOCALCIFEROL) 50,000 unit Cap Take 50,000 Units by mouth every 7 days.   "    FEROSUL 325 mg (65 mg iron) Tab tablet Take by mouth every other day.      FLOWFLEX COVID-19 AG HOME TEST Kit test AS directed      FLUoxetine 20 MG capsule Take 40 mg by mouth once daily.      fluticasone propionate (FLONASE) 50 mcg/actuation nasal spray 2 sprays by Nasal route.      folic acid (FOLVITE) 1 MG tablet Take 1,000 mcg by mouth.      HYDROcodone-acetaminophen (NORCO) 7.5-325 mg per tablet Take 1 tablet by mouth every 6 (six) hours as needed for Pain.      loratadine (CLARITIN) 10 mg tablet Take 10 mg by mouth once daily.      losartan (COZAAR) 100 MG tablet Take 100 mg by mouth once daily.      meclizine (ANTIVERT) 25 mg tablet Take 25 mg by mouth.      meloxicam (MOBIC) 15 MG tablet Take 1 tablet (15 mg total) by mouth once daily. 30 tablet 3    metFORMIN (GLUCOPHAGE-XR) 750 MG ER 24hr tablet Take 750 mg by mouth once daily.      montelukast (SINGULAIR) 10 mg tablet       nicotine polacrilex (NICORETTE) 4 MG Gum SMARTSI Each By Mouth PRN      ondansetron (ZOFRAN-ODT) 8 MG TbDL Take 8 mg by mouth every 8 (eight) hours as needed.      ONETOUCH DELICA PLUS LANCET 33 gauge Misc Apply topically 4 (four) times daily.      ONETOUCH ULTRA2 METER Misc SMARTSIG:Via Meter As Directed      ONETOUCH VERIO TEST STRIPS Strp 1 strip 4 (four) times daily.      oxyCODONE (ROXICODONE) 5 MG immediate release tablet Take 1 tablet (5 mg total) by mouth every 4 (four) hours as needed for Pain. 40 tablet 0    pantoprazole (PROTONIX) 40 MG tablet Take 40 mg by mouth.      pregabalin (LYRICA) 200 MG Cap Take 1 capsule (200 mg total) by mouth 2 (two) times daily. 60 capsule 0    rosuvastatin (CRESTOR) 10 MG tablet Take 10 mg by mouth.      semaglutide (OZEMPIC) 0.25 mg or 0.5 mg (2 mg/3 mL) pen injector Inject 0.25 mg into the skin every 7 (seven) days      senna-docusate 8.6-50 mg (SENNA WITH DOCUSATE SODIUM) 8.6-50 mg per tablet Take 1 tablet by mouth once daily. 30 tablet 0    STOOL SOFTENER 100 mg capsule Take 1  softgel by mouth twice daily 60 capsule 11    sumatriptan (IMITREX) 100 MG tablet Take 100 mg by mouth 2 (two) times daily as needed.      diazePAM (VALIUM) 2 MG tablet Take 2 mg by mouth every 6 (six) hours as needed.      hydrOXYzine pamoate (VISTARIL) 25 MG Cap Take 25 mg by mouth every evening.      methocarbamoL (ROBAXIN) 750 MG Tab Take 1 tablet (750 mg total) by mouth 2 (two) times a day. 40 tablet 1    paroxetine (PAXIL) 20 MG tablet Take 20 mg by mouth every morning.      PREMARIN 0.625 mg tablet Take 0.625 mg by mouth once daily.       No current facility-administered medications for this visit.     Facility-Administered Medications Ordered in Other Visits   Medication Dose Route Frequency Provider Last Rate Last Admin    fentaNYL 50 mcg/mL injection 100 mcg  100 mcg Intravenous PRN Neno Horton PA-C   25 mcg at 23 1234    midazolam (VERSED) 1 mg/mL injection 1 mg  1 mg Intravenous PRN Neno Horton PA-C   2 mg at 23 1120    [facility]aine [facility] Pump infusion 500 ML   Perineural Continuous Neno Horton PA-C   New Bag at 23 1516       Past Medical History:   Diagnosis Date    Allergy     Anemia     Anxiety     Asthma     denies- on outside problem list in Care Everywhere    Depression     Diabetes mellitus     Diabetes mellitus, type 2     Fibromyalgia     GERD (gastroesophageal reflux disease)     Hypertension     Stroke     TIA    Vertigo        Past Surgical History:   Procedure Laterality Date    ARTHROSCOPIC REPAIR OF ROTATOR CUFF OF SHOULDER Right 2022    Procedure: REPAIR, ROTATOR CUFF, ARTHROSCOPIC;  Surgeon: Ministerio Orr Jr., MD;  Location: [facility] OR;  Service: Orthopedics;  Laterality: Right;  need opus system  [employer] notifed CC     ARTHROSCOPY OF KNEE Left 2021    Procedure: ARTHROSCOPY, KNEE;  Surgeon: Donnie Campuzano MD;  Location: [facility] OR;  Service: Orthopedics;  Laterality: Left;     SECTION      DECOMPRESSION OF SUBACROMIAL  "SPACE  9/6/2022    Procedure: DECOMPRESSION, SUBACROMIAL SPACE;  Surgeon: Ministerio Orr Jr., MD;  Location: [facility] OR;  Service: Orthopedics;;    EXCISION OF MASS OF EXTREMITY Left 12/6/2019    Procedure: EXCISION, MASS, EXTREMITY;  Surgeon: Honorio Pulido IV, MD;  Location: [facility] OR;  Service: Orthopedics;  Laterality: Left;  excision lipoma  Request Lacie    HERNIA REPAIR      HYSTERECTOMY      KNEE ARTHROSCOPY W/ MENISCECTOMY  1/11/2021    Procedure: ARTHROSCOPY, KNEE, WITH MENISCECTOMY;  Surgeon: Donnie Campuzano MD;  Location: [facility] OR;  Service: Orthopedics;;    NASAL SEPTOPLASTY      RECONSTRUCTION OF ACROMIOCLAVICULAR JOINT  9/6/2022    Procedure: RECONSTRUCTION, ACROMIOCLAVICULAR JOINT;  Surgeon: Ministerio Orr Jr., MD;  Location: [facility] OR;  Service: Orthopedics;;    TOTAL KNEE ARTHROPLASTY Right 10/26/2022    Procedure: ARTHROPLASTY, KNEE, TOTAL RIGHT: BALDO - NEXGEN;  Surgeon: Nolberto Fraser MD;  Location: [facility] OR;  Service: Orthopedics;  Laterality: Right;    TOTAL KNEE ARTHROPLASTY Left 8/7/2023    Procedure: ARTHROPLASTY, KNEE, TOTAL: LEFT: BALDO NEXGEN;  Surgeon: Nolberto Fraser MD;  Location: [facility];  Service: Orthopedics;  Laterality: Left;       Review of Systems:  ROS    OBJECTIVE:     PHYSICAL EXAM:  Height: 5' 4.02" (162.6 cm)    Vitals:    10/23/23 1358   Height: 5' 4.02" (1.626 m)   PainSc: 10-Worst pain ever   PainLoc: Shoulder     Well developed, well nourished female in no acute distress  Alert and oriented x 3  HEENT- Normal exam  Lungs- Clear to auscultation  Heart- Regular rate and rhythm  Abdomen- Soft nontender  Extremity exam- examination of the shoulders right shoulder nontender range of motion full mildly positive impingement sign strength intact   Left shoulder also has full range of motion mildly positive impingement sign and good strength    RADIOGRAPHS:  AP lateral x-ray of the right shoulder shows postsurgical changes suture anchor in good position little bit of " irregularity to the greater tuberosity    AP lateral x-ray of the left shoulder shows some mild spurring at the anterolateral acromion  Comments: I have personally reviewed the imaging and I agree with the above radiologist's report.    ASSESSMENT/PLAN:     IMPRESSION:  1. Bilateral shoulder pain.      2. Impingement tendinosis left shoulder    PLAN:  I suspect she over did it a bit with her recovery from knee surgery   I recommended injections both shoulders today   Tolerated the procedure well without complication    After pause for time-out identified each shoulder injected bilaterally with combination Kenalog 40 mg 2 cc xylocaine sterile technique   I have also recommended she continue with the Mobic by mouth we can add Robaxin at night as a muscle relaxer   Follow-up 4-6 weeks if symptoms persist we may need to get an MRI scan of either 1 or both shoulders     - We talked at length about the anatomy and pathophysiology of   Encounter Diagnosis   Name Primary?    Biceps tendinitis of right upper extremity Yes           Disclaimer: This note has been generated using voice-recognition software. There may be typographical errors that have been missed during proof-reading.

## 2023-10-23 NOTE — TELEPHONE ENCOUNTER
----- Message from Blane Samuel sent at 10/23/2023 10:21 AM CDT -----  Type:  Needs Medical Advice    Who Called: pt   Symptoms (please be specific): neck swollen and having extreme jean carlos shoulder pain   Would the patient rather a call back or a response via [employer]? Call   Best Call Back Number: [phone]  Additional Information: please be advised pt stated she over slept and she apologize for that

## 2023-11-13 ENCOUNTER — TELEPHONE (OUTPATIENT)
Dept: ORTHOPEDICS | Facility: CLINIC | Age: 53
End: 2023-11-13
Payer: MEDICAID

## 2023-11-13 DIAGNOSIS — Z96.652 STATUS POST LEFT KNEE REPLACEMENT: ICD-10-CM

## 2023-11-13 RX ORDER — METHOCARBAMOL 750 MG/1
750 TABLET, FILM COATED ORAL 3 TIMES DAILY PRN
Qty: 90 TABLET | Refills: 1 | Status: SHIPPED | OUTPATIENT
Start: 2023-11-13 | End: 2023-11-21 | Stop reason: SDUPTHER

## 2023-11-13 RX ORDER — PREGABALIN 200 MG/1
200 CAPSULE ORAL 2 TIMES DAILY
Qty: 60 CAPSULE | Refills: 1 | Status: SHIPPED | OUTPATIENT
Start: 2023-11-13 | End: 2023-11-21 | Stop reason: SDUPTHER

## 2023-11-13 RX ORDER — MELOXICAM 15 MG/1
15 TABLET ORAL DAILY
Qty: 30 TABLET | Refills: 1 | Status: SHIPPED | OUTPATIENT
Start: 2023-11-13 | End: 2023-11-21 | Stop reason: SDUPTHER

## 2023-11-13 NOTE — TELEPHONE ENCOUNTER
"Spoke to pt, notified that Dr. Fraser refilled all the medication except for the Norco. Pt asked "why?" Informed he did not specify, pts states she will check with the pharmacy and hung up.  "

## 2023-11-13 NOTE — TELEPHONE ENCOUNTER
"Spoke to pt, rescheduled appt from 11/14 to 11/21 with Dr. Fraser as requested. Pt states she would also "like a refill of the medications that Donna had me on". She states the OXY does not work for her. Informed pt that she is past the 90 days to have narcotic pain medication refills. She states "so you just stop treating me?" I said no, but the [facility] does not allow filling narcotic prescriptions past 90 days. She states that Dr. Fraser refilled her the last time. She states to "let him know I would like a refill of Norco 10/325, Lyrica, Robaxin, and Mobic. Informed will forward the message to Dr. Fraser and will call her back. Understanding verbalized.  "

## 2023-11-17 ENCOUNTER — TELEPHONE (OUTPATIENT)
Dept: NEUROSURGERY | Facility: CLINIC | Age: 53
End: 2023-11-17
Payer: MEDICAID

## 2023-11-17 NOTE — TELEPHONE ENCOUNTER
Returned call to patient who asked to be scheduled for a follow up now that she has complted her injections and gotten no relief. Pt asked for soonest available with any provider so appt scheduled for 12/14 with Francine and added to waitlist.    ----- Message -----  From: Ramy Khan  Sent: 11/13/2023   1:48 PM CST  To: [facility]  Subject: Reason For Referral                              Pt is calling to speak with someone in provider's office. She says she is not sure why she received a referral to neurology and would like to speak with someone about it. Patient Requesting Call Back @  [phone]

## 2023-11-21 ENCOUNTER — OFFICE VISIT (OUTPATIENT)
Dept: ORTHOPEDICS | Facility: CLINIC | Age: 53
End: 2023-11-21
Payer: MEDICAID

## 2023-11-21 VITALS — BODY MASS INDEX: 39.28 KG/M2 | WEIGHT: 230.06 LBS | HEIGHT: 64 IN

## 2023-11-21 DIAGNOSIS — Z96.652 STATUS POST LEFT KNEE REPLACEMENT: ICD-10-CM

## 2023-11-21 PROCEDURE — 99024 PR POST-OP FOLLOW-UP VISIT: ICD-10-PCS | Mod: ,,, | Performed by: ORTHOPAEDIC SURGERY

## 2023-11-21 PROCEDURE — 3044F PR MOST RECENT HEMOGLOBIN A1C LEVEL <7.0%: ICD-10-PCS | Mod: CPTII,,, | Performed by: ORTHOPAEDIC SURGERY

## 2023-11-21 PROCEDURE — 1160F PR REVIEW ALL MEDS BY PRESCRIBER/CLIN PHARMACIST DOCUMENTED: ICD-10-PCS | Mod: CPTII,,, | Performed by: ORTHOPAEDIC SURGERY

## 2023-11-21 PROCEDURE — 4010F PR ACE/ARB THEARPY RXD/TAKEN: ICD-10-PCS | Mod: CPTII,,, | Performed by: ORTHOPAEDIC SURGERY

## 2023-11-21 PROCEDURE — 1159F MED LIST DOCD IN RCRD: CPT | Mod: CPTII,,, | Performed by: ORTHOPAEDIC SURGERY

## 2023-11-21 PROCEDURE — 1160F RVW MEDS BY RX/DR IN RCRD: CPT | Mod: CPTII,,, | Performed by: ORTHOPAEDIC SURGERY

## 2023-11-21 PROCEDURE — 99214 OFFICE O/P EST MOD 30 MIN: CPT | Mod: PBBFAC | Performed by: ORTHOPAEDIC SURGERY

## 2023-11-21 PROCEDURE — 99024 POSTOP FOLLOW-UP VISIT: CPT | Mod: ,,, | Performed by: ORTHOPAEDIC SURGERY

## 2023-11-21 PROCEDURE — 99999 PR PBB SHADOW E&M-EST. PATIENT-LVL IV: CPT | Mod: PBBFAC,,, | Performed by: ORTHOPAEDIC SURGERY

## 2023-11-21 PROCEDURE — 3044F HG A1C LEVEL LT 7.0%: CPT | Mod: CPTII,,, | Performed by: ORTHOPAEDIC SURGERY

## 2023-11-21 PROCEDURE — 1159F PR MEDICATION LIST DOCUMENTED IN MEDICAL RECORD: ICD-10-PCS | Mod: CPTII,,, | Performed by: ORTHOPAEDIC SURGERY

## 2023-11-21 PROCEDURE — 4010F ACE/ARB THERAPY RXD/TAKEN: CPT | Mod: CPTII,,, | Performed by: ORTHOPAEDIC SURGERY

## 2023-11-21 PROCEDURE — 99999 PR PBB SHADOW E&M-EST. PATIENT-LVL IV: ICD-10-PCS | Mod: PBBFAC,,, | Performed by: ORTHOPAEDIC SURGERY

## 2023-11-21 RX ORDER — PREGABALIN 200 MG/1
200 CAPSULE ORAL 2 TIMES DAILY
Qty: 60 CAPSULE | Refills: 1 | Status: SHIPPED | OUTPATIENT
Start: 2023-11-21 | End: 2024-05-21

## 2023-11-21 RX ORDER — METHOCARBAMOL 750 MG/1
750 TABLET, FILM COATED ORAL 3 TIMES DAILY PRN
Qty: 90 TABLET | Refills: 1 | Status: SHIPPED | OUTPATIENT
Start: 2023-11-21 | End: 2024-01-20

## 2023-11-21 RX ORDER — MELOXICAM 15 MG/1
15 TABLET ORAL DAILY
Qty: 30 TABLET | Refills: 1 | OUTPATIENT
Start: 2023-11-21 | End: 2024-03-24

## 2023-11-21 NOTE — PROGRESS NOTES
Subjective:      Patient ID: Candy Huynh is a 53 y.o. female.    Chief Complaint:   Pain of the Left Knee and Pain of the Right Knee    HPI  She comes in about 3 months out from left TKA.  Her pain is still 8/10, but she is walking without supports and generally progressing well.  Her main complaint is of sciatic pain for which she is being evaluated by Neurosurgery        Objective:      Objective   Ortho/SPM Exam     On exam, the scar is well healed without redness or tenderness.  She walks with a normal, symmetric gait with a normal pace.  There is no effusion.  Active range of motion is 0-115° without crepitus.  No instability to varus/valgus stress in extension or flexion.  No excessive sagittal plane instability.  Calves soft, nontender.  Distal neurovascular intact.           IMAGING:  Previous x-rays show well-fixed and positioned total knee arthroplasty without signs of loosening or other complications  Assessment:   Progressing well status post left TKA, but still complaining of pain      Objective:   She wanted us to refill her oral opioid, but we told her that she was outside the 90-day window for this, and that we would be treating her without opioids.  She very indignantly then said so you are not treating my pain?   I pointed out to her that I was trying to treat her pain with meloxicam, methocarbamol, Lyrica and Tylenol Arthritis, and she repeated the same.  We went back and forth like this a few times.  She clearly wants opioids.  I prescribed the above medications last week, but she did not pick them up from the pharmacy because I did not call in the hydrocodone as well.      Anniversary follow-up with x-rays.  The patient's pathophysiology was explained in detail with reference to x-rays, models, other visual aids as appropriate.  Treatment options were discussed in detail.  Questions were invited and answered to the patient's satisfaction.      Nolberto Fraser MD  Orthopedic Surgery

## 2023-12-14 ENCOUNTER — OFFICE VISIT (OUTPATIENT)
Dept: NEUROSURGERY | Facility: CLINIC | Age: 53
End: 2023-12-14
Payer: MEDICAID

## 2023-12-14 VITALS — DIASTOLIC BLOOD PRESSURE: 90 MMHG | HEART RATE: 101 BPM | SYSTOLIC BLOOD PRESSURE: 142 MMHG

## 2023-12-14 DIAGNOSIS — M53.3 SI (SACROILIAC) JOINT DYSFUNCTION: Primary | ICD-10-CM

## 2023-12-14 DIAGNOSIS — M48.07 SPINAL STENOSIS, LUMBOSACRAL REGION: ICD-10-CM

## 2023-12-14 DIAGNOSIS — M54.16 LUMBAR RADICULOPATHY, CHRONIC: ICD-10-CM

## 2023-12-14 PROCEDURE — 3044F PR MOST RECENT HEMOGLOBIN A1C LEVEL <7.0%: ICD-10-PCS | Mod: CPTII,,,

## 2023-12-14 PROCEDURE — 3080F DIAST BP >= 90 MM HG: CPT | Mod: CPTII,,,

## 2023-12-14 PROCEDURE — 3044F HG A1C LEVEL LT 7.0%: CPT | Mod: CPTII,,,

## 2023-12-14 PROCEDURE — 3077F SYST BP >= 140 MM HG: CPT | Mod: CPTII,,,

## 2023-12-14 PROCEDURE — 3080F PR MOST RECENT DIASTOLIC BLOOD PRESSURE >= 90 MM HG: ICD-10-PCS | Mod: CPTII,,,

## 2023-12-14 PROCEDURE — 1159F MED LIST DOCD IN RCRD: CPT | Mod: CPTII,,,

## 2023-12-14 PROCEDURE — 99999 PR PBB SHADOW E&M-EST. PATIENT-LVL IV: ICD-10-PCS | Mod: PBBFAC,,,

## 2023-12-14 PROCEDURE — 99999 PR PBB SHADOW E&M-EST. PATIENT-LVL IV: CPT | Mod: PBBFAC,,,

## 2023-12-14 PROCEDURE — 3077F PR MOST RECENT SYSTOLIC BLOOD PRESSURE >= 140 MM HG: ICD-10-PCS | Mod: CPTII,,,

## 2023-12-14 PROCEDURE — 99214 OFFICE O/P EST MOD 30 MIN: CPT | Mod: PBBFAC

## 2023-12-14 PROCEDURE — 4010F ACE/ARB THERAPY RXD/TAKEN: CPT | Mod: CPTII,,,

## 2023-12-14 PROCEDURE — 1159F PR MEDICATION LIST DOCUMENTED IN MEDICAL RECORD: ICD-10-PCS | Mod: CPTII,,,

## 2023-12-14 PROCEDURE — 4010F PR ACE/ARB THEARPY RXD/TAKEN: ICD-10-PCS | Mod: CPTII,,,

## 2023-12-14 PROCEDURE — 99215 OFFICE O/P EST HI 40 MIN: CPT | Mod: S$PBB,,,

## 2023-12-14 PROCEDURE — 99215 PR OFFICE/OUTPT VISIT, EST, LEVL V, 40-54 MIN: ICD-10-PCS | Mod: S$PBB,,,

## 2023-12-20 ENCOUNTER — TELEPHONE (OUTPATIENT)
Dept: NEUROSURGERY | Facility: CLINIC | Age: 53
End: 2023-12-20
Payer: MEDICAID

## 2023-12-20 NOTE — TELEPHONE ENCOUNTER
Called and spoke w pt. Informed that the appt on 1/10 was cancelled. Informed the appt with Jayme on 1/8/24 at 2:30pm.  Pt verbalized understanding.

## 2023-12-22 ENCOUNTER — TELEPHONE (OUTPATIENT)
Dept: NEUROSURGERY | Facility: CLINIC | Age: 53
End: 2023-12-22
Payer: MEDICAID

## 2023-12-22 NOTE — TELEPHONE ENCOUNTER
"Called and spoke with pt. Informed that dates and the time for the two Xray. Pt verbalized understanding. Informed that her appointment with Jayme HILL will be cancelled. Informed pt that Jayme would like her to see Dr. Salazar. PT asked who is Dr. Salazar. Explained Dr. Velazco is also a neurosurgeon who is specialty in spine and SI joint. Pt asked the location of Dr. Velazco. Stated that I do not know. However, informed that Dr. Salazar's staff will contact her about the appointment. Pt is upset and claiming that we are refusing to treat her. Discuss with pt that Jayme HILL is referred pt to see spine surgeon in [place] to discuss about the surgery. Pt stated "Whatever" and hung up on me.   "

## 2023-12-22 NOTE — PROGRESS NOTES
Neurosurgery  Established Patient    SUBJECTIVE:     History of Present Illness:  9/11/23: Candy Huynh is a 52 y.o. female with history of incidental meningioma, fibromyalgia, bilateral knee replacements, DMII, and HTN who presents for evaluation of low back and right leg pain. Patient reports low back pain began around December 2022 and was located in the center of her lower back. That pain somewhat subsided but she began to experience worsening pain over her right SI joint that radiated from her right buttock into the posterior aspect of her right leg down to the knee. Occasionally, the pain will radiate posteriorly down to her right ankle. She had a right knee replacement in October 2022 and then a left knee replacement in August 2023. Her right buttocks pain is worse with sitting as well as walking. It is difficult to find a comfortable position. She denies true weakness or bowel/bladder issues or saddle anesthesia. She participates in physical therapy for her knee but has not participated for her back. She used to see a pain management physician outside of [facility] for her back and has undergone RFAs but she no longer wants to see this pain management physician.     Interval f/u 12/14/23: Patient presents in follow up after right SI joint injection. Patient states she had maybe a few days of minimal relief. The pain is now severe and worsening in her right sided low back/hip radiating down her leg. She states she can barely put any pressure on that side when sitting down and is having difficulties walking. Denies focal weakness, paraesthesias, bowel/bladder or saddle anesthesia. She states physical therapy has not helped either. She had no relief from oral steroids. She no longer wants any more injections as she has had minimal success in the past.     Review of patient's allergies indicates:   Allergen Reactions    Adhesive Blisters     Specifically EKG lead pads    Morphine Other (See Comments)      "shake       Current Outpatient Medications   Medication Sig Dispense Refill    albuterol (PROVENTIL/VENTOLIN HFA) 90 mcg/actuation inhaler SMARTSI Puff(s) By Mouth Every 4 Hours PRN      ALBUTEROL INHL Inhale 2 puffs into the lungs every 4 (four) hours as needed.      amLODIPine (NORVASC) 5 MG tablet Take 5 mg by mouth once daily.      ARIPiprazole (ABILIFY) 2 MG Tab Take 2 mg by mouth every evening.      aspirin (ECOTRIN) 81 MG EC tablet Take 1 tablet (81 mg total) by mouth 2 (two) times a day. 60 tablet 0    azelastine (ASTELIN) 137 mcg (0.1 %) nasal spray 1 spray once daily.      BD ALINE 2ND GEN PEN NEEDLE 32 gauge x " Ndle USE ONE NEEDLE ONCE A WEEK      cetirizine (ZYRTEC) 10 MG tablet Take 10 mg by mouth every evening.      cholecalciferol, vitamin D3, 1,250 mcg (50,000 unit) capsule Take 50,000 Units by mouth every 7 days.      cyclobenzaprine (FLEXERIL) 10 MG tablet Take 1 tablet (10 mg total) by mouth 2 (two) times a day. 60 tablet 1    diazePAM (VALIUM) 2 MG tablet Take 2 mg by mouth every 6 (six) hours as needed.      EASY TOUCH ALCOHOL PREP PADS PadM USE TO prepare FOR glucose testing      EPINEPHrine (EPIPEN) 0.3 mg/0.3 mL AtIn as per administration instructions      ergocalciferol (ERGOCALCIFEROL) 50,000 unit Cap Take 50,000 Units by mouth every 7 days.      FEROSUL 325 mg (65 mg iron) Tab tablet Take by mouth every other day.      FLOWFLEX COVID-19 AG HOME TEST Kit test AS directed      FLUoxetine 20 MG capsule Take 40 mg by mouth once daily.      fluticasone propionate (FLONASE) 50 mcg/actuation nasal spray 2 sprays by Nasal route.      folic acid (FOLVITE) 1 MG tablet Take 1,000 mcg by mouth.      HYDROcodone-acetaminophen (NORCO) 7.5-325 mg per tablet Take 1 tablet by mouth every 6 (six) hours as needed for Pain.      hydrOXYzine pamoate (VISTARIL) 25 MG Cap Take 25 mg by mouth every evening.      loratadine (CLARITIN) 10 mg tablet Take 10 mg by mouth once daily.      losartan (COZAAR) 100 " MG tablet Take 100 mg by mouth once daily.      meclizine (ANTIVERT) 25 mg tablet Take 25 mg by mouth.      meloxicam (MOBIC) 15 MG tablet Take 1 tablet (15 mg total) by mouth once daily. 30 tablet 1    metFORMIN (GLUCOPHAGE-XR) 750 MG ER 24hr tablet Take 750 mg by mouth once daily.      methocarbamoL (ROBAXIN) 750 MG Tab Take 1 tablet (750 mg total) by mouth 3 (three) times daily as needed. 90 tablet 1    montelukast (SINGULAIR) 10 mg tablet       nicotine polacrilex (NICORETTE) 4 MG Gum SMARTSI Each By Mouth PRN      ondansetron (ZOFRAN-ODT) 8 MG TbDL Take 8 mg by mouth every 8 (eight) hours as needed.      ONETOUCH DELICA PLUS LANCET 33 gauge Misc Apply topically 4 (four) times daily.      ONETOUCH ULTRA2 METER Misc SMARTSIG:Via Meter As Directed      ONETOProteoTech VERIO TEST STRIPS Strp 1 strip 4 (four) times daily.      oxyCODONE (ROXICODONE) 5 MG immediate release tablet Take 1 tablet (5 mg total) by mouth every 4 (four) hours as needed for Pain. 40 tablet 0    pantoprazole (PROTONIX) 40 MG tablet Take 40 mg by mouth.      paroxetine (PAXIL) 20 MG tablet Take 20 mg by mouth every morning.      pregabalin (LYRICA) 200 MG Cap Take 1 capsule (200 mg total) by mouth 2 (two) times daily. 60 capsule 1    PREMARIN 0.625 mg tablet Take 0.625 mg by mouth once daily.      rosuvastatin (CRESTOR) 10 MG tablet Take 10 mg by mouth.      semaglutide (OZEMPIC) 0.25 mg or 0.5 mg (2 mg/3 mL) pen injector Inject 0.25 mg into the skin every 7 (seven) days      senna-docusate 8.6-50 mg (SENNA WITH DOCUSATE SODIUM) 8.6-50 mg per tablet Take 1 tablet by mouth once daily. 30 tablet 0    STOOL SOFTENER 100 mg capsule Take 1 softgel by mouth twice daily 60 capsule 11    sumatriptan (IMITREX) 100 MG tablet Take 100 mg by mouth 2 (two) times daily as needed.       No current facility-administered medications for this visit.     Facility-Administered Medications Ordered in Other Visits   Medication Dose Route Frequency Provider Last Rate  Last Admin    fentaNYL 50 mcg/mL injection 100 mcg  100 mcg Intravenous PRN Neno Horton PA-C   25 mcg at 23 1234    midazolam (VERSED) 1 mg/mL injection 1 mg  1 mg Intravenous PRN Neno Horton PA-C   2 mg at 23 1120    ropivacaine 0.2% Nimbus PainPRO Pump infusion 500 ML   Perineural Continuous Neno Horton PA-C   New Bag at 23 1516       Past Medical History:   Diagnosis Date    Allergy     Anemia     Anxiety     Asthma     denies- on outside problem list in Care Everywhere    Depression     Diabetes mellitus     Diabetes mellitus, type 2     Fibromyalgia     GERD (gastroesophageal reflux disease)     Hypertension     Stroke     TIA    Vertigo      Past Surgical History:   Procedure Laterality Date    ARTHROSCOPIC REPAIR OF ROTATOR CUFF OF SHOULDER Right 2022    Procedure: REPAIR, ROTATOR CUFF, ARTHROSCOPIC;  Surgeon: Ministerio Orr Jr., MD;  Location: [facility] OR;  Service: Orthopedics;  Laterality: Right;  need opus system  Congregational notifed CC     ARTHROSCOPY OF KNEE Left 2021    Procedure: ARTHROSCOPY, KNEE;  Surgeon: Donnie Campuzano MD;  Location: [facility] OR;  Service: Orthopedics;  Laterality: Left;     SECTION      DECOMPRESSION OF SUBACROMIAL SPACE  2022    Procedure: DECOMPRESSION, SUBACROMIAL SPACE;  Surgeon: Ministerio Orr Jr., MD;  Location: [facility] OR;  Service: Orthopedics;;    EXCISION OF MASS OF EXTREMITY Left 2019    Procedure: EXCISION, MASS, EXTREMITY;  Surgeon: Honorio Pulido IV, MD;  Location: [facility] OR;  Service: Orthopedics;  Laterality: Left;  excision lipoma  Request Lacie    HERNIA REPAIR      HYSTERECTOMY      KNEE ARTHROSCOPY W/ MENISCECTOMY  2021    Procedure: ARTHROSCOPY, KNEE, WITH MENISCECTOMY;  Surgeon: Donnie Campuzano MD;  Location: [facility] OR;  Service: Orthopedics;;    NASAL SEPTOPLASTY      RECONSTRUCTION OF ACROMIOCLAVICULAR JOINT  2022    Procedure: RECONSTRUCTION, ACROMIOCLAVICULAR JOINT;  Surgeon: Ministerio Orr  MD Samantha;  Location: [facility] OR;  Service: Orthopedics;;    TOTAL KNEE ARTHROPLASTY Right 10/26/2022    Procedure: ARTHROPLASTY, KNEE, TOTAL RIGHT: BALDO - NEXGEN;  Surgeon: Nolberto Fraser MD;  Location: [facility] OR;  Service: Orthopedics;  Laterality: Right;    TOTAL KNEE ARTHROPLASTY Left 8/7/2023    Procedure: ARTHROPLASTY, KNEE, TOTAL: LEFT: BALDO NEXGEN;  Surgeon: Nolberto Fraser MD;  Location: [facility] OR;  Service: Orthopedics;  Laterality: Left;     Family History       Problem Relation (Age of Onset)    Hypertension Father, Sister    No Known Problems Mother, Sister, Brother, Daughter, Daughter, Daughter, Son          Social History     Socioeconomic History    Marital status:    Tobacco Use    Smoking status: Former    Smokeless tobacco: Never   Substance and Sexual Activity    Alcohol use: Yes     Comment: occasional    Drug use: Never    Sexual activity: Yes     Partners: Male     Social Determinants of Health     Financial Resource Strain: Medium Risk (8/12/2023)    Overall Financial Resource Strain (CARDIA)     Difficulty of Paying Living Expenses: Somewhat hard   Food Insecurity: No Food Insecurity (8/12/2023)    Hunger Vital Sign     Worried About Running Out of Food in the Last Year: Never true     Ran Out of Food in the Last Year: Never true   Transportation Needs: Unmet Transportation Needs (8/12/2023)    PRAPARE - Transportation     Lack of Transportation (Medical): Yes     Lack of Transportation (Non-Medical): Yes   Physical Activity: Inactive (8/12/2023)    Exercise Vital Sign     Days of Exercise per Week: 0 days     Minutes of Exercise per Session: 0 min   Stress: Stress Concern Present (8/12/2023)    [place] [employer] - Occupational Stress Questionnaire     Feeling of Stress : Rather much   Social Connections: Unknown (8/12/2023)    Social Connection and Isolation Panel [NHANES]     Frequency of Communication with Friends and Family: More than three times a week      Frequency of Social Gatherings with Friends and Family: More than three times a week     Attends Congregation Services: Never     Active Member of Clubs or Organizations: No     Attends Club or Organization Meetings: Never   Recent Concern: Social Connections - Moderately Isolated (8/12/2023)    Social Connection and Isolation Panel [NHANES]     Frequency of Communication with Friends and Family: More than three times a week     Frequency of Social Gatherings with Friends and Family: More than three times a week     Attends Congregation Services: Never     Active Member of Clubs or Organizations: No     Attends Club or Organization Meetings: Never     Marital Status:    Housing Stability: Unknown (8/12/2023)    Housing Stability Vital Sign     Unable to Pay for Housing in the Last Year: No     Unstable Housing in the Last Year: No       Review of Systems  Positive per HPI, otherwise a pertinent ROS was performed and was negative.    OBJECTIVE:     Vital Signs  Pulse: 101  BP: (!) 142/90  Pain Score:   8  There is no height or weight on file to calculate BMI.    Neurosurgery Physical Exam  General: well developed, well nourished, no distress. Patient is barely able to stay seated in chair or put any pressure on the right sided buttock 2/2 pain/tenderness.  Head: normocephalic, atraumatic  Neck: No tracheal deviation. No palpable masses. Full ROM.   Neurologic: Alert and oriented. Thought content appropriate.  GCS: E4 V5 M6; Total: 15  Mental Status: Awake, Alert, Oriented x 4  Language: No aphasia  Speech: No dysarthria  Cranial nerves: face symmetric, tongue midline, CN II-XII grossly intact.   Eyes: pupils equal, round, reactive to light with accomodation, EOMI.   Pulmonary: normal respirations, no signs of respiratory distress  Abdomen: soft, non-distended, not tender to palpation  Vascular: Pulses 2+ and symmetric radial and dorsalis pedis. No LE edema.   Skin: Skin is warm, dry and intact.  Sensory: intact to  light touch throughout  Motor Strength: Moves all extremities spontaneously with good tone.  No abnormal movements seen.     Strength  Deltoids Triceps Biceps Wrist Extension Wrist Flexion Hand    Upper: R 5/5 5/5 5/5 5/5 5/5 5/5    L 5/5 5/5 5/5 5/5 5/5 5/5     Iliopsoas Quadriceps Knee  Flexion Tibialis  anterior Gastro- cnemius EHL   Lower: R 4/5 4/5 4/5 5/5 5/5 5/5    L 5/5 5/5 5/5 5/5 5/5 5/5   R sided LE weakness 2/2 pain.    Lumbar midline TTP: Negative.  SI joint TTP: positive on right. Marked tenderness to area.  Positive straight leg raise on right.  Valentin: positive on right.  Thigh thrust: positive on right.  Positive Gaenslen's test on right.    Diagnostic Results:  Imaging was independently reviewed by myself.  X-Ray lumbar spine AP and Lateral flex/ex 9/7/23: No fractures. L3-5 facet arthropathy.  X-Ray right hip 11/23/22: no fracture or bone destruction.    ASSESSMENT/PLAN:     Candy Huynh is a 53 y.o. female who presents with profound R sided SI joint dysfunction. She has failed conservative treatment with medications, physical therapy and SI joint injection. The patient is no longer interested in pursuing conservative management and would like to explore surgical options. Patient is a candidate for SI joint fusion surgery. Patient should follow up with Dr. Salazar to discuss surgery.    Will obtain an MRI Lumbar spine without contrast to r/u lumbar radiculopathy though suspicion is low. Will also obtain X-rays of the right hip and SI joint.    Francine Delacruz PA-C  Neurosurgery   [facility]

## 2023-12-24 DIAGNOSIS — Z96.652 STATUS POST LEFT KNEE REPLACEMENT: Primary | ICD-10-CM

## 2023-12-24 NOTE — PROGRESS NOTES
Patient called the total joint line with concerns of lateral knee pain when straightening the left knee. She is currently 4 months s/p left TKA. She endorses a sharp pain but denies any catching or locking of the joint. Patient also acknowledges previously being treated with hydrocodone, lyrica, meloxicam, tylenol arhtritis, and methocarbamol with no relief.    She agreed to meet with pain management to optimize her pain control. Referral sent to pain management. Patient notified to call the clinic with any other questions or concerns.

## 2023-12-26 ENCOUNTER — TELEPHONE (OUTPATIENT)
Dept: NEUROSURGERY | Facility: CLINIC | Age: 53
End: 2023-12-26
Payer: MEDICAID

## 2023-12-26 NOTE — TELEPHONE ENCOUNTER
Contacted pt in regards to a message that was sent over to us. I stated to pt that I was calling to get her scheduled for an appointment with  and she was referred by Francine Delacruz at [facility]. I scheduled pt to see  on 1/31 at 4:00 pm with . Pt voiced understanding

## 2023-12-27 ENCOUNTER — TELEPHONE (OUTPATIENT)
Dept: NEUROSURGERY | Facility: CLINIC | Age: 53
End: 2023-12-27
Payer: MEDICAID

## 2023-12-27 NOTE — TELEPHONE ENCOUNTER
----- Message from Kayleigh Rushing sent at 12/27/2023  9:13 AM CST -----  Regarding: MRI  Good Morning!     In regard to this patient [facility] has denied this case for the following reason's:       The patient's records show that she is having lower back pain that travels to her hip and/or leg. Imaging requires six weeks of provider directed treatment to be completed. This must have been completed in the past three months. There must have been no improvement in your symptoms. Contact must occur after the treatment is completed. Contact may have been by office visit, phone, email, or messaging. Your records must show you have all of the following:     -completed six weeks of provider directed treatment   -provider directed treatment that occurred within the last three months   -symptoms that are the same or worse after treatment   -contact with your provider after completing treatment    A peer to peer may be done by calling [phone], option#3 Case # [id number].    Thank You,    Kayleigh ROMO  Pre-Service Radiology Dept

## 2023-12-27 NOTE — TELEPHONE ENCOUNTER
----- Message from Perfecto Perkins sent at 12/27/2023 12:30 PM CST -----  Regarding: pt advice  Contact: pt [phone]  Pt's insurance provider is calling to be advised if we accept pt's insurance and is a referral needed for pt to be seen by neurosurgeon. Please call to advise pt further, thank you for all you are doing.

## 2023-12-29 ENCOUNTER — TELEPHONE (OUTPATIENT)
Dept: NEUROSURGERY | Facility: CLINIC | Age: 53
End: 2023-12-29
Payer: MEDICAID

## 2023-12-29 NOTE — TELEPHONE ENCOUNTER
Called and spoke with pt regarding to the MRI cancellation. Advised pt to get the disc that she obtained not long time ago and bring it to Dr. Velazco appointment. Pt is upset. Asking why we keep referring her to other doctors. Asking if we are trying to make money. Apologized and said no. Explained that she was referred to Dr. Salazar for a surgery consult. Pt calmed down and said she will figure out herself.

## 2023-12-29 NOTE — TELEPHONE ENCOUNTER
----- Message from Francine Delacruz PA-C sent at 12/29/2023 11:11 AM CST -----  Regarding: RE: MRI  Hi, I believe the patient obtained an MRI not too long ago. If she can bring this MRI on a disc to her appointment with Dr. Salazar she will not need to get a new one.     ----- Message -----  From: Emi Khan MA  Sent: 12/29/2023  10:34 AM CST  To: Francine Delacruz PA-C  Subject: FW: MRI                                          Hi Jayme,    Please advise.    Emi Hamlin.  ----- Message -----  From: Kayleigh Rushing  Sent: 12/29/2023  10:28 AM CST  To: Francine Delacruz PA-C; Jayme Escamilla Staff  Subject: MRI                                              Good Morning!    Has the peer to peer been done for this patient, will she be rescheduling her appointment or will this appointment be canceled?    Thank You,    Kayleigh ROMO  Pre-Service  Radiology Dept

## 2024-01-29 ENCOUNTER — TELEPHONE (OUTPATIENT)
Dept: NEUROSURGERY | Facility: CLINIC | Age: 54
End: 2024-01-29
Payer: MEDICAID

## 2024-01-29 NOTE — TELEPHONE ENCOUNTER
Returned pt's call, I asked pt if she received an MRI within the last 6 months. Pt stated that she had an MRI scheduled but medicaid denied it so she did not receive one. I stated to pt that  needs the MRI so he can treat her. I stated to pt that I will reach out to Francine Delacruz's office in regards to the MRI and see what they say. Pt voiced understanding

## 2024-01-29 NOTE — TELEPHONE ENCOUNTER
Good Afternoon,  This patient has an appointment with  on Wednesday January 31, 2024 at 4:00 pm. It looks like you all ordered an MRI for the pt to complete but the patient stated that Medicaid denied her MRI. I just want to know did someone complete a peer to peer to have the MRI approved or did she just cancel her appointment. If the patient does not have the MRI then her appointment with  will be cancelled.      Thanks,  MT

## 2024-01-30 ENCOUNTER — TELEPHONE (OUTPATIENT)
Dept: NEUROSURGERY | Facility: CLINIC | Age: 54
End: 2024-01-30
Payer: MEDICAID

## 2024-01-30 NOTE — TELEPHONE ENCOUNTER
Good morning,     Her appointment may have to pushed back due to me being unaware about a peer to peer until yesterday evening. Our records indicate that she was giving external orders to have imaging completed outside of [facility]. I called her this morning to find out if she completed it at another facility and she was extremely rude and disconnected the call. I will call and get it schedule for Francine to complete.

## 2024-01-30 NOTE — TELEPHONE ENCOUNTER
Called patient in reference to mri being approved until 3/15/2024 no response lv   Approval number [id number]

## 2024-01-30 NOTE — TELEPHONE ENCOUNTER
Returned pt's call, pt stated that she is having her MRI done on February 2. I scheduled pt to see  on March 13 at 4:00 pm and placed on the waiting list. Pt voiced understanding

## 2024-01-30 NOTE — TELEPHONE ENCOUNTER
Hey,     I spoke with patient 's insurance company her Mri was approved. Approval # [id number], I called to her to schedule it no response a detailed message was lef for stating it was approved.

## 2024-02-02 ENCOUNTER — HOSPITAL ENCOUNTER (OUTPATIENT)
Dept: RADIOLOGY | Facility: HOSPITAL | Age: 54
Discharge: HOME | End: 2024-02-02
Payer: MEDICAID

## 2024-02-02 ENCOUNTER — HOSPITAL ENCOUNTER (OUTPATIENT)
Dept: RADIOLOGY | Facility: HOSPITAL | Age: 54
Discharge: HOME | End: 2024-02-02
Attending: PHYSICIAN ASSISTANT
Payer: MEDICAID

## 2024-02-02 DIAGNOSIS — M54.16 LUMBAR RADICULOPATHY, CHRONIC: ICD-10-CM

## 2024-02-02 DIAGNOSIS — M53.3 SI (SACROILIAC) JOINT DYSFUNCTION: ICD-10-CM

## 2024-02-02 DIAGNOSIS — D32.9 MENINGIOMA: ICD-10-CM

## 2024-02-02 PROCEDURE — 72148 MRI LUMBAR SPINE W/O DYE: CPT | Mod: TC

## 2024-02-02 PROCEDURE — 72200 X-RAY EXAM SI JOINTS: CPT | Mod: 26,,, | Performed by: RADIOLOGY

## 2024-02-02 PROCEDURE — 25500020 PHARM REV CODE 255: Performed by: PHYSICIAN ASSISTANT

## 2024-02-02 PROCEDURE — A9585 GADOBUTROL INJECTION: HCPCS | Performed by: PHYSICIAN ASSISTANT

## 2024-02-02 PROCEDURE — 70553 MRI BRAIN STEM W/O & W/DYE: CPT | Mod: TC

## 2024-02-02 PROCEDURE — 70553 MRI BRAIN STEM W/O & W/DYE: CPT | Mod: 26,,, | Performed by: RADIOLOGY

## 2024-02-02 PROCEDURE — 72200 X-RAY EXAM SI JOINTS: CPT | Mod: TC

## 2024-02-02 PROCEDURE — 72148 MRI LUMBAR SPINE W/O DYE: CPT | Mod: 26,,, | Performed by: RADIOLOGY

## 2024-02-02 PROCEDURE — 73502 X-RAY EXAM HIP UNI 2-3 VIEWS: CPT | Mod: TC,RT

## 2024-02-02 PROCEDURE — 73502 X-RAY EXAM HIP UNI 2-3 VIEWS: CPT | Mod: 26,RT,, | Performed by: RADIOLOGY

## 2024-02-02 RX ORDER — GADOBUTROL 604.72 MG/ML
10 INJECTION INTRAVENOUS
Status: COMPLETED | OUTPATIENT
Start: 2024-02-02 | End: 2024-02-02

## 2024-02-02 RX ADMIN — GADOBUTROL 10 ML: 604.72 INJECTION INTRAVENOUS at 05:02

## 2024-02-03 ENCOUNTER — HOSPITAL ENCOUNTER (EMERGENCY)
Facility: HOSPITAL | Age: 54
Discharge: HOME | End: 2024-02-03
Attending: EMERGENCY MEDICINE
Payer: MEDICAID

## 2024-02-03 VITALS
BODY MASS INDEX: 39.11 KG/M2 | WEIGHT: 228 LBS | RESPIRATION RATE: 18 BRPM | TEMPERATURE: 98 F | HEART RATE: 86 BPM | OXYGEN SATURATION: 98 % | DIASTOLIC BLOOD PRESSURE: 88 MMHG | SYSTOLIC BLOOD PRESSURE: 186 MMHG

## 2024-02-03 DIAGNOSIS — S16.1XXA STRAIN OF NECK MUSCLE, INITIAL ENCOUNTER: Primary | ICD-10-CM

## 2024-02-03 DIAGNOSIS — M54.2 NECK PAIN ON RIGHT SIDE: ICD-10-CM

## 2024-02-03 LAB — POCT GLUCOSE: 72 MG/DL (ref 70–110)

## 2024-02-03 PROCEDURE — 99284 EMERGENCY DEPT VISIT MOD MDM: CPT | Mod: ER

## 2024-02-03 PROCEDURE — 25000003 PHARM REV CODE 250: Mod: ER

## 2024-02-03 PROCEDURE — 82962 GLUCOSE BLOOD TEST: CPT | Mod: ER

## 2024-02-03 PROCEDURE — 63600175 PHARM REV CODE 636 W HCPCS: Mod: ER

## 2024-02-03 PROCEDURE — 96372 THER/PROPH/DIAG INJ SC/IM: CPT

## 2024-02-03 RX ORDER — LIDOCAINE 50 MG/G
1 PATCH TOPICAL
Status: DISCONTINUED | OUTPATIENT
Start: 2024-02-03 | End: 2024-02-03 | Stop reason: HOSPADM

## 2024-02-03 RX ORDER — LIDOCAINE 50 MG/G
1 PATCH TOPICAL DAILY
Qty: 15 PATCH | Refills: 0 | Status: SHIPPED | OUTPATIENT
Start: 2024-02-03 | End: 2024-02-03

## 2024-02-03 RX ORDER — KETOROLAC TROMETHAMINE 30 MG/ML
15 INJECTION, SOLUTION INTRAMUSCULAR; INTRAVENOUS
Status: COMPLETED | OUTPATIENT
Start: 2024-02-03 | End: 2024-02-03

## 2024-02-03 RX ORDER — HYDROCODONE BITARTRATE AND ACETAMINOPHEN 5; 325 MG/1; MG/1
1 TABLET ORAL EVERY 6 HOURS PRN
Qty: 12 TABLET | Refills: 0 | Status: ON HOLD | OUTPATIENT
Start: 2024-02-03 | End: 2024-03-28 | Stop reason: HOSPADM

## 2024-02-03 RX ORDER — CYCLOBENZAPRINE HCL 5 MG
5 TABLET ORAL 3 TIMES DAILY PRN
Qty: 15 TABLET | Refills: 0 | Status: SHIPPED | OUTPATIENT
Start: 2024-02-03

## 2024-02-03 RX ADMIN — LIDOCAINE 5% 1 PATCH: 700 PATCH TOPICAL at 05:02

## 2024-02-03 RX ADMIN — KETOROLAC TROMETHAMINE 15 MG: 30 INJECTION INTRAMUSCULAR; INTRAVENOUS at 05:02

## 2024-02-03 NOTE — DISCHARGE INSTRUCTIONS

## 2024-02-03 NOTE — ED PROVIDER NOTES
Encounter Date: 2/3/2024    SCRIBE #1 NOTE: I, Jackie Pulido, am scribing for, and in the presence of,  Alayna Holdsworth, PA-C. I have scribed the following portions of the note - Other sections scribed: HPI, ROS.       History     Chief Complaint   Patient presents with    Neck Pain     A 52 y/o female presents to the ER c/o neck pain radiating to (right) shoulder since yesterday. Denies SOB, CP.      53-year-old female with a past medical history of DM, HTN, presents to the ED with constant right sided neck pain radiating to the right shoulder for 3 days. Patient describes the pain has tight and sore 10/10, worsening with movement. Patient also complains of right otalgia. Patient reports attempted treatment with Mobic and Methocarbamol with no relief; last attempt with Mobic last night. Patient does not know what caused the pain, denying any recent falls, injuries, trauma, heavy lifting, or abnormal sleep positions. Patient denies fever, shortness of breath, CP, sore throat, nausea, vomiting, numbness, paresthesias, color changes to the neck, neck swelling, right ear discharge, and any other symptoms.     The history is provided by the patient. No  was used.     Review of patient's allergies indicates:   Allergen Reactions    Adhesive Blisters     Specifically EKG lead pads    Morphine Other (See Comments)     shake     Past Medical History:   Diagnosis Date    Allergy     Anemia     Anxiety     Asthma     denies- on outside problem list in Care Everywhere    Depression     Diabetes mellitus     Diabetes mellitus, type 2     Fibromyalgia     GERD (gastroesophageal reflux disease)     Hypertension     Stroke     TIA    Vertigo      Past Surgical History:   Procedure Laterality Date    ARTHROSCOPIC REPAIR OF ROTATOR CUFF OF SHOULDER Right 9/6/2022    Procedure: REPAIR, ROTATOR CUFF, ARTHROSCOPIC;  Surgeon: Ministerio Orr Jr., MD;  Location: [facility] OR;  Service: Orthopedics;  Laterality: Right;   need opus system  Religious notifed CC     ARTHROSCOPY OF KNEE Left 2021    Procedure: ARTHROSCOPY, KNEE;  Surgeon: Donnie Campuzano MD;  Location: [facility] OR;  Service: Orthopedics;  Laterality: Left;     SECTION      DECOMPRESSION OF SUBACROMIAL SPACE  2022    Procedure: DECOMPRESSION, SUBACROMIAL SPACE;  Surgeon: Ministerio Orr Jr., MD;  Location: [facility] OR;  Service: Orthopedics;;    EXCISION OF MASS OF EXTREMITY Left 2019    Procedure: EXCISION, MASS, EXTREMITY;  Surgeon: Honorio Pulido IV, MD;  Location: [facility] OR;  Service: Orthopedics;  Laterality: Left;  excision lipoma  Request Lacie    HERNIA REPAIR      HYSTERECTOMY      KNEE ARTHROSCOPY W/ MENISCECTOMY  2021    Procedure: ARTHROSCOPY, KNEE, WITH MENISCECTOMY;  Surgeon: Donnie Campuzano MD;  Location: [facility] OR;  Service: Orthopedics;;    NASAL SEPTOPLASTY      RECONSTRUCTION OF ACROMIOCLAVICULAR JOINT  2022    Procedure: RECONSTRUCTION, ACROMIOCLAVICULAR JOINT;  Surgeon: Ministerio Orr Jr., MD;  Location: [facility] OR;  Service: Orthopedics;;    TOTAL KNEE ARTHROPLASTY Right 10/26/2022    Procedure: ARTHROPLASTY, KNEE, TOTAL RIGHT: BALDO - NEXGEN;  Surgeon: Nolberto Fraser MD;  Location: [facility];  Service: Orthopedics;  Laterality: Right;    TOTAL KNEE ARTHROPLASTY Left 2023    Procedure: ARTHROPLASTY, KNEE, TOTAL: LEFT: BALDO NEXGEN;  Surgeon: Nolberto Fraser MD;  Location: [facility];  Service: Orthopedics;  Laterality: Left;     Family History   Problem Relation Age of Onset    No Known Problems Mother     Hypertension Father     Hypertension Sister     No Known Problems Sister     No Known Problems Brother     No Known Problems Daughter     No Known Problems Daughter     No Known Problems Daughter     No Known Problems Son      Social History     Tobacco Use    Smoking status: Former    Smokeless tobacco: Never   Substance Use Topics    Alcohol use: Yes     Comment: occasional    Drug use: Never     Review of Systems    Constitutional:  Negative for chills, diaphoresis and fever.   HENT:  Positive for ear pain (right). Negative for ear discharge (right), sore throat and trouble swallowing.    Eyes:  Negative for visual disturbance.   Respiratory:  Negative for shortness of breath.    Cardiovascular:  Negative for chest pain.   Gastrointestinal:  Negative for nausea and vomiting.   Musculoskeletal:  Positive for neck pain (right sided). Negative for joint swelling.        (-) neck swelling   Skin:  Negative for color change, pallor, rash and wound.   Neurological:  Negative for dizziness, weakness, light-headedness, numbness and headaches.        (-) paresthesias       Physical Exam     Initial Vitals [02/03/24 1708]   BP Pulse Resp Temp SpO2   (!) 186/88 86 18 98.2 °F (36.8 °C) 98 %      MAP       --         Physical Exam    Nursing note and vitals reviewed.  Constitutional: She appears well-developed and well-nourished. She is not diaphoretic. She is active. She does not appear ill. No distress.   HENT:   Head: Normocephalic and atraumatic.   Right Ear: Tympanic membrane, external ear and ear canal normal.   Left Ear: Tympanic membrane, external ear and ear canal normal.   Nose: Nose normal.   Mouth/Throat: Uvula is midline, oropharynx is clear and moist and mucous membranes are normal.   Eyes: Conjunctivae, EOM and lids are normal. Pupils are equal, round, and reactive to light. Right eye exhibits no discharge. Left eye exhibits no discharge.   Neck: Neck supple.       Normal range of motion.  Cardiovascular:  Normal rate and regular rhythm.           Pulses:       Radial pulses are 2+ on the right side.   Pulmonary/Chest: Effort normal and breath sounds normal. No respiratory distress.   Abdominal: She exhibits no distension.   Musculoskeletal:         General: Normal range of motion.      Cervical back: Normal range of motion and neck supple. No swelling, edema, deformity, erythema or rigidity. Pain with movement and muscular  tenderness present. No spinous process tenderness. Normal range of motion.      Thoracic back: Normal.      Lumbar back: Normal.     Neurological: She is alert and oriented to person, place, and time. She has normal strength. No sensory deficit. GCS eye subscore is 4. GCS verbal subscore is 5. GCS motor subscore is 6.   Skin: Skin is dry. Capillary refill takes less than 2 seconds.         ED Course   Procedures  Labs Reviewed   POCT GLUCOSE MONITORING CONTINUOUS   POCT GLUCOSE          Imaging Results    None          Medications   LIDOcaine 5 % patch 1 patch (1 patch Transdermal Patch Applied 2/3/24 1749)   ketorolac injection 15 mg (15 mg Intramuscular Given 2/3/24 1749)     Medical Decision Making  53-year-old female with a past medical history of DM, HTN, presents to the ED with constant right sided neck pain radiating to the right shoulder for 3 days  Patient's chart and medical history reviewed.    Ddx:  Fracture  Dislocation  Contusion  Sprain  Strain    Patient's vitals reviewed.  Afebrile, no respiratory distress, and nontoxic-appearing in the ED. patient had right cervical muscle tenderness to palpation.  No midline tenderness.  Normal sensation.  2+ radial pulses.  No erythema, warmth, or swelling appreciated.  Normal ENT exam.  Point of care glucose is 72.  Patient given Toradol and a lidocaine patch for pain.  Discussed with patient this is a cervical strain which will take time to heal.  Instructed patient to rest, ice, and heat.  Patient will be sent home with Flexeril, short course of Norco, and lidocaine gel for symptomatic control. I have reviewed the  for this patient. Patient agrees with this plan. Discussed with her strict return precautions, she verbalized understanding. Patient is stable for discharge.       Amount and/or Complexity of Data Reviewed  Labs: ordered. Decision-making details documented in ED Course.    Risk  Prescription drug management.            Scribe Attestation:    Scribe #1: I performed the above scribed service and the documentation accurately describes the services I performed. I attest to the accuracy of the note.            Scribe attestation: I, Alayna Holdsworth,PA-C, personally performed the services described in this documentation. All medical record entries made by the scribe were at my direction and in my presence.  I have reviewed the chart and agree that the record reflects my personal performance and is accurate and complete.                    Clinical Impression:  Final diagnoses:  [S16.1XXA] Strain of neck muscle, initial encounter (Primary)  [M54.2] Neck pain on right side          ED Disposition Condition    Discharge Stable          ED Prescriptions       Medication Sig Dispense Start Date End Date Auth. Provider    HYDROcodone-acetaminophen (NORCO) 5-325 mg per tablet Take 1 tablet by mouth every 6 (six) hours as needed for Pain. 12 tablet 2/3/2024 -- Holdsworth, Alayna, PA-C    cyclobenzaprine (FLEXERIL) 5 MG tablet Take 1 tablet (5 mg total) by mouth 3 (three) times daily as needed for Muscle spasms (Pain). 15 tablet 2/3/2024 -- Holdsworth, Alayna, PA-C    LIDOcaine (LIDODERM) 5 %  (Status: Discontinued) Place 1 patch onto the skin once daily. Remove & Discard patch within 12 hours then leave off for 12 hours 15 patch 2/3/2024 2/3/2024 Holdsworth, Alayna, PA-C    LIDOcaine 5 % Gel Apply 1 g topically every 6 (six) hours as needed (Pain). 113 g 2/3/2024 -- Holdsworth, Alayna, PA-C          Follow-up Information       Follow up With Specialties Details Why Contact [facility] ED Emergency Medicine  If symptoms worsen [address] [place] [address] [phone]             Holdsworth, Alayna, PA-C  02/03/24 0451

## 2024-02-21 ENCOUNTER — TELEPHONE (OUTPATIENT)
Dept: NEUROSURGERY | Facility: CLINIC | Age: 54
End: 2024-02-21
Payer: MEDICAID

## 2024-02-21 NOTE — TELEPHONE ENCOUNTER
Returned pt's call, pt is requesting to know if there were any cancellations before 3/13. I stated to pt that I do not have any cancellation, but I will call her to let her know if they do have any. Pt voiced understanding

## 2024-03-13 ENCOUNTER — OFFICE VISIT (OUTPATIENT)
Dept: NEUROSURGERY | Facility: CLINIC | Age: 54
End: 2024-03-13
Payer: MEDICAID

## 2024-03-13 VITALS
DIASTOLIC BLOOD PRESSURE: 93 MMHG | SYSTOLIC BLOOD PRESSURE: 153 MMHG | BODY MASS INDEX: 38.91 KG/M2 | HEART RATE: 92 BPM | HEIGHT: 64 IN | WEIGHT: 227.94 LBS

## 2024-03-13 DIAGNOSIS — M54.17 LUMBOSACRAL RADICULOPATHY AT S1: ICD-10-CM

## 2024-03-13 DIAGNOSIS — M51.16 LUMBAR DISC HERNIATION WITH RADICULOPATHY: Primary | ICD-10-CM

## 2024-03-13 PROCEDURE — 99999 PR PBB SHADOW E&M-EST. PATIENT-LVL IV: CPT | Mod: PBBFAC,,, | Performed by: NEUROLOGICAL SURGERY

## 2024-03-13 PROCEDURE — 3008F BODY MASS INDEX DOCD: CPT | Mod: CPTII,,, | Performed by: NEUROLOGICAL SURGERY

## 2024-03-13 PROCEDURE — 99214 OFFICE O/P EST MOD 30 MIN: CPT | Mod: PBBFAC,PN | Performed by: NEUROLOGICAL SURGERY

## 2024-03-13 PROCEDURE — 3080F DIAST BP >= 90 MM HG: CPT | Mod: CPTII,,, | Performed by: NEUROLOGICAL SURGERY

## 2024-03-13 PROCEDURE — 1159F MED LIST DOCD IN RCRD: CPT | Mod: CPTII,,, | Performed by: NEUROLOGICAL SURGERY

## 2024-03-13 PROCEDURE — 4010F ACE/ARB THERAPY RXD/TAKEN: CPT | Mod: CPTII,,, | Performed by: NEUROLOGICAL SURGERY

## 2024-03-13 PROCEDURE — 99215 OFFICE O/P EST HI 40 MIN: CPT | Mod: S$PBB,,, | Performed by: NEUROLOGICAL SURGERY

## 2024-03-13 PROCEDURE — 3077F SYST BP >= 140 MM HG: CPT | Mod: CPTII,,, | Performed by: NEUROLOGICAL SURGERY

## 2024-03-13 NOTE — H&P (VIEW-ONLY)
NEUROSURGICAL PROGRESS NOTE    DATE OF SERVICE:  03/13/2024    ATTENDING PHYSICIAN:  Dane Salazar MD    SUBJECTIVE:  9/11/23: Candy Huynh is a 52 y.o. female with history of incidental meningioma, fibromyalgia, bilateral knee replacements, DMII, and HTN who presents for evaluation of low back and right leg pain. Patient reports low back pain began around December 2022 and was located in the center of her lower back. That pain somewhat subsided but she began to experience worsening pain over her right SI joint that radiated from her right buttock into the posterior aspect of her right leg down to the knee. Occasionally, the pain will radiate posteriorly down to her right ankle. She had a right knee replacement in October 2022 and then a left knee replacement in August 2023. Her right buttocks pain is worse with sitting as well as walking. It is difficult to find a comfortable position. She denies true weakness or bowel/bladder issues or saddle anesthesia. She participates in physical therapy for her knee but has not participated for her back. She used to see a pain management physician outside of [facility] for her back and has undergone RFAs but she no longer wants to see this pain management physician.      Interval f/u 12/14/23: Patient presents in follow up after right SI joint injection. Patient states she had maybe a few days of minimal relief. The pain is now severe and worsening in her right sided low back/hip radiating down her leg. She states she can barely put any pressure on that side when sitting down and is having difficulties walking. Denies focal weakness, paraesthesias, bowel/bladder or saddle anesthesia. She states physical therapy has not helped either. She had no relief from oral steroids. She no longer wants any more injections as she has had minimal success in the past.     INTERIM HISTORY:    A 53-year-old female who presented to the clinic for low back and right leg pain evaluation.   The pain started in 2022 after her knee replacement, and since then it has been progressively getting worse.  The pain is aggravated with sitting, standing, walking, sneezing, and coughing.  Pain is also worse when she bends forward.  Pain is constant.  The pain alleviated when she changed position.  The pain radiates to the right buttock to the posterior aspect of her thigh down to the calve of her right leg.  She reports some numbness in the same distribution.  She reports some cramps involving her toes on the right foot.  She denies any bowel or bladder dysfunction.  She tried physical therapy without significant relief.  She also had multiple injections with minimal relief.  She is taking daily Lyrica without much pain relief.  This is affecting her daily activities and she has no quality of life.              PAST MEDICAL HISTORY:  Active Ambulatory Problems     Diagnosis Date Noted    Lipoma of arm 12/06/2019    Quadriceps weakness 10/05/2020    Gait abnormality 10/05/2020    Decreased range of motion (ROM) of left knee 10/05/2020    Decreased functional mobility 10/05/2020    Acute pain of left knee 12/17/2020    Knee pain 01/11/2021    Degenerative tear of left medial meniscus     Degenerative tear of lateral meniscus, left     Morbid obesity with BMI of 40.0-44.9, adult     Difficulty walking 01/13/2021    Status post arthroscopy of knee 02/02/2021    Primary osteoarthritis of right knee 04/28/2022    Rotator cuff impingement syndrome of right shoulder 06/15/2022    Biceps tendinitis of right upper extremity 06/15/2022    Depression     Diabetes mellitus     Hypertension     Fibromyalgia     Gastroesophageal reflux disease without esophagitis 10/10/2022    Personal history of TIA (transient ischemic attack) 10/11/2022    Mild intermittent asthma without complication 10/11/2022    GATITO (obstructive sleep apnea) 10/11/2022    Normocytic anemia 10/12/2022    Decreased range of motion of right shoulder  10/14/2022    Decreased strength of upper extremity 10/14/2022    Hypertrophy of nasal turbinates 2019    Pain in right knee 10/31/2022    Decreased range of motion (ROM) of right knee 10/31/2022    Status post right knee replacement 2022    Primary osteoarthritis of left knee 2023    Bilateral leg edema 2023    Chronic low back pain without sciatica 2023    Chronic, continuous use of opioids 2023    ICH (intracerebral hemorrhage) 2023    New daily persistent headache 2023    Status post left knee replacement 10/19/2023     Resolved Ambulatory Problems     Diagnosis Date Noted    Left anterior shoulder pain 2020    Tear of right supraspinatus tendon 06/15/2022     Past Medical History:   Diagnosis Date    Allergy     Anemia     Anxiety     Asthma     Diabetes mellitus, type 2     GERD (gastroesophageal reflux disease)     Stroke     Vertigo        PAST SURGICAL HISTORY:  Past Surgical History:   Procedure Laterality Date    ARTHROSCOPIC REPAIR OF ROTATOR CUFF OF SHOULDER Right 2022    Procedure: REPAIR, ROTATOR CUFF, ARTHROSCOPIC;  Surgeon: Ministerio Orr Jr., MD;  Location: [facility] OR;  Service: Orthopedics;  Laterality: Right;  need opus system  [facility] notifed CC     ARTHROSCOPY OF KNEE Left 2021    Procedure: ARTHROSCOPY, KNEE;  Surgeon: Donnie Campuzano MD;  Location: [facility] OR;  Service: Orthopedics;  Laterality: Left;     SECTION      DECOMPRESSION OF SUBACROMIAL SPACE  2022    Procedure: DECOMPRESSION, SUBACROMIAL SPACE;  Surgeon: Ministerio Orr Jr., MD;  Location: [facility] OR;  Service: Orthopedics;;    EXCISION OF MASS OF EXTREMITY Left 2019    Procedure: EXCISION, MASS, EXTREMITY;  Surgeon: Honorio Pulido IV, MD;  Location: [facility] OR;  Service: Orthopedics;  Laterality: Left;  excision lipoma  Request Lacie    HERNIA REPAIR      HYSTERECTOMY      KNEE ARTHROSCOPY W/ MENISCECTOMY  2021    Procedure: ARTHROSCOPY, KNEE, WITH  MENISCECTOMY;  Surgeon: Donnie Campuzano MD;  Location: [facility] OR;  Service: Orthopedics;;    NASAL SEPTOPLASTY      RECONSTRUCTION OF ACROMIOCLAVICULAR JOINT  9/6/2022    Procedure: RECONSTRUCTION, ACROMIOCLAVICULAR JOINT;  Surgeon: Ministerio Orr Jr., MD;  Location: [facility] OR;  Service: Orthopedics;;    TOTAL KNEE ARTHROPLASTY Right 10/26/2022    Procedure: ARTHROPLASTY, KNEE, TOTAL RIGHT: BALDO - NEXGEN;  Surgeon: Nolberto Fraser MD;  Location: [facility] OR;  Service: Orthopedics;  Laterality: Right;    TOTAL KNEE ARTHROPLASTY Left 8/7/2023    Procedure: ARTHROPLASTY, KNEE, TOTAL: LEFT: BALDO NEXGEN;  Surgeon: Nolberto Fraser MD;  Location: [facility] OR;  Service: Orthopedics;  Laterality: Left;       SOCIAL HISTORY:   Social History     Socioeconomic History    Marital status:    Tobacco Use    Smoking status: Former    Smokeless tobacco: Never   Substance and Sexual Activity    Alcohol use: Yes     Comment: occasional    Drug use: Never    Sexual activity: Yes     Partners: Male     Social Determinants of Health     Financial Resource Strain: Medium Risk (8/12/2023)    Overall Financial Resource Strain (CARDIA)     Difficulty of Paying Living Expenses: Somewhat hard   Food Insecurity: No Food Insecurity (8/12/2023)    Hunger Vital Sign     Worried About Running Out of Food in the Last Year: Never true     Ran Out of Food in the Last Year: Never true   Transportation Needs: Unmet Transportation Needs (8/12/2023)    PRAPARE - Transportation     Lack of Transportation (Medical): Yes     Lack of Transportation (Non-Medical): Yes   Physical Activity: Inactive (8/12/2023)    Exercise Vital Sign     Days of Exercise per Week: 0 days     Minutes of Exercise per Session: 0 min   Stress: Stress Concern Present (8/12/2023)    [employer] [place] [employer] - Occupational Stress Questionnaire     Feeling of Stress : Rather much   Social Connections: Unknown (8/12/2023)    Social Connection and Isolation Panel  "[NHANES]     Frequency of Communication with Friends and Family: More than three times a week     Frequency of Social Gatherings with Friends and Family: More than three times a week     Attends Jain Services: Never     Active Member of Clubs or Organizations: No     Attends Club or Organization Meetings: Never   Recent Concern: Social Connections - Moderately Isolated (2023)    Social Connection and Isolation Panel [NHANES]     Frequency of Communication with Friends and Family: More than three times a week     Frequency of Social Gatherings with Friends and Family: More than three times a week     Attends Jain Services: Never     Active Member of Clubs or Organizations: No     Attends Club or Organization Meetings: Never     Marital Status:    Housing Stability: Unknown (2023)    Housing Stability Vital Sign     Unable to Pay for Housing in the Last Year: No     Unstable Housing in the Last Year: No       FAMILY HISTORY:  Family History   Problem Relation Age of Onset    No Known Problems Mother     Hypertension Father     Hypertension Sister     No Known Problems Sister     No Known Problems Brother     No Known Problems Daughter     No Known Problems Daughter     No Known Problems Daughter     No Known Problems Son        CURRENTS MEDICATIONS:  Current Outpatient Medications on File Prior to Visit   Medication Sig Dispense Refill    albuterol (PROVENTIL/VENTOLIN HFA) 90 mcg/actuation inhaler SMARTSI Puff(s) By Mouth Every 4 Hours PRN      ALBUTEROL INHL Inhale 2 puffs into the lungs every 4 (four) hours as needed.      amLODIPine (NORVASC) 5 MG tablet Take 5 mg by mouth once daily.      ARIPiprazole (ABILIFY) 2 MG Tab Take 2 mg by mouth every evening.      azelastine (ASTELIN) 137 mcg (0.1 %) nasal spray 1 spray once daily.      BD ALINE 2ND GEN PEN NEEDLE 32 gauge x " Ndle USE ONE NEEDLE ONCE A WEEK      cetirizine (ZYRTEC) 10 MG tablet Take 10 mg by mouth every evening.      " cholecalciferol, vitamin D3, 1,250 mcg (50,000 unit) capsule Take 50,000 Units by mouth every 7 days.      cyclobenzaprine (FLEXERIL) 5 MG tablet Take 1 tablet (5 mg total) by mouth 3 (three) times daily as needed for Muscle spasms (Pain). 15 tablet 0    EASY TOUCH ALCOHOL PREP PADS PadM USE TO prepare FOR glucose testing      EPINEPHrine (EPIPEN) 0.3 mg/0.3 mL AtIn as per administration instructions      ergocalciferol (ERGOCALCIFEROL) 50,000 unit Cap Take 50,000 Units by mouth every 7 days.      FEROSUL 325 mg (65 mg iron) Tab tablet Take by mouth every other day.      FLOWFLEX COVID-19 AG HOME TEST Kit test AS directed      FLUoxetine 20 MG capsule Take 40 mg by mouth once daily.      fluticasone propionate (FLONASE) 50 mcg/actuation nasal spray 2 sprays by Nasal route.      folic acid (FOLVITE) 1 MG tablet Take 1,000 mcg by mouth.      HYDROcodone-acetaminophen (NORCO) 5-325 mg per tablet Take 1 tablet by mouth every 6 (six) hours as needed for Pain. 12 tablet 0    HYDROcodone-acetaminophen (NORCO) 7.5-325 mg per tablet Take 1 tablet by mouth every 6 (six) hours as needed for Pain.      LIDOcaine 5 % Gel Apply 1 g topically every 6 (six) hours as needed (Pain). 113 g 0    loratadine (CLARITIN) 10 mg tablet Take 10 mg by mouth once daily.      losartan (COZAAR) 100 MG tablet Take 100 mg by mouth once daily.      meclizine (ANTIVERT) 25 mg tablet Take 25 mg by mouth.      meloxicam (MOBIC) 15 MG tablet Take 1 tablet (15 mg total) by mouth once daily. 30 tablet 1    metFORMIN (GLUCOPHAGE-XR) 750 MG ER 24hr tablet Take 750 mg by mouth once daily.      montelukast (SINGULAIR) 10 mg tablet       nicotine polacrilex (NICORETTE) 4 MG Gum SMARTSI Each By Mouth PRN      ondansetron (ZOFRAN-ODT) 8 MG TbDL Take 8 mg by mouth every 8 (eight) hours as needed.      ONETOUCH DELICA PLUS LANCET 33 gauge Misc Apply topically 4 (four) times daily.      ONETOUCH ULTRA2 METER Misc SMARTSIG:Via Meter As Directed      ONETOUCH  VERIO TEST STRIPS Strp 1 strip 4 (four) times daily.      oxyCODONE (ROXICODONE) 5 MG immediate release tablet Take 1 tablet (5 mg total) by mouth every 4 (four) hours as needed for Pain. 40 tablet 0    pantoprazole (PROTONIX) 40 MG tablet Take 40 mg by mouth.      pregabalin (LYRICA) 200 MG Cap Take 1 capsule (200 mg total) by mouth 2 (two) times daily. 60 capsule 1    rosuvastatin (CRESTOR) 10 MG tablet Take 10 mg by mouth.      semaglutide (OZEMPIC) 0.25 mg or 0.5 mg (2 mg/3 mL) pen injector Inject 0.25 mg into the skin every 7 (seven) days      senna-docusate 8.6-50 mg (SENNA WITH DOCUSATE SODIUM) 8.6-50 mg per tablet Take 1 tablet by mouth once daily. 30 tablet 0    STOOL SOFTENER 100 mg capsule Take 1 softgel by mouth twice daily 60 capsule 11    sumatriptan (IMITREX) 100 MG tablet Take 100 mg by mouth 2 (two) times daily as needed.      aspirin (ECOTRIN) 81 MG EC tablet Take 1 tablet (81 mg total) by mouth 2 (two) times a day. 60 tablet 0    diazePAM (VALIUM) 2 MG tablet Take 2 mg by mouth every 6 (six) hours as needed.      hydrOXYzine pamoate (VISTARIL) 25 MG Cap Take 25 mg by mouth every evening.      paroxetine (PAXIL) 20 MG tablet Take 20 mg by mouth every morning.      PREMARIN 0.625 mg tablet Take 0.625 mg by mouth once daily.       Current Facility-Administered Medications on File Prior to Visit   Medication Dose Route Frequency Provider Last Rate Last Admin    fentaNYL 50 mcg/mL injection 100 mcg  100 mcg Intravenous PRN Neno Horton PA-C   25 mcg at 08/07/23 1234    midazolam (VERSED) 1 mg/mL injection 1 mg  1 mg Intravenous PRN Neno Horton PA-C   2 mg at 08/07/23 1120    ropivacaine 0.2% Nimbus PainPRO Pump infusion 500 ML   Perineural Continuous Neno Horton PA-C   New Bag at 08/07/23 1516       ALLERGIES:  Review of patient's allergies indicates:   Allergen Reactions    Adhesive Blisters     Specifically EKG lead pads    Morphine Other (See Comments)     shake       REVIEW OF  SYSTEMS:  Review of Systems   Constitutional:  Negative for diaphoresis, fever and weight loss.   Respiratory:  Negative for shortness of breath.    Cardiovascular:  Negative for chest pain.   Gastrointestinal:  Negative for blood in stool.   Genitourinary:  Negative for hematuria.   Endo/Heme/Allergies:  Does not bruise/bleed easily.   All other systems reviewed and are negative.        OBJECTIVE:    PHYSICAL EXAMINATION:   Vitals:    03/13/24 1621   BP: (!) 153/93   Pulse: 92       Physical Exam:  Vitals reviewed.    Constitutional: She appears well-developed and well-nourished.     Eyes: Pupils are equal, round, and reactive to light. Conjunctivae and EOM are normal.     Cardiovascular: Normal distal pulses and no edema.     Abdominal: Soft.     Skin: Skin displays no rash on trunk and no rash on extremities. Skin displays no lesions on trunk and no lesions on extremities.     Psych/Behavior: She is alert. She is oriented to person, place, and time. She has a normal mood and affect.     Musculoskeletal:        Neck: Range of motion is full.     Neurological:        DTRs: Tricep reflexes are 2+ on the right side and 2+ on the left side. Bicep reflexes are 2+ on the right side and 2+ on the left side. Brachioradialis reflexes are 2+ on the right side and 2+ on the left side. Patellar reflexes are 2+ on the right side and 2+ on the left side. Achilles reflexes are 2+ on the right side and 1+ on the left side.       Back Exam     Tenderness   The patient is experiencing tenderness in the lumbar.    Range of Motion   Extension:  normal   Flexion:  abnormal   Lateral bend right:  normal   Lateral bend left:  normal   Rotation right:  normal   Rotation left:  normal     Muscle Strength   Right Quadriceps:  5/5   Left Quadriceps:  5/5   Right Hamstrings:  5/5   Left Hamstrings:  5/5     Tests   Straight leg raise right: negative  Straight leg raise left: negative    Other   Toe walk: abnormal (Difficulty on the right  side)  Heel walk: normal            SI joint:   Palpation at the right and left SI joints is painful  Can not lie down to perform SI joint exam due to pain    Neurologic Exam     Mental Status   Oriented to person, place, and time.   Speech: speech is normal   Level of consciousness: alert    Cranial Nerves   Cranial nerves II through XII intact.     CN III, IV, VI   Pupils are equal, round, and reactive to light.  Extraocular motions are normal.     Motor Exam   Muscle bulk: normal  Overall muscle tone: normal    Strength   Right deltoid: 5/5  Left deltoid: 5/5  Right biceps: 5/5  Left biceps: 5/5  Right triceps: 5/5  Left triceps: 5/5  Right wrist flexion: 5/5  Left wrist flexion: 5/5  Right wrist extension: 5/5  Left wrist extension: 5/5  Right interossei: 5/5  Left interossei: 5/5  Right iliopsoas: 5/5  Left iliopsoas: 5/5  Right quadriceps: 5/5  Left quadriceps: 5/5  Right hamstrin/5  Left hamstrin/5  Right anterior tibial: 5/5  Left anterior tibial: 5/5  Right posterior tibial: 5/5  Left posterior tibial: 5/5  Right peroneal: 5/5  Left peroneal: 5/5  Right gastroc: 5/5  Left gastroc: 5/5    Sensory Exam   Light touch normal.   Pinprick normal.     Gait, Coordination, and Reflexes     Gait  Gait: (Antalgic)    Reflexes   Right brachioradialis: 2+  Left brachioradialis: 2+  Right biceps: 2+  Left biceps: 2+  Right triceps: 2+  Left triceps: 2+  Right patellar: 2+  Left patellar: 2+  Right achilles: 2+  Left achilles: 1+  Right plantar: normal  Left plantar: normal  Right Lowery: absent  Left Lowery: absent  Right ankle clonus: absent  Left ankle clonus: absent        DIAGNOSTIC DATA:  I personally interpreted the following imaging:   Lumbar spine MRI reviewed showing degenerative disc disease at L4-5 and L5-S1.  There is a L5-S1 disc herniation with extrusion of disc material compressing the right more than the left S1 nerve root, mild bilateral L5-S1 foraminal stenosis    ASSESSMENT:  This is a 53  y.o. female with     Problem List Items Addressed This Visit    None  Visit Diagnoses       Lumbar disc herniation with radiculopathy    -  Primary    Lumbosacral radiculopathy at S1                  PLAN:  The patient may have some form of SI joint dysfunction.  However she does have a clear right S1 radiculopathy.  Her lumbar spine MRI shows a disc herniation that is compressing the right more than the left S1 nerve roots.  Since she has a lumbar pathology I would treat the lumbar radiculopathy first.  She did complete a full conservative management including physical therapy and spinal injections.    I explained the natural history of the disease and all treatment options. I recommended a right L5-S1 laminotomy and microdiskectomy with the goal of improving her right leg pain that has become excruciating    We have discussed the risks of surgery including death, coma, bleeding, infection, failure of surgery, CSF leak, nerve root injury, spinal cord injury, ureter injury, weakness, paralysis, peripheral neuropathy, need for reoperation. Patient understands the risks and would like to proceed with surgery.    More than 50% of the time was spent on discussing conservative management treatments (medication, physical therapy exercises) and possible interventions (spinal injections and surgical procedures). Care coordination was discussed.    40 min      Dane Salazar MD  Cell:[phone]

## 2024-03-13 NOTE — PROGRESS NOTES
NEUROSURGICAL PROGRESS NOTE    DATE OF SERVICE:  03/13/2024    ATTENDING PHYSICIAN:  Dane Salazar MD    SUBJECTIVE:  9/11/23: Candy Huynh is a 52 y.o. female with history of incidental meningioma, fibromyalgia, bilateral knee replacements, DMII, and HTN who presents for evaluation of low back and right leg pain. Patient reports low back pain began around December 2022 and was located in the center of her lower back. That pain somewhat subsided but she began to experience worsening pain over her right SI joint that radiated from her right buttock into the posterior aspect of her right leg down to the knee. Occasionally, the pain will radiate posteriorly down to her right ankle. She had a right knee replacement in October 2022 and then a left knee replacement in August 2023. Her right buttocks pain is worse with sitting as well as walking. It is difficult to find a comfortable position. She denies true weakness or bowel/bladder issues or saddle anesthesia. She participates in physical therapy for her knee but has not participated for her back. She used to see a pain management physician outside of [facility] for her back and has undergone RFAs but she no longer wants to see this pain management physician.      Interval f/u 12/14/23: Patient presents in follow up after right SI joint injection. Patient states she had maybe a few days of minimal relief. The pain is now severe and worsening in her right sided low back/hip radiating down her leg. She states she can barely put any pressure on that side when sitting down and is having difficulties walking. Denies focal weakness, paraesthesias, bowel/bladder or saddle anesthesia. She states physical therapy has not helped either. She had no relief from oral steroids. She no longer wants any more injections as she has had minimal success in the past.     INTERIM HISTORY:    A 53-year-old female who presented to the clinic for low back and right leg pain evaluation.   The pain started in 2022 after her knee replacement, and since then it has been progressively getting worse.  The pain is aggravated with sitting, standing, walking, sneezing, and coughing.  Pain is also worse when she bends forward.  Pain is constant.  The pain alleviated when she changed position.  The pain radiates to the right buttock to the posterior aspect of her thigh down to the calve of her right leg.  She reports some numbness in the same distribution.  She reports some cramps involving her toes on the right foot.  She denies any bowel or bladder dysfunction.  She tried physical therapy without significant relief.  She also had multiple injections with minimal relief.  She is taking daily Lyrica without much pain relief.  This is affecting her daily activities and she has no quality of life.              PAST MEDICAL HISTORY:  Active Ambulatory Problems     Diagnosis Date Noted    Lipoma of arm 12/06/2019    Quadriceps weakness 10/05/2020    Gait abnormality 10/05/2020    Decreased range of motion (ROM) of left knee 10/05/2020    Decreased functional mobility 10/05/2020    Acute pain of left knee 12/17/2020    Knee pain 01/11/2021    Degenerative tear of left medial meniscus     Degenerative tear of lateral meniscus, left     Morbid obesity with BMI of 40.0-44.9, adult     Difficulty walking 01/13/2021    Status post arthroscopy of knee 02/02/2021    Primary osteoarthritis of right knee 04/28/2022    Rotator cuff impingement syndrome of right shoulder 06/15/2022    Biceps tendinitis of right upper extremity 06/15/2022    Depression     Diabetes mellitus     Hypertension     Fibromyalgia     Gastroesophageal reflux disease without esophagitis 10/10/2022    Personal history of TIA (transient ischemic attack) 10/11/2022    Mild intermittent asthma without complication 10/11/2022    GATITO (obstructive sleep apnea) 10/11/2022    Normocytic anemia 10/12/2022    Decreased range of motion of right shoulder  10/14/2022    Decreased strength of upper extremity 10/14/2022    Hypertrophy of nasal turbinates 2019    Pain in right knee 10/31/2022    Decreased range of motion (ROM) of right knee 10/31/2022    Status post right knee replacement 2022    Primary osteoarthritis of left knee 2023    Bilateral leg edema 2023    Chronic low back pain without sciatica 2023    Chronic, continuous use of opioids 2023    ICH (intracerebral hemorrhage) 2023    New daily persistent headache 2023    Status post left knee replacement 10/19/2023     Resolved Ambulatory Problems     Diagnosis Date Noted    Left anterior shoulder pain 2020    Tear of right supraspinatus tendon 06/15/2022     Past Medical History:   Diagnosis Date    Allergy     Anemia     Anxiety     Asthma     Diabetes mellitus, type 2     GERD (gastroesophageal reflux disease)     Stroke     Vertigo        PAST SURGICAL HISTORY:  Past Surgical History:   Procedure Laterality Date    ARTHROSCOPIC REPAIR OF ROTATOR CUFF OF SHOULDER Right 2022    Procedure: REPAIR, ROTATOR CUFF, ARTHROSCOPIC;  Surgeon: Ministerio Orr Jr., MD;  Location: [facility] OR;  Service: Orthopedics;  Laterality: Right;  need opus system  Faith notifed CC     ARTHROSCOPY OF KNEE Left 2021    Procedure: ARTHROSCOPY, KNEE;  Surgeon: Donnie Campuzano MD;  Location: [facility] OR;  Service: Orthopedics;  Laterality: Left;     SECTION      DECOMPRESSION OF SUBACROMIAL SPACE  2022    Procedure: DECOMPRESSION, SUBACROMIAL SPACE;  Surgeon: Ministerio Orr Jr., MD;  Location: [facility] OR;  Service: Orthopedics;;    EXCISION OF MASS OF EXTREMITY Left 2019    Procedure: EXCISION, MASS, EXTREMITY;  Surgeon: Honorio Pulido IV, MD;  Location: [facility] OR;  Service: Orthopedics;  Laterality: Left;  excision lipoma  Request Lacie    HERNIA REPAIR      HYSTERECTOMY      KNEE ARTHROSCOPY W/ MENISCECTOMY  2021    Procedure: ARTHROSCOPY, KNEE, WITH  MENISCECTOMY;  Surgeon: Donnie Campuzano MD;  Location: [facility] OR;  Service: Orthopedics;;    NASAL SEPTOPLASTY      RECONSTRUCTION OF ACROMIOCLAVICULAR JOINT  9/6/2022    Procedure: RECONSTRUCTION, ACROMIOCLAVICULAR JOINT;  Surgeon: Ministerio Orr Jr., MD;  Location: [facility] OR;  Service: Orthopedics;;    TOTAL KNEE ARTHROPLASTY Right 10/26/2022    Procedure: ARTHROPLASTY, KNEE, TOTAL RIGHT: BALDO - NEXGEN;  Surgeon: Nolberto Fraser MD;  Location: [facility] OR;  Service: Orthopedics;  Laterality: Right;    TOTAL KNEE ARTHROPLASTY Left 8/7/2023    Procedure: ARTHROPLASTY, KNEE, TOTAL: LEFT: BALDO NEXGEN;  Surgeon: Nolberto Fraser MD;  Location: [facility] OR;  Service: Orthopedics;  Laterality: Left;       SOCIAL HISTORY:   Social History     Socioeconomic History    Marital status:    Tobacco Use    Smoking status: Former    Smokeless tobacco: Never   Substance and Sexual Activity    Alcohol use: Yes     Comment: occasional    Drug use: Never    Sexual activity: Yes     Partners: Male     Social Determinants of Health     Financial Resource Strain: Medium Risk (8/12/2023)    Overall Financial Resource Strain (CARDIA)     Difficulty of Paying Living Expenses: Somewhat hard   Food Insecurity: No Food Insecurity (8/12/2023)    Hunger Vital Sign     Worried About Running Out of Food in the Last Year: Never true     Ran Out of Food in the Last Year: Never true   Transportation Needs: Unmet Transportation Needs (8/12/2023)    PRAPARE - Transportation     Lack of Transportation (Medical): Yes     Lack of Transportation (Non-Medical): Yes   Physical Activity: Inactive (8/12/2023)    Exercise Vital Sign     Days of Exercise per Week: 0 days     Minutes of Exercise per Session: 0 min   Stress: Stress Concern Present (8/12/2023)    [employer] - Occupational Stress Questionnaire     Feeling of Stress : Rather much   Social Connections: Unknown (8/12/2023)    Social Connection and Isolation Panel  "[NHANES]     Frequency of Communication with Friends and Family: More than three times a week     Frequency of Social Gatherings with Friends and Family: More than three times a week     Attends Yazidi Services: Never     Active Member of Clubs or Organizations: No     Attends Club or Organization Meetings: Never   Recent Concern: Social Connections - Moderately Isolated (2023)    Social Connection and Isolation Panel [NHANES]     Frequency of Communication with Friends and Family: More than three times a week     Frequency of Social Gatherings with Friends and Family: More than three times a week     Attends Yazidi Services: Never     Active Member of Clubs or Organizations: No     Attends Club or Organization Meetings: Never     Marital Status:    Housing Stability: Unknown (2023)    Housing Stability Vital Sign     Unable to Pay for Housing in the Last Year: No     Unstable Housing in the Last Year: No       FAMILY HISTORY:  Family History   Problem Relation Age of Onset    No Known Problems Mother     Hypertension Father     Hypertension Sister     No Known Problems Sister     No Known Problems Brother     No Known Problems Daughter     No Known Problems Daughter     No Known Problems Daughter     No Known Problems Son        CURRENTS MEDICATIONS:  Current Outpatient Medications on File Prior to Visit   Medication Sig Dispense Refill    albuterol (PROVENTIL/VENTOLIN HFA) 90 mcg/actuation inhaler SMARTSI Puff(s) By Mouth Every 4 Hours PRN      ALBUTEROL INHL Inhale 2 puffs into the lungs every 4 (four) hours as needed.      amLODIPine (NORVASC) 5 MG tablet Take 5 mg by mouth once daily.      ARIPiprazole (ABILIFY) 2 MG Tab Take 2 mg by mouth every evening.      azelastine (ASTELIN) 137 mcg (0.1 %) nasal spray 1 spray once daily.      BD ALINE 2ND GEN PEN NEEDLE 32 gauge x " Ndle USE ONE NEEDLE ONCE A WEEK      cetirizine (ZYRTEC) 10 MG tablet Take 10 mg by mouth every evening.      " cholecalciferol, vitamin D3, 1,250 mcg (50,000 unit) capsule Take 50,000 Units by mouth every 7 days.      cyclobenzaprine (FLEXERIL) 5 MG tablet Take 1 tablet (5 mg total) by mouth 3 (three) times daily as needed for Muscle spasms (Pain). 15 tablet 0    EASY TOUCH ALCOHOL PREP PADS PadM USE TO prepare FOR glucose testing      EPINEPHrine (EPIPEN) 0.3 mg/0.3 mL AtIn as per administration instructions      ergocalciferol (ERGOCALCIFEROL) 50,000 unit Cap Take 50,000 Units by mouth every 7 days.      FEROSUL 325 mg (65 mg iron) Tab tablet Take by mouth every other day.      FLOWFLEX COVID-19 AG HOME TEST Kit test AS directed      FLUoxetine 20 MG capsule Take 40 mg by mouth once daily.      fluticasone propionate (FLONASE) 50 mcg/actuation nasal spray 2 sprays by Nasal route.      folic acid (FOLVITE) 1 MG tablet Take 1,000 mcg by mouth.      HYDROcodone-acetaminophen (NORCO) 5-325 mg per tablet Take 1 tablet by mouth every 6 (six) hours as needed for Pain. 12 tablet 0    HYDROcodone-acetaminophen (NORCO) 7.5-325 mg per tablet Take 1 tablet by mouth every 6 (six) hours as needed for Pain.      LIDOcaine 5 % Gel Apply 1 g topically every 6 (six) hours as needed (Pain). 113 g 0    loratadine (CLARITIN) 10 mg tablet Take 10 mg by mouth once daily.      losartan (COZAAR) 100 MG tablet Take 100 mg by mouth once daily.      meclizine (ANTIVERT) 25 mg tablet Take 25 mg by mouth.      meloxicam (MOBIC) 15 MG tablet Take 1 tablet (15 mg total) by mouth once daily. 30 tablet 1    metFORMIN (GLUCOPHAGE-XR) 750 MG ER 24hr tablet Take 750 mg by mouth once daily.      montelukast (SINGULAIR) 10 mg tablet       nicotine polacrilex (NICORETTE) 4 MG Gum SMARTSI Each By Mouth PRN      ondansetron (ZOFRAN-ODT) 8 MG TbDL Take 8 mg by mouth every 8 (eight) hours as needed.      ONETOUCH DELICA PLUS LANCET 33 gauge Misc Apply topically 4 (four) times daily.      ONETOUCH ULTRA2 METER Misc SMARTSIG:Via Meter As Directed      ONETOUCH  VERIO TEST STRIPS Strp 1 strip 4 (four) times daily.      oxyCODONE (ROXICODONE) 5 MG immediate release tablet Take 1 tablet (5 mg total) by mouth every 4 (four) hours as needed for Pain. 40 tablet 0    pantoprazole (PROTONIX) 40 MG tablet Take 40 mg by mouth.      pregabalin (LYRICA) 200 MG Cap Take 1 capsule (200 mg total) by mouth 2 (two) times daily. 60 capsule 1    rosuvastatin (CRESTOR) 10 MG tablet Take 10 mg by mouth.      semaglutide (OZEMPIC) 0.25 mg or 0.5 mg (2 mg/3 mL) pen injector Inject 0.25 mg into the skin every 7 (seven) days      senna-docusate 8.6-50 mg (SENNA WITH DOCUSATE SODIUM) 8.6-50 mg per tablet Take 1 tablet by mouth once daily. 30 tablet 0    STOOL SOFTENER 100 mg capsule Take 1 softgel by mouth twice daily 60 capsule 11    sumatriptan (IMITREX) 100 MG tablet Take 100 mg by mouth 2 (two) times daily as needed.      aspirin (ECOTRIN) 81 MG EC tablet Take 1 tablet (81 mg total) by mouth 2 (two) times a day. 60 tablet 0    diazePAM (VALIUM) 2 MG tablet Take 2 mg by mouth every 6 (six) hours as needed.      hydrOXYzine pamoate (VISTARIL) 25 MG Cap Take 25 mg by mouth every evening.      paroxetine (PAXIL) 20 MG tablet Take 20 mg by mouth every morning.      PREMARIN 0.625 mg tablet Take 0.625 mg by mouth once daily.       Current Facility-Administered Medications on File Prior to Visit   Medication Dose Route Frequency Provider Last Rate Last Admin    fentaNYL 50 mcg/mL injection 100 mcg  100 mcg Intravenous PRN Neno Horton PA-C   25 mcg at 08/07/23 1234    midazolam (VERSED) 1 mg/mL injection 1 mg  1 mg Intravenous PRN Neno Horton PA-C   2 mg at 08/07/23 1120    ropivacaine 0.2% Nimbus PainPRO Pump infusion 500 ML   Perineural Continuous Neno Horton PA-C   New Bag at 08/07/23 1516       ALLERGIES:  Review of patient's allergies indicates:   Allergen Reactions    Adhesive Blisters     Specifically EKG lead pads    Morphine Other (See Comments)     shake       REVIEW OF  SYSTEMS:  Review of Systems   Constitutional:  Negative for diaphoresis, fever and weight loss.   Respiratory:  Negative for shortness of breath.    Cardiovascular:  Negative for chest pain.   Gastrointestinal:  Negative for blood in stool.   Genitourinary:  Negative for hematuria.   Endo/Heme/Allergies:  Does not bruise/bleed easily.   All other systems reviewed and are negative.        OBJECTIVE:    PHYSICAL EXAMINATION:   Vitals:    03/13/24 1621   BP: (!) 153/93   Pulse: 92       Physical Exam:  Vitals reviewed.    Constitutional: She appears well-developed and well-nourished.     Eyes: Pupils are equal, round, and reactive to light. Conjunctivae and EOM are normal.     Cardiovascular: Normal distal pulses and no edema.     Abdominal: Soft.     Skin: Skin displays no rash on trunk and no rash on extremities. Skin displays no lesions on trunk and no lesions on extremities.     Psych/Behavior: She is alert. She is oriented to person, place, and time. She has a normal mood and affect.     Musculoskeletal:        Neck: Range of motion is full.     Neurological:        DTRs: Tricep reflexes are 2+ on the right side and 2+ on the left side. Bicep reflexes are 2+ on the right side and 2+ on the left side. Brachioradialis reflexes are 2+ on the right side and 2+ on the left side. Patellar reflexes are 2+ on the right side and 2+ on the left side. Achilles reflexes are 2+ on the right side and 1+ on the left side.       Back Exam     Tenderness   The patient is experiencing tenderness in the lumbar.    Range of Motion   Extension:  normal   Flexion:  abnormal   Lateral bend right:  normal   Lateral bend left:  normal   Rotation right:  normal   Rotation left:  normal     Muscle Strength   Right Quadriceps:  5/5   Left Quadriceps:  5/5   Right Hamstrings:  5/5   Left Hamstrings:  5/5     Tests   Straight leg raise right: negative  Straight leg raise left: negative    Other   Toe walk: abnormal (Difficulty on the right  side)  Heel walk: normal            SI joint:   Palpation at the right and left SI joints is painful  Can not lie down to perform SI joint exam due to pain    Neurologic Exam     Mental Status   Oriented to person, place, and time.   Speech: speech is normal   Level of consciousness: alert    Cranial Nerves   Cranial nerves II through XII intact.     CN III, IV, VI   Pupils are equal, round, and reactive to light.  Extraocular motions are normal.     Motor Exam   Muscle bulk: normal  Overall muscle tone: normal    Strength   Right deltoid: 5/5  Left deltoid: 5/5  Right biceps: 5/5  Left biceps: 5/5  Right triceps: 5/5  Left triceps: 5/5  Right wrist flexion: 5/5  Left wrist flexion: 5/5  Right wrist extension: 5/5  Left wrist extension: 5/5  Right interossei: 5/5  Left interossei: 5/5  Right iliopsoas: 5/5  Left iliopsoas: 5/5  Right quadriceps: 5/5  Left quadriceps: 5/5  Right hamstrin/5  Left hamstrin/5  Right anterior tibial: 5/5  Left anterior tibial: 5/5  Right posterior tibial: 5/5  Left posterior tibial: 5/5  Right peroneal: 5/5  Left peroneal: 5/5  Right gastroc: 5/5  Left gastroc: 5/5    Sensory Exam   Light touch normal.   Pinprick normal.     Gait, Coordination, and Reflexes     Gait  Gait: (Antalgic)    Reflexes   Right brachioradialis: 2+  Left brachioradialis: 2+  Right biceps: 2+  Left biceps: 2+  Right triceps: 2+  Left triceps: 2+  Right patellar: 2+  Left patellar: 2+  Right achilles: 2+  Left achilles: 1+  Right plantar: normal  Left plantar: normal  Right Lowery: absent  Left Lowery: absent  Right ankle clonus: absent  Left ankle clonus: absent        DIAGNOSTIC DATA:  I personally interpreted the following imaging:   Lumbar spine MRI reviewed showing degenerative disc disease at L4-5 and L5-S1.  There is a L5-S1 disc herniation with extrusion of disc material compressing the right more than the left S1 nerve root, mild bilateral L5-S1 foraminal stenosis    ASSESSMENT:  This is a 53  y.o. female with     Problem List Items Addressed This Visit    None  Visit Diagnoses       Lumbar disc herniation with radiculopathy    -  Primary    Lumbosacral radiculopathy at S1                  PLAN:  The patient may have some form of SI joint dysfunction.  However she does have a clear right S1 radiculopathy.  Her lumbar spine MRI shows a disc herniation that is compressing the right more than the left S1 nerve roots.  Since she has a lumbar pathology I would treat the lumbar radiculopathy first.  She did complete a full conservative management including physical therapy and spinal injections.    I explained the natural history of the disease and all treatment options. I recommended a right L5-S1 laminotomy and microdiskectomy with the goal of improving her right leg pain that has become excruciating    We have discussed the risks of surgery including death, coma, bleeding, infection, failure of surgery, CSF leak, nerve root injury, spinal cord injury, ureter injury, weakness, paralysis, peripheral neuropathy, need for reoperation. Patient understands the risks and would like to proceed with surgery.    More than 50% of the time was spent on discussing conservative management treatments (medication, physical therapy exercises) and possible interventions (spinal injections and surgical procedures). Care coordination was discussed.    40 min      Dane Salazar MD  Cell:[phone]

## 2024-03-14 ENCOUNTER — PATIENT MESSAGE (OUTPATIENT)
Dept: NEUROSURGERY | Facility: CLINIC | Age: 54
End: 2024-03-14
Payer: MEDICAID

## 2024-03-15 ENCOUNTER — TELEPHONE (OUTPATIENT)
Dept: NEUROSURGERY | Facility: CLINIC | Age: 54
End: 2024-03-15
Payer: MEDICAID

## 2024-03-15 DIAGNOSIS — M51.16 LUMBAR DISC HERNIATION WITH RADICULOPATHY: Primary | ICD-10-CM

## 2024-03-24 ENCOUNTER — NURSE TRIAGE (OUTPATIENT)
Dept: ADMINISTRATIVE | Facility: CLINIC | Age: 54
End: 2024-03-24
Payer: MEDICAID

## 2024-03-24 ENCOUNTER — HOSPITAL ENCOUNTER (EMERGENCY)
Facility: HOSPITAL | Age: 54
Discharge: HOME | End: 2024-03-24
Attending: EMERGENCY MEDICINE
Payer: MEDICAID

## 2024-03-24 VITALS
HEIGHT: 63 IN | WEIGHT: 229 LBS | BODY MASS INDEX: 40.57 KG/M2 | HEART RATE: 72 BPM | OXYGEN SATURATION: 97 % | RESPIRATION RATE: 18 BRPM | DIASTOLIC BLOOD PRESSURE: 87 MMHG | SYSTOLIC BLOOD PRESSURE: 138 MMHG | TEMPERATURE: 98 F

## 2024-03-24 DIAGNOSIS — M54.9 RIGHT-SIDED BACK PAIN, UNSPECIFIED BACK LOCATION, UNSPECIFIED CHRONICITY: Primary | ICD-10-CM

## 2024-03-24 DIAGNOSIS — Z96.652 STATUS POST LEFT KNEE REPLACEMENT: ICD-10-CM

## 2024-03-24 LAB — POCT GLUCOSE: 84 MG/DL (ref 70–110)

## 2024-03-24 PROCEDURE — 96372 THER/PROPH/DIAG INJ SC/IM: CPT

## 2024-03-24 PROCEDURE — 25000003 PHARM REV CODE 250

## 2024-03-24 PROCEDURE — 82962 GLUCOSE BLOOD TEST: CPT

## 2024-03-24 PROCEDURE — 99284 EMERGENCY DEPT VISIT MOD MDM: CPT | Mod: 25

## 2024-03-24 PROCEDURE — 63600175 PHARM REV CODE 636 W HCPCS

## 2024-03-24 RX ORDER — LIDOCAINE 50 MG/G
1 PATCH TOPICAL DAILY
Qty: 6 PATCH | Refills: 0 | OUTPATIENT
Start: 2024-03-24 | End: 2024-03-24 | Stop reason: CLARIF

## 2024-03-24 RX ORDER — LIDOCAINE 50 MG/G
1 PATCH TOPICAL DAILY
Qty: 6 PATCH | Refills: 0 | OUTPATIENT
Start: 2024-03-24 | End: 2024-03-24

## 2024-03-24 RX ORDER — KETOROLAC TROMETHAMINE 30 MG/ML
15 INJECTION, SOLUTION INTRAMUSCULAR; INTRAVENOUS
Status: COMPLETED | OUTPATIENT
Start: 2024-03-24 | End: 2024-03-24

## 2024-03-24 RX ORDER — MELOXICAM 15 MG/1
15 TABLET ORAL DAILY
Qty: 30 TABLET | Refills: 0 | Status: SHIPPED | OUTPATIENT
Start: 2024-03-24 | End: 2024-03-24

## 2024-03-24 RX ORDER — DICLOFENAC SODIUM 10 MG/G
2 GEL TOPICAL 4 TIMES DAILY
Qty: 20 G | Refills: 0 | Status: SHIPPED | OUTPATIENT
Start: 2024-03-24 | End: 2024-03-24

## 2024-03-24 RX ORDER — DICLOFENAC SODIUM 10 MG/G
2 GEL TOPICAL 4 TIMES DAILY
Qty: 20 G | Refills: 0 | OUTPATIENT
Start: 2024-03-24 | End: 2024-03-24 | Stop reason: CLARIF

## 2024-03-24 RX ORDER — MELOXICAM 15 MG/1
15 TABLET ORAL DAILY
Qty: 30 TABLET | Refills: 1 | OUTPATIENT
Start: 2024-03-24 | End: 2024-03-24

## 2024-03-24 RX ORDER — DICLOFENAC SODIUM 10 MG/G
2 GEL TOPICAL 4 TIMES DAILY
Qty: 20 G | Refills: 0 | Status: SHIPPED | OUTPATIENT
Start: 2024-03-24

## 2024-03-24 RX ORDER — LIDOCAINE 50 MG/G
1 PATCH TOPICAL DAILY
Qty: 6 PATCH | Refills: 0 | Status: SHIPPED | OUTPATIENT
Start: 2024-03-24

## 2024-03-24 RX ORDER — LIDOCAINE 50 MG/G
1 PATCH TOPICAL DAILY
Qty: 6 PATCH | Refills: 0 | Status: SHIPPED | OUTPATIENT
Start: 2024-03-24 | End: 2024-03-24

## 2024-03-24 RX ORDER — DICLOFENAC SODIUM 10 MG/G
2 GEL TOPICAL 4 TIMES DAILY
Qty: 20 G | Refills: 0 | OUTPATIENT
Start: 2024-03-24 | End: 2024-03-24

## 2024-03-24 RX ORDER — MELOXICAM 15 MG/1
15 TABLET ORAL DAILY
Qty: 30 TABLET | Refills: 1 | OUTPATIENT
Start: 2024-03-24 | End: 2024-03-24 | Stop reason: CLARIF

## 2024-03-24 RX ORDER — MELOXICAM 15 MG/1
15 TABLET ORAL DAILY
Qty: 30 TABLET | Refills: 0 | Status: SHIPPED | OUTPATIENT
Start: 2024-03-24 | End: 2024-06-13

## 2024-03-24 RX ORDER — LIDOCAINE 50 MG/G
1 PATCH TOPICAL
Status: DISCONTINUED | OUTPATIENT
Start: 2024-03-24 | End: 2024-03-24 | Stop reason: HOSPADM

## 2024-03-24 RX ADMIN — LIDOCAINE 1 PATCH: 50 PATCH CUTANEOUS at 12:03

## 2024-03-24 RX ADMIN — KETOROLAC TROMETHAMINE 15 MG: 30 INJECTION, SOLUTION INTRAMUSCULAR; INTRAVENOUS at 12:03

## 2024-03-24 NOTE — DISCHARGE INSTRUCTIONS
Ms. Huynh,     Thank you for letting me care for you today! It was nice meeting you, and I hope you feel better soon.   If you would like access to your chart and what was done today please utilize the [facility].   Please don't hesitate to return if your symptoms worsen or you develop any other worrisome symptoms.    Our goal in the emergency department is to always give you outstanding care and exceptional service. You may receive a survey by mail or e-mail in the next week regarding your experience in our ED. We would greatly appreciate you completing and returning the survey. Your feedback provides us with a way to recognize our staff who give very good care and it helps us learn how to improve when your experience was below our aspiration of excellence.     Sincerely,    Arleen Croft PA-C  Emergency Department Physician Assistant  [facility], [place], and [place]

## 2024-03-24 NOTE — ED PROVIDER NOTES
Encounter Date: 3/24/2024       History     Chief Complaint   Patient presents with    Back Pain     Chronic back pain, worse over the past 3 days. Pt reports hx of compressed nerve. Patient reports taking prescribed medication no relief. Patient denies any new injury.      53-year-old female with past medical history of anxiety, asthma, fibromyalgia, type 2 diabetes, stroke, hypertension and chronic back pain presents to the ED with back painx 3 days. Reports lower back pain with radiation to the right posterior side of the leg. No recent direct trauma/ injury to the back. No bladder/ bowel incontinence. Has tried Meloxicam, Robaxin and pregabalin with no relief of her symptoms. Has a scheduled laminectomy this upcoming Friday.  No urinary symptoms. No fever, N/V, chest pain, SOB, abdominal pain. No other acute complaints today.      The history is provided by the patient.     Review of patient's allergies indicates:   Allergen Reactions    Adhesive Blisters     Specifically EKG lead pads    Morphine Other (See Comments)     shake     Past Medical History:   Diagnosis Date    Allergy     Anemia     Anxiety     Asthma     denies- on outside problem list in Care Everywhere    Depression     Diabetes mellitus     Diabetes mellitus, type 2     Fibromyalgia     GERD (gastroesophageal reflux disease)     Hypertension     Stroke     TIA    Vertigo      Past Surgical History:   Procedure Laterality Date    ARTHROSCOPIC REPAIR OF ROTATOR CUFF OF SHOULDER Right 2022    Procedure: REPAIR, ROTATOR CUFF, ARTHROSCOPIC;  Surgeon: Ministerio Orr Jr., MD;  Location: [facility] OR;  Service: Orthopedics;  Laterality: Right;  need opus system  [employer] notifed CC     ARTHROSCOPY OF KNEE Left 2021    Procedure: ARTHROSCOPY, KNEE;  Surgeon: Donnie Campuzano MD;  Location: [facility] OR;  Service: Orthopedics;  Laterality: Left;     SECTION      DECOMPRESSION OF SUBACROMIAL SPACE  2022    Procedure: DECOMPRESSION, SUBACROMIAL  SPACE;  Surgeon: Ministerio Orr Jr., MD;  Location: [facility];  Service: Orthopedics;;    EXCISION OF MASS OF EXTREMITY Left 12/6/2019    Procedure: EXCISION, MASS, EXTREMITY;  Surgeon: Honorio Pulido IV, MD;  Location: [facility] OR;  Service: Orthopedics;  Laterality: Left;  excision lipoma  Request Lacie    HERNIA REPAIR      HYSTERECTOMY      KNEE ARTHROSCOPY W/ MENISCECTOMY  1/11/2021    Procedure: ARTHROSCOPY, KNEE, WITH MENISCECTOMY;  Surgeon: Donnie Campuzano MD;  Location: [facility] OR;  Service: Orthopedics;;    NASAL SEPTOPLASTY      RECONSTRUCTION OF ACROMIOCLAVICULAR JOINT  9/6/2022    Procedure: RECONSTRUCTION, ACROMIOCLAVICULAR JOINT;  Surgeon: Ministerio Orr Jr., MD;  Location: [facility];  Service: Orthopedics;;    TOTAL KNEE ARTHROPLASTY Right 10/26/2022    Procedure: ARTHROPLASTY, KNEE, TOTAL RIGHT: BALDO - NEXGEN;  Surgeon: Nolberto Fraser MD;  Location: [facility];  Service: Orthopedics;  Laterality: Right;    TOTAL KNEE ARTHROPLASTY Left 8/7/2023    Procedure: ARTHROPLASTY, KNEE, TOTAL: LEFT: BALDO NEXGEN;  Surgeon: Nolberto Fraser MD;  Location: [facility];  Service: Orthopedics;  Laterality: Left;     Family History   Problem Relation Age of Onset    No Known Problems Mother     Hypertension Father     Hypertension Sister     No Known Problems Sister     No Known Problems Brother     No Known Problems Daughter     No Known Problems Daughter     No Known Problems Daughter     No Known Problems Son      Social History     Tobacco Use    Smoking status: Former    Smokeless tobacco: Never   Substance Use Topics    Alcohol use: Yes     Comment: occasional    Drug use: Never     Review of Systems   Constitutional:  Negative for chills and fever.   HENT:  Negative for congestion.    Respiratory:  Negative for cough, chest tightness, shortness of breath and wheezing.    Cardiovascular:  Negative for chest pain, palpitations and leg swelling.   Gastrointestinal:  Negative for abdominal distention, abdominal pain,  nausea and vomiting.   Genitourinary:  Negative for dysuria and frequency.   Musculoskeletal:  Positive for back pain. Negative for arthralgias, neck pain and neck stiffness.   Skin:  Negative for rash.   Neurological:  Negative for headaches.       Physical Exam     Initial Vitals [03/24/24 1147]   BP Pulse Resp Temp SpO2   138/87 72 18 98 °F (36.7 °C) 97 %      MAP       --         Physical Exam    Vitals reviewed.  Constitutional: She appears well-developed and well-nourished. She is not diaphoretic. No distress.   HENT:   Head: Normocephalic and atraumatic.   Right Ear: External ear normal.   Left Ear: External ear normal.   Mouth/Throat: Oropharynx is clear and moist.   Eyes: EOM are normal. Pupils are equal, round, and reactive to light.   Neck: Neck supple.   Normal range of motion.  Cardiovascular:  Normal rate and normal heart sounds.           Pulmonary/Chest: Breath sounds normal. No respiratory distress. She has no wheezes.   Abdominal: Abdomen is soft. Bowel sounds are normal. She exhibits no distension. There is no abdominal tenderness.   Musculoskeletal:         General: Tenderness present.      Cervical back: Normal range of motion and neck supple.      Comments: No C,T, L midline spine tenderness. No bony deformities or step-offs noted. There is moderate TTP over the right lumbar paraspinal muscles. Positive leg raise test on right leg. No calf pain or swelling.     Neurological: She is alert and oriented to person, place, and time. GCS score is 15. GCS eye subscore is 4. GCS verbal subscore is 5. GCS motor subscore is 6.   Skin: Skin is warm. Capillary refill takes less than 2 seconds.   Psychiatric: She has a normal mood and affect. Her behavior is normal. Judgment and thought content normal.         ED Course   Procedures  Labs Reviewed   POCT GLUCOSE   POCT GLUCOSE MONITORING CONTINUOUS          Imaging Results    None          Medications   LIDOcaine 5 % patch 1 patch (1 patch Transdermal Patch  Applied 3/24/24 1216)   ketorolac injection 15 mg (15 mg Intramuscular Given 3/24/24 1215)     Medical Decision Making  Differential Diagnosis includes, but is not limited to:  Cauda equina syndrome, diskitis/osteomyelitis, epidural/paraspinal abscess, AAA, aortic dissection, post-op/hardware infection, trauma/vertebral fracture, spinal cord injury, disc herniation, spinal stenosis, sciatica, radiculopathy, neoplasm, lumbar muscle strain, muscle spasm, neuropathic pain, UTI/pyelonephritis, nephrolithiasis.    ED management     53-year-old female with past medical history of anxiety, asthma, fibromyalgia, type 2 diabetes, stroke, hypertension and chronic back pain presents to the ED with back painx 3 days.  Patient is not toxic appearing, hemodynamically stable and resting comfortably on bed. Patient is well-appearing.  Awake and alert.  Afebrile with vitals WNL. No distress on exam. No red flags on history.  On exam no midline tenderness, no neurologic deficits, no rash. Pt with scheduled laminectomy of lumbar spine next week,  will continue to treat with NSAIDs, Voltaren gel, muscle relaxants, and lidoderm patches. Pt given Toradol  We disscused at length warning signs for return  including but not limited to increased pain, change in gait, sensation changes around the rectum or perineum, bowel or bladder issues, fever and the pt understands and they received written instructions.      I have discussed the specifics of the workup with the patient and the patient has verbalized understanding of the details of the workup, the diagnosis, the treatment plan, and the need for outpatient follow-up with PCP. ED precautions given. Discussed with pt about returning to the ED, if symptoms fail to improve or worsen.     RESULTS:  Documented in ED course.  Labs/ekg interpreted by myself                Amount and/or Complexity of Data Reviewed  Labs:  Decision-making details documented in ED Course.    Risk  Prescription drug  management.               ED Course as of 03/24/24 1458   Sun Mar 24, 2024   1332 POCT Glucose: 84 [NW]      ED Course User Index  [NW] Arleen Croft PA-C                           Clinical Impression:  Final diagnoses:  [M54.9] Right-sided back pain, unspecified back location, unspecified chronicity (Primary)          ED Disposition Condition    Discharge Stable          ED Prescriptions       Medication Sig Dispense Start Date End Date Auth. Provider    diclofenac sodium (VOLTAREN) 1 % Gel  (Status: Discontinued) Apply 2 g topically 4 (four) times daily. 20 g 3/24/2024 3/24/2024 Arleen Croft PA-C    meloxicam (MOBIC) 15 MG tablet  (Status: Discontinued) Take 1 tablet (15 mg total) by mouth once daily. 30 tablet 3/24/2024 3/24/2024 Arleen Croft PA-C    LIDOcaine (LIDODERM) 5 %  (Status: Discontinued) Place 1 patch onto the skin once daily. Remove & Discard patch within 12 hours or as directed by MD Lenz patch 3/24/2024 3/24/2024 Arleen Croft PA-C    diclofenac sodium (VOLTAREN) 1 % Gel  (Status: Discontinued) Apply 2 g topically 4 (four) times daily. 20 g 3/24/2024 3/24/2024 Arleen Croft PA-C    LIDOcaine (LIDODERM) 5 %  (Status: Discontinued) Place 1 patch onto the skin once daily. Remove & Discard patch within 12 hours or as directed by MD Lenz patch 3/24/2024 3/24/2024 Alreen Croft PA-C    meloxicam (MOBIC) 15 MG tablet  (Status: Discontinued) Take 1 tablet (15 mg total) by mouth once daily. 30 tablet 3/24/2024 3/24/2024 Arleen Croft PA-C    LIDOcaine (LIDODERM) 5 %  (Status: Discontinued) Place 1 patch onto the skin once daily. Remove & Discard patch within 12 hours or as directed by MD Lenz patch 3/24/2024 3/24/2024 Arleen Croft PA-C    diclofenac sodium (VOLTAREN) 1 % Gel  (Status: Discontinued) Apply 2 g topically 4 (four) times daily. 20 g 3/24/2024 3/24/2024 Arleen Croft PA-C    meloxicam (MOBIC) 15 MG tablet  (Status: Discontinued) Take 1 tablet (15 mg total) by mouth once daily. 30 tablet  3/24/2024 3/24/2024 Arleen Croft PA-C    diclofenac sodium (VOLTAREN) 1 % Gel  (Status: Discontinued) Apply 2 g topically 4 (four) times daily. 20 g 3/24/2024 3/24/2024 Arleen Croft PA-C    LIDOcaine (LIDODERM) 5 %  (Status: Discontinued) Place 1 patch onto the skin once daily. Remove & Discard patch within 12 hours or as directed by MD Lenz patch 3/24/2024 3/24/2024 Arleen Croft PA-C    meloxicam (MOBIC) 15 MG tablet  (Status: Discontinued) Take 1 tablet (15 mg total) by mouth once daily. 30 tablet 3/24/2024 3/24/2024 Arleen Croft PA-C    diclofenac sodium (VOLTAREN) 1 % Gel Apply 2 g topically 4 (four) times daily. 20 g 3/24/2024 -- Arleen Croft PA-C    LIDOcaine (LIDODERM) 5 % Place 1 patch onto the skin once daily. Remove & Discard patch within 12 hours or as directed by MD Lenz patch 3/24/2024 -- Arleen Croft PA-C    meloxicam (MOBIC) 15 MG tablet Take 1 tablet (15 mg total) by mouth once daily. 30 tablet 3/24/2024 -- Arleen Croft PA-C          Follow-up Information       Follow up With Specialties Details Why Contact Info    your primary care provider  Schedule an appointment as soon as possible for a visit in 2 days As needed, If symptoms worsen              Arleen Croft PA-C  03/24/24 [id number]

## 2024-03-24 NOTE — TELEPHONE ENCOUNTER
[place]    PCP:  None ([facility])    She reports having surgery on 3/28.  C/O Rt lower back pain rated 10/10 and weakness.  She reports has a compressed nerve.  She is trying to get in touch with the OCP for pain med.  She is able to walk but with severe pain.  Denies numbness, fever, abdominal pain, injury, burning with urination, and blood in urine.  Per protocol, care advised is go to ED now.  NT spoke with Dr. Jaramillo, OCP.  OCP recommends that she come to the ED to be evaluated so that something for pain can be ordered.  Pt notified of OCP recommendations and she VU.  Advised to call for worsening/questions/concerns.  VU.    Reason for Disposition   Weakness of a leg or foot (e.g., unable to bear weight, dragging foot)    Additional Information   Negative: Passed out (i.e., lost consciousness, collapsed and was not responding)   Negative: Shock suspected (e.g., cold/pale/clammy skin, too weak to stand, low BP, rapid pulse)   Negative: Sounds like a life-threatening emergency to the triager   Negative: [1] SEVERE back pain (e.g., excruciating) AND [2] sudden onset AND [3] age > 60 years   Negative: [1] Unable to urinate (or only a few drops) > 4 hours AND [2] bladder feels very full (e.g., palpable bladder or strong urge to urinate)   Negative: [1] Loss of bladder or bowel control (urine or bowel incontinence; wetting self, leaking stool) AND [2] new-onset   Negative: Numbness in groin or rectal area (i.e., loss of sensation)   Negative: [1] SEVERE abdominal pain AND [2] present > 1 hour   Negative: [1] Abdominal pain AND [2] age > 60 years    Protocols used: Back Pain-A-

## 2024-03-25 ENCOUNTER — HOSPITAL ENCOUNTER (OUTPATIENT)
Dept: PREADMISSION TESTING | Facility: HOSPITAL | Age: 54
Discharge: HOME | End: 2024-03-25
Attending: NURSE PRACTITIONER
Payer: MEDICAID

## 2024-03-25 ENCOUNTER — TELEPHONE (OUTPATIENT)
Dept: PREADMISSION TESTING | Facility: HOSPITAL | Age: 54
End: 2024-03-25
Payer: MEDICAID

## 2024-03-25 ENCOUNTER — ANESTHESIA EVENT (OUTPATIENT)
Dept: SURGERY | Facility: HOSPITAL | Age: 54
End: 2024-03-25
Payer: MEDICAID

## 2024-03-25 NOTE — ANESTHESIA PREPROCEDURE EVALUATION
"                                                                                                             2024  Candy Huynh is a 53 y.o., female scheduled for  LAMINECTOMY, SPINE, LUMBAR, WITH DISCECTOMY (Right: Spine Lumbar) 3/28/24.    Per family medicine Dr. Garcia, "    Past Medical History:   Diagnosis Date    Allergy     Anemia     Anxiety     Asthma     denies- on outside problem list in Care Everywhere    Depression     Diabetes mellitus     Diabetes mellitus, type 2     Fibromyalgia     GERD (gastroesophageal reflux disease)     Hypertension     Stroke     TIA    Vertigo      Past Surgical History:   Procedure Laterality Date    ARTHROSCOPIC REPAIR OF ROTATOR CUFF OF SHOULDER Right 2022    Procedure: REPAIR, ROTATOR CUFF, ARTHROSCOPIC;  Surgeon: Ministerio Orr Jr., MD;  Location: [facility] OR;  Service: Orthopedics;  Laterality: Right;  need opus system  [employer] notifed CC     ARTHROSCOPY OF KNEE Left 2021    Procedure: ARTHROSCOPY, KNEE;  Surgeon: Donnie Campuzano MD;  Location: [facility] OR;  Service: Orthopedics;  Laterality: Left;     SECTION      DECOMPRESSION OF SUBACROMIAL SPACE  2022    Procedure: DECOMPRESSION, SUBACROMIAL SPACE;  Surgeon: Ministerio Orr Jr., MD;  Location: [facility] OR;  Service: Orthopedics;;    EXCISION OF MASS OF EXTREMITY Left 2019    Procedure: EXCISION, MASS, EXTREMITY;  Surgeon: Honorio Pulido IV, MD;  Location: [facility] OR;  Service: Orthopedics;  Laterality: Left;  excision lipoma  Request Lacie    HERNIA REPAIR      HYSTERECTOMY      KNEE ARTHROSCOPY W/ MENISCECTOMY  2021    Procedure: ARTHROSCOPY, KNEE, WITH MENISCECTOMY;  Surgeon: Donnie Campuzano MD;  Location: [facility] OR;  Service: Orthopedics;;    NASAL SEPTOPLASTY      RECONSTRUCTION OF ACROMIOCLAVICULAR JOINT  2022    Procedure: RECONSTRUCTION, ACROMIOCLAVICULAR JOINT;  Surgeon: Ministerio Orr Jr., MD;  Location: [facility] OR;  Service: Orthopedics;;    TOTAL KNEE ARTHROPLASTY Right " "10/26/2022    Procedure: ARTHROPLASTY, KNEE, TOTAL RIGHT: BALDO - NEXGEN;  Surgeon: Nolberto Fraser MD;  Location: [facility] OR;  Service: Orthopedics;  Laterality: Right;    TOTAL KNEE ARTHROPLASTY Left 2023    Procedure: ARTHROPLASTY, KNEE, TOTAL: LEFT: BALDO NEXGEN;  Surgeon: Nolberto Fraser MD;  Location: [facility] OR;  Service: Orthopedics;  Laterality: Left;     Review of patient's allergies indicates:   Allergen Reactions    Adhesive Blisters     Specifically EKG lead pads    Morphine Other (See Comments)     shake     Current Outpatient Medications   Medication Instructions    albuterol (PROVENTIL/VENTOLIN HFA) 90 mcg/actuation inhaler SMARTSI Puff(s) By Mouth Every 4 Hours PRN    ALBUTEROL INHL 2 puffs, Inhalation, Every 4 hours PRN    amLODIPine (NORVASC) 5 mg, Oral, Daily    ARIPiprazole (ABILIFY) 2 mg, Oral, Nightly    aspirin (ECOTRIN) 81 mg, Oral, 2 times daily    azelastine (ASTELIN) 137 mcg (0.1 %) nasal spray 1 spray, Daily    BD ALINE 2ND GEN PEN NEEDLE 32 gauge x " Ndle USE ONE NEEDLE ONCE A WEEK    cetirizine (ZYRTEC) 10 mg, Oral, Nightly    cholecalciferol (vitamin D3) 50,000 Units, Oral, Every 7 days    cyclobenzaprine (FLEXERIL) 5 mg, Oral, 3 times daily PRN    diazePAM (VALIUM) 2 mg, Oral, Every 6 hours PRN    diclofenac sodium (VOLTAREN) 2 g, Topical (Top), 4 times daily    EASY TOUCH ALCOHOL PREP PADS PadM USE TO prepare FOR glucose testing    EPINEPHrine (EPIPEN) 0.3 mg/0.3 mL AtIn as per administration instructions    ergocalciferol (ERGOCALCIFEROL) 50,000 Units, Oral, Every 7 days    FEROSUL 325 mg (65 mg iron) Tab tablet Oral, Every other day    FLOWFLEX COVID-19 AG HOME TEST Kit test AS directed    FLUoxetine 40 mg, Oral, Daily    fluticasone propionate (FLONASE) 50 mcg/actuation nasal spray 2 sprays, Nasal    folic acid (FOLVITE) 1,000 mcg, Oral    HYDROcodone-acetaminophen (NORCO) 5-325 mg per tablet 1 tablet, Oral, Every 6 hours PRN    HYDROcodone-acetaminophen (NORCO) " 7.5-325 mg per tablet 1 tablet, Oral, Every 6 hours PRN    hydrOXYzine pamoate (VISTARIL) 25 mg, Oral, Nightly    LIDOcaine (LIDODERM) 5 % 1 patch, Transdermal, Daily, Remove & Discard patch within 12 hours or as directed by MD    loratadine (CLARITIN) 10 mg, Oral, Daily    losartan (COZAAR) 100 mg, Oral, Daily    meclizine (ANTIVERT) 25 mg, Oral    meloxicam (MOBIC) 15 mg, Oral, Daily    metFORMIN (GLUCOPHAGE-XR) 750 mg, Oral, Daily    montelukast (SINGULAIR) 10 mg tablet No dose, route, or frequency recorded.    nicotine polacrilex (NICORETTE) 4 MG Gum SMARTSI Each By Mouth PRN    ondansetron (ZOFRAN-ODT) 8 mg, Oral, Every 8 hours PRN    ONETOUCH DELICA PLUS LANCET 33 gauge Misc Topical (Top), 4 times daily    ONETOUCH ULTRA2 METER Misc SMARTSIG:Via Meter As Directed    ONETOUCH VERIO TEST STRIPS Strp 1 strip, 4 times daily    oxyCODONE (ROXICODONE) 5 mg, Oral, Every 4 hours PRN    pantoprazole (PROTONIX) 40 mg, Oral    paroxetine (PAXIL) 20 mg, Oral, Every morning    pregabalin (LYRICA) 200 mg, Oral, 2 times daily    PREMARIN 0.625 mg, Oral, Daily    rosuvastatin (CRESTOR) 10 mg, Oral    semaglutide (OZEMPIC) 0.25 mg or 0.5 mg (2 mg/3 mL) pen injector Inject 0.25 mg into the skin every 7 (seven) days    senna-docusate 8.6-50 mg (SENNA WITH DOCUSATE SODIUM) 8.6-50 mg per tablet 1 tablet, Oral, Daily    STOOL SOFTENER 100 mg capsule Take 1 softgel by mouth twice daily    sumatriptan (IMITREX) 100 mg, Oral, 2 times daily PRN       Pre-op Assessment    I have reviewed the Patient Summary Reports.     I have reviewed the Nursing Notes. I have reviewed the NPO Status.   I have reviewed the Medications.     Review of Systems  Anesthesia Hx:  No problems with previous Anesthesia               Denies Personal Hx of Anesthesia complications.                    Social:  Former Smoker, Social Alcohol Use       Hematology/Oncology:       -- Anemia:                                  Cardiovascular:  Exercise tolerance: good    Denies Pacemaker. Hypertension       Denies Angina.                                  Pulmonary:    Asthma moderate  Denies Shortness of breath.  Sleep Apnea, CPAP           Education provided regarding risk of obstructive sleep apnea            Renal/:  Renal/ Normal                 Hepatic/GI:     GERD, well controlled             Musculoskeletal:  Arthritis               Neurological:  TIA, CVA, no residual symptoms Neuromuscular Disease,  Headaches Denies Seizures.                                Endocrine:  Diabetes (A1C 5.5 8/2023), well controlled, type 2         Morbid Obesity / BMI > 40  Psych:  Psychiatric History                  Physical Exam  General: Cooperative, Oriented, Alert and Well nourished    Airway:  Mallampati: I   Mouth Opening: Normal  TM Distance: Normal  Tongue: Normal  Neck ROM: Normal ROM    Dental:  Intact      Lab Results   Component Value Date    WBC 8.27 08/11/2023    HGB 9.9 (L) 08/11/2023    HCT 31.3 (L) 08/11/2023     08/11/2023    CHOL 108 (L) 08/11/2023    TRIG 67 08/11/2023    HDL 38 (L) 08/11/2023    ALT 23 08/11/2023    AST 22 08/11/2023     08/11/2023    K 4.3 08/11/2023     08/11/2023    CREATININE 0.8 08/11/2023    BUN 11 08/11/2023    CO2 27 08/11/2023    TSH 2.512 08/11/2023    INR 1.0 08/12/2023    HGBA1C 5.5 08/11/2023     Results for orders placed or performed during the hospital encounter of 08/11/23   EKG, 12 - Lead    Collection Time: 08/11/23 11:35 PM    Narrative    Test Reason : Z91.89,    Vent. Rate : 075 BPM     Atrial Rate : 075 BPM     P-R Int : 158 ms          QRS Dur : 084 ms      QT Int : 390 ms       P-R-T Axes : 082 009 051 degrees     QTc Int : 435 ms    Normal sinus rhythm  Normal ECG  When compared with ECG of 31-JUL-2023 11:53,  No significant change was found  Confirmed by ALEXY SHANNON MD ([id number]) on 8/12/2023 9:56:07 AM    Referred By: [email or web address]   SELF           Confirmed By:ALEXY SHANNON MD         Anesthesia Plan  Type of  Anesthesia, risks & benefits discussed:    Anesthesia Type: Gen ETT  Intra-op Monitoring Plan: Standard ASA Monitors  Post Op Pain Control Plan: multimodal analgesia and IV/PO Opioids PRN  Induction:  IV  Informed Consent: Informed consent signed with the Patient and all parties understand the risks and agree with anesthesia plan.  All questions answered.   ASA Score: 3  Day of Surgery Review of History & Physical: H&P Update referred to the surgeon/provider.  Anesthesia Plan Notes: Anesthesia consent to be signed prior to surgery 3/28/24      Ready For Surgery From Anesthesia Perspective.     .

## 2024-03-25 NOTE — DISCHARGE INSTRUCTIONS
Your surgery is scheduled for 3/28/24.    Please report to [facility] on the 1st Floor at 1115 a.m.    THIS TIME IS SUBJECT TO CHANGE.  YOU WILL RECEIVE A PHONE CALL THE DAY BEFORE SURGERY BY 3:30 PM TO CONFIRM YOUR TIME OF ARRIVAL.  IF YOU HAVE NOT RECEIVED A PHONE CALL BY 3:30 PM THE DAY BEFORE YOUR SURGERY PLEASE CALL [phone]     INSTRUCTIONS IMPORTANT!!!  ¨ Do not eat or drink after 12 midnight-including water, candy, gum, & mints. OK to brush teeth.      ____  Proceed to [facility] on *** for additional testing.        ____  Do not wear makeup, including mascara.  ____  No powder, lotions or creams to surgical area.  ____  Please remove all jewelry, including piercings and leave at home.  ____  No money or valuables needed. Please leave at home.  ____  Please bring any documents given by your doctor.  ____  If going home the same day, arrange for a ride home. You will not be able to             drive if Anesthesia was used.  ____  Children under 18 years require a parent / guardian present the entire time             they are in surgery / recovery.  ____  Wear loose fitting clothing. Allow for dressings, bandages.  ____  Stop Aspirin, Ibuprofen, Motrin, Aleve, Goody's/BC powders, Excedrine and Naproxen (NSAIDS) at least 3-5 days before surgery, unless otherwise instructed by your doctor, or the nurse.   You MAY use Tylenol/acetaminophen until day of surgery.  ____  Wash the surgical area with Hibiclens or Dial Antibacterial bar soap the night before surgery, and again the             morning of surgery.  Be sure to rinse hibiclens or Dial Antibacterial bar soap off completely (if instructed by   nurse).  ____  If you take diabetic medication including Metformin, Glimepiride, Glipizide, Glyburide, Byetta, Januvia, Actos, do not take am of surgery unless instructed by Doctor.  ____ Hold Invokana, Farxiga, and Jardiance for 3 days prior to surgery.   ____  Call MD for temperature  above 101 degrees or any other signs of infection such as Urinary (bladder) infection, Upper respiratory infection, skin boils, etc.  ____ Stop taking any Fish Oil supplement or any Vitamins that contain Vitamin E at least 5 days prior to surgery.  ____ Do Not wear your contact lenses the day of your procedure.  You may wear your glasses.      ____Do not shave surgical site for 3 days prior to surgery.  ____ Practice Good hand washing before, during, and after procedure.      I have read or had read and explained to me, and understand the above information.  Additional comments or instructions:  For additional questions call [phone]      ANESTHESIA SIDE EFFECTS  -For the first 24 hours after surgery:  Do not drive, use heavy equipment, make important decisions, or drink alcohol  -It is normal to feel sleepy for several hours.  Rest until you are more awake.  -Have someone stay with you, if needed.  They can watch for problems and help keep you safe.  -Some possible post anesthesia side effects include: nausea and vomiting, sore throat and hoarseness, sleepiness, and dizziness.        Pre-Op Bathing Instructions    Before surgery, you can play an important role in your own health.    Because skin is not sterile, we need to be sure that your skin is as free of germs as possible. By following the instructions below, you can reduce the number of germs on your skin before surgery.    IMPORTANT: You will need to shower with a special soap called Hibiclens*, available at any pharmacy.  If you are allergic to Chlorhexidine (the antiseptic in Hibiclens), use an antibacterial soap such as Dial Soap for your preoperative shower.  You will shower with Hibiclens both the night before your surgery and the morning of your surgery.  Do not use Hibiclens on the head, face or genitals to avoid injury to those areas.    STEP #1: THE NIGHT BEFORE YOUR SURGERY     Do not shave the area of your body where your surgery will be  performed.  Shower and wash your hair and body as usual with your normal soap and shampoo.  Rinse your hair and body thoroughly after you shower to remove all soap residue.  With your hand, apply one packet of Hibiclens soap to the surgical site.   Wash the site gently for five (5) minutes. Do not scrub your skin too hard.   Do not wash with your regular soap after Hibiclens is used.  Rinse your body thoroughly.  Pat yourself dry with a clean, soft towel.  Do not use lotion, cream, or powder.  Wear clean clothes.    STEP #2: THE MORNING OF YOUR SURGERY     Repeat Step #1.    * Not to be used by people allergic to Chlorhexidine.    Please take Amlodipine, Losartan, Paxil, and Pantoprazole the morning of surgery. Stop Ozempic on 3/17/24.

## 2024-03-25 NOTE — PRE-PROCEDURE INSTRUCTIONS
Mom.    Allergies, medical, surgical, family and psychosocial histories reviewed with patient. Periop plan of care reviewed. Preop instructions given, including medications to take and to hold. Hibiclens soap and instructions on use given. Time allotted for questions to be addressed.  Patient verbalized understanding.    Arrival time 1115.      Last dose of Ozempic was 3/17/24.

## 2024-03-27 ENCOUNTER — TELEPHONE (OUTPATIENT)
Dept: NEUROSURGERY | Facility: CLINIC | Age: 54
End: 2024-03-27
Payer: MEDICAID

## 2024-03-27 RX ORDER — OXYCODONE HCL 10 MG/1
10 TABLET, FILM COATED, EXTENDED RELEASE ORAL ONCE
Status: CANCELLED | OUTPATIENT
Start: 2024-03-27 | End: 2024-03-27

## 2024-03-27 RX ORDER — CYCLOBENZAPRINE HCL 10 MG
10 TABLET ORAL ONCE
Status: CANCELLED | OUTPATIENT
Start: 2024-03-27 | End: 2024-03-27

## 2024-03-27 RX ORDER — ACETAMINOPHEN 500 MG
1000 TABLET ORAL ONCE
Status: CANCELLED | OUTPATIENT
Start: 2024-03-27 | End: 2024-03-27

## 2024-03-27 RX ORDER — CELECOXIB 100 MG/1
200 CAPSULE ORAL ONCE
Status: CANCELLED | OUTPATIENT
Start: 2024-03-27 | End: 2024-03-27

## 2024-03-27 RX ORDER — PREGABALIN 75 MG/1
75 CAPSULE ORAL ONCE
Status: CANCELLED | OUTPATIENT
Start: 2024-03-27 | End: 2024-03-27

## 2024-03-27 NOTE — TELEPHONE ENCOUNTER
Returned pt's call, I asked pt if she got cleared by her PCP for her surgery tomorrow.  Pt stated that her PCP was supposed to send it over to us. I stated I will check my printer. Pt voiced understanding

## 2024-03-28 ENCOUNTER — HOSPITAL ENCOUNTER (OUTPATIENT)
Facility: HOSPITAL | Age: 54
Discharge: HOME | End: 2024-03-28
Attending: NEUROLOGICAL SURGERY | Admitting: NEUROLOGICAL SURGERY
Payer: MEDICAID

## 2024-03-28 ENCOUNTER — ANESTHESIA (OUTPATIENT)
Dept: SURGERY | Facility: HOSPITAL | Age: 54
End: 2024-03-28
Payer: MEDICAID

## 2024-03-28 VITALS
HEART RATE: 81 BPM | OXYGEN SATURATION: 98 % | SYSTOLIC BLOOD PRESSURE: 128 MMHG | RESPIRATION RATE: 19 BRPM | DIASTOLIC BLOOD PRESSURE: 76 MMHG | TEMPERATURE: 98 F

## 2024-03-28 DIAGNOSIS — M51.16 LUMBAR DISC HERNIATION WITH RADICULOPATHY: Primary | ICD-10-CM

## 2024-03-28 DIAGNOSIS — Z96.652 S/P TOTAL KNEE REPLACEMENT USING CEMENT, LEFT: ICD-10-CM

## 2024-03-28 DIAGNOSIS — M48.062 SPINAL STENOSIS OF LUMBAR REGION WITH NEUROGENIC CLAUDICATION: ICD-10-CM

## 2024-03-28 DIAGNOSIS — M51.26 LUMBAR DISC HERNIATION: ICD-10-CM

## 2024-03-28 DIAGNOSIS — Z96.652 STATUS POST LEFT KNEE REPLACEMENT: ICD-10-CM

## 2024-03-28 LAB
ABO + RH BLD: NORMAL
BLD GP AB SCN CELLS X3 SERPL QL: NORMAL
POCT GLUCOSE: 100 MG/DL (ref 70–110)
POCT GLUCOSE: 42 MG/DL (ref 70–110)
SPECIMEN OUTDATE: NORMAL

## 2024-03-28 PROCEDURE — 25000003 PHARM REV CODE 250: Performed by: NURSE ANESTHETIST, CERTIFIED REGISTERED

## 2024-03-28 PROCEDURE — 36000711: Performed by: NEUROLOGICAL SURGERY

## 2024-03-28 PROCEDURE — 71000015 HC POSTOP RECOV 1ST HR: Performed by: NEUROLOGICAL SURGERY

## 2024-03-28 PROCEDURE — 63600175 PHARM REV CODE 636 W HCPCS: Performed by: NURSE ANESTHETIST, CERTIFIED REGISTERED

## 2024-03-28 PROCEDURE — 25000003 PHARM REV CODE 250: Performed by: NEUROLOGICAL SURGERY

## 2024-03-28 PROCEDURE — 37000009 HC ANESTHESIA EA ADD 15 MINS: Performed by: NEUROLOGICAL SURGERY

## 2024-03-28 PROCEDURE — 36000710: Performed by: NEUROLOGICAL SURGERY

## 2024-03-28 PROCEDURE — 27201423 OPTIME MED/SURG SUP & DEVICES STERILE SUPPLY: Performed by: NEUROLOGICAL SURGERY

## 2024-03-28 PROCEDURE — 71000033 HC RECOVERY, INTIAL HOUR: Performed by: NEUROLOGICAL SURGERY

## 2024-03-28 PROCEDURE — D9220A PRA ANESTHESIA: Mod: ANES,,, | Performed by: STUDENT IN AN ORGANIZED HEALTH CARE EDUCATION/TRAINING PROGRAM

## 2024-03-28 PROCEDURE — 36415 COLL VENOUS BLD VENIPUNCTURE: CPT | Performed by: NURSE PRACTITIONER

## 2024-03-28 PROCEDURE — 37000008 HC ANESTHESIA 1ST 15 MINUTES: Performed by: NEUROLOGICAL SURGERY

## 2024-03-28 PROCEDURE — D9220A PRA ANESTHESIA: Mod: CRNA,,, | Performed by: NURSE ANESTHETIST, CERTIFIED REGISTERED

## 2024-03-28 PROCEDURE — 63030 LAMOT DCMPRN NRV RT 1 LMBR: CPT | Mod: 22,RT,, | Performed by: NEUROLOGICAL SURGERY

## 2024-03-28 PROCEDURE — 86850 RBC ANTIBODY SCREEN: CPT | Performed by: NURSE PRACTITIONER

## 2024-03-28 PROCEDURE — 63600175 PHARM REV CODE 636 W HCPCS: Performed by: STUDENT IN AN ORGANIZED HEALTH CARE EDUCATION/TRAINING PROGRAM

## 2024-03-28 PROCEDURE — 71000039 HC RECOVERY, EACH ADD'L HOUR: Performed by: NEUROLOGICAL SURGERY

## 2024-03-28 PROCEDURE — 71000016 HC POSTOP RECOV ADDL HR: Performed by: NEUROLOGICAL SURGERY

## 2024-03-28 PROCEDURE — 63600175 PHARM REV CODE 636 W HCPCS: Performed by: NEUROLOGICAL SURGERY

## 2024-03-28 RX ORDER — SODIUM CHLORIDE, SODIUM LACTATE, POTASSIUM CHLORIDE, CALCIUM CHLORIDE 600; 310; 30; 20 MG/100ML; MG/100ML; MG/100ML; MG/100ML
INJECTION, SOLUTION INTRAVENOUS CONTINUOUS
Status: ACTIVE | OUTPATIENT
Start: 2024-03-28

## 2024-03-28 RX ORDER — ACETAMINOPHEN 325 MG/1
325 TABLET ORAL EVERY 6 HOURS PRN
Status: DISCONTINUED | OUTPATIENT
Start: 2024-03-28 | End: 2024-03-28 | Stop reason: HOSPADM

## 2024-03-28 RX ORDER — DEXAMETHASONE SODIUM PHOSPHATE 4 MG/ML
INJECTION, SOLUTION INTRA-ARTICULAR; INTRALESIONAL; INTRAMUSCULAR; INTRAVENOUS; SOFT TISSUE
Status: DISCONTINUED | OUTPATIENT
Start: 2024-03-28 | End: 2024-03-28

## 2024-03-28 RX ORDER — CYCLOBENZAPRINE HCL 10 MG
10 TABLET ORAL
Status: COMPLETED | OUTPATIENT
Start: 2024-03-28 | End: 2024-03-28

## 2024-03-28 RX ORDER — CEFAZOLIN SODIUM 2 G/50ML
2 SOLUTION INTRAVENOUS ONCE
Status: DISCONTINUED | OUTPATIENT
Start: 2024-03-28 | End: 2024-03-28

## 2024-03-28 RX ORDER — CEFAZOLIN SODIUM 2 G/50ML
2 SOLUTION INTRAVENOUS
Status: DISCONTINUED | OUTPATIENT
Start: 2024-03-28 | End: 2024-03-28 | Stop reason: HOSPADM

## 2024-03-28 RX ORDER — CELECOXIB 100 MG/1
200 CAPSULE ORAL
Status: COMPLETED | OUTPATIENT
Start: 2024-03-28 | End: 2024-03-28

## 2024-03-28 RX ORDER — ROCURONIUM BROMIDE 10 MG/ML
INJECTION, SOLUTION INTRAVENOUS
Status: DISCONTINUED | OUTPATIENT
Start: 2024-03-28 | End: 2024-03-28

## 2024-03-28 RX ORDER — PREGABALIN 75 MG/1
75 CAPSULE ORAL
Status: COMPLETED | OUTPATIENT
Start: 2024-03-28 | End: 2024-03-28

## 2024-03-28 RX ORDER — OXYCODONE HYDROCHLORIDE 5 MG/1
5 TABLET ORAL
Status: DISCONTINUED | OUTPATIENT
Start: 2024-03-28 | End: 2024-03-28 | Stop reason: HOSPADM

## 2024-03-28 RX ORDER — MIDAZOLAM HYDROCHLORIDE 1 MG/ML
INJECTION, SOLUTION INTRAMUSCULAR; INTRAVENOUS
Status: DISCONTINUED | OUTPATIENT
Start: 2024-03-28 | End: 2024-03-28

## 2024-03-28 RX ORDER — MUPIROCIN 20 MG/G
OINTMENT TOPICAL
Status: DISCONTINUED | OUTPATIENT
Start: 2024-03-28 | End: 2024-03-28 | Stop reason: HOSPADM

## 2024-03-28 RX ORDER — ONDANSETRON HYDROCHLORIDE 2 MG/ML
4 INJECTION, SOLUTION INTRAVENOUS EVERY 8 HOURS PRN
Status: DISCONTINUED | OUTPATIENT
Start: 2024-03-28 | End: 2024-03-28 | Stop reason: HOSPADM

## 2024-03-28 RX ORDER — ACETAMINOPHEN 325 MG/1
650 TABLET ORAL
Status: COMPLETED | OUTPATIENT
Start: 2024-03-28 | End: 2024-03-28

## 2024-03-28 RX ORDER — OXYCODONE AND ACETAMINOPHEN 10; 325 MG/1; MG/1
1 TABLET ORAL EVERY 6 HOURS PRN
Qty: 28 TABLET | Refills: 0 | Status: SHIPPED | OUTPATIENT
Start: 2024-03-28 | End: 2024-04-02 | Stop reason: ALTCHOICE

## 2024-03-28 RX ORDER — DIAZEPAM 5 MG/1
5 TABLET ORAL EVERY 6 HOURS PRN
Status: DISCONTINUED | OUTPATIENT
Start: 2024-03-28 | End: 2024-03-28 | Stop reason: HOSPADM

## 2024-03-28 RX ORDER — OXYCODONE AND ACETAMINOPHEN 10; 325 MG/1; MG/1
1 TABLET ORAL EVERY 4 HOURS PRN
Status: DISCONTINUED | OUTPATIENT
Start: 2024-03-28 | End: 2024-03-28 | Stop reason: HOSPADM

## 2024-03-28 RX ORDER — FENTANYL CITRATE 50 UG/ML
INJECTION, SOLUTION INTRAMUSCULAR; INTRAVENOUS
Status: DISCONTINUED | OUTPATIENT
Start: 2024-03-28 | End: 2024-03-28

## 2024-03-28 RX ORDER — HYDROMORPHONE HYDROCHLORIDE 2 MG/ML
0.5 INJECTION, SOLUTION INTRAMUSCULAR; INTRAVENOUS; SUBCUTANEOUS EVERY 5 MIN PRN
Status: DISCONTINUED | OUTPATIENT
Start: 2024-03-28 | End: 2024-03-28 | Stop reason: HOSPADM

## 2024-03-28 RX ORDER — OXYCODONE HCL 10 MG/1
10 TABLET, FILM COATED, EXTENDED RELEASE ORAL
Status: COMPLETED | OUTPATIENT
Start: 2024-03-28 | End: 2024-03-28

## 2024-03-28 RX ORDER — BUPIVACAINE HCL/EPINEPHRINE 0.5-1:200K
VIAL (ML) INJECTION
Status: DISCONTINUED | OUTPATIENT
Start: 2024-03-28 | End: 2024-03-28 | Stop reason: HOSPADM

## 2024-03-28 RX ORDER — LIDOCAINE HYDROCHLORIDE 10 MG/ML
INJECTION, SOLUTION INTRAVENOUS
Status: DISCONTINUED | OUTPATIENT
Start: 2024-03-28 | End: 2024-03-28

## 2024-03-28 RX ORDER — OXYCODONE AND ACETAMINOPHEN 5; 325 MG/1; MG/1
1 TABLET ORAL EVERY 4 HOURS PRN
Status: DISCONTINUED | OUTPATIENT
Start: 2024-03-28 | End: 2024-03-28 | Stop reason: HOSPADM

## 2024-03-28 RX ORDER — MUPIROCIN 20 MG/G
1 OINTMENT TOPICAL 2 TIMES DAILY
Status: DISCONTINUED | OUTPATIENT
Start: 2024-03-28 | End: 2024-03-28 | Stop reason: HOSPADM

## 2024-03-28 RX ORDER — PROPOFOL 10 MG/ML
VIAL (ML) INTRAVENOUS
Status: DISCONTINUED | OUTPATIENT
Start: 2024-03-28 | End: 2024-03-28

## 2024-03-28 RX ORDER — ONDANSETRON HYDROCHLORIDE 2 MG/ML
4 INJECTION, SOLUTION INTRAVENOUS DAILY PRN
Status: DISCONTINUED | OUTPATIENT
Start: 2024-03-28 | End: 2024-03-28 | Stop reason: HOSPADM

## 2024-03-28 RX ORDER — METHOCARBAMOL 750 MG/1
750 TABLET, FILM COATED ORAL 3 TIMES DAILY PRN
Qty: 45 TABLET | Refills: 0 | Status: SHIPPED | OUTPATIENT
Start: 2024-03-28 | End: 2024-04-12

## 2024-03-28 RX ORDER — ONDANSETRON HYDROCHLORIDE 2 MG/ML
INJECTION, SOLUTION INTRAVENOUS
Status: DISCONTINUED | OUTPATIENT
Start: 2024-03-28 | End: 2024-03-28

## 2024-03-28 RX ORDER — PHENYLEPHRINE HYDROCHLORIDE 10 MG/ML
INJECTION INTRAVENOUS
Status: DISCONTINUED | OUTPATIENT
Start: 2024-03-28 | End: 2024-03-28

## 2024-03-28 RX ADMIN — LIDOCAINE HYDROCHLORIDE 100 MG: 10 INJECTION, SOLUTION INTRAVENOUS at 08:03

## 2024-03-28 RX ADMIN — PHENYLEPHRINE HYDROCHLORIDE 200 MCG: 10 INJECTION INTRAVENOUS at 08:03

## 2024-03-28 RX ADMIN — MIDAZOLAM 2 MG: 1 INJECTION INTRAMUSCULAR; INTRAVENOUS at 08:03

## 2024-03-28 RX ADMIN — ROCURONIUM BROMIDE 50 MG: 10 INJECTION, SOLUTION INTRAVENOUS at 08:03

## 2024-03-28 RX ADMIN — CELECOXIB 200 MG: 100 CAPSULE ORAL at 07:03

## 2024-03-28 RX ADMIN — PROPOFOL 200 MG: 10 INJECTION, EMULSION INTRAVENOUS at 08:03

## 2024-03-28 RX ADMIN — PHENYLEPHRINE HYDROCHLORIDE 200 MCG: 10 INJECTION INTRAVENOUS at 09:03

## 2024-03-28 RX ADMIN — ONDANSETRON 8 MG: 2 INJECTION, SOLUTION INTRAMUSCULAR; INTRAVENOUS at 10:03

## 2024-03-28 RX ADMIN — HYDROMORPHONE HYDROCHLORIDE 0.5 MG: 2 INJECTION, SOLUTION INTRAMUSCULAR; INTRAVENOUS; SUBCUTANEOUS at 10:03

## 2024-03-28 RX ADMIN — CYCLOBENZAPRINE 10 MG: 10 TABLET, FILM COATED ORAL at 07:03

## 2024-03-28 RX ADMIN — ROCURONIUM BROMIDE 30 MG: 10 INJECTION, SOLUTION INTRAVENOUS at 09:03

## 2024-03-28 RX ADMIN — SODIUM CHLORIDE: 0.9 INJECTION, SOLUTION INTRAVENOUS at 08:03

## 2024-03-28 RX ADMIN — ACETAMINOPHEN 650 MG: 325 TABLET ORAL at 07:03

## 2024-03-28 RX ADMIN — FENTANYL CITRATE 100 MCG: 50 INJECTION INTRAMUSCULAR; INTRAVENOUS at 08:03

## 2024-03-28 RX ADMIN — DEXAMETHASONE SODIUM PHOSPHATE 8 MG: 4 INJECTION, SOLUTION INTRA-ARTICULAR; INTRALESIONAL; INTRAMUSCULAR; INTRAVENOUS; SOFT TISSUE at 09:03

## 2024-03-28 RX ADMIN — SODIUM CHLORIDE: 0.9 INJECTION, SOLUTION INTRAVENOUS at 09:03

## 2024-03-28 RX ADMIN — SUGAMMADEX 200 MG: 100 INJECTION, SOLUTION INTRAVENOUS at 10:03

## 2024-03-28 RX ADMIN — OXYCODONE HYDROCHLORIDE 10 MG: 10 TABLET, FILM COATED, EXTENDED RELEASE ORAL at 07:03

## 2024-03-28 RX ADMIN — SODIUM CHLORIDE 3 G: 9 INJECTION, SOLUTION INTRAVENOUS at 08:03

## 2024-03-28 RX ADMIN — PHENYLEPHRINE HYDROCHLORIDE 100 MCG: 10 INJECTION INTRAVENOUS at 09:03

## 2024-03-28 RX ADMIN — OXYCODONE AND ACETAMINOPHEN 1 TABLET: 10; 325 TABLET ORAL at 11:03

## 2024-03-28 RX ADMIN — PREGABALIN 75 MG: 75 CAPSULE ORAL at 07:03

## 2024-03-28 RX ADMIN — GLYCOPYRROLATE 0.4 MG: 0.2 INJECTION, SOLUTION INTRAMUSCULAR; INTRAVITREAL at 08:03

## 2024-03-28 NOTE — PLAN OF CARE
Patient oob and safely ambulated to bathroom and urinated with no issues, vss, all instructions given and discharged in WC to car safely

## 2024-03-28 NOTE — INTERVAL H&P NOTE
The patient has been examined and the H&P has been reviewed:    I concur with the findings and no changes have occurred since H&P was written.    Surgery risks, benefits and alternative options discussed and understood by patient/family.      To the OR as planned   Postop orders to follow up

## 2024-03-28 NOTE — OP NOTE
Date of surgery 03/28/2024    Preop diagnosis   1. Right L5-S1 disc herniation with right S1 radiculopathy  2. Morbid obesity with BMI of 40.57    Postop diagnosis   Same    Surgery   1. Right L5-S1 microdiskectomy    Surgeon   Dane Salazar MD    Complexity  This was deemed to be a more complex procedure requiring 45 minute more due to the morbid obesity of the patient.  More time was needed to positioned the patient and to proceed with all steps of the surgery due to the long working length across the deep adipose tissue.    Indication   9/11/23: Candy Huynh is a 52 y.o. female with history of incidental meningioma, fibromyalgia, bilateral knee replacements, DMII, and HTN who presents for evaluation of low back and right leg pain. Patient reports low back pain began around December 2022 and was located in the center of her lower back. That pain somewhat subsided but she began to experience worsening pain over her right SI joint that radiated from her right buttock into the posterior aspect of her right leg down to the knee. Occasionally, the pain will radiate posteriorly down to her right ankle. She had a right knee replacement in October 2022 and then a left knee replacement in August 2023. Her right buttocks pain is worse with sitting as well as walking. It is difficult to find a comfortable position. She denies true weakness or bowel/bladder issues or saddle anesthesia. She participates in physical therapy for her knee but has not participated for her back. She used to see a pain management physician outside of [facility] for her back and has undergone RFAs but she no longer wants to see this pain management physician.      Interval f/u 12/14/23: Patient presents in follow up after right SI joint injection. Patient states she had maybe a few days of minimal relief. The pain is now severe and worsening in her right sided low back/hip radiating down her leg. She states she can barely put any pressure on that  side when sitting down and is having difficulties walking. Denies focal weakness, paraesthesias, bowel/bladder or saddle anesthesia. She states physical therapy has not helped either. She had no relief from oral steroids. She no longer wants any more injections as she has had minimal success in the past.      INTERIM HISTORY:     A 53-year-old female who presented to the clinic for low back and right leg pain evaluation.  The pain started in 2022 after her knee replacement, and since then it has been progressively getting worse.  The pain is aggravated with sitting, standing, walking, sneezing, and coughing.  Pain is also worse when she bends forward.  Pain is constant.  The pain alleviated when she changed position.  The pain radiates to the right buttock to the posterior aspect of her thigh down to the calve of her right leg.  She reports some numbness in the same distribution.  She reports some cramps involving her toes on the right foot.  She denies any bowel or bladder dysfunction.  She tried physical therapy without significant relief.  She also had multiple injections with minimal relief.  She is taking daily Lyrica without much pain relief.  This is affecting her daily activities and she has no quality of life.    Procedure   The patient was intubated under general anesthesia and positioned prone on a Сергей table.  All pressure points were carefully padded.  The thoracolumbar and sacral area was prepped and draped in a typical sterile fashion.  Using fluoroscopy we planned a paramedian 20 mm incision at L5-S1 on the right side.  Local anesthesia with 0.5 Marcaine with epi.  The skin was incised and hemostasis was carried out.  Using a K-wire we dilated the thoracolumbar fascia down to the L5-S1 lamina and medial facet junction.  We then used serial dilation and placed a final tubular retractor measuring 9 cm x 18 mm.  The retractor was docked.  Under microscopic visualization we proceeded with a  subperiosteal dissection using the high-speed drill we created a inferior right L5 laminotomy.  The yellow ligament was identified and resected in a piecemeal fashion to expose the dura and the traversing right S1 nerve root.  There was a disc herniation pushing on the nerve root anteriorly.  The nerve root was retracted.  The annulus was divided and disc material were removed with nerve hooks and pituitary rongeur.  At the end of the procedure the nerve root appeared well decompressed.  Irrigation and hemostasis.  The retractor was removed.  Hemostasis on the muscle layer.  The dermal layer was closed with inverted interrupted 2-0 Vicryl.  The skin was closed with staples.  No complication.  Blood loss was estimated at less than 50 cc.

## 2024-03-28 NOTE — DISCHARGE INSTRUCTIONS
Remove dressing on 03/30/24.  Allow steri-strips to fall off on their own.  Ok to shower on 03/30/24, but do not immerse wound in water.  Remain active with walking and other light activities daily.  No heavy lifting, no extreme bending or twisting.  Limit upright sitting to 15 minutes per hour.   Do not resume aspirin until 03/31/24    ANESTHESIA  -For the first 24 hours after surgery:  Do not drive, use heavy equipment, make important decisions, or drink alcohol  -It is normal to feel sleepy for several hours.  Rest until you are more awake.  -Have someone stay with you, if needed.  They can watch for problems and help keep you safe.  -Some possible post anesthesia side effects include: nausea and vomiting, sore throat and hoarseness, sleepiness, and dizziness.    PAIN  -If you have pain after surgery, pain medicine will help you feel better.  Take it as directed, before pain becomes severe.  Most pain relievers taken by mouth need at least 20-30 minutes to start working.  -Do not drive or drink alcohol while taking pain medicine.  -Pain medication can upset your stomach.  Taking them with a little food may help.  -Other ways to help control pain: elevation, ice, and relaxation  -Call your surgeon if still having unmanageable pain an hour after taking pain medicine.  -Pain medicine can cause constipation.  Taking an over-the counter stool softener while on prescription pain medicine and drinking plenty of fluids can prevent this side effect.  -Call your surgeon if you have severe side effects like: breathing problems, trouble waking up, dizziness, confusion, or severe constipation.    NAUSEA  -Some people have nausea after surgery.  This is often because of anesthesia, pain, pain medicine, or the stress of surgery.  -Do not push yourself to eat.  Start off with clear liquids and soup.  Slowly move to solid foods.  Don't eat fatty, rich, spicy foods at first.  Eat smaller amounts.  -If you develop persistent nausea  and vomiting please notify your surgeon immediately.    BLEEDING  -Different types of surgery require different types of care and dressing changes.  It is important to follow all instructions and advice from your surgeon.  Change dressing as directed.  Call your surgeon for any concerns regarding postop bleeding.    SIGNS OF INFECTION  -Signs of infection include: fever, swelling, drainage, and redness  -Notify your surgeon if you have a fever of 100.4 F (38.0 C) or higher.  -Notify your surgeon if you notice redness, swelling, increased pain, pus, or a foul smell at the incision site.

## 2024-03-28 NOTE — TRANSFER OF CARE
Anesthesia Transfer of Care Note    Patient: Candy Huynh    Procedure(s) Performed: Procedure(s) (LRB):  LAMINECTOMY, SPINE, LUMBAR, WITH DISCECTOMY (Right)    Patient location: PACU    Transport from OR: Transported from OR on 6-10 L/min O2 by face mask with adequate spontaneous ventilation    Post pain: adequate analgesia    Post assessment: no apparent anesthetic complications    Post vital signs: stable    Level of consciousness: sedated    Nausea/Vomiting: no nausea/vomiting    Complications: none    Transfer of care protocol was followed    Last vitals: Visit Vitals  /75   Resp 20   Breastfeeding No

## 2024-03-28 NOTE — ANESTHESIA PROCEDURE NOTES
Intubation    Date/Time: 3/28/2024 8:42 AM    Performed by: Bonifacio Jones CRNA  Authorized by: Pedro Courtney MD    Intubation:     Induction:  Intravenous    Intubated:  Postinduction    Mask Ventilation:  Easy mask    Attempts:  1    Attempted By:  CRNA and student    Method of Intubation:  Video laryngoscopy    Blade:  Stephenie 3    Laryngeal View Grade: Grade I - full view of cords      Difficult Airway Encountered?: No      Complications:  None    Airway Device:  Oral endotracheal tube    Airway Device Size:  7.0    Style/Cuff Inflation:  Cuffed    Tube secured:  22    Secured at:  The lips    Placement Verified By:  Capnometry    Complicating Factors:  None    Findings Post-Intubation:  BS equal bilateral and atraumatic/condition of teeth unchanged

## 2024-03-30 NOTE — ANESTHESIA POSTPROCEDURE EVALUATION
Anesthesia Post Evaluation    Patient: Candy Huynh    Procedure(s) Performed: Procedure(s) (LRB):  LAMINECTOMY, SPINE, LUMBAR, WITH DISCECTOMY (Right)    Final Anesthesia Type: general      Patient location during evaluation: PACU  Patient participation: Yes- Able to Participate  Level of consciousness: awake and alert  Post-procedure vital signs: reviewed and stable  Pain management: adequate  Airway patency: patent    PONV status at discharge: No PONV  Anesthetic complications: no      Cardiovascular status: stable  Respiratory status: room air  Hydration status: euvolemic  Follow-up not needed.              Vitals Value Taken Time   /76 03/28/24 1300   Temp 36.8 °C (98.2 °F) 03/28/24 1300   Pulse 81 03/28/24 1300   Resp 19 03/28/24 1300   SpO2 98 % 03/28/24 1300         Event Time   Out of Recovery 11:37:58         Pain/Lisa Score: No data recorded

## 2024-03-31 ENCOUNTER — NURSE TRIAGE (OUTPATIENT)
Dept: ADMINISTRATIVE | Facility: CLINIC | Age: 54
End: 2024-03-31
Payer: MEDICAID

## 2024-03-31 NOTE — TELEPHONE ENCOUNTER
Reason for Disposition   [1] SEVERE pain (e.g., excruciating, unable to do any normal activities) AND [2] not improved after 2 hours of pain medicine    Additional Information   Negative: Sounds like a life-threatening emergency to the triager   Negative: Chest pain   Negative: Difficulty breathing   Negative: Acting confused (e.g., disoriented, slurred speech) or excessively sleepy   Negative: Post-Op tonsil and adenoid surgery, symptoms or questions about   Negative: Surgical incision symptoms and questions   Negative: [1] Pain or burning with passing urine (urination) AND [2] male   Negative: [1] Pain or burning with passing urine (urination) AND [2] female   Negative: Constipation   New or worsening leg (calf, thigh) pain   Negative: Looks like a broken bone or dislocated joint (e.g., crooked or deformed)   Negative: Sounds like a life-threatening emergency to the triager   Negative: Chest pain   Negative: Difficulty breathing   Negative: Entire foot is cool or blue in comparison to other side   Negative: Unable to walk   Negative: [1] Red area or streak AND [2] fever   Negative: [1] Swollen joint AND [2] fever   Negative: [1] Cast on leg or ankle AND [2] now increased pain   Negative: Patient sounds very sick or weak to the triager    Protocols used: Post-Op Symptoms and Fuknhqtrm-C-OL, Leg Pain-A-AH  3/28 Laminectomy with dr muller   pt states she had surg thurs. pt states she is having more pain than prior to surg. pain level= 10. Last dose of pain med =12 noon. Pt reporting pain in R leg when standing that is different from what it was prior to surg. Pt states her fingers and feet are swollen since yest. still eating reg diet. drinking liquids. +flatus. passing urine. rec to see dr within 4 hours or option to get in touch with dr on call. pt asking to speak with . spoke with dr heller. MD weathers transferred to speak with pt.

## 2024-04-01 ENCOUNTER — PATIENT MESSAGE (OUTPATIENT)
Dept: NEUROSURGERY | Facility: CLINIC | Age: 54
End: 2024-04-01
Payer: MEDICAID

## 2024-04-01 RX ORDER — METHYLPREDNISOLONE 4 MG/1
TABLET ORAL
Qty: 1 EACH | Refills: 0 | Status: SHIPPED | OUTPATIENT
Start: 2024-04-01 | End: 2024-06-04

## 2024-04-02 ENCOUNTER — OFFICE VISIT (OUTPATIENT)
Dept: NEUROSURGERY | Facility: CLINIC | Age: 54
End: 2024-04-02
Payer: MEDICAID

## 2024-04-02 VITALS — SYSTOLIC BLOOD PRESSURE: 129 MMHG | HEART RATE: 79 BPM | DIASTOLIC BLOOD PRESSURE: 84 MMHG

## 2024-04-02 DIAGNOSIS — Z98.890 S/P LUMBAR MICRODISCECTOMY: Primary | ICD-10-CM

## 2024-04-02 PROCEDURE — 99024 POSTOP FOLLOW-UP VISIT: CPT | Mod: ,,, | Performed by: PHYSICIAN ASSISTANT

## 2024-04-02 PROCEDURE — 1160F RVW MEDS BY RX/DR IN RCRD: CPT | Mod: CPTII,,, | Performed by: PHYSICIAN ASSISTANT

## 2024-04-02 PROCEDURE — 99999 PR PBB SHADOW E&M-EST. PATIENT-LVL IV: CPT | Mod: PBBFAC,,, | Performed by: PHYSICIAN ASSISTANT

## 2024-04-02 PROCEDURE — 1159F MED LIST DOCD IN RCRD: CPT | Mod: CPTII,,, | Performed by: PHYSICIAN ASSISTANT

## 2024-04-02 PROCEDURE — 4010F ACE/ARB THERAPY RXD/TAKEN: CPT | Mod: CPTII,,, | Performed by: PHYSICIAN ASSISTANT

## 2024-04-02 PROCEDURE — 99214 OFFICE O/P EST MOD 30 MIN: CPT | Mod: PBBFAC,PN | Performed by: PHYSICIAN ASSISTANT

## 2024-04-02 PROCEDURE — 3074F SYST BP LT 130 MM HG: CPT | Mod: CPTII,,, | Performed by: PHYSICIAN ASSISTANT

## 2024-04-02 PROCEDURE — 3079F DIAST BP 80-89 MM HG: CPT | Mod: CPTII,,, | Performed by: PHYSICIAN ASSISTANT

## 2024-04-02 RX ORDER — OXYCODONE AND ACETAMINOPHEN 10; 325 MG/1; MG/1
1 TABLET ORAL EVERY 4 HOURS PRN
Qty: 42 TABLET | Refills: 0 | Status: SHIPPED | OUTPATIENT
Start: 2024-04-02 | End: 2024-04-16 | Stop reason: SDUPTHER

## 2024-04-02 NOTE — PROGRESS NOTES
Subjective:     Patient ID:  Candy Huynh is a 53 y.o. female.    Martin    Chief Complaint: Severe right leg pain    Date of surgery 03/28/2024     Preop diagnosis   1. Right L5-S1 disc herniation with right S1 radiculopathy  2. Morbid obesity with BMI of 40.57     Postop diagnosis   Same     Surgery   1. Right L5-S1 microdiskectomy     Surgeon   Dane Salazar MD        HPI    Candy Huynh is a 53 y.o. female who presents for follow up.  Patient states that she is having increased pain in her right hip with radiation down the right posterior leg and numbness in the bottom of the foot.  The pain is worse when she moves or tries to walk or stand.  She is postop day 4 from right L5-S1 microdiskectomy.  She is taking Lyrica 200 mg once a day, oxycodone every 6 hours, Robaxin every 8 hours.  Steroid pack was prescribed yesterday but she has not picked this up yet.  She denies any new weakness in her leg or foot.    Patient denies any recent accidents or trauma, no saddle anesthesias, and no bowel or bladder incontinence.      Review of Systems:  Constitution: Negative for chills, fever, night sweats and weight loss.   Musculoskeletal: Negative for falls.   Gastrointestinal: Negative for bowel incontinence, nausea and vomiting.   Genitourinary: Negative for bladder incontinence.   Neurological: Negative for disturbances in coordination and loss of balance.      Objective:      Vitals:    04/02/24 1051   BP: 129/84   Pulse: 79   PainSc: 10-Worst pain ever   PainLoc: Back         Physical Exam: exam done in the chair      General:  Candy Huynh is well-developed, well-nourished, appears stated age, in no acute distress, alert and oriented to person, place, and time.    Pulmonary/Chest:  Respiratory effort normal  Abdominal: Exhibits no distension  Psychiatric:  Normal mood and affect.  Behavior is normal.  Judgement and thought content normal      Musculoskeletal:    Lumbar Spine Inspection:  Incision  with steristrips.  No redness, swelling or drainage.      Neurological:  Alert and oriented to person, place, and time    Muscle strength against resistance:     Right Left   Hip flexion  5 / 5 5 / 5   Hip extension 5 / 5 5 / 5   Hip abduction 5 / 5 5 / 5   Hip adduction  5 / 5 5 / 5   Knee extension  4 / 5 5 / 5   Knee flexion 4 / 5 5 / 5   Dorsiflexion  5 / 5 5 / 5   EHL  5 / 5 5 / 5   Plantar flexion  5 / 5 5 / 5   Inversion of the feet 5 / 5 5 / 5   Eversion of the feet  5 / 5 5 / 5       On gross examination of the bilateral upper extremities, patient has full painfree ROM with no signs of clubbing, cyanosis, edema, or weakness.         Assessment:          1. S/P lumbar microdiscectomy            Plan:          Orders Placed This Encounter    oxyCODONE-acetaminophen (PERCOCET)  mg per tablet       Patient was told that this pain is not uncommon and should get better over the next 1-2 weeks.  Recommend Medrol pack with food.  No NSAIDs while taking it.  Increase Percocet to every 4 hours.  Increase Lyrica to 200 mg twice a day and continue Robaxin 3 times a day as needed.    She will follow-up in about a week for wound check.    All questions answered.    Follow-Up:  Follow up in about 1 week (around 4/9/2024). If there are any questions prior to this, the patient was instructed to contact the office.       Estephania Rodrigez, [facility], PA-C  Neurosurgery  [facility]  04/02/2024

## 2024-04-08 ENCOUNTER — TELEPHONE (OUTPATIENT)
Dept: NEUROSURGERY | Facility: CLINIC | Age: 54
End: 2024-04-08
Payer: MEDICAID

## 2024-04-08 NOTE — TELEPHONE ENCOUNTER
Returned pt's call, I informed pt that I have called the pharmacy and I am waiting for the pharamcy to send the PA key over so I can start it. Pt voiced understanding

## 2024-04-12 NOTE — DISCHARGE SUMMARY
[facility] ([facility])  Neurosurgery  Discharge Summary      Patient Name: Candy Huynh  MRN: [id number]  Admission Date: 3/28/2024  Hospital Length of Stay: 0 days  Discharge Date and Time: 3/28/2024  1:30 PM  Attending Physician: [facility] att. providers found   Discharging Provider: Dane Salazar MD  Primary Care Provider: [facility] Primary [facility]     HPI: Indication   9/11/23: Candy Huynh is a 52 y.o. female with history of incidental meningioma, fibromyalgia, bilateral knee replacements, DMII, and HTN who presents for evaluation of low back and right leg pain. Patient reports low back pain began around December 2022 and was located in the center of her lower back. That pain somewhat subsided but she began to experience worsening pain over her right SI joint that radiated from her right buttock into the posterior aspect of her right leg down to the knee. Occasionally, the pain will radiate posteriorly down to her right ankle. She had a right knee replacement in October 2022 and then a left knee replacement in August 2023. Her right buttocks pain is worse with sitting as well as walking. It is difficult to find a comfortable position. She denies true weakness or bowel/bladder issues or saddle anesthesia. She participates in physical therapy for her knee but has not participated for her back. She used to see a pain management physician outside of [facility] for her back and has undergone RFAs but she no longer wants to see this pain management physician.      Interval f/u 12/14/23: Patient presents in follow up after right SI joint injection. Patient states she had maybe a few days of minimal relief. The pain is now severe and worsening in her right sided low back/hip radiating down her leg. She states she can barely put any pressure on that side when sitting down and is having difficulties walking. Denies focal weakness, paraesthesias, bowel/bladder or saddle anesthesia. She states physical therapy has not  helped either. She had no relief from oral steroids. She no longer wants any more injections as she has had minimal success in the past.      INTERIM HISTORY:     A 53-year-old female who presented to the clinic for low back and right leg pain evaluation.  The pain started in 2022 after her knee replacement, and since then it has been progressively getting worse.  The pain is aggravated with sitting, standing, walking, sneezing, and coughing.  Pain is also worse when she bends forward.  Pain is constant.  The pain alleviated when she changed position.  The pain radiates to the right buttock to the posterior aspect of her thigh down to the calve of her right leg.  She reports some numbness in the same distribution.  She reports some cramps involving her toes on the right foot.  She denies any bowel or bladder dysfunction.  She tried physical therapy without significant relief.  She also had multiple injections with minimal relief.  She is taking daily Lyrica without much pain relief.  This is affecting her daily activities and she has no quality of life.    Procedure(s) (LRB):  1. Right L5-S1 microdiskectomy     Hospital Course: The surgery was uncomplicated and postoperative course uneventful.  Patient was able to be discharged after voiding.      Consults:     Significant Diagnostic Studies: N/A    Pending Diagnostic Studies:       None          Final Active Diagnoses:    Diagnosis Date Noted POA    PRINCIPAL PROBLEM:  Lumbar disc herniation with radiculopathy [M51.16] 03/28/2024 Yes    BMI 40.0-44.9, adult [Z68.41] 03/28/2024 Not Applicable      Problems Resolved During this Admission:      Discharged Condition: good    Disposition: Home or Self Care    Follow Up:    Patient Instructions:      Diet general     Remove dressing in 48 hours     Medications:  Reconciled Home Medications:      Medication List        START taking these medications      methocarbamoL 750 MG Tab  Commonly known as: ROBAXIN  Take 1 tablet  "(750 mg total) by mouth 3 (three) times daily as needed (muscle spasms).            CONTINUE taking these medications      albuterol 90 mcg/actuation inhaler  Commonly known as: PROVENTIL/VENTOLIN HFA  SMARTSI Puff(s) By Mouth Every 4 Hours PRN     ALBUTEROL INHL  Inhale 2 puffs into the lungs every 4 (four) hours as needed.     amLODIPine 5 MG tablet  Commonly known as: NORVASC  Take 5 mg by mouth once daily.     ARIPiprazole 2 MG Tab  Commonly known as: ABILIFY  Take 2 mg by mouth every evening.     aspirin 81 MG EC tablet  Commonly known as: ECOTRIN  Take 1 tablet (81 mg total) by mouth 2 (two) times a day.     azelastine 137 mcg (0.1 %) nasal spray  Commonly known as: ASTELIN  1 spray once daily.     BD ALINE 2ND GEN PEN NEEDLE 32 gauge x 5/32" Ndle  Generic drug: pen needle, diabetic  USE ONE NEEDLE ONCE A WEEK     cetirizine 10 MG tablet  Commonly known as: ZYRTEC  Take 10 mg by mouth every evening.     cholecalciferol (vitamin D3) 1,250 mcg (50,000 unit) capsule  Take 50,000 Units by mouth every 7 days.     cyclobenzaprine 5 MG tablet  Commonly known as: FLEXERIL  Take 1 tablet (5 mg total) by mouth 3 (three) times daily as needed for Muscle spasms (Pain).     diclofenac sodium 1 % Gel  Commonly known as: VOLTAREN  Apply 2 g topically 4 (four) times daily.     EASY TOUCH ALCOHOL PREP PADS Padm  Generic drug: alcohol swabs  USE TO prepare FOR glucose testing     EPINEPHrine 0.3 mg/0.3 mL Atin  Commonly known as: EPIPEN  as per administration instructions     ergocalciferol 50,000 unit Cap  Commonly known as: ERGOCALCIFEROL  Take 50,000 Units by mouth every 7 days.     FeroSuL 325 mg (65 mg iron) Tab tablet  Generic drug: ferrous sulfate  Take by mouth every other day.     FLOWFLEX COVID-19 AG HOME TEST Kit  Generic drug: COVID-19 antigen test  test AS directed     FLUoxetine 20 MG capsule  Take 40 mg by mouth once daily.     fluticasone propionate 50 mcg/actuation nasal spray  Commonly known as: FLONASE  2 " sprays by Nasal route.     folic acid 1 MG tablet  Commonly known as: FOLVITE  Take 1,000 mcg by mouth.     LIDOcaine 5 %  Commonly known as: LIDODERM  Place 1 patch onto the skin once daily. Remove & Discard patch within 12 hours or as directed by MD     loratadine 10 mg tablet  Commonly known as: CLARITIN  Take 10 mg by mouth once daily.     losartan 100 MG tablet  Commonly known as: COZAAR  Take 100 mg by mouth once daily.     meclizine 25 mg tablet  Commonly known as: ANTIVERT  Take 25 mg by mouth.     meloxicam 15 MG tablet  Commonly known as: MOBIC  Take 1 tablet (15 mg total) by mouth once daily.     metFORMIN 750 MG ER 24hr tablet  Commonly known as: GLUCOPHAGE-XR  Take 750 mg by mouth once daily.     montelukast 10 mg tablet  Commonly known as: SINGULAIR     nicotine polacrilex 4 MG Gum  Commonly known as: NICORETTE  SMARTSI Each By Mouth PRN     ondansetron 8 MG Tbdl  Commonly known as: ZOFRAN-ODT  Take 8 mg by mouth every 8 (eight) hours as needed.     ONETOUCH DELICA PLUS LANCET 33 gauge Misc  Generic drug: lancets  Apply topically 4 (four) times daily.     ONETOUCH ULTRA2 METER Misc  Generic drug: blood-glucose meter  SMARTSIG:Via Meter As Directed     ONETOUCH VERIO TEST STRIPS Strp  Generic drug: blood sugar diagnostic  1 strip 4 (four) times daily.     OZEMPIC 0.25 mg or 0.5 mg (2 mg/3 mL) pen injector  Generic drug: semaglutide  Inject 0.25 mg into the skin every 7 (seven) days     pantoprazole 40 MG tablet  Commonly known as: PROTONIX  Take 40 mg by mouth.     pregabalin 200 MG Cap  Commonly known as: LYRICA  Take 1 capsule (200 mg total) by mouth 2 (two) times daily.     rosuvastatin 10 MG tablet  Commonly known as: CRESTOR  Take 10 mg by mouth.     STOOL SOFTENER 100 MG capsule  Generic drug: docusate sodium  Take 1 softgel by mouth twice daily     STOOL SOFTENER-LAXATIVE 8.6-50 mg per tablet  Generic drug: senna-docusate 8.6-50 mg  Take 1 tablet by mouth once daily.     sumatriptan 100 MG  tablet  Commonly known as: IMITREX  Take 100 mg by mouth 2 (two) times daily as needed.            STOP taking these medications      HYDROcodone-acetaminophen 5-325 mg per tablet  Commonly known as: NORCO     HYDROcodone-acetaminophen 7.5-325 mg per tablet  Commonly known as: NORCO            ASK your doctor about these medications      diazePAM 2 MG tablet  Commonly known as: VALIUM  Take 2 mg by mouth every 6 (six) hours as needed.     hydrOXYzine pamoate 25 MG Cap  Commonly known as: VISTARIL  Take 25 mg by mouth every evening.     paroxetine 20 MG tablet  Commonly known as: PAXIL  Take 20 mg by mouth every morning.     PREMARIN 0.625 MG tablet  Generic drug: estrogens (conjugated)  Take 0.625 mg by mouth once daily.              Dane Salazar MD  Neurosurgery  [facility] ([facility])

## 2024-04-16 ENCOUNTER — OFFICE VISIT (OUTPATIENT)
Dept: NEUROSURGERY | Facility: CLINIC | Age: 54
End: 2024-04-16
Payer: MEDICAID

## 2024-04-16 VITALS
BODY MASS INDEX: 40.59 KG/M2 | DIASTOLIC BLOOD PRESSURE: 73 MMHG | HEIGHT: 63 IN | SYSTOLIC BLOOD PRESSURE: 122 MMHG | WEIGHT: 229.06 LBS | HEART RATE: 88 BPM

## 2024-04-16 DIAGNOSIS — Z98.890 S/P LUMBAR MICRODISCECTOMY: Primary | ICD-10-CM

## 2024-04-16 PROCEDURE — 3078F DIAST BP <80 MM HG: CPT | Mod: CPTII,,, | Performed by: PHYSICIAN ASSISTANT

## 2024-04-16 PROCEDURE — 4010F ACE/ARB THERAPY RXD/TAKEN: CPT | Mod: CPTII,,, | Performed by: PHYSICIAN ASSISTANT

## 2024-04-16 PROCEDURE — 99214 OFFICE O/P EST MOD 30 MIN: CPT | Mod: PBBFAC,PN | Performed by: PHYSICIAN ASSISTANT

## 2024-04-16 PROCEDURE — 3074F SYST BP LT 130 MM HG: CPT | Mod: CPTII,,, | Performed by: PHYSICIAN ASSISTANT

## 2024-04-16 PROCEDURE — 99024 POSTOP FOLLOW-UP VISIT: CPT | Mod: ,,, | Performed by: PHYSICIAN ASSISTANT

## 2024-04-16 PROCEDURE — 99999 PR PBB SHADOW E&M-EST. PATIENT-LVL IV: CPT | Mod: PBBFAC,,, | Performed by: PHYSICIAN ASSISTANT

## 2024-04-16 PROCEDURE — 1160F RVW MEDS BY RX/DR IN RCRD: CPT | Mod: CPTII,,, | Performed by: PHYSICIAN ASSISTANT

## 2024-04-16 PROCEDURE — 1159F MED LIST DOCD IN RCRD: CPT | Mod: CPTII,,, | Performed by: PHYSICIAN ASSISTANT

## 2024-04-16 RX ORDER — OXYCODONE AND ACETAMINOPHEN 10; 325 MG/1; MG/1
1 TABLET ORAL EVERY 4 HOURS PRN
Qty: 42 TABLET | Refills: 0 | Status: SHIPPED | OUTPATIENT
Start: 2024-04-16 | End: 2024-06-04 | Stop reason: SDUPTHER

## 2024-04-17 ENCOUNTER — TELEPHONE (OUTPATIENT)
Dept: NEUROSURGERY | Facility: CLINIC | Age: 54
End: 2024-04-17
Payer: MEDICAID

## 2024-04-17 NOTE — PROGRESS NOTES
"  Subjective:     Patient ID:  Candy Huynh is a 53 y.o. female.    [place]    Chief Complaint: Severe right leg pain    Date of surgery 03/28/2024     Preop diagnosis   1. Right L5-S1 disc herniation with right S1 radiculopathy  2. Morbid obesity with BMI of 40.57     Postop diagnosis   Same     Surgery   1. Right L5-S1 microdiskectomy     Surgeon   Dane Salazar MD        HPI    Candy Huynh is a 53 y.o. female who presents for follow up.  Patient doing much better.  She has some pain in the right low back and buttock region.  No pain radiating down her legs.  She is still taking the pain medication and robaxin as needed.  The Medrol pack helped.    Patient denies any recent accidents or trauma, no saddle anesthesias, and no bowel or bladder incontinence.      Review of Systems:  Constitution: Negative for chills, fever, night sweats and weight loss.   Musculoskeletal: Negative for falls.   Gastrointestinal: Negative for bowel incontinence, nausea and vomiting.   Genitourinary: Negative for bladder incontinence.   Neurological: Negative for disturbances in coordination and loss of balance.      Objective:      Vitals:    04/16/24 1610   BP: 122/73   Pulse: 88   Weight: 103.9 kg (229 lb 0.9 oz)   Height: 5' 3" (1.6 m)   PainSc:   7   PainLoc: Back         Incision without redness, swelling, or drainage.  Not completely healed.      Assessment:          1. S/P lumbar microdiscectomy            Plan:          Orders Placed This Encounter    oxyCODONE-acetaminophen (PERCOCET)  mg per tablet       Patient doing better.  Increase walking   Percocet refilled   Patient would like to speak with Dr. Salazar regarding the back lipoma removal    Follow-Up:  Follow up in about 4 weeks (around 5/14/2024). If there are any questions prior to this, the patient was instructed to contact the office.       Estephania Rodrigez, [facility], PA-C  Neurosurgery  [facility]  04/17/2024              "

## 2024-04-17 NOTE — TELEPHONE ENCOUNTER
Please schedule a phone visit with Dr. Salazar.  She has questions about the surgery and also questions about the lipoma in her back.    Thanks,  Estephania Rodrigez, [facility], PA-C  Neurosurgery  [facility]  04/17/2024

## 2024-05-01 ENCOUNTER — OFFICE VISIT (OUTPATIENT)
Dept: NEUROSURGERY | Facility: CLINIC | Age: 54
End: 2024-05-01
Payer: MEDICAID

## 2024-05-01 DIAGNOSIS — Z98.890 S/P LUMBAR MICRODISCECTOMY: Primary | ICD-10-CM

## 2024-05-01 DIAGNOSIS — Z96.652 STATUS POST LEFT KNEE REPLACEMENT: ICD-10-CM

## 2024-05-01 PROCEDURE — 4010F ACE/ARB THERAPY RXD/TAKEN: CPT | Mod: CPTII,95,, | Performed by: NEUROLOGICAL SURGERY

## 2024-05-01 PROCEDURE — 99024 POSTOP FOLLOW-UP VISIT: CPT | Mod: 95,,, | Performed by: NEUROLOGICAL SURGERY

## 2024-05-01 RX ORDER — TRAMADOL HYDROCHLORIDE 50 MG/1
50 TABLET ORAL EVERY 8 HOURS PRN
Qty: 90 TABLET | Refills: 3 | Status: SHIPPED | OUTPATIENT
Start: 2024-05-01

## 2024-05-01 RX ORDER — METHOCARBAMOL 750 MG/1
750 TABLET, FILM COATED ORAL 3 TIMES DAILY PRN
Qty: 90 TABLET | Refills: 2 | Status: SHIPPED | OUTPATIENT
Start: 2024-05-01 | End: 2024-07-30

## 2024-05-01 RX ORDER — PREGABALIN 200 MG/1
200 CAPSULE ORAL 2 TIMES DAILY
Qty: 60 CAPSULE | Refills: 2 | Status: SHIPPED | OUTPATIENT
Start: 2024-05-01 | End: 2024-07-30

## 2024-05-01 NOTE — PROGRESS NOTES
Established Patient - Audio Only Telehealth Visit     The patient location is: home  The chief complaint leading to consultation is:  Follow up after microdiskectomy  Visit type: Virtual visit with audio only (telephone)  Total time spent with patient: 30       The reason for the audio only service rather than synchronous audio and video virtual visit was related to technical difficulties or patient preference/necessity.     Each patient to whom I provide medical services by telemedicine is:  (1) informed of the relationship between the physician and patient and the respective role of any other health care provider with respect to management of the patient; and (2) notified that they may decline to receive medical services by telemedicine and may withdraw from such care at any time. Patient verbally consented to receive this service via voice-only telephone call.       HPI:  Patient is 5 months status post L5-S1 microdiskectomy.  She is doing well she reports doing much better than before surgery.  She still has some back pain at night.  She is taking Lyrica 300 mg twice daily.  She likes to take methocarbamol as needed.  She has been taking oxycodone 10 mg for pain.  She is contemplating starting physical therapy.  She had some questions about her lipoma involving the left para axial lumbar muscle.  Overall she appears satisfied with her outcome.     Assessment and plan:    We will start physical therapy 3 times a week for 6 weeks  I recommended to stop taking the oxycodone at this time.  We will replace the oxycodone with tramadol as needed.  Refill on Lyrica and methocarbamol done.  Follow up at 3 months postop  I do not recommend any surgery for her left lumbar lipoma.  I explained to her that there is probably more risks than benefits in resecting this left lipoma involving the erector spinae.   I answered all her questions.                        This service was not originating from a related E/M service  provided within the previous 7 days nor will  to an E/M service or procedure within the next 24 hours or my soonest available appointment.  Prevailing standard of care was able to be met in this audio-only visit.

## 2024-05-20 ENCOUNTER — DOCUMENTATION ONLY (OUTPATIENT)
Dept: REHABILITATION | Facility: HOSPITAL | Age: 54
End: 2024-05-20
Payer: MEDICAID

## 2024-05-20 ENCOUNTER — TELEPHONE (OUTPATIENT)
Dept: REHABILITATION | Facility: HOSPITAL | Age: 54
End: 2024-05-20
Payer: MEDICAID

## 2024-05-20 NOTE — PROGRESS NOTES
Patient had her physical therapy evaluation scheduled for 5/16/2024 at 145pm. PT called patient on 5/15/2024 to try to reschedule this time due to a conflict of times with PT needed to take child to doctor. Patient did not answer, but message was left for patient to return phone call. PT called patient on 5/16/2024 again and patient answered. PT explained to patient that PT needed to take child to doctor at her scheduled time. PT offered earlier appointment times that day and or to reschedule entirely. Patient denied wanting to take the earlier time and she did not want to reschedule today. Patient said she would call back and reschedule herself.

## 2024-05-28 ENCOUNTER — PATIENT MESSAGE (OUTPATIENT)
Dept: NEUROSURGERY | Facility: CLINIC | Age: 54
End: 2024-05-28
Payer: MEDICAID

## 2024-05-29 ENCOUNTER — PATIENT MESSAGE (OUTPATIENT)
Dept: NEUROSURGERY | Facility: CLINIC | Age: 54
End: 2024-05-29
Payer: MEDICAID

## 2024-05-29 DIAGNOSIS — M51.26 RECURRENT DISPLACEMENT OF LUMBAR DISC: ICD-10-CM

## 2024-05-29 DIAGNOSIS — Z98.890 S/P LUMBAR MICRODISCECTOMY: Primary | ICD-10-CM

## 2024-05-30 NOTE — TELEPHONE ENCOUNTER
Please see Dr. Salazar' messages and schedule appointments.    Estephania Rodrigez [facility], PA-C  Neurosurgery  [facility]  05/30/2024

## 2024-05-31 ENCOUNTER — TELEPHONE (OUTPATIENT)
Dept: REHABILITATION | Facility: HOSPITAL | Age: 54
End: 2024-05-31
Payer: MEDICAID

## 2024-06-02 ENCOUNTER — PATIENT MESSAGE (OUTPATIENT)
Dept: ORTHOPEDICS | Facility: CLINIC | Age: 54
End: 2024-06-02
Payer: MEDICAID

## 2024-06-04 RX ORDER — OXYCODONE AND ACETAMINOPHEN 10; 325 MG/1; MG/1
1 TABLET ORAL EVERY 6 HOURS PRN
Qty: 28 TABLET | Refills: 0 | Status: SHIPPED | OUTPATIENT
Start: 2024-06-04 | End: 2024-06-19 | Stop reason: SDUPTHER

## 2024-06-04 RX ORDER — KETOROLAC TROMETHAMINE 10 MG/1
10 TABLET, FILM COATED ORAL EVERY 6 HOURS
Qty: 20 TABLET | Refills: 0 | Status: SHIPPED | OUTPATIENT
Start: 2024-06-04 | End: 2024-06-09

## 2024-06-13 ENCOUNTER — HOSPITAL ENCOUNTER (EMERGENCY)
Facility: HOSPITAL | Age: 54
Discharge: HOME | End: 2024-06-13
Attending: EMERGENCY MEDICINE
Payer: MEDICAID

## 2024-06-13 ENCOUNTER — TELEPHONE (OUTPATIENT)
Dept: NEUROSURGERY | Facility: CLINIC | Age: 54
End: 2024-06-13
Payer: MEDICAID

## 2024-06-13 VITALS
BODY MASS INDEX: 40.59 KG/M2 | RESPIRATION RATE: 20 BRPM | WEIGHT: 229.06 LBS | SYSTOLIC BLOOD PRESSURE: 199 MMHG | HEART RATE: 89 BPM | TEMPERATURE: 98 F | OXYGEN SATURATION: 98 % | DIASTOLIC BLOOD PRESSURE: 72 MMHG | HEIGHT: 63 IN

## 2024-06-13 DIAGNOSIS — M54.10 RADICULOPATHY, UNSPECIFIED SPINAL REGION: Primary | ICD-10-CM

## 2024-06-13 PROCEDURE — 99284 EMERGENCY DEPT VISIT MOD MDM: CPT | Mod: 25

## 2024-06-13 PROCEDURE — 63600175 PHARM REV CODE 636 W HCPCS: Performed by: PHYSICIAN ASSISTANT

## 2024-06-13 PROCEDURE — 96372 THER/PROPH/DIAG INJ SC/IM: CPT | Performed by: PHYSICIAN ASSISTANT

## 2024-06-13 RX ORDER — MELOXICAM 15 MG/1
15 TABLET ORAL DAILY
Qty: 30 TABLET | Refills: 0 | Status: SHIPPED | OUTPATIENT
Start: 2024-06-13 | End: 2024-06-19 | Stop reason: SDUPTHER

## 2024-06-13 RX ORDER — TRIAMCINOLONE ACETONIDE 40 MG/ML
60 INJECTION, SUSPENSION INTRA-ARTICULAR; INTRAMUSCULAR
Status: COMPLETED | OUTPATIENT
Start: 2024-06-13 | End: 2024-06-13

## 2024-06-13 RX ADMIN — TRIAMCINOLONE ACETONIDE 60 MG: 200 INJECTION, SUSPENSION INTRA-ARTICULAR; INTRAMUSCULAR at 07:06

## 2024-06-13 NOTE — TELEPHONE ENCOUNTER
Returned pt's call, pt stated that she having some pain on the right side in her hip and calf. Pt is requesting to know if she should go to the emergency room. I stated to pt that if her pain becomes to unbearable then she should  go to the emergency room.Pt voiced understanding

## 2024-06-14 NOTE — ED TRIAGE NOTES
Candy Huynh, an 53 y.o. female presents to the ED via POV with RLE pain x1 month. Denies CP, SOB, NVD, HA, fevers, chills, urinary complaints. Hx of DM, HTN, stroke.        LOC: The patient is awake, alert and aware of environment with an appropriate affect, the patient is oriented x 3 and speaking appropriately.   APPEARANCE: Patient appears comfortable and in no acute distress, patient is clean and well groomed.  SKIN: The skin is warm and dry, color consistent with ethnicity.   MUSCULOSKELETAL: Patient moving all extremities spontaneously, no swelling noted. Pain in RLE.  RESPIRATORY: Airway is open and patent, respirations are spontaneous, patient has a normal effort and rate, no accessory muscle use noted.  CARDIAC: Patient has a normal rate and regular rhythm, no edema noted, capillary refill < 3 seconds.   GASTRO: Soft and non tender to palpation, no distention noted.   : Pt denies any pain or frequency with urination.  NEURO: Pt opens eyes spontaneously, behavior appropriate to situation, follows commands.        Chief Complaint   Patient presents with    Leg Pain     R Leg pain radiating down leg into knee x1 month     Review of patient's allergies indicates:   Allergen Reactions    Adhesive Blisters     Specifically EKG lead pads    Morphine Other (See Comments)     shake     Past Medical History:   Diagnosis Date    Allergy     Anemia     Anxiety     Asthma     denies- on outside problem list in Care Everywhere    Depression     Diabetes mellitus     Diabetes mellitus, type 2     Fibromyalgia     GERD (gastroesophageal reflux disease)     Hypertension     Stroke     TIA    Vertigo

## 2024-06-14 NOTE — ED PROVIDER NOTES
Encounter Date: 2024       History     Chief Complaint   Patient presents with    Leg Pain     R Leg pain radiating down leg into knee x1 month     7:11 PM  Patient is a 52 yo female with a history of Right L5-S1 microdiskectomy on 3/28/24, HTN< DM< fibromyalgia, GERD, who presents to [facility] ED with RLE pain.    Patient states that she had this pain prior to her surgery.  2-3 weeks after, she feels like her radicular pain returned.  She endorses most pain to her right buttock that radiates down right posterior leg to her foot.  She states her toes are not involved.  No lower extremity paresthesias, saddle anesthesias, bowel or bladder incontinence, or lower extremity weakness.  Her pain is worse with prolonged sitting and laying.  She can walk without assistance but that also exacerbates her pain.  She takes Mobic, Robaxin, and narcotic pain medication.  She last had a pain pill early this morning.        Review of patient's allergies indicates:   Allergen Reactions    Adhesive Blisters     Specifically EKG lead pads    Morphine Other (See Comments)     shake     Past Medical History:   Diagnosis Date    Allergy     Anemia     Anxiety     Asthma     denies- on outside problem list in Care Everywhere    Depression     Diabetes mellitus     Diabetes mellitus, type 2     Fibromyalgia     GERD (gastroesophageal reflux disease)     Hypertension     Stroke     TIA    Vertigo      Past Surgical History:   Procedure Laterality Date    ARTHROSCOPIC REPAIR OF ROTATOR CUFF OF SHOULDER Right 2022    Procedure: REPAIR, ROTATOR CUFF, ARTHROSCOPIC;  Surgeon: Ministerio Orr Jr., MD;  Location: [facility] OR;  Service: Orthopedics;  Laterality: Right;  need opus system  Taoism notifed CC     ARTHROSCOPY OF KNEE Left 2021    Procedure: ARTHROSCOPY, KNEE;  Surgeon: Donnie Campuzano MD;  Location: [facility] OR;  Service: Orthopedics;  Laterality: Left;     SECTION      DECOMPRESSION OF SUBACROMIAL SPACE  2022     Procedure: DECOMPRESSION, SUBACROMIAL SPACE;  Surgeon: Ministerio Orr Jr., MD;  Location: [facility];  Service: Orthopedics;;    EXCISION OF MASS OF EXTREMITY Left 12/6/2019    Procedure: EXCISION, MASS, EXTREMITY;  Surgeon: Honorio Pulido IV, MD;  Location: [facility];  Service: Orthopedics;  Laterality: Left;  excision lipoma  Request Lacie    HERNIA REPAIR      HYSTERECTOMY      KNEE ARTHROSCOPY W/ MENISCECTOMY  1/11/2021    Procedure: ARTHROSCOPY, KNEE, WITH MENISCECTOMY;  Surgeon: Donnie Campuzano MD;  Location: [facility];  Service: Orthopedics;;    LUMBAR LAMINECTOMY WITH DISCECTOMY Right 3/28/2024    Procedure: LAMINECTOMY, SPINE, LUMBAR, WITH DISCECTOMY;  Surgeon: Dane Salazar MD;  Location: [facility];  Service: Neurosurgery;  Laterality: Right;  Procedure:Right L5-S1 laminotomy and radiculopathy  Length of procedure: 1.5 hours  LOS: 0-1 night  Anesthesia:General  Radiology:C-arm  Microscope:Metrx  Bed:[place] [id number] Poster  Position:Prone    NASAL SEPTOPLASTY      RECONSTRUCTION OF ACROMIOCLAVICULAR JOINT  9/6/2022    Procedure: RECONSTRUCTION, ACROMIOCLAVICULAR JOINT;  Surgeon: Ministerio Orr Jr., MD;  Location: [facility];  Service: Orthopedics;;    TOTAL KNEE ARTHROPLASTY Right 10/26/2022    Procedure: ARTHROPLASTY, KNEE, TOTAL RIGHT: BALDO - NEXGEN;  Surgeon: Nolberto Fraser MD;  Location: [facility];  Service: Orthopedics;  Laterality: Right;    TOTAL KNEE ARTHROPLASTY Left 8/7/2023    Procedure: ARTHROPLASTY, KNEE, TOTAL: LEFT: BALDO NEXGEN;  Surgeon: Nolberto Fraser MD;  Location: [facility];  Service: Orthopedics;  Laterality: Left;     Family History   Problem Relation Name Age of Onset    No Known Problems Mother      Hypertension Father      Hypertension Sister      No Known Problems Sister      No Known Problems Brother      No Known Problems Daughter      No Known Problems Daughter      No Known Problems Daughter      No Known Problems Son       Social History     Tobacco Use    Smoking status: Former     Smokeless tobacco: Never   Substance Use Topics    Alcohol use: Yes     Comment: occasional    Drug use: Never     Review of Systems   Constitutional:  Negative for fever.   HENT:  Negative for sore throat.    Respiratory:  Negative for shortness of breath.    Cardiovascular:  Negative for chest pain.   Gastrointestinal:  Negative for nausea.   Genitourinary:  Negative for dysuria.   Musculoskeletal:  Positive for arthralgias, back pain and myalgias.   Skin:  Negative for rash.   Neurological:  Negative for weakness.   Hematological:  Does not bruise/bleed easily.       Physical Exam     Initial Vitals [06/13/24 1813]   BP Pulse Resp Temp SpO2   (!) 199/72 89 20 97.8 °F (36.6 °C) 98 %      MAP       --         Physical Exam    Vitals reviewed.  Constitutional: She appears well-developed and well-nourished. She is not diaphoretic. She is cooperative.  Non-toxic appearance. She does not have a sickly appearance. She does not appear ill. No distress.   HENT:   Head: Normocephalic and atraumatic.   Nose: Nose normal.   Mouth/Throat: No trismus in the jaw.   Eyes: Conjunctivae and EOM are normal.   Neck:   Normal range of motion.  Pulmonary/Chest: No accessory muscle usage. No tachypnea. No respiratory distress.   Abdominal: She exhibits no distension.   Musculoskeletal:         General: Normal range of motion.      Cervical back: Normal range of motion.      Comments: Chaperone present for exam.  Skin to area of pain without erythema, wounds, rashes, lesions, abrasions, induration, ecchymosis.  No unilateral leg swelling.  No calf tenderness.  She is able to bear weight and ambulate independently but endorses pain.  Full range of motion and 5/5 strength and symmetric.  Sensations grossly intact.     Neurological: She is alert. She has normal strength.   Skin: Skin is warm and dry. No erythema. No pallor.         ED Course   Procedures  Labs Reviewed - No data to display       Imaging Results    None          Medications    triamcinolone acetonide injection 60 mg (60 mg Intramuscular Given 6/13/24 1959)     Medical Decision Making  Patient is a 52 yo female with a history of Right L5-S1 microdiskectomy on 3/28/24, HTN< DM< fibromyalgia, GERD, who presents to [facility] ED with RLE pain.    Differential diagnosis includes but isn't limited to radiculopathy such as sciatica, neuropathy, DJD, strain, sprain, DDD, sacroiliitis, IT band syndrome.  Patient's pain is similar to her radicular pain.  She is not on hormone therapy or smokes tobacco.  Her surgery was 3 months ago.  Little risk factors for VTE.  Doubt DVT.  DP pulse 2 +and symmetric.  Doubt claudications or acute ischemic limb.  No red flag symptoms to suggest spinal cord compression or cauda equina syndrome.    Patient's exam is reassuring.  She does not need any emergent labs or imaging at this time as it is of little utility.  I will treat her for radiculopathy.  She is already on NSAIDs, muscle relaxers, and narcotic pain medication.  I will give her a steroid injection.  She has not had a Medrol Dosepak in a few months.  Advised her to continue her home pain regimen.  Follow up closely with Neurosurgery if symptoms do not improve in a few days.  She requested that I reach out to her neurosurgery team.  I have updated the neurosurgery ABDIRIZAK via epic chat that she last saw.  All of her questions were answered.  Patient comfortable with plan and stable for discharge.    Risk  Prescription drug management.               ED Course as of 06/13/24 2021   Thu Jun 13, 2024 1908 BP(!): 199/72 [CL]   1908 Temp: 97.8 °F (36.6 °C) [CL]   1908 Pulse: 89 [CL]   1908 Resp: 20 [CL]   1908 SpO2: 98 % [CL]      ED Course User Index  [CL] Malathi Rajan PA-C                           Clinical Impression:  Final diagnoses:  [M54.10] Radiculopathy, unspecified spinal region (Primary)          ED Disposition Condition    Discharge Stable          ED Prescriptions       Medication Sig Dispense Start Date  End Date Auth. Provider    meloxicam (MOBIC) 15 MG tablet Take 1 tablet (15 mg total) by mouth once daily. 30 tablet 6/13/2024 -- Malathi Rajan PA-C          Future Appointments   Date Time Provider Department Center   6/17/2024  1:00 PM [facility] MRI1 500 LB LIMIT [facility] MRI [place] [facility]   7/3/2024  2:00 PM Dane Salazar MD [facility] NEUROSU [place] [facility]   7/16/2024  9:00 AM Caden Metzger III, MD [facility] ORTHO [facility]          Malathi Rajan PA-C  06/13/24 2021

## 2024-06-14 NOTE — DISCHARGE INSTRUCTIONS
You were given a steroid shot in the ED.   Take mobic daily as needed with food for anti-inflammatory relief.  You can take acetaminophen/tylenol 650 mg every 6 hours or 1000 mg every 8 hours for added relief.  Use robaxin (muscle relaxer) only as needed for muscle spasms.   Use narcotic pain medication for SEVERE pain only.   Apply ice to the area for 10-20 minutes every 4 hours. You can apply heat 2 days after for the same duration and frequency.  Follow up with Neurosurgery for reevaluation.  Return to the ER for new or worsening symptoms.

## 2024-06-14 NOTE — ED NOTES
Bed: AHALL4  Expected date: 6/13/24  Expected time: 6:59 PM  Means of arrival:   Comments:  intake

## 2024-06-17 ENCOUNTER — HOSPITAL ENCOUNTER (OUTPATIENT)
Dept: RADIOLOGY | Facility: HOSPITAL | Age: 54
Discharge: HOME | End: 2024-06-17
Attending: NEUROLOGICAL SURGERY
Payer: MEDICAID

## 2024-06-17 DIAGNOSIS — M51.26 RECURRENT DISPLACEMENT OF LUMBAR DISC: ICD-10-CM

## 2024-06-17 DIAGNOSIS — Z98.890 S/P LUMBAR MICRODISCECTOMY: ICD-10-CM

## 2024-06-17 PROCEDURE — 72158 MRI LUMBAR SPINE W/O & W/DYE: CPT | Mod: TC

## 2024-06-17 PROCEDURE — 25500020 PHARM REV CODE 255: Performed by: NEUROLOGICAL SURGERY

## 2024-06-17 PROCEDURE — A9585 GADOBUTROL INJECTION: HCPCS | Performed by: NEUROLOGICAL SURGERY

## 2024-06-17 PROCEDURE — 72158 MRI LUMBAR SPINE W/O & W/DYE: CPT | Mod: 26,,, | Performed by: RADIOLOGY

## 2024-06-17 RX ORDER — GADOBUTROL 604.72 MG/ML
10 INJECTION INTRAVENOUS
Status: COMPLETED | OUTPATIENT
Start: 2024-06-17 | End: 2024-06-17

## 2024-06-17 RX ADMIN — GADOBUTROL 10 ML: 604.72 INJECTION INTRAVENOUS at 02:06

## 2024-06-19 ENCOUNTER — OFFICE VISIT (OUTPATIENT)
Dept: NEUROSURGERY | Facility: CLINIC | Age: 54
End: 2024-06-19
Payer: MEDICAID

## 2024-06-19 DIAGNOSIS — M54.17 LUMBOSACRAL RADICULOPATHY AT S1: ICD-10-CM

## 2024-06-19 DIAGNOSIS — M51.26 RECURRENT HERNIATION OF LUMBAR DISC: Primary | ICD-10-CM

## 2024-06-19 PROCEDURE — 4010F ACE/ARB THERAPY RXD/TAKEN: CPT | Mod: CPTII,95,, | Performed by: NEUROLOGICAL SURGERY

## 2024-06-19 PROCEDURE — 99214 OFFICE O/P EST MOD 30 MIN: CPT | Mod: 24,95,, | Performed by: NEUROLOGICAL SURGERY

## 2024-06-19 RX ORDER — MELOXICAM 15 MG/1
15 TABLET ORAL DAILY
Qty: 30 TABLET | Refills: 2 | Status: SHIPPED | OUTPATIENT
Start: 2024-06-19

## 2024-06-19 RX ORDER — OXYCODONE AND ACETAMINOPHEN 10; 325 MG/1; MG/1
1 TABLET ORAL EVERY 8 HOURS PRN
Qty: 90 TABLET | Refills: 0 | Status: SHIPPED | OUTPATIENT
Start: 2024-06-19

## 2024-06-19 NOTE — PROGRESS NOTES
This was a virtual visit with audio and video    NEUROSURGICAL PROGRESS NOTE    DATE OF SERVICE:  06/19/2024    ATTENDING PHYSICIAN:  Dane Salazar MD    SUBJECTIVE:    INTERIM HISTORY:    She is less than 3 months status post right minimally invasive microdiskectomy.  She has a BMI of 40.58.  For few weeks after the surgery her right leg pain completely subsided but recurred.  She reports constant right leg pain in the S1 distribution.  She has more leg pain than back pain.  Pain is worse with standing up.  Pain can also increase with coughing and sneezing.  She is doing her physical therapy exercises in her bed but she can not do them on the floor due because she also has knee osteoarthritis and is unable to get up from the floor.  She has completed home health physical therapy  She recently went to the emergency room and received an injection in her arm that did not give her pain relief.  No new onset of motor weakness.  No sphincter dysfunction symptoms.  She tried a 5 days of Toradol without significant pain relief.  She also took Mobic with some pain relief.  She is also on oxycodone.              PAST MEDICAL HISTORY:  Active Ambulatory Problems     Diagnosis Date Noted    Lipoma of arm 12/06/2019    Quadriceps weakness 10/05/2020    Gait abnormality 10/05/2020    Decreased range of motion (ROM) of left knee 10/05/2020    Decreased functional mobility 10/05/2020    Acute pain of left knee 12/17/2020    Knee pain 01/11/2021    Degenerative tear of left medial meniscus     Degenerative tear of lateral meniscus, left     Morbid obesity with BMI of 40.0-44.9, adult     Difficulty walking 01/13/2021    Status post arthroscopy of knee 02/02/2021    Primary osteoarthritis of right knee 04/28/2022    Rotator cuff impingement syndrome of right shoulder 06/15/2022    Biceps tendinitis of right upper extremity 06/15/2022    Depression     Diabetes mellitus     Hypertension     Fibromyalgia     Gastroesophageal reflux  disease without esophagitis 10/10/2022    Personal history of TIA (transient ischemic attack) 10/11/2022    Mild intermittent asthma without complication 10/11/2022    GATITO (obstructive sleep apnea) 10/11/2022    Normocytic anemia 10/12/2022    Decreased range of motion of right shoulder 10/14/2022    Decreased strength of upper extremity 10/14/2022    Hypertrophy of nasal turbinates 2019    Pain in right knee 10/31/2022    Decreased range of motion (ROM) of right knee 10/31/2022    Status post right knee replacement 2022    Primary osteoarthritis of left knee 2023    Bilateral leg edema 2023    Chronic low back pain without sciatica 2023    Chronic, continuous use of opioids 2023    ICH (intracerebral hemorrhage) 2023    New daily persistent headache 2023    Status post left knee replacement 10/19/2023    Lumbar disc herniation with radiculopathy 2024    BMI 40.0-44.9, adult 2024     Resolved Ambulatory Problems     Diagnosis Date Noted    Left anterior shoulder pain 2020    Tear of right supraspinatus tendon 06/15/2022     Past Medical History:   Diagnosis Date    Allergy     Anemia     Anxiety     Asthma     Diabetes mellitus, type 2     GERD (gastroesophageal reflux disease)     Stroke     Vertigo        PAST SURGICAL HISTORY:  Past Surgical History:   Procedure Laterality Date    ARTHROSCOPIC REPAIR OF ROTATOR CUFF OF SHOULDER Right 2022    Procedure: REPAIR, ROTATOR CUFF, ARTHROSCOPIC;  Surgeon: Ministerio Orr Jr., MD;  Location: [facility] OR;  Service: Orthopedics;  Laterality: Right;  need opus system  Confucianist notifed CC     ARTHROSCOPY OF KNEE Left 2021    Procedure: ARTHROSCOPY, KNEE;  Surgeon: Donnie Campuzano MD;  Location: [facility] OR;  Service: Orthopedics;  Laterality: Left;     SECTION      DECOMPRESSION OF SUBACROMIAL SPACE  2022    Procedure: DECOMPRESSION, SUBACROMIAL SPACE;  Surgeon: Ministerio Orr Jr., MD;   Location: [facility] OR;  Service: Orthopedics;;    EXCISION OF MASS OF EXTREMITY Left 12/6/2019    Procedure: EXCISION, MASS, EXTREMITY;  Surgeon: Honorio Pulido IV, MD;  Location: [facility];  Service: Orthopedics;  Laterality: Left;  excision lipoma  Request Lacie    HERNIA REPAIR      HYSTERECTOMY      KNEE ARTHROSCOPY W/ MENISCECTOMY  1/11/2021    Procedure: ARTHROSCOPY, KNEE, WITH MENISCECTOMY;  Surgeon: Donnie Campuzano MD;  Location: [facility] OR;  Service: Orthopedics;;    LUMBAR LAMINECTOMY WITH DISCECTOMY Right 3/28/2024    Procedure: LAMINECTOMY, SPINE, LUMBAR, WITH DISCECTOMY;  Surgeon: Dane Salazar MD;  Location: [facility] OR;  Service: Neurosurgery;  Laterality: Right;  Procedure:Right L5-S1 laminotomy and radiculopathy  Length of procedure: 1.5 hours  LOS: 0-1 night  Anesthesia:General  Radiology:C-arm  Microscope:Metrx  Bed:[address]  Position:Prone    NASAL SEPTOPLASTY      RECONSTRUCTION OF ACROMIOCLAVICULAR JOINT  9/6/2022    Procedure: RECONSTRUCTION, ACROMIOCLAVICULAR JOINT;  Surgeon: Ministerio Orr Jr., MD;  Location: [facility] OR;  Service: Orthopedics;;    TOTAL KNEE ARTHROPLASTY Right 10/26/2022    Procedure: ARTHROPLASTY, KNEE, TOTAL RIGHT: BALDO - NEXGEN;  Surgeon: Nolberto Fraser MD;  Location: [facility];  Service: Orthopedics;  Laterality: Right;    TOTAL KNEE ARTHROPLASTY Left 8/7/2023    Procedure: ARTHROPLASTY, KNEE, TOTAL: LEFT: BALDO NEXGEN;  Surgeon: Nolberto Fraser MD;  Location: [facility];  Service: Orthopedics;  Laterality: Left;       SOCIAL HISTORY:   Social History     Socioeconomic History    Marital status:    Tobacco Use    Smoking status: Former    Smokeless tobacco: Never   Substance and Sexual Activity    Alcohol use: Yes     Comment: occasional    Drug use: Never    Sexual activity: Yes     Partners: Male     Social Determinants of Health     Financial Resource Strain: High Risk (3/4/2024)    Received from [facility], [facility]    Overall Financial Resource Strain  (CARDIA)     Difficulty of Paying Living Expenses: Very hard   Food Insecurity: Food Insecurity Present (3/4/2024)    Received from [facility]    Hunger Vital Sign     Worried About Running Out of Food in the Last Year: Often true     Ran Out of Food in the Last Year: Sometimes true   Transportation Needs: No Transportation Needs (3/4/2024)    Received from [facility]    PRAPARE - Transportation     Lack of Transportation (Medical): No     Lack of Transportation (Non-Medical): No   Physical Activity: Inactive (3/4/2024)    Received from [facility]    Exercise Vital Sign     Days of Exercise per Week: 0 days     Minutes of Exercise per Session: 10 min   Stress: Stress Concern Present (3/4/2024)    Received from [facility]    [place] [employer] - Occupational Stress Questionnaire     Feeling of Stress : Very much   Housing Stability: High Risk (3/4/2024)    Received from [facility]    Housing Stability Vital Sign     Unable to Pay for Housing in the Last Year: Yes     Number of Places Lived in the Last Year: 1     In the last 12 months, was there a time when you did not have a steady place to sleep or slept in a shelter (including now)?: No       FAMILY HISTORY:  Family History   Problem Relation Name Age of Onset    No Known Problems Mother      Hypertension Father      Hypertension Sister      No Known Problems Sister      No Known Problems Brother      No Known Problems Daughter      No Known Problems Daughter      No Known Problems Daughter      No Known Problems Son         CURRENTS MEDICATIONS:  Current Outpatient Medications on File Prior to Visit   Medication Sig Dispense Refill    albuterol (PROVENTIL/VENTOLIN HFA) 90 mcg/actuation inhaler SMARTSI Puff(s) By Mouth Every 4 Hours PRN      ALBUTEROL INHL Inhale 2 puffs into the lungs every 4 (four) hours as needed.      amLODIPine (NORVASC) 5 MG tablet Take 5 mg by  "mouth once daily.      ARIPiprazole (ABILIFY) 2 MG Tab Take 2 mg by mouth every evening.      aspirin (ECOTRIN) 81 MG EC tablet Take 1 tablet (81 mg total) by mouth 2 (two) times a day. 60 tablet 0    azelastine (ASTELIN) 137 mcg (0.1 %) nasal spray 1 spray once daily.      BD ALINE 2ND GEN PEN NEEDLE 32 gauge x 5/32" Ndle USE ONE NEEDLE ONCE A WEEK      cetirizine (ZYRTEC) 10 MG tablet Take 10 mg by mouth every evening.      cholecalciferol, vitamin D3, 1,250 mcg (50,000 unit) capsule Take 50,000 Units by mouth every 7 days.      cyclobenzaprine (FLEXERIL) 5 MG tablet Take 1 tablet (5 mg total) by mouth 3 (three) times daily as needed for Muscle spasms (Pain). 15 tablet 0    diazePAM (VALIUM) 2 MG tablet Take 2 mg by mouth every 6 (six) hours as needed.      diclofenac sodium (VOLTAREN) 1 % Gel Apply 2 g topically 4 (four) times daily. 20 g 0    EASY TOUCH ALCOHOL PREP PADS PadM USE TO prepare FOR glucose testing      EPINEPHrine (EPIPEN) 0.3 mg/0.3 mL AtIn as per administration instructions      ergocalciferol (ERGOCALCIFEROL) 50,000 unit Cap Take 50,000 Units by mouth every 7 days.      FEROSUL 325 mg (65 mg iron) Tab tablet Take by mouth every other day.      FLOWFLEX COVID-19 AG HOME TEST Kit test AS directed      FLUoxetine 20 MG capsule Take 40 mg by mouth once daily.      fluticasone propionate (FLONASE) 50 mcg/actuation nasal spray 2 sprays by Nasal route.      folic acid (FOLVITE) 1 MG tablet Take 1,000 mcg by mouth.      hydrOXYzine pamoate (VISTARIL) 25 MG Cap Take 25 mg by mouth every evening.      LIDOcaine (LIDODERM) 5 % Place 1 patch onto the skin once daily. Remove & Discard patch within 12 hours or as directed by MD 6 patch 0    loratadine (CLARITIN) 10 mg tablet Take 10 mg by mouth once daily.      losartan (COZAAR) 100 MG tablet Take 100 mg by mouth once daily.      meclizine (ANTIVERT) 25 mg tablet Take 25 mg by mouth.      metFORMIN (GLUCOPHAGE-XR) 750 MG ER 24hr tablet Take 750 mg by mouth once " daily.      methocarbamoL (ROBAXIN) 750 MG Tab Take 1 tablet (750 mg total) by mouth 3 (three) times daily as needed (muscle spasms). 90 tablet 2    montelukast (SINGULAIR) 10 mg tablet       nicotine polacrilex (NICORETTE) 4 MG Gum SMARTSI Each By Mouth PRN      ondansetron (ZOFRAN-ODT) 8 MG TbDL Take 8 mg by mouth every 8 (eight) hours as needed.      ONETOUCH DELICA PLUS LANCET 33 gauge Misc Apply topically 4 (four) times daily.      ONETOUCH ULTRA2 METER Misc SMARTSIG:Via Meter As Directed      ONETOUCH VERIO TEST STRIPS Strp 1 strip 4 (four) times daily.      pantoprazole (PROTONIX) 40 MG tablet Take 40 mg by mouth.      paroxetine (PAXIL) 20 MG tablet Take 20 mg by mouth every morning.      pregabalin (LYRICA) 200 MG Cap Take 1 capsule (200 mg total) by mouth 2 (two) times daily. 60 capsule 2    PREMARIN 0.625 mg tablet Take 0.625 mg by mouth once daily.      rosuvastatin (CRESTOR) 10 MG tablet Take 10 mg by mouth.      semaglutide (OZEMPIC) 0.25 mg or 0.5 mg (2 mg/3 mL) pen injector Inject 0.25 mg into the skin every 7 (seven) days      senna-docusate 8.6-50 mg (SENNA WITH DOCUSATE SODIUM) 8.6-50 mg per tablet Take 1 tablet by mouth once daily. 30 tablet 0    STOOL SOFTENER 100 mg capsule Take 1 softgel by mouth twice daily 60 capsule 11    sumatriptan (IMITREX) 100 MG tablet Take 100 mg by mouth 2 (two) times daily as needed.      traMADoL (ULTRAM) 50 mg tablet Take 1 tablet (50 mg total) by mouth every 8 (eight) hours as needed for Pain. 90 tablet 3    [DISCONTINUED] meloxicam (MOBIC) 15 MG tablet Take 1 tablet (15 mg total) by mouth once daily. 30 tablet 0    [DISCONTINUED] oxyCODONE-acetaminophen (PERCOCET)  mg per tablet Take 1 tablet by mouth every 6 (six) hours as needed for Pain. 28 tablet 0     Current Facility-Administered Medications on File Prior to Visit   Medication Dose Route Frequency Provider Last Rate Last Admin    fentaNYL 50 mcg/mL injection 100 mcg  100 mcg Intravenous PRN  Neno Horton PA-C   25 mcg at 08/07/23 1234    lactated ringers infusion   Intravenous Continuous Adrienne Johnson, DNP        midazolam (VERSED) 1 mg/mL injection 1 mg  1 mg Intravenous PRN Neno Horton PA-C   2 mg at 08/07/23 1120    ropivacaine 0.2% Nimbus PainPRO Pump infusion 500 ML   Perineural Continuous Neno Horton PA-C   New Bag at 08/07/23 1516       ALLERGIES:  Review of patient's allergies indicates:   Allergen Reactions    Adhesive Blisters     Specifically EKG lead pads    Morphine Other (See Comments)     shake       REVIEW OF SYSTEMS:  ROS      OBJECTIVE:    PHYSICAL EXAMINATION:   There were no vitals filed for this visit.    Neurosurgery Physical Exam    Ortho Exam      Neurologic Exam      DIAGNOSTIC DATA:  I personally interpreted the following imaging:   Repeat lumbar spine MRI, postoperative shows status post right L5-S1 laminotomy and microdiskectomy, persistent central and left-sided disc herniation, new right-sided disc herniation causing lateral recess stenosis, degenerative disc disease at L5-S1    ASSESSMENT:  This is a 53 y.o. female with     Problem List Items Addressed This Visit          Endocrine    BMI 40.0-44.9, adult     Other Visit Diagnoses       Recurrent herniation of lumbar disc    -  Primary    Relevant Orders    Ambulatory referral/consult to Pain Clinic    Lumbosacral radiculopathy at S1                  PLAN:  Refill on Mobic and oxycodone  Referral to pain management for right S1 transforaminal epidural steroid injection  If fails conservative management she had be a candidate for a L5-S1 anterior interbody fusion, placement of interbody spacer, DePuy Conduit ALIF cage, L5-S1 posterior instrumentation using DePuy Viper Prime system.     I explained the natural history of the disease and all treatment options.     We have discussed the risks of surgery including death, coma, bleeding, infection, failure of surgery, CSF leak, nerve root injury, spinal  cord injury, ureter injury, weakness, paralysis, peripheral neuropathy, malplaced hardware, migration of hardware, non-union, need for reoperation. Patient understands the risks and would like to proceed with surgery.    The patient has increase perioperative risks because of these comorbidities: BMI 40.58.     More than 50% of the time was spent on discussing conservative management treatments (medication, physical therapy exercises) and possible interventions (spinal injections and surgical procedures). Care coordination was discussed.    30 min        Dane Salazar MD  Cell:[phone]

## 2024-06-24 ENCOUNTER — PATIENT MESSAGE (OUTPATIENT)
Dept: NEUROSURGERY | Facility: CLINIC | Age: 54
End: 2024-06-24
Payer: MEDICAID

## 2024-07-03 ENCOUNTER — TELEPHONE (OUTPATIENT)
Dept: NEUROSURGERY | Facility: CLINIC | Age: 54
End: 2024-07-03
Payer: MEDICAID

## 2024-07-03 ENCOUNTER — PATIENT MESSAGE (OUTPATIENT)
Dept: ORTHOPEDICS | Facility: CLINIC | Age: 54
End: 2024-07-03
Payer: MEDICAID

## 2024-07-03 NOTE — TELEPHONE ENCOUNTER
Returned pt's call, pt stated that the weather is really bad by her and she has a cough so she would like to cancel her appointment. I stated to pt that I will cancel her appointment. Pt voiced understanding

## 2024-07-09 ENCOUNTER — TELEPHONE (OUTPATIENT)
Dept: NEUROSURGERY | Facility: CLINIC | Age: 54
End: 2024-07-09
Payer: MEDICAID

## 2024-07-09 DIAGNOSIS — Z98.1 S/P LUMBAR FUSION: ICD-10-CM

## 2024-07-09 DIAGNOSIS — Z98.1 S/P LUMBAR FUSION: Primary | ICD-10-CM

## 2024-07-09 DIAGNOSIS — M51.26 RECURRENT HERNIATION OF LUMBAR DISC: Primary | ICD-10-CM

## 2024-07-10 ENCOUNTER — TELEPHONE (OUTPATIENT)
Dept: NEUROSURGERY | Facility: CLINIC | Age: 54
End: 2024-07-10
Payer: MEDICAID

## 2024-07-10 NOTE — TELEPHONE ENCOUNTER
Returned pt's call, pt stated that she needed a copy of the clearance form to give to her PCP but she was able to work things out. Pt also stated that her PCP would like to speak with me. PCP (Beth Gupta)was placed on the phone, she stated that the pt is using her inhaler more often now and she would like to speak with anesthesiologist to discuss this matter more. I stated to the pt and pt's PCP that I will send a message to surgery to see who will be on her case and give them her contact info. PCP and pt voiced understanding.

## 2024-07-11 ENCOUNTER — HOSPITAL ENCOUNTER (OUTPATIENT)
Dept: PREADMISSION TESTING | Facility: HOSPITAL | Age: 54
Discharge: HOME | End: 2024-07-11
Attending: NURSE PRACTITIONER
Payer: MEDICAID

## 2024-07-11 ENCOUNTER — TELEPHONE (OUTPATIENT)
Dept: PREADMISSION TESTING | Facility: HOSPITAL | Age: 54
End: 2024-07-11
Payer: MEDICAID

## 2024-07-11 NOTE — DISCHARGE INSTRUCTIONS

## 2024-07-11 NOTE — TELEPHONE ENCOUNTER
Attempted to contact for her audio PAT appointment with no avail. Left message to return call.     Have you receive medical clearance for her surgery on 7/18/24?

## 2024-07-13 ENCOUNTER — NURSE TRIAGE (OUTPATIENT)
Dept: ADMINISTRATIVE | Facility: CLINIC | Age: 54
End: 2024-07-13
Payer: MEDICAID

## 2024-07-13 NOTE — TELEPHONE ENCOUNTER
Non-[facility] PCP      No Contact. Unable to reach patient. Patient's voice messaging system states that it is not available at this time.   Reason for Disposition   Second attempt to contact caller AND no contact made. Phone number verified.    Additional Information   Negative: Caller is angry or rude (e.g., hangs up, verbally abusive, yelling)   Negative: Caller hangs up   Negative: Caller has already spoken with the PCP and has no further questions.   Negative: Caller has already spoken with another triager and has no further questions.   Negative: Caller has already spoken with another triager or PCP AND has further questions AND triager able to answer questions.   Negative: Busy signal.  First attempt to contact caller.  Follow-up call scheduled within 15 minutes.   Negative: No answer.  First attempt to contact caller.  Follow-up call scheduled within 15 minutes.   Negative: Message left on identified voice mail   Negative: Message left on unidentified voice mail.  Phone number verified.   Negative: Message left with person in household.   Negative: Wrong number reached.  Phone number verified.    Protocols used: No Contact or Duplicate Contact Call-A-

## 2024-07-15 ENCOUNTER — HOSPITAL ENCOUNTER (OUTPATIENT)
Dept: PREADMISSION TESTING | Facility: HOSPITAL | Age: 54
Discharge: HOME | End: 2024-07-15
Attending: NURSE PRACTITIONER
Payer: MEDICAID

## 2024-07-15 ENCOUNTER — TELEPHONE (OUTPATIENT)
Dept: PREADMISSION TESTING | Facility: HOSPITAL | Age: 54
End: 2024-07-15
Payer: MEDICAID

## 2024-07-15 RX ORDER — DULOXETIN HYDROCHLORIDE 20 MG/1
20 CAPSULE, DELAYED RELEASE ORAL DAILY
COMMUNITY

## 2024-07-15 RX ORDER — OXYCODONE AND ACETAMINOPHEN 10; 325 MG/1; MG/1
1 TABLET ORAL EVERY 8 HOURS PRN
Qty: 90 TABLET | Refills: 0 | Status: ON HOLD | OUTPATIENT
Start: 2024-07-15 | End: 2024-07-20 | Stop reason: HOSPADM

## 2024-07-15 NOTE — PRE-PROCEDURE INSTRUCTIONS
Allergies, medical, surgical, family and psychosocial histories reviewed with patient. Periop plan of care reviewed. Preop instructions given, including medications to take and to hold. Hibiclens soap and instructions on use given. Time allotted for questions to be addressed.        Arrival time 0930.      Instructed to take Amlodipine, Cymbalta, Pantoprazole, Fluxotine, and Losartan the morning if surgery. Ozempic last dose on 7/10/24.

## 2024-07-15 NOTE — TELEPHONE ENCOUNTER
Spoke to Karen with Dr. Garcia [phone] for medical clearance. She is sending a message to the office to have them fax over clearance.

## 2024-07-15 NOTE — DISCHARGE INSTRUCTIONS
Your surgery is scheduled for 7/18/24.    Please [facility] to [facility] on the [facility]st Floor at 0930 a.m    Please take Amlodipine, Cymbalta, Pantoprazole, Fluxotine, and Losartan the morning if surgery. Ozempic last dose on 7/10/24.         ____  Do not wear makeup, including mascara.  ____  No powder, lotions or creams to surgical area.  ____  Please remove all jewelry, including piercings and leave at home.  ____  No money or valuables needed. Please leave at home.  ____  Please bring any documents given by your doctor.  ____  If going home the same day, arrange for a ride home. You will not be able to             drive if Anesthesia was used.  ____  Children under 18 years require a parent / guardian present the entire time             they are in surgery / recovery.  ____  Wear loose fitting clothing. Allow for dressings, bandages.  ____  Stop Aspirin and blood thinners as directed by prescribing provider.  ____  Do not take any herbal, vitamins, and or supplements 2 weeks prior to surgery.  ____  Stop NSAIDS (Naproxen, Aleve, Voltaren, Celebrex, Melxoicam), Goody's, and BC powder 7 days prior to surgery.  ____  Wash the surgical area with Hibiclens or Dial Antibacterial bar soap the night before surgery, and again the             morning of surgery.  Be sure to rinse hibiclens or Dial Antibacterial bar soap off completely (if instructed by   nurse).  ____  If you take diabetic medication including Metformin, Glimepiride, Glipizide, Glyburide, Byetta, Januvia, Actos, do not take am of surgery unless            instructed by Doctor.  ____  Hold Invokana, Farxiga, and Jardiance for 3 days prior to surgery.   ____  Call MD for temperature above 101 degrees or any other signs of infection such as Urinary (bladder) infection, Upper respiratory infection, skin boils,             etc.  ____ Do Not wear your contact lenses the day of your procedure.  You may wear your glasses.      ____ Do not shave surgical site  for 3 days prior to surgery.  ____ Practice Good hand washing before, during, and after procedure.      I have read or had read and explained to me, and understand the above information.  Additional comments or instructions:  For additional questions call [phone]      ANESTHESIA SIDE EFFECTS  -For the first 24 hours after surgery:  Do not drive, use heavy equipment, make important decisions, or drink alcohol  -It is normal to feel sleepy for several hours.  Rest until you are more awake.  -Have someone stay with you, if needed.  They can watch for problems and help keep you safe.  -Some possible post anesthesia side effects include: nausea and vomiting, sore throat and hoarseness, sleepiness, and dizziness.        Pre-Op Bathing Instructions    Before surgery, you can play an important role in your own health.    Because skin is not sterile, we need to be sure that your skin is as free of germs as possible. By following the instructions below, you can reduce the number of germs on your skin before surgery.    IMPORTANT: You will need to shower with a special soap called Hibiclens*, available at any pharmacy.  If you are allergic to Chlorhexidine (the antiseptic in Hibiclens), use an antibacterial soap such as Dial Soap for your preoperative shower.  You will shower with Hibiclens both the night before your surgery and the morning of your surgery.  Do not use Hibiclens on the head, face or genitals to avoid injury to those areas.    STEP #1: THE NIGHT BEFORE YOUR SURGERY     Do not shave the area of your body where your surgery will be performed.  Shower and wash your hair and body as usual with your normal soap and shampoo.  Rinse your hair and body thoroughly after you shower to remove all soap residue.  With your hand, apply one packet of Hibiclens soap to the surgical site.   Wash the site gently for five (5) minutes. Do not scrub your skin too hard.   Do not wash with your regular soap after Hibiclens is  used.  Rinse your body thoroughly.  Pat yourself dry with a clean, soft towel.  Do not use lotion, cream, or powder.  Wear clean clothes.    STEP #2: THE MORNING OF YOUR SURGERY     Repeat Step #1.    * Not to be used by people allergic to Chlorhexidine.

## 2024-07-17 ENCOUNTER — ANESTHESIA EVENT (OUTPATIENT)
Dept: SURGERY | Facility: HOSPITAL | Age: 54
End: 2024-07-17
Payer: MEDICAID

## 2024-07-17 ENCOUNTER — TELEPHONE (OUTPATIENT)
Dept: NEUROSURGERY | Facility: CLINIC | Age: 54
End: 2024-07-17
Payer: MEDICAID

## 2024-07-17 NOTE — TELEPHONE ENCOUNTER
Returned call to pharmacy,  pharmacist stated that they need a PA for Oxycodone. I stated to pharmacist I didn't receive a PA. She stated that she will refax it. I voiced understanding

## 2024-07-18 ENCOUNTER — ANESTHESIA (OUTPATIENT)
Dept: SURGERY | Facility: HOSPITAL | Age: 54
End: 2024-07-18
Payer: MEDICAID

## 2024-07-18 ENCOUNTER — HOSPITAL ENCOUNTER (INPATIENT)
Facility: HOSPITAL | Age: 54
LOS: 2 days | Discharge: HOME HEALTH | DRG: 460 | End: 2024-07-20
Attending: NEUROLOGICAL SURGERY | Admitting: NEUROLOGICAL SURGERY
Payer: MEDICAID

## 2024-07-18 DIAGNOSIS — G47.33 OSA (OBSTRUCTIVE SLEEP APNEA): ICD-10-CM

## 2024-07-18 DIAGNOSIS — M51.16 LUMBAR DISC HERNIATION WITH RADICULOPATHY: ICD-10-CM

## 2024-07-18 DIAGNOSIS — M51.26 RECURRENT HERNIATION OF LUMBAR DISC: Primary | ICD-10-CM

## 2024-07-18 DIAGNOSIS — M48.07 FORAMINAL STENOSIS OF LUMBOSACRAL REGION: ICD-10-CM

## 2024-07-18 DIAGNOSIS — Z98.890 S/P LUMBAR MICRODISCECTOMY: ICD-10-CM

## 2024-07-18 LAB
ABO + RH BLD: NORMAL
BLD GP AB SCN CELLS X3 SERPL QL: NORMAL
POCT GLUCOSE: 158 MG/DL (ref 70–110)
POCT GLUCOSE: 97 MG/DL (ref 70–110)
SPECIMEN OUTDATE: NORMAL

## 2024-07-18 PROCEDURE — 63600175 PHARM REV CODE 636 W HCPCS: Performed by: NURSE ANESTHETIST, CERTIFIED REGISTERED

## 2024-07-18 PROCEDURE — 36000711: Performed by: NEUROLOGICAL SURGERY

## 2024-07-18 PROCEDURE — 37000008 HC ANESTHESIA 1ST 15 MINUTES: Performed by: NEUROLOGICAL SURGERY

## 2024-07-18 PROCEDURE — 25000003 PHARM REV CODE 250: Performed by: NEUROLOGICAL SURGERY

## 2024-07-18 PROCEDURE — 3E0U0GB INTRODUCTION OF RECOMBINANT BONE MORPHOGENETIC PROTEIN INTO JOINTS, OPEN APPROACH: ICD-10-PCS | Performed by: NEUROLOGICAL SURGERY

## 2024-07-18 PROCEDURE — 71000033 HC RECOVERY, INTIAL HOUR: Performed by: NEUROLOGICAL SURGERY

## 2024-07-18 PROCEDURE — 22853 INSJ BIOMECHANICAL DEVICE: CPT | Mod: ,,, | Performed by: NEUROLOGICAL SURGERY

## 2024-07-18 PROCEDURE — 27201423 OPTIME MED/SURG SUP & DEVICES STERILE SUPPLY: Performed by: NEUROLOGICAL SURGERY

## 2024-07-18 PROCEDURE — 63600175 PHARM REV CODE 636 W HCPCS: Performed by: STUDENT IN AN ORGANIZED HEALTH CARE EDUCATION/TRAINING PROGRAM

## 2024-07-18 PROCEDURE — 11000001 HC ACUTE MED/SURG PRIVATE ROOM

## 2024-07-18 PROCEDURE — 27800903 OPTIME MED/SURG SUP & DEVICES OTHER IMPLANTS: Performed by: NEUROLOGICAL SURGERY

## 2024-07-18 PROCEDURE — 61783 SCAN PROC SPINAL: CPT | Mod: ,,, | Performed by: NEUROLOGICAL SURGERY

## 2024-07-18 PROCEDURE — C1713 ANCHOR/SCREW BN/BN,TIS/BN: HCPCS | Performed by: NEUROLOGICAL SURGERY

## 2024-07-18 PROCEDURE — 0SG30A0 FUSION OF LUMBOSACRAL JOINT WITH INTERBODY FUSION DEVICE, ANTERIOR APPROACH, ANTERIOR COLUMN, OPEN APPROACH: ICD-10-PCS | Performed by: NEUROLOGICAL SURGERY

## 2024-07-18 PROCEDURE — 25000003 PHARM REV CODE 250: Performed by: NURSE ANESTHETIST, CERTIFIED REGISTERED

## 2024-07-18 PROCEDURE — 63600175 PHARM REV CODE 636 W HCPCS: Performed by: ANESTHESIOLOGY

## 2024-07-18 PROCEDURE — 86900 BLOOD TYPING SEROLOGIC ABO: CPT | Performed by: NEUROLOGICAL SURGERY

## 2024-07-18 PROCEDURE — 86901 BLOOD TYPING SEROLOGIC RH(D): CPT | Performed by: NEUROLOGICAL SURGERY

## 2024-07-18 PROCEDURE — 94660 CPAP INITIATION&MGMT: CPT

## 2024-07-18 PROCEDURE — 36415 COLL VENOUS BLD VENIPUNCTURE: CPT | Performed by: NEUROLOGICAL SURGERY

## 2024-07-18 PROCEDURE — 01NB0ZZ RELEASE LUMBAR NERVE, OPEN APPROACH: ICD-10-PCS | Performed by: NEUROLOGICAL SURGERY

## 2024-07-18 PROCEDURE — 27100171 HC OXYGEN HIGH FLOW UP TO 24 HOURS

## 2024-07-18 PROCEDURE — 37000009 HC ANESTHESIA EA ADD 15 MINS: Performed by: NEUROLOGICAL SURGERY

## 2024-07-18 PROCEDURE — 25000003 PHARM REV CODE 250

## 2024-07-18 PROCEDURE — 94761 N-INVAS EAR/PLS OXIMETRY MLT: CPT

## 2024-07-18 PROCEDURE — 99900035 HC TECH TIME PER 15 MIN (STAT)

## 2024-07-18 PROCEDURE — 22558 ARTHRD ANT NTRBD MIN DSC LUM: CPT | Mod: ,,, | Performed by: NEUROLOGICAL SURGERY

## 2024-07-18 PROCEDURE — 86850 RBC ANTIBODY SCREEN: CPT | Performed by: NEUROLOGICAL SURGERY

## 2024-07-18 PROCEDURE — 36000710: Performed by: NEUROLOGICAL SURGERY

## 2024-07-18 PROCEDURE — 20930 SP BONE ALGRFT MORSEL ADD-ON: CPT | Mod: ,,, | Performed by: NEUROLOGICAL SURGERY

## 2024-07-18 PROCEDURE — 63600175 PHARM REV CODE 636 W HCPCS: Performed by: NEUROLOGICAL SURGERY

## 2024-07-18 PROCEDURE — 22840 INSERT SPINE FIXATION DEVICE: CPT | Mod: ,,, | Performed by: NEUROLOGICAL SURGERY

## 2024-07-18 PROCEDURE — 71000039 HC RECOVERY, EACH ADD'L HOUR: Performed by: NEUROLOGICAL SURGERY

## 2024-07-18 PROCEDURE — 5A09357 ASSISTANCE WITH RESPIRATORY VENTILATION, LESS THAN 24 CONSECUTIVE HOURS, CONTINUOUS POSITIVE AIRWAY PRESSURE: ICD-10-PCS | Performed by: NEUROLOGICAL SURGERY

## 2024-07-18 PROCEDURE — 27000190 HC CPAP FULL FACE MASK W/VALVE

## 2024-07-18 DEVICE — ROD SPINAL VIPER 2 6.5 X 40MM: Type: IMPLANTABLE DEVICE | Site: BACK | Status: FUNCTIONAL

## 2024-07-18 DEVICE — IMPLANTABLE DEVICE: Type: IMPLANTABLE DEVICE | Site: BACK | Status: FUNCTIONAL

## 2024-07-18 DEVICE — SCREW SET SPINAL SINGLE INNER: Type: IMPLANTABLE DEVICE | Site: BACK | Status: FUNCTIONAL

## 2024-07-18 DEVICE — KIT BONE GRAFT IMPACT XXSMALL: Type: IMPLANTABLE DEVICE | Site: BACK | Status: FUNCTIONAL

## 2024-07-18 DEVICE — GRAFT PRIME DBM HD BONE 2.5CC: Type: IMPLANTABLE DEVICE | Site: BACK | Status: FUNCTIONAL

## 2024-07-18 RX ORDER — DEXAMETHASONE SODIUM PHOSPHATE 4 MG/ML
INJECTION, SOLUTION INTRA-ARTICULAR; INTRALESIONAL; INTRAMUSCULAR; INTRAVENOUS; SOFT TISSUE
Status: DISCONTINUED | OUTPATIENT
Start: 2024-07-18 | End: 2024-07-18

## 2024-07-18 RX ORDER — ACETAMINOPHEN 325 MG/1
650 TABLET ORAL EVERY 6 HOURS
Status: DISCONTINUED | OUTPATIENT
Start: 2024-07-18 | End: 2024-07-20 | Stop reason: HOSPADM

## 2024-07-18 RX ORDER — MUPIROCIN 20 MG/G
OINTMENT TOPICAL 2 TIMES DAILY
Status: DISCONTINUED | OUTPATIENT
Start: 2024-07-18 | End: 2024-07-20 | Stop reason: HOSPADM

## 2024-07-18 RX ORDER — PANTOPRAZOLE SODIUM 40 MG/1
40 TABLET, DELAYED RELEASE ORAL DAILY
Status: DISCONTINUED | OUTPATIENT
Start: 2024-07-19 | End: 2024-07-20 | Stop reason: HOSPADM

## 2024-07-18 RX ORDER — SODIUM CHLORIDE 0.9 % (FLUSH) 0.9 %
10 SYRINGE (ML) INJECTION
Status: DISCONTINUED | OUTPATIENT
Start: 2024-07-18 | End: 2024-07-18 | Stop reason: HOSPADM

## 2024-07-18 RX ORDER — IBUPROFEN 200 MG
24 TABLET ORAL
Status: DISCONTINUED | OUTPATIENT
Start: 2024-07-18 | End: 2024-07-20 | Stop reason: HOSPADM

## 2024-07-18 RX ORDER — BISACODYL 10 MG/1
10 SUPPOSITORY RECTAL DAILY
Status: DISCONTINUED | OUTPATIENT
Start: 2024-07-19 | End: 2024-07-20 | Stop reason: HOSPADM

## 2024-07-18 RX ORDER — CYCLOBENZAPRINE HCL 10 MG
10 TABLET ORAL
Status: COMPLETED | OUTPATIENT
Start: 2024-07-18 | End: 2024-07-18

## 2024-07-18 RX ORDER — ONDANSETRON HYDROCHLORIDE 2 MG/ML
4 INJECTION, SOLUTION INTRAVENOUS ONCE AS NEEDED
Status: DISCONTINUED | OUTPATIENT
Start: 2024-07-18 | End: 2024-07-18 | Stop reason: HOSPADM

## 2024-07-18 RX ORDER — MIDAZOLAM HYDROCHLORIDE 1 MG/ML
INJECTION, SOLUTION INTRAMUSCULAR; INTRAVENOUS
Status: DISCONTINUED | OUTPATIENT
Start: 2024-07-18 | End: 2024-07-18

## 2024-07-18 RX ORDER — HYDROMORPHONE HYDROCHLORIDE 2 MG/ML
0.5 INJECTION, SOLUTION INTRAMUSCULAR; INTRAVENOUS; SUBCUTANEOUS EVERY 5 MIN PRN
Status: DISCONTINUED | OUTPATIENT
Start: 2024-07-18 | End: 2024-07-18 | Stop reason: HOSPADM

## 2024-07-18 RX ORDER — ALBUTEROL SULFATE 90 UG/1
2 AEROSOL, METERED RESPIRATORY (INHALATION) EVERY 4 HOURS PRN
Status: DISCONTINUED | OUTPATIENT
Start: 2024-07-18 | End: 2024-07-20 | Stop reason: HOSPADM

## 2024-07-18 RX ORDER — ARIPIPRAZOLE 5 MG/1
5 TABLET ORAL NIGHTLY
COMMUNITY

## 2024-07-18 RX ORDER — CELECOXIB 100 MG/1
200 CAPSULE ORAL
Status: COMPLETED | OUTPATIENT
Start: 2024-07-18 | End: 2024-07-18

## 2024-07-18 RX ORDER — INSULIN ASPART 100 [IU]/ML
0-10 INJECTION, SOLUTION INTRAVENOUS; SUBCUTANEOUS
Status: DISCONTINUED | OUTPATIENT
Start: 2024-07-18 | End: 2024-07-20 | Stop reason: HOSPADM

## 2024-07-18 RX ORDER — DULOXETIN HYDROCHLORIDE 20 MG/1
20 CAPSULE, DELAYED RELEASE ORAL DAILY
Status: DISCONTINUED | OUTPATIENT
Start: 2024-07-19 | End: 2024-07-20 | Stop reason: HOSPADM

## 2024-07-18 RX ORDER — AZELASTINE 1 MG/ML
1 SPRAY, METERED NASAL DAILY
Status: DISCONTINUED | OUTPATIENT
Start: 2024-07-19 | End: 2024-07-20 | Stop reason: HOSPADM

## 2024-07-18 RX ORDER — ACETAMINOPHEN 325 MG/1
650 TABLET ORAL
Status: COMPLETED | OUTPATIENT
Start: 2024-07-18 | End: 2024-07-18

## 2024-07-18 RX ORDER — CELECOXIB 100 MG/1
200 CAPSULE ORAL 2 TIMES DAILY
Status: DISCONTINUED | OUTPATIENT
Start: 2024-07-18 | End: 2024-07-20 | Stop reason: HOSPADM

## 2024-07-18 RX ORDER — GLUCAGON 1 MG
1 KIT INJECTION
Status: DISCONTINUED | OUTPATIENT
Start: 2024-07-18 | End: 2024-07-18 | Stop reason: HOSPADM

## 2024-07-18 RX ORDER — ARIPIPRAZOLE 2 MG/1
2 TABLET ORAL NIGHTLY
Status: DISCONTINUED | OUTPATIENT
Start: 2024-07-18 | End: 2024-07-20 | Stop reason: HOSPADM

## 2024-07-18 RX ORDER — ROSUVASTATIN CALCIUM 20 MG/1
20 TABLET, COATED ORAL DAILY
COMMUNITY

## 2024-07-18 RX ORDER — TRAMADOL HYDROCHLORIDE 50 MG/1
50 TABLET ORAL EVERY 6 HOURS PRN
Status: DISCONTINUED | OUTPATIENT
Start: 2024-07-18 | End: 2024-07-20 | Stop reason: HOSPADM

## 2024-07-18 RX ORDER — IBUPROFEN 200 MG
1 TABLET ORAL DAILY
Status: DISCONTINUED | OUTPATIENT
Start: 2024-07-19 | End: 2024-07-20 | Stop reason: HOSPADM

## 2024-07-18 RX ORDER — OXYCODONE HYDROCHLORIDE 5 MG/1
10 TABLET ORAL EVERY 6 HOURS PRN
Status: DISCONTINUED | OUTPATIENT
Start: 2024-07-18 | End: 2024-07-19

## 2024-07-18 RX ORDER — IBUPROFEN 200 MG
16 TABLET ORAL
Status: DISCONTINUED | OUTPATIENT
Start: 2024-07-18 | End: 2024-07-20 | Stop reason: HOSPADM

## 2024-07-18 RX ORDER — PREGABALIN 75 MG/1
75 CAPSULE ORAL
Status: COMPLETED | OUTPATIENT
Start: 2024-07-18 | End: 2024-07-18

## 2024-07-18 RX ORDER — ROCURONIUM BROMIDE 10 MG/ML
INJECTION, SOLUTION INTRAVENOUS
Status: DISCONTINUED | OUTPATIENT
Start: 2024-07-18 | End: 2024-07-18

## 2024-07-18 RX ORDER — SODIUM CHLORIDE, SODIUM LACTATE, POTASSIUM CHLORIDE, CALCIUM CHLORIDE 600; 310; 30; 20 MG/100ML; MG/100ML; MG/100ML; MG/100ML
INJECTION, SOLUTION INTRAVENOUS CONTINUOUS
Status: DISCONTINUED | OUTPATIENT
Start: 2024-07-18 | End: 2024-07-19

## 2024-07-18 RX ORDER — PROCHLORPERAZINE EDISYLATE 5 MG/ML
5 INJECTION INTRAMUSCULAR; INTRAVENOUS EVERY 6 HOURS PRN
Status: DISCONTINUED | OUTPATIENT
Start: 2024-07-18 | End: 2024-07-20 | Stop reason: HOSPADM

## 2024-07-18 RX ORDER — DIPHENHYDRAMINE HYDROCHLORIDE 50 MG/ML
12.5 INJECTION INTRAMUSCULAR; INTRAVENOUS ONCE
Status: COMPLETED | OUTPATIENT
Start: 2024-07-18 | End: 2024-07-18

## 2024-07-18 RX ORDER — METHOCARBAMOL 750 MG/1
750 TABLET, FILM COATED ORAL 3 TIMES DAILY PRN
Status: DISCONTINUED | OUTPATIENT
Start: 2024-07-18 | End: 2024-07-20 | Stop reason: HOSPADM

## 2024-07-18 RX ORDER — FLUTICASONE PROPIONATE 50 MCG
2 SPRAY, SUSPENSION (ML) NASAL DAILY
Status: DISCONTINUED | OUTPATIENT
Start: 2024-07-19 | End: 2024-07-20 | Stop reason: HOSPADM

## 2024-07-18 RX ORDER — FLUTICASONE FUROATE, UMECLIDINIUM BROMIDE AND VILANTEROL TRIFENATATE 200; 62.5; 25 UG/1; UG/1; UG/1
1 POWDER RESPIRATORY (INHALATION) DAILY
COMMUNITY
Start: 2024-07-01

## 2024-07-18 RX ORDER — FLUOXETINE HYDROCHLORIDE 20 MG/1
40 CAPSULE ORAL DAILY
Status: DISCONTINUED | OUTPATIENT
Start: 2024-07-19 | End: 2024-07-19

## 2024-07-18 RX ORDER — CLONIDINE HYDROCHLORIDE 0.1 MG/1
0.2 TABLET ORAL NIGHTLY
COMMUNITY
Start: 2024-07-11

## 2024-07-18 RX ORDER — SODIUM CHLORIDE 9 MG/ML
INJECTION, SOLUTION INTRAVENOUS CONTINUOUS
Status: DISCONTINUED | OUTPATIENT
Start: 2024-07-18 | End: 2024-07-18

## 2024-07-18 RX ORDER — PHENYLEPHRINE HYDROCHLORIDE 10 MG/ML
INJECTION INTRAVENOUS
Status: DISCONTINUED | OUTPATIENT
Start: 2024-07-18 | End: 2024-07-18

## 2024-07-18 RX ORDER — CEFAZOLIN SODIUM 2 G/50ML
2 SOLUTION INTRAVENOUS ONCE
Status: COMPLETED | OUTPATIENT
Start: 2024-07-18 | End: 2024-07-18

## 2024-07-18 RX ORDER — HYDROMORPHONE HYDROCHLORIDE 2 MG/ML
2 INJECTION, SOLUTION INTRAMUSCULAR; INTRAVENOUS; SUBCUTANEOUS
Status: DISCONTINUED | OUTPATIENT
Start: 2024-07-18 | End: 2024-07-19

## 2024-07-18 RX ORDER — PHENYLEPHRINE HYDROCHLORIDE 10 MG/ML
INJECTION INTRAVENOUS CONTINUOUS PRN
Status: DISCONTINUED | OUTPATIENT
Start: 2024-07-18 | End: 2024-07-18

## 2024-07-18 RX ORDER — HEPARIN SODIUM 5000 [USP'U]/ML
5000 INJECTION, SOLUTION INTRAVENOUS; SUBCUTANEOUS EVERY 8 HOURS
Status: DISCONTINUED | OUTPATIENT
Start: 2024-07-18 | End: 2024-07-20 | Stop reason: HOSPADM

## 2024-07-18 RX ORDER — LIDOCAINE HYDROCHLORIDE 20 MG/ML
INJECTION INTRAVENOUS
Status: DISCONTINUED | OUTPATIENT
Start: 2024-07-18 | End: 2024-07-18

## 2024-07-18 RX ORDER — ONDANSETRON 8 MG/1
8 TABLET, ORALLY DISINTEGRATING ORAL EVERY 6 HOURS PRN
Status: DISCONTINUED | OUTPATIENT
Start: 2024-07-18 | End: 2024-07-20 | Stop reason: HOSPADM

## 2024-07-18 RX ORDER — AMOXICILLIN 250 MG
2 CAPSULE ORAL NIGHTLY PRN
Status: DISCONTINUED | OUTPATIENT
Start: 2024-07-18 | End: 2024-07-20 | Stop reason: HOSPADM

## 2024-07-18 RX ORDER — ONDANSETRON HYDROCHLORIDE 2 MG/ML
INJECTION, SOLUTION INTRAVENOUS
Status: DISCONTINUED | OUTPATIENT
Start: 2024-07-18 | End: 2024-07-18

## 2024-07-18 RX ORDER — ALUMINUM HYDROXIDE, MAGNESIUM HYDROXIDE, AND SIMETHICONE 1200; 120; 1200 MG/30ML; MG/30ML; MG/30ML
30 SUSPENSION ORAL EVERY 4 HOURS PRN
Status: DISCONTINUED | OUTPATIENT
Start: 2024-07-18 | End: 2024-07-20 | Stop reason: HOSPADM

## 2024-07-18 RX ORDER — HYDROMORPHONE HYDROCHLORIDE 2 MG/ML
INJECTION, SOLUTION INTRAMUSCULAR; INTRAVENOUS; SUBCUTANEOUS
Status: DISCONTINUED | OUTPATIENT
Start: 2024-07-18 | End: 2024-07-18

## 2024-07-18 RX ORDER — OXYCODONE HCL 10 MG/1
10 TABLET, FILM COATED, EXTENDED RELEASE ORAL
Status: COMPLETED | OUTPATIENT
Start: 2024-07-18 | End: 2024-07-18

## 2024-07-18 RX ORDER — GLUCAGON 1 MG
1 KIT INJECTION
Status: DISCONTINUED | OUTPATIENT
Start: 2024-07-18 | End: 2024-07-20 | Stop reason: HOSPADM

## 2024-07-18 RX ORDER — MONTELUKAST SODIUM 10 MG/1
10 TABLET ORAL NIGHTLY
Status: DISCONTINUED | OUTPATIENT
Start: 2024-07-18 | End: 2024-07-20 | Stop reason: HOSPADM

## 2024-07-18 RX ORDER — ASPIRIN 81 MG/1
1 TABLET ORAL DAILY
COMMUNITY
Start: 2024-03-15

## 2024-07-18 RX ORDER — PROPOFOL 10 MG/ML
VIAL (ML) INTRAVENOUS
Status: DISCONTINUED | OUTPATIENT
Start: 2024-07-18 | End: 2024-07-18

## 2024-07-18 RX ORDER — FENTANYL CITRATE 50 UG/ML
INJECTION, SOLUTION INTRAMUSCULAR; INTRAVENOUS
Status: DISCONTINUED | OUTPATIENT
Start: 2024-07-18 | End: 2024-07-18

## 2024-07-18 RX ORDER — CETIRIZINE HYDROCHLORIDE 10 MG/1
10 TABLET ORAL NIGHTLY
Status: DISCONTINUED | OUTPATIENT
Start: 2024-07-18 | End: 2024-07-20 | Stop reason: HOSPADM

## 2024-07-18 RX ADMIN — INSULIN ASPART 1 UNITS: 100 INJECTION, SOLUTION INTRAVENOUS; SUBCUTANEOUS at 11:07

## 2024-07-18 RX ADMIN — PREGABALIN 75 MG: 75 CAPSULE ORAL at 07:07

## 2024-07-18 RX ADMIN — ONDANSETRON 8 MG: 2 INJECTION, SOLUTION INTRAMUSCULAR; INTRAVENOUS at 01:07

## 2024-07-18 RX ADMIN — HYDROMORPHONE HYDROCHLORIDE 0.5 MG: 2 INJECTION INTRAMUSCULAR; INTRAVENOUS; SUBCUTANEOUS at 02:07

## 2024-07-18 RX ADMIN — FENTANYL CITRATE 50 MCG: 50 INJECTION INTRAMUSCULAR; INTRAVENOUS at 10:07

## 2024-07-18 RX ADMIN — ACETAMINOPHEN 650 MG: 325 TABLET ORAL at 07:07

## 2024-07-18 RX ADMIN — OXYCODONE HYDROCHLORIDE 10 MG: 10 TABLET, FILM COATED, EXTENDED RELEASE ORAL at 07:07

## 2024-07-18 RX ADMIN — HYDROMORPHONE HYDROCHLORIDE 0.4 MG: 2 INJECTION INTRAMUSCULAR; INTRAVENOUS; SUBCUTANEOUS at 01:07

## 2024-07-18 RX ADMIN — CELECOXIB 200 MG: 100 CAPSULE ORAL at 10:07

## 2024-07-18 RX ADMIN — PHENYLEPHRINE HYDROCHLORIDE 100 MCG: 10 INJECTION INTRAVENOUS at 10:07

## 2024-07-18 RX ADMIN — CYCLOBENZAPRINE 10 MG: 10 TABLET, FILM COATED ORAL at 07:07

## 2024-07-18 RX ADMIN — LIDOCAINE HYDROCHLORIDE 100 MG: 20 INJECTION, SOLUTION INTRAVENOUS at 09:07

## 2024-07-18 RX ADMIN — SODIUM CHLORIDE, SODIUM LACTATE, POTASSIUM CHLORIDE, AND CALCIUM CHLORIDE: .6; .31; .03; .02 INJECTION, SOLUTION INTRAVENOUS at 10:07

## 2024-07-18 RX ADMIN — HYDROMORPHONE HYDROCHLORIDE 0.5 MG: 2 INJECTION INTRAMUSCULAR; INTRAVENOUS; SUBCUTANEOUS at 03:07

## 2024-07-18 RX ADMIN — HYDROMORPHONE HYDROCHLORIDE 2 MG: 2 INJECTION, SOLUTION INTRAMUSCULAR; INTRAVENOUS; SUBCUTANEOUS at 06:07

## 2024-07-18 RX ADMIN — ROCURONIUM BROMIDE 50 MG: 10 INJECTION, SOLUTION INTRAVENOUS at 12:07

## 2024-07-18 RX ADMIN — PHENYLEPHRINE HYDROCHLORIDE 0.2 MCG/KG/MIN: 10 INJECTION INTRAVENOUS at 10:07

## 2024-07-18 RX ADMIN — DEXAMETHASONE SODIUM PHOSPHATE 4 MG: 4 INJECTION, SOLUTION INTRA-ARTICULAR; INTRALESIONAL; INTRAMUSCULAR; INTRAVENOUS; SOFT TISSUE at 01:07

## 2024-07-18 RX ADMIN — FENTANYL CITRATE 50 MCG: 50 INJECTION INTRAMUSCULAR; INTRAVENOUS at 11:07

## 2024-07-18 RX ADMIN — OXYCODONE 10 MG: 5 TABLET ORAL at 10:07

## 2024-07-18 RX ADMIN — HEPARIN SODIUM 5000 UNITS: 5000 INJECTION INTRAVENOUS; SUBCUTANEOUS at 10:07

## 2024-07-18 RX ADMIN — CELECOXIB 200 MG: 100 CAPSULE ORAL at 07:07

## 2024-07-18 RX ADMIN — CETIRIZINE HYDROCHLORIDE 10 MG: 10 TABLET, FILM COATED ORAL at 10:07

## 2024-07-18 RX ADMIN — ACETAMINOPHEN 650 MG: 325 TABLET ORAL at 05:07

## 2024-07-18 RX ADMIN — MUPIROCIN: 20 OINTMENT TOPICAL at 10:07

## 2024-07-18 RX ADMIN — MIDAZOLAM 2 MG: 1 INJECTION INTRAMUSCULAR; INTRAVENOUS at 09:07

## 2024-07-18 RX ADMIN — PROPOFOL 200 MG: 10 INJECTION, EMULSION INTRAVENOUS at 09:07

## 2024-07-18 RX ADMIN — OXYCODONE 10 MG: 5 TABLET ORAL at 03:07

## 2024-07-18 RX ADMIN — FENTANYL CITRATE 100 MCG: 50 INJECTION INTRAMUSCULAR; INTRAVENOUS at 09:07

## 2024-07-18 RX ADMIN — METHOCARBAMOL 750 MG: 750 TABLET ORAL at 03:07

## 2024-07-18 RX ADMIN — SODIUM CHLORIDE, POTASSIUM CHLORIDE, SODIUM LACTATE AND CALCIUM CHLORIDE: 600; 310; 30; 20 INJECTION, SOLUTION INTRAVENOUS at 05:07

## 2024-07-18 RX ADMIN — ARIPIPRAZOLE 2 MG: 2 TABLET ORAL at 10:07

## 2024-07-18 RX ADMIN — SODIUM CHLORIDE, SODIUM LACTATE, POTASSIUM CHLORIDE, AND CALCIUM CHLORIDE: .6; .31; .03; .02 INJECTION, SOLUTION INTRAVENOUS at 09:07

## 2024-07-18 RX ADMIN — PHENYLEPHRINE HYDROCHLORIDE 200 MCG: 10 INJECTION INTRAVENOUS at 10:07

## 2024-07-18 RX ADMIN — CEFAZOLIN SODIUM 3 G: 2 SOLUTION INTRAVENOUS at 10:07

## 2024-07-18 RX ADMIN — ROCURONIUM BROMIDE 50 MG: 10 INJECTION, SOLUTION INTRAVENOUS at 10:07

## 2024-07-18 RX ADMIN — ROCURONIUM BROMIDE 50 MG: 10 INJECTION, SOLUTION INTRAVENOUS at 09:07

## 2024-07-18 RX ADMIN — MONTELUKAST 10 MG: 10 TABLET, FILM COATED ORAL at 10:07

## 2024-07-18 RX ADMIN — DIPHENHYDRAMINE HYDROCHLORIDE 12.5 MG: 50 INJECTION INTRAMUSCULAR; INTRAVENOUS at 08:07

## 2024-07-18 RX ADMIN — PREGABALIN 200 MG: 75 CAPSULE ORAL at 10:07

## 2024-07-18 RX ADMIN — METHOCARBAMOL 750 MG: 750 TABLET ORAL at 10:07

## 2024-07-18 NOTE — ANESTHESIA POSTPROCEDURE EVALUATION
Anesthesia Post Evaluation    Patient: Candy Huynh    Procedure(s) Performed: Procedure(s) (LRB):  FUSION, SPINE, LUMBAR, ALIF (Left)  FUSION, SPINE, WITH INSTRUMENTATION (N/A)    Final Anesthesia Type: general      Patient location during evaluation: PACU  Patient participation: Yes- Able to Participate  Level of consciousness: awake and alert  Post-procedure vital signs: reviewed and stable  Pain management: adequate  Airway patency: patent    PONV status at discharge: No PONV  Anesthetic complications: no      Cardiovascular status: stable  Respiratory status: spontaneous ventilation  Hydration status: euvolemic  Follow-up not needed.              Vitals Value Taken Time   /93 07/18/24 1648   Temp 36.7 °C (98 °F) 07/18/24 1600   Pulse 80 07/18/24 1648   Resp 20 07/18/24 1648   SpO2 98 % 07/18/24 1648         Event Time   Out of Recovery 16:20:00         Pain/Lisa Score: Pain Rating Prior to Med Admin: 10 (7/18/2024  5:41 PM)  Pain Rating Post Med Admin: 3 (7/18/2024  4:00 PM)  Lisa Score: 10 (7/18/2024  4:15 PM)

## 2024-07-18 NOTE — H&P
NEUROSURGICAL PROGRESS NOTE     DATE OF SERVICE:  07/18/24     ATTENDING PHYSICIAN:  Dane Salazar MD     SUBJECTIVE:     INTERIM HISTORY:     She is less than 3 months status post right minimally invasive microdiskectomy.  She has a BMI of 40.58.  For few weeks after the surgery her right leg pain completely subsided but recurred.  She reports constant right leg pain in the S1 distribution.  She has more leg pain than back pain.  Pain is worse with standing up.  Pain can also increase with coughing and sneezing.  She is doing her physical therapy exercises in her bed but she can not do them on the floor due because she also has knee osteoarthritis and is unable to get up from the floor.  She has completed home health physical therapy  She recently went to the emergency room and received an injection in her arm that did not give her pain relief.  No new onset of motor weakness.  No sphincter dysfunction symptoms.  She tried a 5 days of Toradol without significant pain relief.  She also took Mobic with some pain relief.  She is also on oxycodone.           PAST MEDICAL HISTORY:       Active Ambulatory Problems     Diagnosis Date Noted    Lipoma of arm 12/06/2019    Quadriceps weakness 10/05/2020    Gait abnormality 10/05/2020    Decreased range of motion (ROM) of left knee 10/05/2020    Decreased functional mobility 10/05/2020    Acute pain of left knee 12/17/2020    Knee pain 01/11/2021    Degenerative tear of left medial meniscus      Degenerative tear of lateral meniscus, left      Morbid obesity with BMI of 40.0-44.9, adult      Difficulty walking 01/13/2021    Status post arthroscopy of knee 02/02/2021    Primary osteoarthritis of right knee 04/28/2022    Rotator cuff impingement syndrome of right shoulder 06/15/2022    Biceps tendinitis of right upper extremity 06/15/2022    Depression      Diabetes mellitus      Hypertension      Fibromyalgia      Gastroesophageal reflux disease without esophagitis  10/10/2022    Personal history of TIA (transient ischemic attack) 10/11/2022    Mild intermittent asthma without complication 10/11/2022    GATITO (obstructive sleep apnea) 10/11/2022    Normocytic anemia 10/12/2022    Decreased range of motion of right shoulder 10/14/2022    Decreased strength of upper extremity 10/14/2022    Hypertrophy of nasal turbinates 2019    Pain in right knee 10/31/2022    Decreased range of motion (ROM) of right knee 10/31/2022    Status post right knee replacement 2022    Primary osteoarthritis of left knee 2023    Bilateral leg edema 2023    Chronic low back pain without sciatica 2023    Chronic, continuous use of opioids 2023    ICH (intracerebral hemorrhage) 2023    New daily persistent headache 2023    Status post left knee replacement 10/19/2023    Lumbar disc herniation with radiculopathy 2024    BMI 40.0-44.9, adult 2024           Resolved Ambulatory Problems     Diagnosis Date Noted    Left anterior shoulder pain 2020    Tear of right supraspinatus tendon 06/15/2022           Past Medical History:   Diagnosis Date    Allergy      Anemia      Anxiety      Asthma      Diabetes mellitus, type 2      GERD (gastroesophageal reflux disease)      Stroke      Vertigo           PAST SURGICAL HISTORY:        Past Surgical History:   Procedure Laterality Date    ARTHROSCOPIC REPAIR OF ROTATOR CUFF OF SHOULDER Right 2022     Procedure: REPAIR, ROTATOR CUFF, ARTHROSCOPIC;  Surgeon: Ministerio Orr Jr., MD;  Location: [facility] OR;  Service: Orthopedics;  Laterality: Right;  need opus system  Gnosticism notifed CC     ARTHROSCOPY OF KNEE Left 2021     Procedure: ARTHROSCOPY, KNEE;  Surgeon: Donnie Campuzano MD;  Location: [facility] OR;  Service: Orthopedics;  Laterality: Left;     SECTION        DECOMPRESSION OF SUBACROMIAL SPACE   2022     Procedure: DECOMPRESSION, SUBACROMIAL SPACE;  Surgeon: Ministerio Orr Jr., MD;   Location: [facility] OR;  Service: Orthopedics;;    EXCISION OF MASS OF EXTREMITY Left 12/6/2019     Procedure: EXCISION, MASS, EXTREMITY;  Surgeon: Honorio Pulido IV, MD;  Location: [facility] OR;  Service: Orthopedics;  Laterality: Left;  excision lipoma  Request Lacie    HERNIA REPAIR        HYSTERECTOMY        KNEE ARTHROSCOPY W/ MENISCECTOMY   1/11/2021     Procedure: ARTHROSCOPY, KNEE, WITH MENISCECTOMY;  Surgeon: Donnie Campuzano MD;  Location: [facility] OR;  Service: Orthopedics;;    LUMBAR LAMINECTOMY WITH DISCECTOMY Right 3/28/2024     Procedure: LAMINECTOMY, SPINE, LUMBAR, WITH DISCECTOMY;  Surgeon: Dane Salazar MD;  Location: [facility] OR;  Service: Neurosurgery;  Laterality: Right;  Procedure:Right L5-S1 laminotomy and radiculopathy  Length of procedure: 1.5 hours  LOS: 0-1 night  Anesthesia:General  Radiology:C-arm  Microscope:Metrx  Bed:[address]  Position:Prone    NASAL SEPTOPLASTY        RECONSTRUCTION OF ACROMIOCLAVICULAR JOINT   9/6/2022     Procedure: RECONSTRUCTION, ACROMIOCLAVICULAR JOINT;  Surgeon: Ministerio Orr Jr., MD;  Location: [facility] OR;  Service: Orthopedics;;    TOTAL KNEE ARTHROPLASTY Right 10/26/2022     Procedure: ARTHROPLASTY, KNEE, TOTAL RIGHT: BALDO - NEXGEN;  Surgeon: Nolberto Fraser MD;  Location: [facility];  Service: Orthopedics;  Laterality: Right;    TOTAL KNEE ARTHROPLASTY Left 8/7/2023     Procedure: ARTHROPLASTY, KNEE, TOTAL: LEFT: BALDO NEXGEN;  Surgeon: Nolberto Fraser MD;  Location: [facility];  Service: Orthopedics;  Laterality: Left;         SOCIAL HISTORY:   Social History            Socioeconomic History    Marital status:    Tobacco Use    Smoking status: Former    Smokeless tobacco: Never   Substance and Sexual Activity    Alcohol use: Yes       Comment: occasional    Drug use: Never    Sexual activity: Yes       Partners: Male      Social Determinants of Health           Financial Resource Strain: High Risk (3/4/2024)     Received [facility]      Overall Financial Resource Strain (CARDIA)      Difficulty of Paying Living Expenses: Very hard   Food Insecurity: Food Insecurity Present (3/4/2024)     Received from [facility] [place] [place] [employer] [facility] [place] [place] [employer]     Hunger Vital Sign      Worried About Running Out of Food in the Last Year: Often true      Ran Out of Food in the Last Year: Sometimes true   Transportation Needs: No Transportation Needs (3/4/2024)     Received from [facility] [place] [place] [employer] [facility]     PRAPARE - Transportation      Lack of Transportation (Medical): No      Lack of Transportation (Non-Medical): No   Physical Activity: Inactive (3/4/2024)     Received from [facility] [place] [place] [employer] [facility]     Exercise Vital Sign      Days of Exercise per Week: 0 days      Minutes of Exercise per Session: 10 min   Stress: Stress Concern Present (3/4/2024)     Received from [facility] [place] [place] [employer] [facility]     [employer] - Occupational Stress Questionnaire      Feeling of Stress : Very much   Housing Stability: High Risk (3/4/2024)     Received from [facility] [place] [place] [employer] [facility]     Housing Stability Vital Sign      Unable to Pay for Housing in the Last Year: Yes      Number of Places Lived in the Last Year: 1      In the last 12 months, was there a time when you did not have a steady place to sleep or slept in a shelter (including now)?: No         FAMILY HISTORY:         Family History   Problem Relation Name Age of Onset    No Known Problems Mother        Hypertension Father        Hypertension Sister        No Known Problems Sister        No Known Problems Brother        No Known Problems Daughter        No Known Problems Daughter        No Known Problems Daughter        No Known Problems Son             CURRENTS MEDICATIONS:  Medications Ordered Prior to Encounter          Current Outpatient Medications on File Prior to Visit   Medication Sig Dispense Refill    albuterol (PROVENTIL/VENTOLIN HFA) 90 mcg/actuation inhaler SMARTSI Puff(s) By Mouth Every 4 Hours PRN      "   ALBUTEROL INHL Inhale 2 puffs into the lungs every 4 (four) hours as needed.        amLODIPine (NORVASC) 5 MG tablet Take 5 mg by mouth once daily.        ARIPiprazole (ABILIFY) 2 MG Tab Take 2 mg by mouth every evening.        aspirin (ECOTRIN) 81 MG EC tablet Take 1 tablet (81 mg total) by mouth 2 (two) times a day. 60 tablet 0    azelastine (ASTELIN) 137 mcg (0.1 %) nasal spray 1 spray once daily.        BD ALINE 2ND GEN PEN NEEDLE 32 gauge x 5/32" Ndle USE ONE NEEDLE ONCE A WEEK        cetirizine (ZYRTEC) 10 MG tablet Take 10 mg by mouth every evening.        cholecalciferol, vitamin D3, 1,250 mcg (50,000 unit) capsule Take 50,000 Units by mouth every 7 days.        cyclobenzaprine (FLEXERIL) 5 MG tablet Take 1 tablet (5 mg total) by mouth 3 (three) times daily as needed for Muscle spasms (Pain). 15 tablet 0    diazePAM (VALIUM) 2 MG tablet Take 2 mg by mouth every 6 (six) hours as needed.        diclofenac sodium (VOLTAREN) 1 % Gel Apply 2 g topically 4 (four) times daily. 20 g 0    EASY TOUCH ALCOHOL PREP PADS PadM USE TO prepare FOR glucose testing        EPINEPHrine (EPIPEN) 0.3 mg/0.3 mL AtIn as per administration instructions        ergocalciferol (ERGOCALCIFEROL) 50,000 unit Cap Take 50,000 Units by mouth every 7 days.        FEROSUL 325 mg (65 mg iron) Tab tablet Take by mouth every other day.        FLOWFLEX COVID-19 AG HOME TEST Kit test AS directed        FLUoxetine 20 MG capsule Take 40 mg by mouth once daily.        fluticasone propionate (FLONASE) 50 mcg/actuation nasal spray 2 sprays by Nasal route.        folic acid (FOLVITE) 1 MG tablet Take 1,000 mcg by mouth.        hydrOXYzine pamoate (VISTARIL) 25 MG Cap Take 25 mg by mouth every evening.        LIDOcaine (LIDODERM) 5 % Place 1 patch onto the skin once daily. Remove & Discard patch within 12 hours or as directed by MD Lenz patch 0    loratadine (CLARITIN) 10 mg tablet Take 10 mg by mouth once daily.        losartan (COZAAR) 100 MG tablet Take " 100 mg by mouth once daily.        meclizine (ANTIVERT) 25 mg tablet Take 25 mg by mouth.        metFORMIN (GLUCOPHAGE-XR) 750 MG ER 24hr tablet Take 750 mg by mouth once daily.        methocarbamoL (ROBAXIN) 750 MG Tab Take 1 tablet (750 mg total) by mouth 3 (three) times daily as needed (muscle spasms). 90 tablet 2    montelukast (SINGULAIR) 10 mg tablet          nicotine polacrilex (NICORETTE) 4 MG Gum SMARTSI Each By Mouth PRN        ondansetron (ZOFRAN-ODT) 8 MG TbDL Take 8 mg by mouth every 8 (eight) hours as needed.        ONETOUCH DELICA PLUS LANCET 33 gauge Misc Apply topically 4 (four) times daily.        ONETOUCH ULTRA2 METER Misc SMARTSIG:Via Meter As Directed        ONETOUCH VERIO TEST STRIPS Strp 1 strip 4 (four) times daily.        pantoprazole (PROTONIX) 40 MG tablet Take 40 mg by mouth.        paroxetine (PAXIL) 20 MG tablet Take 20 mg by mouth every morning.        pregabalin (LYRICA) 200 MG Cap Take 1 capsule (200 mg total) by mouth 2 (two) times daily. 60 capsule 2    PREMARIN 0.625 mg tablet Take 0.625 mg by mouth once daily.        rosuvastatin (CRESTOR) 10 MG tablet Take 10 mg by mouth.        semaglutide (OZEMPIC) 0.25 mg or 0.5 mg (2 mg/3 mL) pen injector Inject 0.25 mg into the skin every 7 (seven) days        senna-docusate 8.6-50 mg (SENNA WITH DOCUSATE SODIUM) 8.6-50 mg per tablet Take 1 tablet by mouth once daily. 30 tablet 0    STOOL SOFTENER 100 mg capsule Take 1 softgel by mouth twice daily 60 capsule 11    sumatriptan (IMITREX) 100 MG tablet Take 100 mg by mouth 2 (two) times daily as needed.        traMADoL (ULTRAM) 50 mg tablet Take 1 tablet (50 mg total) by mouth every 8 (eight) hours as needed for Pain. 90 tablet 3    [DISCONTINUED] meloxicam (MOBIC) 15 MG tablet Take 1 tablet (15 mg total) by mouth once daily. 30 tablet 0    [DISCONTINUED] oxyCODONE-acetaminophen (PERCOCET)  mg per tablet Take 1 tablet by mouth every 6 (six) hours as needed for Pain. 28 tablet 0                 Current Facility-Administered Medications on File Prior to Visit   Medication Dose Route Frequency Provider Last Rate Last Admin    fentaNYL 50 mcg/mL injection 100 mcg  100 mcg Intravenous PRN Neno Horton PA-C   25 mcg at 08/07/23 1234    lactated ringers infusion   Intravenous Continuous Adrienne Johnson, LEANN        midazolam (VERSED) 1 mg/mL injection 1 mg  1 mg Intravenous PRN Neno Horton PA-C   2 mg at 08/07/23 1120    ropivacaine 0.2% Nimbus PainPRO Pump infusion 500 ML   Perineural Continuous Neno Horton PA-C   New Bag at 08/07/23 1516            ALLERGIES:        Review of patient's allergies indicates:   Allergen Reactions    Adhesive Blisters       Specifically EKG lead pads    Morphine Other (See Comments)       shake         REVIEW OF SYSTEMS:  ROS        OBJECTIVE:     PHYSICAL EXAMINATION:   There were no vitals filed for this visit.     Neurosurgery Physical Exam     Ortho Exam        Neurologic Exam        DIAGNOSTIC DATA:  I personally interpreted the following imaging:   Repeat lumbar spine MRI, postoperative shows status post right L5-S1 laminotomy and microdiskectomy, persistent central and left-sided disc herniation, new right-sided disc herniation causing lateral recess stenosis, degenerative disc disease at L5-S1     ASSESSMENT:  This is a 53 y.o. female with      Problem List Items Addressed This Visit                  Endocrine     BMI 40.0-44.9, adult      Other Visit Diagnoses         Recurrent herniation of lumbar disc    -  Primary     Relevant Orders     Ambulatory referral/consult to Pain Clinic     Lumbosacral radiculopathy at S1                       PLAN:  I explained the natural history of the disease and all treatment options. I recommended a  L5-S1 anterior interbody fusion, placement of interbody spacer, DePuy Conduit ALIF cage, L5-S1 posterior instrumentation using DePuy Viper Prime system.        We have discussed the risks of surgery including  death, coma, bleeding, infection, failure of surgery, CSF leak, nerve root injury, spinal cord injury, ureter injury, weakness, paralysis, peripheral neuropathy, malplaced hardware, migration of hardware, non-union, need for reoperation. Patient understands the risks and would like to proceed with surgery.     The patient has increase perioperative risks because of these comorbidities: BMI 40.58.                Dane Salazar MD  Cell:[phone]

## 2024-07-18 NOTE — TRANSFER OF CARE
"Anesthesia Transfer of Care Note    Patient: Candy Huynh    Procedure(s) Performed: Procedure(s) (LRB):  FUSION, SPINE, LUMBAR, ALIF (Left)  FUSION, SPINE, WITH INSTRUMENTATION (N/A)    Patient location: PACU    Anesthesia Type: general    Transport from OR: Transported from OR on 6-10 L/min O2 by face mask with adequate spontaneous ventilation    Post pain: adequate analgesia    Post assessment: no apparent anesthetic complications    Post vital signs: stable    Level of consciousness: responds to stimulation    Nausea/Vomiting: no nausea/vomiting    Complications: none    Transfer of care protocol was followed      Last vitals: Visit Vitals  /72   Pulse 81   Temp 36.7 °C (98.1 °F) (Skin)   Resp 20   Ht 5' 3" (1.6 m)   Wt 109.8 kg (242 lb)   SpO2 100%   Breastfeeding No   BMI 42.87 kg/m²     "

## 2024-07-18 NOTE — OP NOTE
Date of surgery 07/18/2024    Preop diagnosis   1. L5-S1 foraminal stenosis with radiculopathy  2. 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 BMI of 42.87    Postop diagnosis   Same    Surgery   1. L5-S1 anterior interbody fusion, placement of interbody spacer, DePuy Conduit ALIF cage filled with allograft BMP and DBM  2. L5-S1 posterior instrumentation using DePuy Viper Prime system  3. Registration of neuro navigation using intraoperative 3D imaging [facility] and [facility] station    Surgeon   Dane Salazar MD    Complexity   This was deemed to be a more complex procedure requiring 2 hours more due to the morbid obesity of the patient.  Due to the thickness of the adipose tissue, more time was needed to do the exposure, the closure, the positioning for every step of the surgery.    Indication   She is less than 3 months status post right minimally invasive microdiskectomy.  She has a BMI of 40.58.  For few weeks after the surgery her right leg pain completely subsided but recurred.  She reports constant right leg pain in the S1 distribution.  She has more leg pain than back pain.  Pain is worse with standing up.  Pain can also increase with coughing and sneezing.  She is doing her physical therapy exercises in her bed but she can not do them on the floor due because she also has knee osteoarthritis and is unable to get up from the floor.  She has completed home health physical therapy  She recently went to the emergency room and received an injection in her arm that did not give her pain relief.  No new onset of motor weakness.  No sphincter dysfunction symptoms.  She tried a 5 days of Toradol without significant pain relief.  She also took Mobic with some pain relief.  She is also on oxycodone.    Procedure  The patient intubated under general anesthesia and positioned in lateral decubitus, left side up on a radiolucent table.  All pressure points were carefully padded.  The left flank and abdominal  area was prepped and draped in a typical sterile fashion.  Using fluoroscopy we planned left abdominal incision above the pubic line, in line with the L5-S1 disc space.  Local anesthesia with 1% lidocaine with epi.  The skin was incised and hemostasis was carried out.  The muscular fascia just lateral to the rectus abdominis aponeurosis was divided and the external oblique, internal oblique and transversalis muscles were divided.  The retroperitoneal space was entered and dissected with a finger.  We created corridor between the iliac vessels bifurcation.  We then used serial dilation and placed our 3 blade retractor.  Blunt dissection to retract the left iliac vein, left iliac artery and over the sacral promontory.  We added a 4th blade to exposed the contralateral disc space and a 5th blade to expose the inferior portion of the sacrum.  Throughout the case the iliac vein was protected with manual retraction.  The ureter was identified and protected as well.  We divided the annulus.  Make sure that we were on the midline.  We used fluoroscopy to confirm the midline.  All disc material were shaved from the endplates.  We prepared the endplates and remove all disc material.  We then used serial trials and placed a final interbody spacer measuring 12 mm of height with 10° of lordosis, small size in the L5-S1 disc space.  The spacer filled with allograft BMP and DBM.  Good positioning of the spacer was confirmed with fluoroscopy.  Good indirect decompression of the L5 nerve root was obtained.  Irrigation hemostasis.  The retractor was removed.  The muscular fascia was closed with interrupted 0 Vicryl.  The dermal layer was closed with inverted interrupted 3-0 Vicryl.  The skin was closed with staples.    The patient was then flipped prone on the Сергей table.  All pressure points were carefully padded.  The thoracolumbar area was prepped and draped in a typical sterile fashion.  We inserted 2 array pins in the right  PSIS, installed our navigation array and proceeded with a 3D spin.  The imaging was transferred to the [facility] station.  We then registered our navigated drill guide and pedicle screwdriver.  We confirmed accuracy with the pointer and planned our bilateral minimally invasive incisions to achieve the instrumentation at L5-S1.  Local anesthesia with 0.5% lidocaine with epi.  The skin was incised and hemostasis was carried out.  The thoracolumbar fascia was divided.  Using the drill guide at 35 mm of depth we cannulated the pedicles of L5 and S1 bilaterally and inserted K-wires.  Over the K-wires we advanced our pedicle screws using navigation.  Good positioning of the instrumentation was obtained at L5-S1 bilaterally.  We then reduced precontoured titanium rods over the screw tulips with caps.  All caps were torqued.  The tower tabs were snapped.  Irrigation and hemostasis.  The deep layer was closed with interrupted 0 Vicryl.  The dermal layer was closed with inverted interrupted 3-0 Vicryl.  The skin was closed with staples.  The incisions were dressed.  Blood loss was estimated at less than 100 cc.  No complication.

## 2024-07-18 NOTE — ANESTHESIA PREPROCEDURE EVALUATION
2024  Candy Huynh is a 53 y.o., female       Past Medical History:   Diagnosis Date    Allergy     Anemia     Anxiety     Asthma     denies- on outside problem list in Care Everywhere    Depression     Diabetes mellitus     Diabetes mellitus, type 2     Fibromyalgia     GERD (gastroesophageal reflux disease)     Hypertension     Stroke     TIA    Vertigo      Past Surgical History:   Procedure Laterality Date    ARTHROSCOPIC REPAIR OF ROTATOR CUFF OF SHOULDER Right 2022    Procedure: REPAIR, ROTATOR CUFF, ARTHROSCOPIC;  Surgeon: Ministerio Orr Jr., MD;  Location: [facility] OR;  Service: Orthopedics;  Laterality: Right;  need opus system  Restoration[employer] notifed CC     ARTHROSCOPY OF KNEE Left 2021    Procedure: ARTHROSCOPY, KNEE;  Surgeon: Donnie Campuzano MD;  Location: [facility] OR;  Service: Orthopedics;  Laterality: Left;     SECTION      DECOMPRESSION OF SUBACROMIAL SPACE  2022    Procedure: DECOMPRESSION, SUBACROMIAL SPACE;  Surgeon: Ministerio Orr Jr., MD;  Location: [facility] OR;  Service: Orthopedics;;    EXCISION OF MASS OF EXTREMITY Left 2019    Procedure: EXCISION, MASS, EXTREMITY;  Surgeon: Honorio Pulido IV, MD;  Location: [facility] OR;  Service: Orthopedics;  Laterality: Left;  excision lipoma  Request Lacie    HERNIA REPAIR      HYSTERECTOMY      KNEE ARTHROSCOPY W/ MENISCECTOMY  2021    Procedure: ARTHROSCOPY, KNEE, WITH MENISCECTOMY;  Surgeon: Donnie Campuzano MD;  Location: [facility] OR;  Service: Orthopedics;;    LUMBAR LAMINECTOMY WITH DISCECTOMY Right 3/28/2024    Procedure: LAMINECTOMY, SPINE, LUMBAR, WITH DISCECTOMY;  Surgeon: Dane Salazar MD;  Location: [facility] OR;  Service: Neurosurgery;  Laterality: Right;  Procedure:Right L5-S1 laminotomy and radiculopathy  Length of procedure: 1.5 hours  LOS: 0-1  "night  Anesthesia:General  Radiology:C-arm  Microscope:Metrzakiya  Bed:[place] [id number] Poster  Position:Prone    NASAL SEPTOPLASTY      RECONSTRUCTION OF ACROMIOCLAVICULAR JOINT  2022    Procedure: RECONSTRUCTION, ACROMIOCLAVICULAR JOINT;  Surgeon: Ministerio Orr Jr., MD;  Location: [facility];  Service: Orthopedics;;    TOTAL KNEE ARTHROPLASTY Right 10/26/2022    Procedure: ARTHROPLASTY, KNEE, TOTAL RIGHT: BALDO - NEXGEN;  Surgeon: Nolberto Fraser MD;  Location: [facility] OR;  Service: Orthopedics;  Laterality: Right;    TOTAL KNEE ARTHROPLASTY Left 2023    Procedure: ARTHROPLASTY, KNEE, TOTAL: LEFT: BALDO NEXGEN;  Surgeon: Nolberto Fraser MD;  Location: [facility] OR;  Service: Orthopedics;  Laterality: Left;     Review of patient's allergies indicates:   Allergen Reactions    Adhesive Blisters     Specifically EKG lead pads    Chlorhexidine hcl Itching     Chlorhexidine wipes    Morphine Other (See Comments)     shake     Current Outpatient Medications   Medication Instructions    albuterol (PROVENTIL/VENTOLIN HFA) 90 mcg/actuation inhaler SMARTSI Puff(s) By Mouth Every 4 Hours PRN    ALBUTEROL INHL 2 puffs, Inhalation, Every 4 hours PRN    amLODIPine (NORVASC) 5 mg, Oral, Daily    ARIPiprazole (ABILIFY) 2 mg, Oral, Nightly    aspirin (ECOTRIN) 81 mg, Oral, 2 times daily    azelastine (ASTELIN) 137 mcg (0.1 %) nasal spray 1 spray, Daily    BD ALINE 2ND GEN PEN NEEDLE 32 gauge x 5/32" Ndle USE ONE NEEDLE ONCE A WEEK    cetirizine (ZYRTEC) 10 mg, Oral, Nightly    cholecalciferol (vitamin D3) 50,000 Units, Oral, Every 7 days    cyclobenzaprine (FLEXERIL) 5 mg, Oral, 3 times daily PRN    diclofenac sodium (VOLTAREN) 2 g, Topical (Top), 4 times daily    DULoxetine (CYMBALTA) 20 mg, Oral, Daily    EASY TOUCH ALCOHOL PREP PADS PadM USE TO prepare FOR glucose testing    EPINEPHrine (EPIPEN) 0.3 mg/0.3 mL AtIn as per administration instructions    ergocalciferol (ERGOCALCIFEROL) 50,000 Units, Oral, Every 7 days    FEROSUL 325 " mg (65 mg iron) Tab tablet Oral, Every other day    FLOWFLEX COVID-19 AG HOME TEST Kit test AS directed    FLUoxetine 40 mg, Oral, Daily    fluticasone propionate (FLONASE) 50 mcg/actuation nasal spray 2 sprays, Nasal    folic acid (FOLVITE) 1,000 mcg, Oral    LIDOcaine (LIDODERM) 5 % 1 patch, Transdermal, Daily, Remove & Discard patch within 12 hours or as directed by MD    loratadine (CLARITIN) 10 mg, Oral, Daily    losartan (COZAAR) 100 mg, Oral, Daily    meclizine (ANTIVERT) 25 mg, Oral    meloxicam (MOBIC) 15 mg, Oral, Daily    metFORMIN (GLUCOPHAGE-XR) 750 mg, Oral, Daily    methocarbamoL (ROBAXIN) 750 mg, Oral, 3 times daily PRN    montelukast (SINGULAIR) 10 mg tablet No dose, route, or frequency recorded.    nicotine polacrilex (NICORETTE) 4 MG Gum SMARTSI Each By Mouth PRN    ondansetron (ZOFRAN-ODT) 8 mg, Oral, Every 8 hours PRN    ONETOUCH DELICA PLUS LANCET 33 gauge Misc Topical (Top), 4 times daily    ONETOUCH ULTRA2 METER Misc SMARTSIG:Via Meter As Directed    ONETOUCH VERIO TEST STRIPS Strp 1 strip, 4 times daily    oxyCODONE-acetaminophen (PERCOCET)  mg per tablet 1 tablet, Oral, Every 8 hours PRN    pantoprazole (PROTONIX) 40 mg, Oral    pregabalin (LYRICA) 200 mg, Oral, 2 times daily    rosuvastatin (CRESTOR) 10 mg, Oral    semaglutide (OZEMPIC) 0.25 mg or 0.5 mg (2 mg/3 mL) pen injector Inject 0.25 mg into the skin every 7 (seven) days    senna-docusate 8.6-50 mg (SENNA WITH DOCUSATE SODIUM) 8.6-50 mg per tablet 1 tablet, Oral, Daily    STOOL SOFTENER 100 mg capsule Take 1 softgel by mouth twice daily    sumatriptan (IMITREX) 100 mg, Oral, 2 times daily PRN       Pre-op Assessment    I have reviewed the Patient Summary Reports.     I have reviewed the Nursing Notes. I have reviewed the NPO Status.   I have reviewed the Medications.     Review of Systems  Anesthesia Hx:  No problems with previous Anesthesia               Denies Personal Hx of Anesthesia complications.                     Social:  Former Smoker, Social Alcohol Use       Hematology/Oncology:       -- Anemia:                                  Cardiovascular:  Exercise tolerance: good   Denies Pacemaker. Hypertension       Denies Angina.                                  Pulmonary:    Asthma moderate  Denies Shortness of breath.  Sleep Apnea, CPAP           Education provided regarding risk of obstructive sleep apnea            Renal/:  Renal/ Normal                 Hepatic/GI:     GERD, well controlled             Musculoskeletal:  Arthritis               Neurological:  TIA, CVA, no residual symptoms Neuromuscular Disease,  Headaches Denies Seizures.                                Endocrine:  Diabetes (A1C 5.5 8/2023), well controlled, type 2         Morbid Obesity / BMI > 40  Psych:  Psychiatric History                  Physical Exam  General: Cooperative, Oriented, Alert and Well nourished    Airway:  Mallampati: I   Mouth Opening: Normal  TM Distance: Normal  Tongue: Normal  Neck ROM: Normal ROM    Dental:  Intact      Lab Results   Component Value Date    WBC 8.27 08/11/2023    HGB 9.9 (L) 08/11/2023    HCT 31.3 (L) 08/11/2023     08/11/2023    CHOL 108 (L) 08/11/2023    TRIG 67 08/11/2023    HDL 38 (L) 08/11/2023    ALT 20 07/10/2024    AST 17 07/10/2024     07/10/2024    K 4.6 07/10/2024     08/11/2023    CREATININE 0.73 07/10/2024    BUN 11 07/10/2024    CO2 29 07/10/2024    TSH 2.512 08/11/2023    INR 1.0 08/12/2023    HGBA1C 5.5 08/11/2023     Results for orders placed or performed during the hospital encounter of 08/11/23   EKG, 12 - Lead    Collection Time: 08/11/23 11:35 PM    Narrative    Test Reason : Z91.89,    Vent. Rate : 075 BPM     Atrial Rate : 075 BPM     P-R Int : 158 ms          QRS Dur : 084 ms      QT Int : 390 ms       P-R-T Axes : 082 009 051 degrees     QTc Int : 435 ms    Normal sinus rhythm  Normal ECG  When compared with ECG of 31-JUL-2023 11:53,  No significant change was  found  Confirmed by ALEXY SHANNON MD ([id number]) on 8/12/2023 9:56:07 AM    Referred By: [email or web address]   SELF           Confirmed By:ALEXY SHANNON MD         Anesthesia Plan  Type of Anesthesia, risks & benefits discussed:    Anesthesia Type: Gen ETT  Intra-op Monitoring Plan: Standard ASA Monitors and Art Line  Post Op Pain Control Plan: multimodal analgesia and IV/PO Opioids PRN  Induction:  IV  Informed Consent: Informed consent signed with the Patient and all parties understand the risks and agree with anesthesia plan.  All questions answered.   ASA Score: 3  Day of Surgery Review of History & Physical: H&P Update referred to the surgeon/provider.H&P completed by Anesthesiologist.  Anesthesia Plan Notes:       Ready For Surgery From Anesthesia Perspective.     .

## 2024-07-18 NOTE — ANESTHESIA PROCEDURE NOTES
Intubation    Date/Time: 7/18/2024 9:39 AM    Performed by: Jennifer Arce CRNA  Authorized by: Pedro Courtney MD    Intubation:     Induction:  Intravenous    Intubated:  Postinduction    Mask Ventilation:  Easy with oral airway    Attempts:  1    Attempted By:  Student    Method of Intubation:  Direct    Blade:  [place] 4    Laryngeal View Grade: Grade I - full view of cords      Difficult Airway Encountered?: No      Complications:  None    Airway Device:  Oral endotracheal tube    Airway Device Size:  7.5    Style/Cuff Inflation:  Cuffed (inflated to minimal occlusive pressure)    Tube secured:  22    Secured at:  The teeth    Placement Verified By:  Capnometry    Complicating Factors:  None    Findings Post-Intubation:  BS equal bilateral and atraumatic/condition of teeth unchanged

## 2024-07-18 NOTE — PLAN OF CARE
Problem: Adult Inpatient Plan of Care  Goal: Patient-Specific Goal (Individualized)  7/18/2024 1734 by Jolissaint, Judy C, RN  Outcome: Progressing  7/18/2024 1733 by Jolissaint, Judy C, RN  Outcome: Progressing     Problem: Diabetes Comorbidity  Goal: Blood Glucose Level Within Targeted Range  7/18/2024 1734 by Jolissaint, Judy C, RN  Outcome: Progressing  7/18/2024 1733 by Jolissaint, Judy C, RN  Outcome: Progressing       Problem: Diabetes Comorbidity  Goal: Blood Glucose Level Within Targeted Range  7/18/2024 1734 by Jolissaint, Judy C, RN  Outcome: Progressing  7/18/2024 1733 by Jolissaint, Judy C, RN  Outcome: Progressing     Problem: Spinal Surgery  Goal: Optimal Functional Ability  7/18/2024 1734 by Jolissaint, Judy C, RN  Outcome: Progressing  7/18/2024 1733 by Jolissaint, Judy C, RN  Outcome: Progressing     Problem: Spinal Surgery  Goal: Absence of Infection Signs and Symptoms  7/18/2024 1734 by Jolissaint, Judy C, RN  Outcome: Progressing  7/18/2024 1733 by Jolissaint, Judy C, RN  Outcome: Progressing     Problem: Spinal Surgery  Goal: Anesthesia/Sedation Recovery  7/18/2024 1734 by Jolissaint, Judy C, RN  Outcome: Progressing  7/18/2024 1733 by Jolissaint, Judy C, RN  Outcome: Progressing     Problem: Spinal Surgery  Goal: Optimal Pain Control and Function  7/18/2024 1734 by Jolissaint, Judy C, RN  Outcome: Progressing  7/18/2024 1733 by Jolissaint, Judy C, RN  Outcome: Progressing

## 2024-07-18 NOTE — PLAN OF CARE
VIRTUAL NURSE: Pt arrived to unit. Permission received per patient to turn camera to view patient. VIP model explained; patient informed this VN will be working with bedside nurse and the rest of the care team. Plan of care reviewed with patient.  Educated patient on VTE, fall risk and fall risk precautions in place. Call light within reach, side rails up x2. Admission questions completed.

## 2024-07-19 LAB
ANION GAP SERPL CALC-SCNC: 8 MMOL/L (ref 8–16)
BASOPHILS # BLD AUTO: 0.01 K/UL (ref 0–0.2)
BASOPHILS NFR BLD: 0.1 % (ref 0–1.9)
BUN SERPL-MCNC: 15 MG/DL (ref 6–20)
CALCIUM SERPL-MCNC: 9.4 MG/DL (ref 8.7–10.5)
CHLORIDE SERPL-SCNC: 103 MMOL/L (ref 95–110)
CO2 SERPL-SCNC: 29 MMOL/L (ref 23–29)
CREAT SERPL-MCNC: 0.9 MG/DL (ref 0.5–1.4)
DIFFERENTIAL METHOD BLD: ABNORMAL
EOSINOPHIL # BLD AUTO: 0 K/UL (ref 0–0.5)
EOSINOPHIL NFR BLD: 0 % (ref 0–8)
ERYTHROCYTE [DISTWIDTH] IN BLOOD BY AUTOMATED COUNT: 15.1 % (ref 11.5–14.5)
EST. GFR  (NO RACE VARIABLE): >60 ML/MIN/1.73 M^2
GLUCOSE SERPL-MCNC: 138 MG/DL (ref 70–110)
HCT VFR BLD AUTO: 32.8 % (ref 37–48.5)
HGB BLD-MCNC: 10.7 G/DL (ref 12–16)
IMM GRANULOCYTES # BLD AUTO: 0.02 K/UL (ref 0–0.04)
IMM GRANULOCYTES NFR BLD AUTO: 0.2 % (ref 0–0.5)
LYMPHOCYTES # BLD AUTO: 1.3 K/UL (ref 1–4.8)
LYMPHOCYTES NFR BLD: 15 % (ref 18–48)
MCH RBC QN AUTO: 29.6 PG (ref 27–31)
MCHC RBC AUTO-ENTMCNC: 32.6 G/DL (ref 32–36)
MCV RBC AUTO: 91 FL (ref 82–98)
MONOCYTES # BLD AUTO: 0.7 K/UL (ref 0.3–1)
MONOCYTES NFR BLD: 7.5 % (ref 4–15)
NEUTROPHILS # BLD AUTO: 6.9 K/UL (ref 1.8–7.7)
NEUTROPHILS NFR BLD: 77.2 % (ref 38–73)
NRBC BLD-RTO: 0 /100 WBC
PLATELET # BLD AUTO: 347 K/UL (ref 150–450)
PMV BLD AUTO: 11 FL (ref 9.2–12.9)
POCT GLUCOSE: 117 MG/DL (ref 70–110)
POCT GLUCOSE: 141 MG/DL (ref 70–110)
POCT GLUCOSE: 92 MG/DL (ref 70–110)
POCT GLUCOSE: 99 MG/DL (ref 70–110)
POTASSIUM SERPL-SCNC: 4.2 MMOL/L (ref 3.5–5.1)
RBC # BLD AUTO: 3.62 M/UL (ref 4–5.4)
SODIUM SERPL-SCNC: 140 MMOL/L (ref 136–145)
WBC # BLD AUTO: 8.91 K/UL (ref 3.9–12.7)

## 2024-07-19 PROCEDURE — 27100171 HC OXYGEN HIGH FLOW UP TO 24 HOURS

## 2024-07-19 PROCEDURE — 97530 THERAPEUTIC ACTIVITIES: CPT

## 2024-07-19 PROCEDURE — 94799 UNLISTED PULMONARY SVC/PX: CPT

## 2024-07-19 PROCEDURE — 97161 PT EVAL LOW COMPLEX 20 MIN: CPT

## 2024-07-19 PROCEDURE — 94761 N-INVAS EAR/PLS OXIMETRY MLT: CPT

## 2024-07-19 PROCEDURE — S4991 NICOTINE PATCH NONLEGEND: HCPCS | Performed by: NEUROLOGICAL SURGERY

## 2024-07-19 PROCEDURE — 25000242 PHARM REV CODE 250 ALT 637 W/ HCPCS: Performed by: NEUROLOGICAL SURGERY

## 2024-07-19 PROCEDURE — 94660 CPAP INITIATION&MGMT: CPT

## 2024-07-19 PROCEDURE — 11000001 HC ACUTE MED/SURG PRIVATE ROOM

## 2024-07-19 PROCEDURE — 85025 COMPLETE CBC W/AUTO DIFF WBC: CPT | Performed by: NEUROLOGICAL SURGERY

## 2024-07-19 PROCEDURE — 97535 SELF CARE MNGMENT TRAINING: CPT

## 2024-07-19 PROCEDURE — 63600175 PHARM REV CODE 636 W HCPCS: Performed by: NEUROLOGICAL SURGERY

## 2024-07-19 PROCEDURE — 36415 COLL VENOUS BLD VENIPUNCTURE: CPT | Performed by: NEUROLOGICAL SURGERY

## 2024-07-19 PROCEDURE — 99900035 HC TECH TIME PER 15 MIN (STAT)

## 2024-07-19 PROCEDURE — 97116 GAIT TRAINING THERAPY: CPT

## 2024-07-19 PROCEDURE — 25000003 PHARM REV CODE 250: Performed by: NEUROLOGICAL SURGERY

## 2024-07-19 PROCEDURE — 80048 BASIC METABOLIC PNL TOTAL CA: CPT | Performed by: NEUROLOGICAL SURGERY

## 2024-07-19 PROCEDURE — 97165 OT EVAL LOW COMPLEX 30 MIN: CPT

## 2024-07-19 RX ORDER — DIPHENHYDRAMINE HCL 25 MG
25 CAPSULE ORAL EVERY 6 HOURS PRN
Status: DISCONTINUED | OUTPATIENT
Start: 2024-07-19 | End: 2024-07-19

## 2024-07-19 RX ORDER — HYDROMORPHONE HYDROCHLORIDE 2 MG/ML
3 INJECTION, SOLUTION INTRAMUSCULAR; INTRAVENOUS; SUBCUTANEOUS
Status: DISCONTINUED | OUTPATIENT
Start: 2024-07-19 | End: 2024-07-19

## 2024-07-19 RX ORDER — DIPHENHYDRAMINE HCL 25 MG
50 CAPSULE ORAL EVERY 6 HOURS PRN
Status: DISCONTINUED | OUTPATIENT
Start: 2024-07-19 | End: 2024-07-20 | Stop reason: HOSPADM

## 2024-07-19 RX ORDER — OXYCODONE HYDROCHLORIDE 5 MG/1
15 TABLET ORAL EVERY 4 HOURS PRN
Status: DISCONTINUED | OUTPATIENT
Start: 2024-07-19 | End: 2024-07-20 | Stop reason: HOSPADM

## 2024-07-19 RX ADMIN — OXYCODONE 15 MG: 5 TABLET ORAL at 08:07

## 2024-07-19 RX ADMIN — PANTOPRAZOLE SODIUM 40 MG: 40 TABLET, DELAYED RELEASE ORAL at 09:07

## 2024-07-19 RX ADMIN — DIPHENHYDRAMINE HYDROCHLORIDE 50 MG: 25 CAPSULE ORAL at 12:07

## 2024-07-19 RX ADMIN — MUPIROCIN: 20 OINTMENT TOPICAL at 09:07

## 2024-07-19 RX ADMIN — ACETAMINOPHEN 650 MG: 325 TABLET ORAL at 06:07

## 2024-07-19 RX ADMIN — DIPHENHYDRAMINE HYDROCHLORIDE 50 MG: 25 CAPSULE ORAL at 11:07

## 2024-07-19 RX ADMIN — PREGABALIN 200 MG: 75 CAPSULE ORAL at 09:07

## 2024-07-19 RX ADMIN — OXYCODONE 10 MG: 5 TABLET ORAL at 06:07

## 2024-07-19 RX ADMIN — HEPARIN SODIUM 5000 UNITS: 5000 INJECTION INTRAVENOUS; SUBCUTANEOUS at 02:07

## 2024-07-19 RX ADMIN — METHOCARBAMOL 750 MG: 750 TABLET ORAL at 05:07

## 2024-07-19 RX ADMIN — BISACODYL 10 MG: 10 SUPPOSITORY RECTAL at 06:07

## 2024-07-19 RX ADMIN — DULOXETINE HYDROCHLORIDE 20 MG: 20 CAPSULE, DELAYED RELEASE ORAL at 09:07

## 2024-07-19 RX ADMIN — HYDROMORPHONE HYDROCHLORIDE 3 MG: 2 INJECTION INTRAMUSCULAR; INTRAVENOUS; SUBCUTANEOUS at 09:07

## 2024-07-19 RX ADMIN — OXYCODONE 15 MG: 5 TABLET ORAL at 11:07

## 2024-07-19 RX ADMIN — METHOCARBAMOL 750 MG: 750 TABLET ORAL at 08:07

## 2024-07-19 RX ADMIN — OXYCODONE 15 MG: 5 TABLET ORAL at 02:07

## 2024-07-19 RX ADMIN — ACETAMINOPHEN 650 MG: 325 TABLET ORAL at 05:07

## 2024-07-19 RX ADMIN — CELECOXIB 200 MG: 100 CAPSULE ORAL at 08:07

## 2024-07-19 RX ADMIN — NICOTINE 1 PATCH: 14 PATCH, EXTENDED RELEASE TRANSDERMAL at 09:07

## 2024-07-19 RX ADMIN — HEPARIN SODIUM 5000 UNITS: 5000 INJECTION INTRAVENOUS; SUBCUTANEOUS at 08:07

## 2024-07-19 RX ADMIN — SODIUM CHLORIDE, POTASSIUM CHLORIDE, SODIUM LACTATE AND CALCIUM CHLORIDE: 600; 310; 30; 20 INJECTION, SOLUTION INTRAVENOUS at 02:07

## 2024-07-19 RX ADMIN — MONTELUKAST 10 MG: 10 TABLET, FILM COATED ORAL at 08:07

## 2024-07-19 RX ADMIN — HYDROMORPHONE HYDROCHLORIDE 2 MG: 2 INJECTION, SOLUTION INTRAMUSCULAR; INTRAVENOUS; SUBCUTANEOUS at 02:07

## 2024-07-19 RX ADMIN — ACETAMINOPHEN 650 MG: 325 TABLET ORAL at 12:07

## 2024-07-19 RX ADMIN — AZELASTINE HYDROCHLORIDE 137 MCG: 137 SPRAY, METERED NASAL at 09:07

## 2024-07-19 RX ADMIN — ARIPIPRAZOLE 2 MG: 2 TABLET ORAL at 08:07

## 2024-07-19 RX ADMIN — HEPARIN SODIUM 5000 UNITS: 5000 INJECTION INTRAVENOUS; SUBCUTANEOUS at 06:07

## 2024-07-19 RX ADMIN — DOCUSATE SODIUM AND SENNOSIDES 2 TABLET: 8.6; 5 TABLET, FILM COATED ORAL at 05:07

## 2024-07-19 RX ADMIN — CETIRIZINE HYDROCHLORIDE 10 MG: 10 TABLET, FILM COATED ORAL at 08:07

## 2024-07-19 RX ADMIN — CELECOXIB 200 MG: 100 CAPSULE ORAL at 09:07

## 2024-07-19 RX ADMIN — DIPHENHYDRAMINE HYDROCHLORIDE 25 MG: 25 CAPSULE ORAL at 10:07

## 2024-07-19 RX ADMIN — FLUTICASONE PROPIONATE 100 MCG: 50 SPRAY, METERED NASAL at 09:07

## 2024-07-19 RX ADMIN — PREGABALIN 200 MG: 75 CAPSULE ORAL at 08:07

## 2024-07-19 RX ADMIN — MUPIROCIN: 20 OINTMENT TOPICAL at 08:07

## 2024-07-19 NOTE — PLAN OF CARE
OT/PT co-evaluation completed 2/2 anticipated increased complexity of patient and to safely progress s/p L5-S1 fusion. Patient with PLOF of Modified [place] with ADLs. Patient able to perform log rolling technique with CGA and verbal cues, out of room mobility with RW and CGA with LSO brace wear, and donned LSO brace with CGA. Patient will benefit from skilled acute OT. Patient limited by increased back pain. Recommend Low Intensity Therapy. No DME recommendations.   Problem: Occupational Therapy  Goal: Occupational Therapy Goal  Description: Goals to be met by: 8/16/2024     Patient will increase functional independence with ADLs by performing:    LE Dressing with Modified independence using least restrictive device to prevent increased pain from bending (step stool, reacher).   Patient will t/f to real bathroom commode with Min Assist with use of RW.   Toileting from toilet with Modified [place] for hygiene and clothing management.   Patient will reciprocate to therapist spinal precautions with 100% accuracy.   Patient will reciprocate (LSO) brace wear with 100% accuracy including donning and doffing with independence.     Outcome: Progressing

## 2024-07-19 NOTE — PLAN OF CARE
Problem: Physical Therapy  Goal: Physical Therapy Goal  Description: Goals to be met by: 24     Patient will increase functional independence with mobility by performin. Supine to sit with Modified [place]  2. Sit to supine with Modified [place]  3. Sit to stand transfer with Modified [place] with use of RW.  4. Bed to chair transfer with Modified [place] using Rolling Walker  5. Gait  x 150 feet with Modified [place] using Rolling Walker.   6. Ascend/descend 10 stair with right Handrails Modified [place].    Outcome: Progressing     PT/OT co-evaluation completed due to anticipated complexity of pt's presentation; pt s/p L5-S1 fusion. Pt at this time requires CGA/SBA with mobility with use of RW. Therapy recommending low intensity therapy and use of RW with mobility. Therapy will continue to progress pt as able.

## 2024-07-19 NOTE — NURSING
Assumed care of patient from GRACIA Malhotra, patient is AAOX4, room air, CPAP at night, Q$ neurovascular LLE and RLE, HOB 30 degrees, vitals WNL, mom at bedside, recent L5-S1 fusion on 7/18, c/o 9/10 pain, PRN meds given, LR running at 100 ml/hr, SCD's in place, LSO brace used when using BSC, SBAX1, swallows pills whole, all safety precautions are in place, call bell and tray table are within reach of the patient and no additional questions or conerns from the patient at this time.

## 2024-07-19 NOTE — PT/OT/SLP EVAL
"Occupational Therapy   Evaluation & Treatment    Name: Candy Huynh  MRN: [id number]  Admitting Diagnosis: Recurrent herniation of lumbar disc  Recent Surgery: Procedure(s) (LRB):  FUSION, SPINE, LUMBAR, ALIF (Left)  FUSION, SPINE, WITH INSTRUMENTATION (N/A) 1 Day Post-Op    Recommendations:     Discharge Recommendations: Low Intensity Therapy  Discharge Equipment Recommendations:  none  Barriers to discharge:  None    Assessment:     Candy Huynh is a 53 y.o. female with a medical diagnosis of Recurrent herniation of lumbar disc.  She presents with ..The primary encounter diagnosis was Recurrent herniation of lumbar disc. Diagnoses of BMI 40.0-44.9, adult, Lumbar disc herniation with radiculopathy, S/P lumbar microdiscectomy, Foraminal stenosis of lumbosacral region, and GATITO (obstructive sleep apnea) were also pertinent to this visit. . Performance deficits affecting function: impaired functional mobility, impaired self care skills, pain, orthopedic precautions, decreased ROM, weakness, impaired endurance.      OT/PT co-evaluation completed 2/2 anticipated increased complexity of patient and to safely progress. Patient with PLOF of Modified [place] with ADLs. Patient able to perform log rolling technique with CGA and verbal cues, out of room mobility with RW and CGA with LSO brace wear, and donned LSO brace with CGA. Patient will benefit from skilled acute OT. Patient limited by increased back pain. Recommend Low Intensity Therapy. No DME recommendations.     Rehab Prognosis: Good; patient would benefit from acute skilled OT services to address these deficits and reach maximum level of function.       Plan:     Patient to be seen 3 x/week to address the above listed problems via self-care/home management, therapeutic activities, therapeutic exercises  Plan of Care Expires: 08/16/24  Plan of Care Reviewed with: patient, mother    Subjective     Chief Complaint: pain in back, "can't get on toilet in " "bathroom because its too low", itchy  Patient/Family Comments/goals: getting home    Occupational Profile:  Living Environment: Lives with daughter and friend in a 2 story town house with no steps to enter home; stairs at home have R railing; bedroom and bathroom upstairs; bathroom has a t/s combo  Previous level of function: Modified independent with mobility and ADLs  Roles and Routines: Not working, did some driving.   Equipment Used at Home: bedside commode, walker, rolling, raised toilet, grab bar (ttb, raised toilet seat)  Assistance upon Discharge: Mother     Pain/Comfort:  Pain Rating 1: 8/10  Location 1: back  Pain Addressed 1: Pre-medicate for activity, Reposition, Distraction, Nurse notified      Objective:     Communicated with: nursing prior to session.  Patient found HOB elevated with   upon OT entry to room.    General Precautions: Standard,    Orthopedic Precautions: spinal precautions  Braces: LSO  Respiratory Status: Room air    Occupational Performance:    Bed Mobility:    Patient completed Supine to Sit with CGA progressed to SBA; therapist educated patient on proper technique for log rolling.     Functional Mobility/Transfers:  Patient completed Sit <> Stand Transfer with contact guard assistance  with  rolling walker   Patient completed Bed <> Chair Transfer using Step Transfer technique with stand by assistance and contact guard assistance with rolling walker.  Functional Mobility: Patient able to complete out of room mobility with RW and CGA.   Patient declined t/f to bathroom commode stating "it is too low for me right now".     Activities of Daily Living:  Feeding:  independence   Upper Body Dressing: minimum assistance 2/2 line management  Lower Body Dressing: maximal assistance ; for donning socks and shoes while sitting EOB.   Patient simulated LB dressing when toileting with SBA.   Toileting: Supervision getting to BSC stated by patient and patient mother.   Bathing: Patient and mother " educated on doing initial wash ups upon d/c before trying to get into wet tub then progressing once patient feels comfortable and safe.     Cognitive/Visual Perceptual:  Cognitive/Psychosocial Skills:     -       Oriented to: Person, Place, Time, and Situation   -       Follows Commands/attention:Follows commands  -       Communication: clear/fluent  -       Memory: No Deficits noted  -       Safety awareness/insight to disability: intact   Visual/Perceptual:      -no complaints of changes in vision     Physical Exam:  Balance:    -       no signs of imbalance noted during session; pt with no complains of dizziness  Sensation:    -       Intact  Upper Extremity Range of Motion:     -       Right Upper Extremity: WFL  -       Left Upper Extremity: WFL  Upper Extremity Strength:    -       Right Upper Extremity: WFL  -       Left Upper Extremity: WFL   Strength:    -       Right Upper Extremity: WFL  -       Left Upper Extremity: WFL    AMPAC 6 Click ADL:  AMPAC Total Score: 18    Treatment & Education:  Patient A&OX4 with mother at bedside. Patient educated on role of OT and POC.   Therapist educated mother and pt on log roll technique when moving in bed to avoid breaking spinal precautions. Both reciprocated their understanding.   Patient educated on brace wear times.   Bed mobility/Functional mobility/ADLs as mentioned above.   At end of session, patient educated on spinal precautions and given a handout to take home.   Patient and mother educated on placing BSC over toilet when discharging home and patient reciprocated understanding and stated she has a raised toilet.         Patient left up in chair with all lines intact, call button in reach, chair alarm on, and nursing notified.    GOALS:   Multidisciplinary Problems       Occupational Therapy Goals          Problem: Occupational Therapy    Goal Priority Disciplines Outcome Interventions   Occupational Therapy Goal     OT, PT/OT Progressing    Description:  Goals to be met by: 2024     Patient will increase functional independence with ADLs by performing:    LE Dressing with Modified independence using least restrictive device to prevent increased pain from bending (step stool, reacher).   Patient will t/f to real bathroom commode with Min Assist with use of RW.   Toileting from toilet with Modified [place] for hygiene and clothing management.   Patient will reciprocate to therapist spinal precautions with 100% accuracy.   Patient will reciprocate (LSO) brace wear with 100% accuracy including donning and doffing with independence.                          History:     Past Medical History:   Diagnosis Date    Allergy     Anemia     Anxiety     Asthma     denies- on outside problem list in Care Everywhere    Depression     Diabetes mellitus     Diabetes mellitus, type 2     Fibromyalgia     GERD (gastroesophageal reflux disease)     Hypertension     Stroke     TIA    Vertigo          Past Surgical History:   Procedure Laterality Date    ARTHROSCOPIC REPAIR OF ROTATOR CUFF OF SHOULDER Right 2022    Procedure: REPAIR, ROTATOR CUFF, ARTHROSCOPIC;  Surgeon: Ministerio Orr Jr., MD;  Location: [facility] OR;  Service: Orthopedics;  Laterality: Right;  need opus system  [employer] notifed CC     ARTHROSCOPY OF KNEE Left 2021    Procedure: ARTHROSCOPY, KNEE;  Surgeon: Donnie Campuzano MD;  Location: [facility] OR;  Service: Orthopedics;  Laterality: Left;     SECTION      DECOMPRESSION OF SUBACROMIAL SPACE  2022    Procedure: DECOMPRESSION, SUBACROMIAL SPACE;  Surgeon: Ministerio Orr Jr., MD;  Location: [facility] OR;  Service: Orthopedics;;    EXCISION OF MASS OF EXTREMITY Left 2019    Procedure: EXCISION, MASS, EXTREMITY;  Surgeon: Honorio Pulido IV, MD;  Location: [facility] OR;  Service: Orthopedics;  Laterality: Left;  excision lipoma  Request Lacie    HERNIA REPAIR      HYSTERECTOMY      KNEE ARTHROSCOPY W/ MENISCECTOMY  2021    Procedure: ARTHROSCOPY,  KNEE, WITH MENISCECTOMY;  Surgeon: Donnie Campuzano MD;  Location: [facility];  Service: Orthopedics;;    LUMBAR LAMINECTOMY WITH DISCECTOMY Right 3/28/2024    Procedure: LAMINECTOMY, SPINE, LUMBAR, WITH DISCECTOMY;  Surgeon: Dane Salazar MD;  Location: [facility];  Service: Neurosurgery;  Laterality: Right;  Procedure:Right L5-S1 laminotomy and radiculopathy  Length of procedure: 1.5 hours  LOS: 0-1 night  Anesthesia:General  Radiology:C-arm  Microscope:Metrx  Bed:[place] [id number] Poster  Position:Prone    NASAL SEPTOPLASTY      RECONSTRUCTION OF ACROMIOCLAVICULAR JOINT  9/6/2022    Procedure: RECONSTRUCTION, ACROMIOCLAVICULAR JOINT;  Surgeon: Ministerio Orr Jr., MD;  Location: [facility];  Service: Orthopedics;;    TOTAL KNEE ARTHROPLASTY Right 10/26/2022    Procedure: ARTHROPLASTY, KNEE, TOTAL RIGHT: BALDO - NEXGEN;  Surgeon: Nolberto Fraser MD;  Location: [facility];  Service: Orthopedics;  Laterality: Right;    TOTAL KNEE ARTHROPLASTY Left 8/7/2023    Procedure: ARTHROPLASTY, KNEE, TOTAL: LEFT: BALDO NEXGEN;  Surgeon: Nolberto Fraser MD;  Location: [facility];  Service: Orthopedics;  Laterality: Left;       Time Tracking:     OT Date of Treatment: 07/19/24  OT Start Time: 0953  OT Stop Time: 1027  OT Total Time (min): 34 min    Billable Minutes:Evaluation 8 min  Self Care/Home Management 10 min  Therapeutic Activity 16 min    7/19/2024

## 2024-07-19 NOTE — PT/OT/SLP EVAL
Physical Therapy Evaluation and Treatment    Patient Name:  Candy Huynh   MRN:  [id number]    Recommendations:     Discharge Recommendations: Low Intensity Therapy   Discharge Equipment Recommendations: none   Barriers to discharge: None    Assessment:     Candy Huynh is a 53 y.o. female admitted with a medical diagnosis of Recurrent herniation of lumbar disc.  She presents with the following impairments/functional limitations: weakness, impaired sensation, impaired self care skills, gait instability, impaired functional mobility, impaired balance, decreased lower extremity function, pain, orthopedic precautions .    PT/OT co-evaluation completed due to anticipated complexity of pt's presentation; pt s/p L5-S1 fusion. Pt at this time requires CGA/SBA with mobility with use of RW. Therapy recommending low intensity therapy and use of RW with mobility. Therapy will continue to progress pt as able.     Rehab Prognosis: Good; patient would benefit from acute skilled PT services to address these deficits and reach maximum level of function.    Recent Surgery: Procedure(s) (LRB):  FUSION, SPINE, LUMBAR, ALIF (Left)  FUSION, SPINE, WITH INSTRUMENTATION (N/A) 1 Day Post-Op    Plan:     During this hospitalization, patient to be seen 5 x/week to address the identified rehab impairments via gait training, therapeutic activities, therapeutic exercises, neuromuscular re-education and progress toward the following goals:    Plan of Care Expires:  08/19/24    Subjective     Chief Complaint: pain  Patient/Family Comments/goals: to return home  Pain/Comfort:  Pain Rating 1: 8/10  Location 1: back  Pain Addressed 1: Reposition, Cessation of Activity, Nurse notified  Pain Rating Post-Intervention 1:  (not rated)    Patients cultural, spiritual, Gnosticist conflicts given the current situation: no    Living Environment:  Lives with daughter and friend in 2 [place] with no steps to enter with R with stairs in home  "(bed/bath upstairs).   Prior to admission, patients level of function was Independent with no AD with mobility; assistance from family with some home task.  Equipment used at home: bedside commode, bath bench, walker, rolling, cane, quad, cane, straight.  DME owned (not currently used): none.  Upon discharge, patient will have assistance from family.    Objective:     Communicated with Nurse prior to session.  Patient found HOB elevated with bed alarm  upon PT entry to room.    General Precautions: Standard, fall  Orthopedic Precautions:spinal precautions   Braces: LSO  Respiratory Status: Room air    Exams:  Cognitive Exam:  Patient is oriented to Person, Place, Time, and Situation  Sensation:    -       Intact  light/touch to BLE; however, reports some numbness to lateral aspect of B lower legs  RLE ROM: WFL  RLE Strength: WFL  LLE ROM: WFL  LLE Strength: WFL    Functional Mobility:  Bed Mobility:     Supine to Sit: contact guard assistance  Transfers:     Sit to Stand:  contact guard assistance with rolling walker  Gait: ~150ft with use of RW and CGA/SBA  Stairs:  Pt ascended/descended 4" curb step with Rolling Walker with bilateral handrails with Contact Guard Assistance.       AM-PAC 6 CLICK MOBILITY  Total Score:18       Treatment & Education:  Pt educated on role of therapy.  Pt educated on use of log roll technique with bed mobility.   Pt educated on spinal precautions and use of LSO; pt requires some assistance per therapy staff with donning LSO.   Pt performs functional mobility as stated above.   Pt agreeable to sit up in bedside chair at end of session.   Pt educated on use of RW with all mobility at this time.   Pt educated to call for staff assistance when ready to return to bed; pt understanding of education provided.     Patient left up in chair with all lines intact, chair alarm on, Nurse notified, and mother present.    GOALS:   Multidisciplinary Problems       Physical Therapy Goals          " Problem: Physical Therapy    Goal Priority Disciplines Outcome Goal Variances Interventions   Physical Therapy Goal     PT, PT/OT Progressing     Description: Goals to be met by: 24     Patient will increase functional independence with mobility by performin. Supine to sit with Modified [place]  2. Sit to supine with Modified [place]  3. Sit to stand transfer with Modified [place] with use of RW.  4. Bed to chair transfer with Modified [place] using Rolling Walker  5. Gait  x 150 feet with Modified [place] using Rolling Walker.   6. Ascend/descend 10 stair with right Handrails Modified [place].                         History:     Past Medical History:   Diagnosis Date    Allergy     Anemia     Anxiety     Asthma     denies- on outside problem list in Care Everywhere    Depression     Diabetes mellitus     Diabetes mellitus, type 2     Fibromyalgia     GERD (gastroesophageal reflux disease)     Hypertension     Stroke     TIA    Vertigo        Past Surgical History:   Procedure Laterality Date    ARTHROSCOPIC REPAIR OF ROTATOR CUFF OF SHOULDER Right 2022    Procedure: REPAIR, ROTATOR CUFF, ARTHROSCOPIC;  Surgeon: Ministerio Orr Jr., MD;  Location: [facility];  Service: Orthopedics;  Laterality: Right;  need opus system  Taoist notifed CC     ARTHROSCOPY OF KNEE Left 2021    Procedure: ARTHROSCOPY, KNEE;  Surgeon: Donnie Campuzano MD;  Location: [facility] OR;  Service: Orthopedics;  Laterality: Left;     SECTION      DECOMPRESSION OF SUBACROMIAL SPACE  2022    Procedure: DECOMPRESSION, SUBACROMIAL SPACE;  Surgeon: Ministerio Orr Jr., MD;  Location: [facility] OR;  Service: Orthopedics;;    EXCISION OF MASS OF EXTREMITY Left 2019    Procedure: EXCISION, MASS, EXTREMITY;  Surgeon: Honorio Pulido IV, MD;  Location: [facility] OR;  Service: Orthopedics;  Laterality: Left;  excision lipoma  Request Lacie    HERNIA REPAIR      HYSTERECTOMY      KNEE ARTHROSCOPY W/  MENISCECTOMY  1/11/2021    Procedure: ARTHROSCOPY, KNEE, WITH MENISCECTOMY;  Surgeon: Donnie Campuzano MD;  Location: [facility] OR;  Service: Orthopedics;;    LUMBAR LAMINECTOMY WITH DISCECTOMY Right 3/28/2024    Procedure: LAMINECTOMY, SPINE, LUMBAR, WITH DISCECTOMY;  Surgeon: Dane Salazar MD;  Location: [facility];  Service: Neurosurgery;  Laterality: Right;  Procedure:Right L5-S1 laminotomy and radiculopathy  Length of procedure: 1.5 hours  LOS: 0-1 night  Anesthesia:General  Radiology:C-arm  Microscope:Metrx  Bed:[place] [address] Poster  Position:Prone    NASAL SEPTOPLASTY      RECONSTRUCTION OF ACROMIOCLAVICULAR JOINT  9/6/2022    Procedure: RECONSTRUCTION, ACROMIOCLAVICULAR JOINT;  Surgeon: Ministerio Orr Jr., MD;  Location: [facility];  Service: Orthopedics;;    TOTAL KNEE ARTHROPLASTY Right 10/26/2022    Procedure: ARTHROPLASTY, KNEE, TOTAL RIGHT: BALDO - NEXGEN;  Surgeon: Nolberto Fraser MD;  Location: [facility];  Service: Orthopedics;  Laterality: Right;    TOTAL KNEE ARTHROPLASTY Left 8/7/2023    Procedure: ARTHROPLASTY, KNEE, TOTAL: LEFT: BALDO NEXGEN;  Surgeon: Nolberto Fraser MD;  Location: [facility];  Service: Orthopedics;  Laterality: Left;       Time Tracking:     PT Received On: 07/19/24  PT Start Time: 0953     PT Stop Time: 1028  PT Total Time (min): 35 min With OT    Billable Minutes: Evaluation 10, Gait Training 8, and Therapeutic Activity 10      07/19/2024

## 2024-07-19 NOTE — NURSING
We know that we should not change dressing post op 1 but bandages were saturated and spreading a little to linen, charge RN agreed that we should change after assessing. I think they were aggrivated by the LSO brace from getting up to use bathroom . They are no longer bleeding.

## 2024-07-19 NOTE — PROGRESS NOTES
[facility]  Neurosurgery  Progress Note    Subjective:     Interval History: back pain not controlled.  No leg pain.  Voiding.  Up to bathroom.    History of Present Illness:     She is less than 3 months status post right minimally invasive microdiskectomy. She has a BMI of 40.58. For few weeks after the surgery her right leg pain completely subsided but recurred. She reports constant right leg pain in the S1 distribution. She has more leg pain than back pain. Pain is worse with standing up. Pain can also increase with coughing and sneezing. She is doing her physical therapy exercises in her bed but she can not do them on the floor due because she also has knee osteoarthritis and is unable to get up from the floor. She has completed home health physical therapy She recently went to the emergency room and received an injection in her arm that did not give her pain relief. No new onset of motor weakness. No sphincter dysfunction symptoms. She tried a 5 days of Toradol without significant pain relief. She also took Mobic with some pain relief. She is also on oxycodone.       Post-Op Info:  Procedure(s) (LRB):  FUSION, SPINE, LUMBAR, ALIF (Left)  FUSION, SPINE, WITH INSTRUMENTATION (N/A)   1 Day Post-Op      Medications:  Continuous Infusions:  Scheduled Meds:   acetaminophen  650 mg Oral Q6H    ARIPiprazole  2 mg Oral QHS    azelastine  1 spray Nasal Daily    bisacodyL  10 mg Rectal Daily    celecoxib  200 mg Oral BID    cetirizine  10 mg Oral QHS    DULoxetine  20 mg Oral Daily    fluticasone propionate  2 spray Each Nostril Daily    heparin (porcine)  5,000 Units Subcutaneous Q8H    montelukast  10 mg Oral QHS    mupirocin   Nasal BID    nicotine  1 patch Transdermal Daily    pantoprazole  40 mg Oral Daily    pregabalin  200 mg Oral BID     PRN Meds:  Current Facility-Administered Medications:     albuterol, 2 puff, Inhalation, Q4H PRN    aluminum-magnesium hydroxide-simethicone, 30 mL, Oral, Q4H PRN     "dextrose 10%, 12.5 g, Intravenous, PRN    dextrose 10%, 25 g, Intravenous, PRN    glucagon (human recombinant), 1 mg, Intramuscular, PRN    glucose, 16 g, Oral, PRN    glucose, 24 g, Oral, PRN    HYDROmorphone, 3 mg, Subcutaneous, Q3H PRN    insulin aspart U-100, 0-10 Units, Subcutaneous, QID (AC + HS) PRN    methocarbamoL, 750 mg, Oral, TID PRN    ondansetron, 8 mg, Oral, Q6H PRN    oxyCODONE, 15 mg, Oral, Q4H PRN    prochlorperazine, 5 mg, Intravenous, Q6H PRN    senna-docusate 8.6-50 mg, 2 tablet, Oral, Nightly PRN    traMADoL, 50 mg, Oral, Q6H PRN     Review of Systems  Objective:     Weight: 116.1 kg (255 lb 15.3 oz)  Body mass index is 45.34 kg/m².  Vital Signs (Most Recent):  Temp: 98.4 °F (36.9 °C) (07/19/24 0747)  Pulse: 76 (07/19/24 0747)  Resp: 18 (07/19/24 0931)  BP: (!) 118/57 (07/19/24 0747)  SpO2: (!) 93 % (07/19/24 0747) Vital Signs (24h Range):  Temp:  [97.3 °F (36.3 °C)-98.4 °F (36.9 °C)] 98.4 °F (36.9 °C)  Pulse:  [62-97] 76  Resp:  [7-23] 18  SpO2:  [93 %-100 %] 93 %  BP: (118-185)/() 118/57                Oxygen Concentration (%):  [21] 21             Neurosurgery Physical Exam    General: well developed, well nourished, no distress  Mental Status: Awake, Alert, Oriented X3.Thought content appropriate  GCS: Motor: 6/Verbal: 5/Eyes: 4 GCS Total: 15    Motor Strength:                                  HF KF KE DF PF EHL   Lower: R 5/5 5/5 5/5 5/5 5/5 5/5    L 5/5 5/5 5/5 5/5 5/5 5/5         Incision- CDI        Significant Labs:  Recent Labs   Lab 07/19/24  0436   *      K 4.2      CO2 29   BUN 15   CREATININE 0.9   CALCIUM 9.4     Recent Labs   Lab 07/19/24  0436   WBC 8.91   HGB 10.7*   HCT 32.8*        No results for input(s): "LABPT", "INR", "APTT" in the last 48 hours.  Microbiology Results (last 7 days)       ** No results found for the last 168 hours. **              Assessment/Plan:     Active Diagnoses:    Diagnosis Date Noted POA    PRINCIPAL PROBLEM:  " Recurrent herniation of lumbar disc [M51.26] 07/18/2024 Yes    Foraminal stenosis of lumbosacral region [M48.07] 07/18/2024 Yes    GATITO (obstructive sleep apnea) [G47.33] 10/11/2022 Yes    Morbid obesity with BMI of 40.0-44.9, adult [E66.01, Z68.41]  Not Applicable      Problems Resolved During this Admission:     A/P:  POD #1 L5-S1 ALIF with posterior instrumentation     --Neurologically stable          -q4h neuro checks  --Imaging: Post op xrays pending  --Pain control.  Oxy increased to 15mg and dilaudid to 3mg  --DVT ppx: TEDs/SCDs/SQH  --Activity: PT/OT, OOB. LSO brace  --Diet: Changed to lactose free Regular, Saline Lock IV  --Bowel regimen: PRN suppository, senna PRN  --Urinary: Voiding spontaneously  --Atelectasis ppx: Encourage IS hourly     Dispo: Pending PT/OT recs, imaging     All of the above discussed and reviewed with Dr. Salazar.      ANGELITO HernándezC  Neurosurgery  [place] [facility]

## 2024-07-19 NOTE — PLAN OF CARE
Problem: Adult Inpatient Plan of Care  Goal: Plan of Care Review  Outcome: Progressing  Goal: Patient-Specific Goal (Individualized)  Outcome: Progressing  Goal: Absence of Hospital-Acquired Illness or Injury  Outcome: Progressing  Goal: Optimal Comfort and Wellbeing  Outcome: Progressing  Goal: Readiness for Transition of Care  Outcome: Progressing     Problem: Diabetes Comorbidity  Goal: Blood Glucose Level Within Targeted Range  Outcome: Progressing     Problem: Bariatric Environmental Safety  Goal: Safety Maintained with Care  Outcome: Progressing     Problem: Infection  Goal: Absence of Infection Signs and Symptoms  Outcome: Progressing     Problem: Wound  Goal: Optimal Coping  Outcome: Progressing  Goal: Optimal Functional Ability  Outcome: Progressing  Goal: Absence of Infection Signs and Symptoms  Outcome: Progressing  Goal: Improved Oral Intake  Outcome: Progressing  Goal: Optimal Pain Control and Function  Outcome: Progressing  Goal: Skin Health and Integrity  Outcome: Progressing  Goal: Optimal Wound Healing  Outcome: Progressing     Problem: Spinal Surgery  Goal: Optimal Coping with Surgery  Outcome: Progressing  Goal: Absence of Bleeding  Outcome: Progressing  Goal: Effective Bowel Elimination  Outcome: Progressing  Goal: Fluid and Electrolyte Balance  Outcome: Progressing  Goal: Optimal Functional Ability  Outcome: Progressing  Goal: Absence of Infection Signs and Symptoms  Outcome: Progressing  Goal: Optimal Neurologic Function  Outcome: Progressing  Goal: Anesthesia/Sedation Recovery  Outcome: Progressing  Goal: Optimal Pain Control and Function  Outcome: Progressing  Goal: Nausea and Vomiting Relief  Outcome: Progressing  Goal: Effective Urinary Elimination  Outcome: Progressing  Goal: Effective Oxygenation and Ventilation  Outcome: Progressing     Problem: Pain Acute  Goal: Optimal Pain Control and Function  Outcome: Progressing     Problem: Fall Injury Risk  Goal: Absence of Fall and  Fall-Related Injury  Outcome: Progressing

## 2024-07-19 NOTE — NURSING
Patient was consulted and educated on fall risk, but patient expressed no alarm on bed and bed elevated because it is easier for her to get in and out to use BSC at that level for comfort from her back sx on 7/18.

## 2024-07-19 NOTE — PLAN OF CARE
Problem: Adult Inpatient Plan of Care  Goal: Plan of Care Review  Outcome: Progressing     Problem: Diabetes Comorbidity  Goal: Blood Glucose Level Within Targeted Range  Outcome: Progressing     Problem: Bariatric Environmental Safety  Goal: Safety Maintained with Care  Outcome: Progressing     Problem: Infection  Goal: Absence of Infection Signs and Symptoms  Outcome: Progressing     Problem: Wound  Goal: Optimal Coping  Outcome: Progressing     Problem: Spinal Surgery  Goal: Optimal Coping with Surgery  Outcome: Progressing     Problem: Pain Acute  Goal: Optimal Pain Control and Function  Outcome: Progressing     Problem: Fall Injury Risk  Goal: Absence of Fall and Fall-Related Injury  Outcome: Progressing   AAOX4. C/o back/abdomen pain/fullness. Meds given per MAR. Up to bedside commode. MD notified of px pain not being relief w/ pain meds. PT/OT worked w/ pt, tolerated well & up to chair. Relative @ bedside. Call light within reach. Bed in lowest position.

## 2024-07-19 NOTE — PLAN OF CARE
CM met with pt and her mother at bedside to discuss discharge planning. She lives at home with her SO. Prior to surgery, she was independent with ADL's. Pt has no DME/HH services. Her request is to discharge home with family and HH services when medically ready. Upon discharge, pts family member Kita King (mother) [phone]  will provide transport home. CM added name and number to white board. Pt made aware to contact CM with any questions or concerns. CM will continue to follow and assist with any discharge needs.   Future Appointments   Date Time Provider Department Center   8/1/2024  2:30 PM KN [facility] XR-A LIMIT 350 LBS [facility] XRAY [facility]   8/1/2024  3:30 PM Estephania Rodrigez PA-C [facility] NEUROSU [facility]   8/22/2024  1:45 PM Caden Metzger III, MD [facility] ORTHO [address]   8/30/2024 12:30 PM [facility] XR-A LIMIT 350 LBS [facility] XRAY [facility]   8/30/2024  1:30 PM Estephania Rodrigez PA-C [facility] NEUROSU [facility]   10/23/2024  1:00 PM [facility] XR-A LIMIT 350 LBS [facility] XRAY [facility]   10/23/2024  2:00 PM Dane Salazar MD [facility] NEUROSU [facility]   [facility]  Initial Discharge Assessment       Primary Care Provider: No, Primary Doctor    Admission Diagnosis: Recurrent herniation of lumbar disc [M51.26]  Foraminal stenosis of lumbosacral region [M48.07]    Admission Date: 7/18/2024  Expected Discharge Date:     Transition of Care Barriers: (P) None    Payor: MEDICAID / Plan: [facility] ([place] MEDICAID) / Product Type: Managed Medicaid /     Extended Emergency Contact Information  Primary Emergency Contact: Joslyn King  Address: [address]           [email or web address], [place] [employer]  Mobile Phone: [phone]  Relation: Daughter  Preferred language: English   needed? No  Secondary Emergency Contact: Arie Hwang  Address: [address]           [email or web address] [facility] [employer]  Home Phone:  [phone]  Relation: Daughter    Discharge Plan A: (P) Home, Home with family, Home Health  Discharge Plan B: (P) Rehab      [facility] [place] - [address]  [address]  [place] [address]  Phone: [phone] Fax: [phone]    [id number] - [place] - [address] AT [facility] [address]  [address] [place] [address]  Phone: [phone] Fax: [phone]    [id number] - [place] - [address] AT [facility] OF [facility] [address] [place] [address]  [address]  [place] [address]  Phone: [phone] Fax: [phone]    [id number] - [place] - [address]  [address]  [place] [address]  Phone: [phone] Fax: [phone]    [id number] - [place] - [address] AT [facility] OF [facility] [address]  [address]  [place] [address]  Phone: [phone] Fax: [phone]    [facility]  [address]  [place] [address]  Phone: [phone] Fax: [phone]      Initial Assessment (most recent)       Adult Discharge Assessment - 07/19/24 1600          Discharge Assessment    Assessment Type Discharge Planning Assessment     Confirmed/corrected address, phone number and insurance Yes     Confirmed Demographics Correct on Facesheet     Source of Information patient     When was your last doctors appointment? 06/19/24     Communicated LAM with patient/caregiver Date not available/Unable to determine     Reason For Admission Lumbar/Spine fusion     People in Home significant other     Do you expect to return to your current living situation? Yes     Do you have help at home or someone to help you manage your care at home? Yes     Who are your caregiver(s) and their phone number(s)? Deidra Dial (KAMARI)     Prior to hospitilization cognitive status: Alert/Oriented     Current cognitive status: Alert/Oriented     Walking or  Climbing Stairs Difficulty yes     Walking or Climbing Stairs ambulation difficulty, requires equipment     Mobility Management RW     Dressing/Bathing Difficulty yes     Dressing/Bathing bathing difficulty, assistance 1 person;bathing difficulty, requires equipment     Dressing/Bathing Management shower chair     Equipment Currently Used at Home shower chair;grab bar     Readmission within 30 days? No (P)      Patient currently being followed by outpatient case management? No (P)      Do you currently have service(s) that help you manage your care at home? No (P)      Do you take prescription medications? Yes (P)      Do you have prescription coverage? Yes (P)      Coverage Medicaid (P)      Do you have any problems affording any of your prescribed medications? TBD (P)      Is the patient taking medications as prescribed? yes (P)      Who is going to help you get home at discharge? Kita King (mother) [phone] (P)      How do you get to doctors appointments? car, drives self;family or friend will provide (P)      Are you on dialysis? No (P)      Do you take coumadin? No (P)      Discharge Plan A Home;Home with family;Home Health (P)      Discharge Plan B Rehab (P)      DME Needed Upon Discharge  none (P)      Discharge Plan discussed with: Patient (P)      Transition of Care Barriers None (P)         Physical Activity    On average, how many days per week do you engage in moderate to strenuous exercise (like a brisk walk)? 0 days (P)      On average, how many minutes do you engage in exercise at this level? 0 min (P)         Financial Resource Strain    How hard is it for you to pay for the very basics like food, housing, medical care, and heating? Not hard at all (P)         Housing Stability    In the last 12 months, was there a time when you were not able to pay the mortgage or rent on time? No (P)      At any time in the past 12 months, were you homeless or living in a shelter (including now)? No (P)          Transportation Needs    Has the lack of transportation kept you from medical appointments, meetings, work or from getting things needed for daily living? No (P)         Food Insecurity    Within the past 12 months, you worried that your food would run out before you got the money to buy more. Never true (P)      Within the past 12 months, the food you bought just didn't last and you didn't have money to get more. Never true (P)         Stress    Do you feel stress - tense, restless, nervous, or anxious, or unable to sleep at night because your mind is troubled all the time - these days? To some extent (P)         Social Isolation    How often do you feel lonely or isolated from those around you?  Never (P)         Alcohol Use    Q1: How often do you have a drink containing alcohol? Monthly or less (P)      Q2: How many drinks containing alcohol do you have on a typical day when you are drinking? 1 or 2 (P)      Q3: How often do you have six or more drinks on one occasion? Never (P)         Utilities    In the past 12 months has the electric, gas, oil, or water company threatened to shut off services in your home? No (P)         Health Literacy    How often do you need to have someone help you when you read instructions, pamphlets, or other written material from your doctor or pharmacy? Rarely (P)         OTHER    Name(s) of People in Home Deidra Mejia) (P)

## 2024-07-20 VITALS
BODY MASS INDEX: 45.35 KG/M2 | HEIGHT: 63 IN | RESPIRATION RATE: 18 BRPM | HEART RATE: 89 BPM | DIASTOLIC BLOOD PRESSURE: 65 MMHG | TEMPERATURE: 98 F | SYSTOLIC BLOOD PRESSURE: 136 MMHG | OXYGEN SATURATION: 96 % | WEIGHT: 255.94 LBS

## 2024-07-20 LAB
POCT GLUCOSE: 109 MG/DL (ref 70–110)
POCT GLUCOSE: 109 MG/DL (ref 70–110)

## 2024-07-20 PROCEDURE — 25000003 PHARM REV CODE 250: Performed by: NEUROLOGICAL SURGERY

## 2024-07-20 PROCEDURE — S4991 NICOTINE PATCH NONLEGEND: HCPCS | Performed by: NEUROLOGICAL SURGERY

## 2024-07-20 PROCEDURE — 99900035 HC TECH TIME PER 15 MIN (STAT)

## 2024-07-20 PROCEDURE — 97530 THERAPEUTIC ACTIVITIES: CPT | Mod: CQ

## 2024-07-20 PROCEDURE — 94761 N-INVAS EAR/PLS OXIMETRY MLT: CPT

## 2024-07-20 PROCEDURE — 25000003 PHARM REV CODE 250: Performed by: PHYSICIAN ASSISTANT

## 2024-07-20 PROCEDURE — 63600175 PHARM REV CODE 636 W HCPCS: Performed by: NEUROLOGICAL SURGERY

## 2024-07-20 PROCEDURE — 94799 UNLISTED PULMONARY SVC/PX: CPT

## 2024-07-20 RX ORDER — FAMOTIDINE 20 MG/1
20 TABLET, FILM COATED ORAL DAILY
Status: COMPLETED | OUTPATIENT
Start: 2024-07-20 | End: 2024-07-20

## 2024-07-20 RX ORDER — OXYCODONE HYDROCHLORIDE 15 MG/1
15 TABLET ORAL EVERY 4 HOURS PRN
Qty: 28 TABLET | Refills: 0 | Status: SHIPPED | OUTPATIENT
Start: 2024-07-20

## 2024-07-20 RX ADMIN — NICOTINE 1 PATCH: 14 PATCH, EXTENDED RELEASE TRANSDERMAL at 09:07

## 2024-07-20 RX ADMIN — HEPARIN SODIUM 5000 UNITS: 5000 INJECTION INTRAVENOUS; SUBCUTANEOUS at 05:07

## 2024-07-20 RX ADMIN — PANTOPRAZOLE SODIUM 40 MG: 40 TABLET, DELAYED RELEASE ORAL at 09:07

## 2024-07-20 RX ADMIN — FLUTICASONE PROPIONATE 100 MCG: 50 SPRAY, METERED NASAL at 09:07

## 2024-07-20 RX ADMIN — ACETAMINOPHEN 650 MG: 325 TABLET ORAL at 12:07

## 2024-07-20 RX ADMIN — FAMOTIDINE 20 MG: 20 TABLET ORAL at 10:07

## 2024-07-20 RX ADMIN — BISACODYL 10 MG: 10 SUPPOSITORY RECTAL at 09:07

## 2024-07-20 RX ADMIN — OXYCODONE 15 MG: 5 TABLET ORAL at 12:07

## 2024-07-20 RX ADMIN — DULOXETINE HYDROCHLORIDE 20 MG: 20 CAPSULE, DELAYED RELEASE ORAL at 09:07

## 2024-07-20 RX ADMIN — ACETAMINOPHEN 650 MG: 325 TABLET ORAL at 05:07

## 2024-07-20 RX ADMIN — PREGABALIN 200 MG: 75 CAPSULE ORAL at 09:07

## 2024-07-20 RX ADMIN — CELECOXIB 200 MG: 100 CAPSULE ORAL at 09:07

## 2024-07-20 RX ADMIN — MUPIROCIN: 20 OINTMENT TOPICAL at 09:07

## 2024-07-20 RX ADMIN — OXYCODONE 15 MG: 5 TABLET ORAL at 03:07

## 2024-07-20 RX ADMIN — AZELASTINE HYDROCHLORIDE 137 MCG: 137 SPRAY, METERED NASAL at 09:07

## 2024-07-20 NOTE — NURSING
Patient did amazing tonight, still requiring pain meds pretty frequently, but she is up on her own and getting to and from bathroom without assistance and ambulating a little in the room.

## 2024-07-20 NOTE — PLAN OF CARE
VIRTUAL NURSE:  Labs, notes, orders, and careplan reviewed.   Problem: Adult Inpatient Plan of Care  Goal: Plan of Care Review  Outcome: Progressing

## 2024-07-20 NOTE — PROGRESS NOTES
Virtual Nurse:Discharge orders noted; additional clinical references attached.  notified.  Patient's discharge instruction packet given by bedside RN.    Cued into patient's room.  Permission received per patient to turn camera to view patient.  Introduced as VN that will be instructing on discharge instructions.  Family members at bedside.  Educated patient on reason for admission; medications to hold, continue, and start, appointment to follow-up with doctor, and when to return to ED. Teach back method used. Verbalized understanding  Number given for 24/7 Nurse Line. Opportunity given for questions and questions answered.  Bedside nurse updated. Transport to [facility] requested.          07/20/24 1452   Shift   Virtual Nurse - Patient Verbalized Approval Of Camera Use;VN Rounding   Type of Frequent Check   Type Patient Rounds   Safety/Activity   Patient Rounds visualized patient   Safety Promotion/Fall Prevention Fall Risk reviewed with patient/family   Positioning   Body Position sitting up in bed

## 2024-07-20 NOTE — DISCHARGE INSTRUCTIONS
Please follow ONLY the instructions that are checked below.    Activity Restrictions:  [x]  Return to work will be determined on an individual basis.  [x]  No lifting greater than 10 pounds.  [x]  Avoid bending and twisting the area of your surgery more than 45 degrees from neutral position in any direction.  [x]  No driving or operating machinery:  [x]  until cleared by your surgeon.  [x]  while taking narcotic pain medications or muscle relaxants.  []  No cervical collar, soft collar, or lumbar brace required.  []  Wear your brace at all times. You may be given an extra brace or soft collar to wear when showering.  [x]  Wear your brace at all times except when flat in bed.  []  Wear brace for comfort only.  [x]  Increase ambulation over the next 2 weeks   [x]  Walk on paved surfaces only. It is okay to walk up and down stairs while holding onto a side rail.  [x]  No sexual activity for 2-3 weeks.    Discharge Medication/Follow-up:  [x]  Please refer to discharge medication reconciliation form.  [x]  Do not take ANY non-steroidal anti-inflammatory drugs (NSAIDS), including the following: ibuprofen, naprosyn, Aleve, Advil, Indocin, Mobic  []  4 weeks  []  8 weeks  [x]  6 months  [x]  Prescriptions for appropriate medication will be given upon discharge.   [x]  Pain control:             []  Muscle relaxer:            [x]  Take docusate (Colace 100 mg): take one capsule a day as needed for constipation. You can get this over the counter.  [x]  Follow-up appointment:  [x]  10-14 days post-op for wound check by physician assistant/nurse  [x]  4-6 weeks with MD:  [x]  with x-rays  []  without x-rays  []  An appointment will be mailed to you.    Wound Care:  [x]  Remove dressing or bandaid in  1  days.  [x]  No bandage required. Keep your incision open to the air after 1 day.  [x]  You may shower tomorrow. Have the force of water hit you opposite from the incision. Pat the incision dry after your shower; do not scrub the  incision.  [x]  You cannot take a bath until 8 weeks after surgery.  []  Apply bacitracin to incision twice a day for    more days.    Call your doctor or go to the Emergency Room for any signs of infection, including: increased redness, drainage, pain, or fever (temperature ?101.5 for 24 hours). Call your doctor or go to the Emergency Room if there are any localized neurological changes; problems with speech, vision, numbness, tingling, weakness, or severe headache; or for other concerns.    Special Instructions:  [x]  No use of tobacco products.  [x]  Diet: Please eat a regular diet as tolerated.  []  Other diet:              Specific physician instructions:           Physicians need 3 days' notice for pain medicine to be refilled. Pain medicine will only be refilled between 8 AM and 5 PM, Monday through Friday, due to Food and Drug Administration regulation of documentation.      Form No. 49887 (Revised 10/31/2013)

## 2024-07-20 NOTE — NURSING
Assumed care of patient from GRACIA Nunez, patient is AAOX4, room air, CPAP at night, Q4 neurovascular LLE and RLE, HOB 30 degrees, vitals WNL, mom at bedside, recent L5-S1 fusion on 7/18, c/o 9/10 pain, PRN meds given, SCD's in place, LSO brace used bathroom, independent to bathroom, swallows pills whole, all safety precautions are in place, call bell and tray table are within reach of the patient and no additional questions or conerns from the patient at this time.

## 2024-07-20 NOTE — PT/OT/SLP PROGRESS
Physical Therapy Treatment    Patient Name:  Candy Huynh   MRN:  [id number]    Recommendations:     Discharge Recommendations: Low Intensity Therapy  Discharge Equipment Recommendations: none  Barriers to discharge: None    Assessment:     Candy Huynh is a 53 y.o. female admitted with a medical diagnosis of Recurrent herniation of lumbar disc.  She presents with the following impairments/functional limitations: weakness, impaired endurance, orthopedic precautions, impaired self care skills, impaired functional mobility, decreased lower extremity function, decreased safety awareness, gait instability, impaired balance, decreased coordination.    Rehab Prognosis: Good; patient would benefit from acute skilled PT services to address these deficits and reach maximum level of function.    Recent Surgery: Procedure(s) (LRB):  FUSION, SPINE, LUMBAR, ALIF (Left)  FUSION, SPINE, WITH INSTRUMENTATION (N/A) 2 Days Post-Op    Plan:     During this hospitalization, patient to be seen 5 x/week to address the identified rehab impairments via gait training, therapeutic activities, therapeutic exercises, neuromuscular re-education and progress toward the following goals:    Plan of Care Expires:  08/19/24    Subjective     Chief Complaint: Pt with no complaints or concerns at this time.   Patient/Family Comments/goals: Pt agreeable to PT treatment.   Pain/Comfort:  Pain Rating 1: 0/10      Objective:     Communicated with nursing prior to session.  Patient found supine with bed alarm upon PT entry to room.     General Precautions: Standard, fall  Orthopedic Precautions: spinal precautions  Braces: LSO  Respiratory Status: Room air     Functional Mobility:  Bed Mobility:     Supine to Sit: stand by assistance  Transfers:     Sit to Stand:  supervision with no AD  Gait: Pt ambulated 200+ ft with no AD, requiring SBA as well as verbal cueing on proper heel strike to toe off gait mechanics to reduce foot drag.   Balance: Pt  with good sitting and standing balance.       AM-PAC 6 CLICK MOBILITY  Turning over in bed (including adjusting bedclothes, sheets and blankets)?: 3  Sitting down on and standing up from a chair with arms (e.g., wheelchair, bedside commode, etc.): 3  Moving from lying on back to sitting on the side of the bed?: 3  Moving to and from a bed to a chair (including a wheelchair)?: 3  Need to walk in hospital room?: 3  Climbing 3-5 steps with a railing?: 3  Basic Mobility Total Score: 18       Treatment & Education:      Patient left up in chair with all lines intact, call button in reach, and family member present..    GOALS:   Multidisciplinary Problems       Physical Therapy Goals          Problem: Physical Therapy    Goal Priority Disciplines Outcome Goal Variances Interventions   Physical Therapy Goal     PT, PT/OT Progressing     Description: Goals to be met by: 24     Patient will increase functional independence with mobility by performin. Supine to sit with Modified [place]  2. Sit to supine with Modified [place]  3. Sit to stand transfer with Modified [place] with use of RW.  4. Bed to chair transfer with Modified [place] using Rolling Walker  5. Gait  x 150 feet with Modified [place] using Rolling Walker.   6. Ascend/descend 10 stair with right Handrails Modified [place].                         Time Tracking:     PT Received On: 24  PT Start Time: 942     PT Stop Time: 958  PT Total Time (min): 16 min     Billable Minutes: Therapeutic Activity 16    Treatment Type: Treatment  PT/PTA: PTA     Number of PTA visits since last PT visit: 2024

## 2024-07-20 NOTE — PROGRESS NOTES
[facility]  Neurosurgery  Progress Note    Subjective:     Interval History: back pain much more controlled.  No radiating leg pain.  Stomach pain this morning.  Eating and drinking.  Urinating.  Passing gas. Walking well with PT.    History of Present Illness:     She is less than 3 months status post right minimally invasive microdiskectomy. She has a BMI of 40.58. For few weeks after the surgery her right leg pain completely subsided but recurred. She reports constant right leg pain in the S1 distribution. She has more leg pain than back pain. Pain is worse with standing up. Pain can also increase with coughing and sneezing. She is doing her physical therapy exercises in her bed but she can not do them on the floor due because she also has knee osteoarthritis and is unable to get up from the floor. She has completed home health physical therapy She recently went to the emergency room and received an injection in her arm that did not give her pain relief. No new onset of motor weakness. No sphincter dysfunction symptoms. She tried a 5 days of Toradol without significant pain relief. She also took Mobic with some pain relief. She is also on oxycodone.       Post-Op Info:  Procedure(s) (LRB):  FUSION, SPINE, LUMBAR, ALIF (Left)  FUSION, SPINE, WITH INSTRUMENTATION (N/A)   2 Days Post-Op      Medications:  Continuous Infusions:  Scheduled Meds:   acetaminophen  650 mg Oral Q6H    ARIPiprazole  2 mg Oral QHS    azelastine  1 spray Nasal Daily    bisacodyL  10 mg Rectal Daily    celecoxib  200 mg Oral BID    cetirizine  10 mg Oral QHS    DULoxetine  20 mg Oral Daily    famotidine  20 mg Oral Daily    fluticasone propionate  2 spray Each Nostril Daily    heparin (porcine)  5,000 Units Subcutaneous Q8H    montelukast  10 mg Oral QHS    mupirocin   Nasal BID    nicotine  1 patch Transdermal Daily    pantoprazole  40 mg Oral Daily    pregabalin  200 mg Oral BID     PRN Meds:  Current Facility-Administered  "Medications:     albuterol, 2 puff, Inhalation, Q4H PRN    aluminum-magnesium hydroxide-simethicone, 30 mL, Oral, Q4H PRN    dextrose 10%, 12.5 g, Intravenous, PRN    dextrose 10%, 25 g, Intravenous, PRN    diphenhydrAMINE, 50 mg, Oral, Q6H PRN    glucagon (human recombinant), 1 mg, Intramuscular, PRN    glucose, 16 g, Oral, PRN    glucose, 24 g, Oral, PRN    insulin aspart U-100, 0-10 Units, Subcutaneous, QID (AC + HS) PRN    methocarbamoL, 750 mg, Oral, TID PRN    ondansetron, 8 mg, Oral, Q6H PRN    oxyCODONE, 15 mg, Oral, Q4H PRN    prochlorperazine, 5 mg, Intravenous, Q6H PRN    senna-docusate 8.6-50 mg, 2 tablet, Oral, Nightly PRN    traMADoL, 50 mg, Oral, Q6H PRN     Review of Systems  Objective:     Weight: 116.1 kg (255 lb 15.3 oz)  Body mass index is 45.34 kg/m².  Vital Signs (Most Recent):  Temp: 98.2 °F (36.8 °C) (07/20/24 0801)  Pulse: 82 (07/20/24 0801)  Resp: 20 (07/20/24 0801)  BP: (!) 111/58 (07/20/24 0801)  SpO2: 96 % (07/20/24 0804) Vital Signs (24h Range):  Temp:  [97.2 °F (36.2 °C)-98.8 °F (37.1 °C)] 98.2 °F (36.8 °C)  Pulse:  [62-84] 82  Resp:  [16-20] 20  SpO2:  [96 %-100 %] 96 %  BP: (111-141)/(58-65) 111/58                              Neurosurgery Physical Exam    General: well developed, well nourished, no distress  Mental Status: Awake, Alert, Oriented X3.Thought content appropriate  GCS: Motor: 6/Verbal: 5/Eyes: 4 GCS Total: 15    Motor Strength:                                  HF KF KE DF PF EHL   Lower: R 5/5 5/5 5/5 5/5 5/5 5/5    L 5/5 5/5 5/5 5/5 5/5 5/5         Incision- CDI        Significant Labs:  Recent Labs   Lab 07/19/24  0436   *      K 4.2      CO2 29   BUN 15   CREATININE 0.9   CALCIUM 9.4     Recent Labs   Lab 07/19/24  0436   WBC 8.91   HGB 10.7*   HCT 32.8*        No results for input(s): "LABPT", "INR", "APTT" in the last 48 hours.  Microbiology Results (last 7 days)       ** No results found for the last 168 hours. **      "         Assessment/Plan:     Active Diagnoses:    Diagnosis Date Noted POA    PRINCIPAL PROBLEM:  Recurrent herniation of lumbar disc [M51.26] 07/18/2024 Yes    Foraminal stenosis of lumbosacral region [M48.07] 07/18/2024 Yes    GATITO (obstructive sleep apnea) [G47.33] 10/11/2022 Yes    Morbid obesity with BMI of 40.0-44.9, adult [E66.01, Z68.41]  Not Applicable      Problems Resolved During this Admission:     A/P:  POD #2 L5-S1 ALIF with posterior instrumentation     --Neurologically stable          -q4h neuro checks  --Imaging: Post op xrays pending  --Pain control.    --DVT ppx: TEDs/SCDs/SQH  --Activity: PT/OT, OOB. LSO brace  --Diet: Changed to lactose free Regular, Saline Lock IV  --Bowel regimen: PRN suppository, senna PRN  --Urinary: Voiding spontaneously  --Atelectasis ppx: Encourage IS hourly     Dispo: DC home today with HHPTOT after lumbar xray is done    All of the above discussed and reviewed with Dr. Salazar.      ANGELITO HernándezC  Neurosurgery  [place] [facility]

## 2024-07-20 NOTE — PLAN OF CARE
Pt is agreeable with discharge to home today with HH services from [facility]. Meds called into [facility]. She will be transported home by her mother Kita King [phone].   Future Appointments   Date Time Provider Department Center   8/1/2024  2:30 PM [facility] XR-A LIMIT 350 LBS [facility] X[facility]   8/1/2024  3:30 PM Estephania Rodrigez PA-C [facility]UR[facility] [place] [facility]   8/22/2024  1:45 PM Caden Metzger III, MD [facility]   8/30/2024 12:30 PM [facility] XR-A LIMIT 350 LBS [facility] XRAY [facility] [place] [facility]   8/30/2024  1:30 PM Estephania Rodrigez PA-C [facility]   10/23/2024  1:00 PM [facility] XR-A LIMIT 350 LBS [facility] XRAY [facility] [place] [facility]   10/23/2024  2:00 PM Dane Salazar MD [facility]UR[facility]      07/20/24 1139   Final Note   Assessment Type Final Discharge Note   Anticipated Discharge Disposition Home-Health   What phone number can be called within the next 1-3 days to see how you are doing after discharge? [phone]   Hospital Resources/Appts/Education Provided Appointments scheduled and added to AVS   Post-Acute Status   Post-Acute Authorization Home Health   Home Health Status Set-up Complete/Auth obtained   Coverage Medicaid   Discharge Delays None known at this time

## 2024-07-20 NOTE — PROGRESS NOTES
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       Future Appointments   Date Time Provider Department Center   8/1/2024  2:30 PM [facility] XR-A LIMIT 350 LBS [facility] XRAY [facility] [place] [facility]   8/1/2024  3:30 PM Estephania Rodrigez PA-C [facility] NEUROSU [place] [facility]   8/22/2024  1:45 PM Caden Metzger III, MD [facility] [place]   8/30/2024 12:30 PM [facility] XR-A LIMIT 350 LBS [facility] XRAY [facility]   8/30/2024  1:30 PM Estephania Rodrigez PA-C [facility] NE[facility]U [place] [facility]   10/23/2024  1:00 PM [facility] XR-A LIMIT 350 LBS [facility] X[facility]   10/23/2024  2:00 PM Dane Salazar MD [facility]           I have seen and examined this patient face to face today. My clinical findings that support the need for the home health skilled services and home bound status are the following:  Weakness/numbness causing balance and gait disturbance due to Weakness/Debility and Surgery making it taxing to leave home.  Requiring assistive device to leave home due to unsteady gait caused by  Weakness/Debility and Surgery.  Medical restrictions requiring assistance of another human to leave home due to  Unstable ambulation and Post surgery monitoring.    Allergies:  Review of patient's allergies indicates:   Allergen Reactions    Adhesive Blisters     Specifically EKG lead pads    Chlorhexidine hcl Itching     Chlorhexidine wipes     Morphine Other (See Comments)     shake       Diet: regular diet and diabetic diet: 2000 calorie    Activities: activity as tolerated    Nursing:   SN to complete comprehensive assessment including routine vital signs. Instruct on disease process and s/s of complications to report to MD. Review/verify medication list sent home with the patient at time of discharge  and instruct patient/caregiver as needed. Frequency may be adjusted depending on start of care date.    Notify MD if SBP > 160 or < 90; DBP > 90 or < 50; HR > 120 or < 50; Temp > 101; Other:         CONSULTS:    Physical Therapy to evaluate and treat. Evaluate for home safety and equipment needs; Establish/upgrade home exercise program. Perform / instruct on therapeutic exercises, gait training, transfer training, and Range of Motion.  Occupational Therapy to evaluate and treat. Evaluate home environment for safety and equipment needs. Perform/Instruct on transfers, ADL training, ROM, and therapeutic exercises.  Aide to provide assistance with personal care, ADLs, and vital signs.          Medications: Review discharge medications with patient and family and provide education.      Current Discharge Medication List        START taking these medications    Details   oxyCODONE (ROXICODONE) 15 MG Tab Take 1 tablet (15 mg total) by mouth every 4 (four) hours as needed for Pain.  Qty: 28 tablet, Refills: 0    Comments: Quantity prescribed more than 7 day supply? No           CONTINUE these medications which have NOT CHANGED    Details   albuterol (PROVENTIL/VENTOLIN HFA) 90 mcg/actuation inhaler Inhale 2 puffs into the lungs every 4 (four) hours as needed.      amLODIPine (NORVASC) 5 MG tablet Take 5 mg by mouth once daily.      azelastine (ASTELIN) 137 mcg (0.1 %) nasal spray 1 spray once daily.      cetirizine (ZYRTEC) 10 MG tablet Take 10 mg by mouth every evening.      cholecalciferol, vitamin D3, 1,250 mcg (50,000 unit) capsule Take 50,000 Units by  "mouth every 7 days.      DULoxetine (CYMBALTA) 20 MG capsule Take 20 mg by mouth once daily.      fluticasone propionate (FLONASE) 50 mcg/actuation nasal spray 2 sprays by Nasal route.      loratadine (CLARITIN) 10 mg tablet Take 10 mg by mouth once daily.      losartan (COZAAR) 100 MG tablet Take 100 mg by mouth once daily.      methocarbamoL (ROBAXIN) 750 MG Tab Take 1 tablet (750 mg total) by mouth 3 (three) times daily as needed (muscle spasms).  Qty: 90 tablet, Refills: 2      montelukast (SINGULAIR) 10 mg tablet       nicotine polacrilex (NICORETTE) 4 MG Gum SMARTSI Each By Mouth PRN      pantoprazole (PROTONIX) 40 MG tablet Take 40 mg by mouth.      pregabalin (LYRICA) 200 MG Cap Take 1 capsule (200 mg total) by mouth 2 (two) times daily.  Qty: 60 capsule, Refills: 2    Associated Diagnoses: Status post left knee replacement      rosuvastatin (CRESTOR) 20 MG tablet Take 20 mg by mouth once daily.      semaglutide (OZEMPIC) 0.25 mg or 0.5 mg (2 mg/3 mL) pen injector Inject 0.25 mg into the skin every 7 (seven) days      sumatriptan (IMITREX) 100 MG tablet Take 100 mg by mouth 2 (two) times daily as needed.      TRELEGY ELLIPTA 200-62.5-25 mcg inhaler Inhale 1 puff into the lungs once daily.      ARIPiprazole (ABILIFY) 5 MG Tab Take 5 mg by mouth every evening.      aspirin (ECOTRIN) 81 MG EC tablet Take 1 tablet by mouth once daily.      BD ALINE 2ND GEN PEN NEEDLE 32 gauge x 5/32" Ndle USE ONE NEEDLE ONCE A WEEK      cloNIDine (CATAPRES) 0.1 MG tablet Take 0.2 mg by mouth every evening.      diclofenac sodium (VOLTAREN) 1 % Gel Apply 2 g topically 4 (four) times daily.  Qty: 20 g, Refills: 0      EASY TOUCH ALCOHOL PREP PADS PadM USE TO prepare FOR glucose testing      EPINEPHrine (EPIPEN) 0.3 mg/0.3 mL AtIn as per administration instructions      ergocalciferol (ERGOCALCIFEROL) 50,000 unit Cap Take 50,000 Units by mouth every 7 days.      FEROSUL 325 mg (65 mg iron) Tab tablet Take by mouth every other " day.      FLOWFLEX COVID-19 AG HOME TEST Kit test AS directed      folic acid (FOLVITE) 1 MG tablet Take 1,000 mcg by mouth.      LIDOcaine (LIDODERM) 5 % Place 1 patch onto the skin once daily. Remove & Discard patch within 12 hours or as directed by MD  Qty: 6 patch, Refills: 0      meclizine (ANTIVERT) 25 mg tablet Take 25 mg by mouth.      metFORMIN (GLUCOPHAGE-XR) 750 MG ER 24hr tablet Take 750 mg by mouth once daily.      ondansetron (ZOFRAN-ODT) 8 MG TbDL Take 8 mg by mouth every 8 (eight) hours as needed.      ONETOUCH DELICA PLUS LANCET 33 gauge Misc Apply topically 4 (four) times daily.      ONETOUCH ULTRA2 METER Misc SMARTSIG:Via Meter As Directed      ONETOUCH VERIO TEST STRIPS Strp 1 strip 4 (four) times daily.      senna-docusate 8.6-50 mg (SENNA WITH DOCUSATE SODIUM) 8.6-50 mg per tablet Take 1 tablet by mouth once daily.  Qty: 30 tablet, Refills: 0    Associated Diagnoses: S/P total knee replacement using cement, left      STOOL SOFTENER 100 mg capsule Take 1 softgel by mouth twice daily  Qty: 60 capsule, Refills: 11    Comments: [facility] IS PATIENTS NEW PHARMACY - PLEASE SEND ALL FUTURE MEDS HERE.           STOP taking these medications       meloxicam (MOBIC) 15 MG tablet Comments:   Reason for Stopping:         oxyCODONE-acetaminophen (PERCOCET)  mg per tablet Comments:   Reason for Stopping:         cyclobenzaprine (FLEXERIL) 5 MG tablet Comments:   Reason for Stopping:               I certify that this patient is confined to her home and needs intermittent skilled nursing care, physical therapy, and occupational therapy.    Estephania Rodrigez, [facility], PA-C  Neurosurgery  [facility]  07/20/2024

## 2024-07-20 NOTE — PLAN OF CARE
Problem: Adult Inpatient Plan of Care  Goal: Plan of Care Review  7/20/2024 0255 by Dg Diaz RN  Outcome: Progressing  7/20/2024 0255 by Dg Diaz RN  Outcome: Progressing  Goal: Patient-Specific Goal (Individualized)  7/20/2024 0255 by Dg Diaz RN  Outcome: Progressing  7/20/2024 0255 by Dg Diaz, RN  Outcome: Progressing  Goal: Absence of Hospital-Acquired Illness or Injury  7/20/2024 0255 by Dg Diaz RN  Outcome: Progressing  7/20/2024 0255 by Dg Diaz RN  Outcome: Progressing  Goal: Optimal Comfort and Wellbeing  7/20/2024 0255 by Dg Diaz RN  Outcome: Progressing  7/20/2024 0255 by Dg Diaz RN  Outcome: Progressing  Goal: Readiness for Transition of Care  7/20/2024 0255 by Dg Diaz RN  Outcome: Progressing  7/20/2024 0255 by Dg Diaz RN  Outcome: Progressing     Problem: Diabetes Comorbidity  Goal: Blood Glucose Level Within Targeted Range  7/20/2024 0255 by Dg Diaz RN  Outcome: Progressing  7/20/2024 0255 by Dg Diaz RN  Outcome: Progressing     Problem: Bariatric Environmental Safety  Goal: Safety Maintained with Care  7/20/2024 0255 by Dg Diaz RN  Outcome: Progressing  7/20/2024 0255 by Dg Diaz, RN  Outcome: Progressing     Problem: Infection  Goal: Absence of Infection Signs and Symptoms  7/20/2024 0255 by Dg Diaz RN  Outcome: Progressing  7/20/2024 0255 by Dg Diaz RN  Outcome: Progressing     Problem: Wound  Goal: Optimal Coping  7/20/2024 0255 by Dg Diaz RN  Outcome: Progressing  7/20/2024 0255 by Dg Diaz RN  Outcome: Progressing  Goal: Optimal Functional Ability  7/20/2024 0255 by Dg Diaz, RN  Outcome: Progressing  7/20/2024 0255 by Dg Diaz, RN  Outcome: Progressing  Goal: Absence of Infection Signs and Symptoms  7/20/2024 0255 by Dg Diaz, RN  Outcome: Progressing  7/20/2024 0255 by Dg Diaz, RN  Outcome: Progressing  Goal: Improved Oral Intake  7/20/2024 0255 by Dg Diaz, RN  Outcome: Progressing  7/20/2024  0255 by Joe, Dg, RN  Outcome: Progressing  Goal: Optimal Pain Control and Function  7/20/2024 0255 by Dg Diaz RN  Outcome: Progressing  7/20/2024 0255 by Dg Diaz RN  Outcome: Progressing  Goal: Skin Health and Integrity  7/20/2024 0255 by Dg Diaz RN  Outcome: Progressing  7/20/2024 0255 by Dg Diaz, RN  Outcome: Progressing  Goal: Optimal Wound Healing  7/20/2024 0255 by Dg Diaz, RN  Outcome: Progressing  7/20/2024 0255 by Dg Diaz RN  Outcome: Progressing     Problem: Spinal Surgery  Goal: Optimal Coping with Surgery  7/20/2024 0255 by Dg Diaz RN  Outcome: Progressing  7/20/2024 0255 by Dg Diaz RN  Outcome: Progressing  Goal: Absence of Bleeding  7/20/2024 0255 by Dg Diaz, RN  Outcome: Progressing  7/20/2024 0255 by Dg Diaz RN  Outcome: Progressing  Goal: Effective Bowel Elimination  7/20/2024 0255 by Dg Diaz RN  Outcome: Progressing  7/20/2024 0255 by Dg Diaz RN  Outcome: Progressing  Goal: Fluid and Electrolyte Balance  7/20/2024 0255 by Dg Diaz RN  Outcome: Progressing  7/20/2024 0255 by Dg Diaz RN  Outcome: Progressing  Goal: Optimal Functional Ability  7/20/2024 0255 by Dg Diaz RN  Outcome: Progressing  7/20/2024 0255 by Dg Diaz, RN  Outcome: Progressing  Goal: Absence of Infection Signs and Symptoms  7/20/2024 0255 by Dg Diaz, RN  Outcome: Progressing  7/20/2024 0255 by Dg Diaz RN  Outcome: Progressing  Goal: Optimal Neurologic Function  Outcome: Progressing  Goal: Anesthesia/Sedation Recovery  Outcome: Progressing  Goal: Optimal Pain Control and Function  Outcome: Progressing  Goal: Nausea and Vomiting Relief  Outcome: Progressing  Goal: Effective Urinary Elimination  Outcome: Progressing  Goal: Effective Oxygenation and Ventilation  Outcome: Progressing     Problem: Pain Acute  Goal: Optimal Pain Control and Function  Outcome: Progressing     Problem: Fall Injury Risk  Goal: Absence of Fall and Fall-Related  Injury  Outcome: Progressing

## 2024-07-21 ENCOUNTER — PATIENT MESSAGE (OUTPATIENT)
Dept: NEUROSURGERY | Facility: CLINIC | Age: 54
End: 2024-07-21
Payer: MEDICAID

## 2024-07-21 PROCEDURE — G0180 MD CERTIFICATION HHA PATIENT: HCPCS | Mod: ,,, | Performed by: PHYSICIAN ASSISTANT

## 2024-07-23 ENCOUNTER — PATIENT MESSAGE (OUTPATIENT)
Dept: NEUROSURGERY | Facility: CLINIC | Age: 54
End: 2024-07-23
Payer: MEDICAID

## 2024-07-24 ENCOUNTER — TELEPHONE (OUTPATIENT)
Dept: NEUROSURGERY | Facility: CLINIC | Age: 54
End: 2024-07-24
Payer: MEDICAID

## 2024-07-24 NOTE — TELEPHONE ENCOUNTER
Returned pt's call, pt stated that she is having pain in her right groin area when she stands and sit down. Pt also stated that her right foot feels like it goes dead. I stated to pt that I will send this message over to  and contact her back. Pt voiced understanding

## 2024-07-29 DIAGNOSIS — M54.16 LUMBAR RADICULOPATHY: ICD-10-CM

## 2024-07-29 DIAGNOSIS — Z98.1 S/P LUMBAR FUSION: ICD-10-CM

## 2024-07-31 DIAGNOSIS — R20.0 RIGHT LEG NUMBNESS: ICD-10-CM

## 2024-07-31 DIAGNOSIS — M54.9 DORSALGIA, UNSPECIFIED: Primary | ICD-10-CM

## 2024-08-01 ENCOUNTER — OFFICE VISIT (OUTPATIENT)
Dept: NEUROSURGERY | Facility: CLINIC | Age: 54
End: 2024-08-01
Payer: MEDICAID

## 2024-08-01 ENCOUNTER — HOSPITAL ENCOUNTER (OUTPATIENT)
Dept: RADIOLOGY | Facility: HOSPITAL | Age: 54
Discharge: HOME | End: 2024-08-01
Attending: NEUROLOGICAL SURGERY
Payer: MEDICAID

## 2024-08-01 VITALS
DIASTOLIC BLOOD PRESSURE: 81 MMHG | WEIGHT: 255.94 LBS | BODY MASS INDEX: 45.35 KG/M2 | SYSTOLIC BLOOD PRESSURE: 128 MMHG | HEART RATE: 83 BPM | HEIGHT: 63 IN

## 2024-08-01 DIAGNOSIS — Z98.1 S/P LUMBAR FUSION: ICD-10-CM

## 2024-08-01 DIAGNOSIS — Z98.1 S/P LUMBAR SPINAL FUSION: Primary | ICD-10-CM

## 2024-08-01 DIAGNOSIS — Z96.652 STATUS POST LEFT KNEE REPLACEMENT: ICD-10-CM

## 2024-08-01 DIAGNOSIS — Z96.651 PRESENCE OF RIGHT ARTIFICIAL KNEE JOINT: Primary | ICD-10-CM

## 2024-08-01 PROCEDURE — 4010F ACE/ARB THERAPY RXD/TAKEN: CPT | Mod: CPTII,,, | Performed by: PHYSICIAN ASSISTANT

## 2024-08-01 PROCEDURE — 99214 OFFICE O/P EST MOD 30 MIN: CPT | Mod: PBBFAC,25,PN | Performed by: PHYSICIAN ASSISTANT

## 2024-08-01 PROCEDURE — 99999 PR PBB SHADOW E&M-EST. PATIENT-LVL IV: CPT | Mod: PBBFAC,,, | Performed by: PHYSICIAN ASSISTANT

## 2024-08-01 PROCEDURE — 3079F DIAST BP 80-89 MM HG: CPT | Mod: CPTII,,, | Performed by: PHYSICIAN ASSISTANT

## 2024-08-01 PROCEDURE — 99024 POSTOP FOLLOW-UP VISIT: CPT | Mod: ,,, | Performed by: PHYSICIAN ASSISTANT

## 2024-08-01 PROCEDURE — 72100 X-RAY EXAM L-S SPINE 2/3 VWS: CPT | Mod: 26,,, | Performed by: RADIOLOGY

## 2024-08-01 PROCEDURE — 1159F MED LIST DOCD IN RCRD: CPT | Mod: CPTII,,, | Performed by: PHYSICIAN ASSISTANT

## 2024-08-01 PROCEDURE — 1160F RVW MEDS BY RX/DR IN RCRD: CPT | Mod: CPTII,,, | Performed by: PHYSICIAN ASSISTANT

## 2024-08-01 PROCEDURE — 72100 X-RAY EXAM L-S SPINE 2/3 VWS: CPT | Mod: TC,FY

## 2024-08-01 PROCEDURE — 3074F SYST BP LT 130 MM HG: CPT | Mod: CPTII,,, | Performed by: PHYSICIAN ASSISTANT

## 2024-08-01 RX ORDER — OXYCODONE HYDROCHLORIDE 15 MG/1
15 TABLET ORAL EVERY 4 HOURS PRN
Qty: 28 TABLET | Refills: 0 | Status: SHIPPED | OUTPATIENT
Start: 2024-08-01

## 2024-08-01 RX ORDER — CELECOXIB 200 MG/1
200 CAPSULE ORAL 2 TIMES DAILY PRN
Qty: 60 CAPSULE | Refills: 2 | Status: SHIPPED | OUTPATIENT
Start: 2024-08-01

## 2024-08-01 RX ORDER — PREGABALIN 200 MG/1
200 CAPSULE ORAL 2 TIMES DAILY
Qty: 60 CAPSULE | Refills: 2 | Status: SHIPPED | OUTPATIENT
Start: 2024-08-01 | End: 2024-10-30

## 2024-08-01 NOTE — PROGRESS NOTES
"  Postoperative Wound Check:    Date of surgery 07/18/2024     Preop diagnosis   1. L5-S1 foraminal stenosis with radiculopathy  2. 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 BMI of 42.87     Postop diagnosis   Same     Surgery   1. L5-S1 anterior interbody fusion, placement of interbody spacer, DePuy Conduit ALIF cage filled with allograft BMP and DBM  2. L5-S1 posterior instrumentation using DePuy Viper Prime system  3. Registration of neuro navigation using intraoperative 3D imaging [facility] and [facility] station     Surgeon   Dane Salazar MD          HPI:    She has numbness in the whole right foot when she sits for about 5-10 minutes.  When she leans to the left it eases up some.  No numbness in the right foot when standing or walking.  No right leg pain.  Some right groin pain.  Some pain in the left hip.  Back pain.    Patient denies any problems with the incisions.  No redness, swelling, or drainage, and no fever, chills, or sweats.      Physical Exam:    Vitals:    08/01/24 1525   BP: 128/81   Pulse: 83   Weight: 116.1 kg (255 lb 15.3 oz)   Height: 5' 3" (1.6 m)   PainSc:   6     5/5 in bl le  1+ pitting edema     Incision:  Wound edges are approximated.  No redness, swelling, or drainage.      Diagnosis:     1. S/P lumbar spinal fusion                  Plan:       Orders Placed This Encounter    celecoxib (CELEBREX) 200 MG capsule    oxyCODONE (ROXICODONE) 15 MG Tab    pregabalin (LYRICA) 200 MG Cap         -Medical Assistant removed the staples without any complications.  Chloraprep applied.  -lspine xrays show good hardware placement  -filled oxycodone and Lyrica   -Celebrex as needed   -stop meloxicam   -Dr. Salazar had already ordered MRI lumbar spine and ultrasound of her lower extremity to further evaluate the right foot numbness        The patient will follow up with me in 4 weeks for the 6 week po fu with xrays beforehand.    Estephania Rodrigez, [facility], PA-C  Neurosurgery  [facility]

## 2024-08-10 ENCOUNTER — HOSPITAL ENCOUNTER (OUTPATIENT)
Dept: RADIOLOGY | Facility: HOSPITAL | Age: 54
Discharge: HOME | End: 2024-08-10
Attending: NEUROLOGICAL SURGERY
Payer: MEDICAID

## 2024-08-10 DIAGNOSIS — R20.0 RIGHT LEG NUMBNESS: ICD-10-CM

## 2024-08-10 DIAGNOSIS — M54.9 DORSALGIA, UNSPECIFIED: ICD-10-CM

## 2024-08-10 PROCEDURE — 93926 LOWER EXTREMITY STUDY: CPT | Mod: TC,RT

## 2024-08-10 PROCEDURE — 93971 EXTREMITY STUDY: CPT | Mod: 26,RT,, | Performed by: RADIOLOGY

## 2024-08-10 PROCEDURE — 93926 LOWER EXTREMITY STUDY: CPT | Mod: 26,RT,, | Performed by: RADIOLOGY

## 2024-08-10 PROCEDURE — 93971 EXTREMITY STUDY: CPT | Mod: TC,RT

## 2024-08-14 RX ORDER — OXYCODONE HYDROCHLORIDE 10 MG/1
10 TABLET ORAL EVERY 8 HOURS PRN
Qty: 45 TABLET | Refills: 0 | Status: SHIPPED | OUTPATIENT
Start: 2024-08-14

## 2024-08-17 ENCOUNTER — HOSPITAL ENCOUNTER (OUTPATIENT)
Dept: RADIOLOGY | Facility: HOSPITAL | Age: 54
Discharge: HOME | End: 2024-08-17
Attending: NEUROLOGICAL SURGERY
Payer: MEDICAID

## 2024-08-17 ENCOUNTER — HOSPITAL ENCOUNTER (OUTPATIENT)
Dept: RADIOLOGY | Facility: HOSPITAL | Age: 54
Discharge: HOME | End: 2024-08-17
Attending: ORTHOPAEDIC SURGERY
Payer: MEDICAID

## 2024-08-17 DIAGNOSIS — Z96.651 PRESENCE OF RIGHT ARTIFICIAL KNEE JOINT: ICD-10-CM

## 2024-08-17 DIAGNOSIS — M54.9 DORSALGIA, UNSPECIFIED: ICD-10-CM

## 2024-08-17 PROCEDURE — A9585 GADOBUTROL INJECTION: HCPCS | Performed by: NEUROLOGICAL SURGERY

## 2024-08-17 PROCEDURE — 73562 X-RAY EXAM OF KNEE 3: CPT | Mod: 26,RT,, | Performed by: RADIOLOGY

## 2024-08-17 PROCEDURE — 25500020 PHARM REV CODE 255: Performed by: NEUROLOGICAL SURGERY

## 2024-08-17 PROCEDURE — 72158 MRI LUMBAR SPINE W/O & W/DYE: CPT | Mod: 26,,, | Performed by: RADIOLOGY

## 2024-08-17 PROCEDURE — 72158 MRI LUMBAR SPINE W/O & W/DYE: CPT | Mod: TC

## 2024-08-17 PROCEDURE — 73562 X-RAY EXAM OF KNEE 3: CPT | Mod: TC,FY,RT

## 2024-08-17 RX ORDER — GADOBUTROL 604.72 MG/ML
10 INJECTION INTRAVENOUS
Status: COMPLETED | OUTPATIENT
Start: 2024-08-17 | End: 2024-08-17

## 2024-08-17 RX ADMIN — GADOBUTROL 10 ML: 604.72 INJECTION INTRAVENOUS at 01:08

## 2024-08-18 ENCOUNTER — PATIENT MESSAGE (OUTPATIENT)
Dept: NEUROSURGERY | Facility: CLINIC | Age: 54
End: 2024-08-18
Payer: MEDICAID

## 2024-08-20 ENCOUNTER — EXTERNAL HOME HEALTH (OUTPATIENT)
Dept: HOME HEALTH SERVICES | Facility: HOSPITAL | Age: 54
End: 2024-08-20
Payer: MEDICAID

## 2024-08-22 ENCOUNTER — TELEPHONE (OUTPATIENT)
Dept: ORTHOPEDICS | Facility: CLINIC | Age: 54
End: 2024-08-22
Payer: MEDICAID

## 2024-08-22 NOTE — TELEPHONE ENCOUNTER
Spoke with this pt about her appointment today, pt stated that she could not make the appointment today because she just had back surgery and is having problems currently with that, so she will not be able to come in today.     Sincerely,  Julissa Jenkins, [facility]   Certified Clinical Medical Assistant to Dr. Caden ordaz  Phone: [phone]  Fax: [phone]

## 2024-08-26 ENCOUNTER — OFFICE VISIT (OUTPATIENT)
Dept: NEUROSURGERY | Facility: CLINIC | Age: 54
End: 2024-08-26
Payer: MEDICAID

## 2024-08-26 DIAGNOSIS — M53.3 SI (SACROILIAC) JOINT DYSFUNCTION: ICD-10-CM

## 2024-08-26 DIAGNOSIS — Z98.1 STATUS POST LUMBAR SPINAL FUSION: Primary | ICD-10-CM

## 2024-08-26 PROCEDURE — 4010F ACE/ARB THERAPY RXD/TAKEN: CPT | Mod: CPTII,95,, | Performed by: NEUROLOGICAL SURGERY

## 2024-08-26 PROCEDURE — 99024 POSTOP FOLLOW-UP VISIT: CPT | Mod: 95,,, | Performed by: NEUROLOGICAL SURGERY

## 2024-08-26 RX ORDER — OXYCODONE HYDROCHLORIDE 10 MG/1
10 TABLET ORAL EVERY 8 HOURS PRN
Qty: 45 TABLET | Refills: 0 | Status: SHIPPED | OUTPATIENT
Start: 2024-08-26

## 2024-08-26 NOTE — PROGRESS NOTES
This was a virtual visit with audio and video    NEUROSURGICAL POST-OPERATIVE PROGRESS NOTE    DATE OF SERVICE:  08/26/2024      ATTENDING PHYSICIAN:  Dane Salazar MD    SUBJECTIVE:    INTERIM HISTORY:    This is a very pleasant 53 y.o. y.o. female, who is status 6 weeks L5-S1 anterior interbody fusion with posterior instrumentation.  Since the surgery she reports significant improvement in her right calf pain.  She does not have any significant leg pain anymore.  She is also able to sit for longer period of time.  She is still complaining of severe low back pain.  She takes oxycodone 10 mg 3 times a day for severe low back pain.  If she skips a dose of oxycodone her pain becomes severe.  She also complains of right groin pain.  The right groin pain is worse with walking, sitting.  She is doing physical therapy twice a week.                OBJECTIVE:    PHYSICAL EXAMINATION:   There were no vitals filed for this visit.    Physical Exam:    Constitutional: She appears well-developed and well-nourished.     Eyes: Pupils are equal, round, and reactive to light. Conjunctivae and EOM are normal.     Psych/Behavior: She is alert. She is oriented to person, place, and time. She has a normal mood and affect.       Ortho Exam    Neurologic Exam     Mental Status   Oriented to person, place, and time.     Cranial Nerves     CN III, IV, VI   Pupils are equal, round, and reactive to light.  Extraocular motions are normal.         DIAGNOSTIC DATA:    Recent lumbar spine MRI shows the positioning of hardware, improved foraminal stenosis compared to preoperatively    ASSESSMENT:    This is a 53 y.o. female who is s/p 6 weeks status post L5-S1 anterior fusion.  Right groin pain.    Problem List Items Addressed This Visit    None  Visit Diagnoses       Status post lumbar spinal fusion    -  Primary    SI (sacroiliac) joint dysfunction        Relevant Orders    CT Pelvis Without Contrast            PLAN:    We will be seen in  clinic.  We will examine her right SI joint to see if the residual pain is coming from the SI joint.  All questions answered  Refill on oxycodone 10 mg 3 times a day p.r.n.  for 2 weeks        Dane Salazar MD  Pager: [phone]

## 2024-08-30 ENCOUNTER — TELEPHONE (OUTPATIENT)
Dept: NEUROSURGERY | Facility: CLINIC | Age: 54
End: 2024-08-30

## 2024-08-30 ENCOUNTER — HOSPITAL ENCOUNTER (OUTPATIENT)
Dept: RADIOLOGY | Facility: HOSPITAL | Age: 54
Discharge: HOME | End: 2024-08-30
Attending: NEUROLOGICAL SURGERY
Payer: MEDICAID

## 2024-08-30 ENCOUNTER — OFFICE VISIT (OUTPATIENT)
Dept: NEUROSURGERY | Facility: CLINIC | Age: 54
End: 2024-08-30
Payer: MEDICAID

## 2024-08-30 VITALS
DIASTOLIC BLOOD PRESSURE: 78 MMHG | BODY MASS INDEX: 45.35 KG/M2 | HEIGHT: 63 IN | HEART RATE: 80 BPM | WEIGHT: 255.94 LBS | SYSTOLIC BLOOD PRESSURE: 125 MMHG

## 2024-08-30 DIAGNOSIS — Z98.1 S/P LUMBAR SPINAL FUSION: Primary | ICD-10-CM

## 2024-08-30 DIAGNOSIS — Z98.1 S/P LUMBAR FUSION: ICD-10-CM

## 2024-08-30 PROCEDURE — 72100 X-RAY EXAM L-S SPINE 2/3 VWS: CPT | Mod: 26,,, | Performed by: INTERNAL MEDICINE

## 2024-08-30 PROCEDURE — 72100 X-RAY EXAM L-S SPINE 2/3 VWS: CPT | Mod: TC,FY

## 2024-08-30 PROCEDURE — 99999 PR PBB SHADOW E&M-EST. PATIENT-LVL IV: CPT | Mod: PBBFAC,,, | Performed by: PHYSICIAN ASSISTANT

## 2024-08-30 PROCEDURE — 99214 OFFICE O/P EST MOD 30 MIN: CPT | Mod: PBBFAC,25,PN | Performed by: PHYSICIAN ASSISTANT

## 2024-08-30 NOTE — TELEPHONE ENCOUNTER
Dr. Salazar,    I saw her today for 6 weeks po fu.  She continues to have pain in the back and right groin region.    These of the results of her SI joint exam:    SI Joint Palpation:  No tenderness to left SI Joint palpation.  Pain on right si joint palpation.    Straight Leg Raise:  Negative right and left SLR.    SI Joint Tests    Negative right GLORIA Test but caused right groin pain.  Left GLORIA caused right low back pain  Negative right Gaenslen's Test but caused right groin pain.  Left Gaenslen's cause pain in the right low back.  Positive right Thigh Thrust Test.  Negative left thigh thrust.      At the end of the encounter I told her I would review this further with you.  She then seemed very frustrated at her pain level after the exam and got up and stated that she was leaving and that we were not trying to help her or find out her problem and left the clinic.    Let me know what you recommend next.    Thanks,  Estephania Rodrigez, [facility], PA-C  Neurosurgery  [facility]  08/30/2024

## 2024-08-30 NOTE — TELEPHONE ENCOUNTER
Returned call to [place] pharmacy, informed pharmacist that the PA for the pain medication was approved. Pharmacist voiced understanding

## 2024-08-30 NOTE — PROGRESS NOTES
"Subjective:     Patient ID:  Candy Huynh is a 53 y.o. female.    Martin    Chief Complaint:  6 weeks postop lumbar fusion    HPI    Candy Huynh is a 53 y.o. female who presents for follow up.  Patient states that she is continuing to have the same symptoms that she described to Dr. Salazar recently on a virtual visit.  She has pain in the low back when sitting on hard surfaces.  She has pain in the right hip and groin primarily when walking and standing.  When she lays on each side she gets pain in that particular hip region.  No radiating leg pain.    Patient denies any recent accidents or trauma, no saddle anesthesias, and no bowel or bladder incontinence.      Review of Systems:  Constitution: Negative for chills, fever, night sweats and weight loss.   Musculoskeletal: Negative for falls.   Gastrointestinal: Negative for bowel incontinence, nausea and vomiting.   Genitourinary: Negative for bladder incontinence.   Neurological: Negative for disturbances in coordination and loss of balance.      Objective:      Vitals:    08/30/24 1342   BP: 125/78   Pulse: 80   Weight: 116.1 kg (255 lb 15.3 oz)   Height: 5' 3" (1.6 m)   PainSc:   7         Physical Exam:      General:  Candy Huynh is well-developed, well-nourished, appears stated age, in no acute distress, alert and oriented to person, place, and time.    Pulmonary/Chest:  Respiratory effort normal  Abdominal: Exhibits no distension  Psychiatric:  Normal mood and affect.  Behavior is normal.  Judgement and thought content normal      Musculoskeletal:    Lumbar ROM:   Pain in lumbar flexion, extension, left lateral bending, and right lateral bending.    Lumbar Spine Inspection:  Healed surgical scars with no visible rashes.    Lumbar Spine Palpation:  Moderate tenderness to low back palpation.    SI Joint Palpation:  No tenderness to left SI Joint palpation.  Pain on right si joint palpation.    Straight Leg Raise:  Negative right and left " SLR.    SI Joint Tests    Negative right GLORIA Test but caused right groin pain.  Left GLORIA caused right low back pain  Negative right Gaenslen's Test but caused right groin pain.  Left Gaenslen's cause pain in the right low back.  Positive right Thigh Thrust Test.  Negative left thigh thrust.        Neurological:  Alert and oriented to person, place, and time    Muscle strength against resistance:     Right Left   Hip flexion  5 / 5 5 / 5   Hip extension 5 / 5 5 / 5   Hip abduction 5 / 5 5 / 5   Hip adduction  5 / 5 5 / 5   Knee extension  5 / 5 5 / 5   Knee flexion 5 / 5 5 / 5   Dorsiflexion  5 / 5 5 / 5   EHL  5 / 5 5 / 5   Plantar flexion  5 / 5 5 / 5   Inversion of the feet 5 / 5 5 / 5   Eversion of the feet  5 / 5 5 / 5         On gross examination of the bilateral upper extremities, patient has full painfree ROM with no signs of clubbing, cyanosis, edema, or weakness.       XRAY Interpretation:     Lumbar spine xrays were personally reviewed today.  No fractures. Hardware intact.      Assessment:          1. S/P lumbar spinal fusion            Plan:               Will review all of the above with Dr. Salazar for further recommendations going forward.    Follow-Up:  Follow up in about 4 weeks (around 9/27/2024). If there are any questions prior to this, the patient was instructed to contact the office.       Estephania Rodrigez, [facility], PA-C  Neurosurgery  [facility]  08/30/2024

## 2024-09-03 ENCOUNTER — TELEPHONE (OUTPATIENT)
Dept: ORTHOPEDICS | Facility: CLINIC | Age: 54
End: 2024-09-03
Payer: MEDICAID

## 2024-09-03 ENCOUNTER — TELEPHONE (OUTPATIENT)
Dept: NEUROSURGERY | Facility: CLINIC | Age: 54
End: 2024-09-03
Payer: MEDICAID

## 2024-09-03 DIAGNOSIS — M25.511 BILATERAL SHOULDER PAIN, UNSPECIFIED CHRONICITY: Primary | ICD-10-CM

## 2024-09-03 DIAGNOSIS — M25.512 BILATERAL SHOULDER PAIN, UNSPECIFIED CHRONICITY: Primary | ICD-10-CM

## 2024-09-03 NOTE — TELEPHONE ENCOUNTER
----- Message from Pepper Soliz sent at 8/30/2024  5:22 PM CDT -----  Contact: Kathleen  Type:  Patient Call          Who Called: Patient         Does the patient know what this is regarding?: requesting a call back about having orders for a Xray prior to her appt ; please advise     Would the patient rather a call back or a response via [facility]?call           Best Call Back Number: [phone]             Additional Information:

## 2024-09-03 NOTE — TELEPHONE ENCOUNTER
----- Message from Pepper Soliz sent at 8/30/2024  5:22 PM CDT -----  Contact: Kathleen  Type:  Patient Call          Who Called: Patient         Does the patient know what this is regarding?: requesting a call back about having orders for a Xray prior to her appt ; please advise     Would the patient rather a call back or a response via [facility]?call           Best Call Back Number: [phone]             Additional Information:

## 2024-09-04 ENCOUNTER — TELEPHONE (OUTPATIENT)
Dept: NEUROSURGERY | Facility: CLINIC | Age: 54
End: 2024-09-04
Payer: MEDICAID

## 2024-09-04 DIAGNOSIS — M53.3 SI (SACROILIAC) JOINT DYSFUNCTION: Primary | ICD-10-CM

## 2024-09-06 ENCOUNTER — HOSPITAL ENCOUNTER (OUTPATIENT)
Dept: RADIOLOGY | Facility: HOSPITAL | Age: 54
Discharge: HOME | End: 2024-09-06
Attending: NEUROLOGICAL SURGERY
Payer: MEDICAID

## 2024-09-06 DIAGNOSIS — M53.3 SI (SACROILIAC) JOINT DYSFUNCTION: ICD-10-CM

## 2024-09-06 PROCEDURE — 72192 CT PELVIS W/O DYE: CPT | Mod: 26,,, | Performed by: RADIOLOGY

## 2024-09-06 PROCEDURE — 72192 CT PELVIS W/O DYE: CPT | Mod: TC

## 2024-09-17 ENCOUNTER — TELEPHONE (OUTPATIENT)
Dept: NEUROSURGERY | Facility: CLINIC | Age: 54
End: 2024-09-17
Payer: MEDICAID

## 2024-09-17 NOTE — TELEPHONE ENCOUNTER
Returned call to pharmacist, he stated that the pt needs a PA for her tramadol. I voiced understanding

## 2024-09-19 NOTE — PRE-PROCEDURE INSTRUCTIONS
Patient reviewed on 9/19/2024.  Okay to proceed at [facility]. The following pre-procedure instructions and arrival time have been reviewed with patient via phone and sent to patient portal for review.  Patient verbalized an understanding.  Pt to be accompanied by Deidra Dial day of procedure as responsible .    You are scheduled for a procedure with Dr. Salazar on 9/23/24  Arrival Location: [address] [place] [address]. Proceed to the second floor to check in with registration.     Arrival Time: 7:30 am        Nothing to eat or drink after MIDNIGHT Sunday evening.     If you take medications for blood pressure, heart, thyroid &/or cholesterol; you may take them with a sip of water the morning of your procedure.      Refrain from drinking alcohol 24 hours prior to your procedure.      Hold vitamins, mineral, herbs (including herbal tea) and supplements the morning of your procedure.      Shower the night before and the morning of your procedure with antibacterial soap (ex. dial antibacterial)     Please do not use antibacterial soap to wash your face. Use your regular face soap.     Do not apply any products to your skin nor your hair after you shower the morning of your procedure.  Products include: gels, creams, ointments, oils, powders, lotion, deodorant, make-up, perfume/cologne, after shave and sunscreen.     Wear loose, comfortable clothing (preferably a button up shirt)  No contacts. Bring glasses if necessary.  If you have dentures, please bring a case.  Please leave all jewelry and valuables at home.     You will need a responsible adult to drive you home after your procedure. You cannot take an Uber, Lyft, taxi after post procedure without an adult to accompany you.      If you start to feel sick (fever, chills, coughing, etc) or start on any antibiotics, please contact Dr. Salazar' office at [phone] to reschedule.         OLIVERIO Funes  Pre-Admit Testing  Anesthesia Dept [facility]

## 2024-09-23 ENCOUNTER — HOSPITAL ENCOUNTER (OUTPATIENT)
Facility: HOSPITAL | Age: 54
Discharge: HOME | End: 2024-09-23
Attending: NEUROLOGICAL SURGERY | Admitting: NEUROLOGICAL SURGERY
Payer: MEDICAID

## 2024-09-23 VITALS
RESPIRATION RATE: 14 BRPM | BODY MASS INDEX: 41.46 KG/M2 | TEMPERATURE: 98 F | SYSTOLIC BLOOD PRESSURE: 146 MMHG | DIASTOLIC BLOOD PRESSURE: 83 MMHG | HEART RATE: 71 BPM | OXYGEN SATURATION: 100 % | HEIGHT: 63 IN | WEIGHT: 234 LBS

## 2024-09-23 DIAGNOSIS — M53.3 SI (SACROILIAC) JOINT DYSFUNCTION: Primary | ICD-10-CM

## 2024-09-23 LAB
GLUCOSE SERPL-MCNC: 79 MG/DL (ref 70–110)
POCT GLUCOSE: 79 MG/DL (ref 70–110)

## 2024-09-23 PROCEDURE — 63600175 PHARM REV CODE 636 W HCPCS: Mod: JZ,JG | Performed by: NEUROLOGICAL SURGERY

## 2024-09-23 PROCEDURE — 27096 INJECT SACROILIAC JOINT: CPT | Mod: RT | Performed by: NEUROLOGICAL SURGERY

## 2024-09-23 PROCEDURE — 25000003 PHARM REV CODE 250: Performed by: NEUROLOGICAL SURGERY

## 2024-09-23 PROCEDURE — 25500020 PHARM REV CODE 255: Performed by: NEUROLOGICAL SURGERY

## 2024-09-23 PROCEDURE — 27096 INJECT SACROILIAC JOINT: CPT | Mod: RT,,, | Performed by: NEUROLOGICAL SURGERY

## 2024-09-23 RX ORDER — LIDOCAINE HYDROCHLORIDE 20 MG/ML
INJECTION, SOLUTION EPIDURAL; INFILTRATION; INTRACAUDAL; PERINEURAL
Status: DISCONTINUED | OUTPATIENT
Start: 2024-09-23 | End: 2024-09-23 | Stop reason: HOSPADM

## 2024-09-23 RX ORDER — BUPIVACAINE HYDROCHLORIDE 5 MG/ML
INJECTION, SOLUTION EPIDURAL; INTRACAUDAL
Status: DISCONTINUED | OUTPATIENT
Start: 2024-09-23 | End: 2024-09-23 | Stop reason: HOSPADM

## 2024-09-23 RX ORDER — METHYLPREDNISOLONE ACETATE 40 MG/ML
INJECTION, SUSPENSION INTRA-ARTICULAR; INTRALESIONAL; INTRAMUSCULAR; SOFT TISSUE
Status: DISCONTINUED | OUTPATIENT
Start: 2024-09-23 | End: 2024-09-23 | Stop reason: HOSPADM

## 2024-09-23 NOTE — DISCHARGE INSTRUCTIONS
Home Care Instructions Pain Management:    1.  DIET:    You may resume your normal diet today.    2.  BATHING:    You may shower with luke warm water.    3.  DRESSING:    You may remove your bandage today.    4.  ACTIVITY LEVEL:      You may resume your normal activities 24 hours after your procedure.    5.  MEDICATIONS:    You may resume your normal medications today.    6.  SPECIAL INSTRUCTIONS:    No heat to the injection site for 24 hours including bath or shower, heating pad, moist heat or hot tubs.    Use an ice pack to the injection site for any pain or discomfort.  Apply ice packs for 20 minute intervals as needed.    If you have received any sedatives by mouth today, you can not drive for 12 hours.    If you have received sedation through an IV, you can not drive for 24 hours.    PLEASE CALL YOUR DOCTOR FOR THE FOLLOWIN.  Redness or swelling around the injection site.  2.  Fever of 101 degrees.  3.  Drainage (pus) from the injection site.  4.  For any continuous bleeding (some dried blood over the incision is normal.)    FOR EMERGENCIES:    If any unusual problems or difficulties occur during clinic hours, call [phone] or dial [phone].    Follow up with with your physician in 2-3 weeks.

## 2024-09-23 NOTE — OP NOTE
DATE OF PROCEDURE: 09/23/24     PREOPERATIVE DIAGNOSES:  1. Right sacroiliac joint dysfunction and refractory pain  2. Lumbosacral fusion              POSTOPERATIVE DIAGNOSES:   1. Right sacroiliac joint dysfunction and refractory pain  2. Lumbosacral fusion        NAME OF OPERATION:  1. Right sacroiliac joint block with 1 cc of Marcaine .5% and 1 cc of Depomedrol  2. Fluoroscopy        PRIMARY SURGEON: Dane Salazar M.D.     ASSISTANT SURGEON: NATHALIE     INDICATION FOR PROCEDURE:   Candy Huynh is a 53 y.o. female who presents for follow up.  Patient states that she is continuing to have the same symptoms that she described to Dr. Salazar recently on a virtual visit.  She has pain in the low back when sitting on hard surfaces.  She has pain in the right hip and groin primarily when walking and standing.  When she lays on each side she gets pain in that particular hip region.  No radiating leg pain.     Patient denies any recent accidents or trauma, no saddle anesthesias, and no bowel or bladder incontinence.     DESCRIPTION OF THE PROCEDURE     The patient was placed prone on a radiolucent table..  All pressure points were carefully padded. The patient was prepped and draped   in the typical sterile fashion.   Using the AP, view, and after local anesthesia with 1% lidocaine,  22 spinal asuncion needle was inserted inside the right sacroiliac joint and 0.5 cc of Omnipaque was injected to confirm the needle tip placement.  We then injected 1 cc of 0.5% marcaine and 1 cc of Depomedrol in the SI joint. The wounds were dressed with a sterile dressing.   The blood loss was less than 1 cc. The final count was completed and nothing was missing. There was no complication.

## 2024-09-23 NOTE — H&P
"09/23/241    Subjective:      Patient ID:  Candy Huynh is a 53 y.o. female.     Martin     Chief Complaint: si joint dysfunction pain     HPI     Candy Huynh is a 53 y.o. female who presents for follow up.  Patient states that she is continuing to have the same symptoms that she described to Dr. Salazar recently on a virtual visit.  She has pain in the low back when sitting on hard surfaces.  She has pain in the right hip and groin primarily when walking and standing.  When she lays on each side she gets pain in that particular hip region.  No radiating leg pain.     Patient denies any recent accidents or trauma, no saddle anesthesias, and no bowel or bladder incontinence.        Review of Systems:  Constitution: Negative for chills, fever, night sweats and weight loss.   Musculoskeletal: Negative for falls.   Gastrointestinal: Negative for bowel incontinence, nausea and vomiting.   Genitourinary: Negative for bladder incontinence.   Neurological: Negative for disturbances in coordination and loss of balance.   Objective:          Vitals:     08/30/24 1342   BP: 125/78   Pulse: 80   Weight: 116.1 kg (255 lb 15.3 oz)   Height: 5' 3" (1.6 m)   PainSc:   7            Physical Exam:        General:  Candy Huynh is well-developed, well-nourished, appears stated age, in no acute distress, alert and oriented to person, place, and time.     Pulmonary/Chest:  Respiratory effort normal  Abdominal: Exhibits no distension  Psychiatric:  Normal mood and affect.  Behavior is normal.  Judgement and thought content normal        Musculoskeletal:     Lumbar ROM:   Pain in lumbar flexion, extension, left lateral bending, and right lateral bending.     Lumbar Spine Inspection:  Healed surgical scars with no visible rashes.     Lumbar Spine Palpation:  Moderate tenderness to low back palpation.     SI Joint Palpation:  No tenderness to left SI Joint palpation.  Pain on right si joint palpation.     Straight Leg " Raise:  Negative right and left SLR.     SI Joint Tests     right GLORIA Test but caused right groin pain.  Left GLORIA caused right low back pain  right Gaenslen's Test but caused right groin pain. Gaenslen's cause pain in the right low back.  Positive right Thigh Thrust Test.  Negative left thigh thrust.           Neurological:  Alert and oriented to person, place, and time     Muscle strength against resistance:       Right Left   Hip flexion  5 / 5 5 / 5   Hip extension 5 / 5 5 / 5   Hip abduction 5 / 5 5 / 5   Hip adduction  5 / 5 5 / 5   Knee extension  5 / 5 5 / 5   Knee flexion 5 / 5 5 / 5   Dorsiflexion  5 / 5 5 / 5   EHL  5 / 5 5 / 5   Plantar flexion  5 / 5 5 / 5   Inversion of the feet 5 / 5 5 / 5   Eversion of the feet  5 / 5 5 / 5            On gross examination of the bilateral upper extremities, patient has full painfree ROM with no signs of clubbing, cyanosis, edema, or weakness.         XRAY Interpretation:      Lumbar spine xrays were personally reviewed today.  No fractures. Hardware intact.        Assessment:          Assessment  1. S/P lumbar spinal fusion        2. SI joint dysfunction        Plan:          Plan     Right si joint block and steroid injection today        Estephania Rodrigez, [facility], PA-C  Neurosurgery  [facility]  08/30/2024

## 2024-09-27 NOTE — DISCHARGE SUMMARY
[facility] ([facility])  Neurosurgery  Discharge Summary      Patient Name: Candy Huynh  MRN: [id number]  Admission Date: 9/23/2024  Hospital Length of Stay: 0 days  Discharge Date and Time: 9/23/2024  9:32 AM  Attending Physician: [place] att. providers found   Discharging Provider: Dane Salazar MD  Primary Care Provider: [place], Primary Doctor     HPI: Candy Huynh is a 53 y.o. female who presents for follow up.  Patient states that she is continuing to have the same symptoms that she described to Dr. Salazar recently on a virtual visit.  She has pain in the low back when sitting on hard surfaces.  She has pain in the right hip and groin primarily when walking and standing.  When she lays on each side she gets pain in that particular hip region.  No radiating leg pain.     Patient denies any recent accidents or trauma, no saddle anesthesias, and no bowel or bladder incontinence.       Procedure(s) (LRB):  1. Right sacroiliac joint block with 1 cc of Marcaine .5% and 1 cc of Depomedrol  2. Fluoroscopy    Hospital Course: The surgery was uncomplicated and postoperative course uneventful.  Patient was able to be discharged after voiding.      Consults:     Significant Diagnostic Studies: NA    Pending Diagnostic Studies:       None          Final Active Diagnoses:    Diagnosis Date Noted POA    PRINCIPAL PROBLEM:  SI (sacroiliac) joint dysfunction [M53.3] 09/23/2024 Yes      Problems Resolved During this Admission:      Discharged Condition: good    Disposition: Home or Self Care    Follow Up:    Patient Instructions:      Diet general     Medications:  Reconciled Home Medications:      Medication List        CONTINUE taking these medications      albuterol 90 mcg/actuation inhaler  Commonly known as: PROVENTIL/VENTOLIN HFA  Inhale 2 puffs into the lungs every 4 (four) hours as needed.     amLODIPine 5 MG tablet  Commonly known as: NORVASC  Take 5 mg by mouth once daily.     ARIPiprazole 5 MG  "Tab  Commonly known as: ABILIFY  Take 5 mg by mouth every evening.     aspirin 81 MG EC tablet  Commonly known as: ECOTRIN  Take 1 tablet by mouth once daily.     azelastine 137 mcg (0.1 %) nasal spray  Commonly known as: ASTELIN  1 spray once daily.     BD ALINE 2ND GEN PEN NEEDLE 32 gauge x 5/32" Ndle  Generic drug: pen needle, diabetic  USE ONE NEEDLE ONCE A WEEK     celecoxib 200 MG capsule  Commonly known as: CeleBREX  Take 1 capsule (200 mg total) by mouth 2 (two) times daily as needed for Pain.     cetirizine 10 MG tablet  Commonly known as: ZYRTEC  Take 10 mg by mouth every evening.     cholecalciferol (vitamin D3) 1,250 mcg (50,000 unit) capsule  Take 50,000 Units by mouth every 7 days.     cloNIDine 0.1 MG tablet  Commonly known as: CATAPRES  Take 0.2 mg by mouth every evening.     diclofenac sodium 1 % Gel  Commonly known as: VOLTAREN  Apply 2 g topically 4 (four) times daily.     DULoxetine 20 MG capsule  Commonly known as: CYMBALTA  Take 20 mg by mouth once daily.     EASY TOUCH ALCOHOL PREP PADS Padm  Generic drug: alcohol swabs  USE TO prepare FOR glucose testing     EPINEPHrine 0.3 mg/0.3 mL Atin  Commonly known as: EPIPEN  as per administration instructions     ergocalciferol 50,000 unit Cap  Commonly known as: ERGOCALCIFEROL  Take 50,000 Units by mouth every 7 days.     FeroSuL 325 mg (65 mg iron) Tab tablet  Generic drug: ferrous sulfate  Take by mouth every other day.     FLOWFLEX COVID-19 AG HOME TEST Kit  Generic drug: COVID-19 antigen test  test AS directed     fluticasone propionate 50 mcg/actuation nasal spray  Commonly known as: FLONASE  2 sprays by Nasal route.     folic acid 1 MG tablet  Commonly known as: FOLVITE  Take 1,000 mcg by mouth.     LIDOcaine 5 %  Commonly known as: LIDODERM  Place 1 patch onto the skin once daily. Remove & Discard patch within 12 hours or as directed by MD     loratadine 10 mg tablet  Commonly known as: CLARITIN  Take 10 mg by mouth once daily.     losartan 100 " MG tablet  Commonly known as: COZAAR  Take 100 mg by mouth once daily.     meclizine 25 mg tablet  Commonly known as: ANTIVERT  Take 25 mg by mouth.     metFORMIN 750 MG ER 24hr tablet  Commonly known as: GLUCOPHAGE-XR  Take 750 mg by mouth once daily.     montelukast 10 mg tablet  Commonly known as: SINGULAIR     nicotine polacrilex 4 MG Gum  Commonly known as: NICORETTE  SMARTSI Each By Mouth PRN     ondansetron 8 MG Tbdl  Commonly known as: ZOFRAN-ODT  Take 8 mg by mouth every 8 (eight) hours as needed.     ONETOUCH DELICA PLUS LANCET 33 gauge Misc  Generic drug: lancets  Apply topically 4 (four) times daily.     ONETOUCH ULTRA2 METER Misc  Generic drug: blood-glucose meter  SMARTSIG:Via Meter As Directed     ONETOUCH VERIO TEST STRIPS Strp  Generic drug: blood sugar diagnostic  1 strip 4 (four) times daily.     oxyCODONE 10 mg Tab immediate release tablet  Commonly known as: ROXICODONE  Take 1 tablet (10 mg total) by mouth every 8 (eight) hours as needed for Pain.     OZEMPIC 0.25 mg or 0.5 mg (2 mg/3 mL) pen injector  Generic drug: semaglutide  Inject 0.25 mg into the skin every 7 (seven) days     pantoprazole 40 MG tablet  Commonly known as: PROTONIX  Take 40 mg by mouth.     pregabalin 200 MG Cap  Commonly known as: LYRICA  Take 1 capsule (200 mg total) by mouth 2 (two) times daily.     rosuvastatin 20 MG tablet  Commonly known as: CRESTOR  Take 20 mg by mouth once daily.     STOOL SOFTENER 100 mg capsule  Generic drug: docusate sodium  Take 1 softgel by mouth twice daily     STOOL SOFTENER-LAXATIVE 8.6-50 mg per tablet  Generic drug: senna-docusate 8.6-50 mg  Take 1 tablet by mouth once daily.     sumatriptan 100 MG tablet  Commonly known as: IMITREX  Take 100 mg by mouth 2 (two) times daily as needed.     TRELEGY ELLIPTA 200-62.5-25 mcg inhaler  Generic drug: fluticasone-umeclidin-vilanter  Inhale 1 puff into the lungs once daily.              Dane Salazar MD  Neurosurgery  [facility]

## 2024-10-02 ENCOUNTER — PATIENT MESSAGE (OUTPATIENT)
Dept: NEUROSURGERY | Facility: CLINIC | Age: 54
End: 2024-10-02
Payer: MEDICAID

## 2024-10-02 DIAGNOSIS — Z96.652 STATUS POST LEFT KNEE REPLACEMENT: ICD-10-CM

## 2024-10-03 RX ORDER — PREGABALIN 200 MG/1
200 CAPSULE ORAL 2 TIMES DAILY
Qty: 60 CAPSULE | Refills: 2 | Status: SHIPPED | OUTPATIENT
Start: 2024-10-03 | End: 2025-01-01

## 2024-10-03 RX ORDER — CELECOXIB 200 MG/1
200 CAPSULE ORAL 2 TIMES DAILY PRN
Qty: 60 CAPSULE | Refills: 2 | Status: SHIPPED | OUTPATIENT
Start: 2024-10-03

## 2024-10-03 RX ORDER — OXYCODONE HYDROCHLORIDE 10 MG/1
10 TABLET ORAL EVERY 8 HOURS PRN
Qty: 45 TABLET | Refills: 0 | Status: SHIPPED | OUTPATIENT
Start: 2024-10-03

## 2024-10-08 ENCOUNTER — PATIENT MESSAGE (OUTPATIENT)
Dept: NEUROSURGERY | Facility: CLINIC | Age: 54
End: 2024-10-08
Payer: MEDICAID

## 2024-10-09 ENCOUNTER — DOCUMENT SCAN (OUTPATIENT)
Dept: HOME HEALTH SERVICES | Facility: HOSPITAL | Age: 54
End: 2024-10-09
Payer: MEDICAID

## 2024-10-09 ENCOUNTER — OFFICE VISIT (OUTPATIENT)
Dept: NEUROSURGERY | Facility: CLINIC | Age: 54
End: 2024-10-09
Payer: MEDICAID

## 2024-10-09 DIAGNOSIS — Z98.1 STATUS POST LUMBAR SPINAL FUSION: Primary | ICD-10-CM

## 2024-10-09 DIAGNOSIS — M53.3 SACROILIAC JOINT DYSFUNCTION OF RIGHT SIDE: ICD-10-CM

## 2024-10-09 PROCEDURE — 4010F ACE/ARB THERAPY RXD/TAKEN: CPT | Mod: CPTII,95,, | Performed by: NEUROLOGICAL SURGERY

## 2024-10-09 PROCEDURE — 99442 PR PHYSICIAN TELEPHONE EVALUATION 11-20 MIN: CPT | Mod: 95,,, | Performed by: NEUROLOGICAL SURGERY

## 2024-10-09 RX ORDER — METHOCARBAMOL 500 MG/1
1000 TABLET, FILM COATED ORAL 3 TIMES DAILY PRN
Qty: 180 TABLET | Refills: 6 | Status: SHIPPED | OUTPATIENT
Start: 2024-10-09 | End: 2025-05-07

## 2024-10-09 RX ORDER — HYDROCODONE BITARTRATE AND ACETAMINOPHEN 10; 325 MG/1; MG/1
1 TABLET ORAL 3 TIMES DAILY PRN
Qty: 90 TABLET | Refills: 0 | Status: SHIPPED | OUTPATIENT
Start: 2024-12-09 | End: 2025-01-07

## 2024-10-09 RX ORDER — HYDROCODONE BITARTRATE AND ACETAMINOPHEN 10; 325 MG/1; MG/1
1 TABLET ORAL 3 TIMES DAILY PRN
Qty: 90 TABLET | Refills: 0 | Status: SHIPPED | OUTPATIENT
Start: 2024-11-09 | End: 2024-12-08

## 2024-10-09 RX ORDER — HYDROCODONE BITARTRATE AND ACETAMINOPHEN 10; 325 MG/1; MG/1
1 TABLET ORAL 3 TIMES DAILY PRN
Qty: 90 TABLET | Refills: 0 | Status: SHIPPED | OUTPATIENT
Start: 2024-10-09 | End: 2024-11-08

## 2024-10-09 NOTE — PROGRESS NOTES
Established Patient - Audio Only Telehealth Visit     The patient location is: home  The chief complaint leading to consultation is:  Follow up after right sacroiliac joint fusion and L5-S1 fusion  Visit type: Virtual visit with audio only (telephone)  Total time spent with patient: 20 min       The reason for the audio only service rather than synchronous audio and video virtual visit was related to technical difficulties or patient preference/necessity.     Each patient to whom I provide medical services by telemedicine is:  (1) informed of the relationship between the physician and patient and the respective role of any other health care provider with respect to management of the patient; and (2) notified that they may decline to receive medical services by telemedicine and may withdraw from such care at any time. Patient verbally consented to receive this service via voice-only telephone call.       HPI:  She reports 100% pain relief for the duration of the block after the right SI joint fusion.  The pain has slowly came back.  She only has pain over the right lumbosacral area.  No pain on the left side.  She wants to take less narcotics.  She is taking Celebrex 200 mg twice daily, pregabalin 200 mg twice daily, methocarbamol as needed, oxycodone 10 mg 3 times daily.  No new onset of motor weakness.     Assessment and plan:    Status post lumbar fusion  Right SI joint dysfunction  Stopped taking oxycodone after completing her prescription, start hydrocodone 10 mg 3 times daily, refill on pregabalin, Celebrex, methocarbamol.  Scoliosis film pending  Follow up in 3 months with repeat lumbar x-ray  All questions answered                       This service was not originating from a related E/M service provided within the previous 7 days nor will  to an E/M service or procedure within the next 24 hours or my soonest available appointment.  Prevailing standard of care was able to be met in this audio-only  visit.

## 2024-12-06 DIAGNOSIS — G89.29 OTHER CHRONIC PAIN: Primary | ICD-10-CM

## 2024-12-06 RX ORDER — OXYCODONE HYDROCHLORIDE 10 MG/1
10 TABLET ORAL EVERY 8 HOURS PRN
Qty: 45 TABLET | Refills: 0 | Status: SHIPPED | OUTPATIENT
Start: 2024-12-06

## 2024-12-30 ENCOUNTER — HOSPITAL ENCOUNTER (OUTPATIENT)
Dept: RADIOLOGY | Facility: HOSPITAL | Age: 54
Discharge: HOME | End: 2024-12-30
Attending: FAMILY MEDICINE
Payer: MEDICAID

## 2024-12-30 ENCOUNTER — OFFICE VISIT (OUTPATIENT)
Dept: PRIMARY CARE CLINIC | Facility: CLINIC | Age: 54
End: 2024-12-30
Payer: MEDICAID

## 2024-12-30 VITALS
SYSTOLIC BLOOD PRESSURE: 150 MMHG | OXYGEN SATURATION: 81 % | WEIGHT: 265.63 LBS | BODY MASS INDEX: 47.07 KG/M2 | HEIGHT: 63 IN | DIASTOLIC BLOOD PRESSURE: 103 MMHG

## 2024-12-30 VITALS — BODY MASS INDEX: 46.95 KG/M2 | HEIGHT: 63 IN | WEIGHT: 265 LBS

## 2024-12-30 DIAGNOSIS — Z12.31 ENCOUNTER FOR SCREENING MAMMOGRAM FOR BREAST CANCER: ICD-10-CM

## 2024-12-30 DIAGNOSIS — M54.2 NECK PAIN: ICD-10-CM

## 2024-12-30 DIAGNOSIS — F41.8 DEPRESSION WITH ANXIETY: ICD-10-CM

## 2024-12-30 DIAGNOSIS — E78.2 MIXED HYPERLIPIDEMIA: ICD-10-CM

## 2024-12-30 DIAGNOSIS — N95.1 MENOPAUSAL SYMPTOMS: ICD-10-CM

## 2024-12-30 DIAGNOSIS — R73.03 PRE-DIABETES: Primary | ICD-10-CM

## 2024-12-30 DIAGNOSIS — I10 ESSENTIAL HYPERTENSION: ICD-10-CM

## 2024-12-30 LAB
ALBUMIN/CREAT UR: NORMAL UG/MG (ref 0–30)
CREAT UR-MCNC: 55 MG/DL (ref 15–325)
MICROALBUMIN UR DL<=1MG/L-MCNC: <5 UG/ML

## 2024-12-30 PROCEDURE — 99215 OFFICE O/P EST HI 40 MIN: CPT | Mod: PBBFAC,PN | Performed by: FAMILY MEDICINE

## 2024-12-30 PROCEDURE — 77067 SCR MAMMO BI INCL CAD: CPT | Mod: TC

## 2024-12-30 PROCEDURE — 99999 PR PBB SHADOW E&M-EST. PATIENT-LVL V: CPT | Mod: PBBFAC,,, | Performed by: FAMILY MEDICINE

## 2024-12-30 PROCEDURE — 82043 UR ALBUMIN QUANTITATIVE: CPT | Performed by: FAMILY MEDICINE

## 2024-12-30 RX ORDER — FEZOLINETANT 45 MG/1
45 TABLET, FILM COATED ORAL DAILY
Qty: 30 TABLET | Refills: 11 | Status: SHIPPED | OUTPATIENT
Start: 2024-12-30

## 2024-12-30 RX ORDER — BUSPIRONE HYDROCHLORIDE 15 MG/1
15 TABLET ORAL 3 TIMES DAILY
Qty: 90 TABLET | Refills: 0 | Status: SHIPPED | OUTPATIENT
Start: 2024-12-30 | End: 2025-12-30
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
## 2025-01-02 DIAGNOSIS — E11.9 TYPE 2 DIABETES MELLITUS WITHOUT COMPLICATION, UNSPECIFIED WHETHER LONG TERM INSULIN USE: ICD-10-CM

## 2025-01-02 DIAGNOSIS — G89.29 OTHER CHRONIC PAIN: ICD-10-CM

## 2025-01-07 RX ORDER — OXYCODONE HYDROCHLORIDE 10 MG/1
10 TABLET ORAL EVERY 8 HOURS PRN
Qty: 45 TABLET | Refills: 0 | Status: SHIPPED | OUTPATIENT
Start: 2025-01-07

## 2025-01-08 ENCOUNTER — PATIENT MESSAGE (OUTPATIENT)
Dept: ADMINISTRATIVE | Facility: HOSPITAL | Age: 55
End: 2025-01-08
Payer: MEDICAID

## 2025-01-17 ENCOUNTER — TELEPHONE (OUTPATIENT)
Dept: NEUROSURGERY | Facility: CLINIC | Age: 55
End: 2025-01-17
Payer: MEDICAID

## 2025-01-17 ENCOUNTER — TELEPHONE (OUTPATIENT)
Dept: ORTHOPEDICS | Facility: CLINIC | Age: 55
End: 2025-01-17
Payer: MEDICAID
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
## 2025-01-27 ENCOUNTER — TELEPHONE (OUTPATIENT)
Dept: PRIMARY CARE CLINIC | Facility: CLINIC | Age: 55
End: 2025-01-27
Payer: MEDICAID

## 2025-01-27 NOTE — TELEPHONE ENCOUNTER
----- Message from Emelia sent at 1/27/2025  9:59 AM CST -----  Contact: [phone]  Requesting an RX refill or new RX.    Is this a refill or new RX: refill    RX name and strength (copy/paste from chart):  meclizine (ANTIVERT) 25 mg tablet    Is this a 30 day or 90 day RX:     Pharmacy name and phone # (copy/paste from chart):    [facility] [address] [facility] [facility] [place] [place] [address]  Phone: [phone] Fax: [phone]    Please call pt and advise

## 2025-01-28 DIAGNOSIS — G89.29 OTHER CHRONIC PAIN: ICD-10-CM

## 2025-01-28 RX ORDER — MECLIZINE HYDROCHLORIDE 25 MG/1
25 TABLET ORAL 3 TIMES DAILY PRN
Qty: 30 TABLET | Refills: 0 | Status: SHIPPED | OUTPATIENT
Start: 2025-01-28

## 2025-01-28 RX ORDER — OXYCODONE HYDROCHLORIDE 10 MG/1
10 TABLET ORAL EVERY 8 HOURS PRN
Qty: 45 TABLET | Refills: 0 | Status: SHIPPED | OUTPATIENT
Start: 2025-01-28

## 2025-02-06 ENCOUNTER — PATIENT MESSAGE (OUTPATIENT)
Dept: NEUROSURGERY | Facility: CLINIC | Age: 55
End: 2025-02-06
Payer: MEDICAID

## 2025-02-26 ENCOUNTER — TELEPHONE (OUTPATIENT)
Dept: NEUROSURGERY | Facility: CLINIC | Age: 55
End: 2025-02-26
Payer: MEDICAID

## 2025-02-26 NOTE — TELEPHONE ENCOUNTER
Returned call to [facility], the pharmacist stated that she needs  to send in another prescription for Celebrex with the number of refills on it. I stated to pharmacist I will send a message over to

## 2025-02-27 DIAGNOSIS — G89.29 OTHER CHRONIC PAIN: ICD-10-CM

## 2025-02-27 RX ORDER — OXYCODONE HYDROCHLORIDE 10 MG/1
10 TABLET ORAL EVERY 8 HOURS PRN
Qty: 45 TABLET | Refills: 0 | Status: SHIPPED | OUTPATIENT
Start: 2025-02-27

## 2025-02-27 RX ORDER — CELECOXIB 200 MG/1
200 CAPSULE ORAL 2 TIMES DAILY
Qty: 60 CAPSULE | Refills: 6 | Status: SHIPPED | OUTPATIENT
Start: 2025-02-27

## 2025-02-27 NOTE — TELEPHONE ENCOUNTER
No care due was identified.  [facility] Embedded Care Due Messages. Reference number: [id number].   2/27/2025 12:53:47 PM CST

## 2025-02-27 NOTE — TELEPHONE ENCOUNTER
----- Message from Emelia sent at 2/27/2025 11:42 AM CST -----  Contact: [phone]  Requesting an RX refill or new RX.Is this a refill or new RX: refill RX name and strength (copy/paste from chart):  nicotine polacrilex (NICORETTE) 4 MG GumIs this a 30 day or 90 day RX: Pharmacy name and phone # (copy/paste from chart):  [facility] [facility] [facility] [facility] [address]Vxdav: [phone] Fax: [phone] call pt and advise

## 2025-02-28 RX ORDER — DIPHENHYDRAMINE HCL 25 MG
4 CAPSULE ORAL
Qty: 100 EACH | Refills: 5 | Status: SHIPPED | OUTPATIENT
Start: 2025-02-28

## 2025-03-04 DIAGNOSIS — Z96.652 STATUS POST LEFT KNEE REPLACEMENT: ICD-10-CM

## 2025-03-05 RX ORDER — PREGABALIN 200 MG/1
200 CAPSULE ORAL 2 TIMES DAILY
Qty: 60 CAPSULE | Refills: 2 | Status: SHIPPED | OUTPATIENT
Start: 2025-03-05 | End: 2025-06-03

## 2025-03-07 ENCOUNTER — PATIENT MESSAGE (OUTPATIENT)
Dept: PRIMARY CARE CLINIC | Facility: CLINIC | Age: 55
End: 2025-03-07
Payer: MEDICAID

## 2025-03-12 ENCOUNTER — PATIENT OUTREACH (OUTPATIENT)
Dept: ADMINISTRATIVE | Facility: HOSPITAL | Age: 55
End: 2025-03-12
Payer: MEDICAID

## 2025-03-12 ENCOUNTER — OFFICE VISIT (OUTPATIENT)
Dept: PRIMARY CARE CLINIC | Facility: CLINIC | Age: 55
End: 2025-03-12
Payer: MEDICAID

## 2025-03-12 ENCOUNTER — PATIENT OUTREACH (OUTPATIENT)
Dept: ADMINISTRATIVE | Facility: OTHER | Age: 55
End: 2025-03-12
Payer: MEDICAID

## 2025-03-12 DIAGNOSIS — R60.0 BILATERAL LEG EDEMA: Primary | ICD-10-CM

## 2025-03-12 DIAGNOSIS — I10 UNCONTROLLED HYPERTENSION: ICD-10-CM

## 2025-03-12 RX ORDER — OLMESARTAN MEDOXOMIL 20 MG/1
20 TABLET ORAL DAILY
Qty: 30 TABLET | Refills: 0 | Status: SHIPPED | OUTPATIENT
Start: 2025-03-12 | End: 2026-03-12

## 2025-03-12 RX ORDER — CHLORTHALIDONE 25 MG/1
25 TABLET ORAL DAILY
Qty: 30 TABLET | Refills: 0 | Status: SHIPPED | OUTPATIENT
Start: 2025-03-12 | End: 2026-03-12

## 2025-03-12 NOTE — PROGRESS NOTES
CHW - Initial Contact    This Community Health Worker completed  the Social Determinant of Health questionnaire with patient via telephone today.    Pt identified barriers of most importance are: Food Insecurity and Utility assistance   Referrals to community agencies completed with patient/caregiver consent outside of [facility] include: No  Referrals were put through [facility] - : No  Support and Services:   Other information discussed the patient needs / wants help with: CHW spoke via telephone today with patient who stated that she has applied for  Social Security and is waiting for approval. Patient states that her mother has been helping her but its becoming a financial strain for her to pay her utilities. Patient is asking for any assistance to help pay her utilities. CHW will refer patient to [facility].   Follow up required:   Initial Outreach - Due: 3/19/2025
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
## 2025-03-17 ENCOUNTER — LAB VISIT (OUTPATIENT)
Dept: LAB | Facility: HOSPITAL | Age: 55
End: 2025-03-17
Attending: FAMILY MEDICINE
Payer: MEDICAID

## 2025-03-17 ENCOUNTER — PATIENT MESSAGE (OUTPATIENT)
Dept: ADMINISTRATIVE | Facility: HOSPITAL | Age: 55
End: 2025-03-17
Payer: MEDICAID

## 2025-03-17 DIAGNOSIS — R60.0 BILATERAL LEG EDEMA: ICD-10-CM

## 2025-03-17 LAB — BNP SERPL-MCNC: <10 PG/ML (ref 0–99)

## 2025-03-17 PROCEDURE — 83880 ASSAY OF NATRIURETIC PEPTIDE: CPT | Performed by: FAMILY MEDICINE

## 2025-03-17 PROCEDURE — 36415 COLL VENOUS BLD VENIPUNCTURE: CPT | Performed by: FAMILY MEDICINE

## 2025-03-18 ENCOUNTER — RESULTS FOLLOW-UP (OUTPATIENT)
Dept: PRIMARY CARE CLINIC | Facility: CLINIC | Age: 55
End: 2025-03-18

## 2025-03-18 DIAGNOSIS — Z12.11 SCREENING FOR COLON CANCER: ICD-10-CM

## 2025-03-19 ENCOUNTER — OFFICE VISIT (OUTPATIENT)
Dept: PRIMARY CARE CLINIC | Facility: CLINIC | Age: 55
End: 2025-03-19
Payer: MEDICAID

## 2025-03-19 VITALS
BODY MASS INDEX: 46.78 KG/M2 | HEIGHT: 63 IN | SYSTOLIC BLOOD PRESSURE: 129 MMHG | RESPIRATION RATE: 16 BRPM | DIASTOLIC BLOOD PRESSURE: 74 MMHG | TEMPERATURE: 99 F | OXYGEN SATURATION: 99 % | HEART RATE: 87 BPM | WEIGHT: 264 LBS

## 2025-03-19 DIAGNOSIS — G43.109 MIGRAINE WITH AURA AND WITHOUT STATUS MIGRAINOSUS, NOT INTRACTABLE: ICD-10-CM

## 2025-03-19 DIAGNOSIS — I10 ESSENTIAL HYPERTENSION: Primary | ICD-10-CM

## 2025-03-19 DIAGNOSIS — N18.2 CONTROLLED TYPE 2 DIABETES MELLITUS WITH STAGE 2 CHRONIC KIDNEY DISEASE, WITHOUT LONG-TERM CURRENT USE OF INSULIN: ICD-10-CM

## 2025-03-19 DIAGNOSIS — E11.22 CONTROLLED TYPE 2 DIABETES MELLITUS WITH STAGE 2 CHRONIC KIDNEY DISEASE, WITHOUT LONG-TERM CURRENT USE OF INSULIN: ICD-10-CM

## 2025-03-19 PROCEDURE — 99215 OFFICE O/P EST HI 40 MIN: CPT | Mod: PBBFAC,PN | Performed by: FAMILY MEDICINE

## 2025-03-19 PROCEDURE — 1160F RVW MEDS BY RX/DR IN RCRD: CPT | Mod: CPTII,,, | Performed by: FAMILY MEDICINE

## 2025-03-19 PROCEDURE — 3074F SYST BP LT 130 MM HG: CPT | Mod: CPTII,,, | Performed by: FAMILY MEDICINE

## 2025-03-19 PROCEDURE — 99999 PR PBB SHADOW E&M-EST. PATIENT-LVL V: CPT | Mod: PBBFAC,,, | Performed by: FAMILY MEDICINE

## 2025-03-19 PROCEDURE — 3078F DIAST BP <80 MM HG: CPT | Mod: CPTII,,, | Performed by: FAMILY MEDICINE

## 2025-03-19 PROCEDURE — 99214 OFFICE O/P EST MOD 30 MIN: CPT | Mod: S$PBB,,, | Performed by: FAMILY MEDICINE

## 2025-03-19 PROCEDURE — 3008F BODY MASS INDEX DOCD: CPT | Mod: CPTII,,, | Performed by: FAMILY MEDICINE

## 2025-03-19 PROCEDURE — 4010F ACE/ARB THERAPY RXD/TAKEN: CPT | Mod: CPTII,,, | Performed by: FAMILY MEDICINE

## 2025-03-19 PROCEDURE — 1159F MED LIST DOCD IN RCRD: CPT | Mod: CPTII,,, | Performed by: FAMILY MEDICINE

## 2025-03-19 RX ORDER — TOPIRAMATE 50 MG/1
TABLET, FILM COATED ORAL
Qty: 25 TABLET | Refills: 0 | Status: SHIPPED | OUTPATIENT
Start: 2025-03-19 | End: 2025-04-09

## 2025-03-19 RX ORDER — TOPIRAMATE 50 MG/1
50 TABLET, FILM COATED ORAL 2 TIMES DAILY
Qty: 60 TABLET | Refills: 11 | Status: SHIPPED | OUTPATIENT
Start: 2025-03-19 | End: 2025-03-19 | Stop reason: SDUPTHER

## 2025-03-19 RX ORDER — AMLODIPINE BESYLATE 10 MG/1
10 TABLET ORAL DAILY
Qty: 30 TABLET | Refills: 0 | Status: SHIPPED | OUTPATIENT
Start: 2025-03-19 | End: 2025-03-19 | Stop reason: CLARIF

## 2025-03-20 ENCOUNTER — PATIENT OUTREACH (OUTPATIENT)
Dept: ADMINISTRATIVE | Facility: OTHER | Age: 55
End: 2025-03-20
Payer: MEDICAID

## 2025-03-20 NOTE — PROGRESS NOTES
CHW - Case Closure    This Community Health Worker spoke to patient via telephone today.   Pt/Caregiver reported: Patient stated that she was still waiting to hear from [facility] about utility assistance.   Pt/Caregiver denied any additional needs at this time and agrees with episode closure at this time.  CHW provided patient with Community Health Worker's contact information and encouraged him/her to contact this Community Health Worker if additional needs arise.

## 2025-03-26 DIAGNOSIS — E11.9 TYPE 2 DIABETES MELLITUS WITHOUT COMPLICATION, UNSPECIFIED WHETHER LONG TERM INSULIN USE: ICD-10-CM

## 2025-04-01 DIAGNOSIS — Z96.652 STATUS POST LEFT KNEE REPLACEMENT: ICD-10-CM

## 2025-04-01 DIAGNOSIS — G89.29 OTHER CHRONIC PAIN: ICD-10-CM

## 2025-04-01 DIAGNOSIS — E11.9 TYPE 2 DIABETES MELLITUS WITHOUT COMPLICATIONS: ICD-10-CM

## 2025-04-02 RX ORDER — PREGABALIN 200 MG/1
200 CAPSULE ORAL 2 TIMES DAILY
Qty: 60 CAPSULE | Refills: 2 | Status: SHIPPED | OUTPATIENT
Start: 2025-04-02 | End: 2025-07-01

## 2025-04-02 RX ORDER — SEMAGLUTIDE 1.34 MG/ML
INJECTION, SOLUTION SUBCUTANEOUS
Qty: 3 ML | Refills: 2 | Status: SHIPPED | OUTPATIENT
Start: 2025-04-02

## 2025-04-02 RX ORDER — CELECOXIB 200 MG/1
200 CAPSULE ORAL 2 TIMES DAILY
Qty: 60 CAPSULE | Refills: 6 | Status: SHIPPED | OUTPATIENT
Start: 2025-04-02

## 2025-04-02 RX ORDER — OXYCODONE HYDROCHLORIDE 10 MG/1
10 TABLET ORAL EVERY 8 HOURS PRN
Qty: 45 TABLET | Refills: 0 | Status: SHIPPED | OUTPATIENT
Start: 2025-04-02

## 2025-04-02 NOTE — TELEPHONE ENCOUNTER
Refill Routing Note   Medication(s) are not appropriate for processing by [facility] for the following reason(s):        No active prescription written by provider    [facility] action(s):  Defer             Appointments  past 12m or future 3m with PCP    Date Provider   Last Visit   3/19/2025 Bárbara Mathis MD   Next Visit   4/21/2025 Bárbara Mathis MD   ED visits in past 90 days: 0        Note composed:10:25 AM 04/02/2025

## 2025-04-02 NOTE — TELEPHONE ENCOUNTER
No care due was identified.  [facility] Embedded Care Due Messages. Reference number: [id number].   4/02/2025 10:25:00 AM CDT

## 2025-04-03 ENCOUNTER — PATIENT OUTREACH (OUTPATIENT)
Dept: ADMINISTRATIVE | Facility: HOSPITAL | Age: 55
End: 2025-04-03
Payer: MEDICAID

## 2025-04-03 NOTE — LETTER
AUTHORIZATION FOR RELEASE OF   CONFIDENTIAL INFORMATION    Dear [facility],    We are seeing Candy Huynh, date of birth 1970, in the clinic at [facility]. Bárbara Mathis MD is the patient's PCP. Candy Huynh has an outstanding lab/procedure at the time we reviewed her chart. In order to help keep her health information updated, she has authorized us to request the following medical record(s):                                             ( X )  COLONOSCOPY         Please fax records to [facility], Robinson, Wanda M., MD,  at [phone] or email to [email or web address].           Patient Name: Candy Huynh  : 1970  Patient Phone #: [phone]                Candy Huynh  MRN: [id number]  : 1970  Age: 53 y.o.  Sex: female         Patient/Legal Guardian Signature  This signature was collected at 2024           _______________________________   Printed Name/Relationship to Patient      Consent for Examination and Treatment: I hereby authorize the providers and employees of [facility] ([facility]) to provide medical treatment/services which includes, but is not limited to, performing and administering tests and diagnostic procedures that are deemed necessary, including, but not limited to, imaging examinations, blood tests and other laboratory procedures as may be required by the hospital, clinic, or may be ordered by my physician(s) or persons working under the general and/or special instructions of my physician(s).      I understand and agree that this consent covers all authorized persons, including but not limited to physicians, residents, nurse practitioners, physicians' assistants, specialists, consultants, student nurses, and independently contracted physicians, who are called upon by the physician in charge, to carry out the diagnostic procedures and medical or surgical treatment.     I hereby authorize [facility]  to retain or dispose of any specimens or tissue, should there be such remaining from any test or procedure.     I hereby authorize and give consent for [facility] providers and employees to take photographs, images or videotapes of such diagnostic, surgical or treatment procedures of Patient as may be required by [facility] or as may be ordered by a physician. I further acknowledge and agree that [facility] may use cameras or other devices for patient monitoring.     I am aware that the practice of medicine is not an exact science, and I acknowledge that no guarantees have been made to me as to the outcome of any tests, procedures or treatment.     Authorization for Release of Information: I understand that my insurance company and/or their agents may need information necessary to make determinations about payment/reimbursement. I hereby provide authorization to release to all insurance companies, their successors, assignees, other parties with whom they may have contracted, or others acting on their behalf, that are involved with payment for any hospital and/or clinic charges incurred by the patient, any information that they request and deem necessary for payment/reimbursement, and/or quality review.  I further authorize the release of my health information to physicians or other health care practitioners on staff who are involved in my health care now and in the future, and to other health care providers, entities, or institutions for the purpose of my continued care and treatment, including referrals.     REGISTRATION AUTHORIZATION  Form No. 48115 (Rev. 3/25/2024)    Page 1 of 3                       Medicare Patient's Certification and Authorization to Release Information and Payment Request:  I certify that the information given by me in applying for payment under Title XVIII of the Social Security Act is correct. I authorize any graves of medical or other information about me to release to the [facility], or its intermediaries or carriers, any information needed for this or a related Medicare claim. I request that payment of authorized benefits be made on my behalf.     Assignment of Insurance Benefits:   I hereby authorize any and all insurance companies, health plans, defined   benefit plans, health insurers or any entity that is or may be responsible for payment of my medical expenses to pay all hospital and medical benefits now due, and to become due and payable to me under any hospital benefits, sick benefits, injury benefits or any other benefit for services rendered to me, including Major Medical Benefits, direct to [facility] and all independently contracted physicians. I assign any and all rights that I may have against any and all insurance companies, health plans, defined benefit plans, health insurers or any entity that is or may be responsible for payment of my medical expenses, including, but not limited to any right to appeal a denial of a claim, any right to bring any action, lawsuit, administrative proceeding, or other cause of action on my behalf. I specifically assign my right to pursue litigation against any and all insurance companies, health plans, defined benefit plans, health insurers or any entity that is or may be responsible for payment of my medical expenses based upon a refusal to pay charges.            E. Valuables: It is understood and agreed that [facility] is not liable for the damage to or loss of any money, jewelry,   documents, dentures, eye glasses, hearing aids, prosthetics, or other property of value.     F. Computer Equipment: I understand and agree that should I choose to use computer equipment owned by [facility] or if I choose to access the Internet via [facility]s network, I do so at my own risk. [facility] is not responsible for any damage to my computer equipment or to any damages of any type that might arise from my loss of equipment or data.     G. Acceptance  of Financial Responsibility:  I agree that in consideration of the services and   supplies that have been   or will be furnished to the patient, I am hereby obligated to pay all charges made for or on the account of the patient according to the standard rates (in effect at the time the services and supplies are delivered) established by [facility], including its Patient Financial Assistance Policy to the extent it is applicable. I understand that I am responsible for all charges, or portions thereof, not covered by insurance or other sources. Patient refunds will be distributed only after balances at all [facility] facilities are paid.     H. Communication Authorization:  I hereby authorize [facility] and its representatives, along with any billing service   or  who may work on their behalf, to contact me on   my cell phone and/or home phone using pre- recorded messages, artificial voice messages, automatic telephone dialing devices or other computer assisted technology, or by electronic      mail, text messaging, or by any other form of electronic communication. This includes, but is not limited to, appointment reminders, yearly physical exam reminders, preventive care reminders, patient campaigns, welcome calls, and calls about account balances on my account or any account on which I am listed as a guarantor. I understand I have the right to opt out of these communications at any time.      Relationship  Between  Facility and  Provider:      I understand that some, but not all, providers furnishing services to the patient are not employees or agents of [facility]. The patient is under the care and supervision of his/her attending physician, and it is the responsibility of the facility and its nursing staff to carry out the instructions of such physicians. It is the responsibility of the patient's physician/designee to obtain the patient's informed consent, when required, for medical or surgical treatment,  special diagnostic or therapeutic procedures, or hospital services rendered for the patient under the special instructions of the physician/designee.           REGISTRATION AUTHORIZATION  Form No. 79852 (Rev. 3/25/2024)    Page 2 of 3                       Immunizations: [facility] shares immunization information with state sponsored health departments to help you and your doctor keep track of your immunization records. By signing, you consent to have this information shared with the health department in your state:                                [place] - LINKS ([place] Immunization Network for Kids Statewide)                                [place] - MIIX ([place] Immunization Information eXchange)                                [place] - ImmPRINT (Immunization Patient Registry with Integrated Technology)     TERM: This authorization is valid for this and subsequent care/treatment I receive at [facility] and will remain valid unless/until revoked in writing by me.     [facility]: As used in this document, [facility] means all [facility] owned and managed facilities, including, but not limited to, all health centers, surgery centers, clinics, urgent care centers, and hospitals.         [facility] complies with applicable Federal civil rights laws and does not discriminate on the basis of race, color, national origin, age, disability, or sex.  ATENCIÓN: si habla español, tiene a ceron disposición servicios gratuitos de asistencia lingüística. Telma galvez [phone].  CHÚ Ý: N?u b?n nói Ti?ng Vi?t, có các d?ch v? h? tr? ngôn ng? mi?n phí dành cho b?n. G?i s? [phone].        REGISTRATION AUTHORIZATION  Form No. 40201 (Rev. 3/25/2024)   Page 3 of 3     Patient

## 2025-04-17 ENCOUNTER — OFFICE VISIT (OUTPATIENT)
Dept: URGENT CARE | Facility: CLINIC | Age: 55
End: 2025-04-17
Payer: MEDICAID

## 2025-04-17 VITALS
SYSTOLIC BLOOD PRESSURE: 118 MMHG | DIASTOLIC BLOOD PRESSURE: 80 MMHG | OXYGEN SATURATION: 97 % | TEMPERATURE: 98 F | HEART RATE: 79 BPM | HEIGHT: 63 IN | BODY MASS INDEX: 46.78 KG/M2 | WEIGHT: 264 LBS

## 2025-04-17 DIAGNOSIS — M25.562 ACUTE PAIN OF LEFT KNEE: Primary | ICD-10-CM

## 2025-04-17 DIAGNOSIS — Z96.652 STATUS POST LEFT KNEE REPLACEMENT: ICD-10-CM

## 2025-04-17 PROCEDURE — 73562 X-RAY EXAM OF KNEE 3: CPT | Mod: LT,S$GLB,, | Performed by: RADIOLOGY

## 2025-04-17 RX ORDER — KETOROLAC TROMETHAMINE 30 MG/ML
30 INJECTION, SOLUTION INTRAMUSCULAR; INTRAVENOUS
Status: COMPLETED | OUTPATIENT
Start: 2025-04-17 | End: 2025-04-17

## 2025-04-17 RX ORDER — DICLOFENAC SODIUM 10 MG/G
2 GEL TOPICAL 4 TIMES DAILY
Qty: 50 G | Refills: 0 | Status: SHIPPED | OUTPATIENT
Start: 2025-04-17 | End: 2025-04-24

## 2025-04-17 RX ADMIN — KETOROLAC TROMETHAMINE 30 MG: 30 INJECTION, SOLUTION INTRAMUSCULAR; INTRAVENOUS at 06:04

## 2025-04-17 NOTE — PATIENT INSTRUCTIONS
Please follow up with Orthopedics next week for further evaluation of your knee pain.    Call to make an appointment with orthopedics at [phone].    Continue to take your prescribe pain medication.    If you have significantly worsening symptoms please follow up in urgent care or the emergency room.

## 2025-04-17 NOTE — PROGRESS NOTES
"Subjective:      Patient ID: Candy Huynh is a 54 y.o. female.    Vitals:  height is 5' 3" (1.6 m) and weight is 119.7 kg (264 lb). Her oral temperature is 98.1 °F (36.7 °C). Her blood pressure is 118/80 and her pulse is 79. Her oxygen saturation is 97%.     Chief Complaint: Knee Pain    Patient is a 54-year-old female with complaints of left knee pain that began today.  Patient states when she was walking earlier today she almost fell due to the pain in her left knee.  Patient is post status knee replacement in that knee, however that operation occurred a over a year ago and she has had no complications since then.  There is no swelling, redness, or warmth to her knee.  She has no known injury or trauma to the knee.  Patient states she took oxycodone earlier today to help with the pain.  She is followed by pain management for chronic pain, has not refilled her medications as month.    Knee Pain   The incident occurred 5 to 7 days ago. The injury mechanism is unknown. The pain is present in the left knee. The pain is at a severity of 10/10. The pain is mild. The pain has been Constant since onset. She reports no foreign bodies present. The symptoms are aggravated by movement, palpation and weight bearing. She has tried ice and heat (oxycodone) for the symptoms. The treatment provided no relief.       Constitution: Negative for fever and generalized weakness.   HENT:  Negative for ear pain, sinus pain and sore throat.    Neck: Negative for neck pain.   Cardiovascular:  Negative for chest pain.   Respiratory:  Negative for cough and shortness of breath.    Gastrointestinal:  Negative for abdominal pain.   Musculoskeletal:  Positive for joint pain (Left knee).   Neurological:  Negative for headaches.      Objective:     Physical Exam   Constitutional: She is oriented to person, place, and time. She appears well-developed. She is cooperative.   HENT:   Head: Normocephalic and atraumatic.   Ears:   Right Ear: " Hearing, tympanic membrane, external ear and ear canal normal.   Left Ear: Hearing, tympanic membrane, external ear and ear canal normal.   Nose: Nose normal. No mucosal edema or nasal deformity. No epistaxis. Right sinus exhibits no maxillary sinus tenderness and no frontal sinus tenderness. Left sinus exhibits no maxillary sinus tenderness and no frontal sinus tenderness.   Mouth/Throat: Uvula is midline, oropharynx is clear and moist and mucous membranes are normal. No trismus in the jaw. Normal dentition. No uvula swelling.   Eyes: Conjunctivae and lids are normal.   Neck: Trachea normal and phonation normal. Neck supple.   Cardiovascular: Normal rate, regular rhythm, normal heart sounds and normal pulses.   Pulmonary/Chest: Effort normal and breath sounds normal.   Abdominal: Normal appearance and bowel sounds are normal. Soft.   Musculoskeletal: Normal range of motion.         General: Normal range of motion.      Right knee: Normal.      Left knee: She exhibits no swelling, no ecchymosis, no deformity and no erythema. Tenderness (No tenderness to palpation, tenderness with movement) found.   Neurological: She is alert and oriented to person, place, and time. She exhibits normal muscle tone.   Skin: Skin is warm, dry and intact.   Psychiatric: Her speech is normal and behavior is normal. Judgment and thought content normal.   Nursing note and vitals reviewed.      Assessment:     1. Acute pain of left knee    2. Status post left knee replacement        Plan:   Patient has pain to left knee, only with movement or weight-bearing.  No tenderness to palpation when patient is sitting.  No obvious deformities, swelling, redness, or warmth to the area.  Low suspicion for infection at this time.  X-rays show no fractures or deformities on independent interpretation by me, we will await the official x-ray read.  I have placed a follow up referral for patient to see orthopedics to be re-evaluated, as on chart review she  has not seen orthopedics since shortly after her surgery.  Toradol given in clinic, instructed patient to take prescribed medications at home.  Patient acknowledges understanding of pain management and follow up.  She will return to urgent care or the emergency room if symptoms worsen.    XR KNEE 3 VIEW LEFT  Result Date: 4/17/2025  EXAMINATION: XR KNEE 3 VIEW LEFT CLINICAL HISTORY: Pain in left knee TECHNIQUE: AP, lateral, and Merchant views of the left knee were performed. COMPARISON: Bilateral knee series 10/17/2023 FINDINGS: Remote left knee total arthroplasty demonstrating anatomic positioning and alignment.  No evidence of hardware fracture or loosening.  No displaced fracture, dislocation or destructive osseous process.  No large suprapatellar joint effusion.  No subcutaneous emphysema or radiopaque foreign body.  Partially imaged incidentally noted right TKA.     Left TKA without acute osseous abnormality. Electronically signed by: Pedro Esparza MD Date:    04/17/2025 Time:    18:50        Acute pain of left knee  -     XR KNEE 3 VIEW LEFT; Future; Expected date: 04/17/2025  -     Ambulatory referral/consult to Orthopedics    Status post left knee replacement  -     Ambulatory referral/consult to Orthopedics    Other orders  -     ketorolac injection 30 mg  -     diclofenac sodium (VOLTAREN ARTHRITIS PAIN) 1 % Gel; Apply 2 g topically 4 (four) times daily. for 7 days  Dispense: 50 g; Refill: 0

## 2025-04-21 ENCOUNTER — HOSPITAL ENCOUNTER (EMERGENCY)
Facility: HOSPITAL | Age: 55
Discharge: HOME | End: 2025-04-21
Attending: EMERGENCY MEDICINE
Payer: MEDICAID

## 2025-04-21 VITALS
BODY MASS INDEX: 46.78 KG/M2 | OXYGEN SATURATION: 99 % | DIASTOLIC BLOOD PRESSURE: 83 MMHG | SYSTOLIC BLOOD PRESSURE: 133 MMHG | RESPIRATION RATE: 18 BRPM | WEIGHT: 264 LBS | HEIGHT: 63 IN | HEART RATE: 94 BPM | TEMPERATURE: 98 F

## 2025-04-21 DIAGNOSIS — R20.0 LEFT FACIAL NUMBNESS: Primary | ICD-10-CM

## 2025-04-21 DIAGNOSIS — G43.909 MIGRAINE WITHOUT STATUS MIGRAINOSUS, NOT INTRACTABLE, UNSPECIFIED MIGRAINE TYPE: ICD-10-CM

## 2025-04-21 LAB
ABSOLUTE EOSINOPHIL (OHS): 0.06 K/UL
ABSOLUTE MONOCYTE (OHS): 0.28 K/UL (ref 0.3–1)
ABSOLUTE NEUTROPHIL COUNT (OHS): 2.09 K/UL (ref 1.8–7.7)
ALBUMIN SERPL BCP-MCNC: 3.5 G/DL (ref 3.5–5.2)
ALP SERPL-CCNC: 126 UNIT/L (ref 40–150)
ALT SERPL W/O P-5'-P-CCNC: 24 UNIT/L (ref 10–44)
ANION GAP (OHS): 6 MMOL/L (ref 8–16)
AST SERPL-CCNC: 19 UNIT/L (ref 11–45)
BASOPHILS # BLD AUTO: 0.02 K/UL
BASOPHILS NFR BLD AUTO: 0.4 %
BILIRUB SERPL-MCNC: 0.3 MG/DL (ref 0.1–1)
BUN SERPL-MCNC: 11 MG/DL (ref 6–20)
BUN SERPL-MCNC: 11 MG/DL (ref 6–30)
CALCIUM SERPL-MCNC: 9.2 MG/DL (ref 8.7–10.5)
CHLORIDE SERPL-SCNC: 103 MMOL/L (ref 95–110)
CHLORIDE SERPL-SCNC: 104 MMOL/L (ref 95–110)
CO2 SERPL-SCNC: 26 MMOL/L (ref 23–29)
CREAT SERPL-MCNC: 0.8 MG/DL (ref 0.5–1.4)
CREAT SERPL-MCNC: 0.9 MG/DL (ref 0.5–1.4)
ERYTHROCYTE [DISTWIDTH] IN BLOOD BY AUTOMATED COUNT: 16.2 % (ref 11.5–14.5)
GFR SERPLBLD CREATININE-BSD FMLA CKD-EPI: >60 ML/MIN/1.73/M2
GLUCOSE SERPL-MCNC: 102 MG/DL (ref 70–110)
GLUCOSE SERPL-MCNC: 98 MG/DL (ref 70–110)
HCT VFR BLD AUTO: 38.1 % (ref 37–48.5)
HCT VFR BLD CALC: 38 %PCV (ref 36–54)
HGB BLD-MCNC: 12.1 GM/DL (ref 12–16)
IMM GRANULOCYTES # BLD AUTO: 0.01 K/UL (ref 0–0.04)
IMM GRANULOCYTES NFR BLD AUTO: 0.2 % (ref 0–0.5)
LYMPHOCYTES # BLD AUTO: 2.34 K/UL (ref 1–4.8)
MCH RBC QN AUTO: 28.2 PG (ref 27–31)
MCHC RBC AUTO-ENTMCNC: 31.8 G/DL (ref 32–36)
MCV RBC AUTO: 89 FL (ref 82–98)
NUCLEATED RBC (/100WBC) (OHS): 0 /100 WBC
PLATELET # BLD AUTO: 375 K/UL (ref 150–450)
PMV BLD AUTO: 10.7 FL (ref 9.2–12.9)
POC IONIZED CALCIUM: 1.14 MMOL/L (ref 1.06–1.42)
POC TCO2 (MEASURED): 27 MMOL/L (ref 23–29)
POCT GLUCOSE: 78 MG/DL (ref 70–110)
POTASSIUM BLD-SCNC: 4.7 MMOL/L (ref 3.5–5.1)
POTASSIUM SERPL-SCNC: 4.6 MMOL/L (ref 3.5–5.1)
PROT SERPL-MCNC: 7.5 GM/DL (ref 6–8.4)
RBC # BLD AUTO: 4.29 M/UL (ref 4–5.4)
RELATIVE EOSINOPHIL (OHS): 1.3 %
RELATIVE LYMPHOCYTE (OHS): 48.8 % (ref 18–48)
RELATIVE MONOCYTE (OHS): 5.8 % (ref 4–15)
RELATIVE NEUTROPHIL (OHS): 43.5 % (ref 38–73)
SAMPLE: NORMAL
SODIUM BLD-SCNC: 139 MMOL/L (ref 136–145)
SODIUM SERPL-SCNC: 136 MMOL/L (ref 136–145)
WBC # BLD AUTO: 4.8 K/UL (ref 3.9–12.7)

## 2025-04-21 PROCEDURE — 25000003 PHARM REV CODE 250: Performed by: EMERGENCY MEDICINE

## 2025-04-21 PROCEDURE — 99285 EMERGENCY DEPT VISIT HI MDM: CPT | Mod: 25

## 2025-04-21 PROCEDURE — 25000003 PHARM REV CODE 250

## 2025-04-21 PROCEDURE — 80053 COMPREHEN METABOLIC PANEL: CPT

## 2025-04-21 PROCEDURE — 82962 GLUCOSE BLOOD TEST: CPT

## 2025-04-21 PROCEDURE — 85025 COMPLETE CBC W/AUTO DIFF WBC: CPT

## 2025-04-21 PROCEDURE — 25500020 PHARM REV CODE 255: Performed by: EMERGENCY MEDICINE

## 2025-04-21 PROCEDURE — A9585 GADOBUTROL INJECTION: HCPCS | Performed by: EMERGENCY MEDICINE

## 2025-04-21 RX ORDER — GADOBUTROL 604.72 MG/ML
10 INJECTION INTRAVENOUS
Status: COMPLETED | OUTPATIENT
Start: 2025-04-21 | End: 2025-04-21

## 2025-04-21 RX ORDER — DIAZEPAM 5 MG/1
5 TABLET ORAL
Status: COMPLETED | OUTPATIENT
Start: 2025-04-21 | End: 2025-04-21

## 2025-04-21 RX ADMIN — GADOBUTROL 10 ML: 604.72 INJECTION INTRAVENOUS at 04:04

## 2025-04-21 RX ADMIN — DIAZEPAM 5 MG: 5 TABLET ORAL at 05:04

## 2025-04-21 RX ADMIN — DIAZEPAM 5 MG: 5 TABLET ORAL at 01:04

## 2025-04-21 NOTE — ED NOTES
Patient requested to speak to a supervisor. Unwilling to disclose reasoning. GRACIA Grant Charge and GRACIA Marcial Charge flow notified. Dr. Silva and Dr. Aguirre notified as well.

## 2025-04-21 NOTE — ED PROVIDER NOTES
Source of History:  Patient and chart review    Chief complaint:  Numbness (Left sided facial numbness on and off x 2 weeks)    HPI:  Candy Huynh is a 54 y.o. female with a past medical history of T2DM, HTN, TIA, GERD, and Fibromyalgia who presents to the emergency department with a chief complaint of 2-week history of intermittent facial and hand numbness. Patient reports that the symptoms have recently worsened and involve the left side of the mouth/lips and hands.     Denies recent trauma fever/chills, nausea/vomiting, diarrhea, shortness of breath, chest pain, or skin changes.    Review of patient's allergies indicates:   Allergen Reactions    Adhesive Blisters     Specifically EKG lead pads    Chlorhexidine hcl Itching     Chlorhexidine wipes    Morphine Other (See Comments)     shake    Adhesive tape-silicones Itching and Rash    Meloxicam Nausea And Vomiting       Medications Ordered Prior to Encounter[1]    PMH:  As per HPI and below:  Past Medical History:   Diagnosis Date    Allergy     Anemia     Anxiety     Asthma     denies- on outside problem list in Care Everywhere    Depression     Diabetes mellitus     Diabetes mellitus, type 2     Fibromyalgia     GERD (gastroesophageal reflux disease)     Hypertension     Stroke     TIA    Vertigo      Past Surgical History:   Procedure Laterality Date    ANTERIOR LUMBAR INTERBODY FUSION (ALIF) Left 07/18/2024    Procedure: FUSION, SPINE, LUMBAR, ALIF;  Surgeon: Dane Salazar MD;  Location: [facility] OR;  Service: Neurosurgery;  Laterality: Left;  Procedure:L5-S1 ALiF conduit, Depuy  Length of procedure: 1.5 hours  Anesthesia:General  Blood:Type and screen  Radiology:C-arm  Brace:LSO  Bed:Regular Bed  Position: Lateral right down  Equipment:Depuy-Bonifacio    ARTHROSCOPIC REPAIR OF ROTATOR CUFF OF SHOULDER Right 09/06/2022    Procedure: REPAIR, ROTATOR CUFF, ARTHROSCOPIC;  Surgeon: Ministerio Orr Jr., MD;  Location: [facility] OR;  Service: Orthopedics;   Laterality: Right;  need opus system  jose roberto notifed CC     ARTHROSCOPY OF KNEE Left 2021    Procedure: ARTHROSCOPY, KNEE;  Surgeon: Donnie Campuzano MD;  Location: [facility] OR;  Service: Orthopedics;  Laterality: Left;     SECTION      DECOMPRESSION OF SUBACROMIAL SPACE  2022    Procedure: DECOMPRESSION, SUBACROMIAL SPACE;  Surgeon: Ministerio Orr Jr., MD;  Location: [facility] OR;  Service: Orthopedics;;    EXCISION OF MASS OF EXTREMITY Left 2019    Procedure: EXCISION, MASS, EXTREMITY;  Surgeon: Honorio Pulido IV, MD;  Location: [facility] OR;  Service: Orthopedics;  Laterality: Left;  excision lipoma  Request Lacie    FUSION OF SPINE WITH INSTRUMENTATION N/A 2024    Procedure: FUSION, SPINE, WITH INSTRUMENTATION;  Surgeon: Dane Salazar MD;  Location: [facility];  Service: Neurosurgery;  Laterality: N/A;  Procedure:L5-S1 Viper prime instrumentaion  Length of procedure: 1.5 hours  LOS: 3 nights  Anesthesia:General  Blood:Type and screen  Radiology:Brain Lab Reagan, Ziehm  Brace:LSO  Bed:[place] [address] Poster  Position:Prone  Equipment:Depuy-Bonifacio    HERNIA REPAIR      HYSTERECTOMY      INJECTION OF STEROID Right 2024    Procedure: INJECTION, STEROID;  Surgeon: Dane Salazar MD;  Location: [facility];  Service: Neurosurgery;  Laterality: Right;  Procedure:Right SI joint injection steriod and block  Length of procedure: 30 minutes  Anesthesia:MAC  Radiology:C-arm  Bed:Regular Bed  Position:Prone    KNEE ARTHROSCOPY W/ MENISCECTOMY  2021    Procedure: ARTHROSCOPY, KNEE, WITH MENISCECTOMY;  Surgeon: Donnie Campuzano MD;  Location: [facility];  Service: Orthopedics;;    LUMBAR LAMINECTOMY WITH DISCECTOMY Right 2024    Procedure: LAMINECTOMY, SPINE, LUMBAR, WITH DISCECTOMY;  Surgeon: Dane Salazar MD;  Location: [facility];  Service: Neurosurgery;  Laterality: Right;  Procedure:Right L5-S1 laminotomy and radiculopathy  Length of procedure: 1.5 hours  LOS: 0-1  night  Anesthesia:General  Radiology:C-arm  Microscope:Metrx  Bed:[place] [address] Poster  Position:Prone    NASAL SEPTOPLASTY      RECONSTRUCTION OF ACROMIOCLAVICULAR JOINT  09/06/2022    Procedure: RECONSTRUCTION, ACROMIOCLAVICULAR JOINT;  Surgeon: Ministerio Orr Jr., MD;  Location: [facility];  Service: Orthopedics;;    TOTAL KNEE ARTHROPLASTY Right 10/26/2022    Procedure: ARTHROPLASTY, KNEE, TOTAL RIGHT: BALDO - NEXGEN;  Surgeon: Nolberto Fraser MD;  Location: [facility] OR;  Service: Orthopedics;  Laterality: Right;    TOTAL KNEE ARTHROPLASTY Left 08/07/2023    Procedure: ARTHROPLASTY, KNEE, TOTAL: LEFT: BALDO NEXGEN;  Surgeon: Nolberto Fraser MD;  Location: [facility];  Service: Orthopedics;  Laterality: Left;       Social History     Socioeconomic History    Marital status:    Tobacco Use    Smoking status: Former     Passive exposure: Past    Smokeless tobacco: Never   Substance and Sexual Activity    Alcohol use: Yes     Comment: occasional    Drug use: Never    Sexual activity: Yes     Partners: Male     Social Drivers of Health     Financial Resource Strain: High Risk (3/12/2025)    Overall Financial Resource Strain (CARDIA)     Difficulty of Paying Living Expenses: Very hard   Food Insecurity: Food Insecurity Present (3/12/2025)    Hunger Vital Sign     Worried About Running Out of Food in the Last Year: Sometimes true     Ran Out of Food in the Last Year: Sometimes true   Transportation Needs: No Transportation Needs (3/12/2025)    PRAPARE - Transportation     Lack of Transportation (Medical): No     Lack of Transportation (Non-Medical): No   Physical Activity: Inactive (3/12/2025)    Exercise Vital Sign     Days of Exercise per Week: 0 days     Minutes of Exercise per Session: 0 min   Stress: Stress Concern Present (3/12/2025)    [place] [place] [employer] - Occupational Stress Questionnaire     Feeling of Stress : Very much   Housing Stability: Low Risk  (3/12/2025)    Housing Stability  Vital Sign     Unable to Pay for Housing in the Last Year: No     Number of Times Moved in the Last Year: 1     Homeless in the Last Year: No       Family History   Problem Relation Name Age of Onset    No Known Problems Mother      Hypertension Father      Hypertension Sister      No Known Problems Sister      No Known Problems Brother      No Known Problems Daughter      No Known Problems Daughter      No Known Problems Daughter      No Known Problems Son         Physical Exam:      Vitals:    04/21/25 1815   BP:    Pulse: 94   Resp: 18   Temp:      Physical Exam    Gen: No acute distress  Mental Status: Alert and answering questions appropriately  Skin: Warm, dry. No rashes seen  Eyes: PERRL EOMI  Pulm: CTAB. No increased work of breathing, significant tachypnea, or conversational dyspnea    CV: RRR, no m/r/g  Abd: Soft, non distended, non tender to palpation. No guarding or rebound  MSK: No obvious deformities. WWP.  Neuro: Awake. Speech normal.  CN II-XII intact. No focal neuro deficit observed    Procedures  Laboratory Studies:  Labs Reviewed   COMPREHENSIVE METABOLIC PANEL - Abnormal       Result Value    Sodium 136      Potassium 4.6      Chloride 104      CO2 26      Glucose 98      BUN 11      Creatinine 0.8      Calcium 9.2      Protein Total 7.5      Albumin 3.5      Bilirubin Total 0.3            AST 19      ALT 24      Anion Gap 6 (*)     eGFR >60     CBC WITH DIFFERENTIAL - Abnormal    WBC 4.80      RBC 4.29      HGB 12.1      HCT 38.1      MCV 89      MCH 28.2      MCHC 31.8 (*)     RDW 16.2 (*)     Platelet Count 375      MPV 10.7      Nucleated RBC 0      Neut % 43.5      Lymph % 48.8 (*)     Mono % 5.8      Eos % 1.3      Basophil % 0.4      Imm Grans % 0.2      Neut # 2.09      Lymph # 2.34      Mono # 0.28 (*)     Eos # 0.06      Baso # 0.02      Imm Grans # 0.01     CBC W/ AUTO DIFFERENTIAL    Narrative:     The following orders were created for panel order CBC auto differential.                   Procedure                               Abnormality         Status                                     ---------                               -----------         ------                                     CBC with Differential[[id number]]       Abnormal            Final result                                                 Please view results for these tests on the individual orders.   POCT GLUCOSE    POCT Glucose 78     ISTAT PROCEDURE    POC Glucose 102      POC BUN 11      POC Creatinine 0.9      POC Sodium 139      POC Potassium 4.7      POC Chloride 103      POC TCO2 (MEASURED) 27      POC Ionized Calcium 1.14      POC Hematocrit 38      Sample HEENA         Imaging Results              MRI Brain W WO Contrast (Final result)  Result time 04/21/25 18:00:57      Final result by Pedro Esparza MD (04/21/25 18:00:57)                   Impression:      No acute intracranial abnormality identified.    Small remote right cerebellar infarct.    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    Additional findings as described.    Electronically signed by resident: Gee Atkinson  Date:    04/21/2025  Time:    17:24    Electronically signed by: Pedro Esparza MD  Date:    04/21/2025  Time:    18:00               Narrative:    EXAMINATION:  MRI BRAIN W WO CONTRAST    CLINICAL HISTORY:  Dizziness, persistent/recurrent, cardiac or vascular cause suspected;Brain metastases suspected;.    TECHNIQUE:  Multiplanar multisequence MR imaging of the brain was performed before and after the administration of  mL Gadavist  intravenous contrast.    COMPARISON:  MRI brain 02/02/2024, CT head 09/07/2023, MRI brain 08/11/2023    FINDINGS:  Intracranial compartment:    Ventricles and sulci are unchanged in size and  configuration without evidence of hydrocephalus. No extra-axial blood or fluid collections.    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  Small remote right cerebellar infarct. No restricted diffusion to suggest acute infarction.  No evidence of acute hemorrhage.    Normal vascular flow voids are preserved.    1.6 x 0.8 cm T1 hypointense extra-axial lesion overlying the right inferior parietal convexity demonstrating susceptibility and enhancement similar in size from prior previously measuring 1.6 x 0.8 cm (sequence 15 image 13).    Partially empty configuration of the sella, similar to prior.  This is a nonspecific finding but may be seen with increased intracranial pressure.    Skull/extracranial contents (limited evaluation):    Bone marrow signal intensity is normal.                                      Medications Given:  Medications   diazePAM tablet 5 mg (5 mg Oral Given 4/21/25 1314)   gadobutroL (GADAVIST) injection 10 mL (10 mLs Intravenous Given 4/21/25 1647)   diazePAM tablet 5 mg (5 mg Oral Given 4/21/25 1755)            Discussed with attending physician Dr. Iglesias    MDM:    Candy Huynh is a 54 y.o. female with above medical history who presents with facial and hand numbness.   Patient is afebrile, hemodynamically stable, and in no acute distress on arrival. Exam significant for CN II-XII intact, no FND, no ataxia - A&O x4, answering questions appropriately. 2+ distal pulses in bilateral upper and lower extremities     Differential diagnoses considered include, but not limited to: Complex migraine, radiculopathy, neuropraxia    Labs reassuring    Patient signed out to oncoming team who agreed to follow-up with MRI and disposition. If MRI is negative, patient can likely go home with neurology outpatient follow-up.    Patient updated regarding plan, acknowledges understanding, and  has had her questions answered to the best of my knowledge.    Medical Decision Making  Amount and/or Complexity of Data Reviewed  Labs: ordered.  Radiology: ordered.    Risk  Prescription drug management.           Diagnostic Impression:    1. Left facial numbness    2. Migraine without status migrainosus, not intractable, unspecified migraine type         ED Disposition Condition    Discharge Stable          ED Prescriptions    None       Follow-up Information       Follow up With Specialties Details Why Contact Info    Bárbara Mathis MD Family Medicine Schedule an appointment as soon as possible for a visit  As needed, If symptoms worsen [address] [place] [address] [phone]      [facility] NEUROLOGY Neurology   [address] [facility] [id number] [phone]            Patient and/or family understands the plan and is in agreement, verbalized understanding, questions answered    German Steel MD  Resident  Emergency Medicine        Attending Attestation:   Physician Attestation Statement for Resident:  As the supervising MD   Physician Attestation Statement: I have personally seen and examined this patient.   I agree with the above history.  -:   As the supervising MD I agree with the above PE.     As the supervising MD I agree with the above treatment, course, plan, and disposition.                Attending ED Notes:   STAFF ATTENDING PHYSICIAN NOTE:  I have individually/jointly evaluated Candy Huynh and discussed their ED management with the resident physician. I have also reviewed their notes, assessments, and procedures documented.  I was present during all critical portions of any procedure(s) performed on Candy Huynh.   ____________________  Dinh Iglesias MD, [facility]  Emergency Medicine Staff                             German Steel MD  Resident  04/21/25 9282         [1]   Current Facility-Administered Medications on File Prior to Encounter  "  Medication Dose Route Frequency Provider Last Rate Last Admin    fentaNYL 50 mcg/mL injection 100 mcg  100 mcg Intravenous PRN Neno Horton PA-C   25 mcg at 08/07/23 1234    lactated ringers infusion   Intravenous Continuous Adrienne Johnson, LEANN        midazolam (VERSED) 1 mg/mL injection 1 mg  1 mg Intravenous PRN Neno Horton PA-C   2 mg at 08/07/23 1120    ropivacaine 0.2% [facility] PainPRO Pump infusion 500 ML   Perineural Continuous Neno Horton PA-C   New Bag at 08/07/23 1516     Current Outpatient Medications on File Prior to Encounter   Medication Sig Dispense Refill    albuterol (PROVENTIL/VENTOLIN HFA) 90 mcg/actuation inhaler Inhale 2 puffs into the lungs every 4 (four) hours as needed.      ARIPiprazole (ABILIFY) 5 MG Tab Take 5 mg by mouth every evening.      aspirin (ECOTRIN) 81 MG EC tablet Take 1 tablet by mouth once daily.      azelastine (ASTELIN) 137 mcg (0.1 %) nasal spray 1 spray once daily.      BD ALINE 2ND GEN PEN NEEDLE 32 gauge x 5/32" Ndle USE ONE NEEDLE ONCE A WEEK      busPIRone (BUSPAR) 15 MG tablet Take 1 tablet (15 mg total) by mouth 3 (three) times daily. 90 tablet 0    celecoxib (CELEBREX) 200 MG capsule Take 1 capsule (200 mg total) by mouth 2 (two) times daily as needed for Pain. 60 capsule 2    celecoxib (CELEBREX) 200 MG capsule Take 1 capsule (200 mg total) by mouth 2 (two) times daily. 60 capsule 6    cetirizine (ZYRTEC) 10 MG tablet Take 10 mg by mouth every evening.      chlorthalidone (HYGROTEN) 25 MG Tab Take 1 tablet (25 mg total) by mouth once daily. 30 tablet 0    cholecalciferol, vitamin D3, 1,250 mcg (50,000 unit) capsule Take 50,000 Units by mouth every 7 days.      diclofenac sodium (VOLTAREN) 1 % Gel Apply 2 g topically 4 (four) times daily. 20 g 0    DULoxetine (CYMBALTA) 20 MG capsule Take 20 mg by mouth once daily.      EASY TOUCH ALCOHOL PREP PADS PadM USE TO prepare FOR glucose testing      EPINEPHrine (EPIPEN) 0.3 mg/0.3 mL AtIn as per " administration instructions      ergocalciferol (ERGOCALCIFEROL) 50,000 unit Cap Take 50,000 Units by mouth every 7 days.      FEROSUL 325 mg (65 mg iron) Tab tablet Take by mouth every other day.      fezolinetant (VEOZAH) 45 mg Tab Take 45 mg by mouth Daily. 30 tablet 11    FLOWFLEX COVID-19 AG HOME TEST Kit       fluticasone propionate (FLONASE) 50 mcg/actuation nasal spray 2 sprays by Nasal route.      folic acid (FOLVITE) 1 MG tablet Take 1,000 mcg by mouth.      LIDOcaine (LIDODERM) 5 % Place 1 patch onto the skin once daily. Remove & Discard patch within 12 hours or as directed by MD 6 patch 0    metFORMIN (GLUCOPHAGE-XR) 750 MG ER 24hr tablet Take 750 mg by mouth once daily.      methocarbamoL (ROBAXIN) 500 MG Tab Take 2 tablets (1,000 mg total) by mouth 3 (three) times daily as needed (muscle pain). 180 tablet 6    montelukast (SINGULAIR) 10 mg tablet       nicotine polacrilex (NICORETTE) 4 MG Gum Take 1 each (4 mg total) by mouth as needed. 100 each 5    olmesartan (BENICAR) 20 MG tablet Take 1 tablet (20 mg total) by mouth once daily. 30 tablet 0    ondansetron (ZOFRAN-ODT) 8 MG TbDL Take 8 mg by mouth every 8 (eight) hours as needed.      ONETOUCH DELICA PLUS LANCET 33 gauge Misc Apply topically 4 (four) times daily.      ONETOUCH ULTRA2 METER Misc SMARTSIG:Via Meter As Directed      ONETOUCH VERIO TEST STRIPS Strp 1 strip 4 (four) times daily.      oxyCODONE (ROXICODONE) 10 mg Tab immediate release tablet Take 1 tablet (10 mg total) by mouth every 8 (eight) hours as needed for Pain. 45 tablet 0    pantoprazole (PROTONIX) 40 MG tablet Take 40 mg by mouth.      pregabalin (LYRICA) 200 MG Cap Take 1 capsule (200 mg total) by mouth 2 (two) times daily. 60 capsule 2    rosuvastatin (CRESTOR) 20 MG tablet Take 20 mg by mouth once daily.      semaglutide (OZEMPIC) 0.25 mg or 0.5 mg (2 mg/3 mL) pen injector Inject 0.25 mg into the skin every 7 (seven) days      senna-docusate 8.6-50 mg (SENNA WITH DOCUSATE  SODIUM) 8.6-50 mg per tablet Take 1 tablet by mouth once daily. 30 tablet 0    STOOL SOFTENER 100 mg capsule Take 1 softgel by mouth twice daily 60 capsule 11    topiramate (TOPAMAX) 50 MG tablet Take 0.5 tablets (25 mg total) by mouth once daily for 7 days, THEN 1 tablet (50 mg total) once daily for 7 days, THEN 1 tablet (50 mg total) 2 (two) times daily for 7 days. 25 tablet 0    TRELEGY ELLIPTA 200-62.5-25 mcg inhaler Inhale 1 puff into the lungs once daily.          Kaden Iglesias MD  04/29/25 0614

## 2025-04-21 NOTE — ED NOTES
"Intermittent L lower facial numbness x 2 weeks. Also states L arm numbness. Not always at the same time. Symptoms are worse when position changing from sitting to standing. Associated dizziness and intermittent HA. Intermittent "black spots in vision." Hx of migraines but did not have these symptoms.    Patient identifiers for Candy Huynh 54 y.o. female checked and correct.  Chief Complaint   Patient presents with    Numbness     Left sided facial numbness on and off x 2 weeks     Past Medical History:   Diagnosis Date    Allergy     Anemia     Anxiety     Asthma     denies- on outside problem list in Care Everywhere    Depression     Diabetes mellitus     Diabetes mellitus, type 2     Fibromyalgia     GERD (gastroesophageal reflux disease)     Hypertension     Stroke     TIA    Vertigo      Allergies reported:   Review of patient's allergies indicates:   Allergen Reactions    Adhesive Blisters     Specifically EKG lead pads    Chlorhexidine hcl Itching     Chlorhexidine wipes    Morphine Other (See Comments)     shake    Adhesive tape-silicones Itching and Rash    Meloxicam Nausea And Vomiting       Appearance: Pt awake, alert & oriented to person, place & time. Pt in no acute distress at present time. Pt is clean and well groomed with clothes appropriately fastened.   Skin: Skin warm, dry & intact. Color consistent with ethnicity. Mucous membranes moist. No breakdown or brusing noted.   Musculoskeletal: Patient moving all extremities well, no obvious swelling or deformities noted.   Respiratory: Respirations spontaneous, even, and non-labored. Visible chest rise noted. Airway is open and patent. No accessory muscle use noted.   Neurologic: Sensation is intact. Speech is clear and appropriate. Eyes open spontaneously, behavior appropriate to situation, follows commands, facial expression symmetrical, L hand  slightly weaker, L arm numbness L face numbness including lips and nose, gums tongue.  purposeful " motor response noted. No drift.  Cardiac: All peripheral pulses present. No Bilateral lower extremity edema. Cap refill is <3 seconds.  Abdomen: Abdomen soft, non distended, non tender to palpation.   : Pt voids independently, denies dysuria, hematuria, frequency.

## 2025-04-22 ENCOUNTER — TELEPHONE (OUTPATIENT)
Dept: ORTHOPEDICS | Facility: CLINIC | Age: 55
End: 2025-04-22
Payer: MEDICAID

## 2025-04-22 ENCOUNTER — TELEPHONE (OUTPATIENT)
Dept: PRIMARY CARE CLINIC | Facility: CLINIC | Age: 55
End: 2025-04-22
Payer: MEDICAID

## 2025-04-22 NOTE — TELEPHONE ENCOUNTER
Spoke to pt and scheduled f/u, pt of dr randhawa pt had no specific provider request----- Message from Josep Miller sent at 4/22/2025  3:55 PM CDT -----  Regarding: FW: Missed call  Contact: [phone]    ----- Message -----  From: Ramy Khan  Sent: 4/22/2025   3:49 PM CDT  To: Yovani Vasquez Staff  Subject: Missed call                                      Pt is calling in ref to a missed call from Stephanie about appt. Patient Requesting Call Back @  [phone]

## 2025-04-22 NOTE — TELEPHONE ENCOUNTER
----- Message from Glenda sent at 4/22/2025  9:02 AM CDT -----  Contact: Pt [phone]  Requesting an RX refill or new RX.Is this a refill or new RX: RefillRX name and strength (copy/paste from chart):  meclizine (ANTIVERT) 25 mg tabletIs this a 30 day or 90 day RX: 30Pharmacy name and phone # (copy/paste from chart):  [facility]  [place] [id number] [address] [address]Kzbgf: [phone] Fax: [phone] an RX refill or new RX.Is this a refill or new RX: RefillRX name and strength (copy/paste from chart):  cetirizine (ZYRTEC) 10 MG tabletIs this a 30 day or 90 day RX: 30Pharmacy name and phone # (copy/paste from chart):  [facility]  [address] [facility] [place] [id number] [facility] [address]Kwjbh: [phone] Fax: [phone] call and advise.Thank You

## 2025-04-22 NOTE — TELEPHONE ENCOUNTER
M for pt to call back to schedule----- Message from Josep Romero sent at 4/22/2025  1:41 PM CDT -----  Good Afternoon, This patient was a previous TKA of Dr. Fraser and has a complicated medical history. Dr. Sutton asked that Dr. Pina take over this patient's care. Please call patient for scheduling. Heather Srivastava MS, ATC, OTCOrthopedicsPhone: [phone]  ----- Message -----  From: Heather Srivastava  Sent: 4/22/2025   1:39 PM CDT  To: Heather Srivastava      ----- Message -----  From: Diego Frost  Sent: 4/22/2025   1:33 PM CDT  To: [facility] Clinical Support Staff    Type:  Sooner Apoointment RequestCaller is requesting a sooner appointment.   Caller will accept being placed on the waitlist and is requesting a message be sent to doctor.Name of Caller:PtWhen is the first available appointment? Epic did not populate Symptoms:M25.562 (ICD-10-CM) - Acute pain of left kneeZ96.652 (ICD-10-CM) - Status post left knee replacement Would the patient rather a call back or a response via [facility]? [facility] Call Back Number: [phone] Information: previous pt. Of PETAR Weiss Would like to est care

## 2025-04-22 NOTE — TELEPHONE ENCOUNTER
Spoke with patient. Unfortunately, Dr. Sutton is not accepting new medicaid patients at this time. Told patient I could refer her to Dr. Pina at [facility]. Patient wants to be seen by an [facility] provider [facility] and asked to speak to a supervisor. Message was sent to Araceli Evans.     Heather Srivastava MS, ATC, C  Clinical/Surgical Assistant - Dr. Naila Rodney  Orthopedics  Phone: [phone]      ----- Message -----  From: Diego Frost  Sent: 4/22/2025   1:33 PM CDT  To: [facility] Clinical Support Staff    Type:  Sooner Apoointment RequestCaller is requesting a sooner appointment.   Caller will accept being placed on the waitlist and is requesting a message be sent to doctor.Name of Caller:PtWhen is the first available appointment? Epic did not populate Symptoms:M25.562 (ICD-10-CM) - Acute pain of left kneeZ96.652 (ICD-10-CM) - Status post left knee replacement Would the patient rather a call back or a response via [facility]? callBest Call Back Number: [phone] Information: previous pt. Of PETAR Weiss Would like to est care

## 2025-04-23 ENCOUNTER — OFFICE VISIT (OUTPATIENT)
Dept: NEUROLOGY | Facility: CLINIC | Age: 55
End: 2025-04-23
Payer: MEDICAID

## 2025-04-23 VITALS
HEIGHT: 63 IN | HEART RATE: 105 BPM | SYSTOLIC BLOOD PRESSURE: 146 MMHG | DIASTOLIC BLOOD PRESSURE: 86 MMHG | OXYGEN SATURATION: 96 % | BODY MASS INDEX: 46.64 KG/M2 | WEIGHT: 263.25 LBS

## 2025-04-23 DIAGNOSIS — R20.0 LEFT FACIAL NUMBNESS: Primary | ICD-10-CM

## 2025-04-23 DIAGNOSIS — M79.7 FIBROMYALGIA: ICD-10-CM

## 2025-04-23 PROCEDURE — 99999 PR PBB SHADOW E&M-EST. PATIENT-LVL V: CPT | Mod: PBBFAC,,, | Performed by: STUDENT IN AN ORGANIZED HEALTH CARE EDUCATION/TRAINING PROGRAM

## 2025-04-23 PROCEDURE — 4010F ACE/ARB THERAPY RXD/TAKEN: CPT | Mod: CPTII,,, | Performed by: STUDENT IN AN ORGANIZED HEALTH CARE EDUCATION/TRAINING PROGRAM

## 2025-04-23 PROCEDURE — 1159F MED LIST DOCD IN RCRD: CPT | Mod: CPTII,,, | Performed by: STUDENT IN AN ORGANIZED HEALTH CARE EDUCATION/TRAINING PROGRAM

## 2025-04-23 PROCEDURE — 99215 OFFICE O/P EST HI 40 MIN: CPT | Mod: S$PBB,,, | Performed by: STUDENT IN AN ORGANIZED HEALTH CARE EDUCATION/TRAINING PROGRAM

## 2025-04-23 PROCEDURE — 3079F DIAST BP 80-89 MM HG: CPT | Mod: CPTII,,, | Performed by: STUDENT IN AN ORGANIZED HEALTH CARE EDUCATION/TRAINING PROGRAM

## 2025-04-23 PROCEDURE — 3077F SYST BP >= 140 MM HG: CPT | Mod: CPTII,,, | Performed by: STUDENT IN AN ORGANIZED HEALTH CARE EDUCATION/TRAINING PROGRAM

## 2025-04-23 PROCEDURE — 3008F BODY MASS INDEX DOCD: CPT | Mod: CPTII,,, | Performed by: STUDENT IN AN ORGANIZED HEALTH CARE EDUCATION/TRAINING PROGRAM

## 2025-04-23 PROCEDURE — 99215 OFFICE O/P EST HI 40 MIN: CPT | Mod: PBBFAC | Performed by: STUDENT IN AN ORGANIZED HEALTH CARE EDUCATION/TRAINING PROGRAM

## 2025-04-23 RX ORDER — LORATADINE 10 MG/1
10 TABLET ORAL DAILY
Qty: 30 TABLET | Refills: 11 | Status: SHIPPED | OUTPATIENT
Start: 2025-04-23

## 2025-04-23 RX ORDER — MECLIZINE HYDROCHLORIDE 25 MG/1
25 TABLET ORAL 3 TIMES DAILY PRN
Qty: 30 TABLET | Refills: 0 | Status: SHIPPED | OUTPATIENT
Start: 2025-04-23

## 2025-04-23 NOTE — PROGRESS NOTES
Chief Complaint and Duration     Chief Complaint   Patient presents with    Consult    Headache    Numbness    Pain    Dizziness     Referred By: Sly Aguirre MD  ED follow up - Facial numbness/ headaches/ dizziness    Lasting 2 weeks    History of Present Illness     Candy Huynh is a 54 y.o.  female with a history of multiple medical diagnoses as listed below that presents for L sided facial tingling.     L sided facial tingling. Had episode years ago, had one episode lasting 2 weeks. Went to ER yesterday. Persistent facial tingling on L side. Does have headache last night. Also nonspecific dizziness. MRI brain unremarkable in ER>    Hx of likely meningioma noted on prior brain imaging, not appreciated in significant detail on new.    Hx of fibromyalgia as well, not established w rheum (had one in [place]).       Review of patient's allergies indicates:   Allergen Reactions    Adhesive Blisters     Specifically EKG lead pads    Chlorhexidine hcl Itching     Chlorhexidine wipes    Morphine Other (See Comments)     shake    Adhesive tape-silicones Itching and Rash    Meloxicam Nausea And Vomiting     Current Medications[1]    Medical History     Past Medical History:   Diagnosis Date    Allergy     Anemia     Anxiety     Asthma     denies- on outside problem list in Care Everywhere    Depression     Diabetes mellitus     Diabetes mellitus, type 2     Fibromyalgia     GERD (gastroesophageal reflux disease)     Hypertension     Stroke     TIA    Vertigo      Past Surgical History:   Procedure Laterality Date    ANTERIOR LUMBAR INTERBODY FUSION (ALIF) Left 07/18/2024    Procedure: FUSION, SPINE, LUMBAR, ALIF;  Surgeon: Dane Salazar MD;  Location: [facility];  Service: Neurosurgery;  Laterality: Left;  Procedure:L5-S1 ALiF conduit, Depuy  Length of procedure: 1.5 hours  Anesthesia:General  Blood:Type and screen  Radiology:C-arm  Brace:LSO  Bed:Regular Bed  Position: Lateral right  down  Equipment:Depuy-Bonifacio    ARTHROSCOPIC REPAIR OF ROTATOR CUFF OF SHOULDER Right 2022    Procedure: REPAIR, ROTATOR CUFF, ARTHROSCOPIC;  Surgeon: Ministerio Orr Jr., MD;  Location: [facility];  Service: Orthopedics;  Laterality: Right;  need opus system  jose roberto notifed CC     ARTHROSCOPY OF KNEE Left 2021    Procedure: ARTHROSCOPY, KNEE;  Surgeon: Donnie Campuzano MD;  Location: [facility] OR;  Service: Orthopedics;  Laterality: Left;     SECTION      DECOMPRESSION OF SUBACROMIAL SPACE  2022    Procedure: DECOMPRESSION, SUBACROMIAL SPACE;  Surgeon: Ministerio Orr Jr., MD;  Location: [facility] OR;  Service: Orthopedics;;    EXCISION OF MASS OF EXTREMITY Left 2019    Procedure: EXCISION, MASS, EXTREMITY;  Surgeon: Honorio Pulido IV, MD;  Location: [facility] OR;  Service: Orthopedics;  Laterality: Left;  excision lipoma  Request Lacie    FUSION OF SPINE WITH INSTRUMENTATION N/A 2024    Procedure: FUSION, SPINE, WITH INSTRUMENTATION;  Surgeon: Dane Salazar MD;  Location: [facility];  Service: Neurosurgery;  Laterality: N/A;  Procedure:L5-S1 Viper prime instrumentaion  Length of procedure: 1.5 hours  LOS: 3 nights  Anesthesia:General  Blood:Type and screen  Radiology:[facility], Jasonehm  Brace:LSO  Bed:[address]  Position:Prone  Equipment:Depuy-Bonifacio    HERNIA REPAIR      HYSTERECTOMY      INJECTION OF STEROID Right 2024    Procedure: INJECTION, STEROID;  Surgeon: Dane Salazar MD;  Location: [facility];  Service: Neurosurgery;  Laterality: Right;  Procedure:Right SI joint injection steriod and block  Length of procedure: 30 minutes  Anesthesia:MAC  Radiology:C-arm  Bed:Regular Bed  Position:Prone    KNEE ARTHROSCOPY W/ MENISCECTOMY  2021    Procedure: ARTHROSCOPY, KNEE, WITH MENISCECTOMY;  Surgeon: Donnie Campuzano MD;  Location: [facility];  Service: Orthopedics;;    LUMBAR LAMINECTOMY WITH DISCECTOMY Right 2024    Procedure: LAMINECTOMY, SPINE,  LUMBAR, WITH DISCECTOMY;  Surgeon: Dane Salazar MD;  Location: [facility];  Service: Neurosurgery;  Laterality: Right;  Procedure:Right L5-S1 laminotomy and radiculopathy  Length of procedure: 1.5 hours  LOS: 0-1 night  Anesthesia:General  Radiology:C-arm  Microscope:Metrx  Bed:[place] [id number] Poster  Position:Prone    NASAL SEPTOPLASTY      RECONSTRUCTION OF ACROMIOCLAVICULAR JOINT  09/06/2022    Procedure: RECONSTRUCTION, ACROMIOCLAVICULAR JOINT;  Surgeon: Ministerio Orr Jr., MD;  Location: [facility];  Service: Orthopedics;;    TOTAL KNEE ARTHROPLASTY Right 10/26/2022    Procedure: ARTHROPLASTY, KNEE, TOTAL RIGHT: BALDO - NEXGEN;  Surgeon: Nolberto Fraser MD;  Location: [facility];  Service: Orthopedics;  Laterality: Right;    TOTAL KNEE ARTHROPLASTY Left 08/07/2023    Procedure: ARTHROPLASTY, KNEE, TOTAL: LEFT: BALDO NEXGEN;  Surgeon: Nolberto Fraser MD;  Location: [facility];  Service: Orthopedics;  Laterality: Left;     Family History   Problem Relation Name Age of Onset    No Known Problems Mother      Hypertension Father      Hypertension Sister      No Known Problems Sister      No Known Problems Brother      No Known Problems Daughter      No Known Problems Daughter      No Known Problems Daughter      No Known Problems Son       Social History[2]    Exam     Vitals:    04/23/25 0801   BP: (!) 146/86   Pulse: 105      Physical Exam:  General: Not in acute distress. Not ill-appearing.   HENT: Normocephalic and atraumatic. Moist mucous membranes.    Neurologic Exam   Mental status: oriented to person and situation  Attention: Normal. Concentration: normal.  Speech: speech is normal.  Cranial Nerves: EOMI intact, V1-V3 Facial sensation intact. Symmetric facies. Hearing grossly intact. Palate and uvula midline, symmetric. No tongue deviation. Trapezius strength intact.     Motor exam: bulk and tone normal.     Sensory exam: light touch intact      Labs and Imaging     Labs: reviewed  No results found for this or  any previous visit (from the past 24 hours).    HgA1C%:  5.5  LDL:  89    Imaging:   I have personally reviewed the images performed.   MRI brain w wo - no acute intracranial abnormality    Assessment and Plan     Problem List Items Addressed This Visit          Neuro    Left facial numbness - Primary    Relevant Orders    Vitamin B1    Vitamin B12       Orthopedic    Fibromyalgia     Patient is new to me.    Patient referred to me for L facial tingling, persistent over 2 week span. Concern in ER was stroke - no acute stroke seen in ER.   Episode of this years ago.  Not associated w migraine, patient developed migraine while in ER. Less likely etiology to be complicated or hemiplegic migraine, f/u w PCP for control of vascular risk factors.    Discussed w patient, given fibromyalgia history, establish care w rheum, may have link to small fiber neuropathy. Patient is on lyrica pretty NSGY for her back. Discussed use of b12 which has been well studied for neuroapathic pain as well.    Will check for vitamin deficiencies that may be contributing.    In regards to history of menigioma - encouraged patient to followup if concerns w asa, saw at [facility].    Greatly appreciate patient and her  coming in.     Discussed w patient multifactorial nature of symptoms. Discussed lifestyle modifications including diet and exercise.    Questions answered w patient. Appreciate opportunity to care for this patient.    Follow-up: PCP for control of vascular risk factors. PRN to see me. Will contact patient if levels significant.     Time spent on this encounter: 50 minutes. This includes face to face time and non-face to face time preparing to see the patient (eg, review of tests), obtaining and/or reviewing separately obtained history, documenting clinical information in the electronic or other health record, independently interpreting results and communicating results to the patient/family/caregiver, or care coordinator.     This note  "was created by combination of typed  and M-Modal dictation. Transcription and phonetic errors may be present.  If there are any questions, please contact me.         [1]   Current Outpatient Medications   Medication Sig Dispense Refill    albuterol (PROVENTIL/VENTOLIN HFA) 90 mcg/actuation inhaler Inhale 2 puffs into the lungs every 4 (four) hours as needed.      ARIPiprazole (ABILIFY) 5 MG Tab Take 5 mg by mouth every evening.      aspirin (ECOTRIN) 81 MG EC tablet Take 1 tablet by mouth once daily.      azelastine (ASTELIN) 137 mcg (0.1 %) nasal spray 1 spray once daily.      BD ALINE 2ND GEN PEN NEEDLE 32 gauge x 5/32" Ndle USE ONE NEEDLE ONCE A WEEK      busPIRone (BUSPAR) 15 MG tablet Take 1 tablet (15 mg total) by mouth 3 (three) times daily. 90 tablet 0    celecoxib (CELEBREX) 200 MG capsule Take 1 capsule (200 mg total) by mouth 2 (two) times daily as needed for Pain. 60 capsule 2    celecoxib (CELEBREX) 200 MG capsule Take 1 capsule (200 mg total) by mouth 2 (two) times daily. 60 capsule 6    cetirizine (ZYRTEC) 10 MG tablet Take 10 mg by mouth every evening.      chlorthalidone (HYGROTEN) 25 MG Tab Take 1 tablet (25 mg total) by mouth once daily. 30 tablet 0    cholecalciferol, vitamin D3, 1,250 mcg (50,000 unit) capsule Take 50,000 Units by mouth every 7 days.      diclofenac sodium (VOLTAREN ARTHRITIS PAIN) 1 % Gel Apply 2 g topically 4 (four) times daily. for 7 days 50 g 0    diclofenac sodium (VOLTAREN) 1 % Gel Apply 2 g topically 4 (four) times daily. 20 g 0    DULoxetine (CYMBALTA) 20 MG capsule Take 20 mg by mouth once daily.      EASY TOUCH ALCOHOL PREP PADS PadM USE TO prepare FOR glucose testing      EPINEPHrine (EPIPEN) 0.3 mg/0.3 mL AtIn as per administration instructions      ergocalciferol (ERGOCALCIFEROL) 50,000 unit Cap Take 50,000 Units by mouth every 7 days.      FEROSUL 325 mg (65 mg iron) Tab tablet Take by mouth every other day.      fezolinetant (VEOZAH) 45 mg Tab Take 45 mg " by mouth Daily. 30 tablet 11    FLOWFLEX COVID-19 AG HOME TEST Kit       fluticasone propionate (FLONASE) 50 mcg/actuation nasal spray 2 sprays by Nasal route.      folic acid (FOLVITE) 1 MG tablet Take 1,000 mcg by mouth.      LIDOcaine (LIDODERM) 5 % Place 1 patch onto the skin once daily. Remove & Discard patch within 12 hours or as directed by MD 6 patch 0    loratadine (CLARITIN) 10 mg tablet Take 10 mg by mouth once daily.      meclizine (ANTIVERT) 25 mg tablet Take 1 tablet (25 mg total) by mouth 3 (three) times daily as needed. 30 tablet 0    methocarbamoL (ROBAXIN) 500 MG Tab Take 2 tablets (1,000 mg total) by mouth 3 (three) times daily as needed (muscle pain). 180 tablet 6    montelukast (SINGULAIR) 10 mg tablet       nicotine polacrilex (NICORETTE) 4 MG Gum Take 1 each (4 mg total) by mouth as needed. 100 each 5    olmesartan (BENICAR) 20 MG tablet Take 1 tablet (20 mg total) by mouth once daily. 30 tablet 0    ondansetron (ZOFRAN-ODT) 8 MG TbDL Take 8 mg by mouth every 8 (eight) hours as needed.      ONETOUCH DELICA PLUS LANCET 33 gauge Misc Apply topically 4 (four) times daily.      ONETOUCH ULTRA2 METER Misc SMARTSIG:Via Meter As Directed      ONETOUCH VERIO TEST STRIPS Strp 1 strip 4 (four) times daily.      oxyCODONE (ROXICODONE) 10 mg Tab immediate release tablet Take 1 tablet (10 mg total) by mouth every 8 (eight) hours as needed for Pain. 45 tablet 0    OZEMPIC 1 mg/dose (4 mg/3 mL) Inject 1 mg into the skin every 7 (seven) days 3 mL 2    pantoprazole (PROTONIX) 40 MG tablet Take 40 mg by mouth.      pregabalin (LYRICA) 200 MG Cap Take 1 capsule (200 mg total) by mouth 2 (two) times daily. 60 capsule 2    rosuvastatin (CRESTOR) 20 MG tablet Take 20 mg by mouth once daily.      semaglutide (OZEMPIC) 0.25 mg or 0.5 mg (2 mg/3 mL) pen injector Inject 0.25 mg into the skin every 7 (seven) days      senna-docusate 8.6-50 mg (SENNA WITH DOCUSATE SODIUM) 8.6-50 mg per tablet Take 1 tablet by mouth once  daily. 30 tablet 0    STOOL SOFTENER 100 mg capsule Take 1 softgel by mouth twice daily 60 capsule 11    TRELEGY ELLIPTA 200-62.5-25 mcg inhaler Inhale 1 puff into the lungs once daily.      metFORMIN (GLUCOPHAGE-XR) 750 MG ER 24hr tablet Take 750 mg by mouth once daily.      topiramate (TOPAMAX) 50 MG tablet Take 0.5 tablets (25 mg total) by mouth once daily for 7 days, THEN 1 tablet (50 mg total) once daily for 7 days, THEN 1 tablet (50 mg total) 2 (two) times daily for 7 days. 25 tablet 0     No current facility-administered medications for this visit.     Facility-Administered Medications Ordered in Other Visits   Medication Dose Route Frequency Provider Last Rate Last Admin    fentaNYL 50 mcg/mL injection 100 mcg  100 mcg Intravenous PRN Neno Horton PA-C   25 mcg at 08/07/23 1234    lactated ringers infusion   Intravenous Continuous Adrienne Johnson, DNP        midazolam (VERSED) 1 mg/mL injection 1 mg  1 mg Intravenous PRN Neno Horton PA-C   2 mg at 08/07/23 1120    ropivacaine 0.2% Nimbus PainPRO Pump infusion 500 ML   Perineural Continuous Neno Horton PA-C   New Bag at 08/07/23 1516   [2]   Social History  Socioeconomic History    Marital status:    Tobacco Use    Smoking status: Former     Passive exposure: Past    Smokeless tobacco: Never   Substance and Sexual Activity    Alcohol use: Yes     Comment: occasional    Drug use: Never    Sexual activity: Yes     Partners: Male     Social Drivers of Health     Financial Resource Strain: High Risk (3/12/2025)    Overall Financial Resource Strain (CARDIA)     Difficulty of Paying Living Expenses: Very hard   Food Insecurity: Food Insecurity Present (3/12/2025)    Hunger Vital Sign     Worried About Running Out of Food in the Last Year: Sometimes true     Ran Out of Food in the Last Year: Sometimes true   Transportation Needs: No Transportation Needs (3/12/2025)    PRAPARE - Transportation     Lack of Transportation (Medical): No      Lack of Transportation (Non-Medical): No   Physical Activity: Inactive (3/12/2025)    Exercise Vital Sign     Days of Exercise per Week: 0 days     Minutes of Exercise per Session: 0 min   Stress: Stress Concern Present (3/12/2025)    [employer] [place] [employer] - Occupational Stress Questionnaire     Feeling of Stress : Very much   Housing Stability: Low Risk  (3/12/2025)    Housing Stability Vital Sign     Unable to Pay for Housing in the Last Year: No     Number of Times Moved in the Last Year: 1     Homeless in the Last Year: No

## 2025-04-28 ENCOUNTER — OFFICE VISIT (OUTPATIENT)
Dept: ORTHOPEDICS | Facility: CLINIC | Age: 55
End: 2025-04-28
Payer: MEDICAID

## 2025-04-28 ENCOUNTER — LAB VISIT (OUTPATIENT)
Dept: LAB | Facility: HOSPITAL | Age: 55
End: 2025-04-28
Payer: MEDICAID

## 2025-04-28 VITALS — WEIGHT: 269.31 LBS | BODY MASS INDEX: 47.7 KG/M2

## 2025-04-28 DIAGNOSIS — G89.29 OTHER CHRONIC PAIN: ICD-10-CM

## 2025-04-28 DIAGNOSIS — M47.816 LUMBAR SPONDYLOSIS: ICD-10-CM

## 2025-04-28 DIAGNOSIS — M76.899 QUADRICEPS TENDONITIS: Primary | ICD-10-CM

## 2025-04-28 DIAGNOSIS — Z96.652 STATUS POST LEFT KNEE REPLACEMENT: ICD-10-CM

## 2025-04-28 DIAGNOSIS — E11.9 TYPE 2 DIABETES MELLITUS WITHOUT COMPLICATIONS: ICD-10-CM

## 2025-04-28 DIAGNOSIS — R20.0 LEFT FACIAL NUMBNESS: ICD-10-CM

## 2025-04-28 LAB — VIT B12 SERPL-MCNC: 313 PG/ML (ref 210–950)

## 2025-04-28 PROCEDURE — 1159F MED LIST DOCD IN RCRD: CPT | Mod: CPTII,,,

## 2025-04-28 PROCEDURE — 99214 OFFICE O/P EST MOD 30 MIN: CPT | Mod: S$PBB,,,

## 2025-04-28 PROCEDURE — 82607 VITAMIN B-12: CPT

## 2025-04-28 PROCEDURE — 36415 COLL VENOUS BLD VENIPUNCTURE: CPT

## 2025-04-28 PROCEDURE — 4010F ACE/ARB THERAPY RXD/TAKEN: CPT | Mod: CPTII,,,

## 2025-04-28 PROCEDURE — 84425 ASSAY OF VITAMIN B-1: CPT

## 2025-04-28 PROCEDURE — 3008F BODY MASS INDEX DOCD: CPT | Mod: CPTII,,,

## 2025-04-28 PROCEDURE — 1160F RVW MEDS BY RX/DR IN RCRD: CPT | Mod: CPTII,,,

## 2025-04-28 PROCEDURE — 99215 OFFICE O/P EST HI 40 MIN: CPT | Mod: PBBFAC

## 2025-04-28 PROCEDURE — 99999 PR PBB SHADOW E&M-EST. PATIENT-LVL V: CPT | Mod: PBBFAC,,,

## 2025-04-28 RX ORDER — SEMAGLUTIDE 1.34 MG/ML
1 INJECTION, SOLUTION SUBCUTANEOUS
Qty: 9 ML | Refills: 3 | Status: SHIPPED | OUTPATIENT
Start: 2025-04-28 | End: 2026-04-28
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
## 2025-04-30 ENCOUNTER — TELEPHONE (OUTPATIENT)
Dept: PRIMARY CARE CLINIC | Facility: CLINIC | Age: 55
End: 2025-04-30
Payer: MEDICAID

## 2025-04-30 ENCOUNTER — PATIENT MESSAGE (OUTPATIENT)
Dept: NEUROSURGERY | Facility: CLINIC | Age: 55
End: 2025-04-30
Payer: MEDICAID

## 2025-04-30 DIAGNOSIS — G89.29 OTHER CHRONIC PAIN: ICD-10-CM

## 2025-04-30 RX ORDER — PREGABALIN 200 MG/1
200 CAPSULE ORAL 2 TIMES DAILY
Qty: 60 CAPSULE | Refills: 2 | Status: SHIPPED | OUTPATIENT
Start: 2025-04-30 | End: 2025-07-29

## 2025-04-30 RX ORDER — OXYCODONE HYDROCHLORIDE 5 MG/1
5 TABLET ORAL EVERY 8 HOURS PRN
Qty: 21 TABLET | Refills: 0 | Status: SHIPPED | OUTPATIENT
Start: 2025-04-30 | End: 2025-04-30 | Stop reason: SDUPTHER

## 2025-04-30 RX ORDER — CELECOXIB 200 MG/1
200 CAPSULE ORAL 2 TIMES DAILY
Qty: 60 CAPSULE | Refills: 6 | Status: SHIPPED | OUTPATIENT
Start: 2025-04-30

## 2025-04-30 RX ORDER — MECLIZINE HYDROCHLORIDE 25 MG/1
25 TABLET ORAL 3 TIMES DAILY PRN
Qty: 30 TABLET | Refills: 2 | Status: SHIPPED | OUTPATIENT
Start: 2025-04-30

## 2025-04-30 RX ORDER — CELECOXIB 200 MG/1
200 CAPSULE ORAL 2 TIMES DAILY
Qty: 60 CAPSULE | Refills: 6 | Status: SHIPPED | OUTPATIENT
Start: 2025-04-30 | End: 2025-04-30 | Stop reason: SDUPTHER

## 2025-04-30 RX ORDER — OXYCODONE HYDROCHLORIDE 5 MG/1
5 TABLET ORAL EVERY 8 HOURS PRN
Qty: 21 TABLET | Refills: 0 | Status: SHIPPED | OUTPATIENT
Start: 2025-04-30

## 2025-04-30 NOTE — TELEPHONE ENCOUNTER
----- Message from Med Assistant Emmett sent at 4/23/2025 11:45 AM CDT -----  Contact: [phone] (Mobile)  Please see the attached message.Thank you,Emmett CAMPBELL MA  ----- Message -----  From: Emelia Samuels  Sent: 4/23/2025  11:25 AM CDT  To: [facility] Staff    Requesting an RX refill or new RX.Is this a refill or new RX: refillRX name and strength (copy/paste from chart):  meclizine (ANTIVERT) 25 mg tablet Is this a 30 day or 90 day RX: 30Pharmacy name and phone # (copy/paste from chart):  [facility]  [address]  [address] [facility] [address]Fnkvb: [phone] Fax: [phone] an RX refill or new RX.Is this a refill or new RX: refillRX name and strength (copy/paste from chart):  folic acid (FOLVITE) 1 MG tablet Is this a 30 day or 90 day RX: Pharmacy name and phone # (copy/paste from chart):  Requesting an RX refill or new RX.Is this a refill or new RX: refillRX name and strength (copy/paste from chart):  cetirizine (ZYRTEC) 10 MG tablet Is this a 30 day or 90 day RX: Pharmacy name and phone # (copy/paste from chart):

## 2025-05-01 ENCOUNTER — TELEPHONE (OUTPATIENT)
Dept: NEUROSURGERY | Facility: CLINIC | Age: 55
End: 2025-05-01
Payer: MEDICAID

## 2025-05-02 ENCOUNTER — TELEPHONE (OUTPATIENT)
Dept: PRIMARY CARE CLINIC | Facility: CLINIC | Age: 55
End: 2025-05-02
Payer: MEDICAID

## 2025-05-02 ENCOUNTER — TELEPHONE (OUTPATIENT)
Dept: PAIN MEDICINE | Facility: CLINIC | Age: 55
End: 2025-05-02
Payer: MEDICAID

## 2025-05-02 NOTE — TELEPHONE ENCOUNTER
----- Message from Joseph sent at 5/2/2025 10:17 AM CDT -----  Regarding: Piotr with [facility]  Name of Caller: Piotr Pharmacy Name: [facility] Prescription Name: semaglutide (OZEMPIC) 1 mg/dose (4 mg/3 mL)What do they need to clarify? Need diagnosis code Can you be contacted via [facility]? No Best Call Back Number: .[facility] [address] - [address] [facility] [place], [place] [address]ariel [place] [address]Xulaz: [phone] Fax: [phone] Information

## 2025-05-02 NOTE — TELEPHONE ENCOUNTER
Called pharmacy and provided the diagnosis code regarding patients prior authorization for her weight loss injections.

## 2025-05-02 NOTE — TELEPHONE ENCOUNTER
Attempted to call pt to explain issue of scheduling at [facility] location due to insurance (Medicaid)- no answer.  I did send msg via [facility].

## 2025-05-02 NOTE — TELEPHONE ENCOUNTER
----- Message from Med Assistant Teresita sent at 5/1/2025  3:37 PM CDT -----  Good morning,This patient was transferred to the Medicaid Access [facility] line to schedule an appointment with pain management. The patient only wants the [facility] location and she only wants to speak with someone in that clinic in regards to why she can not be seen. It was explained to her that the medicaid panels are full but she only wants to speak with the clinic. If you can please contact her as soon as you can.Thank you Teresita Supervisor [facility]

## 2025-05-07 ENCOUNTER — PATIENT MESSAGE (OUTPATIENT)
Dept: ORTHOPEDICS | Facility: CLINIC | Age: 55
End: 2025-05-07
Payer: MEDICAID

## 2025-05-07 ENCOUNTER — PATIENT MESSAGE (OUTPATIENT)
Dept: NEUROSURGERY | Facility: CLINIC | Age: 55
End: 2025-05-07
Payer: MEDICAID

## 2025-05-07 ENCOUNTER — TELEPHONE (OUTPATIENT)
Dept: NEUROSURGERY | Facility: CLINIC | Age: 55
End: 2025-05-07
Payer: MEDICAID

## 2025-05-07 NOTE — TELEPHONE ENCOUNTER
Returned call to pt, she sated that she will not be able to come to [place] anymore [place] [facility] [place] stated that she would like to see [place] [facility] I stated to pt that  I Will send a message to  and give her a call back. Pt voiced understanding

## 2025-05-08 DIAGNOSIS — Z96.653 S/P TOTAL KNEE REPLACEMENT, BILATERAL: ICD-10-CM

## 2025-05-08 DIAGNOSIS — M76.899 QUADRICEPS TENDONITIS: Primary | ICD-10-CM

## 2025-05-27 ENCOUNTER — TELEPHONE (OUTPATIENT)
Dept: NEUROSURGERY | Facility: CLINIC | Age: 55
End: 2025-05-27
Payer: MEDICAID

## 2025-05-27 ENCOUNTER — OFFICE VISIT (OUTPATIENT)
Dept: NEUROSURGERY | Facility: CLINIC | Age: 55
End: 2025-05-27
Payer: MEDICAID

## 2025-05-27 ENCOUNTER — HOSPITAL ENCOUNTER (OUTPATIENT)
Dept: RADIOLOGY | Facility: HOSPITAL | Age: 55
Discharge: HOME | End: 2025-05-27
Attending: NEUROLOGICAL SURGERY
Payer: MEDICAID

## 2025-05-27 VITALS
WEIGHT: 269.19 LBS | SYSTOLIC BLOOD PRESSURE: 120 MMHG | HEART RATE: 97 BPM | BODY MASS INDEX: 47.7 KG/M2 | DIASTOLIC BLOOD PRESSURE: 85 MMHG | HEIGHT: 63 IN

## 2025-05-27 DIAGNOSIS — G89.29 CHRONIC MIDLINE LOW BACK PAIN WITHOUT SCIATICA: ICD-10-CM

## 2025-05-27 DIAGNOSIS — Z98.1 S/P LUMBAR FUSION: Primary | ICD-10-CM

## 2025-05-27 DIAGNOSIS — M54.50 CHRONIC MIDLINE LOW BACK PAIN WITHOUT SCIATICA: ICD-10-CM

## 2025-05-27 DIAGNOSIS — Z98.1 S/P LUMBAR FUSION: ICD-10-CM

## 2025-05-27 DIAGNOSIS — M70.61 TROCHANTERIC BURSITIS OF RIGHT HIP: ICD-10-CM

## 2025-05-27 DIAGNOSIS — G89.29 CHRONIC LOW BACK PAIN, UNSPECIFIED BACK PAIN LATERALITY, UNSPECIFIED WHETHER SCIATICA PRESENT: ICD-10-CM

## 2025-05-27 DIAGNOSIS — M54.50 CHRONIC LOW BACK PAIN, UNSPECIFIED BACK PAIN LATERALITY, UNSPECIFIED WHETHER SCIATICA PRESENT: ICD-10-CM

## 2025-05-27 PROCEDURE — 72110 X-RAY EXAM L-2 SPINE 4/>VWS: CPT | Mod: TC,FY

## 2025-05-27 PROCEDURE — 3079F DIAST BP 80-89 MM HG: CPT | Mod: CPTII,,, | Performed by: NEUROLOGICAL SURGERY

## 2025-05-27 PROCEDURE — 72110 X-RAY EXAM L-2 SPINE 4/>VWS: CPT | Mod: 26,,, | Performed by: RADIOLOGY

## 2025-05-27 PROCEDURE — 3008F BODY MASS INDEX DOCD: CPT | Mod: CPTII,,, | Performed by: NEUROLOGICAL SURGERY

## 2025-05-27 PROCEDURE — 4010F ACE/ARB THERAPY RXD/TAKEN: CPT | Mod: CPTII,,, | Performed by: NEUROLOGICAL SURGERY

## 2025-05-27 PROCEDURE — 3074F SYST BP LT 130 MM HG: CPT | Mod: CPTII,,, | Performed by: NEUROLOGICAL SURGERY

## 2025-05-27 PROCEDURE — 99215 OFFICE O/P EST HI 40 MIN: CPT | Mod: PBBFAC,25,PN | Performed by: NEUROLOGICAL SURGERY

## 2025-05-27 PROCEDURE — 1159F MED LIST DOCD IN RCRD: CPT | Mod: CPTII,,, | Performed by: NEUROLOGICAL SURGERY

## 2025-05-27 PROCEDURE — 99999 PR PBB SHADOW E&M-EST. PATIENT-LVL V: CPT | Mod: PBBFAC,,, | Performed by: NEUROLOGICAL SURGERY

## 2025-05-27 PROCEDURE — 99214 OFFICE O/P EST MOD 30 MIN: CPT | Mod: S$PBB,,, | Performed by: NEUROLOGICAL SURGERY

## 2025-05-27 NOTE — PROGRESS NOTES
NEUROSURGICAL PROGRESS NOTE    DATE OF SERVICE:  05/27/2025    ATTENDING PHYSICIAN:  Dane Salazar MD    SUBJECTIVE:    INTERIM HISTORY:    This is a very pleasant 54 y.o. female, she is 10 months status post L5-S1 anterior interbody fusion with posterior instrumentation.  She had a right SI joint injection in September 2025 that gave her pain relief for less than 24 hours.  Her main complaint is right hip pain when she sits for prolonged period of time.  She also can not lay on her right side due to right hip pain.  Pain is reproduced by palpation.  She also complains of midline lumbosacral pain when she sits for long period of time.  She denies having radicular leg pain anymore.  She has gained weight in the last 6 months.  She is sedentary.  She is not working.                  PAST MEDICAL HISTORY:  Active Ambulatory Problems     Diagnosis Date Noted    Lipoma of arm 12/06/2019    Quadriceps weakness 10/05/2020    Gait abnormality 10/05/2020    Decreased range of motion (ROM) of left knee 10/05/2020    Decreased functional mobility 10/05/2020    Acute pain of left knee 12/17/2020    Knee pain 01/11/2021    Degenerative tear of left medial meniscus     Degenerative tear of lateral meniscus, left     Morbid obesity with BMI of 40.0-44.9, adult     Difficulty walking 01/13/2021    Status post arthroscopy of knee 02/02/2021    Primary osteoarthritis of right knee 04/28/2022    Rotator cuff impingement syndrome of right shoulder 06/15/2022    Biceps tendinitis of right upper extremity 06/15/2022    Depression     Diabetes mellitus     Hypertension     Fibromyalgia     Gastroesophageal reflux disease without esophagitis 10/10/2022    Personal history of TIA (transient ischemic attack) 10/11/2022    Mild intermittent asthma without complication 10/11/2022    GATITO (obstructive sleep apnea) 10/11/2022    Normocytic anemia 10/12/2022    Decreased range of motion of right shoulder 10/14/2022    Decreased strength of upper  extremity 10/14/2022    Hypertrophy of nasal turbinates 11/05/2019    Pain in right knee 10/31/2022    Decreased range of motion (ROM) of right knee 10/31/2022    Status post right knee replacement 11/23/2022    Primary osteoarthritis of left knee 07/12/2023    Bilateral leg edema 07/31/2023    Chronic low back pain without sciatica 07/31/2023    Chronic, continuous use of opioids 07/31/2023    ICH (intracerebral hemorrhage) 08/11/2023    New daily persistent headache 08/12/2023    Status post left knee replacement 10/19/2023    Lumbar disc herniation with radiculopathy 03/28/2024    BMI 40.0-44.9, adult 03/28/2024    Foraminal stenosis of lumbosacral region 07/18/2024    Recurrent herniation of lumbar disc 07/18/2024    S/P lumbar spinal fusion 08/30/2024    SI (sacroiliac) joint dysfunction 09/23/2024    Left facial numbness 04/23/2025     Resolved Ambulatory Problems     Diagnosis Date Noted    Left anterior shoulder pain 02/06/2020    Tear of right supraspinatus tendon 06/15/2022     Past Medical History:   Diagnosis Date    Allergy     Anemia     Anxiety     Asthma     Diabetes mellitus, type 2     GERD (gastroesophageal reflux disease)     Stroke     Vertigo        PAST SURGICAL HISTORY:  Past Surgical History:   Procedure Laterality Date    ANTERIOR LUMBAR INTERBODY FUSION (ALIF) Left 07/18/2024    Procedure: FUSION, SPINE, LUMBAR, ALIF;  Surgeon: Dane Salazar MD;  Location: [facility] OR;  Service: Neurosurgery;  Laterality: Left;  Procedure:L5-S1 ALiF conduit, Depuy  Length of procedure: 1.5 hours  Anesthesia:General  Blood:Type and screen  Radiology:C-arm  Brace:LSO  Bed:Regular Bed  Position: Lateral right down  Equipment:Depuy-Bonifacio    ARTHROSCOPIC REPAIR OF ROTATOR CUFF OF SHOULDER Right 09/06/2022    Procedure: REPAIR, ROTATOR CUFF, ARTHROSCOPIC;  Surgeon: Ministerio Orr Jr., MD;  Location: [facility] OR;  Service: Orthopedics;  Laterality: Right;  need opus system  jose roberto notifed CC 8/29     ARTHROSCOPY OF KNEE Left 2021    Procedure: ARTHROSCOPY, KNEE;  Surgeon: Donnie Campuzano MD;  Location: [facility] OR;  Service: Orthopedics;  Laterality: Left;     SECTION      DECOMPRESSION OF SUBACROMIAL SPACE  2022    Procedure: DECOMPRESSION, SUBACROMIAL SPACE;  Surgeon: Ministerio Orr Jr., MD;  Location: [facility] OR;  Service: Orthopedics;;    EXCISION OF MASS OF EXTREMITY Left 2019    Procedure: EXCISION, MASS, EXTREMITY;  Surgeon: Honorio Pulido IV, MD;  Location: [facility] OR;  Service: Orthopedics;  Laterality: Left;  excision lipoma  Request Lacie    FUSION OF SPINE WITH INSTRUMENTATION N/A 2024    Procedure: FUSION, SPINE, WITH INSTRUMENTATION;  Surgeon: Dane Salazar MD;  Location: [facility];  Service: Neurosurgery;  Laterality: N/A;  Procedure:L5-S1 Viper prime instrumentaion  Length of procedure: 1.5 hours  LOS: 3 nights  Anesthesia:General  Blood:Type and screen  Radiology:[facility], [facility]  Brace:LSO  Bed:[place] [address] Poster  Position:Prone  Equipment:Depuy-Bonifacoi    HERNIA REPAIR      HYSTERECTOMY      INJECTION OF STEROID Right 2024    Procedure: INJECTION, STEROID;  Surgeon: Dane Salazar MD;  Location: [facility];  Service: Neurosurgery;  Laterality: Right;  Procedure:Right SI joint injection steriod and block  Length of procedure: 30 minutes  Anesthesia:MAC  Radiology:C-arm  Bed:Regular Bed  Position:Prone    KNEE ARTHROSCOPY W/ MENISCECTOMY  2021    Procedure: ARTHROSCOPY, KNEE, WITH MENISCECTOMY;  Surgeon: Donnie Campuzano MD;  Location: [facility];  Service: Orthopedics;;    LUMBAR LAMINECTOMY WITH DISCECTOMY Right 2024    Procedure: LAMINECTOMY, SPINE, LUMBAR, WITH DISCECTOMY;  Surgeon: Dane Salazar MD;  Location: [facility];  Service: Neurosurgery;  Laterality: Right;  Procedure:Right L5-S1 laminotomy and radiculopathy  Length of procedure: 1.5 hours  LOS: 0-1 night  Anesthesia:General  Radiology:C-arm  Microscope:Metrx  Bed:[place] [address]  Poster  Position:Prone    NASAL SEPTOPLASTY      RECONSTRUCTION OF ACROMIOCLAVICULAR JOINT  09/06/2022    Procedure: RECONSTRUCTION, ACROMIOCLAVICULAR JOINT;  Surgeon: Ministerio Orr Jr., MD;  Location: [facility];  Service: Orthopedics;;    TOTAL KNEE ARTHROPLASTY Right 10/26/2022    Procedure: ARTHROPLASTY, KNEE, TOTAL RIGHT: BALDO - NEXGEN;  Surgeon: Nolberto Fraser MD;  Location: [facility] OR;  Service: Orthopedics;  Laterality: Right;    TOTAL KNEE ARTHROPLASTY Left 08/07/2023    Procedure: ARTHROPLASTY, KNEE, TOTAL: LEFT: BALDO NEXGEN;  Surgeon: Nolberto Fraser MD;  Location: [facility] OR;  Service: Orthopedics;  Laterality: Left;       SOCIAL HISTORY:   Social History[1]    FAMILY HISTORY:  Family History   Problem Relation Name Age of Onset    No Known Problems Mother      Hypertension Father      Hypertension Sister      No Known Problems Sister      No Known Problems Brother      No Known Problems Daughter      No Known Problems Daughter      No Known Problems Daughter      No Known Problems Son         CURRENTS MEDICATIONS:  Medications Ordered Prior to Encounter[2]    ALLERGIES:  Review of patient's allergies indicates:   Allergen Reactions    Adhesive Blisters     Specifically EKG lead pads    Chlorhexidine hcl Itching     Chlorhexidine wipes    Morphine Other (See Comments)     shake    Adhesive tape-silicones Itching and Rash    Meloxicam Nausea And Vomiting       REVIEW OF SYSTEMS:  Review of Systems   Constitutional:  Negative for diaphoresis, fever and weight loss.   Respiratory:  Negative for shortness of breath.    Cardiovascular:  Negative for chest pain.   Gastrointestinal:  Negative for blood in stool.   Genitourinary:  Negative for hematuria.   Endo/Heme/Allergies:  Does not bruise/bleed easily.   All other systems reviewed and are negative.        OBJECTIVE:    PHYSICAL EXAMINATION:   Vitals:    05/27/25 1323   BP: 120/85   Pulse: 97       Physical Exam:  Vitals reviewed.    Constitutional: She  appears well-developed and well-nourished.     Eyes: Pupils are equal, round, and reactive to light. Conjunctivae and EOM are normal.     Cardiovascular: Normal distal pulses and no edema.     Abdominal: Soft.     Skin: Skin displays no rash on trunk and no rash on extremities. Skin displays no lesions on trunk and no lesions on extremities.     Psych/Behavior: She is alert. She is oriented to person, place, and time. She has a normal mood and affect.     Neurological:        DTRs: Tricep reflexes are 2+ on the right side and 2+ on the left side. Bicep reflexes are 2+ on the right side and 2+ on the left side. Brachioradialis reflexes are 2+ on the right side and 2+ on the left side. Patellar reflexes are 2+ on the right side and 2+ on the left side. Achilles reflexes are 2+ on the right side and 2+ on the left side.       Right Hip Exam     Tenderness   The patient is experiencing tenderness in the greater trochanter.      Back Exam     Muscle Strength   Right Quadriceps:  5/5   Left Quadriceps:  5/5   Right Hamstrings:  5/5   Left Hamstrings:  5/5             SI joint:   Palpation at the right and left SI joints not painful  GLORIA test is negative bilaterally  Gaenslen test is negative bilaterally  Thigh thrust test is negative bilaterally    Neurological Exam  Mental Status  Alert. Oriented to person, place, and time. Speech is normal.    Cranial Nerves  CN III, IV, VI: Extraocular movements intact bilaterally. Pupils equal round and reactive to light bilaterally.    Motor   Normal muscle tone.                                               Right                     Left  Deltoid                                   5                          5   Biceps                                   5                          5   Triceps                                  5                          5   Interossei                              5                          5   Iliopsoas                               5                           5   Quadriceps                           5                          5   Hamstring                             5                          5   Gastrocnemius                     5                           5   Anterior tibialis                      5                          5   Posterior tibialis                    5                          5   Peroneal                               5                          5    Sensory  Light touch is normal in upper and lower extremities. Pinprick is normal in upper and lower extremities.     Reflexes                                            Right                      Left  Brachioradialis                    2+                         2+  Biceps                                 2+                         2+  Triceps                                2+                         2+  Patellar                                2+                         2+  Achilles                                2+                         2+  Right Plantar: normal  Left Plantar: normal    Right pathological reflexes: Miriam's absent. Ankle clonus absent.  Left pathological reflexes: Miriam's absent. Ankle clonus absent.        DIAGNOSTIC DATA:  I personally interpreted the following imaging:   Lumbar flexion-extension x-rays shows normal alignment, L5-S1 fusion appears consolidated    ASSESSMENT:  This is a 54 y.o. female with     Problem List Items Addressed This Visit          Orthopedic    Chronic low back pain without sciatica    Relevant Orders    IR [place] Lumbar     Other Visit Diagnoses         S/P lumbar fusion    -  Primary    Relevant Orders    IR [place] Lumbar      Chronic low back pain, unspecified back pain laterality, unspecified whether sciatica present          Trochanteric bursitis of right hip        Relevant Orders    Ambulatory referral/consult to Orthopedics              PLAN:  Possible right hip trochanteric bursitis.  Referral to orthopedic for right trochanteric bursa  injection.  Referral to Interventional Radiology for L4-5 epidural steroid injection for chronic midline low back pain.  All questions answered.    More than 50% of the time was spent on discussing conservative management treatments (medication, physical therapy exercises) and possible interventions (spinal injections and surgical procedures). Care coordination was discussed.    30 min        Dane Salazar MD  Cell:[phone]           [1]   Social History  Socioeconomic History    Marital status:    Tobacco Use    Smoking status: Former     Passive exposure: Past    Smokeless tobacco: Never   Substance and Sexual Activity    Alcohol use: Yes     Comment: occasional    Drug use: Never    Sexual activity: Yes     Partners: Male     Social Drivers of Health     Financial Resource Strain: High Risk (3/12/2025)    Overall Financial Resource Strain (CARDIA)     Difficulty of Paying Living Expenses: Very hard   Food Insecurity: Food Insecurity Present (3/12/2025)    Hunger Vital Sign     Worried About Running Out of Food in the Last Year: Sometimes true     Ran Out of Food in the Last Year: Sometimes true   Transportation Needs: No Transportation Needs (3/12/2025)    PRAPARE - Transportation     Lack of Transportation (Medical): No     Lack of Transportation (Non-Medical): No   Physical Activity: Inactive (3/12/2025)    Exercise Vital Sign     Days of Exercise per Week: 0 days     Minutes of Exercise per Session: 0 min   Stress: Stress Concern Present (3/12/2025)    [employer] - Occupational Stress Questionnaire     Feeling of Stress : Very much   Housing Stability: Low Risk  (3/12/2025)    Housing Stability Vital Sign     Unable to Pay for Housing in the Last Year: No     Number of Times Moved in the Last Year: 1     Homeless in the Last Year: No   [2]   Current Outpatient Medications on File Prior to Visit   Medication Sig Dispense Refill    albuterol (PROVENTIL/VENTOLIN HFA) 90  "mcg/actuation inhaler Inhale 2 puffs into the lungs every 4 (four) hours as needed.      ARIPiprazole (ABILIFY) 5 MG Tab Take 5 mg by mouth every evening.      aspirin (ECOTRIN) 81 MG EC tablet Take 1 tablet by mouth once daily.      azelastine (ASTELIN) 137 mcg (0.1 %) nasal spray 1 spray once daily.      BD ALINE 2ND GEN PEN NEEDLE 32 gauge x 5/32" Ndle USE ONE NEEDLE ONCE A WEEK      busPIRone (BUSPAR) 15 MG tablet Take 1 tablet (15 mg total) by mouth 3 (three) times daily. 90 tablet 0    celecoxib (CELEBREX) 200 MG capsule Take 1 capsule (200 mg total) by mouth 2 (two) times daily. 60 capsule 6    cetirizine (ZYRTEC) 10 MG tablet Take 10 mg by mouth every evening.      chlorthalidone (HYGROTEN) 25 MG Tab Take 1 tablet (25 mg total) by mouth once daily. 30 tablet 0    cholecalciferol, vitamin D3, 1,250 mcg (50,000 unit) capsule Take 50,000 Units by mouth every 7 days.      diclofenac sodium (VOLTAREN) 1 % Gel Apply 2 g topically 4 (four) times daily. 20 g 0    DULoxetine (CYMBALTA) 20 MG capsule Take 20 mg by mouth once daily.      EASY TOUCH ALCOHOL PREP PADS PadM USE TO prepare FOR glucose testing      EPINEPHrine (EPIPEN) 0.3 mg/0.3 mL AtIn as per administration instructions      ergocalciferol (ERGOCALCIFEROL) 50,000 unit Cap Take 50,000 Units by mouth every 7 days.      FEROSUL 325 mg (65 mg iron) Tab tablet Take by mouth every other day.      fezolinetant (VEOZAH) 45 mg Tab Take 45 mg by mouth Daily. 30 tablet 11    FLOWFLEX COVID-19 AG HOME TEST Kit       fluticasone propionate (FLONASE) 50 mcg/actuation nasal spray 2 sprays by Nasal route.      folic acid (FOLVITE) 1 MG tablet Take 1,000 mcg by mouth.      LIDOcaine (LIDODERM) 5 % Place 1 patch onto the skin once daily. Remove & Discard patch within 12 hours or as directed by MD 6 patch 0    loratadine (CLARITIN) 10 mg tablet Take 1 tablet (10 mg total) by mouth once daily. 30 tablet 11    meclizine (ANTIVERT) 25 mg tablet Take 1 tablet (25 mg total) by " mouth 3 (three) times daily as needed. 30 tablet 2    montelukast (SINGULAIR) 10 mg tablet       nicotine polacrilex (NICORETTE) 4 MG Gum Take 1 each (4 mg total) by mouth as needed. 100 each 5    olmesartan (BENICAR) 20 MG tablet Take 1 tablet (20 mg total) by mouth once daily. 30 tablet 0    ondansetron (ZOFRAN-ODT) 8 MG TbDL Take 8 mg by mouth every 8 (eight) hours as needed.      ONETOUCH DELICA PLUS LANCET 33 gauge Misc Apply topically 4 (four) times daily.      ONETOUCH ULTRA2 METER Misc SMARTSIG:Via Meter As Directed      ONETOSolle [employer]aturals VERIO TEST STRIPS Strp 1 strip 4 (four) times daily.      oxyCODONE (ROXICODONE) 5 MG immediate release tablet Take 1 tablet (5 mg total) by mouth every 8 (eight) hours as needed for Pain. 21 tablet 0    pantoprazole (PROTONIX) 40 MG tablet Take 40 mg by mouth.      pregabalin (LYRICA) 200 MG Cap Take 1 capsule (200 mg total) by mouth 2 (two) times daily. 60 capsule 2    rosuvastatin (CRESTOR) 20 MG tablet Take 20 mg by mouth once daily.      semaglutide (OZEMPIC) 0.25 mg or 0.5 mg (2 mg/3 mL) pen injector Inject 0.25 mg into the skin every 7 (seven) days      semaglutide (OZEMPIC) 1 mg/dose (4 mg/3 mL) Inject 1 mg into the skin every 7 days. 9 mL 3    senna-docusate 8.6-50 mg (SENNA WITH DOCUSATE SODIUM) 8.6-50 mg per tablet Take 1 tablet by mouth once daily. 30 tablet 0    STOOL SOFTENER 100 mg capsule Take 1 softgel by mouth twice daily 60 capsule 11    TRELEGY ELLIPTA 200-62.5-25 mcg inhaler Inhale 1 puff into the lungs once daily.      metFORMIN (GLUCOPHAGE-XR) 750 MG ER 24hr tablet Take 750 mg by mouth once daily.      topiramate (TOPAMAX) 50 MG tablet Take 0.5 tablets (25 mg total) by mouth once daily for 7 days, THEN 1 tablet (50 mg total) once daily for 7 days, THEN 1 tablet (50 mg total) 2 (two) times daily for 7 days. 25 tablet 0     Current Facility-Administered Medications on File Prior to Visit   Medication Dose Route Frequency Provider Last Rate Last Admin    fentaNYL  50 mcg/mL injection 100 mcg  100 mcg Intravenous PRN Neno Horton PA-C   25 mcg at 08/07/23 1234    lactated ringers infusion   Intravenous Continuous Adrienne Johnson DNP        midazolam (VERSED) 1 mg/mL injection 1 mg  1 mg Intravenous PRN Neno Horton PA-C   2 mg at 08/07/23 1120    ropivacaine 0.2% [facility] PainPRO Pump infusion 500 ML   Perineural Continuous Neno Horton PA-C   New Bag at 08/07/23 1513

## 2025-05-28 ENCOUNTER — TELEPHONE (OUTPATIENT)
Dept: SPORTS MEDICINE | Facility: CLINIC | Age: 55
End: 2025-05-28
Payer: MEDICAID

## 2025-05-28 NOTE — TELEPHONE ENCOUNTER
----- Message from GRACIA Carter sent at 5/27/2025  4:27 PM CDT -----  Can  you assist this patient in getting scheduled? Priti will not see her.  ----- Message -----  From: Finn Altamirano MA  Sent: 5/27/2025   2:42 PM CDT  To: [facility] - [address] Clinical Suppor#    Good Afternoon,Can someone please contact this patient to get her scheduled for her hip pain

## 2025-05-31 DIAGNOSIS — E11.9 TYPE 2 DIABETES MELLITUS WITHOUT COMPLICATIONS: ICD-10-CM

## 2025-06-02 DIAGNOSIS — Z96.653 S/P TOTAL KNEE REPLACEMENT, BILATERAL: Primary | ICD-10-CM

## 2025-06-06 ENCOUNTER — TELEPHONE (OUTPATIENT)
Dept: INTERVENTIONAL RADIOLOGY/VASCULAR | Facility: CLINIC | Age: 55
End: 2025-06-06
Payer: MEDICAID

## 2025-06-10 RX ORDER — SEMAGLUTIDE 1.34 MG/ML
INJECTION, SOLUTION SUBCUTANEOUS
Qty: 3 ML | Refills: 5 | Status: SHIPPED | OUTPATIENT
Start: 2025-06-10

## 2025-06-20 ENCOUNTER — HOSPITAL ENCOUNTER (OUTPATIENT)
Dept: INTERVENTIONAL RADIOLOGY/VASCULAR | Facility: HOSPITAL | Age: 55
Discharge: HOME | End: 2025-06-20
Attending: NEUROLOGICAL SURGERY
Payer: MEDICAID

## 2025-06-20 VITALS
DIASTOLIC BLOOD PRESSURE: 81 MMHG | OXYGEN SATURATION: 100 % | HEART RATE: 67 BPM | RESPIRATION RATE: 20 BRPM | SYSTOLIC BLOOD PRESSURE: 141 MMHG

## 2025-06-20 DIAGNOSIS — Z98.1 S/P LUMBAR FUSION: ICD-10-CM

## 2025-06-20 DIAGNOSIS — G89.29 CHRONIC MIDLINE LOW BACK PAIN WITHOUT SCIATICA: ICD-10-CM

## 2025-06-20 DIAGNOSIS — M54.50 CHRONIC MIDLINE LOW BACK PAIN WITHOUT SCIATICA: ICD-10-CM

## 2025-06-20 PROCEDURE — 63600175 PHARM REV CODE 636 W HCPCS: Performed by: RADIOLOGY

## 2025-06-20 RX ORDER — METHYLPREDNISOLONE ACETATE 40 MG/ML
INJECTION, SUSPENSION INTRA-ARTICULAR; INTRALESIONAL; INTRAMUSCULAR; SOFT TISSUE
Status: COMPLETED | OUTPATIENT
Start: 2025-06-20 | End: 2025-06-20

## 2025-06-20 RX ORDER — BUPIVACAINE HYDROCHLORIDE 2.5 MG/ML
INJECTION, SOLUTION EPIDURAL; INFILTRATION; INTRACAUDAL; PERINEURAL
Status: COMPLETED | OUTPATIENT
Start: 2025-06-20 | End: 2025-06-20

## 2025-06-20 RX ADMIN — BUPIVACAINE HYDROCHLORIDE 5 ML: 2.5 INJECTION, SOLUTION EPIDURAL; INFILTRATION; INTRACAUDAL; PERINEURAL at 10:06

## 2025-06-20 RX ADMIN — METHYLPREDNISOLONE ACETATE 80 MG: 40 INJECTION, SUSPENSION INTRA-ARTICULAR; INTRALESIONAL; INTRAMUSCULAR; SOFT TISSUE at 10:06

## 2025-06-20 NOTE — PROCEDURES
Radiology Post-Procedure Note    Pre Op Diagnosis: LBP    Post Op Diagnosis: Same    Procedure: Lumbar interlaminal [place]    Procedure performed by: Honorio Wood MD    Written Informed Consent Obtained: Yes    Specimen Removed: NO    Estimated Blood Loss: Minimal    Findings:     Level injected: L4-L5  Needle used: 20 gauge  Dose:  80 mg Depo-methylprednisolone   2 mL  Bupivicaine 0.25% MPF    Patient tolerated procedure well.    Honorio Wood MD  Department of Radiology

## 2025-06-20 NOTE — PLAN OF CARE
Pt tolerated procedure well, coccyx dressing c/d/I, went over discharged instruction, no question or concerns noted

## 2025-06-20 NOTE — PLAN OF CARE
Pt arrived to [facility] rm 190 for tc lumbar, safety/comfort measure implemented, fall risk discuss, pt verbalized understanding, transfer to table, warm blankets provided prn, connected to monitor.

## 2025-06-20 NOTE — H&P
"Interventional Neuroradiology Pre-procedure Note    History of Present Illness:  Per chart" t 54 y.o. female, she is 10 months status post L5-S1 anterior interbody fusion with posterior instrumentation.  She had a right SI joint injection in 2025 that gave her pain relief for less than 24 hours.  Her main complaint is right hip pain when she sits for prolonged period of time.  She also can not lay on her right side due to right hip pain.  Pain is reproduced by palpation.  She also complains of midline lumbosacral pain when she sits for long period of time.  She denies having radicular leg pain anymore.  She has gained weight in the last 6 months.  She is sedentary.  She is not working.    "     Past Medical History:   Diagnosis Date    Allergy     Anemia     Anxiety     Asthma     denies- on outside problem list in Care Everywhere    Depression     Diabetes mellitus     Diabetes mellitus, type 2     Fibromyalgia     GERD (gastroesophageal reflux disease)     Hypertension     Stroke     TIA    Vertigo      Past Surgical History:   Procedure Laterality Date    ANTERIOR LUMBAR INTERBODY FUSION (ALIF) Left 2024    Procedure: FUSION, SPINE, LUMBAR, ALIF;  Surgeon: Dane Salazar MD;  Location: [facility] OR;  Service: Neurosurgery;  Laterality: Left;  Procedure:L5-S1 ALiF conduit, Depuy  Length of procedure: 1.5 hours  Anesthesia:General  Blood:Type and screen  Radiology:C-arm  Brace:LSO  Bed:Regular Bed  Position: Lateral right down  Equipment:Depuy-Bonifacio    ARTHROSCOPIC REPAIR OF ROTATOR CUFF OF SHOULDER Right 2022    Procedure: REPAIR, ROTATOR CUFF, ARTHROSCOPIC;  Surgeon: Ministerio Orr Jr., MD;  Location: [facility] OR;  Service: Orthopedics;  Laterality: Right;  need opus system  jose roberto notifed CC     ARTHROSCOPY OF KNEE Left 2021    Procedure: ARTHROSCOPY, KNEE;  Surgeon: Donnie Campuzano MD;  Location: [facility] OR;  Service: Orthopedics;  Laterality: Left;     SECTION      " DECOMPRESSION OF SUBACROMIAL SPACE  09/06/2022    Procedure: DECOMPRESSION, SUBACROMIAL SPACE;  Surgeon: Ministerio Orr Jr., MD;  Location: [facility] OR;  Service: Orthopedics;;    EXCISION OF MASS OF EXTREMITY Left 12/06/2019    Procedure: EXCISION, MASS, EXTREMITY;  Surgeon: Honorio Pulido IV, MD;  Location: [facility] OR;  Service: Orthopedics;  Laterality: Left;  excision lipoma  Request Lacie    FUSION OF SPINE WITH INSTRUMENTATION N/A 07/18/2024    Procedure: FUSION, SPINE, WITH INSTRUMENTATION;  Surgeon: Dane Salazar MD;  Location: [facility] OR;  Service: Neurosurgery;  Laterality: N/A;  Procedure:L5-S1 Viper prime instrumentaion  Length of procedure: 1.5 hours  LOS: 3 nights  Anesthesia:General  Blood:Type and screen  Radiology:[facility], Ziehm  Brace:LSO  Bed:[place] [address] Poster  Position:Prone  Equipment:Depuy-Bonifacio    HERNIA REPAIR      HYSTERECTOMY      INJECTION OF STEROID Right 09/23/2024    Procedure: INJECTION, STEROID;  Surgeon: Dane Salazar MD;  Location: [facility];  Service: Neurosurgery;  Laterality: Right;  Procedure:Right SI joint injection steriod and block  Length of procedure: 30 minutes  Anesthesia:MAC  Radiology:C-arm  Bed:Regular Bed  Position:Prone    KNEE ARTHROSCOPY W/ MENISCECTOMY  01/11/2021    Procedure: ARTHROSCOPY, KNEE, WITH MENISCECTOMY;  Surgeon: Donnie Campuzano MD;  Location: [facility];  Service: Orthopedics;;    LUMBAR LAMINECTOMY WITH DISCECTOMY Right 03/28/2024    Procedure: LAMINECTOMY, SPINE, LUMBAR, WITH DISCECTOMY;  Surgeon: Dane Salazar MD;  Location: [facility];  Service: Neurosurgery;  Laterality: Right;  Procedure:Right L5-S1 laminotomy and radiculopathy  Length of procedure: 1.5 hours  LOS: 0-1 night  Anesthesia:General  Radiology:C-arm  Microscope:Metrx  Bed:[place] [address] Poster  Position:Prone    NASAL SEPTOPLASTY      RECONSTRUCTION OF ACROMIOCLAVICULAR JOINT  09/06/2022    Procedure: RECONSTRUCTION, ACROMIOCLAVICULAR JOINT;  Surgeon: Ministerio Orr Jr.,  "MD;  Location: [facility] OR;  Service: Orthopedics;;    TOTAL KNEE ARTHROPLASTY Right 10/26/2022    Procedure: ARTHROPLASTY, KNEE, TOTAL RIGHT: BALDO - NEXGEN;  Surgeon: Nolberto Fraser MD;  Location: [facility] OR;  Service: Orthopedics;  Laterality: Right;    TOTAL KNEE ARTHROPLASTY Left 08/07/2023    Procedure: ARTHROPLASTY, KNEE, TOTAL: LEFT: BALDO NEXGEN;  Surgeon: Nolberto Fraser MD;  Location: [facility] OR;  Service: Orthopedics;  Laterality: Left;       Review of Systems:   As documented in primary team H&P    Home Meds:   Prior to Admission medications    Medication Sig Start Date End Date Taking? Authorizing Provider   albuterol (PROVENTIL/VENTOLIN HFA) 90 mcg/actuation inhaler Inhale 2 puffs into the lungs every 4 (four) hours as needed. 6/10/22   Provider, Historical   ARIPiprazole (ABILIFY) 5 MG Tab Take 5 mg by mouth every evening.    Provider, Historical   aspirin (ECOTRIN) 81 MG EC tablet Take 1 tablet by mouth once daily. 3/15/24   Provider, Historical   azelastine (ASTELIN) 137 mcg (0.1 %) nasal spray 1 spray once daily. 5/3/22   Provider, Historical   BD ALINE 2ND GEN PEN NEEDLE 32 gauge x 5/32" Ndle USE ONE NEEDLE ONCE A WEEK 8/22/23   Provider, Historical   busPIRone (BUSPAR) 15 MG tablet Take 1 tablet (15 mg total) by mouth 3 (three) times daily. 12/30/24 12/30/25  Bárbara Mahtis MD   celecoxib (CELEBREX) 200 MG capsule Take 1 capsule (200 mg total) by mouth 2 (two) times daily. 4/30/25   Dane Salazar MD   cetirizine (ZYRTEC) 10 MG tablet Take 10 mg by mouth every evening. 7/17/23   Provider, Historical   chlorthalidone (HYGROTEN) 25 MG Tab Take 1 tablet (25 mg total) by mouth once daily. 3/12/25 3/12/26  Bárbara Mathis MD   cholecalciferol, vitamin D3, 1,250 mcg (50,000 unit) capsule Take 50,000 Units by mouth every 7 days. 4/17/23   Provider, Historical   diclofenac sodium (VOLTAREN) 1 % Gel Apply 2 g topically 4 (four) times daily. 3/24/24   Arleen Croft PA-C   DULoxetine (CYMBALTA) " 20 MG capsule Take 20 mg by mouth once daily.    Provider, Historical   EASY TOUCH ALCOHOL PREP PADS PadM USE TO prepare FOR glucose testing 8/3/23   Provider, Historical   EPINEPHrine (EPIPEN) 0.3 mg/0.3 mL AtIn as per administration instructions    Provider, Historical   ergocalciferol (ERGOCALCIFEROL) 50,000 unit Cap Take 50,000 Units by mouth every 7 days. 5/3/22   Provider, Historical   FEROSUL 325 mg (65 mg iron) Tab tablet Take by mouth every other day. 7/10/23   Provider, Historical   fezolinetant (VEOZAH) 45 mg Tab Take 45 mg by mouth Daily. 12/30/24   Bárbara Mathis MD   FLOWFLEX COVID-19 AG HOME TEST Kit  8/21/23   Provider, Historical   fluticasone propionate (FLONASE) 50 mcg/actuation nasal spray 2 sprays by Nasal route. 5/13/23   Provider, Historical   folic acid (FOLVITE) 1 MG tablet Take 1,000 mcg by mouth. 7/7/23   Provider, Historical   LIDOcaine (LIDODERM) 5 % Place 1 patch onto the skin once daily. Remove & Discard patch within 12 hours or as directed by MD 3/24/24   Arleen Croft PA-C   loratadine (CLARITIN) 10 mg tablet Take 1 tablet (10 mg total) by mouth once daily. 4/23/25   Bárbara Mathis MD   meclizine (ANTIVERT) 25 mg tablet Take 1 tablet (25 mg total) by mouth 3 (three) times daily as needed. 4/30/25   Bárbara Mathis MD   metFORMIN (GLUCOPHAGE-XR) 750 MG ER 24hr tablet Take 750 mg by mouth once daily. 7/11/23   Provider, Historical   montelukast (SINGULAIR) 10 mg tablet  9/2/23   Provider, Historical   nicotine polacrilex (NICORETTE) 4 MG Gum Take 1 each (4 mg total) by mouth as needed. 2/28/25   Bárbara Mathis MD   olmesartan (BENICAR) 20 MG tablet Take 1 tablet (20 mg total) by mouth once daily. 3/12/25 3/12/26  Bárbara Mathis MD   ondansetron (ZOFRAN-ODT) 8 MG TbDL Take 8 mg by mouth every 8 (eight) hours as needed.    Provider, Historical   ONETOUCH DELICA PLUS LANCET 33 gauge Misc Apply topically 4 (four) times daily. 8/18/23   Provider, Historical  "  ONETOUCH ULTRA2 METER Misc SMARTSIG:Via Meter As Directed 6/5/23   Provider, Historical   ONETOUCH VERIO TEST STRIPS Strp 1 strip 4 (four) times daily. 7/7/23   Provider, Historical   oxyCODONE (ROXICODONE) 5 MG immediate release tablet Take 1 tablet (5 mg total) by mouth every 8 (eight) hours as needed for Pain. 4/30/25   Dane Salazar MD   pantoprazole (PROTONIX) 40 MG tablet Take 40 mg by mouth. 10/18/23   Provider, Historical   pregabalin (LYRICA) 200 MG Cap Take 1 capsule (200 mg total) by mouth 2 (two) times daily. 4/30/25 7/29/25  Dane Salazar MD   rosuvastatin (CRESTOR) 20 MG tablet Take 20 mg by mouth once daily.    Provider, Historical   semaglutide (OZEMPIC) 1 mg/dose (4 mg/3 mL) INJECT 1 MILLIGRAM UNDER THE SKIN ONCE EVERY 7 DAYS 6/10/25   Bárbara Mathis MD   senna-docusate 8.6-50 mg (SENNA WITH DOCUSATE SODIUM) 8.6-50 mg per tablet Take 1 tablet by mouth once daily. 8/6/23   Donna Toussaint NP   STOOL SOFTENER 100 mg capsule Take 1 softgel by mouth twice daily 10/31/22   Rajni Olson, PAChandrakantC   topiramate (TOPAMAX) 50 MG tablet Take 0.5 tablets (25 mg total) by mouth once daily for 7 days, THEN 1 tablet (50 mg total) once daily for 7 days, THEN 1 tablet (50 mg total) 2 (two) times daily for 7 days. 3/19/25 4/9/25  Bárbara Mathis MD   TRELEGY ELLIPTA 200-62.5-25 mcg inhaler Inhale 1 puff into the lungs once daily. 7/1/24   Provider, Historical     Scheduled Meds:   Continuous Infusions:   PRN Meds:  Anticoagulants/Antiplatelets: no anticoagulation    Allergies:   Review of patient's allergies indicates:   Allergen Reactions    Adhesive Blisters     Specifically EKG lead pads    Chlorhexidine hcl Itching     Chlorhexidine wipes    Morphine Other (See Comments)     shake    Adhesive tape-silicones Itching and Rash    Meloxicam Nausea And Vomiting     Sedation Hx: have not been any systemic reactions    Labs:  No results for input(s): "INR", "PT", "PTT" in the last 168 hours.  No " "results for input(s): "WBC", "HGB", "HCT", "MCV", "PLT" in the last 168 hours. No results for input(s): "GLU", "NA", "K", "CL", "CO2", "BUN", "CREATININE", "CALCIUM", "MG", "ALT", "AST", "ALBUMIN", "BILITOT", "BILIDIR" in the last 168 hours.      Vitals:        Physical Exam:    General: no acute distress  Mental Status: alert and oriented to person, place and time  HEENT: normocephalic, atraumatic  Chest: unlabored breathing  Heart: regular heart rate  Abdomen: nondistended  Extremity: moves all extremities      Plan:  Sedation Plan: Local anesthesia  Patient will undergo: Patient will undergo L4-5 intra-laminar epidural steroid & lidocaine injection         Radha Doshi MD  Fellow, NeuroEndovascular Surgery, [facility] Dmitri Dodson    "

## 2025-06-20 NOTE — PLAN OF CARE
Kirsten called concerned when she removed the tetragrip stocking yesterday she noticed a blister just above the wound.  She is concerned because it's \"red & warm\".  Kirsten is scheduled to be seen in wound clinic on 4/21/21.  I advised her I don't have a provider in the office today so I recommend that she either call her PCP for an appt today & go to the walk or urgent care.  She is still taking the Doxycycline from last 4/7/21 prescribed by wound care provider but she states, \"it looks worse\".  Asked her to have staff at wherever she goes to take a picture & upload to her chart as this will help the wound care provider visualize what's going on.  Forwarding to Provider as an FYI.         Pt arrived to [facility] [address] for tc lumbar, safety/comfort measure implemented, fall risk discuss, pt verbalized understanding, transfer to table, warm blankets provided prn, connected to monitor, anesthesia providing care during procedure.

## 2025-06-20 NOTE — DISCHARGE INSTRUCTIONS
Interventional Radiology Clinic     For complications  [phone]. Monday - Friday, 8:00 am - 4:00 pm   [phone] After hours and on holidays. Ask to speak with the interventional radiologist on call.     For scheduling  [phone] Monday - Friday, 8:00 am - 4:00 pm

## 2025-06-20 NOTE — H&P
Radiology Minor Procedure Note    Procedure Requested: AUSTIN    History of Present Illness:  Candy Huynh is a 54 y.o. female with history of low back pain.  Past Medical History:   Diagnosis Date    Allergy     Anemia     Anxiety     Asthma     denies- on outside problem list in Care Everywhere    Depression     Diabetes mellitus     Diabetes mellitus, type 2     Fibromyalgia     GERD (gastroesophageal reflux disease)     Hypertension     Stroke     TIA    Vertigo      Past Surgical History:   Procedure Laterality Date    ANTERIOR LUMBAR INTERBODY FUSION (ALIF) Left 2024    Procedure: FUSION, SPINE, LUMBAR, ALIF;  Surgeon: Dane Salazar MD;  Location: [facility] OR;  Service: Neurosurgery;  Laterality: Left;  Procedure:L5-S1 ALiF conduit, Depuy  Length of procedure: 1.5 hours  Anesthesia:General  Blood:Type and screen  Radiology:C-arm  Brace:LSO  Bed:Regular Bed  Position: Lateral right down  Equipment:Depuy-Bonifacio    ARTHROSCOPIC REPAIR OF ROTATOR CUFF OF SHOULDER Right 2022    Procedure: REPAIR, ROTATOR CUFF, ARTHROSCOPIC;  Surgeon: Ministerio Orr Jr., MD;  Location: [facility] OR;  Service: Orthopedics;  Laterality: Right;  need opus system  Sikh notifed CC     ARTHROSCOPY OF KNEE Left 2021    Procedure: ARTHROSCOPY, KNEE;  Surgeon: Donnie Campuzano MD;  Location: [facility] OR;  Service: Orthopedics;  Laterality: Left;     SECTION      DECOMPRESSION OF SUBACROMIAL SPACE  2022    Procedure: DECOMPRESSION, SUBACROMIAL SPACE;  Surgeon: Ministerio Orr Jr., MD;  Location: [facility] OR;  Service: Orthopedics;;    EXCISION OF MASS OF EXTREMITY Left 2019    Procedure: EXCISION, MASS, EXTREMITY;  Surgeon: Honorio Pulido IV, MD;  Location: [facility] OR;  Service: Orthopedics;  Laterality: Left;  excision lipoma  Request Lacie    FUSION OF SPINE WITH INSTRUMENTATION N/A 2024    Procedure: FUSION, SPINE, WITH INSTRUMENTATION;  Surgeon: Dane Salazar MD;  Location: [facility] OR;  Service:  Neurosurgery;  Laterality: N/A;  Procedure:L5-S1 Viper prime instrumentaion  Length of procedure: 1.5 hours  LOS: 3 nights  Anesthesia:General  Blood:Type and screen  Radiology:[facility] Reagan, Ziehm  Brace:LSO  Bed:[place] [address] Poster  Position:Prone  Equipment:Depuy-Bonifacio    HERNIA REPAIR      HYSTERECTOMY      INJECTION OF STEROID Right 09/23/2024    Procedure: INJECTION, STEROID;  Surgeon: Dane Salazar MD;  Location: [facility]IN [facility]NA[facility];  Service: Neurosurgery;  Laterality: Right;  Procedure:Right SI joint injection steriod and block  Length of procedure: 30 minutes  Anesthesia:MAC  Radiology:C-arm  Bed:Regular Bed  Position:Prone    KNEE ARTHROSCOPY W/ MENISCECTOMY  01/11/2021    Procedure: ARTHROSCOPY, KNEE, WITH MENISCECTOMY;  Surgeon: Donnie Campuzano MD;  Location: [facility] OR;  Service: Orthopedics;;    LUMBAR LAMINECTOMY WITH DISCECTOMY Right 03/28/2024    Procedure: LAMINECTOMY, SPINE, LUMBAR, WITH DISCECTOMY;  Surgeon: Dane Salazar MD;  Location: [facility] OR;  Service: Neurosurgery;  Laterality: Right;  Procedure:Right L5-S1 laminotomy and radiculopathy  Length of procedure: 1.5 hours  LOS: 0-1 night  Anesthesia:General  Radiology:C-arm  Microscope:Metrx  Bed:[place] [address] Poster  Position:Prone    NASAL SEPTOPLASTY      RECONSTRUCTION OF ACROMIOCLAVICULAR JOINT  09/06/2022    Procedure: RECONSTRUCTION, ACROMIOCLAVICULAR JOINT;  Surgeon: Ministerio Orr Jr., MD;  Location: [facility] OR;  Service: Orthopedics;;    TOTAL KNEE ARTHROPLASTY Right 10/26/2022    Procedure: ARTHROPLASTY, KNEE, TOTAL RIGHT: BALDO - NEXGEN;  Surgeon: Nolberto Fraser MD;  Location: [facility] OR;  Service: Orthopedics;  Laterality: Right;    TOTAL KNEE ARTHROPLASTY Left 08/07/2023    Procedure: ARTHROPLASTY, KNEE, TOTAL: LEFT: BALDO NEXGEN;  Surgeon: Nolberto Fraser MD;  Location: [facility] OR;  Service: Orthopedics;  Laterality: Left;       Allergies:   Review of patient's allergies indicates:   Allergen Reactions    Adhesive Blisters      "Specifically EKG lead pads    Chlorhexidine hcl Itching     Chlorhexidine wipes    Morphine Other (See Comments)     shake    Adhesive tape-silicones Itching and Rash    Meloxicam Nausea And Vomiting       Current Inpatient Meds:   Home Meds:   Prior to Admission medications    Medication Sig Start Date End Date Taking? Authorizing Provider   albuterol (PROVENTIL/VENTOLIN HFA) 90 mcg/actuation inhaler Inhale 2 puffs into the lungs every 4 (four) hours as needed. 6/10/22   Provider, Historical   ARIPiprazole (ABILIFY) 5 MG Tab Take 5 mg by mouth every evening.    Provider, Historical   aspirin (ECOTRIN) 81 MG EC tablet Take 1 tablet by mouth once daily. 3/15/24   Provider, Historical   azelastine (ASTELIN) 137 mcg (0.1 %) nasal spray 1 spray once daily. 5/3/22   Provider, Historical   BD ALINE 2ND GEN PEN NEEDLE 32 gauge x 5/32" Ndle USE ONE NEEDLE ONCE A WEEK 8/22/23   Provider, Historical   busPIRone (BUSPAR) 15 MG tablet Take 1 tablet (15 mg total) by mouth 3 (three) times daily. 12/30/24 12/30/25  Bárbara Mathis MD   celecoxib (CELEBREX) 200 MG capsule Take 1 capsule (200 mg total) by mouth 2 (two) times daily. 4/30/25   Dane Salazar MD   cetirizine (ZYRTEC) 10 MG tablet Take 10 mg by mouth every evening. 7/17/23   Provider, Historical   chlorthalidone (HYGROTEN) 25 MG Tab Take 1 tablet (25 mg total) by mouth once daily. 3/12/25 3/12/26  Bárbara Mathis MD   cholecalciferol, vitamin D3, 1,250 mcg (50,000 unit) capsule Take 50,000 Units by mouth every 7 days. 4/17/23   Provider, Historical   diclofenac sodium (VOLTAREN) 1 % Gel Apply 2 g topically 4 (four) times daily. 3/24/24   Arleen Croft PA-C   DULoxetine (CYMBALTA) 20 MG capsule Take 20 mg by mouth once daily.    Provider, Historical   EASY TOUCH ALCOHOL PREP PADS PadM USE TO prepare FOR glucose testing 8/3/23   Provider, Historical   EPINEPHrine (EPIPEN) 0.3 mg/0.3 mL AtIn as per administration instructions    Provider, Historical "   ergocalciferol (ERGOCALCIFEROL) 50,000 unit Cap Take 50,000 Units by mouth every 7 days. 5/3/22   Provider, Historical   FEROSUL 325 mg (65 mg iron) Tab tablet Take by mouth every other day. 7/10/23   Provider, Historical   fezolinetant (VEOZAH) 45 mg Tab Take 45 mg by mouth Daily. 12/30/24   Bárbara Mathis MD   FLOWFLEX COVID-19 AG HOME TEST Kit  8/21/23   Provider, Historical   fluticasone propionate (FLONASE) 50 mcg/actuation nasal spray 2 sprays by Nasal route. 5/13/23   Provider, Historical   folic acid (FOLVITE) 1 MG tablet Take 1,000 mcg by mouth. 7/7/23   Provider, Historical   LIDOcaine (LIDODERM) 5 % Place 1 patch onto the skin once daily. Remove & Discard patch within 12 hours or as directed by MD 3/24/24   Arleen Croft PA-C   loratadine (CLARITIN) 10 mg tablet Take 1 tablet (10 mg total) by mouth once daily. 4/23/25   Bárbara Mathis MD   meclizine (ANTIVERT) 25 mg tablet Take 1 tablet (25 mg total) by mouth 3 (three) times daily as needed. 4/30/25   Bárbara Mathis MD   metFORMIN (GLUCOPHAGE-XR) 750 MG ER 24hr tablet Take 750 mg by mouth once daily. 7/11/23   Provider, Historical   montelukast (SINGULAIR) 10 mg tablet  9/2/23   Provider, Historical   nicotine polacrilex (NICORETTE) 4 MG Gum Take 1 each (4 mg total) by mouth as needed. 2/28/25   Bárbara Mathis MD   olmesartan (BENICAR) 20 MG tablet Take 1 tablet (20 mg total) by mouth once daily. 3/12/25 3/12/26  Bárbara Mathis MD   ondansetron (ZOFRAN-ODT) 8 MG TbDL Take 8 mg by mouth every 8 (eight) hours as needed.    Provider, Historical   ONETOUCH DELICA PLUS LANCET 33 gauge Misc Apply topically 4 (four) times daily. 8/18/23   Provider, Historical   ONETOUCH ULTRA2 METER Misc SMARTSIG:Via Meter As Directed 6/5/23   Provider, Historical   ONETOUCH VERIO TEST STRIPS Strp 1 strip 4 (four) times daily. 7/7/23   Provider, Historical   oxyCODONE (ROXICODONE) 5 MG immediate release tablet Take 1 tablet (5 mg total) by mouth every  8 (eight) hours as needed for Pain. 4/30/25   Dane Salazar MD   pantoprazole (PROTONIX) 40 MG tablet Take 40 mg by mouth. 10/18/23   Provider, Historical   pregabalin (LYRICA) 200 MG Cap Take 1 capsule (200 mg total) by mouth 2 (two) times daily. 4/30/25 7/29/25  Dane Salazar MD   rosuvastatin (CRESTOR) 20 MG tablet Take 20 mg by mouth once daily.    Provider, Historical   semaglutide (OZEMPIC) 1 mg/dose (4 mg/3 mL) INJECT 1 MILLIGRAM UNDER THE SKIN ONCE EVERY 7 DAYS 6/10/25   Bárbara Mathis MD   senna-docusate 8.6-50 mg (SENNA WITH DOCUSATE SODIUM) 8.6-50 mg per tablet Take 1 tablet by mouth once daily. 8/6/23   Donna Toussaint NP   STOOL SOFTENER 100 mg capsule Take 1 softgel by mouth twice daily 10/31/22   Rajni Olson, LEONARD   topiramate (TOPAMAX) 50 MG tablet Take 0.5 tablets (25 mg total) by mouth once daily for 7 days, THEN 1 tablet (50 mg total) once daily for 7 days, THEN 1 tablet (50 mg total) 2 (two) times daily for 7 days. 3/19/25 4/9/25  Bárbara Mathis MD   TRECHARLAGY ELLIPTA 200-62.5-25 mcg inhaler Inhale 1 puff into the lungs once daily. 7/1/24   Provider, Historical      Anticoagulants/Antiplatelets: no anticoagulation Aspirin has been held > 5 days    Labs:  Lab Results   Component Value Date    INR 1.0 08/12/2023       Lab Results   Component Value Date    WBC 4.80 04/21/2025    HGB 12.1 04/21/2025    HCT 38 04/21/2025    MCV 89 04/21/2025     04/21/2025      Lab Results   Component Value Date    GLU 98 04/21/2025     04/21/2025    K 4.6 04/21/2025     04/21/2025    CO2 26 04/21/2025    BUN 11 04/21/2025    CREATININE 0.8 04/21/2025    CALCIUM 9.2 04/21/2025    MG 2.0 08/11/2023    ALT 24 04/21/2025    AST 19 04/21/2025    ALBUMIN 3.5 04/21/2025       Objective:  Vitals:     PE:  Strength: equal bilaterally    Plan: Interlaminar AUSTIN at L4-5. Informed consent obtained. Local Sedation    Honorio Wood MD  Department of Radiology

## 2025-06-24 ENCOUNTER — PATIENT MESSAGE (OUTPATIENT)
Dept: ADMINISTRATIVE | Facility: HOSPITAL | Age: 55
End: 2025-06-24
Payer: MEDICAID

## 2025-06-24 ENCOUNTER — PATIENT OUTREACH (OUTPATIENT)
Dept: ADMINISTRATIVE | Facility: HOSPITAL | Age: 55
End: 2025-06-24
Payer: MEDICAID

## 2025-06-24 NOTE — PROGRESS NOTES
Health Maintenance Due   Topic Date Due    Foot Exam  Never done    Diabetic Eye Exam  Never done    Sign Pain Contract  Never done    Complete Opioid Risk Tool  Never done    Colorectal Cancer Screening  Never done    Hemoglobin A1c  06/30/2025     Spoke with Ms. Gupta she stated that [facility] does not take her insurance

## 2025-06-30 ENCOUNTER — OFFICE VISIT (OUTPATIENT)
Dept: PRIMARY CARE CLINIC | Facility: CLINIC | Age: 55
End: 2025-06-30
Payer: MEDICAID

## 2025-06-30 VITALS
HEIGHT: 63 IN | SYSTOLIC BLOOD PRESSURE: 126 MMHG | HEART RATE: 78 BPM | WEIGHT: 258.19 LBS | DIASTOLIC BLOOD PRESSURE: 84 MMHG | BODY MASS INDEX: 45.75 KG/M2

## 2025-06-30 DIAGNOSIS — B96.89 BACTERIAL SINUSITIS: ICD-10-CM

## 2025-06-30 DIAGNOSIS — M25.562 CHRONIC PAIN OF BOTH KNEES: ICD-10-CM

## 2025-06-30 DIAGNOSIS — J32.9 BACTERIAL SINUSITIS: ICD-10-CM

## 2025-06-30 DIAGNOSIS — J31.0 RHINITIS MEDICAMENTOSA: ICD-10-CM

## 2025-06-30 DIAGNOSIS — G89.29 CHRONIC PAIN OF BOTH KNEES: ICD-10-CM

## 2025-06-30 DIAGNOSIS — Z02.89 ENCOUNTER FOR COMPLETION OF FORM WITH PATIENT: ICD-10-CM

## 2025-06-30 DIAGNOSIS — M25.561 CHRONIC PAIN OF BOTH KNEES: ICD-10-CM

## 2025-06-30 DIAGNOSIS — I10 PRIMARY HYPERTENSION: Primary | ICD-10-CM

## 2025-06-30 DIAGNOSIS — T48.5X5A RHINITIS MEDICAMENTOSA: ICD-10-CM

## 2025-06-30 PROCEDURE — 3074F SYST BP LT 130 MM HG: CPT | Mod: CPTII,,,

## 2025-06-30 PROCEDURE — 1160F RVW MEDS BY RX/DR IN RCRD: CPT | Mod: CPTII,,,

## 2025-06-30 PROCEDURE — 99999 PR PBB SHADOW E&M-EST. PATIENT-LVL IV: CPT | Mod: PBBFAC,,,

## 2025-06-30 PROCEDURE — 1159F MED LIST DOCD IN RCRD: CPT | Mod: CPTII,,,

## 2025-06-30 PROCEDURE — 3008F BODY MASS INDEX DOCD: CPT | Mod: CPTII,,,

## 2025-06-30 PROCEDURE — 3079F DIAST BP 80-89 MM HG: CPT | Mod: CPTII,,,

## 2025-06-30 PROCEDURE — G2211 COMPLEX E/M VISIT ADD ON: HCPCS | Mod: ,,,

## 2025-06-30 PROCEDURE — 99214 OFFICE O/P EST MOD 30 MIN: CPT | Mod: PBBFAC,PN

## 2025-06-30 PROCEDURE — 99215 OFFICE O/P EST HI 40 MIN: CPT | Mod: S$PBB,,,

## 2025-06-30 PROCEDURE — 4010F ACE/ARB THERAPY RXD/TAKEN: CPT | Mod: CPTII,,,

## 2025-06-30 RX ORDER — METHOCARBAMOL 750 MG/1
750 TABLET, FILM COATED ORAL 4 TIMES DAILY
Qty: 40 TABLET | Refills: 0 | Status: SHIPPED | OUTPATIENT
Start: 2025-06-30 | End: 2025-07-10

## 2025-06-30 RX ORDER — AMOXICILLIN AND CLAVULANATE POTASSIUM 875; 125 MG/1; MG/1
1 TABLET, FILM COATED ORAL EVERY 12 HOURS
Qty: 14 TABLET | Refills: 0 | Status: SHIPPED | OUTPATIENT
Start: 2025-06-30 | End: 2025-07-07

## 2025-06-30 RX ORDER — CELECOXIB 200 MG/1
200 CAPSULE ORAL 2 TIMES DAILY
Qty: 60 CAPSULE | Refills: 6 | Status: SHIPPED | OUTPATIENT
Start: 2025-06-30

## 2025-06-30 RX ORDER — CHLORTHALIDONE 25 MG/1
25 TABLET ORAL DAILY
Qty: 90 TABLET | Refills: 3 | Status: SHIPPED | OUTPATIENT
Start: 2025-06-30 | End: 2026-06-30

## 2025-06-30 RX ORDER — OLMESARTAN MEDOXOMIL 20 MG/1
20 TABLET ORAL DAILY
Qty: 90 TABLET | Refills: 3 | Status: SHIPPED | OUTPATIENT
Start: 2025-06-30 | End: 2026-06-30

## 2025-06-30 RX ORDER — AMLODIPINE BESYLATE 10 MG/1
10 TABLET ORAL DAILY
Qty: 90 TABLET | Refills: 3 | Status: SHIPPED | OUTPATIENT
Start: 2025-06-30 | End: 2026-06-30

## 2025-06-30 NOTE — PROGRESS NOTES
"Subjective:       Patient ID: Candy Huynh is a 54 y.o. female.    Chief Complaint: Knee Pain, sinus infection  HPI  Patient new to me, patient come for sinus infection and medication refill    Hypertension  Patient's BP today is 126/84, is unsure of what she's taking, but on chart review she's on amlodipine 10 mg, chlorthalidone 25 mg and olmesartan 20 mg    Patient also complains of sinusitis, with mucus production and facial tenderness.   She has been using astelin nasal spray for "a long time". Also has rigoberto using allergy medicine, she is also unsure of what allergy medications she has been using, on chart review she is on loratadine and Zyrtec, patient states she is not using both but does not know what name of medication she is using.    She also has chronic bilateral knee pain, s/p knee replacement. Patient has been taking Lyrica, gabapentin, Celebrex, methocarbamol and oxycodone. Patient asked for oxycodone today.  I discussed with patient I will not be prescribing opiates.   Patient had appointment with orthopedic last week (6/24/2025), that was a no-show.  Patient states that orthopedic clinic does not take her insurance. Patient was also sent to pain management. Patient states pain management isnt taking her insurance as well.  Patient wants DMV form for disability placard    ROS negative except as above  Objective:      /84 (BP Location: Right arm, Patient Position: Sitting)   Pulse 78   Ht 5' 3" (1.6 m)   Wt 117.1 kg (258 lb 3.2 oz)   BMI 45.74 kg/m²    Physical Exam  Vitals reviewed.   Constitutional:       Appearance: Normal appearance. She is obese.   HENT:      Head: Normocephalic.      Nose:      Comments: Maxillary facial tenderness     Mouth/Throat:      Mouth: Mucous membranes are moist.   Cardiovascular:      Rate and Rhythm: Normal rate and regular rhythm.      Pulses: Normal pulses.      Heart sounds: Normal heart sounds.   Pulmonary:      Effort: Pulmonary effort is normal.    "   Breath sounds: Normal breath sounds. No wheezing or rhonchi.   Abdominal:      General: Abdomen is flat. There is no distension.   Musculoskeletal:         General: No swelling. Normal range of motion.      Cervical back: Normal range of motion.   Skin:     General: Skin is warm.      Coloration: Skin is not jaundiced.   Neurological:      Mental Status: She is alert.         Assessment:       1. Primary hypertension    2. Bacterial sinusitis    3. Chronic pain of both knees    4. Encounter for completion of form with patient    5. Rhinitis medicamentosa        Plan:       1. Primary hypertension (Primary)  Well controlled  Continue taking amlodipine 10 mg, chlorthalidone 25 mg and olmesartan 20 mg, tolerating meds well, no side effects  Patient to check BP at home daily and bring BP log next appt  Counseled on lifestyle changes ( low salt diet, exercise at least 150 mins/week, alcohol cessation)    - amLODIPine (NORVASC) 10 MG tablet; Take 1 tablet (10 mg total) by mouth once daily.  Dispense: 90 tablet; Refill: 3  - chlorthalidone (HYGROTEN) 25 MG Tab; Take 1 tablet (25 mg total) by mouth once daily.  Dispense: 90 tablet; Refill: 3  - olmesartan (BENICAR) 20 MG tablet; Take 1 tablet (20 mg total) by mouth once daily.  Dispense: 90 tablet; Refill: 3    2. Bacterial sinusitis  Patient with sinusitis and facial tenderness. We will send Augmentin to the pharmacy.  - amoxicillin-clavulanate 875-125mg (AUGMENTIN) 875-125 mg per tablet; Take 1 tablet by mouth every 12 (twelve) hours. for 7 days  Dispense: 14 tablet; Refill: 0    3. Chronic pain of both knees  Patient requests opiates. I discussed with patient I will not be prescribing opiates.  Patient was supposed to be seen by orthopedic last week, patient states she did not because she was told that clinic does not take her insurance. Patient also has been sent to pain medicine but has not been able to set up an appointment due to pain Medicine not taking her  insurance.   I counseled patient to call her insurance to see which providers take it.   We will send refill of celecoxib and methocarbamol  - celecoxib (CELEBREX) 200 MG capsule; Take 1 capsule (200 mg total) by mouth 2 (two) times daily.  Dispense: 60 capsule; Refill: 6    4. Encounter for completion of form with patient  Signed DMV for patient to get temporary disability placard.   Patient got upset because she wanted to be a permanent disability, I discussed with patient that since there is no specific plan of care for her knee pain, I will be doing a temporary only. This can be renewed in 1 year.   Patient got upset and raised her voice as she was leaving the room.    5. Rhinitis medicamentosa  Patient with chronic rhinitis. She has been using Astelin spray chronically.  Counseled patient on stopping Astelin spray due to rebound effect.          Follow up in about 3 months (around 9/30/2025).      Satinder Oliva M.D.    I spent a total of 50 minutes on the day of the visit.This includes face to face time and non-face to face time preparing to see the patient (eg, review of tests), obtaining and/or reviewing separately obtained history, documenting clinical information in the electronic or other health record, independently interpreting results and communicating results to the patient/family/caregiver, or care coordinator.

## 2025-07-02 DIAGNOSIS — E11.9 TYPE 2 DIABETES MELLITUS WITHOUT COMPLICATION: ICD-10-CM

## 2025-07-15 ENCOUNTER — PATIENT MESSAGE (OUTPATIENT)
Dept: PRIMARY CARE CLINIC | Facility: CLINIC | Age: 55
End: 2025-07-15
Payer: MEDICAID

## 2025-07-16 ENCOUNTER — HOSPITAL ENCOUNTER (EMERGENCY)
Facility: HOSPITAL | Age: 55
Discharge: HOME | End: 2025-07-16
Attending: STUDENT IN AN ORGANIZED HEALTH CARE EDUCATION/TRAINING PROGRAM
Payer: MEDICAID

## 2025-07-16 ENCOUNTER — APPOINTMENT (OUTPATIENT)
Dept: RADIOLOGY | Facility: HOSPITAL | Age: 55
End: 2025-07-16
Attending: STUDENT IN AN ORGANIZED HEALTH CARE EDUCATION/TRAINING PROGRAM
Payer: MEDICAID

## 2025-07-16 ENCOUNTER — TELEPHONE (OUTPATIENT)
Dept: ORTHOPEDICS | Facility: CLINIC | Age: 55
End: 2025-07-16
Payer: MEDICAID

## 2025-07-16 VITALS
HEART RATE: 74 BPM | RESPIRATION RATE: 20 BRPM | DIASTOLIC BLOOD PRESSURE: 56 MMHG | BODY MASS INDEX: 45.71 KG/M2 | SYSTOLIC BLOOD PRESSURE: 113 MMHG | OXYGEN SATURATION: 98 % | HEIGHT: 63 IN | WEIGHT: 258 LBS | TEMPERATURE: 98 F

## 2025-07-16 DIAGNOSIS — M25.569 KNEE PAIN: ICD-10-CM

## 2025-07-16 PROCEDURE — 96372 THER/PROPH/DIAG INJ SC/IM: CPT | Performed by: STUDENT IN AN ORGANIZED HEALTH CARE EDUCATION/TRAINING PROGRAM

## 2025-07-16 PROCEDURE — 73562 X-RAY EXAM OF KNEE 3: CPT | Mod: TC,FY,LT

## 2025-07-16 PROCEDURE — 25000003 PHARM REV CODE 250: Mod: ER | Performed by: STUDENT IN AN ORGANIZED HEALTH CARE EDUCATION/TRAINING PROGRAM

## 2025-07-16 PROCEDURE — 63600175 PHARM REV CODE 636 W HCPCS: Mod: JZ,TB,ER | Performed by: STUDENT IN AN ORGANIZED HEALTH CARE EDUCATION/TRAINING PROGRAM

## 2025-07-16 PROCEDURE — 99284 EMERGENCY DEPT VISIT MOD MDM: CPT | Mod: 25,ER

## 2025-07-16 PROCEDURE — 73562 X-RAY EXAM OF KNEE 3: CPT | Mod: 26,LT,, | Performed by: SPECIALIST

## 2025-07-16 RX ORDER — OXYCODONE AND ACETAMINOPHEN 5; 325 MG/1; MG/1
1 TABLET ORAL EVERY 6 HOURS PRN
Qty: 6 TABLET | Refills: 0 | Status: SHIPPED | OUTPATIENT
Start: 2025-07-16

## 2025-07-16 RX ORDER — KETOROLAC TROMETHAMINE 30 MG/ML
15 INJECTION, SOLUTION INTRAMUSCULAR; INTRAVENOUS
Status: COMPLETED | OUTPATIENT
Start: 2025-07-16 | End: 2025-07-16

## 2025-07-16 RX ORDER — OXYCODONE AND ACETAMINOPHEN 5; 325 MG/1; MG/1
1 TABLET ORAL
Refills: 0 | Status: COMPLETED | OUTPATIENT
Start: 2025-07-16 | End: 2025-07-16

## 2025-07-16 RX ADMIN — OXYCODONE HYDROCHLORIDE AND ACETAMINOPHEN 1 TABLET: 5; 325 TABLET ORAL at 02:07

## 2025-07-16 RX ADMIN — KETOROLAC TROMETHAMINE 15 MG: 30 INJECTION, SOLUTION INTRAMUSCULAR; INTRAVENOUS at 02:07

## 2025-07-16 NOTE — TELEPHONE ENCOUNTER
Copied from CRM #[id number]. Topic: Appointments - Appointment Scheduling  >> Jul 16, 2025  6:08 AM Rafia wrote:  Type: Appointment Request    Caller is requesting an appointment     Name of Caller: Pt  Reason for appointment: ER Follow Up  Would the patient rather a call back or a response via [facility]? Call back  Best Call Back Number:  [phone]  Additional Information: Please call, Thank You

## 2025-07-16 NOTE — ED NOTES
Pt presents with c/o L knee pain x months that worsened over the last 3 days. Pt denies any recent trauma/injury. Pt states pain is unrelieved by home rx. Pt denies numbness/tingling distal to L knee. Pt does report intermittent tingling to face and states she saw her doctor for it already and states she was not given a diagnosis or treatment plan.

## 2025-07-16 NOTE — ED PROVIDER NOTES
Encounter Date: 7/16/2025       History     Chief Complaint   Patient presents with    Knee Pain     Pt presents with c/o pain to medial aspect of L, knee x3 days; Hx of knee replacement; during triage pt then reports tingling sensation to tongue radiating to L, cheek with reported dizziness; reports tingling and dizziness began around 1550 this afternoon; Hx of vertigo; reports Rx Meclizine has not helped to control pain     HPI    54-year-old female with a history of bilateral TKAs presents to ED for evaluation of 6 months of left knee pain that she notes has been worse for the past 3 days.  She notes she has had a lot of pain with the ambulation.  She describes it as not being able to walk. She walked in here with a cane.  She denies any trauma or other inciting event.  Surgery was originally performed by Dr. Fraser however she now follows with a Dr. Harman Mayers.  She does saw Dr. Mayers on 04/28 who felt the patient is having a quadriceps tendonitis; their plan was anti-inflammatories, therapy, cortisone injections.  Patient notes that she has not not like the care that she has been receiving from Dr. Mayers and wants a different orthopedist.  She denies any fevers, chills, nausea, vomiting, numbness or weakness in the leg.    Review of patient's allergies indicates:   Allergen Reactions    Adhesive Blisters     Specifically EKG lead pads    Chlorhexidine hcl Itching     Chlorhexidine wipes    Morphine Other (See Comments)     shake    Adhesive tape-silicones Itching and Rash    Meloxicam Nausea And Vomiting     Past Medical History:   Diagnosis Date    Allergy     Anemia     Anxiety     Asthma     denies- on outside problem list in Care Everywhere    Depression     Diabetes mellitus     Diabetes mellitus, type 2     Fibromyalgia     GERD (gastroesophageal reflux disease)     Hypertension     Stroke     TIA    Vertigo      Past Surgical History:   Procedure Laterality Date    ANTERIOR LUMBAR INTERBODY FUSION  (ALIF) Left 2024    Procedure: FUSION, SPINE, LUMBAR, ALIF;  Surgeon: Dane Salazar MD;  Location: [facility] OR;  Service: Neurosurgery;  Laterality: Left;  Procedure:L5-S1 ALiF conduit, Depuy  Length of procedure: 1.5 hours  Anesthesia:General  Blood:Type and screen  Radiology:C-arm  Brace:LSO  Bed:Regular Bed  Position: Lateral right down  Equipment:Depuy-Bonifacio    ARTHROSCOPIC REPAIR OF ROTATOR CUFF OF SHOULDER Right 2022    Procedure: REPAIR, ROTATOR CUFF, ARTHROSCOPIC;  Surgeon: Ministerio Orr Jr., MD;  Location: [facility] OR;  Service: Orthopedics;  Laterality: Right;  need opus system  Zoroastrian notifed CC     ARTHROSCOPY OF KNEE Left 2021    Procedure: ARTHROSCOPY, KNEE;  Surgeon: Donnie Campuzano MD;  Location: [facility] OR;  Service: Orthopedics;  Laterality: Left;     SECTION      DECOMPRESSION OF SUBACROMIAL SPACE  2022    Procedure: DECOMPRESSION, SUBACROMIAL SPACE;  Surgeon: Ministerio Orr Jr., MD;  Location: [facility] OR;  Service: Orthopedics;;    EXCISION OF MASS OF EXTREMITY Left 2019    Procedure: EXCISION, MASS, EXTREMITY;  Surgeon: Honorio Pulido IV, MD;  Location: [facility] OR;  Service: Orthopedics;  Laterality: Left;  excision lipoma  Request Lacie    FUSION OF SPINE WITH INSTRUMENTATION N/A 2024    Procedure: FUSION, SPINE, WITH INSTRUMENTATION;  Surgeon: Dane Salazar MD;  Location: [facility] OR;  Service: Neurosurgery;  Laterality: N/A;  Procedure:L5-S1 Viper prime instrumentaion  Length of procedure: 1.5 hours  LOS: 3 nights  Anesthesia:General  Blood:Type and screen  Radiology:[facility], [facility]  Brace:LSO  Bed:[place] [address] Poster  Position:Prone  Equipment:Depuy-Bonifacio    HERNIA REPAIR      HYSTERECTOMY      INJECTION OF STEROID Right 2024    Procedure: INJECTION, STEROID;  Surgeon: Dane Salazar MD;  Location: [facility];  Service: Neurosurgery;  Laterality: Right;  Procedure:Right SI joint injection steriod and block  Length of procedure:  30 minutes  Anesthesia:MAC  Radiology:C-arm  Bed:Regular Bed  Position:Prone    KNEE ARTHROSCOPY W/ MENISCECTOMY  01/11/2021    Procedure: ARTHROSCOPY, KNEE, WITH MENISCECTOMY;  Surgeon: Donnie Campuzano MD;  Location: [facility];  Service: Orthopedics;;    LUMBAR LAMINECTOMY WITH DISCECTOMY Right 03/28/2024    Procedure: LAMINECTOMY, SPINE, LUMBAR, WITH DISCECTOMY;  Surgeon: Dane Salazar MD;  Location: [facility];  Service: Neurosurgery;  Laterality: Right;  Procedure:Right L5-S1 laminotomy and radiculopathy  Length of procedure: 1.5 hours  LOS: 0-1 night  Anesthesia:General  Radiology:C-arm  Microscope:Metrx  Bed:[address]  Position:Prone    NASAL SEPTOPLASTY      RECONSTRUCTION OF ACROMIOCLAVICULAR JOINT  09/06/2022    Procedure: RECONSTRUCTION, ACROMIOCLAVICULAR JOINT;  Surgeon: Ministerio Orr Jr., MD;  Location: [facility];  Service: Orthopedics;;    TOTAL KNEE ARTHROPLASTY Right 10/26/2022    Procedure: ARTHROPLASTY, KNEE, TOTAL RIGHT: BALDO - NEXGEN;  Surgeon: Nolberto Fraser MD;  Location: [facility];  Service: Orthopedics;  Laterality: Right;    TOTAL KNEE ARTHROPLASTY Left 08/07/2023    Procedure: ARTHROPLASTY, KNEE, TOTAL: LEFT: BALDO NEXGEN;  Surgeon: Nolberto Fraser MD;  Location: [facility];  Service: Orthopedics;  Laterality: Left;     Family History   Problem Relation Name Age of Onset    No Known Problems Mother      Hypertension Father      Hypertension Sister      No Known Problems Sister      No Known Problems Brother      No Known Problems Daughter      No Known Problems Daughter      No Known Problems Daughter      No Known Problems Son       Social History[1]  Review of Systems   Constitutional:  Negative for fever.   HENT:  Negative for sore throat.    Respiratory:  Negative for shortness of breath.    Cardiovascular:  Negative for chest pain.   Gastrointestinal:  Negative for nausea.   Genitourinary:  Negative for dysuria.   Musculoskeletal:  Negative for back pain.        (+) left knee  pain   Skin:  Negative for rash.   Neurological:  Negative for weakness.   Hematological:  Does not bruise/bleed easily.       Physical Exam     Initial Vitals [07/16/25 0154]   BP Pulse Resp Temp SpO2   123/82 85 16 98.1 °F (36.7 °C) 100 %      MAP       --         Physical Exam    Constitutional: Vital signs are normal. She appears well-developed and well-nourished.  Non-toxic appearance. She does not have a sickly appearance. She does not appear ill.   HENT:   Head: Normocephalic and atraumatic. Mouth/Throat: Mucous membranes are normal.   Eyes: EOM are normal.   Neck: Neck supple.   Cardiovascular:  Normal rate and regular rhythm.           Pulmonary/Chest: No respiratory distress.   Abdominal: Abdomen is soft. Bowel sounds are normal. There is no abdominal tenderness.   Musculoskeletal:      Cervical back: Neck supple.      Comments: She does not have significant swelling to the left knee; she has some pain with range of motion however I can flex the knee entirely; small amount of tenderness to the medial aspect; neurovascularly intact in the left lower extremity     Neurological: She is alert.   Skin: Skin is warm and dry. No rash noted.   Psychiatric: She has a normal mood and affect.         ED Course   Procedures  Labs Reviewed - No data to display       Imaging Results              X-Ray Knee 3 View Left (In process)                      Medications   ketorolac injection 15 mg (15 mg Intramuscular Given 7/16/25 0250)   oxyCODONE-acetaminophen 5-325 mg per tablet 1 tablet (1 tablet Oral Given 7/16/25 0251)     Medical Decision Making  Amount and/or Complexity of Data Reviewed  Radiology: ordered.    Risk  Prescription drug management.    Vitals unremarkable   Patient is well-appearing and in no acute distress   See the above physical exam  This is the same description of knee pain from the note with Dr. Mayers on 04/28; Dr. Mayers referred her to PT and pain management  The patient would like a CT however  I explained that I do not think this CT will be of significant clinical utility as the patient does not had any trauma or other inciting event of her pain, she has metallic hardware that will cause artifact limiting the utility of the CT, and that her pain has been going on for an extended period of time and that if any imaging is warranted as an MRI and that this should be done on an outpatient basis   Patient was very frustrated by this and requesting another physician or if that there was any well as that she could speak to in his situation  I explained them they only ER physician here and that pain medication and x-rays are all that I can offer her for her chronic knee pain at this time  She had a follow up scheduled with Dr. Mayers on 06/24 as well as 07/28 which she has canceled  She has taken oxycodone, Flexeril and Lyrica for chronic back pain  Given IM Toradol and a dose of p.o. oxycodone  X-rays of the left knee do not appear to show any acute hardware complication  I had a long discussion with the patient   Patient has a amenable to discharge with information for the bone and joint Clinic [facility] on the [facility].  Also placed a referral with the Orthopedics through the referral system.  Discharged with a 6 tablets of oxycodone for pain not controlled with Tylenol and Motrin in the meantime  I emphasized that I do think physical therapy and follow up with the pain management is a good idea on an outpatient basis   Patient verbalized understanding agreement with the plan   Patient is stable for discharge    I discussed with the patient/family the diagnosis, treatment plan, indications for return to the emergency department, and for expected follow-up. The patient/family verbalized an understanding. The patient/family is asked if there are any questions or concerns. We discuss the case, until all issues are addressed to the patient/familys satisfaction. Patient/family understands and is agreeable to the  plan.   Nolberto Luna    DISCLAIMER: This note was prepared with [employer] voice recognition transcription software.                                               Clinical Impression:  Final diagnoses:  [M25.569] Knee pain          ED Disposition Condition    Discharge Stable          ED Prescriptions       Medication Sig Dispense Start Date End Date Auth. Provider    oxyCODONE-acetaminophen (PERCOCET) 5-325 mg per tablet Take 1 tablet by mouth every 6 (six) hours as needed for Pain. 6 tablet 7/16/2025 -- Nolberto Luna MD          Follow-up Information       Follow up With Specialties Details Why Contact Info    Taras Hector III, MD Orthopedic Surgery Schedule an appointment as soon as possible for a visit in 2 days right klnee pain [address] [facility]  [facility] [address]  [phone]                     [1]   Social History  Tobacco Use    Smoking status: Former     Passive exposure: Past    Smokeless tobacco: Never   Substance Use Topics    Alcohol use: Yes     Comment: occasional    Drug use: Never        Nolberto Luna MD  07/16/25 0339

## 2025-07-16 NOTE — DISCHARGE INSTRUCTIONS
You can take oxycodone for pain that is not controlled with alternating a 1000 of Tylenol and 400 mg of Motrin.  You can also do RICE therapy with Rest, Ice, Compression, Elevation. The information to follow up with Dr. Steve is above; should reach out to the clinic to schedule an appointment.  I do believe they take walk-ins on weekend mornings.  You can call to confirm this..  I also placed a referral with the Orthopedics within our system.  They will contact you to schedule a follow up appointment.  I do think PT is a good idea.  I do think if you can follow up with the pain management that he is also a good idea.  Thank you.

## 2025-08-07 ENCOUNTER — OFFICE VISIT (OUTPATIENT)
Facility: CLINIC | Age: 55
End: 2025-08-07
Payer: MEDICAID

## 2025-08-07 VITALS
RESPIRATION RATE: 18 BRPM | HEIGHT: 63 IN | WEIGHT: 257.94 LBS | BODY MASS INDEX: 45.7 KG/M2 | SYSTOLIC BLOOD PRESSURE: 116 MMHG | DIASTOLIC BLOOD PRESSURE: 72 MMHG | TEMPERATURE: 96 F | OXYGEN SATURATION: 97 % | HEART RATE: 78 BPM

## 2025-08-07 DIAGNOSIS — N18.2 CONTROLLED TYPE 2 DIABETES MELLITUS WITH STAGE 2 CHRONIC KIDNEY DISEASE, WITHOUT LONG-TERM CURRENT USE OF INSULIN: ICD-10-CM

## 2025-08-07 DIAGNOSIS — I10 PRIMARY HYPERTENSION: ICD-10-CM

## 2025-08-07 DIAGNOSIS — M25.562 CHRONIC PAIN OF BOTH KNEES: Primary | ICD-10-CM

## 2025-08-07 DIAGNOSIS — J32.9 BACTERIAL SINUSITIS: ICD-10-CM

## 2025-08-07 DIAGNOSIS — B96.89 BACTERIAL SINUSITIS: ICD-10-CM

## 2025-08-07 DIAGNOSIS — M25.562 LEFT KNEE PAIN, UNSPECIFIED CHRONICITY: ICD-10-CM

## 2025-08-07 DIAGNOSIS — M25.561 CHRONIC PAIN OF BOTH KNEES: Primary | ICD-10-CM

## 2025-08-07 DIAGNOSIS — E11.22 CONTROLLED TYPE 2 DIABETES MELLITUS WITH STAGE 2 CHRONIC KIDNEY DISEASE, WITHOUT LONG-TERM CURRENT USE OF INSULIN: ICD-10-CM

## 2025-08-07 DIAGNOSIS — G89.29 CHRONIC PAIN OF BOTH KNEES: Primary | ICD-10-CM

## 2025-08-07 PROCEDURE — 3078F DIAST BP <80 MM HG: CPT | Mod: CPTII,,,

## 2025-08-07 PROCEDURE — 4010F ACE/ARB THERAPY RXD/TAKEN: CPT | Mod: CPTII,,,

## 2025-08-07 PROCEDURE — 1160F RVW MEDS BY RX/DR IN RCRD: CPT | Mod: CPTII,,,

## 2025-08-07 PROCEDURE — 3008F BODY MASS INDEX DOCD: CPT | Mod: CPTII,,,

## 2025-08-07 PROCEDURE — 99999 PR PBB SHADOW E&M-EST. PATIENT-LVL V: CPT | Mod: PBBFAC,,,

## 2025-08-07 PROCEDURE — 99215 OFFICE O/P EST HI 40 MIN: CPT | Mod: PBBFAC,PN

## 2025-08-07 PROCEDURE — 99214 OFFICE O/P EST MOD 30 MIN: CPT | Mod: S$PBB,,,

## 2025-08-07 PROCEDURE — G2211 COMPLEX E/M VISIT ADD ON: HCPCS | Mod: ,,,

## 2025-08-07 PROCEDURE — 1159F MED LIST DOCD IN RCRD: CPT | Mod: CPTII,,,

## 2025-08-07 PROCEDURE — 3074F SYST BP LT 130 MM HG: CPT | Mod: CPTII,,,

## 2025-08-07 RX ORDER — AZELASTINE 1 MG/ML
1 SPRAY, METERED NASAL DAILY
COMMUNITY

## 2025-08-07 RX ORDER — DICLOFENAC POTASSIUM 50 MG/1
50 TABLET, FILM COATED ORAL 2 TIMES DAILY PRN
Qty: 10 TABLET | Refills: 0 | Status: SHIPPED | OUTPATIENT
Start: 2025-08-07 | End: 2025-08-15 | Stop reason: SDUPTHER

## 2025-08-09 ENCOUNTER — PATIENT MESSAGE (OUTPATIENT)
Facility: CLINIC | Age: 55
End: 2025-08-09
Payer: MEDICAID

## 2025-08-13 ENCOUNTER — OFFICE VISIT (OUTPATIENT)
Dept: OTOLARYNGOLOGY | Facility: CLINIC | Age: 55
End: 2025-08-13
Payer: MEDICAID

## 2025-08-13 VITALS
BODY MASS INDEX: 45.7 KG/M2 | DIASTOLIC BLOOD PRESSURE: 89 MMHG | HEIGHT: 63 IN | SYSTOLIC BLOOD PRESSURE: 146 MMHG | WEIGHT: 257.94 LBS

## 2025-08-13 DIAGNOSIS — B96.89 BACTERIAL SINUSITIS: ICD-10-CM

## 2025-08-13 DIAGNOSIS — J32.9 BACTERIAL SINUSITIS: ICD-10-CM

## 2025-08-13 DIAGNOSIS — J32.4 CHRONIC PANSINUSITIS: Primary | ICD-10-CM

## 2025-08-13 PROCEDURE — 3079F DIAST BP 80-89 MM HG: CPT | Mod: CPTII,S$GLB,, | Performed by: NURSE PRACTITIONER

## 2025-08-13 PROCEDURE — 3077F SYST BP >= 140 MM HG: CPT | Mod: CPTII,S$GLB,, | Performed by: NURSE PRACTITIONER

## 2025-08-13 PROCEDURE — 3008F BODY MASS INDEX DOCD: CPT | Mod: CPTII,S$GLB,, | Performed by: NURSE PRACTITIONER

## 2025-08-13 PROCEDURE — 99203 OFFICE O/P NEW LOW 30 MIN: CPT | Mod: 25,S$GLB,, | Performed by: NURSE PRACTITIONER

## 2025-08-13 PROCEDURE — 1159F MED LIST DOCD IN RCRD: CPT | Mod: CPTII,S$GLB,, | Performed by: NURSE PRACTITIONER

## 2025-08-13 PROCEDURE — 4010F ACE/ARB THERAPY RXD/TAKEN: CPT | Mod: CPTII,S$GLB,, | Performed by: NURSE PRACTITIONER

## 2025-08-13 RX ORDER — BUDESONIDE 0.25 MG/2ML
INHALANT ORAL
Qty: 60 ML | Refills: 0 | Status: SHIPPED | OUTPATIENT
Start: 2025-08-13

## 2025-08-15 DIAGNOSIS — M25.562 LEFT KNEE PAIN, UNSPECIFIED CHRONICITY: ICD-10-CM

## 2025-08-15 RX ORDER — DICLOFENAC POTASSIUM 50 MG/1
50 TABLET, FILM COATED ORAL 2 TIMES DAILY PRN
Qty: 20 TABLET | Refills: 0 | Status: SHIPPED | OUTPATIENT
Start: 2025-08-15

## 2025-08-20 ENCOUNTER — HOSPITAL ENCOUNTER (OUTPATIENT)
Dept: RADIOLOGY | Facility: HOSPITAL | Age: 55
Discharge: HOME | End: 2025-08-20
Attending: NURSE PRACTITIONER
Payer: MEDICAID

## 2025-08-20 DIAGNOSIS — J32.4 CHRONIC PANSINUSITIS: ICD-10-CM

## 2025-08-20 PROCEDURE — 70486 CT MAXILLOFACIAL W/O DYE: CPT | Mod: TC

## 2025-08-20 PROCEDURE — 70486 CT MAXILLOFACIAL W/O DYE: CPT | Mod: 26,,, | Performed by: STUDENT IN AN ORGANIZED HEALTH CARE EDUCATION/TRAINING PROGRAM

## 2025-08-22 ENCOUNTER — TELEPHONE (OUTPATIENT)
Facility: CLINIC | Age: 55
End: 2025-08-22
Payer: MEDICAID

## 2025-08-24 ENCOUNTER — OFFICE VISIT (OUTPATIENT)
Dept: URGENT CARE | Facility: CLINIC | Age: 55
End: 2025-08-24
Payer: MEDICAID

## 2025-08-24 VITALS
HEIGHT: 63 IN | OXYGEN SATURATION: 98 % | TEMPERATURE: 99 F | RESPIRATION RATE: 18 BRPM | WEIGHT: 255.94 LBS | SYSTOLIC BLOOD PRESSURE: 130 MMHG | DIASTOLIC BLOOD PRESSURE: 86 MMHG | BODY MASS INDEX: 45.35 KG/M2 | HEART RATE: 78 BPM

## 2025-08-24 DIAGNOSIS — F41.9 ANXIETY: ICD-10-CM

## 2025-08-24 DIAGNOSIS — R51.9 NONINTRACTABLE HEADACHE, UNSPECIFIED CHRONICITY PATTERN, UNSPECIFIED HEADACHE TYPE: ICD-10-CM

## 2025-08-24 DIAGNOSIS — M72.2 PLANTAR FASCIITIS OF LEFT FOOT: ICD-10-CM

## 2025-08-24 DIAGNOSIS — J06.9 VIRAL URI: Primary | ICD-10-CM

## 2025-08-24 DIAGNOSIS — M79.672 LEFT FOOT PAIN: ICD-10-CM

## 2025-08-24 LAB
CTP QC/QA: YES
SARS-COV+SARS-COV-2 AG RESP QL IA.RAPID: NEGATIVE

## 2025-08-24 PROCEDURE — 73630 X-RAY EXAM OF FOOT: CPT | Mod: LT,S$GLB,, | Performed by: RADIOLOGY

## 2025-08-24 PROCEDURE — 99214 OFFICE O/P EST MOD 30 MIN: CPT | Mod: S$GLB,,,

## 2025-08-24 PROCEDURE — 87811 SARS-COV-2 COVID19 W/OPTIC: CPT | Mod: QW,S$GLB,,

## 2025-08-24 RX ORDER — HYDROXYZINE HYDROCHLORIDE 25 MG/1
25 TABLET, FILM COATED ORAL NIGHTLY
Qty: 30 TABLET | Refills: 0 | Status: SHIPPED | OUTPATIENT
Start: 2025-08-24

## 2025-08-24 RX ORDER — KETOROLAC TROMETHAMINE 30 MG/ML
15 INJECTION, SOLUTION INTRAMUSCULAR; INTRAVENOUS
Status: COMPLETED | OUTPATIENT
Start: 2025-08-24 | End: 2025-08-24

## 2025-08-24 RX ORDER — NAPROXEN 500 MG/1
500 TABLET ORAL 2 TIMES DAILY PRN
Qty: 20 TABLET | Refills: 0 | Status: SHIPPED | OUTPATIENT
Start: 2025-08-24

## 2025-08-24 RX ORDER — PROMETHAZINE HYDROCHLORIDE AND DEXTROMETHORPHAN HYDROBROMIDE 6.25; 15 MG/5ML; MG/5ML
5 SYRUP ORAL EVERY 6 HOURS PRN
Qty: 118 ML | Refills: 0 | Status: SHIPPED | OUTPATIENT
Start: 2025-08-24

## 2025-08-24 RX ADMIN — KETOROLAC TROMETHAMINE 15 MG: 30 INJECTION, SOLUTION INTRAMUSCULAR; INTRAVENOUS at 01:08

## 2025-08-26 ENCOUNTER — OFFICE VISIT (OUTPATIENT)
Dept: PODIATRY | Facility: CLINIC | Age: 55
End: 2025-08-26
Payer: MEDICAID

## 2025-08-26 VITALS
WEIGHT: 255.94 LBS | SYSTOLIC BLOOD PRESSURE: 130 MMHG | DIASTOLIC BLOOD PRESSURE: 80 MMHG | HEIGHT: 63 IN | HEART RATE: 76 BPM | BODY MASS INDEX: 45.35 KG/M2

## 2025-08-26 DIAGNOSIS — M79.672 FOOT PAIN, LEFT: Primary | ICD-10-CM

## 2025-08-26 DIAGNOSIS — M20.5X2 HALLUX LIMITUS, ACQUIRED, LEFT: ICD-10-CM

## 2025-08-26 DIAGNOSIS — E11.9 ENCOUNTER FOR COMPREHENSIVE DIABETIC FOOT EXAMINATION, TYPE 2 DIABETES MELLITUS: ICD-10-CM

## 2025-08-26 DIAGNOSIS — M76.822 POSTERIOR TIBIAL TENDONITIS, LEFT: ICD-10-CM

## 2025-08-26 DIAGNOSIS — S96.912A ANKLE STRAIN, LEFT, INITIAL ENCOUNTER: ICD-10-CM

## 2025-08-26 DIAGNOSIS — M21.6X2 ACQUIRED EQUINUS DEFORMITY OF BOTH FEET: ICD-10-CM

## 2025-08-26 DIAGNOSIS — M21.6X1 ACQUIRED EQUINUS DEFORMITY OF BOTH FEET: ICD-10-CM

## 2025-08-26 DIAGNOSIS — M20.5X1 HALLUX LIMITUS, ACQUIRED, RIGHT: ICD-10-CM

## 2025-08-26 PROCEDURE — 99215 OFFICE O/P EST HI 40 MIN: CPT | Mod: PBBFAC,PO | Performed by: PODIATRIST

## 2025-08-26 PROCEDURE — 1160F RVW MEDS BY RX/DR IN RCRD: CPT | Mod: CPTII,,, | Performed by: PODIATRIST

## 2025-08-26 PROCEDURE — 3075F SYST BP GE 130 - 139MM HG: CPT | Mod: CPTII,,, | Performed by: PODIATRIST

## 2025-08-26 PROCEDURE — 3008F BODY MASS INDEX DOCD: CPT | Mod: CPTII,,, | Performed by: PODIATRIST

## 2025-08-26 PROCEDURE — 4010F ACE/ARB THERAPY RXD/TAKEN: CPT | Mod: CPTII,,, | Performed by: PODIATRIST

## 2025-08-26 PROCEDURE — 99204 OFFICE O/P NEW MOD 45 MIN: CPT | Mod: S$PBB,,, | Performed by: PODIATRIST

## 2025-08-26 PROCEDURE — 3079F DIAST BP 80-89 MM HG: CPT | Mod: CPTII,,, | Performed by: PODIATRIST

## 2025-08-26 PROCEDURE — 99999 PR PBB SHADOW E&M-EST. PATIENT-LVL V: CPT | Mod: PBBFAC,,, | Performed by: PODIATRIST

## 2025-08-26 PROCEDURE — 1159F MED LIST DOCD IN RCRD: CPT | Mod: CPTII,,, | Performed by: PODIATRIST

## 2025-08-26 RX ORDER — METHYLPREDNISOLONE 4 MG/1
TABLET ORAL
Qty: 21 TABLET | Refills: 0 | Status: SHIPPED | OUTPATIENT
Start: 2025-08-26

## 2025-09-05 ENCOUNTER — OFFICE VISIT (OUTPATIENT)
Dept: ORTHOPEDICS | Facility: CLINIC | Age: 55
End: 2025-09-05
Attending: ORTHOPAEDIC SURGERY
Payer: MEDICAID

## 2025-09-05 VITALS — HEIGHT: 63 IN | WEIGHT: 255.94 LBS | BODY MASS INDEX: 45.35 KG/M2

## 2025-09-05 DIAGNOSIS — T84.84XA PAIN DUE TO TOTAL RIGHT KNEE REPLACEMENT, INITIAL ENCOUNTER: ICD-10-CM

## 2025-09-05 DIAGNOSIS — Z96.652 PRESENCE OF LEFT ARTIFICIAL KNEE JOINT: Primary | ICD-10-CM

## 2025-09-05 DIAGNOSIS — Z96.652 PAIN DUE TO TOTAL LEFT KNEE REPLACEMENT, INITIAL ENCOUNTER: ICD-10-CM

## 2025-09-05 DIAGNOSIS — T84.84XA PAIN DUE TO TOTAL LEFT KNEE REPLACEMENT, INITIAL ENCOUNTER: ICD-10-CM

## 2025-09-05 DIAGNOSIS — Z96.651 PAIN DUE TO TOTAL RIGHT KNEE REPLACEMENT, INITIAL ENCOUNTER: ICD-10-CM

## 2025-09-05 DIAGNOSIS — Z96.651 PRESENCE OF RIGHT ARTIFICIAL KNEE JOINT: ICD-10-CM

## 2025-09-05 PROCEDURE — 99999 PR PBB SHADOW E&M-EST. PATIENT-LVL III: CPT | Mod: PBBFAC,,, | Performed by: ORTHOPAEDIC SURGERY

## 2025-09-05 PROCEDURE — 99213 OFFICE O/P EST LOW 20 MIN: CPT | Mod: PBBFAC,25,PN | Performed by: ORTHOPAEDIC SURGERY

## 2025-09-05 RX ORDER — TRAMADOL HYDROCHLORIDE 50 MG/1
50-100 TABLET, FILM COATED ORAL EVERY 8 HOURS PRN
Qty: 40 TABLET | Refills: 0 | Status: SHIPPED | OUTPATIENT
Start: 2025-09-05

## (undated) DEVICE — DRESSING AQUACEL SACRAL 8 X 7

## (undated) DEVICE — TAPE ADH MEDIPORE 4 X 10YDS

## (undated) DEVICE — ELECTRODE BLD 1 INCH TEFLON

## (undated) DEVICE — GLOVE SURGICAL LATEX SZ 7

## (undated) DEVICE — MIXER BONE CEMENT

## (undated) DEVICE — SEE MEDLINE ITEM 146313

## (undated) DEVICE — DRAPE TIBURON ORTHOPEDIC SPLIT

## (undated) DEVICE — DRESSING AQUACEL FOAM 5 X 5

## (undated) DEVICE — TUBE SET INFLOW/OUTFLOW

## (undated) DEVICE — GLOVE BIOGEL SKINSENSE PI 8.0

## (undated) DEVICE — SUT 2/0 36IN COATED VICRYL

## (undated) DEVICE — DRAPE C-ARM/MOBILE XRAY 44X80

## (undated) DEVICE — SEE MEDLINE ITEM 157117

## (undated) DEVICE — ADHESIVE DERMABOND ADVANCED

## (undated) DEVICE — SUT VICRYL 0 CT 2 ANTIMICRO

## (undated) DEVICE — GLOVE 8 PROTEXIS PI ORTHO

## (undated) DEVICE — COVER CAMERA OPERATING ROOM

## (undated) DEVICE — Device

## (undated) DEVICE — CLOSURE SKIN STERI STRIP 1/2X4

## (undated) DEVICE — PADDING CAST SOFT-ROLL 6 X 4

## (undated) DEVICE — PACK OPTIMA GEN ORTHO

## (undated) DEVICE — COVER TABLE HVY DTY 60X90IN

## (undated) DEVICE — ADHESIVE MASTISOL VIAL 48/BX

## (undated) DEVICE — SEE MEDLINE ITEM 156953

## (undated) DEVICE — PAD ABDOMINAL 5X9 STERILE

## (undated) DEVICE — SEE L#120831

## (undated) DEVICE — SOL NACL IRR 1000ML BTL

## (undated) DEVICE — DRESSING TEGADERM 2 3/8 X 2.75

## (undated) DEVICE — SUT VICRYL PLUS 0 CT1 18IN

## (undated) DEVICE — PUMP COLD THERAPY

## (undated) DEVICE — DRAPE STERI INSTRUMENT 1018

## (undated) DEVICE — ALCOHOL 70% ANTISEPTIC ISO 4OZ

## (undated) DEVICE — SUT 2-0 ETHILON 18 FS

## (undated) DEVICE — GLOVE BIOGEL PI MICRO INDIC 8

## (undated) DEVICE — ALCOHOL 70% ISOP W/GREEN 16OZ

## (undated) DEVICE — TOWEL OR DISP STRL BLUE 4/PK

## (undated) DEVICE — SUT VICRYL PLUS 2-0 CT1 18

## (undated) DEVICE — TUBING 4-LEAD ARTHOSCOPY

## (undated) DEVICE — SOL IRR NACL .9% 3000ML

## (undated) DEVICE — SUT 1 36IN COATED VICRYL UN

## (undated) DEVICE — GLOVE BIOGEL PI MICRO SZ 7.5

## (undated) DEVICE — SOL BETADINE 5%

## (undated) DEVICE — COVER SNAP KAP 30

## (undated) DEVICE — PAD PREP 50/CA

## (undated) DEVICE — GLOVE 8 PROTEXIS PI BLUE

## (undated) DEVICE — GOWN POLY REINF BRTH SLV XL

## (undated) DEVICE — DRAPE U SPLIT SHEET 54X76IN

## (undated) DEVICE — SUPPORT SLING SHOT II MEDIUM

## (undated) DEVICE — TOWEL OR XRAY WHITE 17X26IN

## (undated) DEVICE — DURAPREP SURG SCRUB 26ML

## (undated) DEVICE — DRESSING SURGICAL 1/2X1/2

## (undated) DEVICE — SUT MONOCRYL PLUS UD 3-0 27

## (undated) DEVICE — WRAP KNEE ACCU THERM GEL PACK

## (undated) DEVICE — SUT CTD VICRYL 2.0

## (undated) DEVICE — TUBING SUC UNIV W/CONN 12FT

## (undated) DEVICE — MANIFOLD 4 PORT

## (undated) DEVICE — GAUZE SPONGE 4X4 12PLY

## (undated) DEVICE — BURR MIS CURVED 3.0MM

## (undated) DEVICE — DRAPE TOP 53X102IN

## (undated) DEVICE — NDL MAYO CAT 1/2 CIR #5

## (undated) DEVICE — NDL HYPO REG 25G X 1 1/2

## (undated) DEVICE — GLOVE BIOGEL ECLIPSE SZ 7

## (undated) DEVICE — TUBE SUCTION KAMVAC MINI 20/BX

## (undated) DEVICE — COVER PROXIMA MAYO STAND

## (undated) DEVICE — WRAP PROTECTIVE LEG POS STRL

## (undated) DEVICE — DRAPE LAP T SHT W/ INSTR PAD

## (undated) DEVICE — DRESSING AQUACEL AG 3.5X10IN

## (undated) DEVICE — COVER BACK TBL HD 2-TIER 72IN

## (undated) DEVICE — NDL SPINAL SPINOCAN 22GX3.5

## (undated) DEVICE — TAPE SILK 3IN

## (undated) DEVICE — WAND COBLATION TURBOVAC 90

## (undated) DEVICE — TOURNIQUET SB QC DP 34X4IN

## (undated) DEVICE — CARTRIDGE SUTURE SMARTSTITCH

## (undated) DEVICE — DRAPE STERI-DRAPE 1000 17X11IN

## (undated) DEVICE — SEE MEDLINE ITEM 157216

## (undated) DEVICE — ELECTRODE REM PLYHSV RETURN 9

## (undated) DEVICE — BLADE ELECTRO EDGE INSULATED

## (undated) DEVICE — DRAPE SHOULDER 160X102IN 10/CA

## (undated) DEVICE — GELATIN SURGIPOWDER ABSORBABLE

## (undated) DEVICE — SUT MONOCRYL 3-0 PS-1

## (undated) DEVICE — SUT VICRYL 2 0 CT 2

## (undated) DEVICE — DRAPE INCISE IOBAN 2 23X23IN

## (undated) DEVICE — DRESSING AQUACEL FOAM 4X4IN

## (undated) DEVICE — SUT MONOCRYL 4-0 PS-2

## (undated) DEVICE — SEE MEDLINE ITEM 157116

## (undated) DEVICE — MASK FLYTE HOOD PEEL AWAY

## (undated) DEVICE — CORD BIPOLAR 12 FOOT

## (undated) DEVICE — TOWEL OR NONABSORB ADH 17X26

## (undated) DEVICE — DRAPE STERILE Z BACK TABLE

## (undated) DEVICE — DRAPE STERI U-SHAPED 47X51IN

## (undated) DEVICE — SEE MEDLINE ITEM 152622

## (undated) DEVICE — GLOVE SENSICARE PI MICRO 7.5

## (undated) DEVICE — PACK SURGERY START

## (undated) DEVICE — BANDAGE ESMARK 6X12

## (undated) DEVICE — APPLICATOR CHLORAPREP ORN 26ML

## (undated) DEVICE — PAD CAST SPECIALIST STRL 6

## (undated) DEVICE — COVER OVERHEAD SURG LT BLUE

## (undated) DEVICE — TRAY FOLEY 16FR INFECTION CONT

## (undated) DEVICE — GLOVE SENSICARE PI ALOE 8

## (undated) DEVICE — KIT TOTAL KNEE TKOFG OMC

## (undated) DEVICE — DRAPE PLASTIC U 60X72

## (undated) DEVICE — DRAPE LEICA MICROSCOPE 48X120

## (undated) DEVICE — PULSAVAC ZIMMER

## (undated) DEVICE — KIT SPINAL PATIENT CARE JACK

## (undated) DEVICE — GOWN POLY REINF BRTH SLV 3XL

## (undated) DEVICE — GLOVE SURG BIOGEL LATEX SZ 7.5

## (undated) DEVICE — HOOD T7 W/ PEEL AWAY LENS

## (undated) DEVICE — SUPPORT ULNA NERVE PROTECTOR

## (undated) DEVICE — PACK BASIC

## (undated) DEVICE — SUT VICRYL PLUS 0 CT1 36IN

## (undated) DEVICE — SEE MEDLINE ITEM 156905

## (undated) DEVICE — GLOVE BIOGEL ORTHOPEDIC 7.5

## (undated) DEVICE — NDL SPINAL 18GX3.5 SPINOCAN

## (undated) DEVICE — GOWN ECLIPSE POLY REINF 3XL

## (undated) DEVICE — SUT CTD VICRYL 1 VIL BR CT

## (undated) DEVICE — SLING SHOT II LARGE

## (undated) DEVICE — NDL 22GA X1 1/2 REG BEVEL

## (undated) DEVICE — GUIDEWIRE SPINAL BLUNT 1.45MM
Type: IMPLANTABLE DEVICE | Site: BACK | Status: NON-FUNCTIONAL
Removed: 2024-07-18

## (undated) DEVICE — GOWN POLY REINF BRTH SLV LG

## (undated) DEVICE — DRESSING AQUACEL AG ADV 3.5X12

## (undated) DEVICE — STAPLER SKIN ROTATING HEAD

## (undated) DEVICE — DRESSING AQUACEL SACRAL 9 X 9

## (undated) DEVICE — BLADE SURG CARBON STEEL #10

## (undated) DEVICE — BLADE SAGITTAL 18 X 1.27 X 90M

## (undated) DEVICE — SEE MEDLINE ITEM 146292

## (undated) DEVICE — SEE MEDLINE ITEM 152530

## (undated) DEVICE — UNDERGLOVES BIOGEL PI SIZE 8

## (undated) DEVICE — BLADE SURG CARBON STEEL SZ11

## (undated) DEVICE — DRAPE THREE-QTR REINF 53X77IN

## (undated) DEVICE — DRAPE T EXTRM SURG 121X128X90

## (undated) DEVICE — GOWN SMARTGOWN 3XL XLONG

## (undated) DEVICE — NDL 18GA X1 1/2 REG BEVEL

## (undated) DEVICE — GOWN SURGICAL XX LARGE X LONG

## (undated) DEVICE — ALCOHOL 70% ISOP RUBBING 4OZ

## (undated) DEVICE — KIT POWDER ABSORBABLE GELATIN

## (undated) DEVICE — PENCIL ROCKER SWITCH 10FT CORD

## (undated) DEVICE — BLADE RECIP DS OFST 70X1X12.5

## (undated) DEVICE — PACK ECLIPSE UNIVERSAL STERILE

## (undated) DEVICE — SUT ETHILON 3/0 18IN PS-1

## (undated) DEVICE — SUT 0 VICRYL / UR6 (J603)

## (undated) DEVICE — SOL NACL IRR 3000ML

## (undated) DEVICE — SUT CTD VICRYL 3-0 CR/SH

## (undated) DEVICE — BLADE RECIP RIBBED

## (undated) DEVICE — DRAPE C-ARMOR EQUIPMENT COVER

## (undated) DEVICE — SEE MEDLINE ITEM 146345

## (undated) DEVICE — INSTRAMOD SHOULDER TRACTION

## (undated) DEVICE — SPONGE COTTON TRAY 4X4IN

## (undated) DEVICE — CONNECTOR TUBING STR 5 IN 1

## (undated) DEVICE — SYR 30CC LUER LOCK

## (undated) DEVICE — DRESSING XEROFORM FOIL PK 1X8

## (undated) DEVICE — SEE MEDLINE ITEM 157131

## (undated) DEVICE — BANDAGE MATRIX HK LOOP 6IN 5YD

## (undated) DEVICE — MARKER SKIN STND TIP BLUE BARR

## (undated) DEVICE — MAT SUCTION PUDDLEVAC ORANGE

## (undated) DEVICE — CONN SMARTSTITCH PERFECTPASSER

## (undated) DEVICE — SEE MEDLINE ITEM 152529

## (undated) DEVICE — SEE MEDLINE ITEM 156955